# Patient Record
Sex: FEMALE | Race: WHITE | NOT HISPANIC OR LATINO | Employment: OTHER | URBAN - NONMETROPOLITAN AREA
[De-identification: names, ages, dates, MRNs, and addresses within clinical notes are randomized per-mention and may not be internally consistent; named-entity substitution may affect disease eponyms.]

---

## 2017-01-27 ENCOUNTER — PROCEDURE VISIT (OUTPATIENT)
Dept: NEUROLOGY | Facility: CLINIC | Age: 52
End: 2017-01-27

## 2017-01-27 VITALS
SYSTOLIC BLOOD PRESSURE: 140 MMHG | DIASTOLIC BLOOD PRESSURE: 96 MMHG | WEIGHT: 160 LBS | HEIGHT: 64 IN | BODY MASS INDEX: 27.31 KG/M2

## 2017-01-27 DIAGNOSIS — M54.2 NECK PAIN: ICD-10-CM

## 2017-01-27 DIAGNOSIS — G43.009 MIGRAINE WITHOUT AURA AND WITHOUT STATUS MIGRAINOSUS, NOT INTRACTABLE: Primary | ICD-10-CM

## 2017-01-27 PROCEDURE — 20553 NJX 1/MLT TRIGGER POINTS 3/>: CPT | Performed by: NURSE PRACTITIONER

## 2017-01-27 RX ORDER — METHYLPREDNISOLONE ACETATE 40 MG/ML
200 INJECTION, SUSPENSION INTRA-ARTICULAR; INTRALESIONAL; INTRAMUSCULAR; SOFT TISSUE ONCE
Status: DISCONTINUED | OUTPATIENT
Start: 2017-01-27 | End: 2018-01-21

## 2017-01-27 RX ORDER — BUPIVACAINE HYDROCHLORIDE 7.5 MG/ML
5 INJECTION, SOLUTION EPIDURAL; RETROBULBAR ONCE
Status: DISCONTINUED | OUTPATIENT
Start: 2017-01-27 | End: 2017-05-12

## 2017-01-27 NOTE — PROGRESS NOTES
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 Risks and benefits of the Trigger point injection procedure have been explained to the patient.  Patient has no contraindications such as bleed diathesis, recent acute trauma at the muscle sites, anesthetic allergy or anticoagulation.  Mechanism of trigger point injection has been explained to patient.     Procedure Note:  1.         Patient was positioned comfortably  2.         Before injections are started, 10 Trigger Points sites are identified throughout the bilateral upper trapezius muscle, levator scapulae, and erector spinae muscles.    3.         Overlying skin area was cleaned with Alcohol swab                                                                                                              4.         Before injection, trigger points sites were palpated for local twitch and referred pain to confirms placement                         5.         Local anesthetic was mixed with Depo-Medrol (5 cc of Marcaine 0.5% mixed with 5 cc of Depo-Medrol at 40 mg/ml - for a total of 10 cc)  6.         30 gauge ½ inch needle was utilized to ensure patient comfort          7.         I started with the most tender spot in above mentioned Trigger Points   1.   Localize most tender spot within taut muscle-fibers  2.   Fix tender spot between fingers (1-2 cm in size)   1.   Prevents from rolling away from needle  2.   Controls subcutaneous bleeding  3.   Before injection, patient was instructed of possible pain on injection  4.   Direct needle at 30 degree angle off skin   8.         Insert needle into skin 1-2 cm from Trigger Point   9.         Advance needle into Trigger Point   10.       Use 1cc anesthetic at each Trigger Points identified in step #2  11.       Redirect needle and reinject                                                                                              1.   Withdraw needle to subcutaneous tissue  2.   Redirect needle  into adjacent tender areas  3.   Repeat until local twitch or tautness resolves  12.   After each of the 10 injections I held direct pressure at injection site for 2 minutes to prevents hematoma formation  13.   The same procedure was repeated for other Tender Points located in the upper trapezius muscle, levator scapulae and erector spinae bilaterally  14.   Patient was instructed to gently stretch injected areas to full active range of motion in all directions and was instructed to repeat range of motion three times after injection  15.   Post-Procedure patient was instructed to avoid over-using injected area for 3-4 days   1.   Maintain active range of motion of injected muscle  2.   Patient applies ice to injected areas for a few hours  3.   Anticipate post-injection soreness for 3-4 days  There was no evidence of complications from injection was noted such as local Skin Infection  at injection site or local hematoma at injection site

## 2017-01-27 NOTE — MR AVS SNAPSHOT
Tatiana Fields   2017 4:30 PM   Procedure visit    Dept Phone:  [phone]   Encounter #:  [id number]    Provider:  SOULEYMANE Avitia   Department:  [facility] NEUROLOGY                Your Full Care Plan              Your Updated Medication List          This list is accurate as of: 17  4:58 PM.  Always use your most recent med list.                amitriptyline 10 MG tablet   Commonly known as:  ELAVIL   Take 1 tablet by mouth Every Night.       carvedilol CR 40 MG 24 hr capsule   Commonly known as:  COREG CR       lisinopril-hydrochlorothiazide 20-12.5 MG per tablet   Commonly known as:  PRINZIDE,ZESTORETIC       omeprazole 40 MG capsule   Commonly known as:  priLOSEC       TiZANidine 2 MG capsule   Commonly known as:  ZANAFLEX       Vitamin B-12 500 MCG sublingual tablet       Vitamin D 2000 UNITS tablet               Instructions     None    Patient Instructions History      Upcoming Appointments     Visit Type Date Time Department    IN OFFICE PROCEDURE 2017  4:30 PM [facility]OLOGY [place]    FOLLOW UP 3/1/2017  2:00 PM [facility]UROLOGY [place]      Printed Piece Signup     [facility] Printed Piece allows you to send messages to your doctor, view your test results, renew your prescriptions, schedule appointments, and more. To sign up, go to [employer] and click on the Sign Up Now link in the New User? box. Enter your Printed Piece Activation Code exactly as it appears below along with the last four digits of your Social Security Number and your Date of Birth () to complete the sign-up process. If you do not sign up before the expiration date, you must request a new code.    Printed Piece Activation Code: [email or web address]  Expires: 2/10/2017  4:58 PM    If you have questions, you can email [email or web address] or call [phone] to talk to our Printed Piece staff. Remember, Printed Piece is NOT to be used for urgent needs. For medical emergencies, dial  "911.               Other Info from Your Visit           Your Appointments     Mar 01, 2017  2:00 PM EST   Follow Up with SOULEYMANE Avitia   [facility] NEUROLOGY (--)    [address] [facility], [address] [address] [phone]           Arrive 15 minutes prior to appointment.              Allergies     Sulfa Antibiotics  Rash      Reason for Visit     Injections here for her first series of trigger injections for headaches. states that she has headache currently.      Vital Signs     Blood Pressure Height Weight Body Mass Index          140/96 64\" (162.6 cm) 160 lb (72.6 kg) 27.46 kg/m2          "

## 2017-03-03 ENCOUNTER — PROCEDURE VISIT (OUTPATIENT)
Dept: NEUROLOGY | Facility: CLINIC | Age: 52
End: 2017-03-03

## 2017-03-03 VITALS
HEIGHT: 64 IN | SYSTOLIC BLOOD PRESSURE: 132 MMHG | WEIGHT: 160 LBS | DIASTOLIC BLOOD PRESSURE: 90 MMHG | BODY MASS INDEX: 27.31 KG/M2

## 2017-03-03 DIAGNOSIS — H66.91 RIGHT OTITIS MEDIA, UNSPECIFIED CHRONICITY, UNSPECIFIED OTITIS MEDIA TYPE: Primary | ICD-10-CM

## 2017-03-03 DIAGNOSIS — G43.009 MIGRAINE WITHOUT AURA AND WITHOUT STATUS MIGRAINOSUS, NOT INTRACTABLE: ICD-10-CM

## 2017-03-03 RX ORDER — AMOXICILLIN AND CLAVULANATE POTASSIUM 875; 125 MG/1; MG/1
1 TABLET, FILM COATED ORAL EVERY 12 HOURS SCHEDULED
Status: DISCONTINUED | OUTPATIENT
Start: 2017-03-03 | End: 2017-05-23

## 2017-03-03 RX ORDER — SUMATRIPTAN 100 MG/1
100 TABLET, FILM COATED ORAL
Qty: 9 TABLET | Refills: 4 | Status: SHIPPED | OUTPATIENT
Start: 2017-03-03 | End: 2018-03-20 | Stop reason: SDUPTHER

## 2017-03-03 NOTE — PROGRESS NOTES
Subjective   Tatiana Fields is a 51 y.o. female     Chief Complaint   Patient presents with   • Follow-up     states that trigger injections helped but she continued to have migraines. not sure if she is going to have them again today. states that they did help some.       History of Present Illness     Pt is a very pleasant 52 yo female in clinic to follow up migraines.  Pt failed BB, elavil, steroidand trigger point injections.  Pt with 15 plus migraines monthly. Migraines mainly right temple start migrate to entire head + pounding + photo/phonophobia, + nausea with loss of work.  Discussed botox in depth with pt and she wishes to try    PAST HX: Pt 52 yo old female in clinic today to establish with [facility] [place] for headaches. Onset 2012 age 48 yrs old. States started feeling bad suddenly , dx HTN, Dx Lyme ds but was treated. Headaches onset same time. Denies head trauma. States migraines posterior and frontal simultaneous with neck pain. States dull ache leads into throbbing gets 7-8/10 States past 3 weeks + migraine continuous. States has blurry vision, difficulty thinking, Nausea with photophobia. Migraines worsened when pt returned to [art time working @ [employer]. + cervical pain + trap and c/o pain to mid back pt with known central stenosis. States was seen by pain clinic and trigger point injections helped in the past.  Currently patient states that she is having 3-4 migraines a week that are lasting anywhere from 8 hours to 48 hours this is well over 15 total days of migraines a month. Patient's pain level is 7-8 out of 10 minutes with medication. Patient also with tension headaches in her neck and posterior head.    The following portions of the patient's history were reviewed and updated as appropriate: allergies, current medications, past family history, past medical history, past social history, past surgical history and problem list.    Review of Systems  Constitutional: Positive for  "activity change and fatigue.   HENT: Positive for sinus pressure.   Chronic sinusitis Sp sinus surgery   Respiratory: Negative.   Cardiovascular: Negative.   Neurological: Positive for headaches.   Psychiatric/Behavioral: Positive for sleep disturbance.         Objective         Vitals:    03/03/17 1640   BP: 132/90   Weight: 160 lb (72.6 kg)   Height: 64\" (162.6 cm)     GENERAL: Patient is pleasant, cooperative, appears to be stated age.   SKIN AND EXTREMITIES: No skin rashes or lesions are noted. No cyanosis, clubbing or edema of the extremities.   HEAD: Head is normocephalic and atraumatic.   NECK: Neck without thyromegaly or adenopathy. Carotic upstrokes are 1+/4. No cranial or cervical bruits. The neck is supple with a decreased range of motion.  Laterally  CARDIOVASCULAR: Regular rate and rhythm with normal S1 and S2 without rub or gallop.  RESPIRATORY: Clear to auscultation without wheezes or crackle   PSYCH: Higher cortical function/mental status: The patient is alert. She is oriented x3 to time, place and person. Recent and the remote memory appear normal. The patient has a good fund of knowledge. There is no visual or auditory hallucination or suicidal or homicidal ideation.  SPEECH:There is no gross evidence of aphasia, dysarthria or agnosia.   CRANIAL NERVES: Extraocular movements are full and smooth with normal pursuits and saccades. No nystagmus noted. The face is symmetric. palate elevate symmetrically, Tongue midline, MOTOR: Strength is 5/5 throughout with normal tone and bulk with the following exceptions No involuntary movements noted.   DTR: are 2/4 and symmetrical in the upper extremities, 2/4 and symmetrical at the knees and 2/4 and symmetrical at the Achilles tendon.   COORDINATION AND GAIT: Coordination: The patient normally performs finger-nose-finger, heel-to-knee-to-shin and rapid alternating movements in symmetrical fashion.         Results for orders placed or performed in visit on " "01/10/14   T3, free   Result Value Ref Range    T3, Free 4.0 2.0 - 4.4 pg/mL   TSH   Result Value Ref Range    TSH, High Sensitivity 2.082 0.350 - 5.350 UIU/mL   Urinalysis Without Microscopic   Result Value Ref Range    Color, UA Yellow     Appearance, UA Clear     pH, UA 5.5 4.5 - 8.0    Specific Gravity, UA 1.019 1.001 - 1.030    Glucose, UA Negative NEGATIVE mg/dL    Ketones, UA Negative NEGATIVE    Bilirubin, UA Negative NEGATIVE    Blood, UA Negative NEGATIVE    Protein, UA Negative NEGATIVE mg/dL    Nitrite, UA Negative NEGATIVE    Leukocytes, UA Negative NEGATIVE    Urobilinogen, UA 0.2 0.2 - 1.0 mg/dL   T4, free   Result Value Ref Range    Free T4 1.61 0.89 - 1.76 ng/dL         I have personally reviewed the above labs     Assessment/Plan   1. Migraine: Patient with greater than 15 migraines a month positive neck pain failed trigger plan and prescription of Elavil at bedtime 10 mg. Prescription for Imitrex reviewed with patient on side effects of Imitrex and how to take Imitrex.  we will put in a request for insurance to cover BOTOX     Discussion:  Migraine headaches are a major public health problem affecting more than 28 million persons in this country. Nearly 25 percent of women and 9 percent of men experience disabling migraines.    Migraine treatment depends on the duration and severity of pain, associated symptoms, degree of disability, and initial response to therapy. A widely prescribed and effective class of medications for migraines is the 5-HT1 receptor-specific agonists (\"triptans\"). Contraindications to their use include ischemic vascular conditions, vasospastic coronary disease, uncontrolled hypertension, or other significant cardiovascular disease.  Following appropriate management of acute migraine, patients should be evaluated for initiation of preventive therapy. Factors that should prompt consideration of preventive therapy include the occurrence of two or more migraines per month with " disability lasting three or more days per month; failure of, contraindication for, or adverse events from acute treatments; use of abortive medication more than twice per week; and uncommon migraine conditions (e.g., hemiplegic migraine, migraine with prolonged aura, migrainous infarction). Patient preference and cost also should be considered. Evidence consistently supports the use of the beta blocker propranolol (Inderal) in migraine prophylaxis. Amitriptyline is a first-line agent for migraine prophylaxis and is the only antidepressant with consistent evidence supporting its effectiveness for this use. Divalproex (Depakote) and sodium valproate are well supported by evidence for use in migraine prevention. Topamax has also been studies for migraine prophlaxis in open label studies and double blind studies. Evidence does not support the use of diltiazem (Cardizem) in migraine prevention, and the evidence for several other calcium channel blockers, such as nifedipine (Procardia), is poor and suggests only modest effect  Menstrual Migraine can present a challenge to clinician. Estrogen withdrawal has been shown to precipitate migraine headaches, and a sustained elevated level of estrogen will postpone the migraine. Transdermal estrogen started just before menstruation can provide a sustained low level of estrogen, decreasing the degree of estrogen decline, and thus may prevent induction of migraines        2. Excessive sleepiness snoring insomnia a.m. headaches not feeling rested upon waking: Patient has symptoms of sleep apnea with a history of status post sinus surgery without her being relieved of symptoms. We will plan to schedule patient for a sleep study.     I have counseled patient extensively on Sleep Hygiene including regular sleep wake schedule and stimulus control therapy. I have also discussed the importance of weight reduction because 10% reduction in body weight can reduce sleep apnea by 50 %. We have  also discussed abstaining from smoking and drinking.  I have explained to patient that obstructive apnea episode is defined as the absence of airflow for at least 10 seconds.  Sleep apnea is usually accompanied by snoring, disturbed sleep, and daytime sleepiness. Patients with micrognathia, retrognathia, enlarged tonsils, tongue enlargement, and acromegaly are especially predisposed to obstructive sleep apnea. Abnormalities or weakness in the muscles can also contribute to obstructive sleep apnea. Obesity can also contribute to sleep apnea.  Sleep apnea can lead to a number of complications, ranging from daytime sleepiness to possible increased risk of cardiovascular risks.    Daytime sleepiness is the most serious. Daytime sleepiness can also increase the risk for accident-related injuries. Several studies have suggested that people with sleep apnea have two to three times as many car accidents, and five to seven times the risk for multiple accidents.    A number of cardiovascular diseases -- including high blood pressure, heart failure, stroke, and heart arrhythmias -- have an association with obstructive sleep apnea.    Up to a third of patients with heart failure also have sleep apnea. Both central and obstructive sleep apnea are linked with heart failure. Obstructive sleep apnea is also noted to be associated with type 2 diabetes according to Dr. Cadena at [facility].  The best treatment for symptomatic obstructive sleep apnea is continuous positive airflow pressure (CPAP). Bilevel positive airway pressure (BPAP) systems may be particularly helpful for patients with coexisting lung disease and those with excessive levels of carbon dioxide.  Other treatment options including UPPP surgery, LAUP surgery, radiofrequency somnoplasty and dental appliances such Vickery or Clearway.

## 2017-03-28 ENCOUNTER — HOSPITAL ENCOUNTER (OUTPATIENT)
Dept: SLEEP MEDICINE | Facility: HOSPITAL | Age: 52
Setting detail: THERAPIES SERIES
End: 2017-03-28

## 2017-03-28 DIAGNOSIS — G47.19 EXCESSIVE DAYTIME SLEEPINESS: ICD-10-CM

## 2017-03-28 DIAGNOSIS — G47.33 OBSTRUCTIVE SLEEP APNEA: ICD-10-CM

## 2017-03-28 DIAGNOSIS — T73.2XXA FATIGUE DUE TO EXPOSURE, INITIAL ENCOUNTER: ICD-10-CM

## 2017-03-28 PROCEDURE — 95810 POLYSOM 6/> YRS 4/> PARAM: CPT | Performed by: PSYCHIATRY & NEUROLOGY

## 2017-04-14 ENCOUNTER — PROCEDURE VISIT (OUTPATIENT)
Dept: NEUROLOGY | Facility: CLINIC | Age: 52
End: 2017-04-14

## 2017-04-14 VITALS
BODY MASS INDEX: 27.31 KG/M2 | DIASTOLIC BLOOD PRESSURE: 90 MMHG | SYSTOLIC BLOOD PRESSURE: 136 MMHG | HEIGHT: 64 IN | WEIGHT: 160 LBS

## 2017-04-14 DIAGNOSIS — D50.8 OTHER IRON DEFICIENCY ANEMIA: Primary | ICD-10-CM

## 2017-04-14 DIAGNOSIS — G43.119 INTRACTABLE MIGRAINE WITH AURA WITHOUT STATUS MIGRAINOSUS: ICD-10-CM

## 2017-04-14 PROCEDURE — 64615 CHEMODENERV MUSC MIGRAINE: CPT | Performed by: NURSE PRACTITIONER

## 2017-04-14 RX ORDER — ROPINIROLE 0.5 MG/1
TABLET, FILM COATED ORAL
Qty: 30 TABLET | Refills: 5 | Status: SHIPPED | OUTPATIENT
Start: 2017-04-14 | End: 2017-05-12

## 2017-04-19 PROBLEM — G43.119 INTRACTABLE MIGRAINE WITH AURA WITHOUT STATUS MIGRAINOSUS: Status: ACTIVE | Noted: 2017-04-19

## 2017-04-19 NOTE — PROGRESS NOTES
Patient has over 15 migraines a month over 4 hours duration interfering with the patient daily activities despite conservative medical treatment for acute and preventive measures.  Patient states that the trigger point injections did help in decreasing her migraines and helping with her neck and muscle pain.  However patient is still averaging 15 migraines a month lasting longer than 4 hours each.  The risk and benefit of the Botox treatment has been discussed with patient.  Safety information and contraindication of Botox has been reviewed with patient.  The most frequent Adverse reactions of Botox for migraine include but not limited to neck pain 9%, headache 5%, Ptosis 4%, muscle weakness 4%, injection site pain 3%, muscle spasms 2% have been gone over with our patient.  200 unit vial Botox was re-constituted with 4 mL 0.9 NS to 5 unit/0.1 mL using standard techniques.  10 units were given at the  muscle in 2 divided sites  5 units were given at the Procerus muscle  20 units were given at the Frontalis muscle in 4 divided sites  40 units were given at the Temporalis muscle in 8 divided sites  30 units were given at the Occipitalis muscle in 6 divided sites  20 units were given at the cervical paraspinal muscle in 4 divided sites  30 units were given at the Trapezius muscle in 6 divided sites  For a total of 155 units divided between 31 sites and 45 units of wastage  Patient tolerated procedure well without complication.

## 2017-05-12 ENCOUNTER — PROCEDURE VISIT (OUTPATIENT)
Dept: NEUROLOGY | Facility: CLINIC | Age: 52
End: 2017-05-12

## 2017-05-12 VITALS
WEIGHT: 161 LBS | SYSTOLIC BLOOD PRESSURE: 122 MMHG | DIASTOLIC BLOOD PRESSURE: 64 MMHG | HEIGHT: 64 IN | OXYGEN SATURATION: 96 % | HEART RATE: 87 BPM | BODY MASS INDEX: 27.49 KG/M2

## 2017-05-12 DIAGNOSIS — G44.209 TENSION HEADACHE: ICD-10-CM

## 2017-05-12 DIAGNOSIS — M54.2 CERVICALGIA: Primary | ICD-10-CM

## 2017-05-12 PROCEDURE — 20553 NJX 1/MLT TRIGGER POINTS 3/>: CPT | Performed by: NURSE PRACTITIONER

## 2017-05-12 RX ORDER — BUPIVACAINE HYDROCHLORIDE 7.5 MG/ML
5 INJECTION, SOLUTION EPIDURAL; RETROBULBAR ONCE
Status: COMPLETED | OUTPATIENT
Start: 2017-05-12 | End: 2017-05-12

## 2017-05-12 RX ORDER — CARISOPRODOL 350 MG/1
TABLET ORAL
Refills: 0 | COMMUNITY
Start: 2017-04-15 | End: 2017-06-09

## 2017-05-12 RX ORDER — OXYCODONE HCL/ACETAMINOPHEN 10MG-325MG
TABLET ORAL
Refills: 0 | COMMUNITY
Start: 2017-05-10 | End: 2019-08-07 | Stop reason: ALTCHOICE

## 2017-05-12 RX ORDER — BROMPHENIRAMINE MALEATE AND PSEUDOEPHEDRINE HYDROCHLORIDE 4; 60 MG/1; MG/1
CAPSULE ORAL
Refills: 0 | COMMUNITY
Start: 2017-04-17 | End: 2018-08-15 | Stop reason: SDUPTHER

## 2017-05-12 RX ORDER — ROPINIROLE 0.5 MG/1
1 TABLET, FILM COATED ORAL NIGHTLY
Qty: 60 TABLET | Refills: 5 | Status: SHIPPED | OUTPATIENT
Start: 2017-05-12 | End: 2017-06-19

## 2017-05-12 RX ORDER — METHYLPREDNISOLONE ACETATE 40 MG/ML
200 INJECTION, SUSPENSION INTRA-ARTICULAR; INTRALESIONAL; INTRAMUSCULAR; SOFT TISSUE ONCE
Status: COMPLETED | OUTPATIENT
Start: 2017-05-12 | End: 2017-05-12

## 2017-05-12 RX ORDER — IBUPROFEN 600 MG/1
TABLET ORAL
Refills: 0 | COMMUNITY
Start: 2017-05-10 | End: 2018-03-21

## 2017-05-12 RX ADMIN — METHYLPREDNISOLONE ACETATE 200 MG: 40 INJECTION, SUSPENSION INTRA-ARTICULAR; INTRALESIONAL; INTRAMUSCULAR; SOFT TISSUE at 17:07

## 2017-05-12 RX ADMIN — BUPIVACAINE HYDROCHLORIDE 37.5 MG: 7.5 INJECTION, SOLUTION EPIDURAL; RETROBULBAR at 17:07

## 2017-05-23 ENCOUNTER — OFFICE VISIT (OUTPATIENT)
Dept: FAMILY MEDICINE CLINIC | Facility: CLINIC | Age: 52
End: 2017-05-23

## 2017-05-23 VITALS
HEART RATE: 74 BPM | WEIGHT: 165 LBS | BODY MASS INDEX: 28.17 KG/M2 | SYSTOLIC BLOOD PRESSURE: 122 MMHG | DIASTOLIC BLOOD PRESSURE: 80 MMHG | HEIGHT: 64 IN | OXYGEN SATURATION: 98 %

## 2017-05-23 DIAGNOSIS — R79.89 ABNORMAL THYROID BLOOD TEST: ICD-10-CM

## 2017-05-23 DIAGNOSIS — E61.1 IRON DEFICIENCY: ICD-10-CM

## 2017-05-23 DIAGNOSIS — R73.03 PREDIABETES: ICD-10-CM

## 2017-05-23 DIAGNOSIS — N30.10 INTERSTITIAL CYSTITIS: ICD-10-CM

## 2017-05-23 DIAGNOSIS — R53.82 CHRONIC FATIGUE: ICD-10-CM

## 2017-05-23 DIAGNOSIS — R23.2 HOT FLASHES: ICD-10-CM

## 2017-05-23 DIAGNOSIS — A69.20 LYME DISEASE: ICD-10-CM

## 2017-05-23 DIAGNOSIS — M25.50 POLYARTHRALGIA: Primary | ICD-10-CM

## 2017-05-23 DIAGNOSIS — I10 ESSENTIAL HYPERTENSION: ICD-10-CM

## 2017-05-23 DIAGNOSIS — M79.7 FIBROMYALGIA: ICD-10-CM

## 2017-05-23 DIAGNOSIS — E83.42 HYPOMAGNESEMIA: ICD-10-CM

## 2017-05-23 DIAGNOSIS — E55.9 VITAMIN D DEFICIENCY: ICD-10-CM

## 2017-05-23 DIAGNOSIS — E53.8 VITAMIN B 12 DEFICIENCY: ICD-10-CM

## 2017-05-23 PROBLEM — IMO0001 BRASH: Status: ACTIVE | Noted: 2017-05-23

## 2017-05-23 PROBLEM — K59.09 CHRONIC CONSTIPATION: Status: ACTIVE | Noted: 2017-05-23

## 2017-05-23 PROBLEM — K59.00 CN (CONSTIPATION): Status: ACTIVE | Noted: 2017-05-23

## 2017-05-23 PROBLEM — IMO0001 BRASH: Status: RESOLVED | Noted: 2017-05-23 | Resolved: 2017-05-23

## 2017-05-23 PROBLEM — K58.9 IRRITABLE BOWEL SYNDROME: Status: ACTIVE | Noted: 2017-05-23

## 2017-05-23 PROBLEM — F43.10 PTSD (POST-TRAUMATIC STRESS DISORDER): Status: ACTIVE | Noted: 2017-05-23

## 2017-05-23 PROBLEM — K21.9 GERD (GASTROESOPHAGEAL REFLUX DISEASE): Status: ACTIVE | Noted: 2017-05-23

## 2017-05-23 PROCEDURE — 99204 OFFICE O/P NEW MOD 45 MIN: CPT | Performed by: FAMILY MEDICINE

## 2017-05-23 RX ORDER — SUMATRIPTAN 100 MG/1
100 TABLET, FILM COATED ORAL
COMMUNITY
End: 2018-06-15 | Stop reason: SDUPTHER

## 2017-05-23 RX ORDER — FLUOXETINE HYDROCHLORIDE 20 MG/1
CAPSULE ORAL
Refills: 0 | COMMUNITY
Start: 2017-05-17 | End: 2017-05-23 | Stop reason: ALTCHOICE

## 2017-05-23 RX ORDER — ESCITALOPRAM OXALATE 20 MG/1
20 TABLET ORAL DAILY
COMMUNITY
End: 2017-08-17

## 2017-05-24 LAB
25(OH)D3+25(OH)D2 SERPL-MCNC: 17.4 NG/ML
ALBUMIN SERPL-MCNC: 4.3 G/DL (ref 3.5–5)
ALBUMIN/GLOB SERPL: 1.9 G/DL (ref 1–2)
ALP SERPL-CCNC: 58 U/L (ref 38–126)
ALT SERPL-CCNC: 29 U/L (ref 13–69)
AST SERPL-CCNC: 21 U/L (ref 15–46)
B BURGDOR IGG+IGM SER-ACNC: <0.91 ISR (ref 0–0.9)
BASOPHILS # BLD AUTO: 0.06 10*3/MM3 (ref 0–0.2)
BASOPHILS NFR BLD AUTO: 0.8 % (ref 0–2.5)
BILIRUB SERPL-MCNC: 0.7 MG/DL (ref 0.2–1.3)
BUN SERPL-MCNC: 19 MG/DL (ref 7–20)
BUN/CREAT SERPL: 27.1 (ref 7.1–23.5)
CALCIUM SERPL-MCNC: 9.5 MG/DL (ref 8.4–10.2)
CHLORIDE SERPL-SCNC: 101 MMOL/L (ref 98–107)
CK SERPL-CCNC: 35 U/L (ref 30–170)
CO2 SERPL-SCNC: 28 MMOL/L (ref 26–30)
CREAT SERPL-MCNC: 0.7 MG/DL (ref 0.6–1.3)
EOSINOPHIL # BLD AUTO: 0.11 10*3/MM3 (ref 0–0.7)
EOSINOPHIL NFR BLD AUTO: 1.5 % (ref 0–7)
ERYTHROCYTE [DISTWIDTH] IN BLOOD BY AUTOMATED COUNT: 12.3 % (ref 11.5–14.5)
GLOBULIN SER CALC-MCNC: 2.3 GM/DL
GLUCOSE SERPL-MCNC: 93 MG/DL (ref 74–98)
HBA1C MFR BLD: 5.7 %
HCT VFR BLD AUTO: 39.6 % (ref 37–47)
HGB BLD-MCNC: 12.1 G/DL (ref 12–16)
IMM GRANULOCYTES # BLD: 0.05 10*3/MM3 (ref 0–0.06)
IMM GRANULOCYTES NFR BLD: 0.7 % (ref 0–0.6)
IRON SERPL-MCNC: 111 MCG/DL (ref 37–181)
LYMPHOCYTES # BLD AUTO: 2.44 10*3/MM3 (ref 0.6–3.4)
LYMPHOCYTES NFR BLD AUTO: 32.7 % (ref 10–50)
MAGNESIUM SERPL-MCNC: 1.9 MG/DL (ref 1.6–2.3)
MCH RBC QN AUTO: 27.7 PG (ref 27–31)
MCHC RBC AUTO-ENTMCNC: 30.6 G/DL (ref 30–37)
MCV RBC AUTO: 90.6 FL (ref 81–99)
MONOCYTES # BLD AUTO: 0.53 10*3/MM3 (ref 0–0.9)
MONOCYTES NFR BLD AUTO: 7.1 % (ref 0–12)
NEUTROPHILS # BLD AUTO: 4.28 10*3/MM3 (ref 2–6.9)
NEUTROPHILS NFR BLD AUTO: 57.2 % (ref 37–80)
NRBC BLD AUTO-RTO: 0 /100 WBC (ref 0–0)
PLATELET # BLD AUTO: 346 10*3/MM3 (ref 130–400)
POTASSIUM SERPL-SCNC: 3.8 MMOL/L (ref 3.5–5.1)
PROT SERPL-MCNC: 6.6 G/DL (ref 6.3–8.2)
RBC # BLD AUTO: 4.37 10*6/MM3 (ref 4.2–5.4)
SODIUM SERPL-SCNC: 139 MMOL/L (ref 137–145)
T3FREE SERPL-MCNC: 2.5 PG/ML (ref 2–4.4)
T4 FREE SERPL-MCNC: 1.02 NG/DL (ref 0.78–2.19)
TSH SERPL DL<=0.005 MIU/L-ACNC: 1.37 MIU/ML (ref 0.47–4.68)
VIT B12 SERPL-MCNC: 268 PG/ML (ref 239–931)
WBC # BLD AUTO: 7.47 10*3/MM3 (ref 4.8–10.8)

## 2017-05-26 ENCOUNTER — CLINICAL SUPPORT (OUTPATIENT)
Dept: FAMILY MEDICINE CLINIC | Facility: CLINIC | Age: 52
End: 2017-05-26

## 2017-05-26 DIAGNOSIS — E53.8 B12 DEFICIENCY: ICD-10-CM

## 2017-05-26 PROCEDURE — 96372 THER/PROPH/DIAG INJ SC/IM: CPT | Performed by: FAMILY MEDICINE

## 2017-05-26 RX ORDER — CYANOCOBALAMIN 1000 UG/ML
1000 INJECTION, SOLUTION INTRAMUSCULAR; SUBCUTANEOUS
Status: DISCONTINUED | OUTPATIENT
Start: 2017-05-26 | End: 2018-05-18

## 2017-05-26 RX ADMIN — CYANOCOBALAMIN 1000 MCG: 1000 INJECTION, SOLUTION INTRAMUSCULAR; SUBCUTANEOUS at 16:51

## 2017-06-02 ENCOUNTER — CLINICAL SUPPORT (OUTPATIENT)
Dept: FAMILY MEDICINE CLINIC | Facility: CLINIC | Age: 52
End: 2017-06-02

## 2017-06-02 DIAGNOSIS — E53.8 VITAMIN B12 DEFICIENCY: ICD-10-CM

## 2017-06-02 PROCEDURE — 96372 THER/PROPH/DIAG INJ SC/IM: CPT | Performed by: FAMILY MEDICINE

## 2017-06-02 RX ORDER — CYANOCOBALAMIN 1000 UG/ML
1000 INJECTION, SOLUTION INTRAMUSCULAR; SUBCUTANEOUS
Status: DISCONTINUED | OUTPATIENT
Start: 2017-06-02 | End: 2018-05-18

## 2017-06-02 RX ADMIN — CYANOCOBALAMIN 1000 MCG: 1000 INJECTION, SOLUTION INTRAMUSCULAR; SUBCUTANEOUS at 14:08

## 2017-06-06 ENCOUNTER — CLINICAL SUPPORT (OUTPATIENT)
Dept: FAMILY MEDICINE CLINIC | Facility: CLINIC | Age: 52
End: 2017-06-06

## 2017-06-06 DIAGNOSIS — E53.8 B12 DEFICIENCY: ICD-10-CM

## 2017-06-06 PROCEDURE — 96372 THER/PROPH/DIAG INJ SC/IM: CPT | Performed by: FAMILY MEDICINE

## 2017-06-06 RX ORDER — CYANOCOBALAMIN 1000 UG/ML
1000 INJECTION, SOLUTION INTRAMUSCULAR; SUBCUTANEOUS
Status: DISCONTINUED | OUTPATIENT
Start: 2017-06-06 | End: 2018-05-18

## 2017-06-06 RX ADMIN — CYANOCOBALAMIN 1000 MCG: 1000 INJECTION, SOLUTION INTRAMUSCULAR; SUBCUTANEOUS at 16:29

## 2017-06-09 ENCOUNTER — OFFICE VISIT (OUTPATIENT)
Dept: FAMILY MEDICINE CLINIC | Facility: CLINIC | Age: 52
End: 2017-06-09

## 2017-06-09 VITALS
WEIGHT: 160 LBS | DIASTOLIC BLOOD PRESSURE: 78 MMHG | OXYGEN SATURATION: 98 % | HEART RATE: 84 BPM | HEIGHT: 64 IN | BODY MASS INDEX: 27.31 KG/M2 | SYSTOLIC BLOOD PRESSURE: 115 MMHG

## 2017-06-09 DIAGNOSIS — J01.11 ACUTE RECURRENT FRONTAL SINUSITIS: ICD-10-CM

## 2017-06-09 DIAGNOSIS — J01.01 ACUTE RECURRENT MAXILLARY SINUSITIS: ICD-10-CM

## 2017-06-09 PROCEDURE — 96372 THER/PROPH/DIAG INJ SC/IM: CPT | Performed by: NURSE PRACTITIONER

## 2017-06-09 PROCEDURE — 99213 OFFICE O/P EST LOW 20 MIN: CPT | Performed by: NURSE PRACTITIONER

## 2017-06-09 RX ORDER — CEFTRIAXONE 1 G/1
1 INJECTION, POWDER, FOR SOLUTION INTRAMUSCULAR; INTRAVENOUS ONCE
Status: COMPLETED | OUTPATIENT
Start: 2017-06-09 | End: 2017-06-09

## 2017-06-09 RX ORDER — PREDNISONE 20 MG/1
20 TABLET ORAL 2 TIMES DAILY
Qty: 10 TABLET | Refills: 0 | Status: SHIPPED | OUTPATIENT
Start: 2017-06-09 | End: 2018-03-19 | Stop reason: SDUPTHER

## 2017-06-09 RX ORDER — METHYLPREDNISOLONE ACETATE 80 MG/ML
80 INJECTION, SUSPENSION INTRA-ARTICULAR; INTRALESIONAL; INTRAMUSCULAR; SOFT TISSUE ONCE
Status: COMPLETED | OUTPATIENT
Start: 2017-06-09 | End: 2017-06-09

## 2017-06-09 RX ORDER — DOXEPIN HYDROCHLORIDE 25 MG/1
CAPSULE ORAL
Refills: 0 | COMMUNITY
Start: 2017-06-01 | End: 2017-06-27

## 2017-06-09 RX ADMIN — CEFTRIAXONE 1 G: 1 INJECTION, POWDER, FOR SOLUTION INTRAMUSCULAR; INTRAVENOUS at 17:16

## 2017-06-09 RX ADMIN — METHYLPREDNISOLONE ACETATE 80 MG: 80 INJECTION, SUSPENSION INTRA-ARTICULAR; INTRALESIONAL; INTRAMUSCULAR; SOFT TISSUE at 17:14

## 2017-06-09 NOTE — PROGRESS NOTES
"Subjective   Tatiana Fields is a 51 y.o. female.     HPI Comments: Patient is a 51 year old female here today for sinus pressure and congestion. Patient states she has a history of chronic sinus infections and usually responds best to steroid and antibiotic injections due to stomach sensitivity. Patient denies any further problems or complaints. The patients pain is currently being managed by a specialist.       The following portions of the patient's history were reviewed and updated as appropriate: allergies, current medications, past family history, past medical history, past social history, past surgical history and problem list.    Review of Systems   Constitutional: Negative.    HENT: Positive for congestion, facial swelling and sinus pressure. Negative for dental problem, drooling, ear discharge, ear pain, hearing loss, mouth sores, nosebleeds, rhinorrhea, sneezing, sore throat, tinnitus, trouble swallowing and voice change.    Eyes: Negative.    Respiratory: Negative.    Cardiovascular: Negative.    Gastrointestinal: Negative.    Endocrine: Negative.    Genitourinary: Negative.    Musculoskeletal: Negative.  Back pain: chronic.   Skin: Negative.    Allergic/Immunologic: Negative.    Neurological: Negative.    Hematological: Negative.    Psychiatric/Behavioral: Negative.      Blood pressure 115/78, pulse 84, height 64\" (162.6 cm), weight 160 lb (72.6 kg), SpO2 98 %.    Objective   Physical Exam   Constitutional: She is oriented to person, place, and time. Vital signs are normal. She appears well-developed and well-nourished.  Non-toxic appearance. She does not have a sickly appearance. She does not appear ill. No distress.   HENT:   Head: Normocephalic.   Nose: Right sinus exhibits maxillary sinus tenderness and frontal sinus tenderness. Left sinus exhibits maxillary sinus tenderness and frontal sinus tenderness.   Eyes: Conjunctivae are normal.   Neck: Normal range of motion. No tracheal deviation present. " No thyromegaly present.   Cardiovascular: Normal rate and regular rhythm.    Pulmonary/Chest: Effort normal and breath sounds normal. No accessory muscle usage. No tachypnea. No respiratory distress. She has no decreased breath sounds. She has no wheezes. She has no rhonchi. She has no rales.   Abdominal: Soft. Normal appearance and bowel sounds are normal. There is no tenderness.   Musculoskeletal: Normal range of motion.   ROM in all major joints WNL   Neurological: She is alert and oriented to person, place, and time.   Skin: Skin is warm, dry and intact.   Psychiatric: She has a normal mood and affect. Her speech is normal and behavior is normal. Judgment and thought content normal. Cognition and memory are normal.       Assessment/Plan   Tatiana was seen today for facial pain.    Diagnoses and all orders for this visit:    Acute recurrent maxillary sinusitis  -     methylPREDNISolone acetate (DEPO-medrol) injection 80 mg; Inject 1 mL into the shoulder, thigh, or buttocks 1 (One) Time.  -     cefTRIAXone (ROCEPHIN) injection 1 g; Inject 1 g into the shoulder, thigh, or buttocks 1 (One) Time.    Acute recurrent frontal sinusitis    Other orders  -     predniSONE (DELTASONE) 20 MG tablet; Take 1 tablet by mouth 2 (Two) Times a Day for 5 days.         Patient given DEPO-medrol and Rocephin injections, in this office visit today. Patient instructed to take oral prednisone tablets 20mg 2x daily for 5 days, starting  tomorrow.     Patient was encouraged to keep me informed of any acute changes, lack of improvement, or any new concerning symptoms. I will contact patient regarding test results and provide instructions regarding any necessary changes in plan of care. Patient voiced understanding of all instructions and denied further questions.    Patient to RTC prn.

## 2017-06-11 PROBLEM — J01.01 ACUTE RECURRENT MAXILLARY SINUSITIS: Status: ACTIVE | Noted: 2017-06-11

## 2017-06-11 PROBLEM — J01.10 ACUTE FRONTAL SINUSITIS: Status: ACTIVE | Noted: 2017-06-11

## 2017-06-16 PROBLEM — E55.9 VITAMIN D DEFICIENCY: Status: ACTIVE | Noted: 2017-06-16

## 2017-06-16 PROBLEM — J01.10 ACUTE FRONTAL SINUSITIS: Status: RESOLVED | Noted: 2017-06-11 | Resolved: 2017-06-16

## 2017-06-16 PROBLEM — J01.01 ACUTE RECURRENT MAXILLARY SINUSITIS: Status: RESOLVED | Noted: 2017-06-11 | Resolved: 2017-06-16

## 2017-06-16 PROBLEM — R16.0 HEPATOMEGALY: Status: ACTIVE | Noted: 2017-06-16

## 2017-06-19 ENCOUNTER — OFFICE VISIT (OUTPATIENT)
Dept: FAMILY MEDICINE CLINIC | Facility: CLINIC | Age: 52
End: 2017-06-19

## 2017-06-19 VITALS
SYSTOLIC BLOOD PRESSURE: 138 MMHG | DIASTOLIC BLOOD PRESSURE: 80 MMHG | BODY MASS INDEX: 27.83 KG/M2 | OXYGEN SATURATION: 97 % | WEIGHT: 163 LBS | HEART RATE: 80 BPM | HEIGHT: 64 IN

## 2017-06-19 DIAGNOSIS — R10.84 GENERALIZED ABDOMINAL PAIN: ICD-10-CM

## 2017-06-19 DIAGNOSIS — R23.2 HOT FLASHES: ICD-10-CM

## 2017-06-19 DIAGNOSIS — E53.8 VITAMIN B12 DEFICIENCY: ICD-10-CM

## 2017-06-19 DIAGNOSIS — K59.00 CONSTIPATION, UNSPECIFIED CONSTIPATION TYPE: ICD-10-CM

## 2017-06-19 DIAGNOSIS — K58.1 IRRITABLE BOWEL SYNDROME WITH CONSTIPATION: ICD-10-CM

## 2017-06-19 LAB
BILIRUB BLD-MCNC: NEGATIVE MG/DL
CLARITY, POC: CLEAR
COLOR UR: YELLOW
GLUCOSE UR STRIP-MCNC: NEGATIVE MG/DL
KETONES UR QL: NEGATIVE
LEUKOCYTE EST, POC: NEGATIVE
NITRITE UR-MCNC: NEGATIVE MG/ML
PH UR: 7 [PH] (ref 5–8)
PROT UR STRIP-MCNC: NEGATIVE MG/DL
RBC # UR STRIP: NEGATIVE /UL
SP GR UR: 1.01 (ref 1–1.03)
UROBILINOGEN UR QL: NORMAL

## 2017-06-19 PROCEDURE — 99214 OFFICE O/P EST MOD 30 MIN: CPT | Performed by: NURSE PRACTITIONER

## 2017-06-19 PROCEDURE — 96372 THER/PROPH/DIAG INJ SC/IM: CPT | Performed by: NURSE PRACTITIONER

## 2017-06-19 PROCEDURE — 81003 URINALYSIS AUTO W/O SCOPE: CPT | Performed by: NURSE PRACTITIONER

## 2017-06-19 RX ORDER — GABAPENTIN 300 MG/1
CAPSULE ORAL
Refills: 0 | COMMUNITY
Start: 2017-06-09 | End: 2018-02-14

## 2017-06-19 RX ORDER — POLYETHYLENE GLYCOL 3350 17 G/17G
POWDER, FOR SOLUTION ORAL
Refills: 11 | COMMUNITY
Start: 2017-03-31 | End: 2018-04-24 | Stop reason: SDUPTHER

## 2017-06-19 RX ORDER — LISINOPRIL 10 MG/1
TABLET ORAL
Refills: 6 | COMMUNITY
Start: 2017-05-17 | End: 2018-09-21 | Stop reason: SDUPTHER

## 2017-06-19 RX ORDER — ROPINIROLE 1 MG/1
TABLET, FILM COATED ORAL
Refills: 5 | COMMUNITY
Start: 2017-05-12 | End: 2017-07-14

## 2017-06-19 RX ORDER — LUBIPROSTONE 24 UG/1
24 CAPSULE ORAL 2 TIMES DAILY WITH MEALS
Qty: 60 CAPSULE | Refills: 1 | Status: SHIPPED | OUTPATIENT
Start: 2017-06-19 | End: 2017-06-27

## 2017-06-19 RX ORDER — CYANOCOBALAMIN 1000 UG/ML
1000 INJECTION, SOLUTION INTRAMUSCULAR; SUBCUTANEOUS
Status: DISCONTINUED | OUTPATIENT
Start: 2017-06-19 | End: 2018-05-18

## 2017-06-19 RX ADMIN — CYANOCOBALAMIN 1000 MCG: 1000 INJECTION, SOLUTION INTRAMUSCULAR; SUBCUTANEOUS at 17:27

## 2017-06-19 NOTE — PROGRESS NOTES
Subjective   Tatiana Fields is a 51 y.o. female.     Abdominal Pain   This is a recurrent problem. The current episode started in the past 7 days (5 days). The onset quality is gradual. The problem occurs constantly. The most recent episode lasted 5 days. The problem has been gradually worsening. The pain is located in the epigastric region, periumbilical region, RUQ and LUQ. The pain is at a severity of 7/10. The pain is moderate. The quality of the pain is aching and cramping. The abdominal pain does not radiate. Associated symptoms include belching, constipation and flatus. Pertinent negatives include no anorexia, diarrhea, dysuria, fever, frequency, headaches, hematochezia, hematuria, melena, nausea, vomiting or weight loss. Associated symptoms comments: Excess hunger. The pain is aggravated by deep breathing, certain positions and palpation. The pain is relieved by eating and bowel movements. She has tried proton pump inhibitors (Miralax, Linzess) for the symptoms. The treatment provided no relief. Prior diagnostic workup includes ultrasound and GI consult. Her past medical history is significant for GERD and irritable bowel syndrome. constipation, gastroparesis        The following portions of the patient's history were reviewed and updated as appropriate: allergies, current medications, past family history, past medical history, past social history, past surgical history and problem list.    Review of Systems   Constitutional: Positive for chills, diaphoresis and fatigue. Negative for activity change, appetite change, fever, unexpected weight change and weight loss.   Respiratory: Negative.    Cardiovascular: Negative.    Gastrointestinal: Positive for abdominal pain, constipation and flatus. Negative for anorexia, diarrhea, hematochezia, melena, nausea and vomiting.   Genitourinary: Negative for difficulty urinating, dysuria, frequency, hematuria, pelvic pain, urgency, vaginal bleeding, vaginal discharge  and vaginal pain.   Allergic/Immunologic: Negative.    Neurological: Negative for dizziness, weakness and headaches.   Hematological: Negative.        Objective   Physical Exam   Constitutional: She is oriented to person, place, and time. She appears well-developed and well-nourished. No distress.   HENT:   Head: Normocephalic.   Right Ear: External ear normal.   Left Ear: External ear normal.   Nose: Nose normal.   Eyes: Conjunctivae are normal.   Neck: Normal range of motion. Neck supple. No tracheal deviation present. No thyromegaly present.   Cardiovascular: Normal rate, regular rhythm, normal heart sounds and intact distal pulses.    No murmur heard.  Pulmonary/Chest: Effort normal and breath sounds normal. No respiratory distress. She has no wheezes. She has no rales. She exhibits no tenderness.   Abdominal: Soft. Bowel sounds are normal. She exhibits no distension and no mass. There is no hepatosplenomegaly or splenomegaly. There is tenderness in the periumbilical area. There is no rebound and no guarding. No hernia.   Musculoskeletal: Normal range of motion. She exhibits no edema or tenderness.   Lymphadenopathy:     She has no cervical adenopathy.        Right cervical: No superficial cervical, no deep cervical and no posterior cervical adenopathy present.       Left cervical: No superficial cervical, no deep cervical and no posterior cervical adenopathy present.   Neurological: She is alert and oriented to person, place, and time. Coordination and gait normal.   Skin: Skin is warm and dry. No rash noted.   Psychiatric: She has a normal mood and affect. Her behavior is normal. Judgment and thought content normal.   Nursing note and vitals reviewed.      Assessment/Plan   Tatiana was seen today for abdominal pain.    Diagnoses and all orders for this visit:    Generalized abdominal pain  -     CT Abdomen Pelvis With & Without Contrast; Future  -     CBC (No Diff)  -     Comprehensive Metabolic Panel  -      Amylase  -     Lipase  -     POCT urinalysis dipstick, automated    Constipation, unspecified constipation type  -     CT Abdomen Pelvis With & Without Contrast; Future  -     lubiprostone (AMITIZA) 24 MCG capsule; Take 1 capsule by mouth 2 (Two) Times a Day With Meals for 30 days.  -     CBC (No Diff)  -     Comprehensive Metabolic Panel  -     POCT urinalysis dipstick, automated    Irritable bowel syndrome with constipation  -     lubiprostone (AMITIZA) 24 MCG capsule; Take 1 capsule by mouth 2 (Two) Times a Day With Meals for 30 days.  -     CBC (No Diff)  -     Comprehensive Metabolic Panel  -     POCT urinalysis dipstick, automated    Hot flashes  -     Estradiol  -     FSH & LH  -     Progesterone    Vitamin B12 deficiency  -     cyanocobalamin injection 1,000 mcg; Inject 1 mL into the shoulder, thigh, or buttocks Every 28 (Twenty-Eight) Days.      Screening labs obtained in clinic today, for further evaluation of her symptoms, as well as CT with and without contrast. I will contact patient regarding test results and provide instructions regarding any necessary changes in plan of care.    Linzess stopped and Amitiza started today, for better control of her constipation and IBS.     B12 injection given in clinic today for her deficiency.    Patient was encouraged to keep me informed of any acute changes, lack of improvement, or any new concerning symptoms. Patient also advised when to seek emergent treatment. Patient voiced understanding of all instructions and denied further questions.    Patient to RTC pending labs and CT.

## 2017-06-20 LAB
ALBUMIN SERPL-MCNC: 4.6 G/DL (ref 3.5–5.5)
ALBUMIN/GLOB SERPL: 1.6 {RATIO} (ref 1.2–2.2)
ALP SERPL-CCNC: 75 IU/L (ref 39–117)
ALT SERPL-CCNC: 28 IU/L (ref 0–32)
AMYLASE SERPL-CCNC: 49 U/L (ref 31–124)
AST SERPL-CCNC: 24 IU/L (ref 0–40)
BILIRUB SERPL-MCNC: 0.2 MG/DL (ref 0–1.2)
BUN SERPL-MCNC: 21 MG/DL (ref 6–24)
BUN/CREAT SERPL: 22 (ref 9–23)
CALCIUM SERPL-MCNC: 9.9 MG/DL (ref 8.7–10.2)
CHLORIDE SERPL-SCNC: 98 MMOL/L (ref 96–106)
CO2 SERPL-SCNC: 19 MMOL/L (ref 18–29)
CREAT SERPL-MCNC: 0.95 MG/DL (ref 0.57–1)
ERYTHROCYTE [DISTWIDTH] IN BLOOD BY AUTOMATED COUNT: 13.6 % (ref 12.3–15.4)
ESTRADIOL SERPL-MCNC: <5 PG/ML
FSH SERPL-ACNC: 101.8 MIU/ML
GLOBULIN SER CALC-MCNC: 2.9 G/DL (ref 1.5–4.5)
GLUCOSE SERPL-MCNC: 112 MG/DL (ref 65–99)
HCT VFR BLD AUTO: 41.6 % (ref 34–46.6)
HGB BLD-MCNC: 13.4 G/DL (ref 11.1–15.9)
LH SERPL-ACNC: 49 MIU/ML
LIPASE SERPL-CCNC: 21 U/L (ref 0–59)
MCH RBC QN AUTO: 27.7 PG (ref 26.6–33)
MCHC RBC AUTO-ENTMCNC: 32.2 G/DL (ref 31.5–35.7)
MCV RBC AUTO: 86 FL (ref 79–97)
PLATELET # BLD AUTO: 463 X10E3/UL (ref 150–379)
POTASSIUM SERPL-SCNC: 5.6 MMOL/L (ref 3.5–5.2)
PROGEST SERPL-MCNC: 0.1 NG/ML
PROT SERPL-MCNC: 7.5 G/DL (ref 6–8.5)
RBC # BLD AUTO: 4.84 X10E6/UL (ref 3.77–5.28)
SODIUM SERPL-SCNC: 140 MMOL/L (ref 134–144)
WBC # BLD AUTO: 9.3 X10E3/UL (ref 3.4–10.8)

## 2017-06-22 ENCOUNTER — TELEPHONE (OUTPATIENT)
Dept: FAMILY MEDICINE CLINIC | Facility: CLINIC | Age: 52
End: 2017-06-22

## 2017-06-23 ENCOUNTER — TELEPHONE (OUTPATIENT)
Dept: FAMILY MEDICINE CLINIC | Facility: CLINIC | Age: 52
End: 2017-06-23

## 2017-06-23 DIAGNOSIS — J06.9 VIRAL UPPER RESPIRATORY TRACT INFECTION: Primary | ICD-10-CM

## 2017-06-23 RX ORDER — AZITHROMYCIN 250 MG/1
TABLET, FILM COATED ORAL
Qty: 6 TABLET | Refills: 0 | Status: SHIPPED | OUTPATIENT
Start: 2017-06-23 | End: 2017-07-14

## 2017-06-23 NOTE — TELEPHONE ENCOUNTER
----- Message from Afshan Gillespie MA sent at 6/23/2017  5:31 PM EDT -----  Pt notified of results and scan    ----- Message -----     From: Afshan Gillespie MA     Sent: 6/22/2017   6:33 PM       To: Afshan Gillespie MA    Left vm for pt to return call to office    ----- Message -----     From: SOULEYMANE Richardson     Sent: 6/22/2017   1:14 PM       To: Afshan Gillespie MA    Ok, we will recheck her level in 2 weeks. I sent a message about her scan. I just got the results today.  ----- Message -----     From: Afshan Gillespie MA     Sent: 6/20/2017   6:12 PM       To: SOULEYMANE Richardson    Spoke with pt and notified of results. She said that she has not been taking anything with potassium in it but she has been having cramping in her legs.  She also was asking about the results of her scan. It looks like they are in there but i dont see where you have sent me a message on it    ----- Message -----     From: SOULEYMANE Richardson     Sent: 6/20/2017   9:36 AM       To: Afshan Gillespie MA    Let her know her labs show she is post menopausal. Also, her potassium is a little high. Is she taking any OTC supplements or drinking or eating stuff with potassium in it?

## 2017-06-26 ENCOUNTER — TELEPHONE (OUTPATIENT)
Dept: FAMILY MEDICINE CLINIC | Facility: CLINIC | Age: 52
End: 2017-06-26

## 2017-06-26 NOTE — TELEPHONE ENCOUNTER
----- Message from SOULEYMANE Richardson sent at 6/25/2017  6:53 PM EDT -----  Ok  ----- Message -----     From: Afshan Gillespie MA     Sent: 6/23/2017   5:30 PM       To: SOULEYMANE Richardson    Pt notified of results and she said that she would call and schedule with GI doc that she seen previously    ----- Message -----     From: Afshan Gillespie MA     Sent: 6/22/2017   6:34 PM       To: Afshan Gillespie MA     Left vm for pt to return call to office    ----- Message -----     From: SOULEYMANE Richardson     Sent: 6/22/2017   1:13 PM       To: Afshan Gillespie MA    Let her know her CT scan showed no abnormalities, just some stool in her colon, so a little constipated. It showed some very small cysts on her liver, but this would not be causing any problems for her, people often have these and we just incidentally find them when we do scans. Does she want to be referred to GI?

## 2017-06-27 ENCOUNTER — OFFICE VISIT (OUTPATIENT)
Dept: FAMILY MEDICINE CLINIC | Facility: CLINIC | Age: 52
End: 2017-06-27

## 2017-06-27 VITALS
OXYGEN SATURATION: 97 % | DIASTOLIC BLOOD PRESSURE: 80 MMHG | WEIGHT: 163 LBS | HEIGHT: 64 IN | SYSTOLIC BLOOD PRESSURE: 112 MMHG | BODY MASS INDEX: 27.83 KG/M2 | HEART RATE: 78 BPM

## 2017-06-27 DIAGNOSIS — K58.1 IRRITABLE BOWEL SYNDROME WITH CONSTIPATION: ICD-10-CM

## 2017-06-27 DIAGNOSIS — K59.09 CHRONIC CONSTIPATION: ICD-10-CM

## 2017-06-27 DIAGNOSIS — G47.61 PERIODIC LIMB MOVEMENTS OF SLEEP: ICD-10-CM

## 2017-06-27 DIAGNOSIS — N95.1 POSTMENOPAUSAL DISORDER: ICD-10-CM

## 2017-06-27 DIAGNOSIS — F51.04 CHRONIC INSOMNIA: ICD-10-CM

## 2017-06-27 DIAGNOSIS — E87.5 HYPERKALEMIA: ICD-10-CM

## 2017-06-27 DIAGNOSIS — R73.03 PREDIABETES: ICD-10-CM

## 2017-06-27 DIAGNOSIS — E55.9 VITAMIN D DEFICIENCY: ICD-10-CM

## 2017-06-27 DIAGNOSIS — I10 ESSENTIAL HYPERTENSION: Primary | ICD-10-CM

## 2017-06-27 DIAGNOSIS — Z13.1 ENCOUNTER FOR SCREENING FOR DIABETES MELLITUS: ICD-10-CM

## 2017-06-27 DIAGNOSIS — R68.82 DECREASED LIBIDO: ICD-10-CM

## 2017-06-27 PROCEDURE — 99214 OFFICE O/P EST MOD 30 MIN: CPT | Performed by: FAMILY MEDICINE

## 2017-06-27 PROCEDURE — 96372 THER/PROPH/DIAG INJ SC/IM: CPT | Performed by: FAMILY MEDICINE

## 2017-06-27 RX ORDER — TEMAZEPAM 15 MG/1
15 CAPSULE ORAL NIGHTLY PRN
Qty: 30 CAPSULE | Refills: 2 | Status: SHIPPED | OUTPATIENT
Start: 2017-06-27 | End: 2017-10-03

## 2017-06-27 RX ORDER — FAMCICLOVIR 500 MG/1
500 TABLET ORAL 3 TIMES DAILY
COMMUNITY
End: 2018-05-17 | Stop reason: SDUPTHER

## 2017-06-27 RX ADMIN — CYANOCOBALAMIN 1000 MCG: 1000 INJECTION, SOLUTION INTRAMUSCULAR; SUBCUTANEOUS at 12:05

## 2017-06-28 PROBLEM — F51.04 CHRONIC INSOMNIA: Status: ACTIVE | Noted: 2017-06-28

## 2017-06-28 NOTE — PROGRESS NOTES
"Subjective   Tatiana Fields is a 51 y.o. female.     History of Present Illness  Ms. Fields presents today to discuss recent lab work. She also has several concerns to review.  1) She has h/o Vit B12 def and would like to get repeat injection today. Assoc'd with fatigue.  2) She continues to struggle with chronic constipation. Currently on Linzess. Feels when constipation worsens, this makes her BP go up. Has questions about taking Trulance instead.   3) Has h/o HTN. Taking meds as rx'd. BP recently under better control. Takes lisinopril daily. Uses Zestoretic on \"as needed\" basis.  4) Has h/o Vit D def. Taking replacement at 2000 units per day. Unsure what dose she should take.  5) She has postmenopausal symptoms including hot flashes, insomnia, fatigue, dry skin, thinning hair, decreased libido, mood changes, vaginal dryness. Wants testosterone levels checked. Has questions about HRT. She had TH several years ago for non-cancerous reasons. Had subsequent BSO 2 years ago.  6) Was noted to have hyperkalemia with last labs. Needs recheck. Denies potassium supplementation OTC. Does c/o muscle spasms particularly in lower legs/feet.  7) Strong family h/o diabetes. Would like GTT as this was markedly abnormal previously despite borderline A1c.  8) Has questions about taking Ali for weight loss.  9) Would like to change her nosespray. She is unsure of name of current spray. Does not feel it is as effective for nasal congestion, post nasal drip.  10) She wishes to discuss medication for sleep aid. Took Ambien previously but had early awakening with it. Has questions regarding use of restoril. Has comorbid PLMDo.    The following portions of the patient's history were reviewed and updated as appropriate: allergies, current medications, past family history, past medical history, past social history, past surgical history and problem list.    Review of Systems   Constitutional: Positive for fatigue and unexpected weight " change (gain). Negative for appetite change and fever.   HENT: Positive for congestion and postnasal drip. Negative for ear pain, hearing loss, nosebleeds, rhinorrhea, sneezing, sore throat, tinnitus and trouble swallowing.    Eyes: Positive for itching. Negative for pain, discharge, redness and visual disturbance.        Dry eyes   Respiratory: Negative for cough, chest tightness, shortness of breath and wheezing.    Cardiovascular: Positive for palpitations and leg swelling. Negative for chest pain.   Gastrointestinal: Positive for abdominal pain, constipation and nausea. Negative for blood in stool, diarrhea and vomiting.   Endocrine: Positive for heat intolerance and polydipsia. Negative for cold intolerance and polyuria.   Genitourinary: Positive for frequency and pelvic pain (chronic). Negative for flank pain, hematuria and urgency.   Musculoskeletal: Positive for arthralgias and myalgias. Negative for back pain, joint swelling and neck pain.   Skin: Negative for rash and wound.   Neurological: Positive for weakness and headaches. Negative for dizziness, tremors, seizures, syncope, speech difficulty and numbness.   Hematological: Negative for adenopathy. Bruises/bleeds easily.   Psychiatric/Behavioral: Positive for sleep disturbance. Negative for confusion, dysphoric mood and suicidal ideas. The patient is nervous/anxious.        Objective    Vitals:    06/27/17 1122   BP: 112/80   Pulse: 78   SpO2: 97%     Body mass index is 27.98 kg/(m^2).  Last 2 weights    06/27/17  1122   Weight: 163 lb (73.9 kg)       Physical Exam   Constitutional: She is oriented to person, place, and time. She appears well-developed and well-nourished. She is cooperative. She does not appear ill. No distress.   HENT:   Head: Normocephalic and atraumatic.   Neurological: She is alert and oriented to person, place, and time. Gait normal.   Psychiatric: Her speech is normal and behavior is normal. Thought content normal. Her mood appears  anxious. Cognition and memory are normal.   Nursing note and vitals reviewed.    All recent labs reviewed with pt.    Assessment/Plan   Tatiana was seen today for insomnia, fatigue and spasms.    Diagnoses and all orders for this visit:    Essential hypertension  Comments:  BP controlled. Continue current meds.  Orders:  -     Basic Metabolic Panel    Chronic constipation  Comments:  Stable. Consider switch to Trulance if other options do not improve bowel function.    Irritable bowel syndrome with constipation    Vitamin D deficiency  Comments:  Increase replacement to 5000 units per day  Orders:  -     Cholecalciferol (VITAMIN D3) 5000 UNITS capsule capsule; Take 1 capsule by mouth Daily.    Prediabetes  Comments:  2-hr GTT; tx as indicated  Orders:  -     Glucose Tolerance, 2 Hours; Future    Hyperkalemia  Comments:  Recheck; tx as indicated  Orders:  -     Basic Metabolic Panel    Postmenopausal disorder  Comments:  Check Testosterone levels. Tx as indicated. Risks/benefits of HRT reviewed with pt in detail. She voiced understanding and wishes to proceed.  Orders:  -     Testosterone, Free, Total    Decreased libido  -     Testosterone, Free, Total    Periodic limb movements of sleep    Chronic insomnia  Comments:  Risks/benefits, potential side effects of tx option for insomnia reviewed. She voiced understanding and wishes to proceed with trial of Restoril. Sleep hygiene    Encounter for screening for diabetes mellitus   -     Glucose Tolerance, 2 Hours; Future    Other orders  -     temazepam (RESTORIL) 15 MG capsule; Take 1 capsule by mouth At Night As Needed for Sleep or Anxiety.    Vitamin B12 IM given in office today.    I will contact patient regarding test results and provide instructions regarding any necessary changes in plan of care.    As part of patient's treatment plan I am prescribing a controlled substance.  The patient has been made aware of the appropriate use of such medications, including  potential risk of somnolence, limited ability to drive and/or work safely, and potential for dependence and/or overdose.  It has also been made clear that these medications are for use by this patient only, without concomitant use of alcohol or other substances, unless prescribed.  KIMBERLY report reviewed and scanned into chart.  Last KIMBERLY date 5/24/17.    F/u in 1 month, sooner as needed/instructed.  Patient was encouraged to keep me informed of any acute changes, lack of improvement, or any new concerning symptoms.  Patient voiced understanding of all instructions and denied further questions.    The patient was counseled regarding diagnostic results, impressions, prognosis, instructions for management, risk factor reductions, education, and importance of treatment compliance.  Total time of encounter was 30 minutes and 20 was spent counseling.

## 2017-06-29 LAB
BUN SERPL-MCNC: 15 MG/DL (ref 7–20)
BUN/CREAT SERPL: 18.8 (ref 7.1–23.5)
CALCIUM SERPL-MCNC: 9.5 MG/DL (ref 8.4–10.2)
CHLORIDE SERPL-SCNC: 101 MMOL/L (ref 98–107)
CO2 SERPL-SCNC: 29 MMOL/L (ref 26–30)
CREAT SERPL-MCNC: 0.8 MG/DL (ref 0.6–1.3)
GLUCOSE SERPL-MCNC: 75 MG/DL (ref 74–98)
POTASSIUM SERPL-SCNC: 4.3 MMOL/L (ref 3.5–5.1)
SODIUM SERPL-SCNC: 140 MMOL/L (ref 137–145)
TESTOST FREE SERPL-MCNC: <0.2 PG/ML (ref 0–4.2)
TESTOST SERPL-MCNC: <3 NG/DL (ref 3–41)

## 2017-06-29 RX ORDER — ESTERIFIED ESTROGEN AND METHYLTESTOSTERONE .625; 1.25 MG/1; MG/1
1 TABLET ORAL DAILY
Qty: 30 TABLET | Refills: 2 | OUTPATIENT
Start: 2017-06-29 | End: 2017-07-03 | Stop reason: SDUPTHER

## 2017-07-03 RX ORDER — ESTERIFIED ESTROGEN AND METHYLTESTOSTERONE .625; 1.25 MG/1; MG/1
1 TABLET ORAL DAILY
Qty: 30 TABLET | Refills: 2 | OUTPATIENT
Start: 2017-07-03 | End: 2017-07-17 | Stop reason: SDUPTHER

## 2017-07-03 NOTE — TELEPHONE ENCOUNTER
"----- Message from Amber Mills MD sent at 6/28/2017  8:02 AM EDT -----  Regarding: med changes  Pt wanted to change her \"nosespray\" but couldn't remember name of what she was taking. Please check with [facility] to see what she has filled there previously.    Also she is going to have 2-hr glucose tolerance test. Order should have printed as she is having this at [facility]. She can come by and  prior to having it.      * I spoke with pharmacy and they haveonly filled Flonase in the past.  Faxed the lab order to [facility] registration.  "

## 2017-07-05 RX ORDER — MOMETASONE FUROATE 50 UG/1
2 SPRAY, METERED NASAL DAILY
Qty: 17 G | Refills: 5 | Status: SHIPPED | OUTPATIENT
Start: 2017-07-05 | End: 2018-03-21

## 2017-07-14 ENCOUNTER — PROCEDURE VISIT (OUTPATIENT)
Dept: NEUROLOGY | Facility: CLINIC | Age: 52
End: 2017-07-14

## 2017-07-14 VITALS
SYSTOLIC BLOOD PRESSURE: 122 MMHG | HEART RATE: 82 BPM | HEIGHT: 64 IN | BODY MASS INDEX: 27.83 KG/M2 | WEIGHT: 163 LBS | DIASTOLIC BLOOD PRESSURE: 88 MMHG | OXYGEN SATURATION: 96 %

## 2017-07-14 DIAGNOSIS — G43.019 INTRACTABLE MIGRAINE WITHOUT AURA AND WITHOUT STATUS MIGRAINOSUS: Primary | ICD-10-CM

## 2017-07-14 PROCEDURE — 64612 DESTROY NERVE FACE MUSCLE: CPT | Performed by: NURSE PRACTITIONER

## 2017-07-14 RX ORDER — TOPIRAMATE 25 MG/1
TABLET ORAL
Refills: 0 | COMMUNITY
Start: 2017-07-08 | End: 2017-08-08

## 2017-07-14 RX ORDER — PRAMIPEXOLE DIHYDROCHLORIDE 0.5 MG/1
TABLET ORAL
Qty: 30 TABLET | Refills: 3 | Status: SHIPPED | OUTPATIENT
Start: 2017-07-14 | End: 2017-11-01

## 2017-07-14 NOTE — PROGRESS NOTES
Patient has over 15 migraines a month over 4 hours duration interfering with the patient daily activities despite conservative medical treatment for acute and preventive measures.    The risk and benefit of the Botox treatment has been discussed with patient.  Safety information and contraindication of Botox has been reviewed with patient.  The most frequent Adverse reactions of Botox for migraine include but not limited to neck pain 9%, headache 5%, Ptosis 4%, muscle weakness 4%, injection site pain 3%, muscle spasms 2% have been gone over with our patient.  200 unit vial Botox was re-constituted with 4 mL 0.9 NS to 5 unit/0.1 mL using standard techniques.  10 units were given at the  muscle in 2 divided sites  5 units were given at the Procerus muscle  20 units were given at the Frontalis muscle in 4 divided sites  40 units were given at the Temporalis muscle in 8 divided sites  30 units were given at the Occipitalis muscle in 6 divided sites  20 units were given at the cervical paraspinal muscle in 4 divided sites  30 units were given at the Trapezius muscle in 6 divided sites  For a total of 155 units divided between 31 sites and 45 units of wastage  Patient tolerated procedure well without complication.    Lot number:  [id number]  Expiration date: 2/2020    Patient experience augmentation with restless leg syndrome and Requip.  Starting to have increased leg discomfort and need to move her legs earlier in the afternoon on Requip.  We will discontinue her Requip and begin Mirapex.  Patient's return to clinic in 1 month for trigger point injections.

## 2017-07-17 RX ORDER — ESTERIFIED ESTROGEN AND METHYLTESTOSTERONE .625; 1.25 MG/1; MG/1
1 TABLET ORAL DAILY
Qty: 30 TABLET | Refills: 2 | Status: SHIPPED | OUTPATIENT
Start: 2017-07-17 | End: 2017-07-20 | Stop reason: SDUPTHER

## 2017-07-19 ENCOUNTER — CLINICAL SUPPORT (OUTPATIENT)
Dept: FAMILY MEDICINE CLINIC | Facility: CLINIC | Age: 52
End: 2017-07-19

## 2017-07-19 DIAGNOSIS — E53.8 B12 DEFICIENCY: ICD-10-CM

## 2017-07-19 PROCEDURE — 96372 THER/PROPH/DIAG INJ SC/IM: CPT | Performed by: FAMILY MEDICINE

## 2017-07-19 RX ORDER — CYANOCOBALAMIN 1000 UG/ML
1000 INJECTION, SOLUTION INTRAMUSCULAR; SUBCUTANEOUS
Status: DISCONTINUED | OUTPATIENT
Start: 2017-07-19 | End: 2018-05-18

## 2017-07-19 RX ADMIN — CYANOCOBALAMIN 1000 MCG: 1000 INJECTION, SOLUTION INTRAMUSCULAR; SUBCUTANEOUS at 15:05

## 2017-07-20 ENCOUNTER — TELEPHONE (OUTPATIENT)
Dept: FAMILY MEDICINE CLINIC | Facility: CLINIC | Age: 52
End: 2017-07-20

## 2017-07-20 DIAGNOSIS — N95.1 POST MENOPAUSAL SYNDROME: Primary | ICD-10-CM

## 2017-07-20 RX ORDER — ESTERIFIED ESTROGEN AND METHYLTESTOSTERONE .625; 1.25 MG/1; MG/1
1 TABLET ORAL DAILY
Qty: 30 TABLET | Refills: 0 | OUTPATIENT
Start: 2017-07-20 | End: 2017-10-11

## 2017-07-20 RX ORDER — ESTERIFIED ESTROGEN AND METHYLTESTOSTERONE .625; 1.25 MG/1; MG/1
1 TABLET ORAL DAILY
Qty: 30 TABLET | Refills: 2 | Status: SHIPPED | OUTPATIENT
Start: 2017-07-20 | End: 2017-07-20 | Stop reason: SDUPTHER

## 2017-07-20 NOTE — TELEPHONE ENCOUNTER
----- Message from Afshan Gillespie MA sent at 7/20/2017  1:15 PM EDT -----  Pt called and requested that her estratest script be changed from [facility] to [facility].    Re sent and transferred script to [facility]

## 2017-07-20 NOTE — TELEPHONE ENCOUNTER
----- Message from Afshan Gillespie MA sent at 7/19/2017  3:06 PM EDT -----  When pt came in for b12 injection she wanted me to let you know that the current medication she is taking right now to help her sleep is not working and wanted to know if she can be switched back to the ambien

## 2017-08-02 ENCOUNTER — TELEPHONE (OUTPATIENT)
Dept: NEUROLOGY | Facility: CLINIC | Age: 52
End: 2017-08-02

## 2017-08-08 ENCOUNTER — OFFICE VISIT (OUTPATIENT)
Dept: FAMILY MEDICINE CLINIC | Facility: CLINIC | Age: 52
End: 2017-08-08

## 2017-08-08 VITALS
HEART RATE: 86 BPM | DIASTOLIC BLOOD PRESSURE: 84 MMHG | SYSTOLIC BLOOD PRESSURE: 124 MMHG | WEIGHT: 165 LBS | BODY MASS INDEX: 28.17 KG/M2 | OXYGEN SATURATION: 98 % | HEIGHT: 64 IN

## 2017-08-08 DIAGNOSIS — Z86.19 HISTORY OF LYME DISEASE: ICD-10-CM

## 2017-08-08 DIAGNOSIS — M25.50 POLYARTHRALGIA: ICD-10-CM

## 2017-08-08 DIAGNOSIS — E55.9 VITAMIN D DEFICIENCY: ICD-10-CM

## 2017-08-08 DIAGNOSIS — K59.09 CHRONIC CONSTIPATION: ICD-10-CM

## 2017-08-08 DIAGNOSIS — F51.04 CHRONIC INSOMNIA: ICD-10-CM

## 2017-08-08 DIAGNOSIS — R53.82 CHRONIC FATIGUE: ICD-10-CM

## 2017-08-08 DIAGNOSIS — I10 ESSENTIAL HYPERTENSION: Primary | ICD-10-CM

## 2017-08-08 DIAGNOSIS — E53.8 VITAMIN B12 DEFICIENCY: ICD-10-CM

## 2017-08-08 PROCEDURE — 99214 OFFICE O/P EST MOD 30 MIN: CPT | Performed by: FAMILY MEDICINE

## 2017-08-08 PROCEDURE — 96372 THER/PROPH/DIAG INJ SC/IM: CPT | Performed by: FAMILY MEDICINE

## 2017-08-08 RX ORDER — PREDNISONE 10 MG/1
TABLET ORAL
Qty: 10 TABLET | Refills: 1 | Status: SHIPPED | OUTPATIENT
Start: 2017-08-08 | End: 2017-10-11

## 2017-08-08 RX ORDER — CYANOCOBALAMIN 1000 UG/ML
1000 INJECTION, SOLUTION INTRAMUSCULAR; SUBCUTANEOUS
Status: DISCONTINUED | OUTPATIENT
Start: 2017-08-08 | End: 2018-05-18

## 2017-08-08 RX ORDER — CYCLOSPORINE 0.5 MG/ML
EMULSION OPHTHALMIC
COMMUNITY
Start: 2017-07-18 | End: 2017-12-05

## 2017-08-08 RX ORDER — ALUMINUM ZIRCONIUM OCTACHLOROHYDREX GLY 16 G/100G
GEL TOPICAL
Qty: 60 CAPSULE | Refills: 5 | Status: SHIPPED | OUTPATIENT
Start: 2017-08-08 | End: 2019-02-14

## 2017-08-08 RX ADMIN — CYANOCOBALAMIN 1000 MCG: 1000 INJECTION, SOLUTION INTRAMUSCULAR; SUBCUTANEOUS at 16:21

## 2017-08-08 NOTE — PROGRESS NOTES
"Subjective   Tatiana Fields is a 51 y.o. female.     History of Present Illness  Ms. Fields presents today to f/u on several chronic issues and review recent labs.  1) She has vit B12 deficiency; would like repeat injection today and f/u labs.  Continues to have chronic fatigue.  2) She reports h/o Lyme disease but recent f/u lab eval neg. She is not sure if she should see a Lyme Dz specialist. She continues to have diffuse joint pain as well. She reports doing remarkably well on \"as needed\" dose of prednisone. Feels it \"works better than any pain medicine\". Has assoc'd h/o FMSx. Currently on gabapentin, NSAID  3) She was placed on temazepam for chronic insomnia. She feels it helps her night-time anxiety but does not really improve sleep. Has seen neuro since last visit. Trying new med for RLSx which may improve symptoms.  4) She is on align along with her linzess for chronic constipation. Working well. Wants rx for align  5) requests rx for steroid ointment for poison ivy.  6) h/o vit D def; taking replacement as rx'd  7) Has h/o HTN. Currently 3 meds on med list. Reports she generally only takes lisinopril 10 mg but others if \"it goes up\".  8) h/o anxiety; stable on lexapro    The following portions of the patient's history were reviewed and updated as appropriate: allergies, current medications, past family history, past medical history, past social history, past surgical history and problem list.    Review of Systems   Constitutional: Positive for fatigue and unexpected weight change (gain). Negative for appetite change and fever.   HENT: Positive for congestion and postnasal drip. Negative for ear pain, hearing loss, nosebleeds, rhinorrhea, sneezing, sore throat, tinnitus and trouble swallowing.    Eyes: Positive for itching. Negative for pain, discharge, redness and visual disturbance.        Dry eyes   Respiratory: Negative for cough, chest tightness, shortness of breath and wheezing.    Cardiovascular: " Positive for palpitations and leg swelling. Negative for chest pain.   Gastrointestinal: Positive for abdominal pain, constipation and nausea. Negative for blood in stool, diarrhea and vomiting.   Endocrine: Positive for heat intolerance and polydipsia. Negative for cold intolerance and polyuria.   Genitourinary: Positive for frequency and pelvic pain (chronic). Negative for flank pain, hematuria and urgency.   Musculoskeletal: Positive for arthralgias and myalgias. Negative for back pain, joint swelling and neck pain.   Skin: Negative for rash and wound.   Neurological: Positive for weakness and headaches. Negative for dizziness, tremors, seizures, syncope, speech difficulty and numbness.   Hematological: Negative for adenopathy. Bruises/bleeds easily.   Psychiatric/Behavioral: Positive for sleep disturbance. Negative for confusion, dysphoric mood and suicidal ideas. The patient is nervous/anxious.        Objective    Vitals:    08/08/17 1128   BP: 124/84   Pulse: 86   SpO2: 98%     Body mass index is 28.32 kg/(m^2).  Last 2 weights    08/08/17  1128   Weight: 165 lb (74.8 kg)       Physical Exam   Constitutional: She is oriented to person, place, and time. She appears well-developed and well-nourished. She is cooperative. She does not appear ill. No distress.   HENT:   Mouth/Throat: Mucous membranes are normal. Mucous membranes are not dry.   Cardiovascular: Normal rate, regular rhythm and intact distal pulses.    Pulmonary/Chest: Effort normal and breath sounds normal.       Vascular Status -  Her exam exhibits no right foot edema. Her exam exhibits no left foot edema.  Neurological: She is alert and oriented to person, place, and time. She has normal strength. She displays no tremor. Gait normal.   Skin: Skin is warm and dry.   Psychiatric: Her speech is normal and behavior is normal. Thought content normal. Her mood appears anxious.   Nursing note and vitals reviewed.    Recent Results (from the past 1344  hour(s))   CBC (No Diff)    Collection Time: 06/19/17 12:00 AM   Result Value Ref Range    WBC 9.3 3.4 - 10.8 x10E3/uL    RBC 4.84 3.77 - 5.28 x10E6/uL    Hemoglobin 13.4 11.1 - 15.9 g/dL    Hematocrit 41.6 34.0 - 46.6 %    MCV 86 79 - 97 fL    MCH 27.7 26.6 - 33.0 pg    MCHC 32.2 31.5 - 35.7 g/dL    RDW 13.6 12.3 - 15.4 %    Platelets 463 (H) 150 - 379 x10E3/uL   Comprehensive Metabolic Panel    Collection Time: 06/19/17 12:00 AM   Result Value Ref Range    Glucose 112 (H) 65 - 99 mg/dL    BUN 21 6 - 24 mg/dL    Creatinine 0.95 0.57 - 1.00 mg/dL    eGFR Non African Am 70 >59 mL/min/1.73    eGFR African Am 80 >59 mL/min/1.73    BUN/Creatinine Ratio 22 9 - 23    Sodium 140 134 - 144 mmol/L    Potassium 5.6 (H) 3.5 - 5.2 mmol/L    Chloride 98 96 - 106 mmol/L    Total CO2 19 18 - 29 mmol/L    Calcium 9.9 8.7 - 10.2 mg/dL    Total Protein 7.5 6.0 - 8.5 g/dL    Albumin 4.6 3.5 - 5.5 g/dL    Globulin 2.9 1.5 - 4.5 g/dL    A/G Ratio 1.6 1.2 - 2.2    Total Bilirubin 0.2 0.0 - 1.2 mg/dL    Alkaline Phosphatase 75 39 - 117 IU/L    AST (SGOT) 24 0 - 40 IU/L    ALT (SGPT) 28 0 - 32 IU/L   Estradiol    Collection Time: 06/19/17 12:00 AM   Result Value Ref Range    Estradiol <5.0 pg/mL   FSH & LH    Collection Time: 06/19/17 12:00 AM   Result Value Ref Range    LH 49.0 mIU/mL    .8 mIU/mL   Progesterone    Collection Time: 06/19/17 12:00 AM   Result Value Ref Range    Progesterone 0.1 ng/mL   Amylase    Collection Time: 06/19/17 12:00 AM   Result Value Ref Range    Amylase 49 31 - 124 U/L   Lipase    Collection Time: 06/19/17 12:00 AM   Result Value Ref Range    Lipase 21 0 - 59 U/L   POCT urinalysis dipstick, automated    Collection Time: 06/19/17  4:47 PM   Result Value Ref Range    Color Yellow Yellow, Straw, Dark Yellow, Shanique    Clarity, UA Clear Clear    Glucose, UA Negative Negative, 1000 mg/dL (3+) mg/dL    Bilirubin Negative Negative    Ketones, UA Negative Negative    Specific Gravity  1.010 1.005 - 1.030     "Blood, UA Negative Negative    pH, Urine 7.0 5.0 - 8.0    Protein, POC Negative Negative mg/dL    Urobilinogen, UA Normal Normal    Leukocytes Negative Negative    Nitrite, UA Negative Negative   Testosterone, Free, Total    Collection Time: 06/27/17 12:03 PM   Result Value Ref Range    Testosterone, Total <3 (L) 3 - 41 ng/dL    Testosterone, Free <0.2 0.0 - 4.2 pg/mL   Basic Metabolic Panel    Collection Time: 06/27/17 12:03 PM   Result Value Ref Range    Glucose 75 74 - 98 mg/dL    BUN 15 7 - 20 mg/dL    Creatinine 0.80 0.60 - 1.30 mg/dL    eGFR Non African Am 76 >60 mL/min/1.73    eGFR African Am 92 >60 mL/min/1.73    BUN/Creatinine Ratio 18.8 7.1 - 23.5    Sodium 140 137 - 145 mmol/L    Potassium 4.3 3.5 - 5.1 mmol/L    Chloride 101 98 - 107 mmol/L    Total CO2 29.0 26.0 - 30.0 mmol/L    Calcium 9.5 8.4 - 10.2 mg/dL     Assessment/Plan   Diagnoses and all orders for this visit:    Essential hypertension    Chronic constipation  -     Probiotic Product (ALIGN) capsule; 1 po bid    Vitamin D deficiency    Chronic insomnia    History of Lyme disease  -     B. Burgdorferi Antibodies, WB Reflex; Future    Vitamin B12 deficiency  -     cyanocobalamin injection 1,000 mcg; Inject 1 mL into the shoulder, thigh, or buttocks Every 28 (Twenty-Eight) Days.  -     Vitamin B12; Future    Chronic fatigue    Polyarthralgia  -     B. Burgdorferi Antibodies, WB Reflex; Future    Other orders  -     predniSONE (DELTASONE) 10 MG tablet; 1 po qd two days per week  -     triamcinolone (KENALOG) 0.1 % ointment; Apply  topically 2 (Two) Times a Day.    BP controlled. Med list corrected. Follow low salt diet.  Stable constipation. FMSx stable.  Trial of prednisone up to bid for \"flares\". Risks/benefits and potential short/long-term side effects reviewed.  I will contact patient regarding test results and provide instructions regarding any necessary changes in plan of care.  Routine f/u in 3 months, sooner as needed.  fU with neurology as " scheduled.  Patient was encouraged to keep me informed of any acute changes, lack of improvement, or any new concerning symptoms.  Patient voiced understanding of all instructions and denied further questions.

## 2017-08-14 ENCOUNTER — RESULTS ENCOUNTER (OUTPATIENT)
Dept: FAMILY MEDICINE CLINIC | Facility: CLINIC | Age: 52
End: 2017-08-14

## 2017-08-14 ENCOUNTER — TELEPHONE (OUTPATIENT)
Dept: FAMILY MEDICINE CLINIC | Facility: CLINIC | Age: 52
End: 2017-08-14

## 2017-08-14 DIAGNOSIS — E53.8 VITAMIN B12 DEFICIENCY: ICD-10-CM

## 2017-08-14 DIAGNOSIS — M25.50 POLYARTHRALGIA: ICD-10-CM

## 2017-08-14 DIAGNOSIS — Z86.19 HISTORY OF LYME DISEASE: ICD-10-CM

## 2017-08-15 LAB
B BURGDOR IGG+IGM SER-ACNC: <0.91 ISR (ref 0–0.9)
B BURGDOR IGM SER IA-ACNC: <0.8 INDEX (ref 0–0.79)
VIT B12 SERPL-MCNC: 1267 PG/ML (ref 211–946)

## 2017-08-30 ENCOUNTER — CLINICAL SUPPORT (OUTPATIENT)
Dept: FAMILY MEDICINE CLINIC | Facility: CLINIC | Age: 52
End: 2017-08-30

## 2017-08-30 DIAGNOSIS — E53.8 VITAMIN B12 DEFICIENCY: ICD-10-CM

## 2017-08-30 PROCEDURE — 96372 THER/PROPH/DIAG INJ SC/IM: CPT | Performed by: FAMILY MEDICINE

## 2017-08-30 RX ADMIN — CYANOCOBALAMIN 1000 MCG: 1000 INJECTION, SOLUTION INTRAMUSCULAR; SUBCUTANEOUS at 17:21

## 2017-08-31 ENCOUNTER — TELEPHONE (OUTPATIENT)
Dept: FAMILY MEDICINE CLINIC | Facility: CLINIC | Age: 52
End: 2017-08-31

## 2017-09-01 ENCOUNTER — OFFICE VISIT (OUTPATIENT)
Dept: FAMILY MEDICINE CLINIC | Facility: CLINIC | Age: 52
End: 2017-09-01

## 2017-09-01 VITALS
WEIGHT: 158 LBS | SYSTOLIC BLOOD PRESSURE: 108 MMHG | TEMPERATURE: 98.2 F | BODY MASS INDEX: 26.98 KG/M2 | DIASTOLIC BLOOD PRESSURE: 74 MMHG | HEIGHT: 64 IN | OXYGEN SATURATION: 98 % | HEART RATE: 86 BPM

## 2017-09-01 DIAGNOSIS — J01.11 ACUTE RECURRENT FRONTAL SINUSITIS: ICD-10-CM

## 2017-09-01 DIAGNOSIS — J01.01 ACUTE RECURRENT MAXILLARY SINUSITIS: ICD-10-CM

## 2017-09-01 PROCEDURE — 99213 OFFICE O/P EST LOW 20 MIN: CPT | Performed by: NURSE PRACTITIONER

## 2017-09-01 PROCEDURE — 96372 THER/PROPH/DIAG INJ SC/IM: CPT | Performed by: NURSE PRACTITIONER

## 2017-09-01 RX ORDER — CEFTRIAXONE 1 G/1
1 INJECTION, POWDER, FOR SOLUTION INTRAMUSCULAR; INTRAVENOUS ONCE
Status: COMPLETED | OUTPATIENT
Start: 2017-09-01 | End: 2017-09-01

## 2017-09-01 RX ORDER — LORAZEPAM 0.5 MG/1
TABLET ORAL
Refills: 0 | COMMUNITY
Start: 2017-08-24 | End: 2018-02-14

## 2017-09-01 RX ORDER — ROPINIROLE 1 MG/1
TABLET, FILM COATED ORAL
COMMUNITY
Start: 2017-08-31 | End: 2017-10-03

## 2017-09-01 RX ORDER — ZOLPIDEM TARTRATE 10 MG/1
TABLET ORAL
Refills: 0 | COMMUNITY
Start: 2017-08-24 | End: 2017-10-11

## 2017-09-01 RX ORDER — METHYLPREDNISOLONE ACETATE 80 MG/ML
120 INJECTION, SUSPENSION INTRA-ARTICULAR; INTRALESIONAL; INTRAMUSCULAR; SOFT TISSUE ONCE
Status: COMPLETED | OUTPATIENT
Start: 2017-09-01 | End: 2017-09-01

## 2017-09-01 RX ORDER — CHOLECALCIFEROL (VITAMIN D3) 50 MCG
2000 TABLET ORAL 2 TIMES DAILY
COMMUNITY
End: 2021-05-02

## 2017-09-01 RX ORDER — AZITHROMYCIN 250 MG/1
TABLET, FILM COATED ORAL
Qty: 6 TABLET | Refills: 0 | Status: SHIPPED | OUTPATIENT
Start: 2017-09-01 | End: 2017-10-03

## 2017-09-01 RX ADMIN — METHYLPREDNISOLONE ACETATE 120 MG: 80 INJECTION, SUSPENSION INTRA-ARTICULAR; INTRALESIONAL; INTRAMUSCULAR; SOFT TISSUE at 15:49

## 2017-09-01 RX ADMIN — CEFTRIAXONE 1 G: 1 INJECTION, POWDER, FOR SOLUTION INTRAMUSCULAR; INTRAVENOUS at 15:49

## 2017-09-01 NOTE — PROGRESS NOTES
Subjective   Tatiana Fields is a 51 y.o. female.     HPI Comments: Patient is here today for complaints of sinus pressure and feeling fatigued for the past 2 days. She states she has been using her prescribed allergy medication but it is not helping.       The following portions of the patient's history were reviewed and updated as appropriate: allergies, current medications, past family history, past medical history, past social history, past surgical history and problem list.    Review of Systems   Constitutional: Negative.    HENT: Positive for postnasal drip, sinus pressure and sore throat. Negative for ear pain.    Eyes: Negative.    Respiratory: Negative.    Cardiovascular: Negative.    Gastrointestinal: Negative.    Genitourinary: Negative.    Musculoskeletal: Negative.    Skin: Negative.    Allergic/Immunologic: Positive for environmental allergies. Negative for immunocompromised state.   Neurological: Positive for headaches. Negative for dizziness, syncope, weakness and numbness.   Hematological: Negative for adenopathy.   Psychiatric/Behavioral: Negative for confusion and suicidal ideas. The patient is not nervous/anxious.        Objective   Physical Exam   Constitutional: She is oriented to person, place, and time. She appears well-developed and well-nourished. No distress.   HENT:   Head: Normocephalic.   Right Ear: External ear normal.   Left Ear: External ear normal.   Nose: Right sinus exhibits maxillary sinus tenderness and frontal sinus tenderness. Left sinus exhibits maxillary sinus tenderness and frontal sinus tenderness.   Eyes: Conjunctivae are normal.   Neck: Normal range of motion. Neck supple.   Cardiovascular: Normal rate, regular rhythm and normal heart sounds.    No murmur heard.  Pulmonary/Chest: Effort normal and breath sounds normal. No respiratory distress. She has no wheezes. She has no rales. She exhibits no tenderness.   Abdominal: Soft. Bowel sounds are normal. She exhibits no  distension and no mass. There is no hepatosplenomegaly or splenomegaly. There is no tenderness. There is no rebound and no guarding. No hernia.   Musculoskeletal: Normal range of motion. She exhibits no edema or tenderness.   NROM all major joints   Lymphadenopathy:     She has no cervical adenopathy.        Right cervical: No superficial cervical, no deep cervical and no posterior cervical adenopathy present.       Left cervical: No superficial cervical, no deep cervical and no posterior cervical adenopathy present.   Neurological: She is alert and oriented to person, place, and time. Coordination and gait normal.   Skin: Skin is warm and dry. No rash noted.   Psychiatric: She has a normal mood and affect. Her behavior is normal. Judgment and thought content normal.       Assessment/Plan   Tatiana was seen today for cough.    Diagnoses and all orders for this visit:    Acute recurrent maxillary sinusitis    Acute recurrent frontal sinusitis    Other orders  -     azithromycin (ZITHROMAX Z-OMKAR) 250 MG tablet; Take 2 tablets the first day, then 1 tablet daily for 4 days.     Rocephin and Medrol injection given in the clinic today, for her sinusitis. Zithromax started today as well.     Patient was encouraged to keep me informed of any acute changes, lack of improvement, or any new concerning symptoms. Patient voiced understanding of all instructions and denied further questions.    Patient to RTC prn.

## 2017-10-03 ENCOUNTER — PROCEDURE VISIT (OUTPATIENT)
Dept: NEUROLOGY | Facility: CLINIC | Age: 52
End: 2017-10-03

## 2017-10-03 VITALS
OXYGEN SATURATION: 96 % | SYSTOLIC BLOOD PRESSURE: 118 MMHG | WEIGHT: 159 LBS | BODY MASS INDEX: 27.14 KG/M2 | DIASTOLIC BLOOD PRESSURE: 78 MMHG | HEIGHT: 64 IN | HEART RATE: 103 BPM

## 2017-10-03 DIAGNOSIS — G54.2 CERVICAL NERVE ROOT COMPRESSION: ICD-10-CM

## 2017-10-03 DIAGNOSIS — R51.9 CHRONIC DAILY HEADACHE: Primary | ICD-10-CM

## 2017-10-03 RX ORDER — BUPIVACAINE HYDROCHLORIDE 5 MG/ML
5 INJECTION, SOLUTION PERINEURAL ONCE
Status: DISCONTINUED | OUTPATIENT
Start: 2017-10-03 | End: 2018-05-22

## 2017-10-03 RX ORDER — METHYLPREDNISOLONE ACETATE 40 MG/ML
200 INJECTION, SUSPENSION INTRA-ARTICULAR; INTRALESIONAL; INTRAMUSCULAR; SOFT TISSUE ONCE
Status: DISCONTINUED | OUTPATIENT
Start: 2017-10-03 | End: 2018-01-21

## 2017-10-03 RX ORDER — GABAPENTIN ENACARBIL 600 MG/1
600 TABLET, EXTENDED RELEASE ORAL NIGHTLY
Qty: 30 TABLET | Refills: 3 | Status: SHIPPED | OUTPATIENT
Start: 2017-10-03 | End: 2017-10-06 | Stop reason: SDUPTHER

## 2017-10-03 NOTE — PROGRESS NOTES
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 Pt behind on injections.  States + daily migraines x 2 weeks. Pt states injections helpRisks and benefits of the Trigger point injection procedure have been explained to the patient.  Patient has no contrindications such as bleed diathesis, recent acute trauma at the muscle sites, anesthetic allergy or anticoagulation.  Mechanism of trigger point injection has been explained to patient.     Procedure Note   1.   Patient was positioned comfortably  2.         Patient identified five to six Trigger Points sites  3.         Overlying skin area was cleaned with Alcohol swab  4.         Before injection, trigger points sites were palpated for local twitch and referred pain to confirms placement  5.         Local anesthetic was mixed with Depo-Medrol (5 cc of Marcaine 0.5% mixed with 5 cc of Depo-Medrol at 40 mg/ml)  6.         30 gauge ½ inch needle was utilized to ensure patient comfort  7.         I started with the most tender spot in Trigger Point    1.   Localize most tender spot within taut muscle-fibers  2.   Fix tender spot between fingers (1-2 cm in size)   1.   Prevents from rolling away from needle  2.   Controls subcutaneous bleeding  3.   Before injection, patient was instructed of possible pain on injection  4.   Direct needle at 30 degree angle off skin   5.         Insert needle into skin 1-2 cm from Trigger Point   6.         Advance needle into Trigger Point   3.         Use 0.3 to 0.5 cc anesthetic at each Trigger Point   4.         Redirect needle and reinject   1.   Withdraw needle to subcutaneous tissue  2.   Redirect needle into adjacent tender areas  3.   Repeat until local twitch or tautness resolves  8.   After injection I held direct pressure at injection site for 2 minutes to prevents hematoma formation  9.         The same procedure was repeated for other Tender Points  10.       Patient was instructed to gently stretch injected  areas to full active range of motion in all directions and was instructed to repeat range of motion three times after injection  Post-Procedure patient was instructed to avoid over-using injected area for 3-4 days   1.   Maintain active range of motion of injected muscle  2.   Patient applies ice to injected areas for a few hours  3.   Anticipate post-injection soreness for 3-4 days  There was no evidence of complications from injection was noted such as local Skin Infection  at injection site or local hematoma at injection site  NOTE:  Pt states RLS no longer controlled with mirapex.  Rx horizant written.

## 2017-10-05 ENCOUNTER — TELEPHONE (OUTPATIENT)
Dept: NEUROLOGY | Facility: CLINIC | Age: 52
End: 2017-10-05

## 2017-10-05 NOTE — TELEPHONE ENCOUNTER
LM for pt to call me back. Calling in a Rx of Horizant, need to verify which pharmacy the pt wants it sent too.

## 2017-10-06 RX ORDER — GABAPENTIN ENACARBIL 600 MG/1
600 TABLET, EXTENDED RELEASE ORAL NIGHTLY
Qty: 30 TABLET | Refills: 3 | Status: SHIPPED | OUTPATIENT
Start: 2017-10-06 | End: 2017-12-05

## 2017-10-06 NOTE — TELEPHONE ENCOUNTER
Pt would like her Horizant called in to [facility]    Called in Horizant 600mg, #30, take one tablet by mouth at 5pm with food and 3 RF to [facility]

## 2017-10-11 ENCOUNTER — TELEPHONE (OUTPATIENT)
Dept: NEUROLOGY | Facility: CLINIC | Age: 52
End: 2017-10-11

## 2017-10-11 ENCOUNTER — OFFICE VISIT (OUTPATIENT)
Dept: FAMILY MEDICINE CLINIC | Facility: CLINIC | Age: 52
End: 2017-10-11

## 2017-10-11 VITALS
DIASTOLIC BLOOD PRESSURE: 72 MMHG | HEART RATE: 92 BPM | OXYGEN SATURATION: 97 % | HEIGHT: 64 IN | BODY MASS INDEX: 27.31 KG/M2 | SYSTOLIC BLOOD PRESSURE: 112 MMHG | WEIGHT: 160 LBS

## 2017-10-11 DIAGNOSIS — K58.1 IRRITABLE BOWEL SYNDROME WITH CONSTIPATION: ICD-10-CM

## 2017-10-11 DIAGNOSIS — Z79.890 HORMONE REPLACEMENT THERAPY: ICD-10-CM

## 2017-10-11 DIAGNOSIS — K59.09 CHRONIC CONSTIPATION: ICD-10-CM

## 2017-10-11 DIAGNOSIS — Z13.220 ENCOUNTER FOR LIPID SCREENING FOR CARDIOVASCULAR DISEASE: ICD-10-CM

## 2017-10-11 DIAGNOSIS — Z11.59 NEED FOR HEPATITIS C SCREENING TEST: ICD-10-CM

## 2017-10-11 DIAGNOSIS — Z12.31 ENCOUNTER FOR SCREENING MAMMOGRAM FOR BREAST CANCER: ICD-10-CM

## 2017-10-11 DIAGNOSIS — Z00.00 MEDICARE ANNUAL WELLNESS VISIT, INITIAL: Primary | ICD-10-CM

## 2017-10-11 DIAGNOSIS — Z13.6 ENCOUNTER FOR LIPID SCREENING FOR CARDIOVASCULAR DISEASE: ICD-10-CM

## 2017-10-11 DIAGNOSIS — R73.01 IMPAIRED FASTING GLUCOSE: ICD-10-CM

## 2017-10-11 DIAGNOSIS — Z23 NEED FOR TDAP VACCINATION: ICD-10-CM

## 2017-10-11 DIAGNOSIS — N95.1 POSTMENOPAUSAL DISORDER: ICD-10-CM

## 2017-10-11 DIAGNOSIS — Z13.1 SCREENING FOR DIABETES MELLITUS: ICD-10-CM

## 2017-10-11 DIAGNOSIS — I10 ESSENTIAL HYPERTENSION: ICD-10-CM

## 2017-10-11 DIAGNOSIS — F51.04 CHRONIC INSOMNIA: ICD-10-CM

## 2017-10-11 PROCEDURE — 99214 OFFICE O/P EST MOD 30 MIN: CPT | Performed by: FAMILY MEDICINE

## 2017-10-11 PROCEDURE — G0438 PPPS, INITIAL VISIT: HCPCS | Performed by: FAMILY MEDICINE

## 2017-10-11 RX ORDER — ESZOPICLONE 3 MG/1
3 TABLET, FILM COATED ORAL NIGHTLY
Qty: 30 TABLET | Refills: 2 | Status: SHIPPED | OUTPATIENT
Start: 2017-10-11 | End: 2017-11-01

## 2017-10-11 RX ORDER — ESTRADIOL 0.05 MG/D
1 FILM, EXTENDED RELEASE TRANSDERMAL WEEKLY
Qty: 4 PATCH | Refills: 11 | Status: SHIPPED | OUTPATIENT
Start: 2017-10-11 | End: 2017-11-08 | Stop reason: SDUPTHER

## 2017-10-11 NOTE — TELEPHONE ENCOUNTER
JOSE DAVID CALLED REQUESTING A CALL BACK ABOUT [facility]'S PRIOR AUTH SHE HAD SPOKE WITH NATALY ABOUT.

## 2017-10-11 NOTE — PATIENT INSTRUCTIONS
Preventive Care 40-64 Years, Female  Preventive care refers to lifestyle choices and visits with your health care provider that can promote health and wellness.  WHAT DOES PREVENTIVE CARE INCLUDE?  · A yearly physical exam. This is also called an annual well check.  · Dental exams once or twice a year.  · Routine eye exams. Ask your health care provider how often you should have your eyes checked.  · Personal lifestyle choices, including:    Daily care of your teeth and gums.    Regular physical activity.    Eating a healthy diet.    Avoiding tobacco and drug use.    Limiting alcohol use.    Practicing safe sex.    Taking low-dose aspirin daily starting at age 50.    Taking vitamin and mineral supplements as recommended by your health care provider.  WHAT HAPPENS DURING AN ANNUAL WELL CHECK?  The services and screenings done by your health care provider during your annual well check will depend on your age, overall health, lifestyle risk factors, and family history of disease.  Counseling  Your health care provider may ask you questions about your:  · Alcohol use.  · Tobacco use.  · Drug use.  · Emotional well-being.  · Home and relationship well-being.  · Sexual activity.  · Eating habits.  · Work and work environment.  · Method of birth control.  · Menstrual cycle.  · Pregnancy history.  Screening  You may have the following tests or measurements:  · Height, weight, and BMI.  · Blood pressure.  · Lipid and cholesterol levels. These may be checked every 5 years, or more frequently if you are over 50 years old.  · Skin check.  · Lung cancer screening. You may have this screening every year starting at age 55 if you have a 30-pack-year history of smoking and currently smoke or have quit within the past 15 years.  · Fecal occult blood test (FOBT) of the stool. You may have this test every year starting at age 50.  · Flexible sigmoidoscopy or colonoscopy. You may have a sigmoidoscopy every 5 years or a colonoscopy  every 10 years starting at age 50.  · Hepatitis C blood test.  · Hepatitis B blood test.  · Sexually transmitted disease (STD) testing.  · Diabetes screening. This is done by checking your blood sugar (glucose) after you have not eaten for a while (fasting). You may have this done every 1-3 years.  · Mammogram. This may be done every 1-2 years. Talk to your health care provider about when you should start having regular mammograms. This may depend on whether you have a family history of breast cancer.  · BRCA-related cancer screening. This may be done if you have a family history of breast, ovarian, tubal, or peritoneal cancers.  · Pelvic exam and Pap test. This may be done every 3 years starting at age 21. Starting at age 30, this may be done every 5 years if you have a Pap test in combination with an HPV test.  · Bone density scan. This is done to screen for osteoporosis. You may have this scan if you are at high risk for osteoporosis.  Discuss your test results, treatment options, and if necessary, the need for more tests with your health care provider.  Vaccines   Your health care provider may recommend certain vaccines, such as:  · Influenza vaccine. This is recommended every year.  · Tetanus, diphtheria, and acellular pertussis (Tdap, Td) vaccine. You may need a Td booster every 10 years.  · Varicella vaccine. You may need this if you have not been vaccinated.  · Zoster vaccine. You may need this after age 60.  · Measles, mumps, and rubella (MMR) vaccine. You may need at least one dose of MMR if you were born in 1957 or later. You may also need a second dose.  · Pneumococcal 13-valent conjugate (PCV13) vaccine. You may need this if you have certain conditions and were not previously vaccinated.  · Pneumococcal polysaccharide (PPSV23) vaccine. You may need one or two doses if you smoke cigarettes or if you have certain conditions.  · Meningococcal vaccine. You may need this if you have certain  conditions.  · Hepatitis A vaccine. You may need this if you have certain conditions or if you travel or work in places where you may be exposed to hepatitis A.  · Hepatitis B vaccine. You may need this if you have certain conditions or if you travel or work in places where you may be exposed to hepatitis B.  · Haemophilus influenzae type b (Hib) vaccine. You may need this if you have certain conditions.  Talk to your health care provider about which screenings and vaccines you need and how often you need them.     This information is not intended to replace advice given to you by your health care provider. Make sure you discuss any questions you have with your health care provider.     Document Released: 2017 Document Reviewed: 2017  [employer] Interactive Patient Education © [employer]        Medicare Wellness  Personal Prevention Plan of Service     Date of Office Visit:  10/11/2017  Encounter Provider:  Amber Mills MD  Place of Service:  [facility]  Patient Name: Tatiana Fields  :  1965    As part of the Medicare Wellness portion of your visit today, we are providing you with this personalized preventive plan of services (PPPS). This plan is based upon recommendations of the [place] Preventive Services Task Force (USPSTF) and the Advisory Committee on Immunization Practices (ACIP).    This lists the preventive care services that should be considered, and provides dates of when you are due. Items listed as completed are up-to-date and do not require any further intervention.    Health Maintenance   Topic Date Due   • PNEUMOCOCCAL VACCINE (19-64 MEDIUM RISK) (1 of 1 - PPSV23) 10/07/1984   • TDAP/TD VACCINES (1 - Tdap) 10/07/1984   • HEPATITIS C SCREENING  2017   • MEDICARE ANNUAL WELLNESS  2017   • LIPID PANEL  2017   • INFLUENZA VACCINE  2017   • DIABETIC FOOT EXAM  10/11/2017   • URINE MICROALBUMIN  10/11/2017   •  HEMOGLOBIN A1C  11/23/2017   • MAMMOGRAM  08/16/2018   • DIABETIC EYE EXAM  09/14/2018   • COLONOSCOPY  11/25/2025   • PAP SMEAR  Excluded       Orders Placed This Encounter   Procedures   • Mammo Screening Bilateral With CAD     Standing Status:   Future     Standing Expiration Date:   10/11/2018     Order Specific Question:   Reason for Exam:     Answer:   screening     Order Specific Question:   Patient Pregnant     Answer:   No   • Lipid Panel     Standing Status:   Future     Standing Expiration Date:   10/11/2018   • Hemoglobin A1c     Standing Status:   Future     Standing Expiration Date:   10/11/2018       Return for Next scheduled follow up.    Estradiol skin patches  What is this medicine?  ESTRADIOL (es tra DYE ole) skin patches contain an estrogen. It is mostly used as hormone replacement in menopausal women. It helps to treat hot flashes and prevent osteoporosis. It is also used to treat women with low estrogen levels or those who have had their ovaries removed.  This medicine may be used for other purposes; ask your health care provider or pharmacist if you have questions.  COMMON BRAND NAME(S): Lisa, Climara, Esclim, Estraderm, FemPatch, Menostar, Minivelle, Vivelle, Vivelle-Dot  What should I tell my health care provider before I take this medicine?  They need to know if you have any of these conditions:  -abnormal vaginal bleeding  -blood vessel disease or blood clots  -breast, cervical, endometrial, ovarian, liver, or uterine cancer  -dementia  -diabetes  -gallbladder disease  -heart disease or recent heart attack  -high blood pressure  -high cholesterol  -high level of calcium in the blood  -hysterectomy  -kidney disease  -liver disease  -migraine headaches  -protein C deficiency  -protein S deficiency  -stroke  -systemic lupus erythematosus (SLE)  -tobacco smoker  -an unusual or allergic reaction to estrogens, other hormones, medicines, foods, dyes, or preservatives  -pregnant or trying to get  pregnant  -breast-feeding  How should I use this medicine?  This medicine is for external use only. Follow the directions on the prescription label. Tear open the pouch, do not use scissors. Remove the stiff protective liner covering the adhesive. Try not to touch the adhesive. Apply the patch, sticky side to the skin, to an area that is clean, dry and hairless. Avoid injured, irritated, calloused, or scarred areas. Do not apply the skin patches to your breasts or around the waistline. Use a different site each time to prevent skin irritation. Do not cut or trim the patch. Do not stop using except on the advice of your doctor or health care professional. Do not wear more than one patch at a time unless you are told to do so by your doctor or health care professional.  Contact your pediatrician regarding the use of this medicine in children. Special care may be needed.  A patient package insert for the product will be given with each prescription and refill. Read this sheet carefully each time. The sheet may change frequently.  Overdosage: If you think you have taken too much of this medicine contact a poison control center or emergency room at once.  NOTE: This medicine is only for you. Do not share this medicine with others.  What if I miss a dose?  If you miss a dose, apply it as soon as you can. If it is almost time for your next dose, apply only that dose. Do not apply double or extra doses.  What may interact with this medicine?  Do not take this medicine with any of the following medications:  -aromatase inhibitors like aminoglutethimide, anastrozole, exemestane, letrozole, testolactone  This medicine may also interact with the following medications:  -carbamazepine  -certain antibiotics used to treat infections  -certain barbiturates used for inducing sleep or treating seizures  -grapefruit juice  -medicines for fungus infections like itraconazole and ketoconazole  -raloxifene or tamoxifen  -rifabutin,  rifampin, or rifapentine  -ritonavir  -Stacey's Wort  This list may not describe all possible interactions. Give your health care provider a list of all the medicines, herbs, non-prescription drugs, or dietary supplements you use. Also tell them if you smoke, drink alcohol, or use illegal drugs. Some items may interact with your medicine.  What should I watch for while using this medicine?  Visit your doctor or health care professional for regular checks on your progress. You will need a regular breast and pelvic exam and Pap smear while on this medicine. You should also discuss the need for regular mammograms with your health care professional, and follow his or her guidelines for these tests.  This medicine can make your body retain fluid, making your fingers, hands, or ankles swell. Your blood pressure can go up. Contact your doctor or health care professional if you feel you are retaining fluid.  If you have any reason to think you are pregnant, stop taking this medicine right away and contact your doctor or health care professional.  Smoking increases the risk of getting a blood clot or having a stroke while you are taking this medicine, especially if you are more than 35 years old. You are strongly advised not to smoke.  If you wear contact lenses and notice visual changes, or if the lenses begin to feel uncomfortable, consult your eye doctor or health care professional.  This medicine can increase the risk of developing a condition (endometrial hyperplasia) that may lead to cancer of the lining of the uterus. Taking progestins, another hormone drug, with this medicine lowers the risk of developing this condition. Therefore, if your uterus has not been removed (by a hysterectomy), your doctor may prescribe a progestin for you to take together with your estrogen. You should know, however, that taking estrogens with progestins may have additional health risks. You should discuss the use of estrogens and  progestins with your health care professional to determine the benefits and risks for you.  If you are going to have surgery or an MRI, you may need to stop taking this medicine. Consult your health care professional for advice before you schedule the surgery.  You may bathe or participate in other activities while wearing your patch. If the patch pulls loose or falls off, you may reapply it if the patch is sticky enough to stay on the skin. You should reapply the patch in a different area. Use a fresh patch if it will no longer stick.  What side effects may I notice from receiving this medicine?  Side effects that you should report to your doctor or health care professional as soon as possible:  -allergic reactions like skin rash, itching or hives, swelling of the face, lips, or tongue  -breast tissue changes or discharge  -changes in vision  -chest pain  -confusion, trouble speaking or understanding  -dark urine  -general ill feeling or flu-like symptoms  -light-colored stools  -nausea, vomiting  -pain, swelling, warmth in the leg  -right upper belly pain  -severe headaches  -shortness of breath  -sudden numbness or weakness of the face, arm or leg  -trouble walking, dizziness, loss of balance or coordination  -unusual vaginal bleeding  -yellowing of the eyes or skin  Side effects that usually do not require medical attention (report to your doctor or health care professional if they continue or are bothersome):  -hair loss  -increased hunger or thirst  -increased urination  -symptoms of vaginal infection like itching, irritation or unusual discharge  -unusually weak or tired  This list may not describe all possible side effects. Call your doctor for medical advice about side effects. You may report side effects to FDA at 4-192-FDA-6200.  Where should I keep my medicine?  Keep out of the reach of children.  Store at room temperature below 30 degrees C (86 degrees F). Do not store any patches that have been removed  from their protective pouch. Throw away any unused medicine after the expiration date. Dispose of used patches properly. Since used patches may still contain active hormones, fold the patch in half so that it sticks to itself prior to disposal.  NOTE: This sheet is a summary. It may not cover all possible information. If you have questions about this medicine, talk to your doctor, pharmacist, or health care provider.     © 2017, [employer]. (2012-03-21 09:19:41)

## 2017-10-11 NOTE — PROGRESS NOTES
QUICK REFERENCE INFORMATION:  The ABCs of the Annual Wellness Visit    Initial Medicare Wellness Visit    HEALTH RISK ASSESSMENT    1965    Recent Hospitalizations:  No hospitalization(s) within the last year..        Current Medical Providers:  Patient Care Team:  Amber Mills MD as PCP - General (Family Medicine)  Jake Naranjo MD as Consulting Physician (Cardiology)  Bora Miles MD, FAAN as Consulting Physician (Sleep Medicine)  Lisa Villalobos MD as Consulting Physician (Physical Medicine and Rehabilitation)  Keisha Ruiz MD as Consulting Physician (Gastroenterology)  Malik Faira II, MD as Consulting Physician (Pain Medicine)  Mal Weeks MD as Consulting Physician (Colon and Rectal Surgery)  Shira Lawrence MD as Obstetrician (Obstetrics and Gynecology)        Smoking Status:  History   Smoking Status   • Never Smoker   Smokeless Tobacco   • Never Used       Alcohol Consumption:  History   Alcohol Use No       Depression Screen:   PHQ-2/PHQ-9 Depression Screening 10/11/2017   Little interest or pleasure in doing things 1   Feeling down, depressed, or hopeless 1   Trouble falling or staying asleep, or sleeping too much 3   Feeling tired or having little energy 2   Poor appetite or overeating 2   Feeling bad about yourself - or that you are a failure or have let yourself or your family down 3   Trouble concentrating on things, such as reading the newspaper or watching television 0   Moving or speaking so slowly that other people could have noticed. Or the opposite - being so fidgety or restless that you have been moving around a lot more than usual 0   Thoughts that you would be better off dead, or of hurting yourself in some way 0   Total Score 12   If you checked off any problems, how difficult have these problems made it for you to do your work, take care of things at home, or get along with other people? Somewhat difficult       Health Habits and Functional and Cognitive  Screening:    Functional & Cognitive Status 10/11/2017   Do you have difficulty preparing food and eating? No   Do you have difficulty bathing yourself? No   Do you have difficulty getting dressed? No   Do you have difficulty using the toilet? No   Do you have difficulty moving around from place to place? No   In the past year have you fallen or experienced a near fall? No   Do you need help using the phone?  No   Are you deaf or do you have serious difficulty hearing?  No   Do you need help with transportation? No   Do you need help shopping? No   Do you need help preparing meals?  No   Do you need help with housework?  No   Do you need help with laundry? No   Do you need help taking your medications? No   Do you need help managing money? No   Do you have difficulty concentrating, remembering or making decisions? No       Health Habits  Current Diet: Limited Junk Food  Dental Exam: Up to date  Eye Exam: Up to date  Exercise (times per week): 7 times per week  Current Exercise Activities Include: Walking          Does the patient have evidence of cognitive impairment? No    Asiprin use counseling: Does not need ASA (and currently is not on it)      Recent Lab Results:      Visual Acuity:    No exam data present; she declines today as she has had eye exam within last month and told she needed reading glassse    Age-appropriate Screening Schedule:  Refer to the list below for future screening recommendations based on patient's age, sex and/or medical conditions. Orders for these recommended tests are listed in the plan section. The patient has been provided with a written plan.    Health Maintenance   Topic Date Due   • TDAP/TD VACCINES (1 - Tdap) 10/07/1984   • LIPID PANEL  05/23/2017   • INFLUENZA VACCINE  08/01/2017   • DIABETIC FOOT EXAM  10/11/2017   • URINE MICROALBUMIN  10/11/2017   • PNEUMOCOCCAL VACCINE (19-64 MEDIUM RISK) (1 of 1 - PPSV23) 10/11/2018 (Originally 10/7/1984)   • HEMOGLOBIN A1C  11/23/2017   •  MAMMOGRAM  08/16/2018   • DIABETIC EYE EXAM  09/14/2018   • COLONOSCOPY  11/25/2025   • PAP SMEAR  Excluded        Subjective   History of Present Illness    Tatiana Fields is a 52 y.o. female who presents for an Annual Wellness Visit. Chronic conditions including HTN have been stable. Most recent labs reviewed on chart. She has several other issues she would like to discuss today.    Hormone Replacement Counseling: Patient presents to discuss hormone replacement therapy. Patient is requesting hormone replacement therapy due to hot flashes, insomnia, moodiness, no energy, vaginal dryness and hysterectomy with BSO. The patient is not currently taking hormone replacement therapy. She tried estra-test but did not like side effects as she felt it increased her anxiety.  Patient denies post-menopausal vaginal bleeding. The patient is not currently sexually active. last mammogram: normal on 10/10/14. Patient is hormone deficient due to hysterectomy with BSO, which occurred at age 47 for endometriosis.  The patient currently has symptoms of anxiety, depression, dry skin, hot flashes, insomnia, moodiness, no energy, vaginal dryness. She would like to try the Minivelle patch  Current hormone therapy:   none  Previous hormone therapy:   Estratest  Reason for starting HRT: hot flashes, insomnia, moodiness, no energy, vaginal dryness and hysterectomy with BSO  Bleeding in past on HRT: none    She also c/o chronic insomnia. Problem for several years. Previously did well on ambien but no longer effective. Would like to try lunesta. Currently on tx for RLSx as well. Denies side effects from current meds.    She has questions about possible referral to colorectal surgery. She has h/o chronic functional constipation, IBSx. She has questions about potential bowel surgery for colonic inertia.  She has had relatively recent EGD as well as colonoscopy (2015). She continues to have severe constipation, bloating, abd pain- some better on  current meds. No recent blood in stool or increased mucous.    The following portions of the patient's history were reviewed and updated as appropriate: allergies, current medications, past family history, past medical history, past social history, past surgical history and problem list.    Outpatient Medications Prior to Visit   Medication Sig Dispense Refill   • Cholecalciferol (VITAMIN D) 2000 units tablet Take 2,000 Units by mouth 2 (Two) Times a Day.     • Cyanocobalamin (VITAMIN B-12) 500 MCG sublingual tablet Place  under the tongue.     • ENDOCET  MG per tablet take 1 tablet by mouth every 6 hours if needed for pain  0   • famciclovir (FAMVIR) 500 MG tablet Take 500 mg by mouth 3 (Three) Times a Day.     • gabapentin (NEURONTIN) 300 MG capsule take 2 capsules by mouth at bedtime  0   • ibuprofen (ADVIL,MOTRIN) 600 MG tablet take 1 tablet by mouth every 8 hours  0   • linaclotide (LINZESS) 290 MCG capsule capsule Take 290 mcg by mouth Every Morning Before Breakfast.     • lisinopril (PRINIVIL,ZESTRIL) 10 MG tablet TAKE ONE TABLET TWICE DAILY  6   • LODRANE D 4-60 MG capsule Take one daily  0   • LORazepam (ATIVAN) 0.5 MG tablet take 1 tablet by mouth once daily if needed  0   • mometasone (NASONEX) 50 MCG/ACT nasal spray 2 sprays into each nostril Daily. 17 g 5   • omeprazole (priLOSEC) 40 MG capsule      • polyethylene glycol (MIRALAX) powder TAKE ONE CAPFUL (17GM) DAILY  11   • pramipexole (MIRAPEX) 0.5 MG tablet One pill 2 hours before bedtime 30 tablet 3   • Probiotic Product (ALIGN) capsule 1 po bid 60 capsule 5   • RESTASIS 0.05 % ophthalmic emulsion      • sertraline (ZOLOFT) 50 MG tablet Take 1 tablet by mouth Daily. 30 tablet 5   • SUMAtriptan (IMITREX) 100 MG tablet Take 100 mg by mouth Every 2 (Two) Hours As Needed for Migraine.     • triamcinolone (KENALOG) 0.1 % ointment Apply  topically 2 (Two) Times a Day. 60 g 0   • zolpidem (AMBIEN) 10 MG tablet take 1 tablet by mouth at bedtime  0   •  HORIZANT 600 MG tablet controlled-release Take 600 mg by mouth Every Night. at 5 PM with food 30 tablet 3   • estrogens, conjugated,-methyltestosterone (ESTRATEST HS) 0.625-1.25 MG per tablet Take 1 tablet by mouth Daily. 30 tablet 0   • predniSONE (DELTASONE) 10 MG tablet 1 po qd two days per week 10 tablet 1     Facility-Administered Medications Prior to Visit   Medication Dose Route Frequency Provider Last Rate Last Dose   • bupivacaine (MARCAINE) injection 5 mL  5 mL Injection Once SOULEYMANE Avitia       • bupivacaine (MARCAINE) injection 5 mL  5 mL Injection Once SOULEYMANE Avitia       • cyanocobalamin injection 1,000 mcg  1,000 mcg Intramuscular Q28 Days Amber Mills MD   1,000 mcg at 06/27/17 1205   • cyanocobalamin injection 1,000 mcg  1,000 mcg Intramuscular Q28 Days SOULEYMANE Richardson   1,000 mcg at 06/02/17 1408   • cyanocobalamin injection 1,000 mcg  1,000 mcg Intramuscular Q28 Days SOULEYMANE Richardson   1,000 mcg at 06/06/17 1629   • cyanocobalamin injection 1,000 mcg  1,000 mcg Intramuscular Q28 Days SOULEYMANE Richardson   1,000 mcg at 06/19/17 1727   • cyanocobalamin injection 1,000 mcg  1,000 mcg Intramuscular Q28 Days Amber Mills MD   1,000 mcg at 07/19/17 1505   • cyanocobalamin injection 1,000 mcg  1,000 mcg Intramuscular Q28 Days Amber Mills MD   1,000 mcg at 08/30/17 1721   • methylPREDNISolone acetate (DEPO-medrol) injection 200 mg  200 mg Intramuscular Once SOULEYMANE Avitia       • methylPREDNISolone acetate (DEPO-medrol) injection 200 mg  200 mg Intramuscular Once SOULEYMANE Avitia       • methylPREDNISolone acetate (DEPO-medrol) injection 200 mg  200 mg Intramuscular Once SOULEYMANE Avitia           Patient Active Problem List   Diagnosis   • Depression with somatization   • Essential tremor   • Periodic limb movements of sleep   • Chronic daily headache   • Excessive daytime sleepiness   • Obstructive sleep apnea   • Intractable  migraine with aura without status migrainosus   • Ovarian cyst   • History of Lyme disease   • Fibromyalgia   • Interstitial cystitis   • Essential hypertension   • IFG (impaired fasting glucose)   • Chronic constipation   • GERD (gastroesophageal reflux disease)   • Irritable bowel syndrome   • PTSD (post-traumatic stress disorder)   • Vitamin D deficiency   • Hepatomegaly   • Postmenopausal disorder   • Chronic insomnia   • Cervical arthritis   • Cervical nerve root compression   • Shingles   • Vitamin B12 deficiency       Advance Care Planning:  has NO advance directive - information provided to the patient today    Identification of Risk Factors:  Risk factors include: cardiovascular risk, chronic pain, depression and polypharmacy.    Review of Systems   Constitutional: Positive for fatigue. Negative for appetite change and fever.   HENT: Positive for congestion, postnasal drip and sinus pressure. Negative for ear pain, hearing loss, nosebleeds, rhinorrhea, sneezing, sore throat, tinnitus and trouble swallowing.    Eyes: Negative for pain, discharge, redness and visual disturbance.        Dry eyes   Respiratory: Negative for cough, chest tightness, shortness of breath and wheezing.    Cardiovascular: Positive for palpitations. Negative for chest pain and leg swelling.   Gastrointestinal: Positive for abdominal pain (chronic), constipation and nausea. Negative for blood in stool, diarrhea and vomiting.   Endocrine: Positive for heat intolerance. Negative for cold intolerance and polyuria.   Genitourinary: Positive for frequency and pelvic pain (chronic). Negative for dysuria, flank pain, hematuria, urgency and vaginal discharge.        Vaginal dryness   Musculoskeletal: Positive for arthralgias and myalgias. Negative for back pain, joint swelling and neck pain.   Skin: Negative for rash and wound.   Neurological: Positive for weakness and headaches. Negative for dizziness, tremors, seizures, syncope, speech  "difficulty and numbness.   Hematological: Negative for adenopathy. Bruises/bleeds easily.   Psychiatric/Behavioral: Positive for sleep disturbance. Negative for confusion, dysphoric mood and suicidal ideas. The patient is nervous/anxious.        Compared to one year ago, the patient feels her physical health is worse.  Compared to one year ago, the patient feels her mental health is worse.    Objective     Physical Exam   Constitutional: She is oriented to person, place, and time. She appears well-developed and well-nourished. She is cooperative. She does not appear ill. No distress.   HENT:   Head: Normocephalic and atraumatic.   Mouth/Throat: Mucous membranes are normal. Mucous membranes are not dry.   Eyes: Conjunctivae and lids are normal.   Neck: Phonation normal. Neck supple. Normal carotid pulses present. Carotid bruit is not present. No thyroid mass and no thyromegaly present.   Cardiovascular: Normal rate, regular rhythm, normal heart sounds and intact distal pulses.    Pulmonary/Chest: Effort normal and breath sounds normal.   Abdominal: Soft. Bowel sounds are normal. She exhibits no distension and no mass. There is no hepatosplenomegaly. There is generalized tenderness (mild). There is no rigidity, no rebound and no guarding.   Musculoskeletal: Normal range of motion.       Vascular Status -  Her exam exhibits no right foot edema. Her exam exhibits no left foot edema.  Neurological: She is alert and oriented to person, place, and time. She has normal strength. She displays no tremor. Gait normal.   Skin: Skin is warm and dry. No bruising and no rash noted.   Psychiatric: She has a normal mood and affect. Her behavior is normal. Cognition and memory are normal.   Nursing note and vitals reviewed.      Vitals:    10/11/17 1444   BP: 112/72   Pulse: 92   SpO2: 97%   Weight: 160 lb (72.6 kg)   Height: 63.5\" (161.3 cm)       Body mass index is 27.9 kg/(m^2).  Discussed the patient's BMI with her. The BMI is " above average; BMI management plan is completed.    Assessment/Plan   Patient Self-Management and Personalized Health Advice  The patient has been provided with information about: diet, exercise, weight management, prevention of cardiac or vascular disease, designing advance directives and mental health concerns and preventive services including:   · Advance directive, Exercise counseling provided, Influenza vaccine, Nutrition counseling provided, Screening mammography, referral placed, TdaP vaccine.    Visit Diagnoses:    ICD-10-CM ICD-9-CM   1. Medicare annual wellness visit, initial Z00.00 V70.0   2. Encounter for screening mammogram for breast cancer Z12.31 V76.12   3. Chronic insomnia F51.04 780.52   4. Hormone replacement therapy Z79.890 V07.4   5. Postmenopausal disorder N95.1 627.9   6. Need for Tdap vaccination Z23 V06.1   7. Screening for diabetes mellitus Z13.1 V77.1   8. Encounter for lipid screening for cardiovascular disease Z13.220 V77.91    Z13.6 V81.2   9. Impaired fasting glucose  R73.01 790.21   10. Irritable bowel syndrome with constipation K58.1 564.1   11. Chronic constipation K59.09 564.00   12. Essential hypertension I10 401.9   13. Need for hepatitis C screening test Z11.59 V73.89       Orders Placed This Encounter   Procedures   • Mammo Screening Bilateral With CAD     Standing Status:   Future     Standing Expiration Date:   10/11/2018     Order Specific Question:   Reason for Exam:     Answer:   screening     Order Specific Question:   Patient Pregnant     Answer:   No   • Lipid Panel     Standing Status:   Future     Standing Expiration Date:   10/11/2018   • Hemoglobin A1c     Standing Status:   Future     Standing Expiration Date:   10/11/2018   • Hepatitis C Antibody     Standing Status:   Future     Standing Expiration Date:   10/12/2018   • Ambulatory Referral to Colorectal Surgery     Referral Priority:   Routine     Referral Type:   Consultation     Referral Reason:   Specialty  Services Required     Requested Specialty:   Colon and Rectal Surgery     Number of Visits Requested:   1       Outpatient Encounter Prescriptions as of 10/11/2017   Medication Sig Dispense Refill   • Cholecalciferol (VITAMIN D) 2000 units tablet Take 2,000 Units by mouth 2 (Two) Times a Day.     • Cyanocobalamin (VITAMIN B-12) 500 MCG sublingual tablet Place  under the tongue.     • ENDOCET  MG per tablet take 1 tablet by mouth every 6 hours if needed for pain  0   • famciclovir (FAMVIR) 500 MG tablet Take 500 mg by mouth 3 (Three) Times a Day.     • gabapentin (NEURONTIN) 300 MG capsule take 2 capsules by mouth at bedtime  0   • ibuprofen (ADVIL,MOTRIN) 600 MG tablet take 1 tablet by mouth every 8 hours  0   • linaclotide (LINZESS) 290 MCG capsule capsule Take 290 mcg by mouth Every Morning Before Breakfast.     • lisinopril (PRINIVIL,ZESTRIL) 10 MG tablet TAKE ONE TABLET TWICE DAILY  6   • LODRANE D 4-60 MG capsule Take one daily  0   • LORazepam (ATIVAN) 0.5 MG tablet take 1 tablet by mouth once daily if needed  0   • mometasone (NASONEX) 50 MCG/ACT nasal spray 2 sprays into each nostril Daily. 17 g 5   • omeprazole (priLOSEC) 40 MG capsule      • polyethylene glycol (MIRALAX) powder TAKE ONE CAPFUL (17GM) DAILY  11   • pramipexole (MIRAPEX) 0.5 MG tablet One pill 2 hours before bedtime 30 tablet 3   • Probiotic Product (ALIGN) capsule 1 po bid 60 capsule 5   • RESTASIS 0.05 % ophthalmic emulsion      • sertraline (ZOLOFT) 50 MG tablet Take 1 tablet by mouth Daily. 30 tablet 5   • SUMAtriptan (IMITREX) 100 MG tablet Take 100 mg by mouth Every 2 (Two) Hours As Needed for Migraine.     • triamcinolone (KENALOG) 0.1 % ointment Apply  topically 2 (Two) Times a Day. 60 g 0   • [DISCONTINUED] zolpidem (AMBIEN) 10 MG tablet take 1 tablet by mouth at bedtime  0   • estradiol (MINIVELLE) 0.05 MG/24HR patch Place 1 patch on the skin 1 (One) Time Per Week. 4 patch 11   • eszopiclone (LUNESTA) 3 MG tablet Take 1  tablet by mouth Every Night. Take immediately before bedtime 30 tablet 2   • HORIZANT 600 MG tablet controlled-release Take 600 mg by mouth Every Night. at 5 PM with food 30 tablet 3   • [] Tdap (BOOSTRIX) 5-2.5-18.5 LF-MCG/0.5 injection Inject 0.5 mL into the shoulder, thigh, or buttocks 1 (One) Time for 1 dose. 0.5 mL 0   • [DISCONTINUED] estrogens, conjugated,-methyltestosterone (ESTRATEST HS) 0.625-1.25 MG per tablet Take 1 tablet by mouth Daily. 30 tablet 0   • [DISCONTINUED] predniSONE (DELTASONE) 10 MG tablet 1 po qd two days per week 10 tablet 1     Facility-Administered Encounter Medications as of 10/11/2017   Medication Dose Route Frequency Provider Last Rate Last Dose   • bupivacaine (MARCAINE) injection 5 mL  5 mL Injection Once SOULEYMANE Avitia       • bupivacaine (MARCAINE) injection 5 mL  5 mL Injection Once SOULEYMANE Avitia       • cyanocobalamin injection 1,000 mcg  1,000 mcg Intramuscular Q28 Days Amber Mills MD   1,000 mcg at 17 1205   • cyanocobalamin injection 1,000 mcg  1,000 mcg Intramuscular Q28 Days SOULEYMANE Richardson   1,000 mcg at 17 1408   • cyanocobalamin injection 1,000 mcg  1,000 mcg Intramuscular Q28 Days SOULEYMANE Richardson   1,000 mcg at 17 1629   • cyanocobalamin injection 1,000 mcg  1,000 mcg Intramuscular Q28 Days SOULEYMANE Richardson   1,000 mcg at 17 1727   • cyanocobalamin injection 1,000 mcg  1,000 mcg Intramuscular Q28 Days Amber Mills MD   1,000 mcg at 17 1505   • cyanocobalamin injection 1,000 mcg  1,000 mcg Intramuscular Q28 Days Amber Mills MD   1,000 mcg at 17 1721   • methylPREDNISolone acetate (DEPO-medrol) injection 200 mg  200 mg Intramuscular Once SOULEYMANE Avitia       • methylPREDNISolone acetate (DEPO-medrol) injection 200 mg  200 mg Intramuscular Once SOULEYMANE Avitia       • methylPREDNISolone acetate (DEPO-medrol) injection 200 mg  200 mg Intramuscular Once  SOULEYMANE Avitia           Reviewed use of high risk medication in the elderly: not applicable  Reviewed for potential of harmful drug interactions in the elderly: not applicable    I have reviewed risks/benefits and potential side effects of various treatment options for postmenopausal DO. Pt voices understanding and wishes to proceed with trial of minivelle at med dose. She is encouraged to have her routine mammogram as ordered. Handout reviewing particular r/o estrogen replacement tx provided for her as well.    I have reviewed risks/benefits and potential side effects of various treatment options for insomnia. Pt voices understanding and wishes to proceed with trial of Lunesta. As part of patient's treatment plan I am prescribing a controlled substance.  The patient has been made aware of the appropriate use of such medications, including potential risk of somnolence, limited ability to drive and/or work safely, and potential for dependence and/or overdose.  It has also been made clear that these medications are for use by this patient only, without concomitant use of alcohol or other substances, unless prescribed.  KIMBERLY report requested.    She will f/u with Dr. Orr regarding potential procedural interventions for chronic constipation.    HTN stable.    Given written rx for Tdap to take to pharmacy for administration.    Follow Up:  Return for Next scheduled follow up.   Patient was encouraged to keep me informed of any acute changes, lack of improvement, or any new concerning symptoms.  Pt is aware of reasons to seek emergent care.  Patient voiced understanding of all instructions and denied further questions.    An After Visit Summary and PPPS with all of these plans were given to the patient.

## 2017-10-12 PROBLEM — R73.01 IFG (IMPAIRED FASTING GLUCOSE): Status: ACTIVE | Noted: 2017-05-23

## 2017-10-16 ENCOUNTER — RESULTS ENCOUNTER (OUTPATIENT)
Dept: FAMILY MEDICINE CLINIC | Facility: CLINIC | Age: 52
End: 2017-10-16

## 2017-10-16 DIAGNOSIS — Z13.1 SCREENING FOR DIABETES MELLITUS: ICD-10-CM

## 2017-10-16 DIAGNOSIS — Z00.00 MEDICARE ANNUAL WELLNESS VISIT, INITIAL: ICD-10-CM

## 2017-10-16 DIAGNOSIS — R73.01 IMPAIRED FASTING GLUCOSE: ICD-10-CM

## 2017-10-16 DIAGNOSIS — Z13.220 ENCOUNTER FOR LIPID SCREENING FOR CARDIOVASCULAR DISEASE: ICD-10-CM

## 2017-10-16 DIAGNOSIS — Z13.6 ENCOUNTER FOR LIPID SCREENING FOR CARDIOVASCULAR DISEASE: ICD-10-CM

## 2017-10-17 ENCOUNTER — RESULTS ENCOUNTER (OUTPATIENT)
Dept: FAMILY MEDICINE CLINIC | Facility: CLINIC | Age: 52
End: 2017-10-17

## 2017-10-17 DIAGNOSIS — Z11.59 NEED FOR HEPATITIS C SCREENING TEST: ICD-10-CM

## 2017-10-17 DIAGNOSIS — Z00.00 MEDICARE ANNUAL WELLNESS VISIT, INITIAL: ICD-10-CM

## 2017-10-26 ENCOUNTER — TELEPHONE (OUTPATIENT)
Dept: FAMILY MEDICINE CLINIC | Facility: CLINIC | Age: 52
End: 2017-10-26

## 2017-10-26 NOTE — TELEPHONE ENCOUNTER
Patient called to see if she can get a z-tessy, she has sinus infection, colored drainage, pressure for couple of days.  She has an apt. The first week of nov. But wanted to start something now.

## 2017-10-27 LAB
CHOLEST SERPL-MCNC: 200 MG/DL (ref 0–199)
HBA1C MFR BLD: 5.7 %
HDLC SERPL-MCNC: 78 MG/DL (ref 40–60)
LDLC SERPL CALC-MCNC: 95 MG/DL (ref 0–99)
TRIGL SERPL-MCNC: 133 MG/DL
VLDLC SERPL CALC-MCNC: 26.6 MG/DL

## 2017-10-27 NOTE — TELEPHONE ENCOUNTER
The symptoms she described with no fever and only lasting 3-4 days is generally due to virus which antibiotics will not help. Most of these resolve within 7-10 days even without antibiotics. Taking unnecessary antibiotics puts her at risk for colon infection. Advise she use nasal saline, mucinex, rest, fluids, etc.

## 2017-10-27 NOTE — TELEPHONE ENCOUNTER
Patient called back and I informed her of Dr. Mills's recommendations and she voiced understanding and will call back next week if she isn't any better.

## 2017-10-27 NOTE — TELEPHONE ENCOUNTER
Patient was in for labs today and I checked her Temp. 98.8, she has been having yellow/greenish nasal drainage for past 3-4 days.  She says she usually can take the rocephin shot and do a z-tessy and that takes care of it.

## 2017-10-28 LAB — HCV AB S/CO SERPL IA: 0.1 S/CO RATIO (ref 0–0.9)

## 2017-11-01 ENCOUNTER — PROCEDURE VISIT (OUTPATIENT)
Dept: NEUROLOGY | Facility: CLINIC | Age: 52
End: 2017-11-01

## 2017-11-01 VITALS
SYSTOLIC BLOOD PRESSURE: 138 MMHG | HEIGHT: 64 IN | BODY MASS INDEX: 27.31 KG/M2 | DIASTOLIC BLOOD PRESSURE: 96 MMHG | OXYGEN SATURATION: 96 % | HEART RATE: 94 BPM | WEIGHT: 160 LBS

## 2017-11-01 DIAGNOSIS — G43.119 INTRACTABLE MIGRAINE WITH AURA WITHOUT STATUS MIGRAINOSUS: Primary | ICD-10-CM

## 2017-11-01 PROCEDURE — 64615 CHEMODENERV MUSC MIGRAINE: CPT | Performed by: NURSE PRACTITIONER

## 2017-11-01 RX ORDER — PRAZOSIN HYDROCHLORIDE 1 MG/1
CAPSULE ORAL
Refills: 0 | COMMUNITY
Start: 2017-10-26 | End: 2018-02-14

## 2017-11-01 RX ORDER — ZOLPIDEM TARTRATE 6.25 MG/1
TABLET, FILM COATED, EXTENDED RELEASE ORAL
Refills: 0 | COMMUNITY
Start: 2017-10-30 | End: 2018-02-14

## 2017-11-01 RX ORDER — FLUOXETINE HYDROCHLORIDE 20 MG/1
CAPSULE ORAL
Refills: 0 | COMMUNITY
Start: 2017-10-26 | End: 2018-02-14

## 2017-11-01 RX ORDER — ESTRADIOL 0.05 MG/D
PATCH TRANSDERMAL
COMMUNITY
Start: 2017-10-11 | End: 2017-11-08

## 2017-11-03 ENCOUNTER — OFFICE VISIT (OUTPATIENT)
Dept: FAMILY MEDICINE CLINIC | Facility: CLINIC | Age: 52
End: 2017-11-03

## 2017-11-03 VITALS
OXYGEN SATURATION: 95 % | DIASTOLIC BLOOD PRESSURE: 80 MMHG | BODY MASS INDEX: 28.34 KG/M2 | HEART RATE: 80 BPM | WEIGHT: 166 LBS | HEIGHT: 64 IN | SYSTOLIC BLOOD PRESSURE: 124 MMHG

## 2017-11-03 DIAGNOSIS — J01.01 ACUTE RECURRENT MAXILLARY SINUSITIS: ICD-10-CM

## 2017-11-03 DIAGNOSIS — Z29.9 PROPHYLACTIC MEASURE: ICD-10-CM

## 2017-11-03 DIAGNOSIS — J01.11 ACUTE RECURRENT FRONTAL SINUSITIS: ICD-10-CM

## 2017-11-03 PROCEDURE — 99213 OFFICE O/P EST LOW 20 MIN: CPT | Performed by: NURSE PRACTITIONER

## 2017-11-03 PROCEDURE — 96372 THER/PROPH/DIAG INJ SC/IM: CPT | Performed by: NURSE PRACTITIONER

## 2017-11-03 RX ORDER — METHYLPREDNISOLONE ACETATE 80 MG/ML
120 INJECTION, SUSPENSION INTRA-ARTICULAR; INTRALESIONAL; INTRAMUSCULAR; SOFT TISSUE ONCE
Status: COMPLETED | OUTPATIENT
Start: 2017-11-03 | End: 2017-11-03

## 2017-11-03 RX ORDER — CEFTRIAXONE 1 G/1
1 INJECTION, POWDER, FOR SOLUTION INTRAMUSCULAR; INTRAVENOUS ONCE
Status: COMPLETED | OUTPATIENT
Start: 2017-11-03 | End: 2017-11-03

## 2017-11-03 RX ORDER — AZITHROMYCIN 250 MG/1
TABLET, FILM COATED ORAL
Qty: 6 TABLET | Refills: 0 | Status: SHIPPED | OUTPATIENT
Start: 2017-11-03 | End: 2017-11-08

## 2017-11-03 RX ADMIN — CEFTRIAXONE 1 G: 1 INJECTION, POWDER, FOR SOLUTION INTRAMUSCULAR; INTRAVENOUS at 17:42

## 2017-11-03 RX ADMIN — METHYLPREDNISOLONE ACETATE 120 MG: 80 INJECTION, SUSPENSION INTRA-ARTICULAR; INTRALESIONAL; INTRAMUSCULAR; SOFT TISSUE at 17:43

## 2017-11-03 NOTE — PROGRESS NOTES
"Subjective   Tatiana Fields is a 52 y.o. female.     Headache    This is a new problem. The current episode started in the past 7 days. The problem occurs constantly. The problem has been gradually worsening. The pain is located in the frontal region. The pain does not radiate. The pain quality is similar to prior headaches. The quality of the pain is described as aching and throbbing (pressure). The pain is at a severity of 7/10. The pain is moderate. Associated symptoms include sinus pressure. Pertinent negatives include no dizziness, ear pain, eye pain, fever, neck pain, sore throat or weakness. Nothing aggravates the symptoms. Treatments tried: OTC allergy and cold meds. The treatment provided no relief. Her past medical history is significant for sinus disease.       The following portions of the patient's history were reviewed and updated as appropriate: allergies, current medications, past family history, past medical history, past social history, past surgical history and problem list.    Review of Systems   Constitutional: Positive for chills and fatigue. Negative for fever.   HENT: Positive for sinus pain and sinus pressure. Negative for congestion, ear pain, postnasal drip and sore throat.    Eyes: Negative for pain.   Respiratory: Negative.    Cardiovascular: Negative.    Gastrointestinal: Negative.    Genitourinary: Negative for decreased urine volume, dysuria, flank pain, pelvic pain and urgency.   Musculoskeletal: Negative for arthralgias, myalgias, neck pain and neck stiffness.   Skin: Negative for rash.   Allergic/Immunologic: Negative.    Neurological: Positive for headaches. Negative for dizziness and weakness.   Hematological: Negative.      Vitals:    11/03/17 1657   BP: 124/80   BP Location: Right arm   Patient Position: Sitting   Pulse: 80   SpO2: 95%   Weight: 166 lb (75.3 kg)   Height: 63.5\" (161.3 cm)       Objective   Physical Exam   Constitutional: She is oriented to person, place, and " time. She appears well-developed and well-nourished.   HENT:   Head: Normocephalic.   Right Ear: External ear normal.   Left Ear: External ear normal.   Nose: Right sinus exhibits maxillary sinus tenderness and frontal sinus tenderness. Left sinus exhibits maxillary sinus tenderness and frontal sinus tenderness.   Mouth/Throat: No oropharyngeal exudate.   Eyes: Conjunctivae are normal.   Neck: Normal range of motion.   Cardiovascular: Normal rate, regular rhythm and normal heart sounds.    Pulmonary/Chest: Effort normal and breath sounds normal. No respiratory distress. She has no wheezes.   Abdominal: Soft. She exhibits no distension. There is no tenderness.   Musculoskeletal: Normal range of motion. She exhibits no edema or tenderness.   NROM all major joints   Lymphadenopathy:     She has no cervical adenopathy.   Neurological: She is alert and oriented to person, place, and time.   Skin: Skin is warm and dry. No rash noted.   Psychiatric: She has a normal mood and affect. Her behavior is normal. Judgment and thought content normal.   Nursing note and vitals reviewed.      Assessment/Plan   Tatiana was seen today for headache and uri.    Diagnoses and all orders for this visit:    Acute recurrent maxillary sinusitis  -     azithromycin (ZITHROMAX Z-OMKAR) 250 MG tablet; Take 2 tablets the first day, then 1 tablet daily for 4 days.  -     cefTRIAXone (ROCEPHIN) injection 1 g; Inject 1 g into the shoulder, thigh, or buttocks 1 (One) Time.  -     methylPREDNISolone acetate (DEPO-medrol) injection 120 mg; Inject 1.5 mL into the shoulder, thigh, or buttocks 1 (One) Time.    Acute recurrent frontal sinusitis  -     azithromycin (ZITHROMAX Z-OMKAR) 250 MG tablet; Take 2 tablets the first day, then 1 tablet daily for 4 days.  -     cefTRIAXone (ROCEPHIN) injection 1 g; Inject 1 g into the shoulder, thigh, or buttocks 1 (One) Time.  -     methylPREDNISolone acetate (DEPO-medrol) injection 120 mg; Inject 1.5 mL into the  shoulder, thigh, or buttocks 1 (One) Time.    Prophylactic measure  -     cefTRIAXone (ROCEPHIN) injection 1 g; Inject 1 g into the shoulder, thigh, or buttocks 1 (One) Time.  -     methylPREDNISolone acetate (DEPO-medrol) injection 120 mg; Inject 1.5 mL into the shoulder, thigh, or buttocks 1 (One) Time.  -     fluconazole (DIFLUCAN) 150 MG tablet; Take 1 tablet by mouth 1 (One) Time for 1 dose.     Rocephin and Medrol injection given in the clinic today. A Z-Wale was prescribed today as well.     Diflucan was prescribed today as a prophylactic measure.     Patient was encouraged to keep me informed of any acute changes, lack of improvement, or any new concerning symptoms. Patient voiced understanding of all instructions and denied further questions.    Patient to RTC prn.

## 2017-11-05 RX ORDER — FLUCONAZOLE 150 MG/1
150 TABLET ORAL ONCE
Qty: 1 TABLET | Refills: 0 | Status: SHIPPED | OUTPATIENT
Start: 2017-11-05 | End: 2017-11-05

## 2017-11-08 ENCOUNTER — OFFICE VISIT (OUTPATIENT)
Dept: FAMILY MEDICINE CLINIC | Facility: CLINIC | Age: 52
End: 2017-11-08

## 2017-11-08 VITALS
DIASTOLIC BLOOD PRESSURE: 80 MMHG | WEIGHT: 161 LBS | SYSTOLIC BLOOD PRESSURE: 122 MMHG | HEART RATE: 84 BPM | HEIGHT: 64 IN | BODY MASS INDEX: 27.49 KG/M2 | OXYGEN SATURATION: 97 %

## 2017-11-08 DIAGNOSIS — E53.8 VITAMIN B12 DEFICIENCY: ICD-10-CM

## 2017-11-08 DIAGNOSIS — Z79.890 HORMONE REPLACEMENT THERAPY: ICD-10-CM

## 2017-11-08 DIAGNOSIS — E55.9 VITAMIN D DEFICIENCY: ICD-10-CM

## 2017-11-08 DIAGNOSIS — K59.09 CHRONIC CONSTIPATION: ICD-10-CM

## 2017-11-08 DIAGNOSIS — N95.1 POSTMENOPAUSAL DISORDER: ICD-10-CM

## 2017-11-08 DIAGNOSIS — I10 ESSENTIAL HYPERTENSION: Primary | ICD-10-CM

## 2017-11-08 PROCEDURE — 99214 OFFICE O/P EST MOD 30 MIN: CPT | Performed by: FAMILY MEDICINE

## 2017-11-08 PROCEDURE — 96372 THER/PROPH/DIAG INJ SC/IM: CPT | Performed by: FAMILY MEDICINE

## 2017-11-08 RX ORDER — FLUCONAZOLE 150 MG/1
150 TABLET ORAL ONCE
Qty: 1 TABLET | Refills: 0 | Status: SHIPPED | OUTPATIENT
Start: 2017-11-08 | End: 2021-12-01 | Stop reason: SDUPTHER

## 2017-11-08 RX ORDER — CYANOCOBALAMIN 1000 UG/ML
1000 INJECTION, SOLUTION INTRAMUSCULAR; SUBCUTANEOUS
Qty: 2 ML | Refills: 5 | Status: SHIPPED | OUTPATIENT
Start: 2017-11-08 | End: 2018-05-18 | Stop reason: SDUPTHER

## 2017-11-08 RX ORDER — ESTRADIOL 0.05 MG/D
FILM, EXTENDED RELEASE TRANSDERMAL
Qty: 8 PATCH | Refills: 11 | Status: SHIPPED | OUTPATIENT
Start: 2017-11-08 | End: 2018-03-21

## 2017-11-08 RX ORDER — PLECANATIDE 3 MG/1
TABLET ORAL
COMMUNITY
Start: 2017-11-06 | End: 2017-11-08

## 2017-11-08 RX ORDER — FLUCONAZOLE 150 MG/1
TABLET ORAL
Refills: 0 | COMMUNITY
Start: 2017-11-06 | End: 2017-11-08 | Stop reason: SDUPTHER

## 2017-11-08 RX ADMIN — CYANOCOBALAMIN 1000 MCG: 1000 INJECTION, SOLUTION INTRAMUSCULAR; SUBCUTANEOUS at 15:26

## 2017-11-08 NOTE — PROGRESS NOTES
"Subjective   Tatiana Fields is a 52 y.o. female.     History of Present Illness  Ms. Fields presents today with several concerns:  1) She wishes to have increased frequency of dosing of HRT patch. Rx'd for 1 patch weekly but states it generally \"falls off\" 3-4 days in. Has spoken with pharmacist; told this happens frequently. Has had good improvement in postmenopasual symptoms with tx.  2) She would like to restart B12 injections and do them at home. Level drops with oral B12 only. She has recurrence of fatigue, depressed mood, etc. Would like to try IM injections q 2-4 weeks.  3) Has h/o HTN. BP has  Been well controlled. Taking meds as rx'd. Denies side effects.  4) It taking vit D for def as well. Good compliance. Denies side effects.  5) h/o chronic constipation. Had increased symptoms over weekend. Had to take mag citrate and laxatives. Feels she has gotten \"straightened out' now.  6) tx'd for sinusitis with abx earlier this month; requested diflucan for vaginitis but apparently did not receive    The following portions of the patient's history were reviewed and updated as appropriate: allergies, current medications, past family history, past medical history, past social history, past surgical history and problem list.    Review of Systems   Constitutional: Positive for fatigue. Negative for appetite change and fever.   HENT: Positive for congestion, postnasal drip and sinus pressure. Negative for ear pain, hearing loss, nosebleeds, rhinorrhea, sneezing, sore throat, tinnitus and trouble swallowing.    Eyes: Negative for pain, discharge, redness and visual disturbance.        Dry eyes   Respiratory: Negative for cough, chest tightness, shortness of breath and wheezing.    Cardiovascular: Positive for palpitations. Negative for chest pain and leg swelling.   Gastrointestinal: Positive for abdominal pain (chronic), constipation and nausea. Negative for blood in stool, diarrhea and vomiting.   Endocrine: " Positive for heat intolerance. Negative for cold intolerance and polyuria.   Genitourinary: Positive for frequency and pelvic pain (chronic). Negative for dysuria, flank pain, hematuria, urgency and vaginal discharge.        Vaginal dryness   Musculoskeletal: Positive for arthralgias and myalgias. Negative for back pain, joint swelling and neck pain.   Skin: Negative for rash and wound.   Neurological: Positive for weakness and headaches. Negative for dizziness, tremors, seizures, syncope, speech difficulty and numbness.   Hematological: Negative for adenopathy. Bruises/bleeds easily.   Psychiatric/Behavioral: Positive for sleep disturbance. Negative for confusion, dysphoric mood and suicidal ideas. The patient is nervous/anxious.        Objective    Vitals:    11/08/17 1451   BP: 122/80   Pulse: 84   SpO2: 97%     Body mass index is 28.07 kg/(m^2).  Last 2 weights    11/08/17  1451   Weight: 161 lb (73 kg)       Physical Exam   Constitutional: She is oriented to person, place, and time. She appears well-developed and well-nourished. She is cooperative. She does not appear ill. No distress.   HENT:   Head: Normocephalic and atraumatic.   Mouth/Throat: Oropharynx is clear and moist and mucous membranes are normal. Mucous membranes are not dry. No oral lesions.   Eyes: Conjunctivae and lids are normal.   Neck: Phonation normal. Neck supple. Normal carotid pulses present. No thyroid mass and no thyromegaly present.   Cardiovascular: Normal rate, regular rhythm, normal heart sounds and intact distal pulses.    Pulmonary/Chest: Effort normal and breath sounds normal.       Vascular Status -  Her exam exhibits no right foot edema. Her exam exhibits no left foot edema.  Lymphadenopathy:     She has no cervical adenopathy.   Neurological: She is alert and oriented to person, place, and time. She has normal strength. She displays no tremor. Gait normal.   Skin: Skin is warm and dry. No bruising and no rash noted.    Psychiatric: She has a normal mood and affect. Her behavior is normal. Cognition and memory are normal.   Nursing note and vitals reviewed.      Assessment/Plan   Tatiana was seen today for follow-up, hypertension and fatigue.    Diagnoses and all orders for this visit:    Essential hypertension    Vitamin B12 deficiency    Vitamin D deficiency    Postmenopausal disorder  -     estradiol (MINIVELLE) 0.05 MG/24HR patch; 1 patch every 4 days.    Hormone replacement therapy  -     estradiol (MINIVELLE) 0.05 MG/24HR patch; 1 patch every 4 days.    Chronic constipation    Other orders  -     fluconazole (DIFLUCAN) 150 MG tablet; Take 1 tablet by mouth 1 (One) Time for 1 dose.  -     cyanocobalamin 1000 MCG/ML injection; Inject 1 mL into the shoulder, thigh, or buttocks Every 14 (Fourteen) Days.    HTN controlled.  Pt advised to eat a heart healthy diet and get regular aerobic exercise.  Continue vitamin replacement. TRial of IM B12 at home.  HRT due to postmenopausal DO. Trial of increased patch application to every 4 days. Risks, benefits, and potential side effects of HRT reviewed with patient.  Patient voiced understanding and wished to proceed with treatment.  Continue constipation mgnt, linzess, etc.  Patient was encouraged to keep me informed of any acute changes, lack of improvement, or any new concerning symptoms.  Pt is aware of reasons to seek emergent care.  F/U in 3 months, sooner as needed.  Patient voiced understanding of all instructions and denied further questions.

## 2017-11-13 NOTE — PROGRESS NOTES
Patient has over 15 migraines a month over 4 hours duration interfering with the patient daily activities despite conservative medical treatment for acute and preventive measures.    The risk and benefit of the Botox treatment has been discussed with patient.  Safety information and contraindication of Botox has been reviewed with patient.  The most frequent Adverse reactions of Botox for migraine include but not limited to neck pain 9%, headache 5%, Ptosis 4%, muscle weakness 4%, injection site pain 3%, muscle spasms 2% have been gone over with our patient.  200 unit vial Botox was re-constituted with 4 mL 0.9 NS to 5 unit/0.1 mL using standard techniques.  10 units were given at the  muscle in 2 divided sites  5 units were given at the Procerus muscle  20 units were given at the Frontalis muscle in 4 divided sites  40 units were given at the Temporalis muscle in 8 divided sites  30 units were given at the Occipitalis muscle in 6 divided sites  20 units were given at the cervical paraspinal muscle in 4 divided sites  30 units were given at the Trapezius muscle in 6 divided sites  For a total of 155 units divided between 31 sites and 45 units of wastage  Patient tolerated procedure well without complication.

## 2017-12-05 ENCOUNTER — OFFICE VISIT (OUTPATIENT)
Dept: NEUROLOGY | Facility: CLINIC | Age: 52
End: 2017-12-05

## 2017-12-05 ENCOUNTER — OFFICE VISIT (OUTPATIENT)
Dept: RETAIL CLINIC | Facility: CLINIC | Age: 52
End: 2017-12-05

## 2017-12-05 VITALS
TEMPERATURE: 99.2 F | HEART RATE: 98 BPM | RESPIRATION RATE: 18 BRPM | WEIGHT: 164 LBS | BODY MASS INDEX: 28.59 KG/M2 | OXYGEN SATURATION: 98 %

## 2017-12-05 VITALS
HEART RATE: 111 BPM | HEIGHT: 64 IN | WEIGHT: 161 LBS | BODY MASS INDEX: 27.49 KG/M2 | SYSTOLIC BLOOD PRESSURE: 118 MMHG | DIASTOLIC BLOOD PRESSURE: 80 MMHG | OXYGEN SATURATION: 99 %

## 2017-12-05 DIAGNOSIS — M54.2 CERVICALGIA: Primary | ICD-10-CM

## 2017-12-05 DIAGNOSIS — G43.119 INTRACTABLE MIGRAINE WITH AURA WITHOUT STATUS MIGRAINOSUS: ICD-10-CM

## 2017-12-05 DIAGNOSIS — J01.41 ACUTE RECURRENT PANSINUSITIS: Primary | ICD-10-CM

## 2017-12-05 PROCEDURE — 96372 THER/PROPH/DIAG INJ SC/IM: CPT | Performed by: NURSE PRACTITIONER

## 2017-12-05 PROCEDURE — 99213 OFFICE O/P EST LOW 20 MIN: CPT | Performed by: NURSE PRACTITIONER

## 2017-12-05 RX ORDER — METHYLPREDNISOLONE 4 MG/1
TABLET ORAL
Qty: 1 EACH | Refills: 0 | Status: SHIPPED | OUTPATIENT
Start: 2017-12-05 | End: 2018-02-14

## 2017-12-05 RX ORDER — KETOROLAC TROMETHAMINE 30 MG/ML
60 INJECTION, SOLUTION INTRAMUSCULAR; INTRAVENOUS ONCE
Status: COMPLETED | OUTPATIENT
Start: 2017-12-05 | End: 2017-12-05

## 2017-12-05 RX ORDER — FLUCONAZOLE 150 MG/1
150 TABLET ORAL DAILY
Qty: 2 TABLET | Refills: 0 | Status: SHIPPED | OUTPATIENT
Start: 2017-12-05 | End: 2017-12-15 | Stop reason: SDUPTHER

## 2017-12-05 RX ORDER — CEFDINIR 300 MG/1
300 CAPSULE ORAL 2 TIMES DAILY
Qty: 20 CAPSULE | Refills: 0 | Status: SHIPPED | OUTPATIENT
Start: 2017-12-05 | End: 2017-12-15

## 2017-12-05 RX ADMIN — KETOROLAC TROMETHAMINE 60 MG: 30 INJECTION, SOLUTION INTRAMUSCULAR; INTRAVENOUS at 10:29

## 2017-12-05 NOTE — PROGRESS NOTES
Subjective   Tatiana Fields is a 52 y.o. female     Chief Complaint   Patient presents with   • Migraine     Pt is here for worsening headaches. Pt states that this migraine started approx 2 weeks ago and has not let up. Pt states that she is in an incredible amount of pain and is weeking some relief.        History of Present Illness     Patient is very pleasant 52-year-old female in clinic today to follow-up for her migraine headaches.  Migraines continue to begin at the back of her head with neck pain and radiate forward with combination of sharp dull and throbbing pain.  She has had a headache for 2 weeks straight but prior to that she was still having 15+ migraines a month lasting greater than 4 hours.  Patient has been on migraine medicine rescue migraine medicine trigger points and Botox with continued migraines.  Has photophobia and phonophobia with nausea vomiting with her migraines.    The following portions of the patient's history were reviewed and updated as appropriate: allergies, current medications, past family history, past medical history, past social history, past surgical history and problem list.    Review of Systems   Constitutional: Negative for chills and fever.   HENT: Negative for congestion, ear pain, hearing loss, rhinorrhea and sore throat.    Eyes: Negative for pain, discharge and redness.   Respiratory: Negative for cough, shortness of breath, wheezing and stridor.    Cardiovascular: Negative for chest pain, palpitations and leg swelling.   Gastrointestinal: Negative for abdominal pain, constipation, nausea and vomiting.   Endocrine: Negative for cold intolerance, heat intolerance and polyphagia.   Genitourinary: Negative for dysuria, flank pain, frequency and urgency.   Musculoskeletal: Positive for arthralgias, myalgias, neck pain and neck stiffness. Negative for joint swelling.   Skin: Negative for pallor, rash and wound.   Allergic/Immunologic: Negative for environmental  "allergies.   Neurological: Positive for headaches. Negative for dizziness, tremors, seizures, syncope, facial asymmetry, speech difficulty, weakness, light-headedness and numbness.   Hematological: Negative for adenopathy.   Psychiatric/Behavioral: Positive for sleep disturbance. Negative for confusion and hallucinations. The patient is nervous/anxious.        Objective         Vitals:    12/05/17 0952   BP: 118/80   Pulse: 111   SpO2: 99%   Weight: 73 kg (161 lb)   Height: 161.3 cm (63.5\")     GENERAL: Patient is pleasant, cooperative, appears to be stated age.  Body habitus is endomorphic.  HEAD:  Head is normocephalic and atraumatic.    NECK: Neck are non-tender without thyromegaly or adenopathy.  Carotic upstrokes are 1+/4.  No cranial or cervical bruits.  The neck is supple with a f  ENT: palate elevate symmetrically, no evidence of high arch palate, tongue midline erythema in posterior pharynx, Mallampati Classification Class III   CARDIOVASCULAR:  Regular rate and rhythm with normal S1 and S2 without rub or gallop.  RESPIRATORY:  Clear to auscultation without wheezes or crackle   PSYCH:  Higher cortical function/mental status:  The patient is alert.  She  is oriented x3 to time, place and person.  Recent and the remote memory appear normal.  The patient has a good fund of knowledge.  There is no visual or auditory hallucination or suicidal or homicidal ideation.  SPEECH:There is no gross evidence of aphasia, dysarthria or agnosia.      CRANIAL NERVES: .  Extraocular movements are full and smooth with normal pursuits and saccades.  No nystagmus noted.  The face is symmetric.  MOTOR: Strength is 5/5 throughout with normal tone and bulk  No involuntary movements noted.    COORDINATION AND GAIT:  The patient walks with a narrow-based gait.      Results for orders placed or performed in visit on 10/17/17   Hepatitis C Antibody   Result Value Ref Range    Hep C Virus Ab 0.1 0.0 - 0.9 s/co ratio         I have " personally reviewed the above labs and imaging studies.    Assessment/Plan     1 migraine headaches/cervicalgia: Patient has failed multiple therapies as noted above.  Plan a Toradol injection today with the Medrol Dosepak.  Patient to have an MRI of her brain and cervical eye.    Discussion:  Migraine headaches are a major public health problem affecting more than 28 million persons in this country. Nearly 25 percent of women and 9 percent of men experience disabling migraines.    Migraine treatment depends on the duration and severity of pain, associated symptoms, degree of disability, and initial response to therapy. A widely prescribed and effective class of medications for migraines is the 5-HT1 receptor-specific agonists. Contraindications to their use include ischemic vascular conditions, vasospastic coronary disease, uncontrolled hypertension, or other significant cardiovascular disease.  Following appropriate management of acute migraine, patients should be evaluated for initiation of preventive therapy. Factors that should prompt consideration of preventive therapy include the occurrence of two or more migraines per month with disability lasting three or more days per month; failure of, contraindication for, or adverse events from acute treatments; use of abortive medication more than twice per week; and uncommon migraine conditions (e.g., hemiplegic migraine, migraine with prolonged aura, migrainous infarction). Patient preference and cost also should be considered. Evidence consistently supports the use of the beta blocker propranolol (Inderal) in migraine prophylaxis. Amitriptyline is a first-line agent for migraine prophylaxis and is the only antidepressant with consistent evidence supporting its effectiveness for this use. Divalproex (Depakote) and sodium valproate are well supported by evidence for use in migraine prevention. Topamax has also been studies for migraine prophlaxis in open label studies  and double blind studies. Evidence does not support the use of diltiazem (Cardizem) in migraine prevention, and the evidence for several other calcium channel blockers, such as nifedipine (Procardia), is poor and suggests only modest effect  Menstrual Migraine can present a challenge to clinician. Estrogen withdrawal has been shown to precipitate migraine headaches, and a sustained elevated level of estrogen will postpone the migraine. Transdermal estrogen started just before menstruation can provide a sustained low level of estrogen, decreasing the degree of estrogen decline, and thus may prevent induction of migraines

## 2017-12-06 NOTE — PROGRESS NOTES
Sinusitis      Subjective   Tatiana Fields is a 52 y.o. female.     Sinusitis   This is a new problem. Episode onset: 2 weeks ago  The problem is unchanged. There has been no fever. Associated symptoms include congestion, coughing (r/t PND), headaches and sinus pressure. Pertinent negatives include no chills, diaphoresis, ear pain, shortness of breath, sneezing or sore throat. Treatments tried: Lodrane D; Nasacort; Advil and Tylenol  The treatment provided no relief.    Has had influenza vaccine. See ROS.      Patient Active Problem List   Diagnosis   • Depression with somatization   • Essential tremor   • Periodic limb movements of sleep   • Chronic daily headache   • Excessive daytime sleepiness   • Obstructive sleep apnea   • Intractable migraine with aura without status migrainosus   • Ovarian cyst   • History of Lyme disease   • Fibromyalgia   • Interstitial cystitis   • Essential hypertension   • IFG (impaired fasting glucose)   • Chronic constipation   • GERD (gastroesophageal reflux disease)   • Irritable bowel syndrome   • PTSD (post-traumatic stress disorder)   • Vitamin D deficiency   • Hepatomegaly   • Postmenopausal disorder   • Chronic insomnia   • Cervical arthritis   • Cervical nerve root compression   • Shingles   • Vitamin B12 deficiency       Allergies   Allergen Reactions   • Trazodone And Nefazodone Mental Status Change   • Sulfa Antibiotics Rash        Current Outpatient Prescriptions on File Prior to Visit   Medication Sig Dispense Refill   • Cholecalciferol (VITAMIN D) 2000 units tablet Take 2,000 Units by mouth 2 (Two) Times a Day.     • Cyanocobalamin (VITAMIN B-12) 500 MCG sublingual tablet Place  under the tongue.     • cyanocobalamin 1000 MCG/ML injection Inject 1 mL into the shoulder, thigh, or buttocks Every 14 (Fourteen) Days. 2 mL 5   • ENDOCET  MG per tablet take 1 tablet by mouth every 6 hours if needed for pain  0   • estradiol (MINIVELLE) 0.05 MG/24HR patch 1 patch every 4  days. 8 patch 11   • famciclovir (FAMVIR) 500 MG tablet Take 500 mg by mouth 3 (Three) Times a Day.     • FLUoxetine (PROzac) 20 MG capsule take 1 capsule by mouth every morning  0   • gabapentin (NEURONTIN) 300 MG capsule take 2 capsules by mouth at bedtime  0   • ibuprofen (ADVIL,MOTRIN) 600 MG tablet take 1 tablet by mouth every 8 hours  0   • linaclotide (LINZESS) 290 MCG capsule capsule Take 290 mcg by mouth Every Morning Before Breakfast.     • lisinopril (PRINIVIL,ZESTRIL) 10 MG tablet TAKE ONE TABLET TWICE DAILY  6   • LODRANE D 4-60 MG capsule Take one daily  0   • LORazepam (ATIVAN) 0.5 MG tablet take 1 tablet by mouth once daily if needed  0   • MethylPREDNISolone (MEDROL, OMKAR,) 4 MG tablet Take as directed on package instructions. 1 each 0   • mometasone (NASONEX) 50 MCG/ACT nasal spray 2 sprays into each nostril Daily. 17 g 5   • omeprazole (priLOSEC) 40 MG capsule      • polyethylene glycol (MIRALAX) powder TAKE ONE CAPFUL (17GM) DAILY  11   • prazosin (MINIPRESS) 1 MG capsule take 1 capsule by mouth at bedtime  0   • Probiotic Product (ALIGN) capsule 1 po bid 60 capsule 5   • SUMAtriptan (IMITREX) 100 MG tablet Take 100 mg by mouth Every 2 (Two) Hours As Needed for Migraine.     • zolpidem CR (AMBIEN CR) 6.25 MG CR tablet take 1 tablet at bedtime  0   • [DISCONTINUED] HORIZANT 600 MG tablet controlled-release Take 600 mg by mouth Every Night. at 5 PM with food 30 tablet 3   • [DISCONTINUED] RESTASIS 0.05 % ophthalmic emulsion        Current Facility-Administered Medications on File Prior to Visit   Medication Dose Route Frequency Provider Last Rate Last Dose   • bupivacaine (MARCAINE) injection 5 mL  5 mL Injection Once SOULEYMANE Avitia       • bupivacaine (MARCAINE) injection 5 mL  5 mL Injection Once SOULEYMANE Avitia       • cyanocobalamin injection 1,000 mcg  1,000 mcg Intramuscular Q28 Days Amber Mills MD   1,000 mcg at 06/27/17 1205   • cyanocobalamin injection 1,000 mcg  1,000 mcg  Intramuscular Q28 Days Mickie Mohan, APRN   1,000 mcg at 06/02/17 1408   • cyanocobalamin injection 1,000 mcg  1,000 mcg Intramuscular Q28 Days Mickie Mohan, APRN   1,000 mcg at 06/06/17 1629   • cyanocobalamin injection 1,000 mcg  1,000 mcg Intramuscular Q28 Days Mickie Mohan, APRN   1,000 mcg at 06/19/17 1727   • cyanocobalamin injection 1,000 mcg  1,000 mcg Intramuscular Q28 Days Amber Mills MD   1,000 mcg at 07/19/17 1505   • cyanocobalamin injection 1,000 mcg  1,000 mcg Intramuscular Q28 Days Amber Mills MD   1,000 mcg at 11/08/17 1526   • [COMPLETED] ketorolac (TORADOL) injection 60 mg  60 mg Intramuscular Once Aarti Mallory, APRN   60 mg at 12/05/17 1029   • methylPREDNISolone acetate (DEPO-medrol) injection 200 mg  200 mg Intramuscular Once Aarti Mallory, APRN       • methylPREDNISolone acetate (DEPO-medrol) injection 200 mg  200 mg Intramuscular Once Aarti Mallory, APRN       • methylPREDNISolone acetate (DEPO-medrol) injection 200 mg  200 mg Intramuscular Once Aarti Mallory, APRN           Past Medical History:   Diagnosis Date   • Anemia    • Arthritis    • Cervical spinal stenosis    • Chronic fatigue    • DDD (degenerative disc disease), lumbar    • Depression    • Endometriosis    • Headache    • Hyperlipidemia    • Hypertension    • Irritable bowel syndrome    • Lyme disease 2012    tx'd with prolonged course abx   • Narcolepsy     listed in medical records, tx'd with provigil   • Ovarian cyst    • Sicca syndrome    • Vitamin D deficiency        Past Surgical History:   Procedure Laterality Date   • BILATERAL SALPINGO OOPHORECTOMY     • BLADDER REPAIR  2013    bladder tack   • COLECTOMY PARTIAL / TOTAL  2012    listed in medical record; done at time of tx for endometriosis   • COLONOSCOPY  09/29/2015    normal   • CYSTOCELE REPAIR     • ENDOMETRIAL ABLATION  2012   • ESOPHAGOSCOPY / EGD  09/29/2015   • HYSTERECTOMY  2013    for endometriosis   • SINUS  SURGERY  2000       Family History   Problem Relation Age of Onset   • Inflammatory bowel disease Mother    • Hypertension Mother    • Heart disease Mother    • Arthritis Mother    • Hyperlipidemia Mother    • Diabetes Mother    • Hypertension Father    • Heart attack Father    • Hyperlipidemia Father    • Cancer Sister    • Bone cancer Sister    • Colon cancer Other    • Hyperlipidemia Brother    • Colon cancer Brother    • Diabetes Maternal Grandmother    • Diabetes Paternal Grandmother        Social History     Social History   • Marital status:      Spouse name: N/A   • Number of children: N/A   • Years of education: N/A     Occupational History   • Not on file.     Social History Main Topics   • Smoking status: Never Smoker   • Smokeless tobacco: Never Used   • Alcohol use No   • Drug use: No   • Sexual activity: Defer     Other Topics Concern   • Not on file     Social History Narrative       Travel:  No recent travel within the last 21 days outside the [place] Denies recent travel to one of the following [place]  Countries:  [place], [place], [place], or [place].  Denies contact with anyone who has traveled to one of the following [place]  Countries: [place], [place], [place], or [place] within the last 21 days and is known or suspected to have Ebola.  Denies having had any contact with the human remains, blood or any bodily fluids of someone who is known or suspected to have Ebola within the last 21 days.     OB History     No data available          Review of Systems   Constitutional: Negative for chills, diaphoresis and fever.   HENT: Positive for congestion, postnasal drip, sinus pain and sinus pressure. Negative for ear discharge, ear pain, mouth sores, nosebleeds, rhinorrhea, sneezing, sore throat and voice change.    Respiratory: Positive for cough (r/t PND). Negative for shortness of breath and wheezing.    Gastrointestinal: Negative for abdominal pain, diarrhea, nausea and vomiting.    Musculoskeletal: Negative for myalgias.   Skin: Negative for rash.   Neurological: Positive for dizziness and headaches.       Pulse 98  Temp 99.2 °F (37.3 °C) (Oral)   Resp 18  Wt 74.4 kg (164 lb)  LMP  (LMP Unknown)  SpO2 98%  Breastfeeding? No  BMI 28.59 kg/m2    Objective   Physical Exam   Constitutional: She is oriented to person, place, and time. She appears well-developed and well-nourished. She is cooperative. She appears ill. No distress.   HENT:   Head: Normocephalic.   Right Ear: Tympanic membrane, external ear and ear canal normal.   Left Ear: Tympanic membrane, external ear and ear canal normal.   Nose: Mucosal edema present. Right sinus exhibits maxillary sinus tenderness and frontal sinus tenderness. Left sinus exhibits maxillary sinus tenderness and frontal sinus tenderness.   Mouth/Throat: Uvula is midline, oropharynx is clear and moist and mucous membranes are normal. No tonsillar exudate.   Nasal congestion , clear PND   Cardiovascular: Normal rate, regular rhythm and normal heart sounds.    Pulmonary/Chest: Effort normal and breath sounds normal.   Lymphadenopathy:        Head (right side): Tonsillar adenopathy present.        Head (left side): Tonsillar adenopathy present.     She has no cervical adenopathy.   Neurological: She is alert and oriented to person, place, and time.   Skin: Skin is warm, dry and intact. No rash noted.       Assessment/Plan   Tatiana was seen today for sinusitis.    Diagnoses and all orders for this visit:    Acute recurrent pansinusitis  -     cefdinir (OMNICEF) 300 MG capsule; Take 1 capsule by mouth 2 (Two) Times a Day for 10 days.  -     fluconazole (DIFLUCAN) 150 MG tablet; Take 1 tablet by mouth Daily.        Return if symptoms worsen or fail to improve.

## 2017-12-14 ENCOUNTER — CLINICAL SUPPORT (OUTPATIENT)
Dept: FAMILY MEDICINE CLINIC | Facility: CLINIC | Age: 52
End: 2017-12-14

## 2017-12-14 ENCOUNTER — TELEPHONE (OUTPATIENT)
Dept: FAMILY MEDICINE CLINIC | Facility: CLINIC | Age: 52
End: 2017-12-14

## 2017-12-14 DIAGNOSIS — E53.8 VITAMIN B12 DEFICIENCY: ICD-10-CM

## 2017-12-14 DIAGNOSIS — J01.41 ACUTE RECURRENT PANSINUSITIS: ICD-10-CM

## 2017-12-14 PROCEDURE — 96372 THER/PROPH/DIAG INJ SC/IM: CPT | Performed by: FAMILY MEDICINE

## 2017-12-14 RX ORDER — CYANOCOBALAMIN 1000 UG/ML
1000 INJECTION, SOLUTION INTRAMUSCULAR; SUBCUTANEOUS
Status: DISCONTINUED | OUTPATIENT
Start: 2017-12-14 | End: 2018-05-18

## 2017-12-14 RX ADMIN — CYANOCOBALAMIN 1000 MCG: 1000 INJECTION, SOLUTION INTRAMUSCULAR; SUBCUTANEOUS at 18:54

## 2017-12-15 RX ORDER — FLUCONAZOLE 150 MG/1
150 TABLET ORAL DAILY
Qty: 1 TABLET | Refills: 0 | Status: SHIPPED | OUTPATIENT
Start: 2017-12-15 | End: 2018-02-14

## 2017-12-15 NOTE — TELEPHONE ENCOUNTER
Please clarify what type of symptoms she is having. I have sent in 1 dose but if she does not improve, she should be seen.

## 2017-12-22 ENCOUNTER — TELEPHONE (OUTPATIENT)
Dept: NEUROLOGY | Facility: CLINIC | Age: 52
End: 2017-12-22

## 2017-12-22 DIAGNOSIS — I10 HYPERTENSION, UNSPECIFIED TYPE: Primary | ICD-10-CM

## 2017-12-22 DIAGNOSIS — M54.12 CERVICAL RADICULOPATHY: ICD-10-CM

## 2017-12-22 DIAGNOSIS — M54.2 CERVICALGIA: ICD-10-CM

## 2017-12-22 DIAGNOSIS — G43.119 INTRACTABLE MIGRAINE WITH AURA WITHOUT STATUS MIGRAINOSUS: ICD-10-CM

## 2017-12-22 NOTE — TELEPHONE ENCOUNTER
Called patient to notify her of the MRI results of her brain and spine. Pt was advised that the cervical spine results showed a severe narrowing in her C5 and C6, Pt advised that we would be sending a consult to Neurosurgery, Dr. Paula at [facility] in [place]. Pt verbalized understanding.

## 2018-01-09 ENCOUNTER — PROCEDURE VISIT (OUTPATIENT)
Dept: NEUROLOGY | Facility: CLINIC | Age: 53
End: 2018-01-09

## 2018-01-09 VITALS
DIASTOLIC BLOOD PRESSURE: 84 MMHG | WEIGHT: 164 LBS | SYSTOLIC BLOOD PRESSURE: 128 MMHG | HEART RATE: 93 BPM | OXYGEN SATURATION: 97 % | HEIGHT: 64 IN | BODY MASS INDEX: 28 KG/M2

## 2018-01-09 DIAGNOSIS — G43.019 INTRACTABLE MIGRAINE WITHOUT AURA AND WITHOUT STATUS MIGRAINOSUS: ICD-10-CM

## 2018-01-09 DIAGNOSIS — Z87.39 PERSONAL HISTORY OF SPINAL NARROWING: Primary | ICD-10-CM

## 2018-01-09 PROCEDURE — 20553 NJX 1/MLT TRIGGER POINTS 3/>: CPT | Performed by: NURSE PRACTITIONER

## 2018-01-09 RX ORDER — AZITHROMYCIN 250 MG/1
TABLET, FILM COATED ORAL
Qty: 11 TABLET | Refills: 0 | Status: SHIPPED | OUTPATIENT
Start: 2018-01-09 | End: 2018-01-14

## 2018-01-09 NOTE — PROGRESS NOTES
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 Pt states trigger points help cervicalgia.Risks and benefits of the Trigger point injection procedure have been explained to the patient.  Patient has no contraindications such as bleed diathesis, recent acute trauma at the muscle sites, anesthetic allergy or anticoagulation.  Mechanism of trigger point injection has been explained to patient.     Procedure Note:  1.         Patient was positioned comfortably  2.         Before injections are started, 10 Trigger Points sites are identified throughout the bilateral upper trapezius muscle, levator scapulae, and erector spinae muscles.    3.         Overlying skin area was cleaned with Alcohol swab                                                                                                              4.         Before injection, trigger points sites were palpated for local twitch and referred pain to confirms placement                         5.         Local anesthetic was mixed with Depo-Medrol (5 cc of Marcaine 0.5% mixed with 5 cc of Depo-Medrol at 40 mg/ml - for a total of 10 cc)  6.         30 gauge ½ inch needle was utilized to ensure patient comfort          7.         I started with the most tender spot in above mentioned Trigger Points   1.   Localize most tender spot within taut muscle-fibers  2.   Fix tender spot between fingers (1-2 cm in size)   1.   Prevents from rolling away from needle  2.   Controls subcutaneous bleeding  3.   Before injection, patient was instructed of possible pain on injection  4.   Direct needle at 30 degree angle off skin   8.         Insert needle into skin 1-2 cm from Trigger Point   9.         Advance needle into Trigger Point   10.       Use 1cc anesthetic at each Trigger Points identified in step #2  11.       Redirect needle and reinject                                                                                              1.   Withdraw needle to  subcutaneous tissue  2.   Redirect needle into adjacent tender areas  3.   Repeat until local twitch or tautness resolves  12.   After each of the 10 injections I held direct pressure at injection site for 2 minutes to prevents hematoma formation  13.   The same procedure was repeated for other Tender Points located in the upper trapezius muscle, levator scapulae and erector spinae bilaterally  14.   Patient was instructed to gently stretch injected areas to full active range of motion in all directions and was instructed to repeat range of motion three times after injection  15.   Post-Procedure patient was instructed to avoid over-using injected area for 3-4 days   1.   Maintain active range of motion of injected muscle  2.   Patient applies ice to injected areas for a few hours  3.   Anticipate post-injection soreness for 3-4 days  There was no evidence of complications from injection was noted such as local Skin Infection  at injection site or local hematoma at injection site    Pt willing to sched with NS. Consult sent

## 2018-01-17 DIAGNOSIS — J01.41 ACUTE RECURRENT PANSINUSITIS: ICD-10-CM

## 2018-01-18 RX ORDER — FLUCONAZOLE 150 MG/1
TABLET ORAL
Qty: 2 TABLET | Refills: 0 | OUTPATIENT
Start: 2018-01-18

## 2018-01-21 RX ORDER — BUPIVACAINE HYDROCHLORIDE 5 MG/ML
5 INJECTION, SOLUTION PERINEURAL ONCE
Status: COMPLETED | OUTPATIENT
Start: 2018-01-21 | End: 2018-01-21

## 2018-01-21 RX ORDER — BUPIVACAINE HYDROCHLORIDE 5 MG/ML
5 INJECTION, SOLUTION PERINEURAL ONCE
Status: DISCONTINUED | OUTPATIENT
Start: 2018-01-21 | End: 2018-05-22

## 2018-01-21 RX ORDER — METHYLPREDNISOLONE ACETATE 40 MG/ML
200 INJECTION, SUSPENSION INTRA-ARTICULAR; INTRALESIONAL; INTRAMUSCULAR; SOFT TISSUE ONCE
Status: DISCONTINUED | OUTPATIENT
Start: 2018-01-21 | End: 2018-05-22

## 2018-01-21 RX ORDER — METHYLPREDNISOLONE ACETATE 40 MG/ML
200 INJECTION, SUSPENSION INTRA-ARTICULAR; INTRALESIONAL; INTRAMUSCULAR; SOFT TISSUE ONCE
Status: COMPLETED | OUTPATIENT
Start: 2018-01-21 | End: 2018-01-21

## 2018-01-21 RX ADMIN — BUPIVACAINE HYDROCHLORIDE 5 ML: 5 INJECTION, SOLUTION PERINEURAL at 15:15

## 2018-01-21 RX ADMIN — METHYLPREDNISOLONE ACETATE 200 MG: 40 INJECTION, SUSPENSION INTRA-ARTICULAR; INTRALESIONAL; INTRAMUSCULAR; SOFT TISSUE at 15:14

## 2018-02-02 ENCOUNTER — TELEPHONE (OUTPATIENT)
Dept: FAMILY MEDICINE CLINIC | Facility: CLINIC | Age: 53
End: 2018-02-02

## 2018-02-02 DIAGNOSIS — G43.A0 CYCLICAL VOMITING WITH NAUSEA, INTRACTABILITY OF VOMITING NOT SPECIFIED: Primary | ICD-10-CM

## 2018-02-02 RX ORDER — PROMETHAZINE HYDROCHLORIDE 25 MG/1
25 TABLET ORAL EVERY 6 HOURS PRN
Qty: 20 TABLET | Refills: 0 | Status: SHIPPED | OUTPATIENT
Start: 2018-02-02 | End: 2018-02-07

## 2018-02-02 NOTE — TELEPHONE ENCOUNTER
Patient called stating she has the stomach virus and would like some phenergan called in to pharmacy,

## 2018-02-13 ENCOUNTER — PROCEDURE VISIT (OUTPATIENT)
Dept: NEUROLOGY | Facility: CLINIC | Age: 53
End: 2018-02-13

## 2018-02-13 VITALS
OXYGEN SATURATION: 98 % | HEART RATE: 81 BPM | WEIGHT: 164 LBS | HEIGHT: 64 IN | BODY MASS INDEX: 28 KG/M2 | SYSTOLIC BLOOD PRESSURE: 142 MMHG | DIASTOLIC BLOOD PRESSURE: 92 MMHG

## 2018-02-13 DIAGNOSIS — Z92.29 S/P BOTOX INJECTION: Primary | ICD-10-CM

## 2018-02-13 DIAGNOSIS — G43.119 INTRACTABLE MIGRAINE WITH AURA WITHOUT STATUS MIGRAINOSUS: ICD-10-CM

## 2018-02-13 PROCEDURE — 64615 CHEMODENERV MUSC MIGRAINE: CPT | Performed by: NURSE PRACTITIONER

## 2018-02-14 ENCOUNTER — OFFICE VISIT (OUTPATIENT)
Dept: FAMILY MEDICINE CLINIC | Facility: CLINIC | Age: 53
End: 2018-02-14

## 2018-02-14 VITALS
DIASTOLIC BLOOD PRESSURE: 84 MMHG | OXYGEN SATURATION: 98 % | BODY MASS INDEX: 28.34 KG/M2 | HEIGHT: 64 IN | WEIGHT: 166 LBS | SYSTOLIC BLOOD PRESSURE: 128 MMHG | HEART RATE: 86 BPM | TEMPERATURE: 98.5 F

## 2018-02-14 DIAGNOSIS — E53.8 VITAMIN B12 DEFICIENCY: ICD-10-CM

## 2018-02-14 DIAGNOSIS — I10 ESSENTIAL HYPERTENSION: Primary | ICD-10-CM

## 2018-02-14 DIAGNOSIS — R73.01 IFG (IMPAIRED FASTING GLUCOSE): ICD-10-CM

## 2018-02-14 DIAGNOSIS — G43.119 INTRACTABLE MIGRAINE WITH AURA WITHOUT STATUS MIGRAINOSUS: ICD-10-CM

## 2018-02-14 DIAGNOSIS — Z51.81 MEDICATION MONITORING ENCOUNTER: ICD-10-CM

## 2018-02-14 DIAGNOSIS — E55.9 VITAMIN D DEFICIENCY: ICD-10-CM

## 2018-02-14 DIAGNOSIS — K58.1 IRRITABLE BOWEL SYNDROME WITH CONSTIPATION: ICD-10-CM

## 2018-02-14 DIAGNOSIS — R35.0 URINARY FREQUENCY: ICD-10-CM

## 2018-02-14 DIAGNOSIS — M79.674 GREAT TOE PAIN, RIGHT: ICD-10-CM

## 2018-02-14 DIAGNOSIS — N30.10 INTERSTITIAL CYSTITIS: ICD-10-CM

## 2018-02-14 LAB
ALBUMIN SERPL-MCNC: 4.3 G/DL (ref 3.5–5)
ALBUMIN/GLOB SERPL: 1.8 G/DL (ref 1–2)
ALP SERPL-CCNC: 67 U/L (ref 38–126)
ALT SERPL-CCNC: 35 U/L (ref 13–69)
AST SERPL-CCNC: 30 U/L (ref 15–46)
BILIRUB BLD-MCNC: NEGATIVE MG/DL
BILIRUB SERPL-MCNC: 0.3 MG/DL (ref 0.2–1.3)
BUN SERPL-MCNC: 12 MG/DL (ref 7–20)
BUN/CREAT SERPL: 15 (ref 7.1–23.5)
CALCIUM SERPL-MCNC: 9.4 MG/DL (ref 8.4–10.2)
CHLORIDE SERPL-SCNC: 101 MMOL/L (ref 98–107)
CLARITY, POC: CLEAR
CO2 SERPL-SCNC: 28 MMOL/L (ref 26–30)
COLOR UR: YELLOW
CREAT SERPL-MCNC: 0.8 MG/DL (ref 0.6–1.3)
ERYTHROCYTE [SEDIMENTATION RATE] IN BLOOD BY WESTERGREN METHOD: 6 MM/HR (ref 0–20)
GFR SERPLBLD CREATININE-BSD FMLA CKD-EPI: 75 ML/MIN/1.73
GFR SERPLBLD CREATININE-BSD FMLA CKD-EPI: 91 ML/MIN/1.73
GLOBULIN SER CALC-MCNC: 2.4 GM/DL
GLUCOSE SERPL-MCNC: 86 MG/DL (ref 74–98)
GLUCOSE UR STRIP-MCNC: NEGATIVE MG/DL
KETONES UR QL: NEGATIVE
LEUKOCYTE EST, POC: NEGATIVE
NITRITE UR-MCNC: NEGATIVE MG/ML
PH UR: 7 [PH] (ref 5–8)
POTASSIUM SERPL-SCNC: 3.9 MMOL/L (ref 3.5–5.1)
PROT SERPL-MCNC: 6.7 G/DL (ref 6.3–8.2)
PROT UR STRIP-MCNC: NEGATIVE MG/DL
RBC # UR STRIP: NEGATIVE /UL
SODIUM SERPL-SCNC: 142 MMOL/L (ref 137–145)
SP GR UR: 1.01 (ref 1–1.03)
URATE SERPL-MCNC: 4.9 MG/DL (ref 2.5–8.5)
UROBILINOGEN UR QL: NORMAL

## 2018-02-14 PROCEDURE — 99214 OFFICE O/P EST MOD 30 MIN: CPT | Performed by: FAMILY MEDICINE

## 2018-02-14 PROCEDURE — 96372 THER/PROPH/DIAG INJ SC/IM: CPT | Performed by: FAMILY MEDICINE

## 2018-02-14 PROCEDURE — 81003 URINALYSIS AUTO W/O SCOPE: CPT | Performed by: FAMILY MEDICINE

## 2018-02-14 RX ORDER — DIAZEPAM 5 MG/1
TABLET ORAL
Qty: 30 TABLET | Refills: 0 | Status: SHIPPED | OUTPATIENT
Start: 2018-02-14 | End: 2018-03-09 | Stop reason: SDUPTHER

## 2018-02-14 RX ADMIN — CYANOCOBALAMIN 1000 MCG: 1000 INJECTION, SOLUTION INTRAMUSCULAR; SUBCUTANEOUS at 17:00

## 2018-02-14 NOTE — PROGRESS NOTES
"Subjective   Tatiana Fields is a 52 y.o. female.     History of Present Illness  Ms. Fields presents today for f/u on several chronic conditions:  1) Has HTN. BP has been well controlled. Taking meds as rx'd. Denies side effects. Following fairly heart healthy diet. Irregular exercise. Has comorbid IFG.   2) Taking vit D for def as well. Good compliance. Denies side effects.  3) h/o chronic constipation. Has had recent f/u with  GI. No major changes in treatment. Was advised to wean off \"narcotics\".   4) has had f/u for chronic migraine with neurology; receiving Botox. Continues to have feeling of \"permanent sinusitis\".   5) c/o moderate aching right great toe pain beginning several days ago, now some better; was swollen, tender to touch, hard to wear shoe. Father with h/o gout. Pt not aware of being dx'd with gout  6) would like B12 shot; has h/o def and reports increased fatigue  7) She is currently on Ambien, soma and lorazepam for sleep aid, anxiety and muscle relaxant. She would like to \"not have to take so many pills\" and is wondering if she can take something to replace all three. Has taken valium qhs in past with good response. Is currently followed by pain rebecca.  8) She reports worsening dysuria, frequency; worried about UTI.    The following portions of the patient's history were reviewed and updated as appropriate: allergies, current medications, past family history, past medical history, past social history, past surgical history and problem list.    Review of Systems   Constitutional: Positive for fatigue. Negative for appetite change and fever.   HENT: Positive for congestion, postnasal drip and sinus pressure. Negative for ear pain, hearing loss, nosebleeds, rhinorrhea, sneezing, sore throat, tinnitus and trouble swallowing.    Eyes: Negative for pain, discharge, redness and visual disturbance.        Dry eyes   Respiratory: Negative for cough, chest tightness, shortness of breath and wheezing.  "   Cardiovascular: Positive for palpitations. Negative for chest pain and leg swelling.   Gastrointestinal: Positive for abdominal pain (chronic), constipation and nausea. Negative for blood in stool, diarrhea and vomiting.   Endocrine: Positive for heat intolerance. Negative for cold intolerance and polyuria.   Genitourinary: Positive for frequency and pelvic pain (chronic). Negative for dysuria, flank pain, hematuria, urgency and vaginal discharge.        Vaginal dryness   Musculoskeletal: Positive for arthralgias and myalgias. Negative for back pain, joint swelling and neck pain.   Skin: Negative for rash and wound.   Neurological: Positive for weakness and headaches. Negative for dizziness, tremors, seizures, syncope, speech difficulty and numbness.   Hematological: Negative for adenopathy. Bruises/bleeds easily.   Psychiatric/Behavioral: Positive for sleep disturbance. Negative for confusion, dysphoric mood and suicidal ideas. The patient is nervous/anxious.        Objective    Vitals:    02/14/18 1419   BP: 128/84   Pulse: 86   Temp: 98.5 °F (36.9 °C)   SpO2: 98%     Body mass index is 28.94 kg/(m^2).  Last 2 weights    02/14/18  1419   Weight: 75.3 kg (166 lb)       Physical Exam   Constitutional: She is oriented to person, place, and time. She appears well-developed and well-nourished. She is cooperative. She does not appear ill. No distress.   HENT:   Head: Normocephalic and atraumatic.   Right Ear: Tympanic membrane, external ear and ear canal normal.   Left Ear: Tympanic membrane, external ear and ear canal normal.   Nose: Nose normal.   Mouth/Throat: Oropharynx is clear and moist and mucous membranes are normal. Mucous membranes are not dry. No oral lesions.   Eyes: Conjunctivae and lids are normal.   Neck: Phonation normal. Neck supple. Normal carotid pulses present. No thyroid mass and no thyromegaly present.   Cardiovascular: Normal rate, regular rhythm, normal heart sounds and intact distal pulses.     Pulmonary/Chest: Effort normal and breath sounds normal.   Musculoskeletal:        Right foot: There is bony tenderness. There is normal capillary refill.       Vascular Status -  Her exam exhibits no right foot edema. Her exam exhibits no left foot edema.     Lymphadenopathy:     She has no cervical adenopathy.   Neurological: She is alert and oriented to person, place, and time. She has normal strength. She displays no tremor. Gait normal.   Skin: Skin is warm and dry. No bruising and no rash noted.   Psychiatric: She has a normal mood and affect. Her behavior is normal. Cognition and memory are normal.   Nursing note and vitals reviewed.    Recent Results (from the past 72 hour(s))   POCT urinalysis dipstick, automated    Collection Time: 02/14/18  5:06 PM   Result Value Ref Range    Color Yellow Yellow, Straw, Dark Yellow, Shanique    Clarity, UA Clear Clear    Glucose, UA Negative Negative, 1000 mg/dL (3+) mg/dL    Bilirubin Negative Negative    Ketones, UA Negative Negative    Specific Gravity  1.010 1.005 - 1.030    Blood, UA Negative Negative    pH, Urine 7.0 5.0 - 8.0    Protein, POC Negative Negative mg/dL    Urobilinogen, UA Normal Normal    Leukocytes Negative Negative    Nitrite, UA Negative Negative     Assessment/Plan   Tatiana was seen today for follow-up, hypertension and urinary frequency.    Diagnoses and all orders for this visit:    Essential hypertension  -     Comprehensive Metabolic Panel    Intractable migraine with aura without status migrainosus    Vitamin B12 deficiency    Vitamin D deficiency    Irritable bowel syndrome with constipation    IFG (impaired fasting glucose)  -     Comprehensive Metabolic Panel    Interstitial cystitis  -     POCT urinalysis dipstick, automated    Great toe pain, right  -     Uric Acid  -     Sedimentation Rate    Medication monitoring encounter    Urinary frequency  -     POCT urinalysis dipstick, automated    BMI 28.0-28.9,adult    Other orders  -      diazePAM (VALIUM) 5 MG tablet; 1-2 po qhs as needed for sleep    Urine normal today.  F/U with neurology for mgnt of migraines.  Continue vit d and B12 replacement.  IBS stable; f/u with [facility].  Right great toe pain possible due to OA vs gouty arthritis. Labs as above. Overall improving course.  F/u with pain mgnt as scheduled.  D/C soma, ativan and ambien and replace with low dose valium qhs only. Will inform pain mgnt.  Routine f/u in 3 months, sooner as needed.  I will contact patient regarding test results and provide instructions regarding any necessary changes in plan of care.  As part of patient's treatment plan I am prescribing a controlled substance.  The patient has been made aware of the appropriate use of such medications, including potential risk of somnolence, limited ability to drive and/or work safely, and potential for dependence and/or overdose.  It has also been made clear that these medications are for use by this patient only, without concomitant use of alcohol or other substances, unless prescribed.  Updated KIMBER[facility] report requested.

## 2018-02-16 PROBLEM — Z51.81 MEDICATION MONITORING ENCOUNTER: Status: ACTIVE | Noted: 2018-02-16

## 2018-02-21 PROBLEM — Z92.29 S/P BOTOX INJECTION: Status: ACTIVE | Noted: 2018-02-21

## 2018-03-09 ENCOUNTER — TELEPHONE (OUTPATIENT)
Dept: FAMILY MEDICINE CLINIC | Facility: CLINIC | Age: 53
End: 2018-03-09

## 2018-03-09 DIAGNOSIS — M79.7 FIBROMYALGIA: ICD-10-CM

## 2018-03-09 DIAGNOSIS — Z86.19 HISTORY OF LYME DISEASE: ICD-10-CM

## 2018-03-09 DIAGNOSIS — M47.812 CERVICAL ARTHRITIS: Primary | ICD-10-CM

## 2018-03-09 RX ORDER — DIAZEPAM 5 MG/1
TABLET ORAL
Qty: 30 TABLET | Refills: 0 | OUTPATIENT
Start: 2018-03-09 | End: 2018-07-27

## 2018-03-09 NOTE — TELEPHONE ENCOUNTER
Patient l/m stating that the diazepam is helping, but she is having to take 2 and runs out early, she has an apt on 3/21.  I just called in a refill for her with quanity of 30.

## 2018-03-16 ENCOUNTER — TELEPHONE (OUTPATIENT)
Dept: FAMILY MEDICINE CLINIC | Facility: CLINIC | Age: 53
End: 2018-03-16

## 2018-03-16 RX ORDER — METHOCARBAMOL 750 MG/1
1 TABLET, FILM COATED ORAL EVERY 6 HOURS PRN
Refills: 0 | COMMUNITY
Start: 2018-03-06 | End: 2018-03-21

## 2018-03-16 RX ORDER — PENTOSAN POLYSULFATE SODIUM 100 MG/1
1 CAPSULE, GELATIN COATED ORAL 3 TIMES DAILY
Refills: 0 | COMMUNITY
Start: 2018-03-01 | End: 2018-03-21

## 2018-03-16 RX ORDER — FLUOXETINE HYDROCHLORIDE 40 MG/1
1 CAPSULE ORAL DAILY
Refills: 0 | COMMUNITY
Start: 2018-03-01 | End: 2018-11-21 | Stop reason: SDUPTHER

## 2018-03-16 RX ORDER — NAPROXEN 500 MG/1
1 TABLET ORAL 2 TIMES DAILY PRN
Refills: 0 | COMMUNITY
Start: 2018-03-06 | End: 2018-03-21

## 2018-03-16 NOTE — TELEPHONE ENCOUNTER
Patient called to see if she can get some steroids called in again. She stated they did help but is having knee an joint pain again.

## 2018-03-16 NOTE — TELEPHONE ENCOUNTER
Please check with pharmacy regarding providers prescribing meds to patient. I went to reconcile her meds and she has had several recent tx's not from me.

## 2018-03-19 ENCOUNTER — TELEPHONE (OUTPATIENT)
Dept: FAMILY MEDICINE CLINIC | Facility: CLINIC | Age: 53
End: 2018-03-19

## 2018-03-19 RX ORDER — PREDNISONE 20 MG/1
20 TABLET ORAL 2 TIMES DAILY
Qty: 10 TABLET | Refills: 0 | Status: SHIPPED | OUTPATIENT
Start: 2018-03-19 | End: 2018-03-24

## 2018-03-19 NOTE — TELEPHONE ENCOUNTER
Per [facility] pharmacy, it was fill by you in aug with 1 refill which she filled in sept.  leo had not filled any steroids for her before.

## 2018-03-19 NOTE — TELEPHONE ENCOUNTER
Pt called and wanted to let you know that Dr Mendiola has scheduled her for neck surgery on 3/26.

## 2018-03-19 NOTE — TELEPHONE ENCOUNTER
I have sent in refill of prednisone, but what I needed to know is if there are any other providers prescribing any other meds for her, not just prednisone.    FYI, the pharmacy listed in her chart now if [facility], not [facility] so you may have to check with both.

## 2018-03-21 ENCOUNTER — OFFICE VISIT (OUTPATIENT)
Dept: FAMILY MEDICINE CLINIC | Facility: CLINIC | Age: 53
End: 2018-03-21

## 2018-03-21 VITALS
TEMPERATURE: 98 F | OXYGEN SATURATION: 97 % | BODY MASS INDEX: 28.68 KG/M2 | HEIGHT: 64 IN | DIASTOLIC BLOOD PRESSURE: 78 MMHG | WEIGHT: 168 LBS | SYSTOLIC BLOOD PRESSURE: 104 MMHG | HEART RATE: 84 BPM

## 2018-03-21 DIAGNOSIS — M79.671 RIGHT FOOT PAIN: Primary | ICD-10-CM

## 2018-03-21 DIAGNOSIS — F51.04 CHRONIC INSOMNIA: ICD-10-CM

## 2018-03-21 DIAGNOSIS — G54.2 CERVICAL NERVE ROOT COMPRESSION: ICD-10-CM

## 2018-03-21 DIAGNOSIS — M25.561 CHRONIC PAIN OF RIGHT KNEE: ICD-10-CM

## 2018-03-21 DIAGNOSIS — G89.29 CHRONIC PAIN OF RIGHT KNEE: ICD-10-CM

## 2018-03-21 DIAGNOSIS — F45.0 DEPRESSION WITH SOMATIZATION: ICD-10-CM

## 2018-03-21 DIAGNOSIS — M79.7 FIBROMYALGIA: ICD-10-CM

## 2018-03-21 DIAGNOSIS — F32.A DEPRESSION WITH SOMATIZATION: ICD-10-CM

## 2018-03-21 DIAGNOSIS — I10 ESSENTIAL HYPERTENSION: ICD-10-CM

## 2018-03-21 PROCEDURE — 99214 OFFICE O/P EST MOD 30 MIN: CPT | Performed by: FAMILY MEDICINE

## 2018-03-21 RX ORDER — SUMATRIPTAN 100 MG/1
TABLET, FILM COATED ORAL
Qty: 9 TABLET | Refills: 1 | Status: SHIPPED | OUTPATIENT
Start: 2018-03-21 | End: 2018-03-21

## 2018-03-21 RX ORDER — GABAPENTIN ENACARBIL 600 MG/1
TABLET, EXTENDED RELEASE ORAL
COMMUNITY
Start: 2018-02-01 | End: 2018-03-21

## 2018-03-21 RX ORDER — BECLOMETHASONE DIPROPIONATE 40 UG/1
AEROSOL, METERED NASAL
Refills: 0 | COMMUNITY
Start: 2018-03-18 | End: 2019-02-14

## 2018-03-21 RX ORDER — PREDNISONE 10 MG/1
TABLET ORAL
Qty: 10 TABLET | Refills: 1 | OUTPATIENT
Start: 2018-03-21

## 2018-03-21 RX ORDER — ZOLPIDEM TARTRATE 6.25 MG/1
6.25 TABLET, FILM COATED, EXTENDED RELEASE ORAL NIGHTLY PRN
Qty: 30 TABLET | Refills: 0 | Status: SHIPPED | OUTPATIENT
Start: 2018-03-21 | End: 2018-05-16 | Stop reason: SDUPTHER

## 2018-03-21 RX ORDER — ESTRADIOL 0.1 MG/D
FILM, EXTENDED RELEASE TRANSDERMAL
Refills: 0 | COMMUNITY
Start: 2018-03-02 | End: 2018-03-21

## 2018-03-21 RX ORDER — HYDROCHLOROTHIAZIDE 25 MG/1
TABLET ORAL
Qty: 30 TABLET | Refills: 2 | Status: SHIPPED | OUTPATIENT
Start: 2018-03-21 | End: 2018-05-24 | Stop reason: SDUPTHER

## 2018-03-21 NOTE — PROGRESS NOTES
Subjective   Tatiana Fields is a 52 y.o. female.     History of Present Illness  Mrs. Fields presents today with complaint of persistent right foot pain.  She was seen for same problem approximately one month ago.  Uric acid and sedimentation rate were normal at that time.  Conservative treatment advised.  Some improvement with prednisone given at last visit. Pain is worsening, throbbing/burning.  Increased with prolonged standing or bearing weight.  Impaired sleep.  Now also having trouble with increased pain in the right knee.  Throbbing pain sometime sharp.  Worse with increased activity, some better with rest.    Since her last visit she has had surgical consultation with Dr. Mendiola regarding need for C-spine surgery for nerve root compression.  This is been scheduled for next week.  She denies any new focal neurological symptoms since that time.    She complains of intermittent swelling of feet and ankles.  Previously on HCTZ for this problem and did well.  She also has intermittently elevated blood pressure despite taking lisinopril as prescribed..  She would like to restart that as needed.    She has fibromyalgia with associated depression and anxiety.  Symptoms recently worsened with weather changes well as anxiety related to C-spine surgery.  She is currently on Prozac, B12 supplementation.  She has recently been on low-dose thallium as speech at night for sleep and muscle relaxation.  Feels it does help her anxiety, muscle spasticity but is not as helpful for sleep.  She has been on Ambien previously with minimal side effects but had trouble with sleep maintenance.  She would like to try Ambien CR.  Failed Lunesta due to intolerance of metallic taste. Has a h/o prolonged insomnia.      The following portions of the patient's history were reviewed and updated as appropriate: allergies, current medications, past family history, past medical history, past social history, past surgical history and problem  list.    Review of Systems   Constitutional: Positive for fatigue. Negative for appetite change and fever.   HENT: Positive for congestion, postnasal drip and sinus pressure. Negative for ear pain, hearing loss, nosebleeds, rhinorrhea, sneezing, sore throat, tinnitus and trouble swallowing.    Eyes: Negative for pain, discharge, redness and visual disturbance.        Dry eyes   Respiratory: Negative for cough, chest tightness, shortness of breath and wheezing.    Cardiovascular: Positive for palpitations. Negative for chest pain and leg swelling.   Gastrointestinal: Positive for abdominal pain (chronic), constipation and nausea. Negative for blood in stool, diarrhea and vomiting.   Endocrine: Positive for heat intolerance. Negative for cold intolerance and polyuria.   Genitourinary: Positive for frequency and pelvic pain (chronic). Negative for dysuria, flank pain, hematuria, urgency and vaginal discharge.        Vaginal dryness   Musculoskeletal: Positive for arthralgias and myalgias. Negative for back pain, joint swelling and neck pain.   Skin: Negative for rash and wound.   Neurological: Positive for weakness and headaches. Negative for dizziness, tremors, seizures, syncope, speech difficulty and numbness.   Hematological: Negative for adenopathy. Bruises/bleeds easily.   Psychiatric/Behavioral: Positive for sleep disturbance. Negative for confusion, dysphoric mood and suicidal ideas. The patient is nervous/anxious.        Objective    Vitals:    03/21/18 1034   BP: 104/78   Pulse: 84   Temp: 98 °F (36.7 °C)   SpO2: 97%     Body mass index is 29.29 kg/m².  1    03/21/18  1034   Weight: 76.2 kg (168 lb)       Physical Exam   Constitutional: She is oriented to person, place, and time. She appears well-developed and well-nourished. She is cooperative. She does not appear ill. No distress.   Musculoskeletal:        Right knee: She exhibits normal range of motion, no swelling, no ecchymosis, no deformity, no erythema,  no LCL laxity and no MCL laxity. No medial joint line and no lateral joint line tenderness noted.        Legs:       Right foot: There is decreased range of motion, bony tenderness and deformity (bunion). There is no swelling.     Vascular Status -  Her right foot exhibits normal right foot edema. Her left foot exhibits normal left foot edema.     Neurological: She is alert and oriented to person, place, and time. Gait normal.   Skin: Skin is warm and dry. No rash noted. No erythema.   Psychiatric: Her speech is normal and behavior is normal. Thought content normal. Her mood appears anxious. Cognition and memory are normal. She exhibits a depressed mood.   Nursing note and vitals reviewed.    Lab Results   Component Value Date    URICACID 4.9 02/14/2018     Lab Results   Component Value Date    SEDRATE 6 02/14/2018     Lab Results   Component Value Date    WBC 9.3 06/19/2017    HGB 13.4 06/19/2017    HCT 41.6 06/19/2017    MCV 86 06/19/2017     (H) 06/19/2017     Assessment/Plan   Tatiana was seen today for foot pain.    Diagnoses and all orders for this visit:    Right foot pain  -     XR Foot 3+ View Right; Future    Chronic pain of right knee  -     XR Knee 3 View Right; Future    Essential hypertension    Cervical nerve root compression    Depression with somatization    Fibromyalgia    Chronic insomnia    Other orders  -     zolpidem CR (AMBIEN CR) 6.25 MG CR tablet; Take 1 tablet by mouth At Night As Needed for Sleep.  -     hydrochlorothiazide (HYDRODIURIL) 25 MG tablet; 1 po daily as needed for sleep    HTN with intermittently BP elevations despite use of lisinopril.  Also having intermittent swelling in lower extremities.  Restart HCTZ daily as needed. Pt advised to eat a heart healthy diet and get regular aerobic exercise.    Persistent right foot pain, right knee pain.  X-rays as above.    She is strongly encouraged to follow with Dr. Mendiola as scheduled for C-spine surgery and nerve root  compression.    Depression appears stable.  Some mild increase in anxiety in anticipation of upcoming surgery.    Chronic insomnia not responded to current use of valium. I have reviewed risks/benefits and potential side effects of various treatment options. Pt voices understanding and wishes to proceed with trial of ambien MUSA. SHe is aware she should not take this medication along with valium at night. Rather valium can be used for sever anxiety, severe muscle spasm etc.  As part of patient's treatment plan I am prescribing a controlled substance.  The patient has been made aware of the appropriate use of such medications, including potential risk of somnolence, limited ability to drive and/or work safely, and potential for dependence and/or overdose.  It has also been made clear that these medications are for use by this patient only, without concomitant use of alcohol or other substances, unless prescribed.  KIMBERLY report reviewed and scanned into chart.  Last KIMBERLY date 2/19/18.    Patient was encouraged to keep me informed of any acute changes, lack of improvement, or any new concerning symptoms.  Pt is aware of reasons to seek emergent care.  Keep routine f/u in 2 months, sooner as needed/instructed.  I will contact patient regarding test results and provide instructions regarding any necessary changes in plan of care.  Patient voiced understanding of all instructions and denied further questions.

## 2018-03-22 ENCOUNTER — TELEPHONE (OUTPATIENT)
Dept: FAMILY MEDICINE CLINIC | Facility: CLINIC | Age: 53
End: 2018-03-22

## 2018-03-22 NOTE — TELEPHONE ENCOUNTER
Patient called stating that she received her lab results for her pre-op and all was normal except for BUN felicitas they said was 22 and to just let you know that.

## 2018-04-02 RX ORDER — DIAZEPAM 5 MG/1
TABLET ORAL
Qty: 30 TABLET | Refills: 0 | OUTPATIENT
Start: 2018-04-02

## 2018-04-02 NOTE — TELEPHONE ENCOUNTER
Dr Mills just filled it on 3/9, so it is a week early. Is she already out or just letting us know she needs it soon?

## 2018-04-16 ENCOUNTER — TRANSCRIBE ORDERS (OUTPATIENT)
Dept: ADMINISTRATIVE | Facility: HOSPITAL | Age: 53
End: 2018-04-16

## 2018-04-16 ENCOUNTER — HOSPITAL ENCOUNTER (OUTPATIENT)
Dept: GENERAL RADIOLOGY | Facility: HOSPITAL | Age: 53
Discharge: HOME | End: 2018-04-16
Attending: INTERNAL MEDICINE | Admitting: INTERNAL MEDICINE

## 2018-04-16 DIAGNOSIS — M47.812 SPONDYLOSIS WITHOUT MYELOPATHY OR RADICULOPATHY, CERVICAL REGION: ICD-10-CM

## 2018-04-16 DIAGNOSIS — G93.32 CHRONIC FATIGUE SYNDROME: ICD-10-CM

## 2018-04-16 DIAGNOSIS — M13.0 POLYARTHROPATHY: ICD-10-CM

## 2018-04-16 DIAGNOSIS — M79.10 MYALGIA: ICD-10-CM

## 2018-04-16 PROCEDURE — 73562 X-RAY EXAM OF KNEE 3: CPT

## 2018-04-16 PROCEDURE — 73630 X-RAY EXAM OF FOOT: CPT

## 2018-04-16 PROCEDURE — 73521 X-RAY EXAM HIPS BI 2 VIEWS: CPT

## 2018-04-17 ENCOUNTER — TELEPHONE (OUTPATIENT)
Dept: FAMILY MEDICINE CLINIC | Facility: CLINIC | Age: 53
End: 2018-04-17

## 2018-04-17 RX ORDER — FLUCONAZOLE 150 MG/1
TABLET ORAL
Qty: 2 TABLET | Refills: 0 | Status: SHIPPED | OUTPATIENT
Start: 2018-04-17 | End: 2018-05-17

## 2018-04-17 NOTE — TELEPHONE ENCOUNTER
Pt called stating she had a cervical surgery that required her to be on abx. Reports now having yeast infection sx and requesting an rx.

## 2018-04-24 RX ORDER — POLYETHYLENE GLYCOL 3350 17 G/17G
17 POWDER, FOR SOLUTION ORAL DAILY
Qty: 250 G | Refills: 5 | Status: SHIPPED | OUTPATIENT
Start: 2018-04-24 | End: 2018-08-22 | Stop reason: SDUPTHER

## 2018-04-24 NOTE — TELEPHONE ENCOUNTER
Patient called for a refill on her Famvir, you have never filled it for her yet, would you like to refill it.

## 2018-04-24 NOTE — TELEPHONE ENCOUNTER
Yes it is fine to refill as previously prescribed.  Please update med list.  In addition she has several non-reconciled medications from outside sources.  Please clarify with pharmacy what she is actually taking and is the prescriber.

## 2018-05-16 ENCOUNTER — OFFICE VISIT (OUTPATIENT)
Dept: FAMILY MEDICINE CLINIC | Facility: CLINIC | Age: 53
End: 2018-05-16

## 2018-05-16 VITALS
OXYGEN SATURATION: 99 % | HEART RATE: 82 BPM | BODY MASS INDEX: 28.51 KG/M2 | DIASTOLIC BLOOD PRESSURE: 72 MMHG | HEIGHT: 64 IN | SYSTOLIC BLOOD PRESSURE: 112 MMHG | WEIGHT: 167 LBS

## 2018-05-16 DIAGNOSIS — F32.A DEPRESSION WITH SOMATIZATION: ICD-10-CM

## 2018-05-16 DIAGNOSIS — F45.0 DEPRESSION WITH SOMATIZATION: ICD-10-CM

## 2018-05-16 DIAGNOSIS — I10 ESSENTIAL HYPERTENSION: Primary | ICD-10-CM

## 2018-05-16 DIAGNOSIS — N30.10 INTERSTITIAL CYSTITIS: ICD-10-CM

## 2018-05-16 DIAGNOSIS — E53.8 VITAMIN B12 DEFICIENCY: ICD-10-CM

## 2018-05-16 DIAGNOSIS — F51.04 CHRONIC INSOMNIA: ICD-10-CM

## 2018-05-16 DIAGNOSIS — R73.01 IFG (IMPAIRED FASTING GLUCOSE): ICD-10-CM

## 2018-05-16 DIAGNOSIS — G47.19 EXCESSIVE DAYTIME SLEEPINESS: ICD-10-CM

## 2018-05-16 DIAGNOSIS — E55.9 VITAMIN D DEFICIENCY: ICD-10-CM

## 2018-05-16 DIAGNOSIS — M79.7 FIBROMYALGIA: ICD-10-CM

## 2018-05-16 PROCEDURE — 96372 THER/PROPH/DIAG INJ SC/IM: CPT | Performed by: FAMILY MEDICINE

## 2018-05-16 PROCEDURE — 99214 OFFICE O/P EST MOD 30 MIN: CPT | Performed by: FAMILY MEDICINE

## 2018-05-16 RX ORDER — LORATADINE 10 MG/1
TABLET ORAL DAILY
Refills: 2 | COMMUNITY
Start: 2018-03-24 | End: 2018-09-25

## 2018-05-16 RX ORDER — MODAFINIL 100 MG/1
100 TABLET ORAL DAILY
Qty: 30 TABLET | Refills: 0 | Status: SHIPPED | OUTPATIENT
Start: 2018-05-16 | End: 2018-06-15 | Stop reason: SDUPTHER

## 2018-05-16 RX ORDER — ZOLPIDEM TARTRATE 6.25 MG/1
6.25 TABLET, FILM COATED, EXTENDED RELEASE ORAL NIGHTLY PRN
Qty: 30 TABLET | Refills: 1 | Status: SHIPPED | OUTPATIENT
Start: 2018-05-16 | End: 2018-08-20 | Stop reason: SDUPTHER

## 2018-05-16 RX ORDER — DOCUSATE SODIUM, SENNOSIDES 50; 8.6 MG/1; MG/1
TABLET ORAL
Refills: 0 | COMMUNITY
Start: 2018-05-08 | End: 2018-06-21 | Stop reason: SDUPTHER

## 2018-05-16 RX ORDER — LINACLOTIDE 290 UG/1
CAPSULE, GELATIN COATED ORAL
COMMUNITY
Start: 2018-05-14 | End: 2018-08-14 | Stop reason: SDUPTHER

## 2018-05-16 RX ORDER — OXYCODONE HYDROCHLORIDE AND ACETAMINOPHEN 5; 325 MG/1; MG/1
TABLET ORAL
Refills: 0 | COMMUNITY
Start: 2018-04-12 | End: 2018-05-17

## 2018-05-16 RX ORDER — PRAMIPEXOLE DIHYDROCHLORIDE 0.5 MG/1
TABLET ORAL
COMMUNITY
Start: 2018-04-25 | End: 2018-11-19 | Stop reason: SDUPTHER

## 2018-05-16 RX ORDER — ONDANSETRON 4 MG/1
TABLET, ORALLY DISINTEGRATING ORAL
COMMUNITY
Start: 2018-03-29 | End: 2018-06-29

## 2018-05-16 RX ORDER — SYRINGE WITH NEEDLE, 1 ML 25GX5/8"
SYRINGE, EMPTY DISPOSABLE MISCELLANEOUS
Refills: 5 | COMMUNITY
Start: 2018-04-09 | End: 2021-01-27

## 2018-05-16 RX ORDER — ESTRADIOL 0.1 MG/D
FILM, EXTENDED RELEASE TRANSDERMAL
Refills: 0 | COMMUNITY
Start: 2018-04-27 | End: 2018-05-30 | Stop reason: SDUPTHER

## 2018-05-16 RX ADMIN — CYANOCOBALAMIN 1000 MCG: 1000 INJECTION, SOLUTION INTRAMUSCULAR; SUBCUTANEOUS at 14:49

## 2018-05-16 NOTE — PROGRESS NOTES
"Subjective   Tatiana Fields is a 52 y.o. female.     History of Present Illness  Mrs. Fields presents today for follow-up on several issues.    Since her last visit she has undergone C3-C4 spinal fusion per Dr. Mendiola.  No surgical complications.  She is healing well.  No new focal neurological symptoms. Following surgery she was placed on Valium 2-3 times per day for muscle relaxation.    She has had consultation with rheumatology regarding chronic fatigue, diffuse arthralgias, stiffness.  She was told she has \"severe diffuse posterior arthritis\" according to patient.  No apparent diagnosis of other autoimmune disease.  She does have comorbid fibromyalgia.  She is on chronic opioid analgesics per pain management in addition to muscle relaxants.      She requests refill of Ambien CR for her chronic insomnia.  She feels her restless leg symptoms have improved.    She inquires if there are additional treatment options for her severe chronic fatigue, resistant depression.  She is currently on Prozac.  Was previously on stimulant therapy for the symptoms.  Like to consider restarting.    She has been diagnosed with interstitial cystitis.  She has not seen urologist in several years.  For the last several months she has noticed slowly increasing dysuria, no hematuria.    She has HTN, taking meds as prescribed.  Denies side effects.  She is working on increasing physical activity.  She does limit salt.    She has had impaired fasting glucose on previous lab for evaluation.  Is working on limiting sugar.  Denies polydipsia, polyuria.    Has vitamin D and vitamin B12 deficiencies.  Currently on oral placement of both.  Due to persistent low B12 levels has been encouraged to obtain IM injections at least monthly.    She is being followed by neurology for migraine headaches.  Has been receiving Botox.  Uses triptan as needed.    The following portions of the patient's history were reviewed and updated as appropriate: " allergies, current medications, past family history, past medical history, past social history, past surgical history and problem list.    Review of Systems   Constitutional: Positive for fatigue and unexpected weight change. Negative for appetite change and fever.   HENT: Positive for congestion, postnasal drip and sinus pressure. Negative for ear pain, hearing loss, nosebleeds, rhinorrhea, sneezing, sore throat, tinnitus and trouble swallowing.    Eyes: Negative for pain, discharge, redness and visual disturbance.        Dry eyes   Respiratory: Negative for cough, chest tightness, shortness of breath and wheezing.    Cardiovascular: Positive for palpitations. Negative for chest pain and leg swelling.   Gastrointestinal: Positive for abdominal pain (chronic), constipation and nausea. Negative for blood in stool, diarrhea and vomiting.   Endocrine: Positive for heat intolerance. Negative for cold intolerance and polyuria.   Genitourinary: Positive for frequency and pelvic pain (chronic). Negative for dysuria, flank pain, hematuria, urgency and vaginal discharge.        Vaginal dryness   Musculoskeletal: Positive for arthralgias and myalgias. Negative for back pain and joint swelling.   Skin: Negative for rash.   Neurological: Positive for weakness and headaches. Negative for dizziness, tremors, seizures, syncope, speech difficulty and numbness.   Hematological: Negative for adenopathy. Bruises/bleeds easily.   Psychiatric/Behavioral: Positive for sleep disturbance. Negative for confusion, dysphoric mood and suicidal ideas. The patient is nervous/anxious.        Objective    Vitals:    05/16/18 1422   BP: 112/72   Pulse: 82   SpO2: 99%     Body mass index is 29.12 kg/m².  1    05/16/18  1422   Weight: 75.8 kg (167 lb)       Physical Exam   Constitutional: She is oriented to person, place, and time. She appears well-developed and well-nourished. She is cooperative. She does not appear ill. No distress.   HENT:   Head:  Normocephalic and atraumatic.   Mouth/Throat: Mucous membranes are normal. Mucous membranes are not dry.   Eyes: Conjunctivae and lids are normal.   Neck: Phonation normal. Neck supple. Normal carotid pulses present.       Cardiovascular: Normal rate, regular rhythm, normal heart sounds and intact distal pulses.    No peripheral edema.   Pulmonary/Chest: Effort normal and breath sounds normal.   Lymphadenopathy:     She has no cervical adenopathy.   Neurological: She is alert and oriented to person, place, and time. Gait normal.   Skin: Skin is warm and dry. No bruising and no rash noted.   Psychiatric: She has a normal mood and affect. Her behavior is normal. Cognition and memory are normal.   Nursing note and vitals reviewed.      Assessment/Plan   Tatiana was seen today for depression, hypertension, hyperlipidemia, fibromyalgia and insomnia.    Diagnoses and all orders for this visit:    Essential hypertension    Fibromyalgia    IFG (impaired fasting glucose)    Vitamin D deficiency    Vitamin B12 deficiency    Interstitial cystitis  -     Ambulatory Referral to Urology    Chronic insomnia    Excessive daytime sleepiness    Depression with somatization    Other orders  -     modafinil (PROVIGIL) 100 MG tablet; Take 1 tablet by mouth Daily.  -     zolpidem CR (AMBIEN CR) 6.25 MG CR tablet; Take 1 tablet by mouth At Night As Needed for Sleep.    Hypertension controlled.  Continue current medications, low-salt diet, increase physical activity as able.    Chronic pain which is multifactorial including diffuse arthritis, cervical and lumbar disc disease, fibromyalgia.  Follow-up with pain management as scheduled.    Continue diabetic appropriate/heart healthy diet.  Limit sugar/carbohydrates.    Vitamin D and B12 replacement.    Refer to urology for further evaluation of interstitial cystitis.    Chronic insomnia associated restless leg syndrome.  Continue Ambien CR as needed.  She denies side effects or any  parasomnias.    Persistent chronic severe fatigue in association with resistant depression, excessive daytime sleepiness. I have reviewed risks/benefits and potential side effects of various treatment options which may be good add-ons to her Prozac. Pt voices understanding and wishes to proceed with trial of Provigil. Med info sheet provided. She will continue Valium as needed for muscle spasms per Dr. Mendiola or pain mgnt.    I have discussed with her risk of drug/drug interaction or significant side effect due to polypharmacy, particularly when multiple medications can have sedative effects.  She was understanding.    As part of patient's treatment plan I am prescribing a controlled substance.  The patient has been made aware of the appropriate use of such medications, including potential risk of somnolence, limited ability to drive and/or work safely, and potential for dependence and/or overdose.  It has also been made clear that these medications are for use by this patient only, without concomitant use of alcohol or other substances, unless prescribed.  KIMBERLY report reviewed and scanned into chart.  Last KIMBERLY date 5/17/18.    Routine f/u in 3 months, sooner as needed.  F/U with specialists as scheduled.  Patient was encouraged to keep me informed of any acute changes, lack of improvement, or any new concerning symptoms.  Pt is aware of reasons to seek emergent care.  Patient voiced understanding of all instructions and denied further questions.

## 2018-05-18 RX ORDER — FAMCICLOVIR 500 MG/1
TABLET ORAL
Qty: 30 TABLET | Refills: 5 | Status: SHIPPED | OUTPATIENT
Start: 2018-05-18 | End: 2019-06-29 | Stop reason: SDUPTHER

## 2018-05-21 RX ORDER — CYANOCOBALAMIN 1000 UG/ML
1000 INJECTION, SOLUTION INTRAMUSCULAR; SUBCUTANEOUS
Status: DISCONTINUED | OUTPATIENT
Start: 2018-05-16 | End: 2018-06-14

## 2018-05-22 ENCOUNTER — PROCEDURE VISIT (OUTPATIENT)
Dept: NEUROLOGY | Facility: CLINIC | Age: 53
End: 2018-05-22

## 2018-05-22 VITALS
OXYGEN SATURATION: 99 % | WEIGHT: 167 LBS | BODY MASS INDEX: 28.51 KG/M2 | SYSTOLIC BLOOD PRESSURE: 148 MMHG | DIASTOLIC BLOOD PRESSURE: 96 MMHG | HEART RATE: 90 BPM | HEIGHT: 64 IN

## 2018-05-22 DIAGNOSIS — G43.119 INTRACTABLE MIGRAINE WITH AURA WITHOUT STATUS MIGRAINOSUS: ICD-10-CM

## 2018-05-22 DIAGNOSIS — M54.2 CERVICALGIA: Primary | ICD-10-CM

## 2018-05-22 PROCEDURE — 20553 NJX 1/MLT TRIGGER POINTS 3/>: CPT | Performed by: NURSE PRACTITIONER

## 2018-05-22 RX ORDER — BUPIVACAINE HYDROCHLORIDE 5 MG/ML
5 INJECTION, SOLUTION PERINEURAL ONCE
Status: DISCONTINUED | OUTPATIENT
Start: 2018-05-22 | End: 2018-12-20

## 2018-05-22 RX ORDER — METHYLPREDNISOLONE ACETATE 40 MG/ML
200 INJECTION, SUSPENSION INTRA-ARTICULAR; INTRALESIONAL; INTRAMUSCULAR; SOFT TISSUE ONCE
Status: COMPLETED | OUTPATIENT
Start: 2018-05-22 | End: 2019-03-07

## 2018-05-22 NOTE — PROGRESS NOTES
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 Pt states trigger point help cervicalgia and migraines. Pt sp cervical surgery with Dr. Mendiola. Risks and benefits of the Trigger point injection procedure have been explained to the patient.  Patient has no contraindications such as bleed diathesis, recent acute trauma at the muscle sites, anesthetic allergy or anticoagulation.  Mechanism of trigger point injection has been explained to patient.     Procedure Note:  1.         Patient was positioned comfortably  2.         Before injections are started, 10 Trigger Points sites are identified throughout the bilateral upper trapezius muscle, levator scapulae, and erector spinae muscles.    3.         Overlying skin area was cleaned with Alcohol swab                                                                                                              4.         Before injection, trigger points sites were palpated for local twitch and referred pain to confirms placement                         5.         Local anesthetic was mixed with Depo-Medrol (5 cc of Marcaine 0.5% mixed with 5 cc of Depo-Medrol at 40 mg/ml - for a total of 10 cc)  6.         30 gauge ½ inch needle was utilized to ensure patient comfort          7.         I started with the most tender spot in above mentioned Trigger Points   1.   Localize most tender spot within taut muscle-fibers  2.   Fix tender spot between fingers (1-2 cm in size)   1.   Prevents from rolling away from needle  2.   Controls subcutaneous bleeding  3.   Before injection, patient was instructed of possible pain on injection  4.   Direct needle at 30 degree angle off skin   8.         Insert needle into skin 1-2 cm from Trigger Point   9.         Advance needle into Trigger Point   10.       Use 1cc anesthetic at each Trigger Points identified in step #2  11.       Redirect needle and reinject                                                                                               1.   Withdraw needle to subcutaneous tissue  2.   Redirect needle into adjacent tender areas  3.   Repeat until local twitch or tautness resolves  12.   After each of the 10 injections I held direct pressure at injection site for 2 minutes to prevents hematoma formation  13.   The same procedure was repeated for other Tender Points located in the upper trapezius muscle, levator scapulae and erector spinae bilaterally  14.   Patient was instructed to gently stretch injected areas to full active range of motion in all directions and was instructed to repeat range of motion three times after injection  15.   Post-Procedure patient was instructed to avoid over-using injected area for 3-4 days   1.   Maintain active range of motion of injected muscle  2.   Patient applies ice to injected areas for a few hours  3.   Anticipate post-injection soreness for 3-4 days  There was no evidence of complications from injection was noted such as local Skin Infection  at injection site or local hematoma at injection site

## 2018-05-24 RX ORDER — HYDROCHLOROTHIAZIDE 25 MG/1
TABLET ORAL
Qty: 90 TABLET | Refills: 2 | Status: SHIPPED | OUTPATIENT
Start: 2018-05-24 | End: 2019-09-30

## 2018-05-30 RX ORDER — ESTRADIOL 0.1 MG/D
FILM, EXTENDED RELEASE TRANSDERMAL
Qty: 25 PATCH | Refills: 0 | Status: SHIPPED | OUTPATIENT
Start: 2018-05-30 | End: 2018-09-21 | Stop reason: SDUPTHER

## 2018-06-14 RX ORDER — SUMATRIPTAN 100 MG/1
TABLET, FILM COATED ORAL
Qty: 9 TABLET | Refills: 1 | Status: CANCELLED | OUTPATIENT
Start: 2018-06-14

## 2018-06-14 RX ORDER — CYANOCOBALAMIN 1000 UG/ML
1000 INJECTION, SOLUTION INTRAMUSCULAR; SUBCUTANEOUS
Qty: 2 ML | Refills: 5 | Status: SHIPPED | OUTPATIENT
Start: 2018-06-14 | End: 2019-05-01 | Stop reason: SDUPTHER

## 2018-06-15 RX ORDER — MODAFINIL 100 MG/1
100 TABLET ORAL DAILY
Qty: 30 TABLET | Refills: 0 | Status: SHIPPED | OUTPATIENT
Start: 2018-06-15 | End: 2018-06-21 | Stop reason: SDUPTHER

## 2018-06-15 RX ORDER — DOCUSATE SODIUM, SENNOSIDES 50; 8.6 MG/1; MG/1
TABLET ORAL
Refills: 0 | OUTPATIENT
Start: 2018-06-15

## 2018-06-15 RX ORDER — SUMATRIPTAN 100 MG/1
100 TABLET, FILM COATED ORAL
Qty: 9 TABLET | Refills: 1 | Status: SHIPPED | OUTPATIENT
Start: 2018-06-15 | End: 2018-11-01 | Stop reason: SDUPTHER

## 2018-06-15 NOTE — TELEPHONE ENCOUNTER
Please clarify with pt how she is taking the Senna-Plus as we have not rx'd this for her previously (OTC).    Provigil E-Rxd to pharmacy.

## 2018-06-21 ENCOUNTER — OFFICE VISIT (OUTPATIENT)
Dept: FAMILY MEDICINE CLINIC | Facility: CLINIC | Age: 53
End: 2018-06-21

## 2018-06-21 VITALS
HEIGHT: 64 IN | OXYGEN SATURATION: 94 % | WEIGHT: 167 LBS | HEART RATE: 95 BPM | DIASTOLIC BLOOD PRESSURE: 86 MMHG | BODY MASS INDEX: 28.51 KG/M2 | SYSTOLIC BLOOD PRESSURE: 122 MMHG

## 2018-06-21 DIAGNOSIS — I10 ESSENTIAL HYPERTENSION: Primary | ICD-10-CM

## 2018-06-21 DIAGNOSIS — F43.10 PTSD (POST-TRAUMATIC STRESS DISORDER): ICD-10-CM

## 2018-06-21 DIAGNOSIS — F51.04 CHRONIC INSOMNIA: ICD-10-CM

## 2018-06-21 DIAGNOSIS — Z13.6 ENCOUNTER FOR LIPID SCREENING FOR CARDIOVASCULAR DISEASE: ICD-10-CM

## 2018-06-21 DIAGNOSIS — Z51.81 MEDICATION MONITORING ENCOUNTER: ICD-10-CM

## 2018-06-21 DIAGNOSIS — G89.29 CHRONIC PAIN OF RIGHT UPPER EXTREMITY: ICD-10-CM

## 2018-06-21 DIAGNOSIS — E61.1 IRON DEFICIENCY: ICD-10-CM

## 2018-06-21 DIAGNOSIS — M79.601 CHRONIC PAIN OF RIGHT UPPER EXTREMITY: ICD-10-CM

## 2018-06-21 DIAGNOSIS — E83.42 HYPOMAGNESEMIA: ICD-10-CM

## 2018-06-21 DIAGNOSIS — G43.119 INTRACTABLE MIGRAINE WITH AURA WITHOUT STATUS MIGRAINOSUS: ICD-10-CM

## 2018-06-21 DIAGNOSIS — G47.33 OBSTRUCTIVE SLEEP APNEA: ICD-10-CM

## 2018-06-21 DIAGNOSIS — R53.82 CHRONIC FATIGUE: ICD-10-CM

## 2018-06-21 DIAGNOSIS — E53.8 VITAMIN B12 DEFICIENCY: ICD-10-CM

## 2018-06-21 DIAGNOSIS — Z13.220 ENCOUNTER FOR LIPID SCREENING FOR CARDIOVASCULAR DISEASE: ICD-10-CM

## 2018-06-21 DIAGNOSIS — M79.7 FIBROMYALGIA: ICD-10-CM

## 2018-06-21 DIAGNOSIS — F32.A DEPRESSION WITH SOMATIZATION: ICD-10-CM

## 2018-06-21 DIAGNOSIS — R73.01 IFG (IMPAIRED FASTING GLUCOSE): ICD-10-CM

## 2018-06-21 DIAGNOSIS — M79.10 MUSCLE PAIN: ICD-10-CM

## 2018-06-21 DIAGNOSIS — K58.1 IRRITABLE BOWEL SYNDROME WITH CONSTIPATION: ICD-10-CM

## 2018-06-21 DIAGNOSIS — E55.9 VITAMIN D DEFICIENCY: ICD-10-CM

## 2018-06-21 DIAGNOSIS — G47.19 EXCESSIVE DAYTIME SLEEPINESS: ICD-10-CM

## 2018-06-21 DIAGNOSIS — F45.0 DEPRESSION WITH SOMATIZATION: ICD-10-CM

## 2018-06-21 PROCEDURE — 99214 OFFICE O/P EST MOD 30 MIN: CPT | Performed by: FAMILY MEDICINE

## 2018-06-21 RX ORDER — MODAFINIL 100 MG/1
100 TABLET ORAL DAILY
Qty: 30 TABLET | Refills: 2 | Status: SHIPPED | OUTPATIENT
Start: 2018-06-21 | End: 2018-07-18 | Stop reason: SDUPTHER

## 2018-06-21 RX ORDER — DOCUSATE SODIUM, SENNOSIDES 50; 8.6 MG/1; MG/1
1 TABLET ORAL 2 TIMES DAILY
Qty: 60 TABLET | Refills: 0 | Status: SHIPPED | OUTPATIENT
Start: 2018-06-21 | End: 2018-08-27 | Stop reason: SDUPTHER

## 2018-06-21 NOTE — PROGRESS NOTES
"Subjective   Tatiana Fields is a 52 y.o. female.     History of Present Illness  Mrs. Fields presents today for follow-up on several chronic issues.     She has undergone C3-C4 spinal fusion per Dr. Mendiola.  No surgical complications.  She is healing well but continues to have burning pain down right arm with \"drawing up\" of fingers in right hand. Following surgery she was placed on Valium 2-3 times per day for muscle relaxation per Dr. Mendiola. There is some concern for possible thoracic outlet dx as cause of her right shoulder and arm pain. She is currently participating in PT as ordered via Dr. Mendiola.     She has had consultation with rheumatology regarding chronic fatigue, diffuse arthralgias, stiffness.  She was told she has \"severe diffuse arthritis\" according to patient.  No apparent diagnosis of other autoimmune disease.  She does have comorbid fibromyalgia.  She is on chronic opioid analgesics per pain management in addition to muscle relaxants.       She requests refill of Ambien CR for her chronic insomnia.  She feels her restless leg symptoms have improved as well as overall sleep quality.     At her visit last month, she inquired if there were additional treatment options for her severe chronic fatigue, resistant depression.  She has DANE with excessive daytime sleepiness as well. She is currently on Prozac.  Had previously been on stimulant therapy for the symptoms. She was restarted on Provigil. She is please that her fatigue has improved although not completely resolved. She also feels more \"clear headed\". She denies side effects.      She has HTN, taking meds as prescribed.  Denies side effects.  She is working on increasing physical activity.  She does limit salt. She is concerned about her magnesium level as it has been low before and led to muscle spasms, increased headaches, anxiety. Would like it checked today.     She has had impaired fasting glucose on previous lab evaluation.  Is working on " limiting sugar.  Denies polydipsia, polyuria.     Has vitamin D and vitamin B12 deficiencies.  Currently on oral placement of both.  Due to persistent low B12 levels has been encouraged to obtain IM injections at monthly.     She is being followed by neurology for migraine headaches.  Has been receiving Botox.  Uses triptan as needed.    She has PTSD, chronic depression with somatization .Currently on prozac. Moods recently stable. 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 She requests letter stating need of emotional support animal. This was initially written by former provider, Ulises Noland 5 years ago. Needed to assist with tx/mgnt of PTSD.    The following portions of the patient's history were reviewed and updated as appropriate: allergies, current medications, past family history, past medical history, past social history, past surgical history and problem list.    Review of Systems   Constitutional: Positive for fatigue. Negative for appetite change and fever.   HENT: Positive for congestion, postnasal drip and sinus pressure. Negative for ear pain, hearing loss, nosebleeds, rhinorrhea, sneezing, sore throat, tinnitus and trouble swallowing.    Eyes: Negative for pain, discharge, redness and visual disturbance.        Dry eyes   Respiratory: Negative for cough, chest tightness, shortness of breath and wheezing.    Cardiovascular: Positive for palpitations. Negative for chest pain and leg swelling.   Gastrointestinal: Positive for abdominal pain (chronic), constipation and nausea. Negative for blood in stool, diarrhea and vomiting.   Endocrine: Positive for heat intolerance. Negative for cold intolerance, polydipsia and polyuria.   Genitourinary: Positive for frequency and pelvic pain (chronic). Negative for dysuria, flank pain, hematuria, urgency and vaginal discharge.        Vaginal dryness   Musculoskeletal: Positive for arthralgias and myalgias. Negative for back pain and joint swelling.    Skin: Negative for rash and wound.   Neurological: Positive for weakness and headaches. Negative for dizziness, tremors, seizures, syncope, speech difficulty and numbness.   Hematological: Negative for adenopathy. Bruises/bleeds easily.   Psychiatric/Behavioral: Positive for sleep disturbance. Negative for confusion, dysphoric mood and suicidal ideas. The patient is nervous/anxious.        Objective    Vitals:    06/21/18 0847   BP: 122/86   Pulse: 95   SpO2: 94%     Body mass index is 29.12 kg/m².  1    06/21/18  0847   Weight: 75.8 kg (167 lb)       Physical Exam   Constitutional: She is oriented to person, place, and time. She appears well-developed and well-nourished. She is cooperative. She does not appear ill. No distress.   HENT:   Head: Normocephalic and atraumatic.   Mouth/Throat: Oropharynx is clear and moist and mucous membranes are normal. Mucous membranes are not dry. No oral lesions.   Eyes: Conjunctivae and lids are normal.   Neck: Phonation normal. Neck supple. Normal carotid pulses present. No thyroid mass and no thyromegaly present.   Cardiovascular: Normal rate, regular rhythm, normal heart sounds and intact distal pulses.  Exam reveals no gallop.    No murmur heard.  Pulmonary/Chest: Effort normal and breath sounds normal.     Vascular Status -  Her right foot exhibits no edema. Her left foot exhibits no edema.  Lymphadenopathy:     She has no cervical adenopathy.   Neurological: She is alert and oriented to person, place, and time. Gait normal.   Skin: Skin is warm and dry. No bruising and no rash noted.   Psychiatric: Her behavior is normal. Thought content normal. Her mood appears anxious. Cognition and memory are normal.   Nursing note and vitals reviewed.      Assessment/Plan   Tatiana was seen today for depression, hyperlipidemia, hypertension, fibromyalgia, anemia, shoulder pain, hand pain and med refill.    Diagnoses and all orders for this visit:    Essential hypertension  -     CBC &  Differential  -     TSH Rfx On Abnormal To Free T4  -     Comprehensive Metabolic Panel    Intractable migraine with aura without status migrainosus  -     CBC & Differential    Irritable bowel syndrome with constipation    Vitamin B12 deficiency  -     Vitamin B12    Vitamin D deficiency  -     Vitamin D 25 Hydroxy    IFG (impaired fasting glucose)  -     Comprehensive Metabolic Panel  -     Hemoglobin A1c    Depression with somatization  -     modafinil (PROVIGIL) 100 MG tablet; Take 1 tablet by mouth Daily.    Chronic insomnia  -     TSH Rfx On Abnormal To Free T4    Fibromyalgia  -     C-reactive Protein  -     Sedimentation Rate    PTSD (post-traumatic stress disorder)    Iron deficiency  -     CBC & Differential  -     Iron    Medication monitoring encounter  -     CBC & Differential  -     Comprehensive Metabolic Panel    Encounter for lipid screening for cardiovascular disease  -     Lipid Panel    Muscle pain  -     C-reactive Protein  -     Sedimentation Rate    Hypomagnesemia  -     Magnesium  -     Phosphorus    Chronic fatigue  -     modafinil (PROVIGIL) 100 MG tablet; Take 1 tablet by mouth Daily.    Excessive daytime sleepiness  -     modafinil (PROVIGIL) 100 MG tablet; Take 1 tablet by mouth Daily.    Obstructive sleep apnea  -     modafinil (PROVIGIL) 100 MG tablet; Take 1 tablet by mouth Daily.    Chronic pain of right upper extremity  -     MRI shoulder right wo contrast; Future    Other orders  -     SENNA-PLUS 8.6-50 MG per tablet; Take 1 tablet by mouth 2 (Two) Times a Day.    Hypertension controlled.  Continue current medications, low-salt diet, increase physical activity as able.     Chronic pain which is multifactorial including diffuse arthritis, cervical and lumbar disc disease, fibromyalgia.  Follow-up with pain management as scheduled.    Chronic right shoulder and arm pain concerning for possible thoracic outlet dx. MRI as above. Conside NCS/EMG.    Continue diabetic appropriate/heart  healthy diet.  Limit sugar/carbohydrates.     Vitamin D and B12 replacement.    IBSx stable. Continue Linzess.    Migraines stable, f/u with neurology.    Chronic insomnia associated restless leg syndrome.  Continue Ambien CR as needed.  She denies side effects or any parasomnias.     Persistent chronic severe fatigue in association with resistant depression, excessive daytime sleepiness. I have once again reviewed risks/benefits and potential side effects of Provigil and she wishes to continue tx due to overall improved functioning.      I have discussed with her risk of drug/drug interaction or significant side effect due to polypharmacy, particularly when multiple medications can have sedative effects.  She voices understanding.     As part of patient's treatment plan I am prescribing a controlled substance.  The patient has been made aware of the appropriate use of such medications, including potential risk of somnolence, limited ability to drive and/or work safely, and potential for dependence and/or overdose.  It has also been made clear that these medications are for use by this patient only, without concomitant use of alcohol or other substances, unless prescribed.  KIMBERLY report reviewed and scanned into chart.  Last KIMBERLY date 5/17/18.     Routine f/u in 3 months, sooner as needed.  F/U with specialists as scheduled.  I will contact patient regarding test results and provide instructions regarding any necessary changes in plan of care.  Patient was encouraged to keep me informed of any acute changes, lack of improvement, or any new concerning symptoms.  Pt is aware of reasons to seek emergent care.  Patient voiced understanding of all instructions and denied further questions.

## 2018-06-22 ENCOUNTER — TELEPHONE (OUTPATIENT)
Dept: FAMILY MEDICINE CLINIC | Facility: CLINIC | Age: 53
End: 2018-06-22

## 2018-06-22 LAB
25(OH)D3+25(OH)D2 SERPL-MCNC: 26.8 NG/ML (ref 30–100)
ALBUMIN SERPL-MCNC: 4.4 G/DL (ref 3.5–5.5)
ALBUMIN/GLOB SERPL: 1.8 {RATIO} (ref 1.2–2.2)
ALP SERPL-CCNC: 61 IU/L (ref 39–117)
ALT SERPL-CCNC: 15 IU/L (ref 0–32)
AST SERPL-CCNC: 18 IU/L (ref 0–40)
BASOPHILS # BLD AUTO: 0 X10E3/UL (ref 0–0.2)
BASOPHILS NFR BLD AUTO: 0 %
BILIRUB SERPL-MCNC: 0.3 MG/DL (ref 0–1.2)
BUN SERPL-MCNC: 17 MG/DL (ref 6–24)
BUN/CREAT SERPL: 20 (ref 9–23)
CALCIUM SERPL-MCNC: 9.1 MG/DL (ref 8.7–10.2)
CHLORIDE SERPL-SCNC: 98 MMOL/L (ref 96–106)
CHOLEST SERPL-MCNC: 182 MG/DL (ref 100–199)
CO2 SERPL-SCNC: 26 MMOL/L (ref 20–29)
CREAT SERPL-MCNC: 0.84 MG/DL (ref 0.57–1)
CRP SERPL-MCNC: 2.1 MG/L (ref 0–4.9)
EOSINOPHIL # BLD AUTO: 0.1 X10E3/UL (ref 0–0.4)
EOSINOPHIL NFR BLD AUTO: 3 %
ERYTHROCYTE [DISTWIDTH] IN BLOOD BY AUTOMATED COUNT: 14.3 % (ref 12.3–15.4)
ERYTHROCYTE [SEDIMENTATION RATE] IN BLOOD BY WESTERGREN METHOD: 4 MM/HR (ref 0–40)
GFR SERPLBLD CREATININE-BSD FMLA CKD-EPI: 80 ML/MIN/1.73
GFR SERPLBLD CREATININE-BSD FMLA CKD-EPI: 92 ML/MIN/1.73
GLOBULIN SER CALC-MCNC: 2.4 G/DL (ref 1.5–4.5)
GLUCOSE SERPL-MCNC: 104 MG/DL (ref 65–99)
HBA1C MFR BLD: 5.5 % (ref 4.8–5.6)
HCT VFR BLD AUTO: 39.2 % (ref 34–46.6)
HDLC SERPL-MCNC: 54 MG/DL
HGB BLD-MCNC: 12.8 G/DL (ref 11.1–15.9)
IMM GRANULOCYTES # BLD: 0 X10E3/UL (ref 0–0.1)
IMM GRANULOCYTES NFR BLD: 0 %
IRON SERPL-MCNC: 90 UG/DL (ref 27–159)
LDLC SERPL CALC-MCNC: 89 MG/DL (ref 0–99)
LYMPHOCYTES # BLD AUTO: 2.2 X10E3/UL (ref 0.7–3.1)
LYMPHOCYTES NFR BLD AUTO: 45 %
MAGNESIUM SERPL-MCNC: 2.2 MG/DL (ref 1.6–2.3)
MCH RBC QN AUTO: 28.3 PG (ref 26.6–33)
MCHC RBC AUTO-ENTMCNC: 32.7 G/DL (ref 31.5–35.7)
MCV RBC AUTO: 87 FL (ref 79–97)
MONOCYTES # BLD AUTO: 0.3 X10E3/UL (ref 0.1–0.9)
MONOCYTES NFR BLD AUTO: 7 %
NEUTROPHILS # BLD AUTO: 2.2 X10E3/UL (ref 1.4–7)
NEUTROPHILS NFR BLD AUTO: 45 %
PHOSPHATE SERPL-MCNC: 3.1 MG/DL (ref 2.5–4.5)
PLATELET # BLD AUTO: 348 X10E3/UL (ref 150–379)
POTASSIUM SERPL-SCNC: 4.3 MMOL/L (ref 3.5–5.2)
PROT SERPL-MCNC: 6.8 G/DL (ref 6–8.5)
RBC # BLD AUTO: 4.52 X10E6/UL (ref 3.77–5.28)
SODIUM SERPL-SCNC: 138 MMOL/L (ref 134–144)
TRIGL SERPL-MCNC: 197 MG/DL (ref 0–149)
TSH SERPL DL<=0.005 MIU/L-ACNC: 4.02 UIU/ML (ref 0.45–4.5)
VIT B12 SERPL-MCNC: >2000 PG/ML (ref 232–1245)
VLDLC SERPL CALC-MCNC: 39 MG/DL (ref 5–40)
WBC # BLD AUTO: 4.9 X10E3/UL (ref 3.4–10.8)

## 2018-06-26 ENCOUNTER — HOSPITAL ENCOUNTER (OUTPATIENT)
Dept: MRI IMAGING | Facility: HOSPITAL | Age: 53
Discharge: HOME | End: 2018-06-26
Admitting: FAMILY MEDICINE

## 2018-06-26 DIAGNOSIS — G89.29 CHRONIC PAIN OF RIGHT UPPER EXTREMITY: ICD-10-CM

## 2018-06-26 DIAGNOSIS — M79.601 CHRONIC PAIN OF RIGHT UPPER EXTREMITY: ICD-10-CM

## 2018-06-26 PROCEDURE — 73221 MRI JOINT UPR EXTREM W/O DYE: CPT

## 2018-06-27 DIAGNOSIS — G89.29 CHRONIC RIGHT SHOULDER PAIN: ICD-10-CM

## 2018-06-27 DIAGNOSIS — M24.111 LABRAL TEAR OF SHOULDER, DEGENERATIVE, RIGHT: Primary | ICD-10-CM

## 2018-06-27 DIAGNOSIS — M25.511 CHRONIC RIGHT SHOULDER PAIN: ICD-10-CM

## 2018-06-29 ENCOUNTER — TELEPHONE (OUTPATIENT)
Dept: FAMILY MEDICINE CLINIC | Facility: CLINIC | Age: 53
End: 2018-06-29

## 2018-06-29 RX ORDER — ONDANSETRON 4 MG/1
4 TABLET, FILM COATED ORAL EVERY 8 HOURS PRN
Qty: 30 TABLET | Refills: 2 | Status: SHIPPED | OUTPATIENT
Start: 2018-06-29 | End: 2019-02-14

## 2018-06-29 RX ORDER — ONDANSETRON 4 MG/1
TABLET, ORALLY DISINTEGRATING ORAL
Status: CANCELLED | OUTPATIENT
Start: 2018-06-29

## 2018-06-29 NOTE — TELEPHONE ENCOUNTER
Pt called requesting a prescription for nausea medication.  States she would prefer phenergan because it works better for her, but Zofran would be ok as well.  Would like this sent to [facility].

## 2018-06-29 NOTE — TELEPHONE ENCOUNTER
Pt called stating she had discussed a letter with you regarding her support dog at her recent visit.  Advised that someone would contact her when letter was available and that it would also be available to her through [employer]. Please advise.

## 2018-07-18 RX ORDER — MODAFINIL 100 MG/1
100 TABLET ORAL DAILY
Qty: 30 TABLET | Refills: 0 | Status: SHIPPED | OUTPATIENT
Start: 2018-07-18 | End: 2018-07-27 | Stop reason: CLARIF

## 2018-07-24 ENCOUNTER — PROCEDURE VISIT (OUTPATIENT)
Dept: NEUROLOGY | Facility: CLINIC | Age: 53
End: 2018-07-24

## 2018-07-24 VITALS
HEART RATE: 100 BPM | OXYGEN SATURATION: 98 % | SYSTOLIC BLOOD PRESSURE: 120 MMHG | WEIGHT: 167 LBS | BODY MASS INDEX: 28.51 KG/M2 | HEIGHT: 64 IN | DIASTOLIC BLOOD PRESSURE: 90 MMHG

## 2018-07-24 DIAGNOSIS — M25.511 ACUTE PAIN OF RIGHT SHOULDER: ICD-10-CM

## 2018-07-24 DIAGNOSIS — G43.119 INTRACTABLE MIGRAINE WITH AURA WITHOUT STATUS MIGRAINOSUS: Primary | ICD-10-CM

## 2018-07-24 PROCEDURE — 64615 CHEMODENERV MUSC MIGRAINE: CPT | Performed by: NURSE PRACTITIONER

## 2018-07-24 RX ORDER — NAPROXEN 500 MG/1
TABLET ORAL
Refills: 1 | COMMUNITY
Start: 2018-07-09 | End: 2020-10-20

## 2018-07-24 RX ORDER — GABAPENTIN 300 MG/1
CAPSULE ORAL
COMMUNITY
Start: 2018-07-06 | End: 2019-03-15

## 2018-07-27 ENCOUNTER — OFFICE VISIT (OUTPATIENT)
Dept: FAMILY MEDICINE CLINIC | Facility: CLINIC | Age: 53
End: 2018-07-27

## 2018-07-27 VITALS
SYSTOLIC BLOOD PRESSURE: 122 MMHG | WEIGHT: 165 LBS | TEMPERATURE: 98.1 F | BODY MASS INDEX: 28.17 KG/M2 | HEART RATE: 88 BPM | HEIGHT: 64 IN | RESPIRATION RATE: 12 BRPM | DIASTOLIC BLOOD PRESSURE: 84 MMHG | OXYGEN SATURATION: 97 %

## 2018-07-27 DIAGNOSIS — M25.511 ARTHRALGIA OF RIGHT SHOULDER REGION: ICD-10-CM

## 2018-07-27 DIAGNOSIS — S43.431A LABRAL TEAR OF SHOULDER, RIGHT, INITIAL ENCOUNTER: ICD-10-CM

## 2018-07-27 DIAGNOSIS — H61.23 HEARING LOSS DUE TO CERUMEN IMPACTION, BILATERAL: Primary | ICD-10-CM

## 2018-07-27 PROCEDURE — 99214 OFFICE O/P EST MOD 30 MIN: CPT | Performed by: FAMILY MEDICINE

## 2018-07-27 NOTE — PROGRESS NOTES
"    Established Patient        Chief Complaint:   Chief Complaint   Patient presents with   • Ear Problem     having problems with right ear \"sounds like ocean in ear\"   • Arm Pain     pain in right arm        Tatiana Fields is a 52 y.o. female    History of Present Illness:     Patient is a 52-year-old female who presents with complaints of bilateral hearing deficits, and right ear pain.  This is been present for the last several weeks and progressively worsened over the last few days.  No significant drainage.  Denies any fever or chills.  No trauma of report.    Patient also complains of continued right upper extremity pain, predominantly at the right shoulder.  She has a significant history of cervical disc surgical intervention approximately 4/2 months ago in [place] at [facility].  Dr. Mendiola perform the surgery, and went successfully without complications.  She is expressed right shoulder pain, described as right upper arm and in.  Range of motion testing.  She is continue to be seen by physical therapy additionally.  Her primary care provider was concerned about her right shoulder, and ordered an MRI which demonstrated a probable labral tear.  Rotator cuff intact.  She was referred to [facility] sports medicine Department at patient's preference, in which she was seen most recently.    Subjective     The following portions of the patient's history were reviewed and updated as appropriate: allergies, current medications, past family history, past medical history, past social history, past surgical history and problem list.    Allergies   Allergen Reactions   • Trazodone And Nefazodone Mental Status Change   • Sulfa Antibiotics Rash       Review of Systems  1. Constitutional: Negative for fever. Negative for chills, diaphoresis, fatigue and unexpected weight change.   2. HENT: No dysphagia.  Decreased hearing bilaterally, no draining.  Otherwise as per history of present " "illness.  3. Eyes: Negative for redness and visual disturbance.   4. Respiratory: negative for shortness of breath. Negative for chest pain . Negative for cough and chest tightness.   5. Cardiovascular: Negative for chest pain and palpitations.   6. Gastrointestinal: Negative for abdominal distention, abdominal pain and blood in stool.   7. Endocrine: Negative for cold intolerance and heat intolerance.   8. Genitourinary: Negative for difficulty urinating, dysuria and frequency.   9. Musculoskeletal: Right upper extremity pain/arthralgia/myalgia at shoulder.   10. Skin: Negative for color change, rash and wound.   11. Neurological: Negative for syncope, weakness and headaches.   12. Hematological: Negative for adenopathy. Does not bruise/bleed easily.   13. Psychiatric/Behavioral: Negative for confusion. The patient is not nervous/anxious.    Objective     Physical Exam   Vital Signs: /84   Pulse 88   Temp 98.1 °F (36.7 °C)   Resp 12   Ht 161.3 cm (63.5\")   Wt 74.8 kg (165 lb)   LMP  (LMP Unknown)   SpO2 97%   BMI 28.77 kg/m²     General Appearance: alert, oriented x 3, no acute distress.  Skin: warm and dry.   HEENT: Atraumatic.  pupils round and reactive to light and accommodation, oral mucosa pink and moist.  Nares patent without epistaxis.  External auditory canals are patent tympanic membranes intact.  Neck: supple, no JVD, trachea midline.  No thyromegaly  Lungs: CTA, unlabored breathing effort.  Heart: RRR, normal S1 and S2, no S3, no rub.  Abdomen: soft, non-tender, no palpable bladder, present bowel sounds to auscultation ×4.  No guarding or rigidity.  Extremities: Neurovascular exam of right upper extremity is normal.  Hawkin's test positive, pain on palpation of the right acromioclavicular joint.  Neer's test weakly positive with pain, cross body abduction worsens pain additionally.  Skagway's test is grossly positive.  Apprehension sign positive.  Pushoff negative.  Normal  strength " bilaterally.  Preserved supination and pronation of forearm.  Normal extension and flexion of the elbow and wrist/fingers.  Symmetric muscle strength and development  Neuro: normal speech and mental status.  Cranial nerves II through XII intact.  No anosmia. DTR 2+; proprioception intact.  No focal motor/sensory deficits.    Assessment and Plan      Assessment:   Tatiana was seen today for ear problem and arm pain.    Diagnoses and all orders for this visit:    Hearing loss due to cerumen impaction, bilateral    Arthralgia of right shoulder region    Labral tear of shoulder, right, initial encounter        Plan:  [facility], with noted muscular skeletal findings.  I do disagree with the Troy's test being positive or negative.  I reviewed the findings of the MRI additionally.  Her symptomatology/clinical exam and MRI certainly do favor a labral tear.  This is likely the reason that her physical therapy progress is not improved significantly as well.  I've discussed the case with physical therapist at [facility] in [place], they do agree with their clinical suspicion that as well of a labral tear of significant etiology.  Patient is being referred to Dr. Vasques at patient's request for second opinion.  I do agree with the need for this at this time.  Focus of the physical therapy will now be placed on labral tear pathology.  I've discussed with patient assurance surgery become necessary, will likely involve arthroscopy with possible intervention if needed.    Patient tolerated ear lavage without difficulty.  No complications, resolution of hearing loss.    I've asked the patient follow up in 2 weeks' time at which time we can readdress her progress with physical therapy changes.  Discussion Summary:    Discussed plan of care in detail with pt today; pt verb understanding and agrees; counseled for approx 15 min of total 25 min exam time.  Follow up:  Return in  about 2 weeks (around 8/10/2018) for Next scheduled follow up.     Patient Instructions   SLAP Lesions Rehab  Ask your health care provider which exercises are safe for you. Do exercises exactly as told by your health care provider and adjust them as directed. It is normal to feel mild stretching, pulling, tightness, or discomfort as you do these exercises, but you should stop right away if you feel sudden pain or your pain gets worse. Do not begin these exercises until told by your health care provider.  Stretching and range of motion exercise  This exercise warms up your muscles and joints and improves the movement and flexibility of your shoulder. This exercise also helps to relieve pain and stiffness.  Exercise A: Passive shoulder horizontal adduction    1. Sit or stand and pull your left / right elbow across your chest, toward your other shoulder. Stop when you feel a gentle stretch in the back of your shoulder and upper arm.  ? Keep your arm at shoulder height.  ? Keep your arm as close to your body as you comfortably can.  2. Hold for __________ seconds.  3. Slowly return to the starting position.  Repeat __________ times. Complete this exercise __________ times a day.  Strengthening exercises  These exercises build strength and endurance in your shoulder. Endurance is the ability to use your muscles for a long time, even after they get tired.  Exercise B: Scapular protraction, supine    1. Lie on your back on a firm surface. If directed, hold a __________ weight in your left / right hand.  2. Raise your left / right arm straight into the air so your hand is directly above your shoulder joint.  3. Lift your shoulder off of the floor so you push the weight into the air. Do not move your head, neck, or back.  4. Hold for __________ seconds.  5. Slowly return to the starting position. Let your muscles relax completely before you repeat this exercise.  Repeat __________ times. Complete this exercise __________  "times a day.  Exercise C: Scapular retraction    1. Sit in a stable chair without armrests, or stand.  2. Secure an exercise band to a stable object in front of you so the band is at shoulder height.  3. Hold one end of the exercise band in each hand.  4. Squeeze your shoulder blades together and move your elbows slightly behind you. Do not shrug your shoulders.  5. Hold for __________ seconds.  6. Slowly return to the starting position.  Repeat __________ times. Complete this exercise __________ times a day.  Exercise D: Shoulder external rotation  1. Lie down on your left / right side.  2. Bend your left / right elbow to an \"L\" shape (90 degrees). Place a small pillow or a rolled-up towel under your left / right upper arm.  3. With your elbow bent to 90 degrees, place your left / right hand on your abdomen.  4. Squeeze your shoulder blade back toward your spine.  5. Keeping your upper arm against the pillow or towel, move (pivot) your forearm and hand away from your abdomen and toward the ceiling. Keep your elbow bent to 90 degrees.  6. Hold for __________ seconds.  7. Slowly return to the starting position.  Repeat __________ times. Complete this exercise __________ times a day.  Exercise E: Shoulder extension, prone    1. Lie on your abdomen on a firm surface so your left / right arm hangs over the edge.  2. Hold a __________ weight in your hand so your palm faces in toward your body. Your arm should be straight.  3. Squeeze your shoulder blade down toward the middle of your back.  4. Slowly raise your arm behind you, up to the height of the surface that you are lying on. Keep your arm straight.  5. Hold for __________ seconds.  6. Slowly return to the starting position and relax your muscles.  Repeat __________ times. Complete this exercise __________ times a day.  This information is not intended to replace advice given to you by your health care provider. Make sure you discuss any questions you have with your " health care provider.  Document Released: 12/18/2006 Document Revised: 08/24/2017 Document Reviewed: 11/19/2016  [employer]vier Interactive Patient Education © 2018 [employer]        Bert Hernández DO  07/27/18  3:55 PM    Please note that portions of this note may have been completed with a voice recognition program. Efforts were made to edit the dictations, but occasionally words are mistranscribed.

## 2018-07-27 NOTE — PATIENT INSTRUCTIONS
SLAP Lesions Rehab  Ask your health care provider which exercises are safe for you. Do exercises exactly as told by your health care provider and adjust them as directed. It is normal to feel mild stretching, pulling, tightness, or discomfort as you do these exercises, but you should stop right away if you feel sudden pain or your pain gets worse. Do not begin these exercises until told by your health care provider.  Stretching and range of motion exercise  This exercise warms up your muscles and joints and improves the movement and flexibility of your shoulder. This exercise also helps to relieve pain and stiffness.  Exercise A: Passive shoulder horizontal adduction    1. Sit or stand and pull your left / right elbow across your chest, toward your other shoulder. Stop when you feel a gentle stretch in the back of your shoulder and upper arm.  ? Keep your arm at shoulder height.  ? Keep your arm as close to your body as you comfortably can.  2. Hold for __________ seconds.  3. Slowly return to the starting position.  Repeat __________ times. Complete this exercise __________ times a day.  Strengthening exercises  These exercises build strength and endurance in your shoulder. Endurance is the ability to use your muscles for a long time, even after they get tired.  Exercise B: Scapular protraction, supine    1. Lie on your back on a firm surface. If directed, hold a __________ weight in your left / right hand.  2. Raise your left / right arm straight into the air so your hand is directly above your shoulder joint.  3. Lift your shoulder off of the floor so you push the weight into the air. Do not move your head, neck, or back.  4. Hold for __________ seconds.  5. Slowly return to the starting position. Let your muscles relax completely before you repeat this exercise.  Repeat __________ times. Complete this exercise __________ times a day.  Exercise C: Scapular retraction    1. Sit in a stable chair without armrests,  "or stand.  2. Secure an exercise band to a stable object in front of you so the band is at shoulder height.  3. Hold one end of the exercise band in each hand.  4. Squeeze your shoulder blades together and move your elbows slightly behind you. Do not shrug your shoulders.  5. Hold for __________ seconds.  6. Slowly return to the starting position.  Repeat __________ times. Complete this exercise __________ times a day.  Exercise D: Shoulder external rotation  1. Lie down on your left / right side.  2. Bend your left / right elbow to an \"L\" shape (90 degrees). Place a small pillow or a rolled-up towel under your left / right upper arm.  3. With your elbow bent to 90 degrees, place your left / right hand on your abdomen.  4. Squeeze your shoulder blade back toward your spine.  5. Keeping your upper arm against the pillow or towel, move (pivot) your forearm and hand away from your abdomen and toward the ceiling. Keep your elbow bent to 90 degrees.  6. Hold for __________ seconds.  7. Slowly return to the starting position.  Repeat __________ times. Complete this exercise __________ times a day.  Exercise E: Shoulder extension, prone    1. Lie on your abdomen on a firm surface so your left / right arm hangs over the edge.  2. Hold a __________ weight in your hand so your palm faces in toward your body. Your arm should be straight.  3. Squeeze your shoulder blade down toward the middle of your back.  4. Slowly raise your arm behind you, up to the height of the surface that you are lying on. Keep your arm straight.  5. Hold for __________ seconds.  6. Slowly return to the starting position and relax your muscles.  Repeat __________ times. Complete this exercise __________ times a day.  This information is not intended to replace advice given to you by your health care provider. Make sure you discuss any questions you have with your health care provider.  Document Released: 12/18/2006 Document Revised: 08/24/2017 Document " Reviewed: 11/19/2016  [employer] Interactive Patient Education © 2018 [employer]

## 2018-08-01 NOTE — PROGRESS NOTES
Patient has over 15 migraines a month over 4 hours duration interfering with the patient daily activities despite conservative medical treatment for acute and preventive measures.  Patient is very pleased with current Botox.  She states it does reduce her migraine frequency and severity.  Patient continues with trigger point injections and medications.  Patient is having approximately 7 migraines a month they do continue to interfere with her work patient is part time and not wanting to donate her time.  The risk and benefit of the Botox treatment has been discussed with patient.  Safety information and contraindication of Botox has been reviewed with patient.  The most frequent Adverse reactions of Botox for migraine include but not limited to neck pain 9%, headache 5%, Ptosis 4%, muscle weakness 4%, injection site pain 3%, muscle spasms 2% have been gone over with our patient.  200 unit vial Botox was re-constituted with 4 mL 0.9 NS to 5 unit/0.1 mL using standard techniques.  10 units were given at the  muscle in 2 divided sites  5 units were given at the Procerus muscle  20 units were given at the Frontalis muscle in 4 divided sites  40 units were given at the Temporalis muscle in 8 divided sites  30 units were given at the Occipitalis muscle in 6 divided sites  20 units were given at the cervical paraspinal muscle in 4 divided sites  30 units were given at the Trapezius muscle in 6 divided sites  For a total of 155 units divided between 31 sites and 45 units of wastage  Patient tolerated procedure well without complication.

## 2018-08-10 ENCOUNTER — OFFICE VISIT (OUTPATIENT)
Dept: FAMILY MEDICINE CLINIC | Facility: CLINIC | Age: 53
End: 2018-08-10

## 2018-08-10 VITALS
WEIGHT: 167 LBS | HEART RATE: 88 BPM | BODY MASS INDEX: 28.51 KG/M2 | DIASTOLIC BLOOD PRESSURE: 80 MMHG | SYSTOLIC BLOOD PRESSURE: 122 MMHG | TEMPERATURE: 97.8 F | OXYGEN SATURATION: 97 % | RESPIRATION RATE: 12 BRPM | HEIGHT: 64 IN

## 2018-08-10 DIAGNOSIS — S43.431D LABRAL TEAR OF SHOULDER, RIGHT, SUBSEQUENT ENCOUNTER: ICD-10-CM

## 2018-08-10 DIAGNOSIS — F43.10 PTSD (POST-TRAUMATIC STRESS DISORDER): ICD-10-CM

## 2018-08-10 DIAGNOSIS — M47.812 CERVICAL ARTHRITIS: Primary | ICD-10-CM

## 2018-08-10 PROCEDURE — 99213 OFFICE O/P EST LOW 20 MIN: CPT | Performed by: FAMILY MEDICINE

## 2018-08-10 RX ORDER — DIAZEPAM 2 MG/1
2 TABLET ORAL EVERY 8 HOURS PRN
Qty: 30 TABLET | Refills: 0 | Status: SHIPPED | OUTPATIENT
Start: 2018-08-10 | End: 2018-09-21 | Stop reason: SDUPTHER

## 2018-08-10 NOTE — PROGRESS NOTES
Established Patient        Chief Complaint:   Chief Complaint   Patient presents with   • Follow-up     2 week follow up on ear        Tatiana L Donnie is a 52 y.o. female    History of Present Illness:     Patient is here for scheduled follow-up visit concerning her right shoulder injury.  She has was clinically been defined as a probable glenohumeral labral tear.  Focused physical therapy since last visit is provided significant improvement to generalized symptoms.  She has a scheduled evaluation by orthopedic surgery [facility].  Periodic panic attacks.  When necessary use of low-dose diazepam as improved spasticity noted to her right cervical and trapezius area, as well as provided symptomatic improvement of her panic attacks.  She continues to use cognitive behavioral therapy in addition to prayer, meditation and breathing exercises.    Subjective     The following portions of the patient's history were reviewed and updated as appropriate: allergies, current medications, past family history, past medical history, past social history, past surgical history and problem list.    Allergies   Allergen Reactions   • Trazodone And Nefazodone Mental Status Change   • Sulfa Antibiotics Rash       Review of Systems  1. Constitutional: Negative for fever. Negative for chills, diaphoresis, fatigue and unexpected weight change.   2. HENT: No dysphagia.  Decreased hearing bilaterally, no draining.  Otherwise as per history of present illness.  3. Eyes: Negative for redness and visual disturbance.   4. Respiratory: negative for shortness of breath. Negative for chest pain . Negative for cough and chest tightness.   5. Cardiovascular: Negative for chest pain and palpitations.   6. Gastrointestinal: Negative for abdominal distention, abdominal pain and blood in stool.   7. Endocrine: Negative for cold intolerance and heat intolerance.   8. Genitourinary: Negative for difficulty urinating, dysuria and frequency.  "  9. Musculoskeletal: Right upper extremity pain/arthralgia/myalgia at shoulder.   10. Skin: Negative for color change, rash and wound.   11. Neurological: Negative for syncope, weakness and headaches.   12. Hematological: Negative for adenopathy. Does not bruise/bleed easily.   13. Psychiatric/Behavioral: Negative for confusion. The patient is not nervous/anxious at present, but as per history of present illness above has had periodic panic attacks.    Objective     Physical Exam   Vital Signs: /80   Pulse 88   Temp 97.8 °F (36.6 °C)   Resp 12   Ht 161.3 cm (63.5\")   Wt 75.8 kg (167 lb)   LMP  (LMP Unknown)   SpO2 97%   BMI 29.12 kg/m²     General Appearance: alert, oriented x 3, no acute distress.  Skin: warm and dry.   HEENT: Atraumatic.  pupils round and reactive to light and accommodation, oral mucosa pink and moist.  Nares patent without epistaxis.  External auditory canals are patent tympanic membranes intact.  Neck: supple, no JVD, trachea midline.  No thyromegaly  Lungs: CTA, unlabored breathing effort.  Heart: RRR, normal S1 and S2, no S3, no rub.  Abdomen: soft, non-tender, no palpable bladder, present bowel sounds to auscultation ×4.  No guarding or rigidity.  Extremities: Neurovascular exam of right upper extremity is normal.  Hawkin's test positive, pain on palpation of the right acromioclavicular joint.  Neer's test weakly positive with pain, cross body abduction worsens pain additionally.  Beaumont's test is grossly positive.  Apprehension sign positive.  Pushoff negative.  Normal  strength bilaterally.  Preserved supination and pronation of forearm.  Normal extension and flexion of the elbow and wrist/fingers.  Symmetric muscle strength and development  Neuro: normal speech and mental status.  Cranial nerves II through XII intact.  No anosmia. DTR 2+; proprioception intact.  No focal motor/sensory deficits.    Assessment and Plan      Assessment:   Tatiana was seen today for " follow-up.    Diagnoses and all orders for this visit:    Cervical arthritis (CMS/HCC)  -     diazePAM (VALIUM) 2 MG tablet; Take 1 tablet by mouth Every 8 (Eight) Hours As Needed for Anxiety or Muscle Spasms.    Labral tear of shoulder, right, subsequent encounter    PTSD (post-traumatic stress disorder)  -     diazePAM (VALIUM) 2 MG tablet; Take 1 tablet by mouth Every 8 (Eight) Hours As Needed for Anxiety or Muscle Spasms.        Plan:  Refilled diazepam to aid in muscle relaxant properties, while also aiding in control of periodic panic disorder symptoms.    Keep sched appt with [facility] ortho; cont PT.    F/u planned with Dr. Mills.  Discussion Summary:    Discussed plan of care in detail with pt today; pt verb understanding and agrees; counseled for approx 15 min of total 25 min exam time.  Follow up:  Return if symptoms worsen or fail to improve.     There are no Patient Instructions on file for this visit.    Bert Hernández,   08/20/18  12:53 PM    Please note that portions of this note may have been completed with a voice recognition program. Efforts were made to edit the dictations, but occasionally words are mistranscribed.

## 2018-08-14 RX ORDER — LINACLOTIDE 290 UG/1
CAPSULE, GELATIN COATED ORAL
Qty: 30 CAPSULE | Refills: 4 | Status: SHIPPED | OUTPATIENT
Start: 2018-08-14 | End: 2018-09-15 | Stop reason: SDUPTHER

## 2018-08-16 RX ORDER — BROMPHENIRAMINE MALEATE AND PSEUDOEPHEDRINE HYDROCHLORIDE 4; 60 MG/1; MG/1
1 CAPSULE ORAL 2 TIMES DAILY PRN
Qty: 56 CAPSULE | Refills: 0 | Status: SHIPPED | OUTPATIENT
Start: 2018-08-16 | End: 2018-09-11 | Stop reason: SDUPTHER

## 2018-08-20 RX ORDER — ZOLPIDEM TARTRATE 6.25 MG/1
6.25 TABLET, FILM COATED, EXTENDED RELEASE ORAL NIGHTLY PRN
Qty: 30 TABLET | Refills: 0 | Status: SHIPPED | OUTPATIENT
Start: 2018-08-20 | End: 2018-09-24 | Stop reason: SDUPTHER

## 2018-08-22 RX ORDER — POLYETHYLENE GLYCOL 3350 17 G/17G
POWDER, FOR SOLUTION ORAL
Qty: 527 G | Refills: 11 | Status: SHIPPED | OUTPATIENT
Start: 2018-08-22 | End: 2019-02-26 | Stop reason: SDUPTHER

## 2018-08-23 DIAGNOSIS — M25.511 RIGHT SHOULDER PAIN, UNSPECIFIED CHRONICITY: Primary | ICD-10-CM

## 2018-08-24 ENCOUNTER — OFFICE VISIT (OUTPATIENT)
Dept: ORTHOPEDIC SURGERY | Facility: CLINIC | Age: 53
End: 2018-08-24

## 2018-08-24 DIAGNOSIS — S43.431A LABRAL TEAR OF SHOULDER, RIGHT, INITIAL ENCOUNTER: Primary | ICD-10-CM

## 2018-08-24 DIAGNOSIS — M19.019 AC JOINT ARTHROPATHY: ICD-10-CM

## 2018-08-24 DIAGNOSIS — M25.511 ARTHRALGIA OF SHOULDER REGION, RIGHT: ICD-10-CM

## 2018-08-24 PROCEDURE — 20610 DRAIN/INJ JOINT/BURSA W/O US: CPT | Performed by: PHYSICIAN ASSISTANT

## 2018-08-24 PROCEDURE — 99203 OFFICE O/P NEW LOW 30 MIN: CPT | Performed by: PHYSICIAN ASSISTANT

## 2018-08-24 RX ORDER — LIDOCAINE HYDROCHLORIDE 10 MG/ML
2 INJECTION, SOLUTION INFILTRATION; PERINEURAL
Status: COMPLETED | OUTPATIENT
Start: 2018-08-24 | End: 2018-08-24

## 2018-08-24 RX ORDER — METHYLPREDNISOLONE ACETATE 40 MG/ML
40 INJECTION, SUSPENSION INTRA-ARTICULAR; INTRALESIONAL; INTRAMUSCULAR; SOFT TISSUE
Status: COMPLETED | OUTPATIENT
Start: 2018-08-24 | End: 2018-08-24

## 2018-08-24 RX ADMIN — METHYLPREDNISOLONE ACETATE 40 MG: 40 INJECTION, SUSPENSION INTRA-ARTICULAR; INTRALESIONAL; INTRAMUSCULAR; SOFT TISSUE at 14:46

## 2018-08-24 RX ADMIN — LIDOCAINE HYDROCHLORIDE 2 ML: 10 INJECTION, SOLUTION INFILTRATION; PERINEURAL at 14:46

## 2018-08-24 NOTE — PROGRESS NOTES
Subjective   Patient ID: Tatiana Fields is a 52 y.o.  female  Pain of the Right Shoulder (Patient states pain started 2 months ago, denies injury. She had cervical surgery 3-28-18. Patient seen Dr Mills and had MRI of left shoulder 6-26-18. Right hand dominant.)         History of Present Illness  Patient presents with complaints of right shoulder pain that has been ongoing for the last several months.  She denies any recent injury or trauma.  She states certain movements intensifies the pain.  She has tried over-the-counter pain medicine as well as formal physical therapy for 2 months with no significant improvement.    Pain Score: 8  Pain Location: Shoulder  Pain Orientation: Right     Pain Descriptors: Aching  Pain Frequency: Intermittent                       Result of Injury: No  Work-Related Injury: No    Past Medical History:   Diagnosis Date   • Anemia    • Arthritis    • Cervical spinal stenosis    • Chronic fatigue    • DDD (degenerative disc disease), lumbar    • Depression    • Endometriosis    • Headache    • Hyperlipidemia    • Hypertension    • Irritable bowel syndrome    • Lyme disease 2012    tx'd with prolonged course abx   • Narcolepsy     listed in medical records, tx'd with provigil   • Ovarian cyst    • Shingles 2/15/2013   • Sicca syndrome (CMS/HCC)    • Vitamin D deficiency         Past Surgical History:   Procedure Laterality Date   • BILATERAL SALPINGO OOPHORECTOMY     • BLADDER REPAIR  2013    bladder tack   • CERVICAL SPINE ANTERIOR  03/2018    Dr. Mendiola, C3-C4   • COLECTOMY PARTIAL / TOTAL  2012    listed in medical record; done at time of tx for endometriosis   • COLONOSCOPY  09/29/2015    normal   • CYSTOCELE REPAIR     • ENDOMETRIAL ABLATION  2012   • ESOPHAGOSCOPY / EGD  09/29/2015   • HYSTERECTOMY  2013    for endometriosis   • SINUS SURGERY  2000       Family History   Problem Relation Age of Onset   • Inflammatory bowel disease Mother    • Hypertension Mother    • Heart disease  "Mother    • Arthritis Mother    • Hyperlipidemia Mother    • Diabetes Mother    • Hypertension Father    • Heart attack Father    • Hyperlipidemia Father    • Cancer Sister    • Bone cancer Sister    • Colon cancer Other    • Hyperlipidemia Brother    • Colon cancer Brother    • Diabetes Maternal Grandmother    • Diabetes Paternal Grandmother        Social History     Social History   • Marital status:      Spouse name: N/A   • Number of children: N/A   • Years of education: N/A     Occupational History   • Not on file.     Social History Main Topics   • Smoking status: Never Smoker   • Smokeless tobacco: Never Used   • Alcohol use No   • Drug use: No   • Sexual activity: Defer     Other Topics Concern   • Not on file     Social History Narrative   • No narrative on file         Current Outpatient Prescriptions:   •  B-D 3CC LUER-DORA SYR 25GX1\" 25G X 1\" 3 ML misc, USE WITH B-12 INJECTIONS, Disp: , Rfl: 5  •  Cholecalciferol (VITAMIN D) 2000 units tablet, Take 2,000 Units by mouth 2 (Two) Times a Day., Disp: , Rfl:   •  cyanocobalamin 1000 MCG/ML injection, INJECT 1 ML INTO THE SHOULDER, THIGH, OR BUTTOCKS EVERY 14 (FOURTEEN) DAYS., Disp: 2 mL, Rfl: 5  •  diazePAM (VALIUM) 2 MG tablet, Take 1 tablet by mouth Every 8 (Eight) Hours As Needed for Anxiety or Muscle Spasms., Disp: 30 tablet, Rfl: 0  •  ENDOCET  MG per tablet, take 1 tablet by mouth every 6 hours if needed for pain, Disp: , Rfl: 0  •  estradiol (MINIVELLE, VIVELLE-DOT) 0.1 MG/24HR patch, APPLY ONE PATCH TO SKIN TWICE WEEKLY, Disp: 25 patch, Rfl: 0  •  famciclovir (FAMVIR) 500 MG tablet, TAKE 1 TABLET EVERY DAY, Disp: 30 tablet, Rfl: 5  •  FLUoxetine (PROzac) 40 MG capsule, Take 1 capsule by mouth Daily., Disp: , Rfl: 0  •  gabapentin (NEURONTIN) 300 MG capsule, , Disp: , Rfl:   •  hydrochlorothiazide (HYDRODIURIL) 25 MG tablet, TAKE 1 TABLET BY MOUTH DAILY AS NEEDED FOR SLEEP, Disp: 90 tablet, Rfl: 2  •  LINZESS 290 MCG capsule capsule, TAKE " ONE CAPSULE DAILY, Disp: 30 capsule, Rfl: 4  •  lisinopril (PRINIVIL,ZESTRIL) 10 MG tablet, TAKE ONE TABLET TWICE DAILY, Disp: , Rfl: 6  •  LODRANE D 4-60 MG capsule, Take 1 capsule by mouth 2 (Two) Times a Day As Needed (congestion, drainage)., Disp: 56 capsule, Rfl: 0  •  loratadine (CLARITIN) 10 MG tablet, Take  by mouth Daily., Disp: , Rfl: 2  •  naproxen (NAPROSYN) 500 MG tablet, TK 1 T PO Q 12 H, Disp: , Rfl: 1  •  omeprazole (priLOSEC) 40 MG capsule, , Disp: , Rfl:   •  OnabotulinumtoxinA 200 units reconstituted solution, INJECT 155 UNITS IM TO HEAD AND NECK AREA, Disp: 1 each, Rfl: 3  •  ondansetron (ZOFRAN) 4 MG tablet, Take 1 tablet by mouth Every 8 (Eight) Hours As Needed for Nausea or Vomiting., Disp: 30 tablet, Rfl: 2  •  polyethylene glycol (MIRALAX) powder, TAKE ONE CAPFUL (17GM) DAILY, Disp: 527 g, Rfl: 11  •  pramipexole (MIRAPEX) 0.5 MG tablet, , Disp: , Rfl:   •  Probiotic Product (ALIGN) capsule, 1 po bid, Disp: 60 capsule, Rfl: 5  •  QNASL CHILDRENS 40 MCG/ACT aerosol solution, INT 2 SPRAYS IEN QD, Disp: , Rfl: 0  •  SENNA-PLUS 8.6-50 MG per tablet, Take 1 tablet by mouth 2 (Two) Times a Day., Disp: 60 tablet, Rfl: 0  •  SUMAtriptan (IMITREX) 100 MG tablet, Take 1 tablet by mouth Every 2 (Two) Hours As Needed for Migraine., Disp: 9 tablet, Rfl: 1  •  zolpidem CR (AMBIEN CR) 6.25 MG CR tablet, TAKE 1 TABLET BY MOUTH AT NIGHT AS NEEDED FOR SLEEP, Disp: 30 tablet, Rfl: 0    Current Facility-Administered Medications:   •  bupivacaine (MARCAINE) 0.5 % injection 5 mL, 5 mL, Injection, Once, Aarti Mallory, APRN  •  methylPREDNISolone acetate (DEPO-medrol) injection 200 mg, 200 mg, Intramuscular, Once, Aarti Mallory, APRN    Allergies   Allergen Reactions   • Trazodone And Nefazodone Mental Status Change   • Sulfa Antibiotics Rash       Review of Systems   Constitutional: Negative for fever.   HENT: Negative for voice change.    Eyes: Negative for visual disturbance.   Respiratory: Negative for  shortness of breath.    Cardiovascular: Negative for chest pain.   Gastrointestinal: Negative for abdominal distention and abdominal pain.   Genitourinary: Negative for dysuria.   Musculoskeletal: Positive for arthralgias. Negative for gait problem and joint swelling.   Skin: Negative for rash.   Neurological: Negative for speech difficulty.   Hematological: Does not bruise/bleed easily.   Psychiatric/Behavioral: Negative for confusion.   All other systems reviewed and are negative.      Objective   LMP  (LMP Unknown)    Physical Exam   Constitutional: She is oriented to person, place, and time. She appears well-nourished.   Pulmonary/Chest: No respiratory distress.   Musculoskeletal:        Right shoulder: She exhibits tenderness (lateral aspect of shoulder, deltoid), crepitus and pain. She exhibits no effusion, no deformity, normal pulse and normal strength.        Right hand: She exhibits normal range of motion, normal capillary refill and no swelling. Normal sensation noted. Normal strength noted.   Neurological: She is alert and oriented to person, place, and time.   Skin: Capillary refill takes less than 2 seconds.   Psychiatric: She has a normal mood and affect. Her behavior is normal.   Vitals reviewed.    Right Shoulder Exam     Range of Motion   Right shoulder active abduction: 125.   Forward Flexion: 130   Internal Rotation 0 degrees: Sacrum     Muscle Strength   The patient has normal right shoulder strength.    Tests   Cross Arm: positive  Drop Arm: negative  Impingement: positive    Other   Erythema: absent  Sensation: normal  Pulse: present             Neurologic Exam     Mental Status   Oriented to person, place, and time.      Right Shoulder Exam     Muscle Strength   Normal right shoulder strength         + [place]'s test to RUE    Neg. Adson's test to RUE  Assessment/Plan   Independent Review of Radiographic Studies:    Shows no acute fracture or dislocation.  I did review an MRI of the right  shoulder with the patient in the office.  There is a superior labral tear at the biceps tendon anchor there is also mild acromioclavicular joint space arthropathy.    Large Joint Arthrocentesis  Date/Time: 8/24/2018 2:46 PM  Consent given by: patient  Site marked: site marked  Timeout: Immediately prior to procedure a time out was called to verify the correct patient, procedure, equipment, support staff and site/side marked as required   Supporting Documentation  Indications: pain   Procedure Details  Location: shoulder - R subacromial bursa  Preparation: Patient was prepped and draped in the usual sterile fashion  Needle size: 22 G  Approach: posterior  Medications administered: 40 mg methylPREDNISolone acetate 40 MG/ML; 2 mL lidocaine 1 %  Patient tolerance: patient tolerated the procedure well with no immediate complications             Tatiana was seen today for pain.    Diagnoses and all orders for this visit:    Labral tear of shoulder, right, initial encounter  -     Large Joint Arthrocentesis    AC joint arthropathy  -     Large Joint Arthrocentesis    Arthralgia of shoulder region, right  -     Large Joint Arthrocentesis       Orthopedic activities reviewed and patient expressed appreciation  Discussion of orthopedic goals  Risk, benefits, and merits of treatment alternatives reviewed with the patient and questions answered  Take prescribed medications as instructed only as tolerated  Weight bearing parameters reviewed  Avoid offending activity  Ice, heat, and/or modalities as beneficial    Recommendations/Plan:  Exercise, medications, injections, other patient advice, and return appointment as noted.  Patient is encouraged to call or return for any issues or concerns.    FU in 4 weeks - if no significant improvement from the injection may consider right shoulder diagnostic arthroscopy.  Patient agreeable to call or return sooner for any concerns.

## 2018-08-27 RX ORDER — DOCUSATE SODIUM, SENNOSIDES 50; 8.6 MG/1; MG/1
1 TABLET ORAL 2 TIMES DAILY
Qty: 60 TABLET | Refills: 0 | Status: SHIPPED | OUTPATIENT
Start: 2018-08-27 | End: 2018-11-08 | Stop reason: SDUPTHER

## 2018-09-11 ENCOUNTER — TELEPHONE (OUTPATIENT)
Dept: FAMILY MEDICINE CLINIC | Facility: CLINIC | Age: 53
End: 2018-09-11

## 2018-09-11 DIAGNOSIS — N30.10 INTERSTITIAL CYSTITIS: Primary | ICD-10-CM

## 2018-09-11 PROCEDURE — 81003 URINALYSIS AUTO W/O SCOPE: CPT | Performed by: FAMILY MEDICINE

## 2018-09-11 RX ORDER — BROMPHENIRAMINE MALEATE AND PSEUDOEPHEDRINE HYDROCHLORIDE 4; 60 MG/1; MG/1
CAPSULE ORAL
Qty: 56 CAPSULE | Refills: 0 | Status: SHIPPED | OUTPATIENT
Start: 2018-09-11 | End: 2018-09-21 | Stop reason: SDUPTHER

## 2018-09-17 LAB
BILIRUB BLD-MCNC: NEGATIVE MG/DL
CLARITY, POC: CLEAR
COLOR UR: YELLOW
GLUCOSE UR STRIP-MCNC: NEGATIVE MG/DL
KETONES UR QL: NEGATIVE
LEUKOCYTE EST, POC: NEGATIVE
NITRITE UR-MCNC: NEGATIVE MG/ML
PH UR: 7 [PH] (ref 5–8)
PROT UR STRIP-MCNC: NEGATIVE MG/DL
RBC # UR STRIP: NEGATIVE /UL
SP GR UR: 1 (ref 1–1.03)
UROBILINOGEN UR QL: NORMAL

## 2018-09-17 RX ORDER — LINACLOTIDE 290 UG/1
CAPSULE, GELATIN COATED ORAL
Qty: 30 CAPSULE | Refills: 4 | Status: SHIPPED | OUTPATIENT
Start: 2018-09-17 | End: 2019-02-26 | Stop reason: SDUPTHER

## 2018-09-20 ENCOUNTER — OFFICE VISIT (OUTPATIENT)
Dept: ORTHOPEDIC SURGERY | Facility: CLINIC | Age: 53
End: 2018-09-20

## 2018-09-20 VITALS — HEIGHT: 64 IN | WEIGHT: 163 LBS | BODY MASS INDEX: 27.83 KG/M2 | RESPIRATION RATE: 18 BRPM

## 2018-09-20 DIAGNOSIS — S43.431A LABRAL TEAR OF SHOULDER, RIGHT, INITIAL ENCOUNTER: ICD-10-CM

## 2018-09-20 DIAGNOSIS — M54.12 CERVICAL RADICULOPATHY AT C6: Primary | ICD-10-CM

## 2018-09-20 DIAGNOSIS — R25.2 HAND CRAMPS: ICD-10-CM

## 2018-09-20 DIAGNOSIS — M54.12 CERVICAL RADICULAR PAIN: ICD-10-CM

## 2018-09-20 DIAGNOSIS — M19.019 AC JOINT ARTHROPATHY: ICD-10-CM

## 2018-09-20 DIAGNOSIS — M25.511 ARTHRALGIA OF SHOULDER REGION, RIGHT: ICD-10-CM

## 2018-09-20 PROCEDURE — 99213 OFFICE O/P EST LOW 20 MIN: CPT | Performed by: PHYSICIAN ASSISTANT

## 2018-09-20 NOTE — PROGRESS NOTES
"Subjective   Patient ID: Tatiana Fields is a 52 y.o. right hand dominant female  Follow-up of the Right Shoulder         History of Present Illness  Patient is following up at his scheduled appointment in regards to right shoulder pain.  She's been having right shoulder pain for the last 3 months without injury or trauma.  She did have an MRI of the right shoulder recently that revealed probable superior labral tear with acromioclavicular joint arthropathy.  She was given a cortisone injection in our office at her last visit which she states helped for several weeks.  She also reports right hand the last 3 digits will \" draw up involuntarily\" and has done this for the last several years.  Patient did have a cervical fusion C3-C4 with Dr. Dao Mendiola in March 2018.  She states she was told she did have \"degenerative disc disease in the lower part of her cervical spine\"    Pain Score: 6  Pain Location: Shoulder  Pain Orientation: Right     Pain Descriptors: Aching  Pain Frequency: Constant/continuous           Aggravating Factors: Straightening        Pain Intervention(s): Rest          Past Medical History:   Diagnosis Date   • Anemia    • Arthritis    • Cervical spinal stenosis    • Chronic fatigue    • DDD (degenerative disc disease), lumbar    • Depression    • Endometriosis    • Headache    • Hyperlipidemia    • Hypertension    • Irritable bowel syndrome    • Lyme disease 2012    tx'd with prolonged course abx   • Narcolepsy     listed in medical records, tx'd with provigil   • Ovarian cyst    • Shingles 2/15/2013   • Sicca syndrome (CMS/HCC)    • Vitamin D deficiency         Past Surgical History:   Procedure Laterality Date   • BILATERAL SALPINGO OOPHORECTOMY     • BLADDER REPAIR  2013    bladder tack   • CERVICAL SPINE ANTERIOR  03/2018    Dr. Mendiola, C3-C4   • COLECTOMY PARTIAL / TOTAL  2012    listed in medical record; done at time of tx for endometriosis   • COLONOSCOPY  09/29/2015    normal   • CYSTOCELE " "REPAIR     • ENDOMETRIAL ABLATION  2012   • ESOPHAGOSCOPY / EGD  09/29/2015   • HYSTERECTOMY  2013    for endometriosis   • SINUS SURGERY  2000       Family History   Problem Relation Age of Onset   • Inflammatory bowel disease Mother    • Hypertension Mother    • Heart disease Mother    • Arthritis Mother    • Hyperlipidemia Mother    • Diabetes Mother    • Hypertension Father    • Heart attack Father    • Hyperlipidemia Father    • Cancer Sister    • Bone cancer Sister    • Colon cancer Other    • Hyperlipidemia Brother    • Colon cancer Brother    • Diabetes Maternal Grandmother    • Diabetes Paternal Grandmother        Social History     Social History   • Marital status:      Spouse name: N/A   • Number of children: N/A   • Years of education: N/A     Occupational History   • Not on file.     Social History Main Topics   • Smoking status: Never Smoker   • Smokeless tobacco: Never Used   • Alcohol use No   • Drug use: No   • Sexual activity: Defer     Other Topics Concern   • Not on file     Social History Narrative   • No narrative on file         Current Outpatient Prescriptions:   •  B-D 3CC LUER-DORA SYR 25GX1\" 25G X 1\" 3 ML misc, USE WITH B-12 INJECTIONS, Disp: , Rfl: 5  •  Cholecalciferol (VITAMIN D) 2000 units tablet, Take 2,000 Units by mouth 2 (Two) Times a Day., Disp: , Rfl:   •  cyanocobalamin 1000 MCG/ML injection, INJECT 1 ML INTO THE SHOULDER, THIGH, OR BUTTOCKS EVERY 14 (FOURTEEN) DAYS., Disp: 2 mL, Rfl: 5  •  diazePAM (VALIUM) 2 MG tablet, Take 1 tablet by mouth Every 8 (Eight) Hours As Needed for Anxiety or Muscle Spasms., Disp: 30 tablet, Rfl: 0  •  ENDOCET  MG per tablet, take 1 tablet by mouth every 6 hours if needed for pain, Disp: , Rfl: 0  •  estradiol (CLIMARA) 0.1 MG/24HR patch, TEST, Disp: 12 patch, Rfl: 0  •  estradiol (CLIMARA) 0.1 MG/24HR patch, TEST, Disp: 12 patch, Rfl: 0  •  estradiol (MINIVELLE, VIVELLE-DOT) 0.1 MG/24HR patch, APPLY ONE PATCH TO SKIN TWICE WEEKLY, " Disp: 25 patch, Rfl: 0  •  famciclovir (FAMVIR) 500 MG tablet, TAKE 1 TABLET EVERY DAY, Disp: 30 tablet, Rfl: 5  •  FLUoxetine (PROzac) 40 MG capsule, Take 1 capsule by mouth Daily., Disp: , Rfl: 0  •  gabapentin (NEURONTIN) 300 MG capsule, , Disp: , Rfl:   •  hydrochlorothiazide (HYDRODIURIL) 25 MG tablet, TAKE 1 TABLET BY MOUTH DAILY AS NEEDED FOR SLEEP, Disp: 90 tablet, Rfl: 2  •  LINZESS 290 MCG capsule capsule, TAKE ONE CAPSULE DAILY, Disp: 30 capsule, Rfl: 4  •  lisinopril (PRINIVIL,ZESTRIL) 10 MG tablet, TAKE ONE TABLET TWICE DAILY, Disp: , Rfl: 6  •  LODRANE D 4-60 MG capsule, TAKE 1 CAPSULE BY MOUTH TWICE DAILY AS NEEDED, Disp: 56 capsule, Rfl: 0  •  loratadine (CLARITIN) 10 MG tablet, Take  by mouth Daily., Disp: , Rfl: 2  •  naproxen (NAPROSYN) 500 MG tablet, TK 1 T PO Q 12 H, Disp: , Rfl: 1  •  omeprazole (priLOSEC) 40 MG capsule, , Disp: , Rfl:   •  OnabotulinumtoxinA 200 units reconstituted solution, INJECT 155 UNITS IM TO HEAD AND NECK AREA, Disp: 1 each, Rfl: 3  •  ondansetron (ZOFRAN) 4 MG tablet, Take 1 tablet by mouth Every 8 (Eight) Hours As Needed for Nausea or Vomiting., Disp: 30 tablet, Rfl: 2  •  polyethylene glycol (MIRALAX) powder, TAKE ONE CAPFUL (17GM) DAILY, Disp: 527 g, Rfl: 11  •  pramipexole (MIRAPEX) 0.5 MG tablet, , Disp: , Rfl:   •  Probiotic Product (ALIGN) capsule, 1 po bid, Disp: 60 capsule, Rfl: 5  •  QNASL CHILDRENS 40 MCG/ACT aerosol solution, INT 2 SPRAYS IEN QD, Disp: , Rfl: 0  •  SENNA-PLUS 8.6-50 MG per tablet, Take 1 tablet by mouth 2 (Two) Times a Day., Disp: 60 tablet, Rfl: 0  •  SUMAtriptan (IMITREX) 100 MG tablet, Take 1 tablet by mouth Every 2 (Two) Hours As Needed for Migraine., Disp: 9 tablet, Rfl: 1  •  zolpidem CR (AMBIEN CR) 6.25 MG CR tablet, TAKE 1 TABLET BY MOUTH AT NIGHT AS NEEDED FOR SLEEP, Disp: 30 tablet, Rfl: 0    Current Facility-Administered Medications:   •  bupivacaine (MARCAINE) 0.5 % injection 5 mL, 5 mL, Injection, Once, Aarti Mallory, APRN  •   "methylPREDNISolone acetate (DEPO-medrol) injection 200 mg, 200 mg, Intramuscular, Once, aArti Mallory APRN    Allergies   Allergen Reactions   • Trazodone And Nefazodone Mental Status Change   • Sulfa Antibiotics Rash       Review of Systems   Constitutional: Negative for fever.   HENT: Negative for voice change.    Eyes: Negative for visual disturbance.   Respiratory: Negative for shortness of breath.    Cardiovascular: Negative for chest pain.   Gastrointestinal: Negative for abdominal distention and abdominal pain.   Genitourinary: Negative for dysuria.   Musculoskeletal: Positive for arthralgias. Negative for gait problem and joint swelling.   Skin: Negative for rash.   Neurological: Negative for speech difficulty.   Hematological: Does not bruise/bleed easily.   Psychiatric/Behavioral: Negative for confusion.   All other systems reviewed and are negative.      Objective   Resp 18   Ht 161.3 cm (63.5\")   Wt 73.9 kg (163 lb)   LMP  (LMP Unknown)   BMI 28.42 kg/m²    Physical Exam   Constitutional: She is oriented to person, place, and time. She appears well-nourished.   Eyes: Conjunctivae are normal.   Neck: No tracheal deviation present.   Pulmonary/Chest: No respiratory distress.   Musculoskeletal:        Right shoulder: She exhibits tenderness (AC joint). She exhibits no swelling, no effusion, no crepitus and no deformity.        Right wrist: She exhibits normal range of motion, no tenderness and no bony tenderness.        Cervical back: She exhibits tenderness and bony tenderness (C6, C7). She exhibits no deformity and no laceration.        Right hand: She exhibits normal capillary refill and no swelling. Normal sensation noted. Decreased sensation is not present in the ulnar distribution, is not present in the medial distribution and is not present in the radial distribution. Normal strength noted.   Neurological: She is alert and oriented to person, place, and time.   Skin: Capillary refill takes " less than 2 seconds.   Psychiatric: She has a normal mood and affect.   Vitals reviewed.    Right Elbow Exam     Tests Tinel's Sign (cubital tunnel): negative      Right Shoulder Exam     Range of Motion   Active Abduction: 120   Forward Flexion: 130   Internal Rotation 0 degrees: Sacrum     Muscle Strength   Abduction: 4/5     Tests   Apprehension: negative  Cross Arm: positive  Drop Arm: negative  Hawkin's test: negative  Impingement: positive  Sulcus: absent    Other   Sensation: normal  Pulse: present             Neurologic Exam     Mental Status   Oriented to person, place, and time.      Ortho Exam         Assessment/Plan   Independent Review of Radiographic Studies:    Cervical spine xray in office reveals multilevel DDD    Right shoulder MRI did reveal labral tear with AC joint arthropathy and type II acromion.     There is NO trigger finger effect to digits of left hand        Procedures       Tatiana was seen today for follow-up.    Diagnoses and all orders for this visit:    Cervical radiculopathy at C6  -     MRI Cervical Spine Without Contrast  -     XR Spine Cervical 2 or 3 View    Cervical radicular pain  -     MRI Cervical Spine Without Contrast  -     EMG & Nerve Conduction Test  -     XR Spine Cervical 2 or 3 View    Hand cramps  -     EMG & Nerve Conduction Test    AC joint arthropathy    Labral tear of shoulder, right, initial encounter    Arthralgia of shoulder region, right       Orthopedic activities reviewed and patient expressed appreciation  Discussion of orthopedic goals  Risk, benefits, and merits of treatment alternatives reviewed with the patient and questions answered  Ice, heat, and/or modalities as beneficial    Recommendations/Plan:  Exercise, medications, injections, other patient advice, and return appointment as noted.  Patient is encouraged to call or return for any issues or concerns.    I did have a lengthy discussion with the patient about possible treatment options.  Given she  is having symptoms that could be related to C6 cervical radiculopathy I would like to further investigate the cervical spine as well as order an EMG of the right upper extremity before proceeding with right shoulder arthroscopy.  She has tried all the appropriate conservative measures to treat her right shoulder pain, if she should decide to proceed with right shoulder arthroscopy we will discuss this at her follow-up visit.  Patient agreeable to call or return sooner for any concerns.

## 2018-09-21 ENCOUNTER — TELEPHONE (OUTPATIENT)
Dept: FAMILY MEDICINE CLINIC | Facility: CLINIC | Age: 53
End: 2018-09-21

## 2018-09-21 ENCOUNTER — OFFICE VISIT (OUTPATIENT)
Dept: FAMILY MEDICINE CLINIC | Facility: CLINIC | Age: 53
End: 2018-09-21

## 2018-09-21 VITALS
HEIGHT: 64 IN | DIASTOLIC BLOOD PRESSURE: 110 MMHG | BODY MASS INDEX: 28.42 KG/M2 | OXYGEN SATURATION: 96 % | HEART RATE: 97 BPM | SYSTOLIC BLOOD PRESSURE: 140 MMHG

## 2018-09-21 DIAGNOSIS — I10 ESSENTIAL HYPERTENSION: Primary | ICD-10-CM

## 2018-09-21 DIAGNOSIS — F43.10 PTSD (POST-TRAUMATIC STRESS DISORDER): ICD-10-CM

## 2018-09-21 DIAGNOSIS — F45.0 DEPRESSION WITH SOMATIZATION: ICD-10-CM

## 2018-09-21 DIAGNOSIS — G47.33 OBSTRUCTIVE SLEEP APNEA: ICD-10-CM

## 2018-09-21 DIAGNOSIS — J01.01 ACUTE RECURRENT MAXILLARY SINUSITIS: ICD-10-CM

## 2018-09-21 DIAGNOSIS — M79.7 FIBROMYALGIA: ICD-10-CM

## 2018-09-21 DIAGNOSIS — K59.09 CHRONIC CONSTIPATION: ICD-10-CM

## 2018-09-21 DIAGNOSIS — R73.01 IFG (IMPAIRED FASTING GLUCOSE): ICD-10-CM

## 2018-09-21 DIAGNOSIS — Z51.81 MEDICATION MONITORING ENCOUNTER: ICD-10-CM

## 2018-09-21 DIAGNOSIS — N30.10 INTERSTITIAL CYSTITIS: ICD-10-CM

## 2018-09-21 DIAGNOSIS — E53.8 VITAMIN B12 DEFICIENCY: ICD-10-CM

## 2018-09-21 DIAGNOSIS — F51.04 CHRONIC INSOMNIA: ICD-10-CM

## 2018-09-21 DIAGNOSIS — F32.A DEPRESSION WITH SOMATIZATION: ICD-10-CM

## 2018-09-21 DIAGNOSIS — Z28.21 INFLUENZA VACCINE REFUSED: ICD-10-CM

## 2018-09-21 DIAGNOSIS — E55.9 VITAMIN D DEFICIENCY: ICD-10-CM

## 2018-09-21 DIAGNOSIS — M47.812 CERVICAL ARTHRITIS: ICD-10-CM

## 2018-09-21 PROCEDURE — 99215 OFFICE O/P EST HI 40 MIN: CPT | Performed by: FAMILY MEDICINE

## 2018-09-21 PROCEDURE — 96372 THER/PROPH/DIAG INJ SC/IM: CPT | Performed by: FAMILY MEDICINE

## 2018-09-21 RX ORDER — METHYLPREDNISOLONE ACETATE 80 MG/ML
80 INJECTION, SUSPENSION INTRA-ARTICULAR; INTRALESIONAL; INTRAMUSCULAR; SOFT TISSUE ONCE
Status: COMPLETED | OUTPATIENT
Start: 2018-09-21 | End: 2018-09-21

## 2018-09-21 RX ORDER — CEFTRIAXONE 1 G/1
1 INJECTION, POWDER, FOR SOLUTION INTRAMUSCULAR; INTRAVENOUS ONCE
Status: COMPLETED | OUTPATIENT
Start: 2018-09-21 | End: 2018-09-21

## 2018-09-21 RX ORDER — DIAZEPAM 2 MG/1
2 TABLET ORAL EVERY 8 HOURS PRN
Qty: 30 TABLET | Refills: 0 | Status: SHIPPED | OUTPATIENT
Start: 2018-09-21 | End: 2018-10-23 | Stop reason: SDUPTHER

## 2018-09-21 RX ORDER — LISINOPRIL 10 MG/1
10 TABLET ORAL 2 TIMES DAILY
Qty: 60 TABLET | Refills: 2 | Status: SHIPPED | OUTPATIENT
Start: 2018-09-21 | End: 2018-12-17 | Stop reason: SDUPTHER

## 2018-09-21 RX ORDER — BROMPHENIRAMINE MALEATE AND PSEUDOEPHEDRINE HYDROCHLORIDE 4; 60 MG/1; MG/1
CAPSULE ORAL
Qty: 60 CAPSULE | Refills: 5 | Status: SHIPPED | OUTPATIENT
Start: 2018-09-21 | End: 2018-10-15

## 2018-09-21 RX ORDER — ESTRADIOL 0.1 MG/D
1 FILM, EXTENDED RELEASE TRANSDERMAL 2 TIMES WEEKLY
Qty: 25 PATCH | Refills: 2 | Status: SHIPPED | OUTPATIENT
Start: 2018-09-24 | End: 2018-11-01 | Stop reason: ALTCHOICE

## 2018-09-21 RX ADMIN — CEFTRIAXONE 1 G: 1 INJECTION, POWDER, FOR SOLUTION INTRAMUSCULAR; INTRAVENOUS at 13:22

## 2018-09-21 RX ADMIN — METHYLPREDNISOLONE ACETATE 80 MG: 80 INJECTION, SUSPENSION INTRA-ARTICULAR; INTRALESIONAL; INTRAMUSCULAR; SOFT TISSUE at 13:24

## 2018-09-21 NOTE — TELEPHONE ENCOUNTER
I have sent in refill of Valium.  I'm still considering appropriate dosing of Ritalin and will let her know when I have sent in prescription.

## 2018-09-21 NOTE — TELEPHONE ENCOUNTER
CY pt. Advised re: Murray sent and Dr Mills still reviewing appropriate Ritalin dosage. Advised that someone would call her once that refill had been approved.  Voiced understanding.

## 2018-09-21 NOTE — TELEPHONE ENCOUNTER
Pt called sts that the ritalin and valium rx's that were to be refilled at her OV today were not at pharmacy. Needs sent.

## 2018-09-21 NOTE — PROGRESS NOTES
"Subjective   Tatiana Fields is a 52 y.o. female.     History of Present Illness  Mrs. Fields presents today for follow-up on several chronic issues.     She continues to c/o neck pain, right shoulder pain. Is s/p C3-C4 spinal fusion per Dr. Mendiola.  she continues to have burning pain down right arm with \"drawing up\" of fingers in right hand. She is currently on low dose Valium 2-3 times per day for muscle relaxation per Dr. Mendiola. She has also had PT, right shoulder injection, etc. Is awaiting upcoming arthroscopic surgery.      She has had consultation with rheumatology 4/2018 regarding chronic fatigue, diffuse arthralgias, stiffness. Dx's including FMSx and non-specific polyarthritis. No apparent diagnosis of other autoimmune disease. She is on chronic opioid analgesics and gabapentin per pain management.     She is on Ambien CR for her chronic insomnia.  She feels her restless leg symptoms have improved as well as overall sleep quality. Denies side effects/parasomnias.     At her visit 3 months ago, she inquired if there were additional treatment options for her severe chronic fatigue, resistant depression.  She has DANE with excessive daytime sleepiness as well. She was already on Prozac.  Had previously been on stimulant therapy for the symptoms. She was restarted on Provigil. Initially she felt her fatigue has improved although not completely resolved. She felt more \"clear headed\". She denied side effects. Unfortunately over the past several weeks, efficacy has waned. She would like to try alternative.      She has HTN, taking meds as prescribed.  Denies side effects.  She is working on increasing physical activity.  She does limit salt. BP elevated today.      She has had impaired fasting glucose on previous lab evaluation.  Is working on limiting sugar.  Denies polydipsia, polyuria.     Has vitamin D and vitamin B12 deficiencies.  Currently on oral placement of both.  Due to persistent low B12 levels has " "been encouraged to obtain IM injections monthly.     She is being followed by neurology for migraine headaches.  Has been receiving Botox.  Uses triptan as needed.     She has PTSD, chronic depression with somatization .Currently on prozac. Moods recently stable. 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 Does have emotional support animal which helps her manage symptoms of PTSD.    She has dx of interstitial cystitis from former urologist. Has not had eval in few years. C/o worsening dysuria, frequency, urgency with no evidence of UTI on u/a.     She continues to struggle with chronic severe constipation. Generally is able to have BM once weekly after use of Linzess, miralax, colace, enema and laxative. Denies blood in stool.     She c/o severe nasal congestion, pururlent drainage, facial pain, headache, no fever. Feels \"fall allergies have turned into infection\". Does not feel she can tolerate oral antibiotics at this time.    The following portions of the patient's history were reviewed and updated as appropriate: allergies, current medications, past family history, past medical history, past social history, past surgical history and problem list.    Review of Systems   Constitutional: Positive for fatigue. Negative for appetite change and fever.   HENT: Positive for congestion, postnasal drip and sinus pressure. Negative for ear pain, hearing loss, nosebleeds, rhinorrhea, sneezing, sore throat, tinnitus and trouble swallowing.    Eyes: Negative for pain, discharge, redness and visual disturbance.        Dry eyes   Respiratory: Negative for cough, chest tightness, shortness of breath and wheezing.    Cardiovascular: Positive for palpitations. Negative for chest pain and leg swelling.   Gastrointestinal: Positive for abdominal pain (chronic), constipation and nausea. Negative for blood in stool, diarrhea and vomiting.   Endocrine: Positive for heat intolerance. Negative for cold intolerance, polydipsia " and polyuria.   Genitourinary: Positive for dysuria, frequency, pelvic pain (chronic) and urgency. Negative for flank pain, hematuria and vaginal discharge.        Vaginal dryness   Musculoskeletal: Positive for arthralgias and myalgias. Negative for back pain and joint swelling.   Skin: Negative for rash and wound.   Neurological: Positive for weakness and headaches. Negative for dizziness, tremors, seizures, syncope, speech difficulty and numbness.   Hematological: Negative for adenopathy. Bruises/bleeds easily.   Psychiatric/Behavioral: Positive for sleep disturbance. Negative for confusion, dysphoric mood and suicidal ideas. The patient is nervous/anxious.        Objective    Vitals:    09/21/18 1123   BP: (!) 140/110   Pulse: 97   SpO2: 96%     Body mass index is 28.42 kg/m².  There were no vitals filed for this visit.    Physical Exam   Constitutional: She is oriented to person, place, and time. She appears well-developed and well-nourished. She is cooperative. She does not appear ill. No distress.   HENT:   Head: Normocephalic and atraumatic.   Nose: Right sinus exhibits maxillary sinus tenderness. Left sinus exhibits maxillary sinus tenderness.   Mouth/Throat: Oropharynx is clear and moist and mucous membranes are normal. Mucous membranes are not dry. No oral lesions.   Eyes: Conjunctivae and lids are normal.   Neck: Phonation normal. Neck supple. Normal carotid pulses present. No thyroid mass and no thyromegaly present.   Cardiovascular: Normal rate, regular rhythm, normal heart sounds and intact distal pulses.  Exam reveals no gallop.    No murmur heard.  Pulmonary/Chest: Effort normal and breath sounds normal.     Vascular Status -  Her right foot exhibits no edema. Her left foot exhibits no edema.  Lymphadenopathy:     She has no cervical adenopathy.   Neurological: She is alert and oriented to person, place, and time. She displays no tremor. Gait normal.   Skin: Skin is warm and dry. No bruising and no  rash noted.   Psychiatric: Her speech is normal and behavior is normal. Thought content normal. Her mood appears anxious. Cognition and memory are normal.   Good eye contact. Answers questions appropriately. Good personal hygiene and grooming.   Nursing note and vitals reviewed.    Lab Results   Component Value Date    WBC 9.3 06/19/2017    HGB 12.8 06/21/2018    HCT 39.2 06/21/2018    MCV 87 06/21/2018     06/21/2018     Lab Results   Component Value Date    GLUCOSE 92 01/04/2014    BUN 17 06/21/2018    CREATININE 0.84 06/21/2018    EGFRIFNONA 80 06/21/2018    EGFRIFAFRI 92 06/21/2018    BCR 20 06/21/2018    K 4.3 06/21/2018    CO2 26 06/21/2018    CALCIUM 9.1 06/21/2018    PROTENTOTREF 6.8 06/21/2018    ALBUMIN 4.4 06/21/2018    LABIL2 1.8 06/21/2018    AST 18 06/21/2018    ALT 15 06/21/2018     Lab Results   Component Value Date    HGBA1C 5.5 06/21/2018     Lab Results   Component Value Date    CHLPL 182 06/21/2018    TRIG 197 (H) 06/21/2018    HDL 54 06/21/2018    LDL 89 06/21/2018     Lab Results   Component Value Date    TSH 4.020 06/21/2018     Lab Results   Component Value Date    SEDRATE 4 06/21/2018     Assessment/Plan   Tatiana was seen today for depression, fibromyalgia, hypertension, ptsd, constipation, difficulty urinating and anxiety.    Diagnoses and all orders for this visit:    Essential hypertension    PTSD (post-traumatic stress disorder)    Medication monitoring encounter    Fibromyalgia    Depression with somatization    Chronic insomnia    Interstitial cystitis  -     Ambulatory Referral to Urology    IFG (impaired fasting glucose)    Vitamin D deficiency    Vitamin B12 deficiency    Obstructive sleep apnea    Acute recurrent maxillary sinusitis  -     cefTRIAXone (ROCEPHIN) injection 1 g; Inject 1 g into the appropriate muscle as directed by prescriber 1 (One) Time.  -     methylPREDNISolone acetate (DEPO-medrol) injection 80 mg; Inject 1 mL into the appropriate muscle as directed by  prescriber 1 (One) Time.    Chronic constipation  -     Ambulatory Referral to Gastroenterology    Influenza vaccine refused    Other orders  -     LODRANE D 4-60 MG capsule; Take one capsule BID.  -     estradiol (MINIVELLE, VIVELLE-DOT) 0.1 MG/24HR patch; Place 1 patch on the skin as directed by provider 2 (Two) Times a Week.  -     lisinopril (PRINIVIL,ZESTRIL) 10 MG tablet; Take 1 tablet by mouth 2 (Two) Times a Day.    Hypertension not at goal.  Increase lisinopril to 20 mg once daily.  She is encouraged to limit salt, follow heart healthy diet and exercise daily as able.    Chronic severe depression and PTSD.  Overall doing well on Prozac.  Continues to have severe fatigue, anhedonia.  He did well initially on Provigil as she had comorbid sleep apnea.  Efficacy has decreased.  I discussed risk/benefits of ventral side effects of various treatment options.  She was understanding and wishes to try low-dose Ritalin.    Multifactorial chronic pain including fibromyalgia, polyarthritis, cervical degenerative disc disease.  Continue follow-up with pain management.  She will follow-up with orthopedics as well for further evaluation/treatment of shoulder pain.    Chronic insomnia doing well on gabapentin (for pain and restless leg syndrome) as well as Ambien CR.    Continue vitamin D and vitamin B12 replacement.    Refer to gastroenterology for progressive, severe chronic constipation.    Refer to urology for worsening symptoms of interstitial cystitis.    Stable impaired fasting glucose.  She is encouraged to limit sugar intake and increased physical activity.    She is reminded she is overdue for mammogram.     The patient was counseled regarding diagnostic results, impressions, prognosis, instructions for management, risk factor reductions, education, and importance of treatment compliance.  Total time of encounter was >40 minutes and > 50% was spent counseling.    Routine f/u in 1 month, sooner as needed.  Patient  was encouraged to keep me informed of any acute changes, lack of improvement, or any new concerning symptoms.  Pt is aware of reasons to seek emergent care.  Patient voiced understanding of all instructions and denied further questions.  As part of patient's treatment plan I am prescribing a controlled substance.  The patient has been made aware of the appropriate use of such medications, including potential risk of somnolence, limited ability to drive and/or work safely, and potential for dependence and/or overdose.  It has also been made clear that these medications are for use by this patient only, without concomitant use of alcohol or other substances, unless prescribed.  KIMBERLY report reviewed and scanned into chart.  Last KIMBERLY date 6/25/18.            Please note that portions of this note may have been completed with a voice recognition program. Efforts were made to edit the dictations, but occasionally words are mistranscribed.

## 2018-09-24 ENCOUNTER — HOSPITAL ENCOUNTER (OUTPATIENT)
Dept: MRI IMAGING | Facility: HOSPITAL | Age: 53
Discharge: HOME | End: 2018-09-24

## 2018-09-24 ENCOUNTER — TELEPHONE (OUTPATIENT)
Dept: FAMILY MEDICINE CLINIC | Facility: CLINIC | Age: 53
End: 2018-09-24

## 2018-09-25 RX ORDER — ZOLPIDEM TARTRATE 6.25 MG/1
6.25 TABLET, FILM COATED, EXTENDED RELEASE ORAL NIGHTLY PRN
Qty: 30 TABLET | Refills: 2 | Status: SHIPPED | OUTPATIENT
Start: 2018-09-25 | End: 2018-12-17 | Stop reason: SDUPTHER

## 2018-09-25 RX ORDER — METHYLPHENIDATE HYDROCHLORIDE 10 MG/1
TABLET ORAL
Qty: 60 TABLET | Refills: 0 | Status: SHIPPED | OUTPATIENT
Start: 2018-09-25 | End: 2018-10-15 | Stop reason: SINTOL

## 2018-09-29 DIAGNOSIS — G47.33 OBSTRUCTIVE SLEEP APNEA: ICD-10-CM

## 2018-09-29 DIAGNOSIS — G47.19 EXCESSIVE DAYTIME SLEEPINESS: ICD-10-CM

## 2018-09-29 DIAGNOSIS — F45.0 DEPRESSION WITH SOMATIZATION: ICD-10-CM

## 2018-09-29 DIAGNOSIS — R53.82 CHRONIC FATIGUE: ICD-10-CM

## 2018-09-29 DIAGNOSIS — F32.A DEPRESSION WITH SOMATIZATION: ICD-10-CM

## 2018-10-01 RX ORDER — MODAFINIL 100 MG/1
100 TABLET ORAL DAILY
Qty: 30 TABLET | Refills: 0 | Status: SHIPPED | OUTPATIENT
Start: 2018-10-01 | End: 2018-12-10 | Stop reason: SDUPTHER

## 2018-10-15 ENCOUNTER — OFFICE VISIT (OUTPATIENT)
Dept: FAMILY MEDICINE CLINIC | Facility: CLINIC | Age: 53
End: 2018-10-15

## 2018-10-15 VITALS
SYSTOLIC BLOOD PRESSURE: 130 MMHG | WEIGHT: 162 LBS | HEART RATE: 84 BPM | DIASTOLIC BLOOD PRESSURE: 86 MMHG | BODY MASS INDEX: 27.66 KG/M2 | HEIGHT: 64 IN | OXYGEN SATURATION: 99 %

## 2018-10-15 DIAGNOSIS — Z51.81 MEDICATION MONITORING ENCOUNTER: ICD-10-CM

## 2018-10-15 DIAGNOSIS — K59.09 CHRONIC CONSTIPATION: ICD-10-CM

## 2018-10-15 DIAGNOSIS — F32.A DEPRESSION WITH SOMATIZATION: ICD-10-CM

## 2018-10-15 DIAGNOSIS — Z12.31 ENCOUNTER FOR SCREENING MAMMOGRAM FOR BREAST CANCER: ICD-10-CM

## 2018-10-15 DIAGNOSIS — Z00.00 MEDICARE ANNUAL WELLNESS VISIT, SUBSEQUENT: Primary | ICD-10-CM

## 2018-10-15 DIAGNOSIS — G47.19 EXCESSIVE DAYTIME SLEEPINESS: ICD-10-CM

## 2018-10-15 DIAGNOSIS — E55.9 VITAMIN D DEFICIENCY: ICD-10-CM

## 2018-10-15 DIAGNOSIS — G43.119 INTRACTABLE MIGRAINE WITH AURA WITHOUT STATUS MIGRAINOSUS: ICD-10-CM

## 2018-10-15 DIAGNOSIS — J32.9 RECURRENT SINUSITIS: ICD-10-CM

## 2018-10-15 DIAGNOSIS — R73.01 IFG (IMPAIRED FASTING GLUCOSE): ICD-10-CM

## 2018-10-15 DIAGNOSIS — F43.10 PTSD (POST-TRAUMATIC STRESS DISORDER): ICD-10-CM

## 2018-10-15 DIAGNOSIS — E53.8 VITAMIN B12 DEFICIENCY: ICD-10-CM

## 2018-10-15 DIAGNOSIS — N95.1 POSTMENOPAUSAL DISORDER: ICD-10-CM

## 2018-10-15 DIAGNOSIS — I10 ESSENTIAL HYPERTENSION: ICD-10-CM

## 2018-10-15 DIAGNOSIS — M79.7 FIBROMYALGIA: ICD-10-CM

## 2018-10-15 DIAGNOSIS — G25.0 ESSENTIAL TREMOR: ICD-10-CM

## 2018-10-15 DIAGNOSIS — F45.0 DEPRESSION WITH SOMATIZATION: ICD-10-CM

## 2018-10-15 DIAGNOSIS — M47.812 CERVICAL ARTHRITIS: ICD-10-CM

## 2018-10-15 DIAGNOSIS — K21.9 GASTROESOPHAGEAL REFLUX DISEASE, ESOPHAGITIS PRESENCE NOT SPECIFIED: ICD-10-CM

## 2018-10-15 DIAGNOSIS — F51.04 CHRONIC INSOMNIA: ICD-10-CM

## 2018-10-15 DIAGNOSIS — Z79.899 POLYPHARMACY: ICD-10-CM

## 2018-10-15 DIAGNOSIS — N30.10 INTERSTITIAL CYSTITIS: ICD-10-CM

## 2018-10-15 DIAGNOSIS — K58.1 IRRITABLE BOWEL SYNDROME WITH CONSTIPATION: ICD-10-CM

## 2018-10-15 DIAGNOSIS — G47.33 OBSTRUCTIVE SLEEP APNEA: ICD-10-CM

## 2018-10-15 DIAGNOSIS — Z79.890 HORMONE REPLACEMENT THERAPY (HRT): ICD-10-CM

## 2018-10-15 DIAGNOSIS — Z23 NEED FOR INFLUENZA VACCINATION: ICD-10-CM

## 2018-10-15 PROCEDURE — 90686 IIV4 VACC NO PRSV 0.5 ML IM: CPT | Performed by: FAMILY MEDICINE

## 2018-10-15 PROCEDURE — 96372 THER/PROPH/DIAG INJ SC/IM: CPT | Performed by: FAMILY MEDICINE

## 2018-10-15 PROCEDURE — 99396 PREV VISIT EST AGE 40-64: CPT | Performed by: FAMILY MEDICINE

## 2018-10-15 PROCEDURE — 96160 PT-FOCUSED HLTH RISK ASSMT: CPT | Performed by: FAMILY MEDICINE

## 2018-10-15 PROCEDURE — G0439 PPPS, SUBSEQ VISIT: HCPCS | Performed by: FAMILY MEDICINE

## 2018-10-15 PROCEDURE — G0008 ADMIN INFLUENZA VIRUS VAC: HCPCS | Performed by: FAMILY MEDICINE

## 2018-10-15 RX ORDER — DOXYCYCLINE 100 MG/1
100 TABLET ORAL 2 TIMES DAILY
Qty: 20 TABLET | Refills: 0 | Status: SHIPPED | OUTPATIENT
Start: 2018-10-15 | End: 2018-10-19 | Stop reason: SINTOL

## 2018-10-15 RX ORDER — CYANOCOBALAMIN 1000 UG/ML
1000 INJECTION, SOLUTION INTRAMUSCULAR; SUBCUTANEOUS ONCE
Status: COMPLETED | OUTPATIENT
Start: 2018-10-15 | End: 2018-10-15

## 2018-10-15 RX ADMIN — CYANOCOBALAMIN 1000 MCG: 1000 INJECTION, SOLUTION INTRAMUSCULAR; SUBCUTANEOUS at 13:11

## 2018-10-15 NOTE — PATIENT INSTRUCTIONS
Preventive Care 40-64 Years, Female  Preventive care refers to lifestyle choices and visits with your health care provider that can promote health and wellness.  What does preventive care include?  · A yearly physical exam. This is also called an annual well check.  · Dental exams once or twice a year.  · Routine eye exams. Ask your health care provider how often you should have your eyes checked.  · Personal lifestyle choices, including:  ? Daily care of your teeth and gums.  ? Regular physical activity.  ? Eating a healthy diet.  ? Avoiding tobacco and drug use.  ? Limiting alcohol use.  ? Practicing safe sex.  ? Taking low-dose aspirin daily starting at age 50.  ? Taking vitamin and mineral supplements as recommended by your health care provider.  What happens during an annual well check?  The services and screenings done by your health care provider during your annual well check will depend on your age, overall health, lifestyle risk factors, and family history of disease.  Counseling  Your health care provider may ask you questions about your:  · Alcohol use.  · Tobacco use.  · Drug use.  · Emotional well-being.  · Home and relationship well-being.  · Sexual activity.  · Eating habits.  · Work and work environment.  · Method of birth control.  · Menstrual cycle.  · Pregnancy history.    Screening  You may have the following tests or measurements:  · Height, weight, and BMI.  · Blood pressure.  · Lipid and cholesterol levels. These may be checked every 5 years, or more frequently if you are over 50 years old.  · Skin check.  · Lung cancer screening. You may have this screening every year starting at age 55 if you have a 30-pack-year history of smoking and currently smoke or have quit within the past 15 years.  · Fecal occult blood test (FOBT) of the stool. You may have this test every year starting at age 50.  · Flexible sigmoidoscopy or colonoscopy. You may have a sigmoidoscopy every 5 years or a colonoscopy  every 10 years starting at age 50.  · Hepatitis C blood test.  · Hepatitis B blood test.  · Sexually transmitted disease (STD) testing.  · Diabetes screening. This is done by checking your blood sugar (glucose) after you have not eaten for a while (fasting). You may have this done every 1-3 years.  · Mammogram. This may be done every 1-2 years. Talk to your health care provider about when you should start having regular mammograms. This may depend on whether you have a family history of breast cancer.  · BRCA-related cancer screening. This may be done if you have a family history of breast, ovarian, tubal, or peritoneal cancers.  · Pelvic exam and Pap test. This may be done every 3 years starting at age 21. Starting at age 30, this may be done every 5 years if you have a Pap test in combination with an HPV test.  · Bone density scan. This is done to screen for osteoporosis. You may have this scan if you are at high risk for osteoporosis.    Discuss your test results, treatment options, and if necessary, the need for more tests with your health care provider.  Vaccines  Your health care provider may recommend certain vaccines, such as:  · Influenza vaccine. This is recommended every year.  · Tetanus, diphtheria, and acellular pertussis (Tdap, Td) vaccine. You may need a Td booster every 10 years.  · Varicella vaccine. You may need this if you have not been vaccinated.  · Zoster vaccine. You may need this after age 60.  · Measles, mumps, and rubella (MMR) vaccine. You may need at least one dose of MMR if you were born in 1957 or later. You may also need a second dose.  · Pneumococcal 13-valent conjugate (PCV13) vaccine. You may need this if you have certain conditions and were not previously vaccinated.  · Pneumococcal polysaccharide (PPSV23) vaccine. You may need one or two doses if you smoke cigarettes or if you have certain conditions.  · Meningococcal vaccine. You may need this if you have certain  conditions.  · Hepatitis A vaccine. You may need this if you have certain conditions or if you travel or work in places where you may be exposed to hepatitis A.  · Hepatitis B vaccine. You may need this if you have certain conditions or if you travel or work in places where you may be exposed to hepatitis B.  · Haemophilus influenzae type b (Hib) vaccine. You may need this if you have certain conditions.    Talk to your health care provider about which screenings and vaccines you need and how often you need them.  This information is not intended to replace advice given to you by your health care provider. Make sure you discuss any questions you have with your health care provider.  Document Released: 2017 Document Revised: 2017 Document Reviewed: 10/18/2016  [employer] Interactive Patient Education © 2018 [employer]        Medicare Wellness  Personal Prevention Plan of Service     Date of Office Visit:  10/15/2018  Encounter Provider:  Amber Mills MD  Place of Service:  [facility]  Patient Name: Tatiana Fields  :  1965    As part of the Medicare Wellness portion of your visit today, we are providing you with this personalized preventive plan of services (PPPS). This plan is based upon recommendations of the [place] Preventive Services Task Force (USPSTF) and the Advisory Committee on Immunization Practices (ACIP).    This lists the preventive care services that should be considered, and provides dates of when you are due. Items listed as completed are up-to-date and do not require any further intervention.    Health Maintenance   Topic Date Due   • PNEUMOCOCCAL VACCINE (19-64 MEDIUM RISK) (1 of 1 - PPSV23) 10/07/1984   • TDAP/TD VACCINES (1 - Tdap) 10/07/1984   • ZOSTER VACCINE (1 of 2) 10/07/2015   • MAMMOGRAM  2018   • MEDICARE ANNUAL WELLNESS  10/11/2018   • INFLUENZA VACCINE  10/01/2019 (Originally 2018)   • HEMOGLOBIN A1C  2018   •  LIPID PANEL  06/21/2019   • COLONOSCOPY  11/25/2025   • HEPATITIS C SCREENING  Completed   • DIABETIC FOOT EXAM  Excluded   • PAP SMEAR  Excluded   • DIABETIC EYE EXAM  Excluded   • URINE MICROALBUMIN  Excluded       Orders Placed This Encounter   Procedures   • Mammo Screening Digital Tomosynthesis Bilateral With CAD     Standing Status:   Future     Standing Expiration Date:   10/15/2019     Order Specific Question:   Reason for Exam:     Answer:   screening for breast cancer   • Fluarix/Fluzone/Afluria Quad>6 Months       Return in about 3 months (around 1/15/2019).

## 2018-10-15 NOTE — PROGRESS NOTES
QUICK REFERENCE INFORMATION:  The ABCs of the Annual Wellness Visit    Subsequent Medicare Wellness Visit    HEALTH RISK ASSESSMENT    1965    Recent Hospitalizations:  No hospitalization(s) within the last year..        Current Medical Providers:  Patient Care Team:  Amber Mills MD as PCP - General (Family Medicine)  Jake Naranjo MD as Consulting Physician (Cardiology)  Bora Miles MD, FAAN as Consulting Physician (Sleep Medicine)  Lisa Villalobos MD as Consulting Physician (Physical Medicine and Rehabilitation)  Keisha Ruiz MD as Consulting Physician (Gastroenterology)  Malik Faria II, MD as Consulting Physician (Pain Medicine)  Mal Weeks MD as Consulting Physician (Colon and Rectal Surgery)  Shira Lawrence MD as Obstetrician (Obstetrics and Gynecology)  Aj Mendiola MD as Surgeon (Neurosurgery)        Smoking Status:  History   Smoking Status   • Never Smoker   Smokeless Tobacco   • Never Used       Alcohol Consumption:  History   Alcohol Use No       Depression Screen:   PHQ-2/PHQ-9 Depression Screening 10/15/2018   Little interest or pleasure in doing things 0   Feeling down, depressed, or hopeless 0   Trouble falling or staying asleep, or sleeping too much -   Feeling tired or having little energy -   Poor appetite or overeating -   Feeling bad about yourself - or that you are a failure or have let yourself or your family down -   Trouble concentrating on things, such as reading the newspaper or watching television -   Moving or speaking so slowly that other people could have noticed. Or the opposite - being so fidgety or restless that you have been moving around a lot more than usual -   Thoughts that you would be better off dead, or of hurting yourself in some way -   Total Score 0   If you checked off any problems, how difficult have these problems made it for you to do your work, take care of things at home, or get along with other people? -        Health Habits and Functional and Cognitive Screening:  Functional & Cognitive Status 10/15/2018   Do you have difficulty preparing food and eating? No   Do you have difficulty bathing yourself, getting dressed or grooming yourself? No   Do you have difficulty using the toilet? No   Do you have difficulty moving around from place to place? No   Do you have trouble with steps or getting out of a bed or a chair? No   In the past year have you fallen or experienced a near fall? No   Current Diet Limited Junk Food   Dental Exam Up to date   Eye Exam Not up to date   Exercise (times per week) 3 times per week   Current Exercise Activities Include Walking   Do you need help using the phone?  No   Are you deaf or do you have serious difficulty hearing?  No   Do you need help with transportation? No   Do you need help shopping? No   Do you need help preparing meals?  No   Do you need help with housework?  No   Do you need help with laundry? No   Do you need help taking your medications? No   Do you need help managing money? No   Do you ever drive or ride in a car without wearing a seat belt? No   Have you felt unusual stress, anger or loneliness in the last month? Yes   Who do you live with? Alone   If you need help, do you have trouble finding someone available to you? No   Have you been bothered in the last four weeks by sexual problems? No   Do you have difficulty concentrating, remembering or making decisions? No           Does the patient have evidence of cognitive impairment? No    Aspirin use counseling: Does not need AS but is currently taking (discussed benefits vs risks and patient elects to stay on ASA)      Recent Lab Results:  CMP:  Lab Results   Component Value Date     (H) 06/21/2018    BUN 17 06/21/2018    CREATININE 0.84 06/21/2018    EGFRIFNONA 80 06/21/2018    EGFRIFAFRI 92 06/21/2018    BCR 20 06/21/2018     06/21/2018    K 4.3 06/21/2018    CO2 26 06/21/2018    CALCIUM 9.1 06/21/2018     PROTENTOTREF 6.8 06/21/2018    ALBUMIN 4.4 06/21/2018    LABGLOBREF 2.4 06/21/2018    LABIL2 1.8 06/21/2018    BILITOT 0.3 06/21/2018    ALKPHOS 61 06/21/2018    AST 18 06/21/2018    ALT 15 06/21/2018     Lipid Panel:  Lab Results   Component Value Date    TRIG 197 (H) 06/21/2018    HDL 54 06/21/2018    VLDL 39 06/21/2018     HbA1c:  Lab Results   Component Value Date    HGBA1C 5.5 06/21/2018       Visual Acuity:   Visual Acuity Screening    Right eye Left eye Both eyes   Without correction: 20/20 20/15 20/15   With correction:          Age-appropriate Screening Schedule:  Refer to the list below for future screening recommendations based on patient's age, sex and/or medical conditions. Orders for these recommended tests are listed in the plan section. The patient has been provided with a written plan.    Health Maintenance   Topic Date Due   • TDAP/TD VACCINES (1 - Tdap) 10/07/1984   • ZOSTER VACCINE (1 of 2) 10/07/2015   • MAMMOGRAM  08/16/2018   • PNEUMOCOCCAL VACCINE (19-64 MEDIUM RISK) (1 of 1 - PPSV23) 10/01/2019 (Originally 10/7/1984)   • INFLUENZA VACCINE  10/01/2019 (Originally 8/1/2018)   • HEMOGLOBIN A1C  12/21/2018   • LIPID PANEL  06/21/2019   • COLONOSCOPY  11/25/2025   • DIABETIC FOOT EXAM  Excluded   • PAP SMEAR  Excluded   • DIABETIC EYE EXAM  Excluded   • URINE MICROALBUMIN  Excluded        Subjective   History of Present Illness    Tatiana Fields is a 53 y.o. female who presents for an Subsequent Wellness Visit.    She has HTN, taking meds as rx'd. Numbers improving. Fair job of following heart healthy diet. No regular exercise.    Intractable migraines improved. Followed by neurology. No new focal neuro symptoms.    Has DANE and is currently on CPAP. Good compliance. Sleep quality improved.    Chronic constipation and IBSx currently under eval by GI and colorectal surgery.    GERD stable. No weight loss, dysphagia or blood in stool.    Has B12 and vit D def; taking replacement as rx'd.  "Continues to have chronic fatigue and arthralgias.    Previous IFG/pre-D. Working to limit sugar.    Chronic pain due to FMSx and C-DJD/DDD. Under care of pain mgnt. No recent changes in med.    Essential tremor without recent worsening. Had eval per neurology.    Has Interstitial cystitis; referred back to urology due to worsening symptoms. No fever or hematuria.    Postmenopausal DO on HRT. Taking as rx'd. Denies side effects.    Chronic insomnia currently stable on current meds. Comorbid chronic severe depression with somatization and PTSD currently on SSRI. Moods, appetite, sleep patterns stable. Denies SI.    Currently on Provigil for mgnt chronic fatigue, EDS despite tx of DANE. Did not do well on ritalin as she felt it was \"sedating\".    tx'd for sinusitis last visit; does not feel symptoms completely resolved. Continues to have facial pain, pressure, jaw pain, etc.    The following portions of the patient's history were reviewed and updated as appropriate: allergies, current medications, past family history, past medical history, past social history, past surgical history and problem list.    Outpatient Medications Prior to Visit   Medication Sig Dispense Refill   • B-D 3CC LUER-DORA SYR 25GX1\" 25G X 1\" 3 ML misc USE WITH B-12 INJECTIONS  5   • Cholecalciferol (VITAMIN D) 2000 units tablet Take 2,000 Units by mouth 2 (Two) Times a Day.     • cyanocobalamin 1000 MCG/ML injection INJECT 1 ML INTO THE SHOULDER, THIGH, OR BUTTOCKS EVERY 14 (FOURTEEN) DAYS. 2 mL 5   • diazePAM (VALIUM) 2 MG tablet Take 1 tablet by mouth Every 8 (Eight) Hours As Needed for Anxiety or Muscle Spasms. 30 tablet 0   • ENDOCET  MG per tablet take 1 tablet by mouth every 6 hours if needed for pain  0   • estradiol (MINIVELLE, VIVELLE-DOT) 0.1 MG/24HR patch Place 1 patch on the skin as directed by provider 2 (Two) Times a Week. 25 patch 2   • famciclovir (FAMVIR) 500 MG tablet TAKE 1 TABLET EVERY DAY 30 tablet 5   • FLUoxetine " (PROzac) 40 MG capsule Take 1 capsule by mouth Daily.  0   • gabapentin (NEURONTIN) 300 MG capsule      • hydrochlorothiazide (HYDRODIURIL) 25 MG tablet TAKE 1 TABLET BY MOUTH DAILY AS NEEDED FOR SLEEP 90 tablet 2   • LINZESS 290 MCG capsule capsule TAKE ONE CAPSULE DAILY 30 capsule 4   • lisinopril (PRINIVIL,ZESTRIL) 10 MG tablet Take 1 tablet by mouth 2 (Two) Times a Day. 60 tablet 2   • modafinil (PROVIGIL) 100 MG tablet TAKE 1 TABLET BY MOUTH DAILY 30 tablet 0   • naproxen (NAPROSYN) 500 MG tablet TK 1 T PO Q 12 H  1   • omeprazole (priLOSEC) 40 MG capsule      • OnabotulinumtoxinA 200 units reconstituted solution INJECT 155 UNITS IM TO HEAD AND NECK AREA 1 each 3   • ondansetron (ZOFRAN) 4 MG tablet Take 1 tablet by mouth Every 8 (Eight) Hours As Needed for Nausea or Vomiting. 30 tablet 2   • polyethylene glycol (MIRALAX) powder TAKE ONE CAPFUL (17GM) DAILY 527 g 11   • pramipexole (MIRAPEX) 0.5 MG tablet      • Probiotic Product (ALIGN) capsule 1 po bid 60 capsule 5   • QNASL CHILDRENS 40 MCG/ACT aerosol solution INT 2 SPRAYS IEN QD  0   • SENNA-PLUS 8.6-50 MG per tablet Take 1 tablet by mouth 2 (Two) Times a Day. 60 tablet 0   • SUMAtriptan (IMITREX) 100 MG tablet Take 1 tablet by mouth Every 2 (Two) Hours As Needed for Migraine. 9 tablet 1   • zolpidem CR (AMBIEN CR) 6.25 MG CR tablet Take 1 tablet by mouth At Night As Needed for Sleep. 30 tablet 2   • LODRANE D 4-60 MG capsule Take one capsule BID. 60 capsule 5   • methylphenidate (RITALIN) 10 MG tablet 1 po q am and 1 po q afternoon 60 tablet 0     Facility-Administered Medications Prior to Visit   Medication Dose Route Frequency Provider Last Rate Last Dose   • bupivacaine (MARCAINE) 0.5 % injection 5 mL  5 mL Injection Once Aarti Mallory, APRN       • methylPREDNISolone acetate (DEPO-medrol) injection 200 mg  200 mg Intramuscular Once Aarti Mallory, APRN           Patient Active Problem List   Diagnosis   • Depression with somatization   • Essential  tremor   • Periodic limb movements of sleep   • Chronic daily headache   • Excessive daytime sleepiness   • Obstructive sleep apnea   • Intractable migraine with aura without status migrainosus   • Ovarian cyst   • History of Lyme disease   • Fibromyalgia   • Interstitial cystitis   • Essential hypertension   • IFG (impaired fasting glucose)   • Chronic constipation   • GERD (gastroesophageal reflux disease)   • Irritable bowel syndrome   • PTSD (post-traumatic stress disorder)   • Vitamin D deficiency   • Hepatomegaly   • Postmenopausal disorder   • Chronic insomnia   • Cervical arthritis   • Cervical nerve root compression   • Vitamin B12 deficiency   • BMI 28.0-28.9,adult   • Medication monitoring encounter   • S/P Botox injection   • Hormone replacement therapy (HRT)   • Polypharmacy       Advance Care Planning:  has NO advance directive - information provided to the patient today    Identification of Risk Factors:  Risk factors include: weight , unhealthy diet, inactivity, chronic pain and depression.    Review of Systems   Constitutional: Positive for fatigue. Negative for appetite change and fever.   HENT: Positive for congestion, postnasal drip and sinus pressure. Negative for ear pain, hearing loss, nosebleeds, rhinorrhea, sneezing, sore throat, tinnitus and trouble swallowing.    Eyes: Negative for pain, discharge, redness and visual disturbance.        Dry eyes   Respiratory: Negative for cough, chest tightness, shortness of breath and wheezing.    Cardiovascular: Positive for palpitations. Negative for chest pain and leg swelling.   Gastrointestinal: Positive for abdominal pain (chronic), constipation and nausea. Negative for blood in stool, diarrhea and vomiting.   Endocrine: Positive for heat intolerance. Negative for cold intolerance, polydipsia and polyuria.   Genitourinary: Positive for dysuria, frequency, pelvic pain (chronic) and urgency. Negative for flank pain, hematuria and vaginal discharge.         Vaginal dryness   Musculoskeletal: Positive for arthralgias and myalgias. Negative for back pain and joint swelling.   Skin: Negative for rash and wound.   Neurological: Positive for weakness and headaches. Negative for dizziness, tremors, seizures, syncope, speech difficulty and numbness.   Hematological: Negative for adenopathy. Bruises/bleeds easily.   Psychiatric/Behavioral: Positive for sleep disturbance. Negative for confusion, dysphoric mood and suicidal ideas. The patient is nervous/anxious.        Compared to one year ago, the patient feels her physical health is worse.  Compared to one year ago, the patient feels her mental health is worse.    Objective     Physical Exam   Constitutional: She is oriented to person, place, and time. She appears well-developed and well-nourished. She is cooperative. She does not appear ill. No distress.   HENT:   Head: Normocephalic and atraumatic.   Right Ear: Tympanic membrane, external ear and ear canal normal.   Left Ear: Tympanic membrane, external ear and ear canal normal.   Nose: Mucosal edema and rhinorrhea present. Right sinus exhibits maxillary sinus tenderness. Left sinus exhibits maxillary sinus tenderness.   Mouth/Throat: Oropharynx is clear and moist and mucous membranes are normal. Mucous membranes are not dry. No oral lesions.   Eyes: Conjunctivae and lids are normal.   Neck: Phonation normal. Neck supple. Normal carotid pulses present. No thyroid mass and no thyromegaly present.   Cardiovascular: Normal rate, regular rhythm, normal heart sounds and intact distal pulses.  Exam reveals no gallop.    No murmur heard.  Pulmonary/Chest: Effort normal and breath sounds normal.     Vascular Status -  Her right foot exhibits no edema. Her left foot exhibits no edema.  Lymphadenopathy:     She has no cervical adenopathy.   Neurological: She is alert and oriented to person, place, and time. Gait normal.   Skin: Skin is warm and dry. No bruising and no rash  "noted.   Psychiatric: She has a normal mood and affect. Her speech is normal and behavior is normal. Judgment and thought content normal. Cognition and memory are normal.   Good eye contact. Answers questions appropriately. Good personal hygiene and grooming.   Nursing note and vitals reviewed.      Vitals:    10/15/18 1155   BP: 130/86   Pulse: 84   SpO2: 99%   Weight: 73.5 kg (162 lb)   Height: 161.3 cm (63.5\")   PainSc: 0-No pain       Patient's Body mass index is 28.25 kg/m². BMI is above normal parameters. Recommendations include: educational material, exercise counseling and nutrition counseling.      Assessment/Plan   Patient Self-Management and Personalized Health Advice  The patient has been provided with information about: diet, exercise, weight management, prevention of cardiac or vascular disease, fall prevention, designing advance directives and mental health concerns and preventive services including:   · Advance directive, Exercise counseling provided, Influenza vaccine, Nutrition counseling provided, Pneumococcal vaccine , Screening mammography, referral placed, TdaP vaccine, Zostavax vaccine (Herpes Zoster).    Visit Diagnoses:    ICD-10-CM ICD-9-CM   1. Medicare annual wellness visit, subsequent Z00.00 V70.0   2. Need for influenza vaccination Z23 V04.81   3. Essential hypertension I10 401.9   4. Intractable migraine with aura without status migrainosus G43.119 346.01   5. Obstructive sleep apnea G47.33 327.23   6. Vitamin B12 deficiency E53.8 266.2   7. Medication monitoring encounter Z51.81 V58.83   8. Encounter for screening mammogram for breast cancer Z12.31 V76.12   9. PTSD (post-traumatic stress disorder) F43.10 309.81   10. Excessive daytime sleepiness G47.19 780.54   11. Depression with somatization F32.9 311    F45.0 300.81   12. Chronic insomnia F51.04 780.52   13. BMI 28.0-28.9,adult Z68.28 V85.24   14. Postmenopausal disorder N95.1 627.9   15. Interstitial cystitis N30.10 595.1   16. " "Cervical arthritis M47.812 721.0   17. Fibromyalgia M79.7 729.1   18. Essential tremor G25.0 333.1   19. IFG (impaired fasting glucose) R73.01 790.21   20. Vitamin D deficiency E55.9 268.9   21. Irritable bowel syndrome with constipation K58.1 564.1   22. Gastroesophageal reflux disease, esophagitis presence not specified K21.9 530.81   23. Chronic constipation K59.09 564.00   24. Hormone replacement therapy (HRT) Z79.890 V07.4   25. Recurrent sinusitis J32.9 473.9   26. Polypharmacy Z79.899 V58.69       Orders Placed This Encounter   Procedures   • Mammo Screening Digital Tomosynthesis Bilateral With CAD     Standing Status:   Future     Standing Expiration Date:   10/15/2019     Order Specific Question:   Reason for Exam:     Answer:   screening for breast cancer   • Fluarix/Fluzone/Afluria Quad>6 Months       Outpatient Encounter Prescriptions as of 10/15/2018   Medication Sig Dispense Refill   • B-D 3CC LUER-DORA SYR 25GX1\" 25G X 1\" 3 ML misc USE WITH B-12 INJECTIONS  5   • Cholecalciferol (VITAMIN D) 2000 units tablet Take 2,000 Units by mouth 2 (Two) Times a Day.     • cyanocobalamin 1000 MCG/ML injection INJECT 1 ML INTO THE SHOULDER, THIGH, OR BUTTOCKS EVERY 14 (FOURTEEN) DAYS. 2 mL 5   • diazePAM (VALIUM) 2 MG tablet Take 1 tablet by mouth Every 8 (Eight) Hours As Needed for Anxiety or Muscle Spasms. 30 tablet 0   • ENDOCET  MG per tablet take 1 tablet by mouth every 6 hours if needed for pain  0   • estradiol (MINIVELLE, VIVELLE-DOT) 0.1 MG/24HR patch Place 1 patch on the skin as directed by provider 2 (Two) Times a Week. 25 patch 2   • famciclovir (FAMVIR) 500 MG tablet TAKE 1 TABLET EVERY DAY 30 tablet 5   • FLUoxetine (PROzac) 40 MG capsule Take 1 capsule by mouth Daily.  0   • gabapentin (NEURONTIN) 300 MG capsule      • hydrochlorothiazide (HYDRODIURIL) 25 MG tablet TAKE 1 TABLET BY MOUTH DAILY AS NEEDED FOR SLEEP 90 tablet 2   • LINZESS 290 MCG capsule capsule TAKE ONE CAPSULE DAILY 30 capsule 4 "   • lisinopril (PRINIVIL,ZESTRIL) 10 MG tablet Take 1 tablet by mouth 2 (Two) Times a Day. 60 tablet 2   • modafinil (PROVIGIL) 100 MG tablet TAKE 1 TABLET BY MOUTH DAILY 30 tablet 0   • naproxen (NAPROSYN) 500 MG tablet TK 1 T PO Q 12 H  1   • omeprazole (priLOSEC) 40 MG capsule      • OnabotulinumtoxinA 200 units reconstituted solution INJECT 155 UNITS IM TO HEAD AND NECK AREA 1 each 3   • ondansetron (ZOFRAN) 4 MG tablet Take 1 tablet by mouth Every 8 (Eight) Hours As Needed for Nausea or Vomiting. 30 tablet 2   • polyethylene glycol (MIRALAX) powder TAKE ONE CAPFUL (17GM) DAILY 527 g 11   • pramipexole (MIRAPEX) 0.5 MG tablet      • Probiotic Product (ALIGN) capsule 1 po bid 60 capsule 5   • QNASL CHILDRENS 40 MCG/ACT aerosol solution INT 2 SPRAYS IEN QD  0   • SENNA-PLUS 8.6-50 MG per tablet Take 1 tablet by mouth 2 (Two) Times a Day. 60 tablet 0   • SUMAtriptan (IMITREX) 100 MG tablet Take 1 tablet by mouth Every 2 (Two) Hours As Needed for Migraine. 9 tablet 1   • zolpidem CR (AMBIEN CR) 6.25 MG CR tablet Take 1 tablet by mouth At Night As Needed for Sleep. 30 tablet 2   • [DISCONTINUED] LODRANE D 4-60 MG capsule Take one capsule BID. 60 capsule 5   • [DISCONTINUED] methylphenidate (RITALIN) 10 MG tablet 1 po q am and 1 po q afternoon 60 tablet 0   • doxycycline (ADOXA) 100 MG tablet Take 1 tablet by mouth 2 (Two) Times a Day for 10 days. 20 tablet 0   • loratadine-pseudoephedrine (CLARITIN-D 24 HOUR)  MG per 24 hr tablet Take 1 tablet by mouth Daily. 30 tablet 2   • Tdap (BOOSTRIX) 5-2.5-18.5 LF-MCG/0.5 injection Inject 0.5 mL into the appropriate muscle as directed by prescriber 1 (One) Time for 1 dose. 0.5 mL 0   • Zoster Vac Recomb Adjuvanted 50 MCG reconstituted suspension Inject 50 mcg into the appropriate muscle as directed by prescriber Take As Directed. Repeat 6 months. 1 each 1     Facility-Administered Encounter Medications as of 10/15/2018   Medication Dose Route Frequency Provider Last Rate  Last Dose   • bupivacaine (MARCAINE) 0.5 % injection 5 mL  5 mL Injection Once Aarti Mallory APRN       • [COMPLETED] cyanocobalamin injection 1,000 mcg  1,000 mcg Intramuscular Once Amber Mills MD   1,000 mcg at 10/15/18 1311   • methylPREDNISolone acetate (DEPO-medrol) injection 200 mg  200 mg Intramuscular Once Aarti Mallory APRN           Reviewed use of high risk medication: yes  Reviewed for potential of harmful drug interactions: yes.    Age appropriate preventive care reviewed including cancer screenings, safety measures, mental health concerns, supplements, prevention of CV disease and DM, etc. Handout provided.    Multiple chronic medical problems which appear stable at this time. Good f/u with multiple specialists.  She is aware of risks/benefits of HRT and wishes to continue tx.  Pt advised to eat a heart healthy diet and get regular aerobic exercise.    Follow Up:  Return in about 3 months (around 1/15/2019).   F/u sooner as needed.  An After Visit Summary and PPPS with all of these plans were given to the patient.

## 2018-10-19 ENCOUNTER — TELEPHONE (OUTPATIENT)
Dept: FAMILY MEDICINE CLINIC | Facility: CLINIC | Age: 53
End: 2018-10-19

## 2018-10-19 RX ORDER — PREDNISONE 20 MG/1
20 TABLET ORAL DAILY
Qty: 5 TABLET | Refills: 0 | Status: SHIPPED | OUTPATIENT
Start: 2018-10-19 | End: 2018-10-24

## 2018-10-19 RX ORDER — AMOXICILLIN AND CLAVULANATE POTASSIUM 500; 125 MG/1; MG/1
1 TABLET, FILM COATED ORAL 3 TIMES DAILY
Qty: 21 TABLET | Refills: 0 | Status: SHIPPED | OUTPATIENT
Start: 2018-10-19 | End: 2018-10-26

## 2018-10-19 NOTE — TELEPHONE ENCOUNTER
Pt called. Sts that she saw Dr Mills last week and she was rx'd antibiotics, but they are hurting her stomach. Wants to know if she can come in for steroid or abx inj - or if Dr Mills wants to rx her a different abx & steroids.  Please advise.

## 2018-10-19 NOTE — TELEPHONE ENCOUNTER
I have sent in new abx rx along with steroid rx. She needs to make sure she takes these with food.

## 2018-10-23 DIAGNOSIS — F43.10 PTSD (POST-TRAUMATIC STRESS DISORDER): ICD-10-CM

## 2018-10-23 DIAGNOSIS — M47.812 CERVICAL ARTHRITIS: ICD-10-CM

## 2018-10-23 RX ORDER — DIAZEPAM 2 MG/1
2 TABLET ORAL EVERY 8 HOURS PRN
Qty: 30 TABLET | Refills: 0 | Status: SHIPPED | OUTPATIENT
Start: 2018-10-23 | End: 2018-11-20 | Stop reason: SDUPTHER

## 2018-10-30 ENCOUNTER — PROCEDURE VISIT (OUTPATIENT)
Dept: NEUROLOGY | Facility: CLINIC | Age: 53
End: 2018-10-30

## 2018-10-30 VITALS
OXYGEN SATURATION: 98 % | HEART RATE: 105 BPM | DIASTOLIC BLOOD PRESSURE: 84 MMHG | WEIGHT: 162 LBS | SYSTOLIC BLOOD PRESSURE: 124 MMHG | BODY MASS INDEX: 27.66 KG/M2 | HEIGHT: 64 IN

## 2018-10-30 DIAGNOSIS — R51.9 CHRONIC DAILY HEADACHE: Primary | ICD-10-CM

## 2018-10-30 DIAGNOSIS — G43.719 INTRACTABLE CHRONIC MIGRAINE WITHOUT AURA AND WITHOUT STATUS MIGRAINOSUS: ICD-10-CM

## 2018-10-30 DIAGNOSIS — G43.119 INTRACTABLE MIGRAINE WITH AURA WITHOUT STATUS MIGRAINOSUS: ICD-10-CM

## 2018-10-30 PROCEDURE — 64615 CHEMODENERV MUSC MIGRAINE: CPT | Performed by: NURSE PRACTITIONER

## 2018-10-30 NOTE — PROGRESS NOTES
Patient has 15 migraines a month over 12 hours duration interfering with the patient daily activities despite conservative medical treatment for acute and preventive measures.  Patient's Botox has reduced her migraine headaches by 50% she was having 30 migraine days a month and actually had to retire from work as a result of her migraines.  She states that the Botox is helping with the intensity and is dropped him down to a 6 or 7 out of 10 pain level  The risk and benefit of the Botox treatment has been discussed with patient.  Safety information and contraindication of Botox has been reviewed with patient.  The most frequent Adverse reactions of Botox for migraine include but not limited to neck pain 9%, headache 5%, Ptosis 4%, muscle weakness 4%, injection site pain 3%, muscle spasms 2% have been gone over with our patient.  200 unit vial Botox was re-constituted with 4 mL 0.9 NS to 5 unit/0.1 mL using standard techniques.  10 units were given at the  muscle in 2 divided sites  5 units were given at the Procerus muscle  20 units were given at the Frontalis muscle in 4 divided sites  40 units were given at the Temporalis muscle in 8 divided sites  30 units were given at the Occipitalis muscle in 6 divided sites  20 units were given at the cervical paraspinal muscle in 4 divided sites  30 units were given at the Trapezius muscle in 6 divided sites  For a total of 155 units divided between 31 sites and 45 units of wastage  Patient tolerated procedure well without complication.

## 2018-11-01 ENCOUNTER — OFFICE VISIT (OUTPATIENT)
Dept: UROLOGY | Facility: CLINIC | Age: 53
End: 2018-11-01

## 2018-11-01 VITALS
HEART RATE: 84 BPM | WEIGHT: 162 LBS | DIASTOLIC BLOOD PRESSURE: 64 MMHG | SYSTOLIC BLOOD PRESSURE: 122 MMHG | HEIGHT: 64 IN | OXYGEN SATURATION: 97 % | BODY MASS INDEX: 27.66 KG/M2

## 2018-11-01 DIAGNOSIS — N30.10 CHRONIC INTERSTITIAL CYSTITIS: ICD-10-CM

## 2018-11-01 DIAGNOSIS — N94.19 DYSPAREUNIA DUE TO MEDICAL CONDITION IN FEMALE: ICD-10-CM

## 2018-11-01 DIAGNOSIS — N76.2 ATROPHIC VULVITIS: ICD-10-CM

## 2018-11-01 DIAGNOSIS — N30.10 CYSTITIS, INTERSTITIAL: Primary | ICD-10-CM

## 2018-11-01 LAB
BILIRUB BLD-MCNC: NEGATIVE MG/DL
CLARITY, POC: CLEAR
COLOR UR: YELLOW
GLUCOSE UR STRIP-MCNC: NEGATIVE MG/DL
KETONES UR QL: NEGATIVE
LEUKOCYTE EST, POC: NEGATIVE
NITRITE UR-MCNC: NEGATIVE MG/ML
PH UR: 6.5 [PH] (ref 5–8)
PROT UR STRIP-MCNC: NEGATIVE MG/DL
RBC # UR STRIP: NEGATIVE /UL
SP GR UR: 1.01 (ref 1–1.03)
UROBILINOGEN UR QL: NORMAL

## 2018-11-01 PROCEDURE — 81003 URINALYSIS AUTO W/O SCOPE: CPT | Performed by: UROLOGY

## 2018-11-01 PROCEDURE — 99203 OFFICE O/P NEW LOW 30 MIN: CPT | Performed by: UROLOGY

## 2018-11-01 PROCEDURE — 51700 IRRIGATION OF BLADDER: CPT | Performed by: UROLOGY

## 2018-11-01 PROCEDURE — 52265 CYSTOSCOPY AND TREATMENT: CPT | Performed by: UROLOGY

## 2018-11-01 RX ORDER — ESTERIFIED ESTROGEN AND METHYLTESTOSTERONE .625; 1.25 MG/1; MG/1
1 TABLET ORAL DAILY
Qty: 30 TABLET | Refills: 5 | Status: SHIPPED | OUTPATIENT
Start: 2018-11-01 | End: 2018-11-13 | Stop reason: SDUPTHER

## 2018-11-01 NOTE — PROGRESS NOTES
Chief Complaint  Cystitis (Patient referred by  for IC.)      HPI  Tatiana Fields is a 53 y.o.female who e is referred for evaluation of her bladder stating that 20 years ago she was   My patient and was treated for chronic interstitial cystitis.  She moved out of town and was treated by gynecologist with an InterStim implant that failed and had to be removed.  She states within a few months she also had have a hysterectomy for endometriosis. She has subsequently had a bladder tack through vaginal surgery. Since those 3 incidences she has not been able to have an orgasm. She is currently using a systemic estrogen patch but states she was found to have a testosterone level near 0.  Minutes to drinking 2-3 Cokes per day but no other caffeine.      Vitals:    11/01/18 1447   BP: 122/64   Pulse: 84   SpO2: 97%       Past Medical History  Past Medical History:   Diagnosis Date   • Anemia    • Arthritis    • Cervical spinal stenosis    • Chronic fatigue    • DDD (degenerative disc disease), lumbar    • Depression    • Endometriosis    • Hyperlipidemia    • Hypertension    • Irritable bowel syndrome    • Lyme disease 2012    tx'd with prolonged course abx   • Narcolepsy     listed in medical records, tx'd with provigil   • Ovarian cyst    • Shingles 2/15/2013   • Sicca syndrome (CMS/HCC)    • Vitamin D deficiency        Past Surgical History  Past Surgical History:   Procedure Laterality Date   • BILATERAL SALPINGO OOPHORECTOMY     • BLADDER REPAIR  2013    bladder tack   • CERVICAL SPINE ANTERIOR  03/2018    Dr. Mendiola, C3-C4   • COLECTOMY PARTIAL / TOTAL  2012    listed in medical record; done at time of tx for endometriosis   • COLONOSCOPY  09/29/2015    normal   • CYSTOCELE REPAIR     • ENDOMETRIAL ABLATION  2012   • ESOPHAGOSCOPY / EGD  09/29/2015   • HYSTERECTOMY  2013    for endometriosis   • SINUS SURGERY  2000       Medications  has a current medication list which includes the following prescription(s):  "b-d 3cc luer-sujata syr 25gx1\", vitamin d, cyanocobalamin, diazepam, endocet, estradiol, famciclovir, fluoxetine, fremanezumab-vfrm, gabapentin, hydrochlorothiazide, linzess, lisinopril, loratadine-pseudoephedrine, modafinil, naproxen, omeprazole, polyethylene glycol, pramipexole, align, senna-plus, sumatriptan, zolpidem cr, zoster vac recomb adjuvanted, ondansetron, and qnasl childrens, and the following Facility-Administered Medications: bupivacaine and methylprednisolone acetate.    Allergies  Allergies   Allergen Reactions   • Trazodone And Nefazodone Mental Status Change   • Sulfa Antibiotics Rash       Social History  Social History     Social History   • Marital status:      Spouse name: N/A   • Number of children: N/A   • Years of education: N/A     Occupational History   • Not on file.     Social History Main Topics   • Smoking status: Never Smoker   • Smokeless tobacco: Never Used   • Alcohol use No   • Drug use: No   • Sexual activity: Defer     Other Topics Concern   • Not on file     Social History Narrative   • No narrative on file       Family History  Family History   Problem Relation Age of Onset   • Inflammatory bowel disease Mother    • Hypertension Mother    • Heart disease Mother    • Arthritis Mother    • Hyperlipidemia Mother    • Diabetes Mother    • Hypertension Father    • Heart attack Father    • Hyperlipidemia Father    • Cancer Sister    • Bone cancer Sister    • Colon cancer Other    • Hyperlipidemia Brother    • Colon cancer Brother    • Diabetes Maternal Grandmother    • Diabetes Paternal Grandmother        Review of Systems  Review of Systems  Is positive for feeling tired weight gain abdominal pain constipation nausea frequent heartburn pelvic pain frequent urination painful joints muscle pain easy bruising chronic pain excessive thirst feeling of weakness and headaches negative and other categories  Physical Exam  Physical Exam   Constitutional: She is oriented to person, " place, and time. She appears well-developed and well-nourished.   HENT:   Head: Normocephalic.   Eyes: Pupils are equal, round, and reactive to light. EOM are normal.   Neck: Normal range of motion. Neck supple.   Cardiovascular: Normal rate and regular rhythm.    Pulmonary/Chest: Effort normal.   Abdominal: Soft. Bowel sounds are normal.   Genitourinary:         Neurological: She is alert and oriented to person, place, and time.   Skin: Skin is warm and dry.   Psychiatric: She has a normal mood and affect.       Labs recent and today in the office:  Results for orders placed or performed in visit on 11/01/18   POC Urinalysis Dipstick, Automated   Result Value Ref Range    Color Yellow Yellow, Straw, Dark Yellow, Shanique    Clarity, UA Clear Clear    Specific Gravity  1.015 1.005 - 1.030    pH, Urine 6.5 5.0 - 8.0    Leukocytes Negative Negative    Nitrite, UA Negative Negative    Protein, POC Negative Negative mg/dL    Glucose, UA Negative Negative, 1000 mg/dL (3+) mg/dL    Ketones, UA Negative Negative    Urobilinogen, UA Normal Normal    Bilirubin Negative Negative    Blood, UA Negative Negative      Female cystoscopy:     The patient is prepped and draped in the dorsal lithotomy position in a routine sterile fashion. The vulva and urethral meatus are inspected and found to be normal. The panendoscope is inserted in the bladder which is visualized with the Foroblique and 70 degree lenses and found to be free of foreign bodies and mucosal lesions. Ureteral orifices are normal location and effluxing clear urine bilaterally. The post void residual is only 1 ounce.  The bladder is distended to 500 mL which produces significant discomfort but reveals few changes of interstitial cystitis.    Assessment & Plan  #1 chronic interstitial cystitis: She is given an instillation of DMSO cocktail with rescue solution started on Myrbetriq and Estratest he'll return in 3 months for follow-up.

## 2018-11-02 RX ORDER — SUMATRIPTAN 100 MG/1
TABLET, FILM COATED ORAL
Qty: 9 TABLET | Refills: 3 | Status: SHIPPED | OUTPATIENT
Start: 2018-11-02 | End: 2019-04-20 | Stop reason: SDUPTHER

## 2018-11-07 ENCOUNTER — TELEPHONE (OUTPATIENT)
Dept: UROLOGY | Facility: CLINIC | Age: 53
End: 2018-11-07

## 2018-11-07 RX ORDER — AZITHROMYCIN 250 MG/1
TABLET, FILM COATED ORAL
Qty: 6 TABLET | Refills: 0 | Status: SHIPPED | OUTPATIENT
Start: 2018-11-07 | End: 2018-12-05

## 2018-11-08 RX ORDER — DOCUSATE SODIUM, SENNOSIDES 50; 8.6 MG/1; MG/1
TABLET ORAL
Qty: 60 TABLET | Refills: 0 | Status: SHIPPED | OUTPATIENT
Start: 2018-11-08 | End: 2018-12-10 | Stop reason: SDUPTHER

## 2018-11-12 DIAGNOSIS — N94.19 DYSPAREUNIA DUE TO MEDICAL CONDITION IN FEMALE: ICD-10-CM

## 2018-11-13 DIAGNOSIS — N94.19 DYSPAREUNIA DUE TO MEDICAL CONDITION IN FEMALE: ICD-10-CM

## 2018-11-14 RX ORDER — PRAMIPEXOLE DIHYDROCHLORIDE 0.5 MG/1
TABLET ORAL
Qty: 30 TABLET | Refills: 3 | Status: SHIPPED | OUTPATIENT
Start: 2018-11-14 | End: 2018-11-19 | Stop reason: SDUPTHER

## 2018-11-15 RX ORDER — ESTERIFIED ESTROGEN AND METHYLTESTOSTERONE .625; 1.25 MG/1; MG/1
1 TABLET ORAL DAILY
Qty: 30 TABLET | Refills: 5 | Status: SHIPPED | OUTPATIENT
Start: 2018-11-15 | End: 2019-09-12 | Stop reason: SDUPTHER

## 2018-11-19 ENCOUNTER — TELEPHONE (OUTPATIENT)
Dept: FAMILY MEDICINE CLINIC | Facility: CLINIC | Age: 53
End: 2018-11-19

## 2018-11-19 RX ORDER — PRAMIPEXOLE DIHYDROCHLORIDE 0.5 MG/1
1 TABLET ORAL
Qty: 90 TABLET | Refills: 3 | Status: SHIPPED | OUTPATIENT
Start: 2018-11-19 | End: 2018-12-17 | Stop reason: SDUPTHER

## 2018-11-20 DIAGNOSIS — F43.10 PTSD (POST-TRAUMATIC STRESS DISORDER): ICD-10-CM

## 2018-11-20 DIAGNOSIS — M47.812 CERVICAL ARTHRITIS: ICD-10-CM

## 2018-11-20 RX ORDER — FLUCONAZOLE 150 MG/1
TABLET ORAL
Qty: 2 TABLET | Refills: 0 | Status: SHIPPED | OUTPATIENT
Start: 2018-11-20 | End: 2018-12-13 | Stop reason: SDUPTHER

## 2018-11-21 ENCOUNTER — TELEPHONE (OUTPATIENT)
Dept: FAMILY MEDICINE CLINIC | Facility: CLINIC | Age: 53
End: 2018-11-21

## 2018-11-21 RX ORDER — FLUOXETINE HYDROCHLORIDE 40 MG/1
40 CAPSULE ORAL DAILY
Qty: 30 CAPSULE | Refills: 3 | Status: SHIPPED | OUTPATIENT
Start: 2018-11-21 | End: 2019-08-22

## 2018-11-21 RX ORDER — OMEPRAZOLE 40 MG/1
40 CAPSULE, DELAYED RELEASE ORAL DAILY
Qty: 30 CAPSULE | Refills: 3 | Status: SHIPPED | OUTPATIENT
Start: 2018-11-21 | End: 2018-11-21 | Stop reason: SDUPTHER

## 2018-11-21 RX ORDER — DIAZEPAM 2 MG/1
2 TABLET ORAL EVERY 8 HOURS PRN
Qty: 30 TABLET | Refills: 0 | Status: SHIPPED | OUTPATIENT
Start: 2018-11-21 | End: 2018-12-18 | Stop reason: SDUPTHER

## 2018-11-21 RX ORDER — OMEPRAZOLE 40 MG/1
CAPSULE, DELAYED RELEASE ORAL
Qty: 30 CAPSULE | Refills: 0 | Status: SHIPPED | OUTPATIENT
Start: 2018-11-21 | End: 2019-02-26 | Stop reason: SDUPTHER

## 2018-11-28 PROBLEM — G43.709 CHRONIC MIGRAINE WITHOUT AURA: Status: ACTIVE | Noted: 2018-11-28

## 2018-12-05 ENCOUNTER — OFFICE VISIT (OUTPATIENT)
Dept: FAMILY MEDICINE CLINIC | Facility: CLINIC | Age: 53
End: 2018-12-05

## 2018-12-05 VITALS
SYSTOLIC BLOOD PRESSURE: 122 MMHG | BODY MASS INDEX: 28.51 KG/M2 | WEIGHT: 167 LBS | OXYGEN SATURATION: 98 % | HEART RATE: 102 BPM | HEIGHT: 64 IN | DIASTOLIC BLOOD PRESSURE: 80 MMHG

## 2018-12-05 DIAGNOSIS — J01.01 ACUTE RECURRENT MAXILLARY SINUSITIS: Primary | ICD-10-CM

## 2018-12-05 PROCEDURE — 96372 THER/PROPH/DIAG INJ SC/IM: CPT | Performed by: FAMILY MEDICINE

## 2018-12-05 PROCEDURE — 99213 OFFICE O/P EST LOW 20 MIN: CPT | Performed by: FAMILY MEDICINE

## 2018-12-05 RX ORDER — METHYLPREDNISOLONE ACETATE 80 MG/ML
120 INJECTION, SUSPENSION INTRA-ARTICULAR; INTRALESIONAL; INTRAMUSCULAR; SOFT TISSUE ONCE
Status: COMPLETED | OUTPATIENT
Start: 2018-12-05 | End: 2018-12-05

## 2018-12-05 RX ORDER — DOXYCYCLINE HYCLATE 100 MG/1
CAPSULE ORAL
Refills: 0 | COMMUNITY
Start: 2018-12-02 | End: 2019-01-16

## 2018-12-05 RX ORDER — CEFTRIAXONE 1 G/1
1 INJECTION, POWDER, FOR SOLUTION INTRAMUSCULAR; INTRAVENOUS ONCE
Status: COMPLETED | OUTPATIENT
Start: 2018-12-05 | End: 2018-12-05

## 2018-12-05 RX ADMIN — CEFTRIAXONE 1 G: 1 INJECTION, POWDER, FOR SOLUTION INTRAMUSCULAR; INTRAVENOUS at 17:21

## 2018-12-05 RX ADMIN — METHYLPREDNISOLONE ACETATE 120 MG: 80 INJECTION, SUSPENSION INTRA-ARTICULAR; INTRALESIONAL; INTRAMUSCULAR; SOFT TISSUE at 17:23

## 2018-12-05 NOTE — PROGRESS NOTES
Subjective   Tatiana Fields is a 53 y.o. female.     She c/o congestion, headache      Sinusitis   This is a recurrent problem. The current episode started 1 to 4 weeks ago. The problem has been gradually worsening since onset. There has been no fever. The pain is moderate (around eyes, bilateral cheeks). Associated symptoms include congestion, headaches and sinus pressure. Pertinent negatives include no chills, coughing, ear pain, shortness of breath, sneezing, sore throat or swollen glands. Past treatments include oral decongestants. The treatment provided no relief.   Has significant GI distress with oral abx use.    The following portions of the patient's history were reviewed and updated as appropriate: allergies, current medications, past family history, past medical history, past social history, past surgical history and problem list.    Review of Systems   Constitutional: Positive for fatigue. Negative for chills.   HENT: Positive for congestion, postnasal drip, rhinorrhea, sinus pressure and sinus pain. Negative for ear pain, mouth sores, nosebleeds, sneezing and sore throat.    Eyes: Negative for pain, discharge and redness.   Respiratory: Negative for cough, shortness of breath and wheezing.    Gastrointestinal: Positive for nausea. Negative for diarrhea and vomiting.   Genitourinary: Negative for dysuria and hematuria.   Skin: Negative for rash.   Neurological: Positive for headaches. Negative for weakness.   Hematological: Negative for adenopathy. Does not bruise/bleed easily.   Psychiatric/Behavioral: Negative for confusion.       Objective    Vitals:    12/05/18 1417   BP: 122/80   Pulse: 102   SpO2: 98%     Body mass index is 29.12 kg/m².      12/05/18  1417   Weight: 75.8 kg (167 lb)       Physical Exam   Constitutional: She is oriented to person, place, and time. She appears well-developed and well-nourished. She is cooperative. She does not appear ill. No distress.   HENT:   Head: Normocephalic  and atraumatic.   Right Ear: Tympanic membrane, external ear and ear canal normal.   Left Ear: Tympanic membrane, external ear and ear canal normal.   Nose: Mucosal edema present. No rhinorrhea. Right sinus exhibits maxillary sinus tenderness and frontal sinus tenderness. Left sinus exhibits maxillary sinus tenderness and frontal sinus tenderness.   Mouth/Throat: Oropharynx is clear and moist and mucous membranes are normal. Mucous membranes are not dry. No oral lesions.   Eyes: Conjunctivae and lids are normal.   Neck: Phonation normal. Neck supple. Normal carotid pulses present. No thyroid mass and no thyromegaly present.   Cardiovascular: Normal rate, regular rhythm, normal heart sounds and intact distal pulses. Exam reveals no gallop.   No murmur heard.  Pulmonary/Chest: Effort normal and breath sounds normal.     Vascular Status -  Her right foot exhibits no edema. Her left foot exhibits no edema.  Lymphadenopathy:     She has no cervical adenopathy.   Neurological: She is alert and oriented to person, place, and time. Gait normal.   Skin: Skin is warm and dry. No bruising and no rash noted.   Psychiatric: She has a normal mood and affect. Her speech is normal and behavior is normal. Judgment and thought content normal. Cognition and memory are normal.   Good eye contact. Answers questions appropriately. Good personal hygiene and grooming.   Nursing note and vitals reviewed.      Assessment/Plan   Tatiana was seen today for nasal congestion and headache.    Diagnoses and all orders for this visit:    Acute recurrent maxillary sinusitis  -     cefTRIAXone (ROCEPHIN) injection 1 g; Inject 1 g into the appropriate muscle as directed by prescriber 1 (One) Time.  -     methylPREDNISolone acetate (DEPO-medrol) injection 120 mg; Inject 1.5 mL into the appropriate muscle as directed by prescriber 1 (One) Time.    Symptomatic tx reviewed as well. May f/u in 48 hours for repeat injections if needed.  otehrwise keep routine  f/u as scheduled.  Patient was encouraged to keep me informed of any acute changes, lack of improvement, or any new concerning symptoms.  Pt is aware of reasons to seek emergent care.  Patient voiced understanding of all instructions and denied further questions.

## 2018-12-06 ENCOUNTER — PROCEDURE VISIT (OUTPATIENT)
Dept: NEUROLOGY | Facility: CLINIC | Age: 53
End: 2018-12-06

## 2018-12-06 VITALS
DIASTOLIC BLOOD PRESSURE: 68 MMHG | BODY MASS INDEX: 28.51 KG/M2 | SYSTOLIC BLOOD PRESSURE: 118 MMHG | OXYGEN SATURATION: 98 % | HEART RATE: 108 BPM | HEIGHT: 64 IN | WEIGHT: 167 LBS

## 2018-12-06 DIAGNOSIS — M54.2 CERVICALGIA: ICD-10-CM

## 2018-12-06 DIAGNOSIS — G43.119 INTRACTABLE MIGRAINE WITH AURA WITHOUT STATUS MIGRAINOSUS: Primary | ICD-10-CM

## 2018-12-06 PROCEDURE — 20553 NJX 1/MLT TRIGGER POINTS 3/>: CPT | Performed by: NURSE PRACTITIONER

## 2018-12-06 NOTE — PROGRESS NOTES
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 Pt states decreased migraines from 30 headache days to 10 headache days with trigger points, meds and botox. Pt has start Ajovy but takes up to 5 months to have beneficial effects. Risks and benefits of the Trigger point injection procedure have been explained to the patient.  Patient has no contraindications such as bleed diathesis, recent acute trauma at the muscle sites, anesthetic allergy or anticoagulation.  Mechanism of trigger point injection has been explained to patient.     Procedure Note:  1.         Patient was positioned comfortably  2.         Before injections are started, 10 Trigger Points sites are identified throughout the bilateral upper trapezius muscle, levator scapulae, and erector spinae muscles.    3.         Overlying skin area was cleaned with Alcohol swab                                                                                                              4.         Before injection, trigger points sites were palpated for local twitch and referred pain to confirms placement                         5.         Local anesthetic was mixed with Depo-Medrol (5 cc of Marcaine 0.5% mixed with 5 cc of Depo-Medrol at 40 mg/ml - for a total of 10 cc)  6.         30 gauge ½ inch needle was utilized to ensure patient comfort          7.         I started with the most tender spot in above mentioned Trigger Points   1.   Localize most tender spot within taut muscle-fibers  2.   Fix tender spot between fingers (1-2 cm in size)   1.   Prevents from rolling away from needle  2.   Controls subcutaneous bleeding  3.   Before injection, patient was instructed of possible pain on injection  4.   Direct needle at 30 degree angle off skin   8.         Insert needle into skin 1-2 cm from Trigger Point   9.         Advance needle into Trigger Point   10.       Use 1cc anesthetic at each Trigger Points identified in step #2  11.       Redirect  needle and reinject                                                                                              1.   Withdraw needle to subcutaneous tissue  2.   Redirect needle into adjacent tender areas  3.   Repeat until local twitch or tautness resolves  12.   After each of the 10 injections I held direct pressure at injection site for 2 minutes to prevents hematoma formation  13.   The same procedure was repeated for other Tender Points located in the upper trapezius muscle, levator scapulae and erector spinae bilaterally  14.   Patient was instructed to gently stretch injected areas to full active range of motion in all directions and was instructed to repeat range of motion three times after injection  15.   Post-Procedure patient was instructed to avoid over-using injected area for 3-4 days   1.   Maintain active range of motion of injected muscle  2.   Patient applies ice to injected areas for a few hours  3.   Anticipate post-injection soreness for 3-4 days  There was no evidence of complications from injection was noted such as local Skin Infection  at injection site or local hematoma at injection site

## 2018-12-07 ENCOUNTER — CLINICAL SUPPORT (OUTPATIENT)
Dept: FAMILY MEDICINE CLINIC | Facility: CLINIC | Age: 53
End: 2018-12-07

## 2018-12-07 DIAGNOSIS — J01.01 ACUTE RECURRENT MAXILLARY SINUSITIS: ICD-10-CM

## 2018-12-07 PROCEDURE — 96372 THER/PROPH/DIAG INJ SC/IM: CPT | Performed by: FAMILY MEDICINE

## 2018-12-07 RX ORDER — METHYLPREDNISOLONE ACETATE 80 MG/ML
120 INJECTION, SUSPENSION INTRA-ARTICULAR; INTRALESIONAL; INTRAMUSCULAR; SOFT TISSUE ONCE
Status: COMPLETED | OUTPATIENT
Start: 2018-12-07 | End: 2018-12-07

## 2018-12-07 RX ORDER — CEFTRIAXONE 1 G/1
1 INJECTION, POWDER, FOR SOLUTION INTRAMUSCULAR; INTRAVENOUS ONCE
Status: COMPLETED | OUTPATIENT
Start: 2018-12-07 | End: 2018-12-07

## 2018-12-07 RX ADMIN — CEFTRIAXONE 1 G: 1 INJECTION, POWDER, FOR SOLUTION INTRAMUSCULAR; INTRAVENOUS at 17:51

## 2018-12-07 RX ADMIN — METHYLPREDNISOLONE ACETATE 120 MG: 80 INJECTION, SUSPENSION INTRA-ARTICULAR; INTRALESIONAL; INTRAMUSCULAR; SOFT TISSUE at 17:53

## 2018-12-10 DIAGNOSIS — G47.19 EXCESSIVE DAYTIME SLEEPINESS: ICD-10-CM

## 2018-12-10 DIAGNOSIS — F32.A DEPRESSION WITH SOMATIZATION: ICD-10-CM

## 2018-12-10 DIAGNOSIS — G47.33 OBSTRUCTIVE SLEEP APNEA: ICD-10-CM

## 2018-12-10 DIAGNOSIS — F45.0 DEPRESSION WITH SOMATIZATION: ICD-10-CM

## 2018-12-10 DIAGNOSIS — R53.82 CHRONIC FATIGUE: ICD-10-CM

## 2018-12-10 RX ORDER — DOCUSATE SODIUM, SENNOSIDES 50; 8.6 MG/1; MG/1
TABLET ORAL
Qty: 60 TABLET | Refills: 5 | Status: SHIPPED | OUTPATIENT
Start: 2018-12-10 | End: 2019-05-03 | Stop reason: SDUPTHER

## 2018-12-11 NOTE — TELEPHONE ENCOUNTER
I need some clarification regarding the information pt provided in this message. What has she actually been taking? Provigil or Ritalin. I believe she tried the ritalin and dc'd due to side effects.     Updated KIMBERLY please.

## 2018-12-12 RX ORDER — MODAFINIL 100 MG/1
100 TABLET ORAL DAILY
Qty: 30 TABLET | Refills: 0 | Status: SHIPPED | OUTPATIENT
Start: 2018-12-12 | End: 2019-01-16

## 2018-12-13 RX ORDER — FLUCONAZOLE 150 MG/1
TABLET ORAL
Qty: 2 TABLET | Refills: 0 | OUTPATIENT
Start: 2018-12-13

## 2018-12-14 DIAGNOSIS — R53.82 CHRONIC FATIGUE: ICD-10-CM

## 2018-12-14 DIAGNOSIS — F45.0 DEPRESSION WITH SOMATIZATION: ICD-10-CM

## 2018-12-14 DIAGNOSIS — F32.A DEPRESSION WITH SOMATIZATION: ICD-10-CM

## 2018-12-14 DIAGNOSIS — G47.19 EXCESSIVE DAYTIME SLEEPINESS: ICD-10-CM

## 2018-12-14 DIAGNOSIS — G47.33 OBSTRUCTIVE SLEEP APNEA: ICD-10-CM

## 2018-12-14 RX ORDER — MODAFINIL 100 MG/1
100 TABLET ORAL DAILY
Qty: 30 TABLET | Refills: 0 | OUTPATIENT
Start: 2018-12-14

## 2018-12-14 RX ORDER — FLUCONAZOLE 150 MG/1
TABLET ORAL
Qty: 2 TABLET | Refills: 0 | Status: SHIPPED | OUTPATIENT
Start: 2018-12-14 | End: 2019-02-14

## 2018-12-16 DIAGNOSIS — F32.A DEPRESSION WITH SOMATIZATION: ICD-10-CM

## 2018-12-16 DIAGNOSIS — G47.33 OBSTRUCTIVE SLEEP APNEA: ICD-10-CM

## 2018-12-16 DIAGNOSIS — G47.19 EXCESSIVE DAYTIME SLEEPINESS: ICD-10-CM

## 2018-12-16 DIAGNOSIS — R53.82 CHRONIC FATIGUE: ICD-10-CM

## 2018-12-16 DIAGNOSIS — F45.0 DEPRESSION WITH SOMATIZATION: ICD-10-CM

## 2018-12-17 ENCOUNTER — TELEPHONE (OUTPATIENT)
Dept: GASTROENTEROLOGY | Facility: CLINIC | Age: 53
End: 2018-12-17

## 2018-12-17 RX ORDER — ZOLPIDEM TARTRATE 6.25 MG/1
6.25 TABLET, FILM COATED, EXTENDED RELEASE ORAL NIGHTLY PRN
Qty: 30 TABLET | Refills: 2 | Status: SHIPPED | OUTPATIENT
Start: 2018-12-17 | End: 2019-01-16 | Stop reason: SDUPTHER

## 2018-12-17 RX ORDER — MODAFINIL 100 MG/1
100 TABLET ORAL DAILY
Qty: 30 TABLET | Refills: 0 | OUTPATIENT
Start: 2018-12-17

## 2018-12-17 RX ORDER — LISINOPRIL 10 MG/1
TABLET ORAL
Qty: 60 TABLET | Refills: 2 | Status: SHIPPED | OUTPATIENT
Start: 2018-12-17 | End: 2019-03-18 | Stop reason: SDUPTHER

## 2018-12-18 DIAGNOSIS — M47.812 CERVICAL ARTHRITIS: ICD-10-CM

## 2018-12-18 DIAGNOSIS — F43.10 PTSD (POST-TRAUMATIC STRESS DISORDER): ICD-10-CM

## 2018-12-19 RX ORDER — PRAMIPEXOLE DIHYDROCHLORIDE 0.5 MG/1
1 TABLET ORAL
Qty: 90 TABLET | Refills: 3 | Status: SHIPPED | OUTPATIENT
Start: 2018-12-19 | End: 2019-01-16 | Stop reason: SDUPTHER

## 2018-12-19 RX ORDER — DIAZEPAM 2 MG/1
2 TABLET ORAL EVERY 8 HOURS PRN
Qty: 30 TABLET | Refills: 0 | Status: SHIPPED | OUTPATIENT
Start: 2018-12-19 | End: 2019-01-22 | Stop reason: SDUPTHER

## 2018-12-20 RX ORDER — BUPIVACAINE HYDROCHLORIDE 2.5 MG/ML
5 INJECTION, SOLUTION INFILTRATION; PERINEURAL ONCE
Status: COMPLETED | OUTPATIENT
Start: 2018-12-20 | End: 2018-12-20

## 2018-12-20 RX ORDER — METHYLPREDNISOLONE ACETATE 40 MG/ML
200 INJECTION, SUSPENSION INTRA-ARTICULAR; INTRALESIONAL; INTRAMUSCULAR; SOFT TISSUE ONCE
Status: COMPLETED | OUTPATIENT
Start: 2018-12-20 | End: 2018-12-20

## 2018-12-20 RX ADMIN — BUPIVACAINE HYDROCHLORIDE 5 ML: 2.5 INJECTION, SOLUTION INFILTRATION; PERINEURAL at 21:44

## 2018-12-20 RX ADMIN — METHYLPREDNISOLONE ACETATE 200 MG: 40 INJECTION, SUSPENSION INTRA-ARTICULAR; INTRALESIONAL; INTRAMUSCULAR; SOFT TISSUE at 21:45

## 2019-01-16 ENCOUNTER — OFFICE VISIT (OUTPATIENT)
Dept: FAMILY MEDICINE CLINIC | Facility: CLINIC | Age: 54
End: 2019-01-16

## 2019-01-16 VITALS
WEIGHT: 172 LBS | HEART RATE: 96 BPM | BODY MASS INDEX: 29.37 KG/M2 | OXYGEN SATURATION: 98 % | SYSTOLIC BLOOD PRESSURE: 130 MMHG | DIASTOLIC BLOOD PRESSURE: 86 MMHG | HEIGHT: 64 IN

## 2019-01-16 DIAGNOSIS — G47.19 EXCESSIVE DAYTIME SLEEPINESS: ICD-10-CM

## 2019-01-16 DIAGNOSIS — R53.82 CHRONIC FATIGUE: ICD-10-CM

## 2019-01-16 DIAGNOSIS — F45.0 DEPRESSION WITH SOMATIZATION: ICD-10-CM

## 2019-01-16 DIAGNOSIS — G47.19 EXCESSIVE DAYTIME SLEEPINESS: Primary | ICD-10-CM

## 2019-01-16 DIAGNOSIS — F32.A DEPRESSION WITH SOMATIZATION: ICD-10-CM

## 2019-01-16 DIAGNOSIS — R51.9 CHRONIC DAILY HEADACHE: ICD-10-CM

## 2019-01-16 DIAGNOSIS — F33.9 RECURRENT MAJOR DEPRESSION RESISTANT TO TREATMENT (HCC): ICD-10-CM

## 2019-01-16 DIAGNOSIS — R73.01 IFG (IMPAIRED FASTING GLUCOSE): ICD-10-CM

## 2019-01-16 DIAGNOSIS — G47.33 OBSTRUCTIVE SLEEP APNEA: ICD-10-CM

## 2019-01-16 DIAGNOSIS — M79.7 FIBROMYALGIA: ICD-10-CM

## 2019-01-16 DIAGNOSIS — G43.719 INTRACTABLE CHRONIC MIGRAINE WITHOUT AURA AND WITHOUT STATUS MIGRAINOSUS: ICD-10-CM

## 2019-01-16 DIAGNOSIS — I10 ESSENTIAL HYPERTENSION: ICD-10-CM

## 2019-01-16 LAB
EXPIRATION DATE: NORMAL
HBA1C MFR BLD: 5.6 %
Lab: NORMAL

## 2019-01-16 PROCEDURE — 83036 HEMOGLOBIN GLYCOSYLATED A1C: CPT | Performed by: FAMILY MEDICINE

## 2019-01-16 PROCEDURE — 99214 OFFICE O/P EST MOD 30 MIN: CPT | Performed by: FAMILY MEDICINE

## 2019-01-16 RX ORDER — TRAMADOL HYDROCHLORIDE 50 MG/1
TABLET ORAL
COMMUNITY
Start: 2019-01-14 | End: 2019-05-20

## 2019-01-16 RX ORDER — ZOLPIDEM TARTRATE 6.25 MG/1
6.25 TABLET, FILM COATED, EXTENDED RELEASE ORAL NIGHTLY PRN
Qty: 30 TABLET | Refills: 2 | Status: SHIPPED | OUTPATIENT
Start: 2019-01-16 | End: 2019-05-18 | Stop reason: SDUPTHER

## 2019-01-16 RX ORDER — TIZANIDINE 4 MG/1
TABLET ORAL
COMMUNITY
Start: 2019-01-14 | End: 2020-04-01

## 2019-01-16 RX ORDER — SENNOSIDES 8.6 MG
CAPSULE ORAL
Refills: 4 | COMMUNITY
Start: 2019-01-10 | End: 2019-01-16 | Stop reason: SDUPTHER

## 2019-01-16 RX ORDER — PRAMIPEXOLE DIHYDROCHLORIDE 1 MG/1
TABLET ORAL
COMMUNITY
Start: 2018-12-19 | End: 2019-03-05 | Stop reason: SDUPTHER

## 2019-01-16 RX ORDER — METHYLPHENIDATE HYDROCHLORIDE 10 MG/1
TABLET ORAL
Qty: 60 TABLET | Refills: 0 | Status: SHIPPED | OUTPATIENT
Start: 2019-01-16 | End: 2019-02-15 | Stop reason: SDUPTHER

## 2019-01-16 RX ORDER — ESTRADIOL 0.1 MG/D
FILM, EXTENDED RELEASE TRANSDERMAL
COMMUNITY
Start: 2018-12-17 | End: 2019-03-15

## 2019-01-16 NOTE — PROGRESS NOTES
"Subjective   Tatiana Fields is a 53 y.o. female.     History of Present Illness    Mrs. Fields presents today for routine follow-up on several chronic medical problems.    Her main concern about his of persistent excessive daytime sleepiness.  This is in relation to her chronic depression, fibromyalgia.  She previously been on Provigil.  Felt it worked well at first but declining efficacy.  She had a trial of short acting Ritalin which was more effective.  She would like to retry that as she feels her symptoms have acutely worsened during the winter.  She has comorbid severe recurrent major depressive disorder somewhat resistant to treatment and associated with significant somatization.  She is currently on Prozac which are generally worked well for her.  She denies any suicidal ideation.  No manic or psychotic features.    She struggles with chronic migraine which is currently under management of neurology.  This is in association with comorbid cervical degenerative disc disease for which she has had C-spine surgery in the past year.  She continues to have a \"drawing up\" of her right hand, chronic spasm in the right shoulder and upper back.  She is undergoing treatment with Botox as well as trigger point injections.  Uses Imitrex for abortive therapy.    She is also followed by pain management.  Has been on chronic low/moderate doses of opioid analgesic but is wishing to limit use and therefore has recently transitioned to tramadol.    She was noted to have prediabetes on previous labs.  Due for recheck A1c.    She has hypertension for which she is taking her medications as prescribed.  Not currently exercising due to pain/depression.  Not dating this is a job of following heart healthy diet again she feels due to depression, trouble motivating etc.    The following portions of the patient's history were reviewed and updated as appropriate: allergies, current medications, past family history, past medical " history, past social history, past surgical history and problem list.    Review of Systems   Constitutional: Positive for fatigue and unexpected weight change. Negative for appetite change and fever.   HENT: Positive for congestion, postnasal drip and sinus pressure. Negative for ear pain, hearing loss, nosebleeds, rhinorrhea, sneezing, sore throat, tinnitus and trouble swallowing.    Eyes: Positive for visual disturbance. Negative for pain, discharge and redness.        Dry eyes   Respiratory: Negative for cough, chest tightness, shortness of breath and wheezing.    Cardiovascular: Positive for palpitations. Negative for chest pain and leg swelling.   Gastrointestinal: Positive for abdominal pain (chronic), constipation and nausea. Negative for blood in stool, diarrhea and vomiting.   Endocrine: Positive for heat intolerance. Negative for cold intolerance, polydipsia and polyuria.   Genitourinary: Positive for pelvic pain (chronic). Negative for dysuria, hematuria and vaginal discharge.        Vaginal dryness   Musculoskeletal: Positive for arthralgias, myalgias, neck pain and neck stiffness. Negative for back pain and joint swelling.   Skin: Negative for rash and wound.   Neurological: Positive for weakness and headaches. Negative for dizziness, tremors, seizures, syncope, speech difficulty and numbness.   Hematological: Negative for adenopathy. Bruises/bleeds easily.   Psychiatric/Behavioral: Positive for dysphoric mood and sleep disturbance. Negative for confusion and suicidal ideas. The patient is nervous/anxious.        Objective    Vitals:    01/16/19 0958   BP: 130/86   Pulse: 96   SpO2: 98%     Body mass index is 29.99 kg/m².      01/16/19  0958   Weight: 78 kg (172 lb)       Physical Exam   Constitutional: She is oriented to person, place, and time. She appears well-developed and well-nourished. She is cooperative. She does not appear ill. She appears distressed.   HENT:   Head: Normocephalic and  atraumatic.   Nose: Right sinus exhibits maxillary sinus tenderness. Left sinus exhibits maxillary sinus tenderness.   Mouth/Throat: Mucous membranes are normal. Mucous membranes are not dry.   Eyes: Conjunctivae are normal.   Mild dropping right upper eyelid   Neck: Phonation normal. Neck supple. Normal carotid pulses present. No thyroid mass and no thyromegaly present.   Cardiovascular: Normal rate, regular rhythm, normal heart sounds and intact distal pulses. Exam reveals no gallop.   No murmur heard.  Pulmonary/Chest: Effort normal and breath sounds normal.   Musculoskeletal:        Cervical back: She exhibits decreased range of motion, tenderness (diffuse soft tissue, worst on right side), bony tenderness (C5-C7), deformity (loss of normal lordosis) and spasm.     Vascular Status -  Her right foot exhibits no edema. Her left foot exhibits no edema.  Lymphadenopathy:     She has no cervical adenopathy.   Neurological: She is alert and oriented to person, place, and time. She displays no tremor. Gait normal.   Skin: Skin is warm and dry. No bruising and no rash noted.   Psychiatric: Her speech is normal and behavior is normal. Thought content normal. Her mood appears anxious. Cognition and memory are normal. She exhibits a depressed mood.   Good eye contact. Answers questions appropriately. Good personal hygiene and grooming.   Nursing note and vitals reviewed.    Lab Results   Component Value Date    WBC 4.9 06/21/2018    HGB 12.8 06/21/2018    HCT 39.2 06/21/2018    MCV 87 06/21/2018     06/21/2018     Lab Results   Component Value Date    GLUCOSE 92 01/04/2014    BUN 17 06/21/2018    CREATININE 0.84 06/21/2018    EGFRIFNONA 80 06/21/2018    EGFRIFAFRI 92 06/21/2018    BCR 20 06/21/2018    K 4.3 06/21/2018    CO2 26 06/21/2018    CALCIUM 9.1 06/21/2018    PROTENTOTREF 6.8 06/21/2018    ALBUMIN 4.4 06/21/2018    LABIL2 1.8 06/21/2018    AST 18 06/21/2018    ALT 15 06/21/2018     Lab Results   Component  Value Date    CHLPL 182 06/21/2018    TRIG 197 (H) 06/21/2018    HDL 54 06/21/2018    LDL 89 06/21/2018     Lab Results   Component Value Date    TSH 4.020 06/21/2018     Lab Results   Component Value Date    SEDRATE 4 06/21/2018     Lab Results   Component Value Date    HGBA1C 5.6 01/16/2019       Assessment/Plan   Tatiana was seen today for depression, fibromyalgia, hyperlipidemia, hypertension and migraine.    Diagnoses and all orders for this visit:    Excessive daytime sleepiness  -     methylphenidate (RITALIN) 10 MG tablet; 1 po q am and 1 po q afternoon    Recurrent major depression resistant to treatment (CMS/HCC)  -     methylphenidate (RITALIN) 10 MG tablet; 1 po q am and 1 po q afternoon    Intractable chronic migraine without aura and without status migrainosus    Essential hypertension    IFG (impaired fasting glucose)  -     POC Glycated Hemoglobin, Total    Chronic daily headache    Fibromyalgia    Depression with somatization       Chronic major depressive disorder, recurrent, resistant treatment with significant somatization.  She has had significant improvement in severe daytime sleepiness, depressive symptoms with use of stimulant.  She did better on Ritalin versus Provigil.  Therefore I have placed her back on low-dose Ritalin twice daily after extensive review of risk/benefits and potential side effects of this medication.  She will also continue Prozac.    Multifactorial chronic pain due to chronic migraine, cervical degenerative disc disease with radiculopathy, fibromyalgia etc.  She will follow-up with pain management, neurology as scheduled.  I have reviewed with her risk/benefits of concomitant use of SSRI and tramadol.  She was understanding wishes to proceed with treatment and is aware of symptoms which should prompt medical attention.    IFG/Pre-DM stable. Continue close monitoring. Encouraged daily exercise, heart healthy eating.    As part of patient's treatment plan I am prescribing a  controlled substance.  The patient has been made aware of the appropriate use of such medications, including potential risk of somnolence, limited ability to drive and/or work safely, and potential for dependence and/or overdose.  It has also been made clear that these medications are for use by this patient only, without concomitant use of alcohol or other substances, unless prescribed.  KIMBERLY report reviewed and scanned into chart.  Last KIMBERLY date 1/17/19.    Routine f/u in 1 month, sooner as needed.  Patient was encouraged to keep me informed of any acute changes, lack of improvement, or any new concerning symptoms.  Pt is aware of reasons to seek emergent care.  Patient voiced understanding of all instructions and denied further questions.        Please note that portions of this note may have been completed with a voice recognition program. Efforts were made to edit the dictations, but occasionally words are mistranscribed.

## 2019-01-17 RX ORDER — MODAFINIL 100 MG/1
100 TABLET ORAL DAILY
Qty: 30 TABLET | Refills: 0 | OUTPATIENT
Start: 2019-01-17

## 2019-01-18 NOTE — TELEPHONE ENCOUNTER
Patient stated the lexapro doesn't seem to help much and she wanted to try the zoloft to see if would do any better.   No

## 2019-01-19 DIAGNOSIS — F43.10 PTSD (POST-TRAUMATIC STRESS DISORDER): ICD-10-CM

## 2019-01-19 DIAGNOSIS — M47.812 CERVICAL ARTHRITIS: ICD-10-CM

## 2019-01-19 RX ORDER — DIAZEPAM 2 MG/1
TABLET ORAL
Qty: 30 TABLET | Refills: 0 | Status: CANCELLED | OUTPATIENT
Start: 2019-01-19

## 2019-01-22 ENCOUNTER — OFFICE VISIT (OUTPATIENT)
Dept: FAMILY MEDICINE CLINIC | Facility: CLINIC | Age: 54
End: 2019-01-22

## 2019-01-22 VITALS
HEIGHT: 64 IN | TEMPERATURE: 98.3 F | SYSTOLIC BLOOD PRESSURE: 115 MMHG | OXYGEN SATURATION: 96 % | WEIGHT: 169.38 LBS | HEART RATE: 93 BPM | DIASTOLIC BLOOD PRESSURE: 75 MMHG | BODY MASS INDEX: 28.92 KG/M2

## 2019-01-22 DIAGNOSIS — H65.03 BILATERAL ACUTE SEROUS OTITIS MEDIA, RECURRENCE NOT SPECIFIED: ICD-10-CM

## 2019-01-22 DIAGNOSIS — J02.9 SORE THROAT: Primary | ICD-10-CM

## 2019-01-22 DIAGNOSIS — J01.00 ACUTE NON-RECURRENT MAXILLARY SINUSITIS: ICD-10-CM

## 2019-01-22 DIAGNOSIS — F43.10 PTSD (POST-TRAUMATIC STRESS DISORDER): ICD-10-CM

## 2019-01-22 DIAGNOSIS — M47.812 CERVICAL ARTHRITIS: ICD-10-CM

## 2019-01-22 DIAGNOSIS — J01.10 ACUTE NON-RECURRENT FRONTAL SINUSITIS: ICD-10-CM

## 2019-01-22 LAB
EXPIRATION DATE: NORMAL
INTERNAL CONTROL: NORMAL
Lab: NORMAL
S PYO AG THROAT QL: NEGATIVE

## 2019-01-22 PROCEDURE — 96372 THER/PROPH/DIAG INJ SC/IM: CPT | Performed by: NURSE PRACTITIONER

## 2019-01-22 PROCEDURE — 87880 STREP A ASSAY W/OPTIC: CPT | Performed by: NURSE PRACTITIONER

## 2019-01-22 PROCEDURE — 99214 OFFICE O/P EST MOD 30 MIN: CPT | Performed by: NURSE PRACTITIONER

## 2019-01-22 RX ORDER — CEFTRIAXONE 1 G/1
1 INJECTION, POWDER, FOR SOLUTION INTRAMUSCULAR; INTRAVENOUS ONCE
Status: COMPLETED | OUTPATIENT
Start: 2019-01-22 | End: 2019-01-22

## 2019-01-22 RX ORDER — DIAZEPAM 2 MG/1
2 TABLET ORAL EVERY 8 HOURS PRN
Qty: 30 TABLET | Refills: 0 | Status: SHIPPED | OUTPATIENT
Start: 2019-01-22 | End: 2019-02-20 | Stop reason: SDUPTHER

## 2019-01-22 RX ORDER — CEFDINIR 300 MG/1
300 CAPSULE ORAL 2 TIMES DAILY
Qty: 20 CAPSULE | Refills: 0 | Status: SHIPPED | OUTPATIENT
Start: 2019-01-22 | End: 2019-02-01

## 2019-01-22 RX ORDER — METHYLPREDNISOLONE ACETATE 80 MG/ML
80 INJECTION, SUSPENSION INTRA-ARTICULAR; INTRALESIONAL; INTRAMUSCULAR; SOFT TISSUE ONCE
Status: COMPLETED | OUTPATIENT
Start: 2019-01-22 | End: 2019-01-22

## 2019-01-22 RX ORDER — FLUCONAZOLE 150 MG/1
150 TABLET ORAL ONCE
Qty: 1 TABLET | Refills: 1 | Status: SHIPPED | OUTPATIENT
Start: 2019-01-22 | End: 2019-01-22

## 2019-01-22 RX ADMIN — METHYLPREDNISOLONE ACETATE 80 MG: 80 INJECTION, SUSPENSION INTRA-ARTICULAR; INTRALESIONAL; INTRAMUSCULAR; SOFT TISSUE at 18:19

## 2019-01-22 RX ADMIN — CEFTRIAXONE 1 G: 1 INJECTION, POWDER, FOR SOLUTION INTRAMUSCULAR; INTRAVENOUS at 18:18

## 2019-01-22 NOTE — PROGRESS NOTES
Subjective   Tatiana Fields is a 53 y.o. female.     Patient is here today for complaints of sore throat, sinus pressure and cough, for the past 3 days. She does have chronic sinusitis, which is what it feels like, but she was exposed to strept throat and mono,  Babysitting, so wanted to make sure she does not have strept. She has felt like she had a fever, has been achy and chilling, but has not taken her temperature. She has had a frontal headache for 2 days too.     She needs a refill on her Valium, for her PTSD and cervical arthritis.         The following portions of the patient's history were reviewed and updated as appropriate: allergies, current medications, past family history, past medical history, past social history, past surgical history and problem list.    Review of Systems   Constitutional: Positive for chills and fatigue. Negative for activity change, appetite change, diaphoresis, fever and unexpected weight change.   HENT: Positive for ear pain, postnasal drip, rhinorrhea, sinus pressure, sinus pain and sore throat.    Eyes: Negative.    Respiratory: Negative.    Cardiovascular: Negative.    Gastrointestinal: Negative.    Genitourinary: Negative.    Musculoskeletal: Positive for arthralgias and neck pain.   Skin: Negative.    Allergic/Immunologic: Positive for environmental allergies and immunocompromised state.   Neurological: Positive for headaches. Negative for dizziness, syncope, weakness and numbness.   Hematological: Negative for adenopathy.   Psychiatric/Behavioral: Negative for confusion and suicidal ideas. The patient is not nervous/anxious.      Vitals:    01/22/19 1511   BP: 115/75   Pulse: 93   Temp: 98.3 °F (36.8 °C)   SpO2: 96%     Objective   Physical Exam   Constitutional: She is oriented to person, place, and time. She appears well-developed and well-nourished. No distress.   HENT:   Head: Normocephalic.   Right Ear: External ear normal. A middle ear effusion is present.   Left  Ear: External ear normal. Tympanic membrane is erythematous. A middle ear effusion is present.   Nose: Mucosal edema present. Right sinus exhibits maxillary sinus tenderness and frontal sinus tenderness. Left sinus exhibits maxillary sinus tenderness and frontal sinus tenderness.   Mouth/Throat: Oropharyngeal exudate (PND) and posterior oropharyngeal erythema present.   Eyes: Conjunctivae are normal.   Neck: Normal range of motion. Neck supple. No tracheal deviation present. No thyromegaly present.   Cardiovascular: Normal rate, regular rhythm, normal heart sounds and intact distal pulses.   No murmur heard.  Pulmonary/Chest: Effort normal and breath sounds normal. No respiratory distress. She has no wheezes. She has no rales. She exhibits no tenderness.   Abdominal: Soft. Bowel sounds are normal. She exhibits no distension and no mass. There is no hepatosplenomegaly or splenomegaly. There is no tenderness. There is no rebound and no guarding. No hernia.   Musculoskeletal: Normal range of motion. She exhibits no edema or tenderness.   Lymphadenopathy:     She has no cervical adenopathy.        Right cervical: No superficial cervical, no deep cervical and no posterior cervical adenopathy present.       Left cervical: No superficial cervical, no deep cervical and no posterior cervical adenopathy present.   Neurological: She is alert and oriented to person, place, and time. Coordination and gait normal.   Skin: Skin is warm and dry. No rash noted.   Psychiatric: She has a normal mood and affect. Her behavior is normal. Judgment and thought content normal.   Nursing note and vitals reviewed.      Assessment/Plan   Tatiana was seen today for sore throat, uri, cough and mononucleosis.    Diagnoses and all orders for this visit:    Sore throat  -     POCT rapid strep A    Acute non-recurrent frontal sinusitis  -     cefTRIAXone (ROCEPHIN) injection 1 g; Inject 1 g into the appropriate muscle as directed by prescriber 1 (One)  Time.  -     methylPREDNISolone acetate (DEPO-medrol) injection 80 mg; Inject 1 mL into the appropriate muscle as directed by prescriber 1 (One) Time.    Acute non-recurrent maxillary sinusitis  -     cefTRIAXone (ROCEPHIN) injection 1 g; Inject 1 g into the appropriate muscle as directed by prescriber 1 (One) Time.  -     methylPREDNISolone acetate (DEPO-medrol) injection 80 mg; Inject 1 mL into the appropriate muscle as directed by prescriber 1 (One) Time.    Bilateral acute serous otitis media, recurrence not specified  -     cefTRIAXone (ROCEPHIN) injection 1 g; Inject 1 g into the appropriate muscle as directed by prescriber 1 (One) Time.  -     methylPREDNISolone acetate (DEPO-medrol) injection 80 mg; Inject 1 mL into the appropriate muscle as directed by prescriber 1 (One) Time.    Cervical arthritis  -     diazePAM (VALIUM) 2 MG tablet; Take 1 tablet by mouth Every 8 (Eight) Hours As Needed for Anxiety or Muscle Spasms.    PTSD (post-traumatic stress disorder)  -     diazePAM (VALIUM) 2 MG tablet; Take 1 tablet by mouth Every 8 (Eight) Hours As Needed for Anxiety or Muscle Spasms.    Other orders  -     cefdinir (OMNICEF) 300 MG capsule; Take 1 capsule by mouth 2 (Two) Times a Day for 10 days.  -     fluconazole (DIFLUCAN) 150 MG tablet; Take 1 tablet by mouth 1 (One) Time for 1 dose.       Strep screen negative in clinic today. Pharyngitis r/t PND.    Rocephin and Medrol injection given in clinic today, for treatment of sinusitis and otitis.  Cefdinir prescribed as well, for patient to start in 48-72 hours if symptoms not improving.     Valium refilled today, for treatment of her PTSD and cervical arthritis. History and physical exam exhibit continued safe and appropriate use of controlled substance.     Patient was encouraged to keep me informed of any acute changes, lack of improvement, or any new concerning symptoms. Patient voiced understanding of all instructions and denied further  questions.    Patient to RTC in 1 week for her regular follow up or sooner if needed.

## 2019-02-06 ENCOUNTER — TELEPHONE (OUTPATIENT)
Dept: FAMILY MEDICINE CLINIC | Facility: CLINIC | Age: 54
End: 2019-02-06

## 2019-02-06 RX ORDER — CEFDINIR 300 MG/1
300 CAPSULE ORAL 2 TIMES DAILY
Qty: 20 CAPSULE | Refills: 0 | Status: SHIPPED | OUTPATIENT
Start: 2019-02-06 | End: 2019-02-14

## 2019-02-06 NOTE — TELEPHONE ENCOUNTER
Patient called stating that she was seen by Mickie on 1/22/19 for a severe sinus infection. She states that the cefdinir did help with the symptoms and that she has off the medication for about 2 days now. Patient states that since finishing the medication her symptoms have returned and was wondering if she could have another round of cefdinir called into the pharmacy.      Would you be ok with this?

## 2019-02-07 ENCOUNTER — PROCEDURE VISIT (OUTPATIENT)
Dept: NEUROLOGY | Facility: CLINIC | Age: 54
End: 2019-02-07

## 2019-02-07 VITALS
HEIGHT: 64 IN | WEIGHT: 164.8 LBS | SYSTOLIC BLOOD PRESSURE: 120 MMHG | OXYGEN SATURATION: 97 % | DIASTOLIC BLOOD PRESSURE: 72 MMHG | BODY MASS INDEX: 28.13 KG/M2 | HEART RATE: 103 BPM

## 2019-02-07 DIAGNOSIS — G43.119 INTRACTABLE MIGRAINE WITH AURA WITHOUT STATUS MIGRAINOSUS: Primary | ICD-10-CM

## 2019-02-07 PROCEDURE — 96372 THER/PROPH/DIAG INJ SC/IM: CPT | Performed by: NURSE PRACTITIONER

## 2019-02-07 RX ORDER — KETOROLAC TROMETHAMINE 30 MG/ML
60 INJECTION, SOLUTION INTRAMUSCULAR; INTRAVENOUS ONCE
Status: COMPLETED | OUTPATIENT
Start: 2019-02-07 | End: 2019-02-07

## 2019-02-07 RX ADMIN — KETOROLAC TROMETHAMINE 60 MG: 30 INJECTION, SOLUTION INTRAMUSCULAR; INTRAVENOUS at 14:44

## 2019-02-07 NOTE — PROGRESS NOTES
Patient came in for botox injections today. Patient has been on an antibiotic within the past 48 hours and is not able to get her injections. Pt received toradol injection to get her to her next appointment for botox injections.

## 2019-02-14 ENCOUNTER — PROCEDURE VISIT (OUTPATIENT)
Dept: NEUROLOGY | Facility: CLINIC | Age: 54
End: 2019-02-14

## 2019-02-14 VITALS
OXYGEN SATURATION: 97 % | HEART RATE: 123 BPM | BODY MASS INDEX: 28 KG/M2 | WEIGHT: 164 LBS | HEIGHT: 64 IN | DIASTOLIC BLOOD PRESSURE: 84 MMHG | SYSTOLIC BLOOD PRESSURE: 137 MMHG

## 2019-02-14 DIAGNOSIS — G43.719 INTRACTABLE CHRONIC MIGRAINE WITHOUT AURA AND WITHOUT STATUS MIGRAINOSUS: ICD-10-CM

## 2019-02-14 DIAGNOSIS — G43.119 INTRACTABLE MIGRAINE WITH AURA WITHOUT STATUS MIGRAINOSUS: Primary | ICD-10-CM

## 2019-02-14 PROCEDURE — 64615 CHEMODENERV MUSC MIGRAINE: CPT | Performed by: NURSE PRACTITIONER

## 2019-02-15 ENCOUNTER — TELEPHONE (OUTPATIENT)
Dept: FAMILY MEDICINE CLINIC | Facility: CLINIC | Age: 54
End: 2019-02-15

## 2019-02-15 DIAGNOSIS — G47.19 EXCESSIVE DAYTIME SLEEPINESS: ICD-10-CM

## 2019-02-15 DIAGNOSIS — F33.9 RECURRENT MAJOR DEPRESSION RESISTANT TO TREATMENT (HCC): ICD-10-CM

## 2019-02-15 RX ORDER — METHYLPHENIDATE HYDROCHLORIDE 10 MG/1
TABLET ORAL
Qty: 60 TABLET | Refills: 0 | Status: SHIPPED | OUTPATIENT
Start: 2019-02-15 | End: 2019-03-15 | Stop reason: SDUPTHER

## 2019-02-15 RX ORDER — METHYLPHENIDATE HYDROCHLORIDE 10 MG/1
TABLET ORAL
Qty: 60 TABLET | Refills: 0 | Status: CANCELLED | OUTPATIENT
Start: 2019-02-15

## 2019-02-20 DIAGNOSIS — M47.812 CERVICAL ARTHRITIS: ICD-10-CM

## 2019-02-20 DIAGNOSIS — F43.10 PTSD (POST-TRAUMATIC STRESS DISORDER): ICD-10-CM

## 2019-02-20 RX ORDER — DIAZEPAM 2 MG/1
2 TABLET ORAL EVERY 8 HOURS PRN
Qty: 30 TABLET | Refills: 0 | Status: SHIPPED | OUTPATIENT
Start: 2019-02-20 | End: 2019-03-15 | Stop reason: SDUPTHER

## 2019-02-25 ENCOUNTER — TELEPHONE (OUTPATIENT)
Dept: ORTHOPEDIC SURGERY | Facility: CLINIC | Age: 54
End: 2019-02-25

## 2019-02-26 DIAGNOSIS — N94.19 DYSPAREUNIA DUE TO MEDICAL CONDITION IN FEMALE: ICD-10-CM

## 2019-02-27 ENCOUNTER — TELEPHONE (OUTPATIENT)
Dept: FAMILY MEDICINE CLINIC | Facility: CLINIC | Age: 54
End: 2019-02-27

## 2019-02-27 DIAGNOSIS — M50.30 DDD (DEGENERATIVE DISC DISEASE), CERVICAL: ICD-10-CM

## 2019-02-27 DIAGNOSIS — M79.601 RIGHT ARM PAIN: ICD-10-CM

## 2019-02-27 DIAGNOSIS — Z98.890 H/O CERVICAL SPINE SURGERY: ICD-10-CM

## 2019-02-27 DIAGNOSIS — R29.898 RIGHT ARM WEAKNESS: ICD-10-CM

## 2019-02-27 DIAGNOSIS — M54.12 CERVICAL RADICULAR PAIN: ICD-10-CM

## 2019-02-27 DIAGNOSIS — M47.812 CERVICAL ARTHRITIS: Primary | ICD-10-CM

## 2019-02-27 RX ORDER — OMEPRAZOLE 40 MG/1
40 CAPSULE, DELAYED RELEASE ORAL DAILY
Qty: 30 CAPSULE | Refills: 5 | Status: SHIPPED | OUTPATIENT
Start: 2019-02-27 | End: 2019-08-20 | Stop reason: SDUPTHER

## 2019-02-27 RX ORDER — POLYETHYLENE GLYCOL 3350 17 G/17G
17 POWDER, FOR SOLUTION ORAL 2 TIMES DAILY
Qty: 527 G | Refills: 5 | Status: SHIPPED | OUTPATIENT
Start: 2019-02-27 | End: 2019-07-09 | Stop reason: SDUPTHER

## 2019-02-28 ENCOUNTER — OFFICE VISIT (OUTPATIENT)
Dept: NEUROLOGY | Facility: CLINIC | Age: 54
End: 2019-02-28

## 2019-02-28 ENCOUNTER — HOSPITAL ENCOUNTER (OUTPATIENT)
Dept: MRI IMAGING | Facility: HOSPITAL | Age: 54
Discharge: HOME | End: 2019-02-28
Admitting: NURSE PRACTITIONER

## 2019-02-28 VITALS
BODY MASS INDEX: 28 KG/M2 | OXYGEN SATURATION: 98 % | SYSTOLIC BLOOD PRESSURE: 130 MMHG | HEART RATE: 94 BPM | HEIGHT: 64 IN | WEIGHT: 164 LBS | DIASTOLIC BLOOD PRESSURE: 80 MMHG

## 2019-02-28 DIAGNOSIS — M54.2 CERVICAL PAIN: Primary | ICD-10-CM

## 2019-02-28 DIAGNOSIS — M54.2 CERVICAL PAIN: ICD-10-CM

## 2019-02-28 LAB — CREAT BLDA-MCNC: 0.8 MG/DL (ref 0.6–1.3)

## 2019-02-28 PROCEDURE — 0 GADOBENATE DIMEGLUMINE 529 MG/ML SOLUTION: Performed by: NURSE PRACTITIONER

## 2019-02-28 PROCEDURE — A9577 INJ MULTIHANCE: HCPCS | Performed by: NURSE PRACTITIONER

## 2019-02-28 PROCEDURE — 82565 ASSAY OF CREATININE: CPT

## 2019-02-28 PROCEDURE — 72156 MRI NECK SPINE W/O & W/DYE: CPT

## 2019-02-28 PROCEDURE — 99213 OFFICE O/P EST LOW 20 MIN: CPT | Performed by: NURSE PRACTITIONER

## 2019-02-28 RX ADMIN — GADOBENATE DIMEGLUMINE 15 ML: 529 INJECTION, SOLUTION INTRAVENOUS at 16:28

## 2019-02-28 NOTE — PROGRESS NOTES
Subjective   Tatiana Fields is a 53 y.o. female     Chief Complaint   Patient presents with   • Numbness     Pt arrives with numbness in right hand    • Pain     Pt arrives today with pain in neck and right arm       History of Present Illness     Patient is a very pleasant 53-year-old female who walked into neurology clinic today with complaints of right upper extremity and neck pain.  Onset was about 4 days ago she states it starts in the right side of her neck into her shoulder and down to her thumb and first finger she states that they are constantly numb and there is an ache that she feels.  She has tried massage muscle relaxers and Biofreeze and states none of it is getting better now she states that the hand is starting to tremor with activity she is having difficulty even writing.  Patient does have a history of having cervical surgery with Dr. Mendiola at  in March 2018 she has already contacted his office they have not called her back yet.    The following portions of the patient's history were reviewed and updated as appropriate: allergies, current medications, past family history, past medical history, past social history, past surgical history and problem list.    Review of Systems   Constitutional: Negative for chills and fever.   HENT: Negative for congestion, ear pain, hearing loss, rhinorrhea and sore throat.    Eyes: Negative for pain, discharge and redness.   Respiratory: Negative for cough, shortness of breath, wheezing and stridor.    Cardiovascular: Negative for chest pain, palpitations and leg swelling.   Gastrointestinal: Negative for abdominal pain, constipation, nausea and vomiting.   Endocrine: Negative for cold intolerance, heat intolerance and polyphagia.   Genitourinary: Negative for dysuria, flank pain, frequency and urgency.   Musculoskeletal: Positive for arthralgias, myalgias, neck pain and neck stiffness. Negative for joint swelling.   Skin: Negative for pallor, rash and wound.  "  Allergic/Immunologic: Negative for environmental allergies.   Neurological: Positive for weakness, light-headedness and headaches. Negative for dizziness, tremors, seizures, syncope, facial asymmetry, speech difficulty and numbness.   Hematological: Negative for adenopathy.   Psychiatric/Behavioral: Negative for confusion and hallucinations. The patient is nervous/anxious.        Objective       Vitals:    02/28/19 1442   BP: 130/80   BP Location: Left arm   Patient Position: Sitting   Cuff Size: Adult   Pulse: 94   SpO2: 98%   Weight: 74.4 kg (164 lb)   Height: 161.3 cm (63.5\")     GENERAL: Patient is pleasant, cooperative, appears to be stated age.  Body habitus is endomorphic.  HEAD:  Head is normocephalic and atraumatic.    NECK: Neck are non-tender without thyromegaly or adenopathy.  Carotic upstrokes are 1+/4.  No cranial or cervical bruits.  The neck is supple with a full range of motion.   CARDIOVASCULAR:  Regular rate and rhythm with normal S1 and S2 without rub or gallop.  RESPIRATORY:  Clear to auscultation without wheezes or crackle   PSYCH:  Higher cortical function/mental status:  The patient is alert.  She  is oriented x3 to time, place and person.  Recent and the remote memory appear normal.  The patient has a good fund of knowledge.  There is no visual or auditory hallucination or suicidal or homicidal ideation.  SPEECH:There is no gross evidence of aphasia, dysarthria or agnosia.      MOTOR: Strength is 5/5 throughout with normal tone and bulk except right upper extremity hand  is 4 out of 5.  Positive tremor with extension of her right hand and arm no tremor noted at rest.  DTR: are 3/4 and symmetrical in the upper extremities  COORDINATION AND GAIT:  The patient walks with a narrow-based gait.      Results for orders placed or performed in visit on 01/22/19   POCT rapid strep A   Result Value Ref Range    Rapid Strep A Screen Negative Negative, VALID, INVALID, Not Performed    Internal " Control Passed Passed    Lot Number [id number]     Expiration Date 3/31/2020        I have personally reviewed the above labs. Pt follows with PCP.    Assessment/Plan     1.  Cervicalgia with right upper extremity radiculopathy patient is status post surgery.  We will order an MRI of her vehicle with and without contrast were attempting to get these MRI today patient has an appointment to return to clinic for Botox injections and she will will contact Dr. Mendiola office again tomorrow at .

## 2019-03-01 ENCOUNTER — HOSPITAL ENCOUNTER (OUTPATIENT)
Dept: MRI IMAGING | Facility: HOSPITAL | Age: 54
End: 2019-03-01

## 2019-03-01 NOTE — TELEPHONE ENCOUNTER
Can you refill this prescription, [facility] will not allow me to refill because I am not authorized on this drug.

## 2019-03-05 RX ORDER — PRAMIPEXOLE DIHYDROCHLORIDE 1 MG/1
1 TABLET ORAL
Qty: 30 TABLET | Refills: 2 | Status: SHIPPED | OUTPATIENT
Start: 2019-03-05 | End: 2019-07-09 | Stop reason: SDUPTHER

## 2019-03-07 ENCOUNTER — PROCEDURE VISIT (OUTPATIENT)
Dept: NEUROLOGY | Facility: CLINIC | Age: 54
End: 2019-03-07

## 2019-03-07 VITALS
OXYGEN SATURATION: 95 % | DIASTOLIC BLOOD PRESSURE: 88 MMHG | SYSTOLIC BLOOD PRESSURE: 130 MMHG | HEART RATE: 115 BPM | WEIGHT: 164 LBS | BODY MASS INDEX: 28 KG/M2 | HEIGHT: 64 IN

## 2019-03-07 DIAGNOSIS — M50.122 CERVICAL DISC DISORDER AT C5-C6 LEVEL WITH RADICULOPATHY: Primary | ICD-10-CM

## 2019-03-07 PROCEDURE — 20553 NJX 1/MLT TRIGGER POINTS 3/>: CPT | Performed by: NURSE PRACTITIONER

## 2019-03-07 RX ORDER — BUPIVACAINE HYDROCHLORIDE 5 MG/ML
5 INJECTION, SOLUTION PERINEURAL ONCE
Status: COMPLETED | OUTPATIENT
Start: 2019-03-07 | End: 2019-03-07

## 2019-03-07 RX ORDER — METHYLPREDNISOLONE ACETATE 40 MG/ML
200 INJECTION, SUSPENSION INTRA-ARTICULAR; INTRALESIONAL; INTRAMUSCULAR; SOFT TISSUE ONCE
Status: DISCONTINUED | OUTPATIENT
Start: 2019-03-07 | End: 2019-09-26

## 2019-03-07 RX ADMIN — BUPIVACAINE HYDROCHLORIDE 5 ML: 5 INJECTION, SOLUTION PERINEURAL at 11:21

## 2019-03-07 RX ADMIN — METHYLPREDNISOLONE ACETATE 200 MG: 40 INJECTION, SUSPENSION INTRA-ARTICULAR; INTRALESIONAL; INTRAMUSCULAR; SOFT TISSUE at 11:22

## 2019-03-07 NOTE — PROGRESS NOTES
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 Risks and benefits of the Trigger point injection procedure have been explained to the patient.  Patient has no contraindications such as bleed diathesis, recent acute trauma at the muscle sites, anesthetic allergy or anticoagulation.  Mechanism of trigger point injection has been explained to patient.     Procedure Note:  1.         Patient was positioned comfortably  2.         Before injections are started, 10 Trigger Points sites are identified throughout the bilateral upper trapezius muscle, levator scapulae, and erector spinae muscles.    3.         Overlying skin area was cleaned with Alcohol swab                                                                                                              4.         Before injection, trigger points sites were palpated for local twitch and referred pain to confirms placement                         5.         Local anesthetic was mixed with Depo-Medrol (5 cc of Marcaine 0.5% mixed with 5 cc of Depo-Medrol at 40 mg/ml - for a total of 10 cc)  6.         30 gauge ½ inch needle was utilized to ensure patient comfort          7.         I started with the most tender spot in above mentioned Trigger Points   1.   Localize most tender spot within taut muscle-fibers  2.   Fix tender spot between fingers (1-2 cm in size)   1.   Prevents from rolling away from needle  2.   Controls subcutaneous bleeding  3.   Before injection, patient was instructed of possible pain on injection  4.   Direct needle at 30 degree angle off skin   8.         Insert needle into skin 1-2 cm from Trigger Point   9.         Advance needle into Trigger Point   10.       Use 1cc anesthetic at each Trigger Points identified in step #2  11.       Redirect needle and reinject                                                                                              1.   Withdraw needle to subcutaneous tissue  2.   Redirect needle  into adjacent tender areas  3.   Repeat until local twitch or tautness resolves  12.   After each of the 10 injections I held direct pressure at injection site for 2 minutes to prevents hematoma formation  13.   The same procedure was repeated for other Tender Points located in the upper trapezius muscle, levator scapulae and erector spinae bilaterally  14.   Patient was instructed to gently stretch injected areas to full active range of motion in all directions and was instructed to repeat range of motion three times after injection  15.   Post-Procedure patient was instructed to avoid over-using injected area for 3-4 days   1.   Maintain active range of motion of injected muscle  2.   Patient applies ice to injected areas for a few hours  3.   Anticipate post-injection soreness for 3-4 days  There was no evidence of complications from injection was noted such as local Skin Infection  at injection site or local hematoma at injection site    Pt had MRI reviewed + C5-C6 moderate severe narrowing with arm and hand sxs. Consult neurosurgery / Chai

## 2019-03-09 RX ORDER — ESTERIFIED ESTROGEN AND METHYLTESTOSTERONE .625; 1.25 MG/1; MG/1
1 TABLET ORAL DAILY
Qty: 30 TABLET | Refills: 5 | OUTPATIENT
Start: 2019-03-09

## 2019-03-15 ENCOUNTER — OFFICE VISIT (OUTPATIENT)
Dept: FAMILY MEDICINE CLINIC | Facility: CLINIC | Age: 54
End: 2019-03-15

## 2019-03-15 VITALS
DIASTOLIC BLOOD PRESSURE: 92 MMHG | OXYGEN SATURATION: 99 % | BODY MASS INDEX: 28 KG/M2 | WEIGHT: 164 LBS | HEIGHT: 64 IN | HEART RATE: 97 BPM | SYSTOLIC BLOOD PRESSURE: 128 MMHG

## 2019-03-15 DIAGNOSIS — I10 ESSENTIAL HYPERTENSION: Primary | ICD-10-CM

## 2019-03-15 DIAGNOSIS — Z86.73 HISTORY OF TIA (TRANSIENT ISCHEMIC ATTACK): ICD-10-CM

## 2019-03-15 DIAGNOSIS — R51.9 CHRONIC INTRACTABLE HEADACHE, UNSPECIFIED HEADACHE TYPE: ICD-10-CM

## 2019-03-15 DIAGNOSIS — R15.9 INCONTINENCE OF FECES, UNSPECIFIED FECAL INCONTINENCE TYPE: ICD-10-CM

## 2019-03-15 DIAGNOSIS — G89.29 CHRONIC INTRACTABLE HEADACHE, UNSPECIFIED HEADACHE TYPE: ICD-10-CM

## 2019-03-15 DIAGNOSIS — R25.1 TREMOR OF RIGHT HAND: ICD-10-CM

## 2019-03-15 DIAGNOSIS — F33.9 RECURRENT MAJOR DEPRESSION RESISTANT TO TREATMENT (HCC): ICD-10-CM

## 2019-03-15 DIAGNOSIS — R22.1 NECK SWELLING: ICD-10-CM

## 2019-03-15 DIAGNOSIS — G89.4 CHRONIC PAIN SYNDROME: ICD-10-CM

## 2019-03-15 DIAGNOSIS — G47.19 EXCESSIVE DAYTIME SLEEPINESS: ICD-10-CM

## 2019-03-15 DIAGNOSIS — M47.812 CERVICAL ARTHRITIS: ICD-10-CM

## 2019-03-15 DIAGNOSIS — R29.898 RIGHT ARM WEAKNESS: ICD-10-CM

## 2019-03-15 DIAGNOSIS — R73.01 IFG (IMPAIRED FASTING GLUCOSE): ICD-10-CM

## 2019-03-15 DIAGNOSIS — F43.10 PTSD (POST-TRAUMATIC STRESS DISORDER): ICD-10-CM

## 2019-03-15 DIAGNOSIS — M54.2 NECK PAIN ON RIGHT SIDE: ICD-10-CM

## 2019-03-15 DIAGNOSIS — Z51.81 MEDICATION MONITORING ENCOUNTER: ICD-10-CM

## 2019-03-15 PROCEDURE — 96372 THER/PROPH/DIAG INJ SC/IM: CPT | Performed by: FAMILY MEDICINE

## 2019-03-15 PROCEDURE — 99214 OFFICE O/P EST MOD 30 MIN: CPT | Performed by: FAMILY MEDICINE

## 2019-03-15 RX ORDER — METHYLPHENIDATE HYDROCHLORIDE 10 MG/1
TABLET ORAL
Qty: 60 TABLET | Refills: 0 | Status: SHIPPED | OUTPATIENT
Start: 2019-03-15 | End: 2019-04-20 | Stop reason: SDUPTHER

## 2019-03-15 RX ORDER — KETOROLAC TROMETHAMINE 30 MG/ML
60 INJECTION, SOLUTION INTRAMUSCULAR; INTRAVENOUS ONCE
Status: COMPLETED | OUTPATIENT
Start: 2019-03-15 | End: 2019-03-15

## 2019-03-15 RX ORDER — DIAZEPAM 2 MG/1
2 TABLET ORAL EVERY 8 HOURS PRN
Qty: 30 TABLET | Refills: 2 | Status: SHIPPED | OUTPATIENT
Start: 2019-03-15 | End: 2019-06-16 | Stop reason: SDUPTHER

## 2019-03-15 RX ADMIN — KETOROLAC TROMETHAMINE 60 MG: 30 INJECTION, SOLUTION INTRAMUSCULAR; INTRAVENOUS at 12:32

## 2019-03-15 NOTE — PROGRESS NOTES
"Subjective   Tatiana Fields is a 53 y.o. female.     History of Present Illness  Mrs. Fields presents today for routine follow-up on several chronic medical problems.     She has chronic excessive daytime sleepiness.  This is in relation to her chronic depression, fibromyalgia.  She previously been on Provigil.  Felt it worked well at first but declining efficacy.  She had a trial of short acting Ritalin which was more effective. She has cont'd ritalin. Symptoms have worse during the winter.  Some improvement with time change. She has comorbid severe recurrent major depressive disorder somewhat resistant to treatment and associated with significant somatization.  She is currently on Prozac which has generally worked well for her.  She denies any suicidal ideation.  No manic or psychotic features.     She struggles with chronic migraine which is currently under management of neurology.  This is in association with comorbid cervical degenerative disc disease for which she has had C-spine surgery.  She continues to have a \"drawing up\" of her right hand, tremor in right hand, chronic spasm in the right shoulder and upper back, numbness in 4th, 5th digits right hand. Having persistent pain in anterior right neck, supraclavicular area assoc'd with swelling.  She has also noted intermittent swelling into the arm and right hand.  She reports at least 2 episodes of rectal incontinence during the night while sleeping over the past month.  She is undergoing treatment with Botox as well as trigger point injections for tx of migraines. Uses Imitrex for abortive therapy. She has f/u apt with her neurosurgeon Dr. Mendiola next week.  Of note, mother with history of recurrent DVT.    Had MRI C spine with/without ordered per neurology. Reviewed and scanned to chart.  Showed moderate disc space narrowing and marginal osteophyte formation C4-C5, C5-C6.  Diffuse congenital canal stenosis with AP diameter of the canal at C3 measuring 10 " "mm.  She has multilevel facet arthritis.  When asked if she had ever been evaluated for possible MS she reports having had an MRI several years ago for workup of possible pituitary mass that she was having nipple discharge.  Recalls having an MRI approximate 5 years ago with reported possible chronic vascular changes with possible previous \"multiple TIAs\".     She is also followed by pain management.  Has been on chronic low/moderate doses of opioid analgesic but is wishing to limit use and therefore has recently transitioned to tramadol.     She was noted to have prediabetes/impaired fasting glucose on previous labs.    Most recent A1c stable.    She has hypertension for which she is taking her medications as prescribed.  Not currently exercising due to pain/depression.  Not following heart healthy diet again she feels due to depression, trouble motivating etc.    The following portions of the patient's history were reviewed and updated as appropriate: allergies, current medications, past family history, past medical history, past social history, past surgical history and problem list.    Review of Systems   Constitutional: Positive for fatigue. Negative for appetite change, fever and unexpected weight change.   HENT: Positive for congestion, postnasal drip and sinus pressure. Negative for ear pain, hearing loss, nosebleeds, rhinorrhea, sneezing, sore throat, tinnitus and trouble swallowing.    Eyes: Negative for pain, discharge and redness.        Dry eyes   Respiratory: Negative for cough, chest tightness, shortness of breath and wheezing.    Cardiovascular: Positive for palpitations. Negative for chest pain and leg swelling.   Gastrointestinal: Positive for abdominal pain (chronic), constipation and nausea. Negative for blood in stool, diarrhea and vomiting.   Endocrine: Positive for heat intolerance. Negative for cold intolerance, polydipsia and polyuria.   Genitourinary: Positive for pelvic pain (chronic). " Negative for dysuria, hematuria and vaginal discharge.        Vaginal dryness   Musculoskeletal: Positive for arthralgias, myalgias, neck pain and neck stiffness. Negative for back pain and joint swelling.   Skin: Negative for rash and wound.   Neurological: Positive for tremors, weakness and headaches. Negative for dizziness, seizures, syncope and speech difficulty.   Hematological: Negative for adenopathy. Bruises/bleeds easily.   Psychiatric/Behavioral: Positive for dysphoric mood and sleep disturbance. Negative for confusion and suicidal ideas. The patient is nervous/anxious.        Objective    Vitals:    03/15/19 1125   BP: 128/92   Pulse: 97   SpO2: 99%     Body mass index is 28.6 kg/m².      03/15/19  1125   Weight: 74.4 kg (164 lb)       Physical Exam   Constitutional: She is oriented to person, place, and time. She appears well-developed and well-nourished. She is cooperative. She does not appear ill. No distress.   HENT:   Head: Normocephalic and atraumatic.   Mouth/Throat: Mucous membranes are normal. Mucous membranes are not dry.   Eyes: Conjunctivae are normal. Right eye exhibits no nystagmus. Left eye exhibits no nystagmus.   Mild dropping right upper eyelid   Neck: Phonation normal. Neck supple. Normal carotid pulses present. No thyroid mass and no thyromegaly present.       Cardiovascular: Normal rate, regular rhythm, normal heart sounds and intact distal pulses. Exam reveals no gallop.   No murmur heard.  Pulmonary/Chest: Effort normal and breath sounds normal.   Musculoskeletal:        Cervical back: She exhibits decreased range of motion, tenderness (diffuse soft tissue, worst on right side), bony tenderness (C5-C7), deformity (loss of normal lordosis) and spasm.     Vascular Status -  Her right foot exhibits no edema. Her left foot exhibits no edema.  Lymphadenopathy:     She has no cervical adenopathy.   Neurological: She is alert and oriented to person, place, and time. She displays no tremor.  No sensory deficit. Gait normal.   Reflex Scores:       Tricep reflexes are 1+ on the right side and 1+ on the left side.       Bicep reflexes are 1+ on the right side and 1+ on the left side.       Brachioradialis reflexes are 1+ on the right side and 1+ on the left side.  Tremor right hand, worse with intention. Mild weakness right arm compared to left.   Skin: Skin is warm and dry. No bruising and no rash noted.   Psychiatric: Her speech is normal and behavior is normal. Thought content normal. Her mood appears anxious. Cognition and memory are normal. She exhibits a depressed mood.   Good eye contact. Answers questions appropriately. Good personal hygiene and grooming.   Nursing note and vitals reviewed.    Lab Results   Component Value Date    WBC 4.9 06/21/2018    HGB 12.8 06/21/2018    HCT 39.2 06/21/2018    MCV 87 06/21/2018     06/21/2018     Lab Results   Component Value Date    GLUCOSE 92 01/04/2014    BUN 17 06/21/2018    CREATININE 0.80 02/28/2019    EGFRIFNONA 80 06/21/2018    EGFRIFAFRI 92 06/21/2018    BCR 20 06/21/2018    K 4.3 06/21/2018    CO2 26 06/21/2018    CALCIUM 9.1 06/21/2018    PROTENTOTREF 6.8 06/21/2018    ALBUMIN 4.4 06/21/2018    LABIL2 1.8 06/21/2018    AST 18 06/21/2018    ALT 15 06/21/2018     Lab Results   Component Value Date    SEDRATE 4 06/21/2018     Lab Results   Component Value Date    HGBA1C 5.6 01/16/2019     Lab Results   Component Value Date    TSH 4.020 06/21/2018         Assessment/Plan   Tatiana was seen today for depression, fibromyalgia, hypertension, ptsd and headache.    Diagnoses and all orders for this visit:    Essential hypertension    IFG (impaired fasting glucose)    PTSD (post-traumatic stress disorder)  -     diazePAM (VALIUM) 2 MG tablet; Take 1 tablet by mouth Every 8 (Eight) Hours As Needed for Anxiety or Muscle Spasms.    Medication monitoring encounter    Excessive daytime sleepiness  -     methylphenidate (RITALIN) 10 MG tablet; TAKE 1 TABLET  (10MG) BY MOUTH EVERY MORNING AND TAKE 1 TABLET (10MG) EACH AFTERNOON    Cervical arthritis  -     diazePAM (VALIUM) 2 MG tablet; Take 1 tablet by mouth Every 8 (Eight) Hours As Needed for Anxiety or Muscle Spasms.    Recurrent major depression resistant to treatment (CMS/HCC)  -     methylphenidate (RITALIN) 10 MG tablet; TAKE 1 TABLET (10MG) BY MOUTH EVERY MORNING AND TAKE 1 TABLET (10MG) EACH AFTERNOON    Chronic pain syndrome  -     ketorolac (TORADOL) injection 60 mg; Inject 2 mL into the appropriate muscle as directed by prescriber 1 (One) Time.    Right arm weakness  -     MRI Brain Without Contrast; Future    Tremor of right hand  -     MRI Brain Without Contrast; Future    Chronic intractable headache, unspecified headache type  -     MRI Brain Without Contrast; Future    Incontinence of feces, unspecified fecal incontinence type  -     MRI Brain Without Contrast; Future    History of TIA (transient ischemic attack)  -     MRI Brain Without Contrast; Future    Neck pain on right side  -     CT soft tissue neck w contrast; Future    Neck swelling  -     CT soft tissue neck w contrast; Future      Hypertension generally well controlled.  Elevated blood pressure today possibly due to increased anxiety, pain.  Continue close monitoring.  She is encouraged to continue HCTZ, lisinopril and work on improving diet.    Impaired fasting glucose stable.  Encouraged to increase physical activity and follow diabetic appropriate diet.    Chronic anxiety with depression generally stable.  She is encouraged to continue counseling services. Chronic depression associated with severe fatigue, excessive daytime sleepiness.  Continue Ritalin as prescribed.  Denies side effects.  Good improvement in overall symptomatology.    Multifactorial chronic pain syndrome with chronic migraine, cervical DDD/DJD, fibromyalgia.  Toradol injection as above.  Otherwise follow with pain management as scheduled.  She is currently on opioid  analgesic, muscle relaxant, NSAID.    Cervical DDD/DJD with persistent neck pain and right upper arm weakness, muscle spasm etc.  Follow with Dr. Mendiola next week as scheduled.    Neck/supraclavicular swelling associated with right arm swelling.  CT soft tissue neck with possible right upper extremity ultrasound if CT negative.    MR of the brain without contrast due to reported history of TIA and persistent neurological symptoms including chronic headache, right upper extremity pain/weakness/contracture/tremor, and rectal incontinence.    Routine follow-up 3 months, sooner as needed/instructed.  I will contact patient regarding test results and provide instructions regarding any necessary changes in plan of care.  Patient was encouraged to keep me informed of any acute changes, lack of improvement, or any new concerning symptoms.  Pt is aware of reasons to seek emergent care.  Patient voiced understanding of all instructions and denied further questions.  As part of patient's treatment plan I am prescribing a controlled substance.  The patient has been made aware of the appropriate use of such medications, including potential risk of somnolence, limited ability to drive and/or work safely, and potential for dependence and/or overdose.  It has also been made clear that these medications are for use by this patient only, without concomitant use of alcohol or other substances, unless prescribed.  KIMBERLY report reviewed and scanned into chart.  Last KIMBERLY date 3/15/19.        Please note that portions of this note may have been completed with a voice recognition program. Efforts were made to edit the dictations, but occasionally words are mistranscribed.

## 2019-03-18 RX ORDER — LISINOPRIL 10 MG/1
TABLET ORAL
Qty: 60 TABLET | Refills: 2 | Status: SHIPPED | OUTPATIENT
Start: 2019-03-18 | End: 2019-07-03 | Stop reason: SDUPTHER

## 2019-03-22 ENCOUNTER — HOSPITAL ENCOUNTER (OUTPATIENT)
Dept: CT IMAGING | Facility: HOSPITAL | Age: 54
Discharge: HOME | End: 2019-03-22

## 2019-03-22 ENCOUNTER — HOSPITAL ENCOUNTER (OUTPATIENT)
Dept: MRI IMAGING | Facility: HOSPITAL | Age: 54
Discharge: HOME | End: 2019-03-22
Admitting: FAMILY MEDICINE

## 2019-03-22 DIAGNOSIS — R29.898 RIGHT ARM WEAKNESS: ICD-10-CM

## 2019-03-22 DIAGNOSIS — R15.9 INCONTINENCE OF FECES, UNSPECIFIED FECAL INCONTINENCE TYPE: ICD-10-CM

## 2019-03-22 DIAGNOSIS — Z86.73 HISTORY OF TIA (TRANSIENT ISCHEMIC ATTACK): ICD-10-CM

## 2019-03-22 DIAGNOSIS — M54.2 NECK PAIN ON RIGHT SIDE: ICD-10-CM

## 2019-03-22 DIAGNOSIS — R22.1 NECK SWELLING: ICD-10-CM

## 2019-03-22 DIAGNOSIS — R51.9 CHRONIC INTRACTABLE HEADACHE, UNSPECIFIED HEADACHE TYPE: ICD-10-CM

## 2019-03-22 DIAGNOSIS — R25.1 TREMOR OF RIGHT HAND: ICD-10-CM

## 2019-03-22 DIAGNOSIS — G89.29 CHRONIC INTRACTABLE HEADACHE, UNSPECIFIED HEADACHE TYPE: ICD-10-CM

## 2019-03-22 PROCEDURE — 70491 CT SOFT TISSUE NECK W/DYE: CPT

## 2019-03-22 PROCEDURE — 70551 MRI BRAIN STEM W/O DYE: CPT

## 2019-03-22 PROCEDURE — 25010000002 IOPAMIDOL 61 % SOLUTION: Performed by: FAMILY MEDICINE

## 2019-03-22 RX ADMIN — IOPAMIDOL 100 ML: 612 INJECTION, SOLUTION INTRAVENOUS at 16:38

## 2019-04-03 RX ORDER — LORATADINE AND PSEUDOEPHEDRINE SULFATE 10; 240 MG/1; MG/1
TABLET, FILM COATED, EXTENDED RELEASE ORAL
Qty: 30 TABLET | Refills: 2 | Status: SHIPPED | OUTPATIENT
Start: 2019-04-03 | End: 2019-10-22 | Stop reason: SDUPTHER

## 2019-04-04 RX ORDER — ONDANSETRON 4 MG/1
TABLET, FILM COATED ORAL
Qty: 30 TABLET | Refills: 0 | Status: SHIPPED | OUTPATIENT
Start: 2019-04-04 | End: 2019-05-03 | Stop reason: SDUPTHER

## 2019-04-05 RX ORDER — FLUCONAZOLE 150 MG/1
TABLET ORAL
Qty: 2 TABLET | Refills: 0 | Status: SHIPPED | OUTPATIENT
Start: 2019-04-05 | End: 2019-04-23

## 2019-04-20 DIAGNOSIS — G47.19 EXCESSIVE DAYTIME SLEEPINESS: ICD-10-CM

## 2019-04-20 DIAGNOSIS — F33.9 RECURRENT MAJOR DEPRESSION RESISTANT TO TREATMENT (HCC): ICD-10-CM

## 2019-04-22 ENCOUNTER — TELEPHONE (OUTPATIENT)
Dept: NEUROLOGY | Facility: CLINIC | Age: 54
End: 2019-04-22

## 2019-04-22 RX ORDER — METHYLPHENIDATE HYDROCHLORIDE 10 MG/1
TABLET ORAL
Qty: 60 TABLET | Refills: 0 | Status: SHIPPED | OUTPATIENT
Start: 2019-04-22 | End: 2019-05-21 | Stop reason: SDUPTHER

## 2019-04-22 RX ORDER — SUMATRIPTAN 100 MG/1
TABLET, FILM COATED ORAL
Qty: 9 TABLET | Refills: 3 | Status: SHIPPED | OUTPATIENT
Start: 2019-04-22 | End: 2019-05-03 | Stop reason: SDUPTHER

## 2019-04-22 NOTE — TELEPHONE ENCOUNTER
A NEUROSURGERY REFERRAL WAS SENT TO [facility] BUT THEY HAVE DECLINED TO SEE PATIENT. PLEASE ADVISE.

## 2019-04-22 NOTE — TELEPHONE ENCOUNTER
Pt called back to let us know that she is an existing pt with Dr. Mendiola and she has made an apt with that office. No further assistance needed for this.

## 2019-04-23 ENCOUNTER — TELEMEDICINE (OUTPATIENT)
Dept: FAMILY MEDICINE CLINIC | Facility: TELEHEALTH | Age: 54
End: 2019-04-23

## 2019-04-23 DIAGNOSIS — J01.00 ACUTE MAXILLARY SINUSITIS, RECURRENCE NOT SPECIFIED: Primary | ICD-10-CM

## 2019-04-23 PROCEDURE — VIDEOVISIT: Performed by: NURSE PRACTITIONER

## 2019-04-23 RX ORDER — FLUCONAZOLE 150 MG/1
TABLET ORAL
Qty: 2 TABLET | Refills: 0 | Status: SHIPPED | OUTPATIENT
Start: 2019-04-23 | End: 2019-06-20

## 2019-04-23 RX ORDER — FLUTICASONE PROPIONATE 50 MCG
2 SPRAY, SUSPENSION (ML) NASAL DAILY
Qty: 1 BOTTLE | Refills: 0 | Status: SHIPPED | OUTPATIENT
Start: 2019-04-23 | End: 2019-08-07 | Stop reason: SDUPTHER

## 2019-04-23 RX ORDER — AMOXICILLIN 875 MG/1
875 TABLET, COATED ORAL 2 TIMES DAILY
Qty: 20 TABLET | Refills: 0 | Status: SHIPPED | OUTPATIENT
Start: 2019-04-23 | End: 2019-05-03

## 2019-04-23 NOTE — PATIENT INSTRUCTIONS
Sinusitis, Adult  Sinusitis is soreness and inflammation of your sinuses. Sinuses are hollow spaces in the bones around your face. Your sinuses are located:  · Around your eyes.  · In the middle of your forehead.  · Behind your nose.  · In your cheekbones.    Your sinuses and nasal passages are lined with a stringy fluid (mucus). Mucus normally drains out of your sinuses. When your nasal tissues become inflamed or swollen, mucus can become trapped or blocked. Blocked or trapped mucus makes it difficult for air to flow through your sinuses. This allows bacteria, viruses, and funguses to grow, which leads to infection. Most infections of the sinuses are caused by a virus.  Sinusitis can develop quickly. It can last for 7?10 days (acute) or for more than 12 weeks (chronic). Sinusitis often develops after a cold.  What are the causes?  This condition is caused by anything that creates swelling in the sinuses or stops mucus from draining, including:  · Allergies.  · Asthma.  · Bacterial or viral infection.  · Abnormally shaped bones between the nasal passages.  · Nasal growths that contain mucus (nasal polyps).  · Narrow sinus openings.  · Pollutants, such as chemicals or irritants in the air.  · A foreign object stuck in the nose.  · A fungal infection. This is rare.    What increases the risk?  The following factors may make you more likely to develop this condition:  · Having allergies or asthma.  · Having had a recent cold or respiratory tract infection.  · Having structural deformities or blockages in your nose or sinuses.  · Having a weak immune system.  · Doing a lot of swimming or diving.  · Overusing nasal sprays.  · Smoking.    What are the signs or symptoms?  The main symptoms of this condition are pain and a feeling of pressure around the affected sinuses. Other symptoms include:  · Upper toothache.  · Earache.  · Headache.  · Bad breath.  · Decreased sense of smell and taste.  · A cough that may get worse at  night.  · Fatigue.  · Fever.  · Thick drainage from your nose. The drainage is often green and it may contain pus (purulent).  · Stuffy nose or congestion.  · Postnasal drip. This is when extra mucus collects in the throat or back of the nose.  · Swelling and warmth over the affected sinuses.  · Sore throat.  · Sensitivity to light.    How is this diagnosed?  This condition is diagnosed based on symptoms, a medical history, and a physical exam. To find out if your condition is acute or chronic, your health care provider may:  · Look in your nose for signs of nasal polyps.  · Tap over the affected sinus to check for signs of infection.  · View the inside of your sinuses using an imaging device that has a light attached (endoscope).    If your health care provider suspects that you have chronic sinusitis, you may also:  · Be tested for allergies.  · Have a sample of mucus taken from your nose (nasal culture) and checked for bacteria.  · Have a mucus sample examined to see if your sinusitis is related to an allergy.    If your sinusitis does not respond to treatment and it lasts longer than 8 weeks, you may have an MRI or CT scan to check your sinuses. These scans also help to determine how severe your infection is.  In rare cases, a bone biopsy may be done to rule out more serious types of fungal sinus disease.  How is this treated?  Treatment for sinusitis depends on the cause and whether your condition is chronic or acute. If a virus is causing your sinusitis, your symptoms will go away on their own within 10 days. You may be given medicines to relieve your symptoms, including:  · Topical nasal decongestants. They shrink swollen nasal passages and let mucus drain from your sinuses.  · Antihistamines. These drugs block inflammation that is triggered by allergies. This can help to ease swelling in your nose and sinuses.  · Topical nasal corticosteroids. These are nasal sprays that ease inflammation and swelling in  your nose and sinuses.  · Nasal saline washes. These rinses can help to get rid of thick mucus in your nose.    If your condition is caused by bacteria, your health care provider may recommend waiting to see if your symptoms improve. Most bacterial infections will get better without antibiotic medicine. If you have a severe infection or a weak immune system, you may be prescribed antibiotics.  Surgery may be needed to correct underlying conditions, such as narrow nasal passages. Surgery may also be needed to remove polyps.  Follow these instructions at home:  Medicines  · Take, use, or apply over-the-counter and prescription medicines only as told by your health care provider. These may include nasal sprays.  · If you were prescribed an antibiotic, take it as told by your health care provider. Do not stop taking the antibiotic even if you start to feel better.  Hydrate and Humidify  · Drink enough water to keep your urine clear or pale yellow. Staying hydrated will help to thin your mucus.  · Use a cool mist humidifier to keep the humidity level in your home above 50%.  · Inhale steam for 10-15 minutes, 3-4 times a day or as told by your health care provider. You can do this in the bathroom while a hot shower is running.  · Limit your exposure to cool or dry air.  Rest  · Rest as much as possible.  · Sleep with your head raised (elevated).  · Make sure to get enough sleep each night.  General instructions  · Apply a warm, moist washcloth to your face 3-4 times a day or as told by your health care provider. This will help with discomfort.  · Wash your hands often with soap and water to reduce your exposure to viruses and other germs. If soap and water are not available, use hand .  · Do not smoke. Avoid being around people who are smoking (secondhand smoke).  · Keep all follow-up visits as told by your health care provider. This is important.  Contact a health care provider if:  · You have a fever.  · Your  symptoms get worse.  · Your symptoms do not improve within 10 days.  Get help right away if:  · You have a severe headache.  · You have persistent vomiting.  · You have pain or swelling around your face or eyes.  · You have vision problems.  · You develop confusion.  · Your neck is stiff.  · You have trouble breathing.  This information is not intended to replace advice given to you by your health care provider. Make sure you discuss any questions you have with your health care provider.  Document Released: 12/18/2006 Document Revised: 06/28/2018 Document Reviewed: 10/12/2016  [employer] Interactive Patient Education © 2019 [employer]    Upper Respiratory Infection, Adult  An upper respiratory infection (URI) is a common viral infection of the nose, throat, and upper air passages that lead to the lungs. The most common type of URI is the common cold. URIs usually get better on their own, without medical treatment.  What are the causes?  A URI is caused by a virus. You may catch a virus by:  · Breathing in droplets from an infected person's cough or sneeze.  · Touching something that has been exposed to the virus (contaminated) and then touching your mouth, nose, or eyes.    What increases the risk?  You are more likely to get a URI if:  · You are very young or very old.  · It is reuben or winter.  · You have close contact with others, such as at a , school, or health care facility.  · You smoke.  · You have long-term (chronic) heart or lung disease.  · You have a weakened disease-fighting (immune) system.  · You have nasal allergies or asthma.  · You are experiencing a lot of stress.  · You work in an area that has poor air circulation.  · You have poor nutrition.    What are the signs or symptoms?  A URI usually involves some of the following symptoms:  · Runny or stuffy (congested) nose.  · Sneezing.  · Cough.  · Sore throat.  · Headache.  · Fatigue.  · Fever.  · Loss of appetite.  · Pain in your forehead,  behind your eyes, and over your cheekbones (sinus pain).  · Muscle aches.  · Redness or irritation of the eyes.  · Pressure in the ears or face.    How is this diagnosed?  This condition may be diagnosed based on your medical history and symptoms, and a physical exam. Your health care provider may use a cotton swab to take a mucus sample from your nose (nasal swab). This sample can be tested to determine what virus is causing the illness.  How is this treated?  URIs usually get better on their own within 7-10 days. You can take steps at home to relieve your symptoms. Medicines cannot cure URIs, but your health care provider may recommend certain medicines to help relieve symptoms, such as:  · Over-the-counter cold medicines.  · Cough suppressants. Coughing is a type of defense against infection that helps to clear the respiratory system, so take these medicines only as recommended by your health care provider.  · Fever-reducing medicines.    Follow these instructions at home:  Activity  · Rest as needed.  · If you have a fever, stay home from work or school until your fever is gone or until your health care provider says you are no longer contagious. Your health care provider may have you wear a face mask to prevent your infection from spreading.  Relieving symptoms  · Gargle with a salt-water mixture 3-4 times a day or as needed. To make a salt-water mixture, completely dissolve ½-1 tsp of salt in 1 cup of warm water.  · Use a cool-mist humidifier to add moisture to the air. This can help you breathe more easily.  Eating and drinking  · Drink enough fluid to keep your urine pale yellow.  · Eat soups and other clear broths.  General instructions  · Take over-the-counter and prescription medicines only as told by your health care provider. These include cold medicines, fever reducers, and cough suppressants.  · Do not use any products that contain nicotine or tobacco, such as cigarettes and e-cigarettes. If you need  help quitting, ask your health care provider.  · Stay away from secondhand smoke.  · Stay up to date on all immunizations, including the yearly (annual) flu vaccine.  · Keep all follow-up visits as told by your health care provider. This is important.  How to prevent the spread of infection to others  · URIs can be passed from person to person (are contagious). To prevent the infection from spreading:  ? Wash your hands often with soap and water. If soap and water are not available, use hand .  ? Avoid touching your mouth, face, eyes, or nose.  ? Cough or sneeze into a tissue or your sleeve or elbow instead of into your hand or into the air.  Contact a health care provider if:  · You are getting worse instead of better.  · You have a fever or chills.  · Your mucus is brown or red.  · You have yellow or brown discharge coming from your nose.  · You have pain in your face, especially when you bend forward.  · You have swollen neck glands.  · You have pain while swallowing.  · You have white areas in the back of your throat.  Get help right away if:  · You have shortness of breath that gets worse.  · You have severe or persistent:  ? Headache.  ? Ear pain.  ? Sinus pain.  ? Chest pain.  · You have chronic lung disease along with any of the following:  ? Wheezing.  ? Prolonged cough.  ? Coughing up blood.  ? A change in your usual mucus.  · You have a stiff neck.  · You have changes in your:  ? Vision.  ? Hearing.  ? Thinking.  ? Mood.  Summary  · An upper respiratory infection (URI) is a common infection of the nose, throat, and upper air passages that lead to the lungs.  · A URI is caused by a virus.  · URIs usually get better on their own within 7-10 days.  · Medicines cannot cure URIs, but your health care provider may recommend certain medicines to help relieve symptoms.  This information is not intended to replace advice given to you by your health care provider. Make sure you discuss any questions you  have with your health care provider.  Document Released: 06/13/2002 Document Revised: 08/03/2018 Document Reviewed: 08/03/2018  [employer] Interactive Patient Education © 2019 [employer]    Sore Throat  A sore throat is pain, burning, irritation, or scratchiness in the throat. When you have a sore throat, you may feel pain or tenderness in your throat when you swallow or talk.  Many things can cause a sore throat, including:  · An infection.  · Seasonal allergies.  · Dryness in the air.  · Irritants, such as smoke or pollution.  · Gastroesophageal reflux disease (GERD).  · A tumor.    A sore throat is often the first sign of another sickness. It may happen with other symptoms, such as coughing, sneezing, fever, and swollen neck glands. Most sore throats go away without medical treatment.  Follow these instructions at home:  · Take over-the-counter medicines only as told by your health care provider.  · Drink enough fluids to keep your urine clear or pale yellow.  · Rest as needed.  · To help with pain, try:  ? Sipping warm liquids, such as broth, herbal tea, or warm water.  ? Eating or drinking cold or frozen liquids, such as frozen ice pops.  ? Gargling with a salt-water mixture 3-4 times a day or as needed. To make a salt-water mixture, completely dissolve ½-1 tsp of salt in 1 cup of warm water.  ? Sucking on hard candy or throat lozenges.  ? Putting a cool-mist humidifier in your bedroom at night to moisten the air.  ? Sitting in the bathroom with the door closed for 5-10 minutes while you run hot water in the shower.  · Do not use any tobacco products, such as cigarettes, chewing tobacco, and e-cigarettes. If you need help quitting, ask your health care provider.  Contact a health care provider if:  · You have a fever for more than 2-3 days.  · You have symptoms that last (are persistent) for more than 2-3 days.  · Your throat does not get better within 7 days.  · You have a fever and your symptoms suddenly get  worse.  Get help right away if:  · You have difficulty breathing.  · You cannot swallow fluids, soft foods, or your saliva.  · You have increased swelling in your throat or neck.  · You have persistent nausea and vomiting.  This information is not intended to replace advice given to you by your health care provider. Make sure you discuss any questions you have with your health care provider.  Document Released: 01/25/2006 Document Revised: 08/13/2017 Document Reviewed: 10/07/2016  [employer] Interactive Patient Education © 2019 [employer]

## 2019-04-23 NOTE — PROGRESS NOTES
CHIEF COMPLAINT  Chief Complaint   Patient presents with   • URI   • Sore Throat       HPI  Tatiana Fields is a 53 y.o. female  presents with complaint of 7 day hx of pressure/pain in both cheeks, PND, occ cough to clear phlegm that is yellow/green; nasal congestion with yellow/green occ bloody nasal d/c, ears are popping, headache; has been taking naproxen and claritin D; fatigue; has hx of chronic sinusitis    Review of Systems   Denies fever, chills, decreased appetite, cough, chest    tightness, wheezing, SOA, body aches, n/v/d    Pertinent ROS noted in HPI    Past Medical History:   Diagnosis Date   • Anemia    • Arthritis    • Cervical spinal stenosis    • Chronic fatigue    • DDD (degenerative disc disease), lumbar    • Depression    • Endometriosis    • Hyperlipidemia    • Hypertension    • Irritable bowel syndrome    • Lyme disease 2012    tx'd with prolonged course abx   • Narcolepsy     listed in medical records, tx'd with provigil   • Ovarian cyst    • Shingles 2/15/2013   • Sicca syndrome (CMS/HCC)    • Vitamin D deficiency        Family History   Problem Relation Age of Onset   • Inflammatory bowel disease Mother    • Hypertension Mother    • Heart disease Mother    • Arthritis Mother    • Hyperlipidemia Mother    • Diabetes Mother    • Hypertension Father    • Heart attack Father    • Hyperlipidemia Father    • Cancer Sister    • Bone cancer Sister    • Colon cancer Other    • Hyperlipidemia Brother    • Colon cancer Brother    • Diabetes Maternal Grandmother    • Diabetes Paternal Grandmother        Social History     Socioeconomic History   • Marital status:      Spouse name: Not on file   • Number of children: Not on file   • Years of education: Not on file   • Highest education level: Not on file   Tobacco Use   • Smoking status: Never Smoker   • Smokeless tobacco: Never Used   Substance and Sexual Activity   • Alcohol use: No   • Drug use: No   • Sexual activity: Defer     Birth  control/protection: Surgical         LMP  (LMP Unknown)     PHYSICAL EXAM  Physical Exam   Constitutional: She is oriented to person, place, and time. She appears well-developed and well-nourished. She is cooperative.   HENT:   Head: Normocephalic and atraumatic.   Neurological: She is alert and oriented to person, place, and time.   Skin: Skin is intact.         Problem List Items Addressed This Visit     None      Visit Diagnoses     Acute maxillary sinusitis, recurrence not specified    -  Primary    Relevant Medications    amoxicillin (AMOXIL) 875 MG tablet    fluticasone (FLONASE) 50 MCG/ACT nasal spray    fluconazole (DIFLUCAN) 150 MG tablet        --pt instructions for home care of sinusitis, upper respiratory infection, and sore throat are located in My Chart    --encouraged to increase fluid intake to maintain hydration and keep mucous membranes moist; warm salt water gargles for sore throat; suggest sinus rinses to clear sinus cavities; also warm compresses across eyes and cheeks for comfort; may continue Aleve and Claritin D    --instructed to f/u with Dr. Mills if worsening cough, SOA, wheezing, or worrisome symptoms occur.    FOLLOW-UP  5-7 days with Dr. Amber Mills if no improvement or sooner if worsening of symptoms    Patient verbalizes understanding of medication dosage, comfort measures, instructions for treatment and follow-up.    SOULEYMANE Maria  04/23/2019  12:39 PM    Virtual visit via My Chart video conference:  Due to the inability to perform a vital signs or a physical exam, treatment is guided by information provided by the patient during the visit; all patients are instructed to follow-up with their primary care provider if their symptoms worsen or the treatment provided does not resolve their illness; if the illness and/or symptoms warrant the need for an in-person visit for further evaluation, the patient will be notified of the provider's concern.  A copy of the visit progress  note has been sent to the patient's primary care provider if they are within the Epic database.

## 2019-05-02 RX ORDER — CYANOCOBALAMIN 1000 UG/ML
1000 INJECTION, SOLUTION INTRAMUSCULAR; SUBCUTANEOUS
Qty: 2 ML | Refills: 5 | Status: SHIPPED | OUTPATIENT
Start: 2019-05-02 | End: 2019-05-03 | Stop reason: SDUPTHER

## 2019-05-06 RX ORDER — CYANOCOBALAMIN 1000 UG/ML
1000 INJECTION, SOLUTION INTRAMUSCULAR; SUBCUTANEOUS
Qty: 2 ML | Refills: 5 | Status: SHIPPED | OUTPATIENT
Start: 2019-05-06 | End: 2019-07-09 | Stop reason: SDUPTHER

## 2019-05-06 RX ORDER — AMOXICILLIN 250 MG
1 CAPSULE ORAL 2 TIMES DAILY
Qty: 60 TABLET | Refills: 5 | Status: SHIPPED | OUTPATIENT
Start: 2019-05-06 | End: 2020-01-03 | Stop reason: SDUPTHER

## 2019-05-06 RX ORDER — ONDANSETRON 4 MG/1
4 TABLET, FILM COATED ORAL EVERY 8 HOURS PRN
Qty: 30 TABLET | Refills: 0 | Status: SHIPPED | OUTPATIENT
Start: 2019-05-06 | End: 2019-06-29 | Stop reason: SDUPTHER

## 2019-05-06 RX ORDER — ONDANSETRON 4 MG/1
4 TABLET, FILM COATED ORAL EVERY 8 HOURS PRN
Qty: 30 TABLET | Refills: 0 | OUTPATIENT
Start: 2019-05-06

## 2019-05-06 RX ORDER — SUMATRIPTAN 100 MG/1
TABLET, FILM COATED ORAL
Qty: 9 TABLET | Refills: 3 | Status: SHIPPED | OUTPATIENT
Start: 2019-05-06 | End: 2019-10-22 | Stop reason: SDUPTHER

## 2019-05-14 ENCOUNTER — TELEPHONE (OUTPATIENT)
Dept: FAMILY MEDICINE CLINIC | Facility: CLINIC | Age: 54
End: 2019-05-14

## 2019-05-14 NOTE — TELEPHONE ENCOUNTER
Regarding: Prescription Question  Contact: [phone]  ----- Message from Sarah Morales MA sent at 5/13/2019 11:24 AM EDT -----       ----- Message from Tatiana Fields to Amber Mills MD sent at 5/13/2019 11:21 AM -----   I had a tooth filled week before last and they said it might have to have a root cancal and it started hurting bad friday and has gotten worse.  Dr Parsons is my dentist and she is out of office on Mondays.    Could you please call me in some zithromax or [facility] for tooth and I'll call Dr Parsons tomorrow.  Thank You

## 2019-05-20 RX ORDER — ZOLPIDEM TARTRATE 6.25 MG/1
6.25 TABLET, FILM COATED, EXTENDED RELEASE ORAL NIGHTLY PRN
Qty: 30 TABLET | Refills: 2 | Status: SHIPPED | OUTPATIENT
Start: 2019-05-20 | End: 2019-07-09 | Stop reason: SDUPTHER

## 2019-05-21 DIAGNOSIS — G47.19 EXCESSIVE DAYTIME SLEEPINESS: ICD-10-CM

## 2019-05-21 DIAGNOSIS — F33.9 RECURRENT MAJOR DEPRESSION RESISTANT TO TREATMENT (HCC): ICD-10-CM

## 2019-05-21 RX ORDER — TOPIRAMATE 25 MG/1
1 TABLET ORAL TAKE AS DIRECTED
COMMUNITY
Start: 2019-05-08 | End: 2019-07-10

## 2019-05-21 RX ORDER — METHYLPHENIDATE HYDROCHLORIDE 10 MG/1
TABLET ORAL
Qty: 60 TABLET | Refills: 0 | Status: SHIPPED | OUTPATIENT
Start: 2019-05-21 | End: 2019-06-04 | Stop reason: SDUPTHER

## 2019-05-21 NOTE — TELEPHONE ENCOUNTER
KIMBERLY reviewed. Refill approved and printed for . Pt to have UDS at time of . Pt to keep f/u apt as scheduled.

## 2019-05-23 ENCOUNTER — OFFICE VISIT (OUTPATIENT)
Dept: NEUROLOGY | Facility: CLINIC | Age: 54
End: 2019-05-23

## 2019-05-23 VITALS
HEIGHT: 64 IN | HEART RATE: 83 BPM | OXYGEN SATURATION: 98 % | BODY MASS INDEX: 28 KG/M2 | SYSTOLIC BLOOD PRESSURE: 130 MMHG | WEIGHT: 164 LBS | DIASTOLIC BLOOD PRESSURE: 88 MMHG

## 2019-05-23 DIAGNOSIS — G43.119 INTRACTABLE MIGRAINE WITH AURA WITHOUT STATUS MIGRAINOSUS: Primary | ICD-10-CM

## 2019-05-23 DIAGNOSIS — G43.719 INTRACTABLE CHRONIC MIGRAINE WITHOUT AURA AND WITHOUT STATUS MIGRAINOSUS: ICD-10-CM

## 2019-05-23 PROCEDURE — 99212 OFFICE O/P EST SF 10 MIN: CPT | Performed by: NURSE PRACTITIONER

## 2019-05-23 RX ORDER — ESTRADIOL 0.1 MG/D
FILM, EXTENDED RELEASE TRANSDERMAL
COMMUNITY
Start: 2019-05-07 | End: 2019-06-24 | Stop reason: ALTCHOICE

## 2019-05-23 RX ORDER — METHYLPREDNISOLONE 4 MG/1
TABLET ORAL
Qty: 1 EACH | Refills: 0 | Status: SHIPPED | OUTPATIENT
Start: 2019-05-23 | End: 2019-06-06

## 2019-05-23 RX ORDER — AZITHROMYCIN 250 MG/1
TABLET, FILM COATED ORAL
Refills: 0 | COMMUNITY
Start: 2019-05-19 | End: 2019-06-06

## 2019-05-24 ENCOUNTER — TELEPHONE (OUTPATIENT)
Dept: FAMILY MEDICINE CLINIC | Facility: CLINIC | Age: 54
End: 2019-05-24

## 2019-05-24 NOTE — TELEPHONE ENCOUNTER
Patient called back and she stated she just had a UDS with Dr. Faria office 2 days ago and she called [facility] ins to see if they would cover another one this close together. They informed her that it wouldn't be covered and she would have to pay for it.  She wanted to know what the cost would be as she didn't have any extra money right now.  She stated she still had some pills left that she didn't take it every day and she would wait till Tuesday until we could find out her cost.  She stated we made her feel like a criminal and she may just let it go.  She is trying to find a psychiatrist that takes her ins that she can start seeing.  She didn't want to see a clinical therapist as she is one her self and felt she need the psychiatrist.

## 2019-05-24 NOTE — TELEPHONE ENCOUNTER
Patient came by to  her ritalin script and when I called her back for her drug screen, she stated she had her 2 dogs in the car and was afraid they would get to hot.  Patient stated she lived out by the [facility] and she would take them home and come back, she said if she had known she had to do something she would not have brought them. I told her it would take very longthat we need her to get a urine and sign some papers, she stated that she was to worried about the dogs getting to hot and would be right back.  She left her  licence at the  when she left.

## 2019-05-24 NOTE — TELEPHONE ENCOUNTER
Patient was also informed that if she didn't come back today, that she would not be able to get her prescription next week either.

## 2019-05-28 NOTE — TELEPHONE ENCOUNTER
I confirmed with Dr. Faria office that she did have a drug screen last week there, but the results are not back yet.  They stated patient called the AM to get a copy and they told her she would need to call medical records to get the copy when they're back.  I also left patient a message and to call me with messages.

## 2019-05-28 NOTE — TELEPHONE ENCOUNTER
Please reassure her this is standard procedure with all pts taking chronic controlled medications, just like at her pain clinic. She does not have to perform one for us if she just did so at her pain clinic but we will need a copy of that result for documentation.

## 2019-06-04 DIAGNOSIS — G47.19 EXCESSIVE DAYTIME SLEEPINESS: ICD-10-CM

## 2019-06-04 DIAGNOSIS — F33.9 RECURRENT MAJOR DEPRESSION RESISTANT TO TREATMENT (HCC): ICD-10-CM

## 2019-06-04 RX ORDER — METHYLPHENIDATE HYDROCHLORIDE 10 MG/1
TABLET ORAL
Qty: 60 TABLET | Refills: 0 | Status: SHIPPED | OUTPATIENT
Start: 2019-06-04 | End: 2019-07-10

## 2019-06-06 ENCOUNTER — PROCEDURE VISIT (OUTPATIENT)
Dept: NEUROLOGY | Facility: CLINIC | Age: 54
End: 2019-06-06

## 2019-06-06 VITALS
SYSTOLIC BLOOD PRESSURE: 102 MMHG | DIASTOLIC BLOOD PRESSURE: 80 MMHG | BODY MASS INDEX: 28 KG/M2 | HEIGHT: 64 IN | HEART RATE: 90 BPM | OXYGEN SATURATION: 96 % | WEIGHT: 164 LBS

## 2019-06-06 DIAGNOSIS — R51.9 CHRONIC DAILY HEADACHE: ICD-10-CM

## 2019-06-06 DIAGNOSIS — G54.2 CERVICAL NERVE ROOT COMPRESSION: ICD-10-CM

## 2019-06-06 DIAGNOSIS — G43.719 INTRACTABLE CHRONIC MIGRAINE WITHOUT AURA AND WITHOUT STATUS MIGRAINOSUS: ICD-10-CM

## 2019-06-06 PROCEDURE — 64615 CHEMODENERV MUSC MIGRAINE: CPT | Performed by: NURSE PRACTITIONER

## 2019-06-06 NOTE — PROGRESS NOTES
.  SUBJECTIVE:  Tatiana Fields is a 53 y.o. female     Chief Complaint   Patient presents with   • Procedure     Pt sup. Botox Inj. NDC:[id number]       Tatiana Fields in clinic for Botox for migraines. Patient had 30number of migraines monthly before Botox. Since  Botox patient has had a > 50% reduction of migraines. Tatiana Fields  is pleased with current treatment.   The risk and benefit of the Botox treatment has been discussed with patient.  Safety information and contraindication of Botox has been reviewed with patient.  The most frequent Adverse reactions of Botox for migraine include but not limited to neck pain 9%, headache 5%, Ptosis 4%, muscle weakness 4%, injection site pain 3%, muscle spasms 2% have been gone over with our patient.    History of Present Illness    The following portions of the patient's history were reviewed and updated as appropriate: allergies, current medications, past family history, past medical history, past social history, past surgical history and problem list.    Review of Systems   Constitutional: Negative for chills and fever.   HENT: Negative for congestion, ear pain, hearing loss, rhinorrhea and sore throat.    Eyes: Negative for pain, discharge and redness.   Respiratory: Negative for cough, shortness of breath, wheezing and stridor.    Cardiovascular: Negative for chest pain, palpitations and leg swelling.   Gastrointestinal: Negative for abdominal pain, constipation, nausea and vomiting.   Endocrine: Negative for cold intolerance, heat intolerance and polyphagia.   Genitourinary: Negative for dysuria, flank pain, frequency and urgency.   Musculoskeletal: Positive for myalgias, neck pain and neck stiffness. Negative for joint swelling.   Skin: Negative for pallor, rash and wound.   Allergic/Immunologic: Negative for environmental allergies.   Neurological: Positive for headaches. Negative for dizziness, tremors, seizures, syncope, facial asymmetry, speech difficulty,  "weakness, light-headedness and numbness.   Hematological: Negative for adenopathy.   Psychiatric/Behavioral: Negative for confusion and hallucinations. The patient is not nervous/anxious.        OBJECTIVE:    Vitals:    06/06/19 1139   BP: 102/80   Pulse: 90   SpO2: 96%   Weight: 74.4 kg (164 lb)   Height: 161.3 cm (63.5\")     GENERAL: Patient is pleasant, cooperative, appears to be stated age.  Body habitus is endomorphic.  HEAD:  Head is normocephalic and atraumatic.    PSYCH:  Higher cortical function/mental status:  The patient is alert.  She  is oriented x3 to time, place and person.  Recent and the remote memory appear normal.  The patient has a good fund of knowledge.  There is no visual or auditory hallucination or suicidal or homicidal ideation.  SPEECH:There is no gross evidence of aphasia, dysarthria or agnosia.      CRANIAL NERVES: Extraocular movements are full and smooth with normal pursuits and saccades.  No nystagmus noted.  The face is symmetric. Tongue midline.   COORDINATION AND GAIT:  The patient walks with a narrow-based gait.        BOTOX PROCEDURE:   The risk and benefit of the Botox treatment has been discussed with patient.  Safety information and contraindication of Botox has been reviewed with patient.  The most frequent Adverse reactions of Botox for migraine include but not limited to neck pain 9%, headache 5%, Ptosis 4%, muscle weakness 4%, injection site pain 3%, muscle spasms 2% have been gone over with our patient.  200 unit vial Botox was re-constituted with 4 mL 0.9 NS to 5 unit/0.1 mL using standard techniques.  10 units were given at the  muscle in 2 divided sites  5 units were given at the Procerus muscle  20 units were given at the Frontalis muscle in 4 divided sites  40 units were given at the Temporalis muscle in 8 divided sites  30 units were given at the Occipitalis muscle in 6 divided sites  20 units were given at the cervical paraspinal muscle in 4 divided " sites  30 units were given at the Trapezius muscle in 6 divided sites  Follow the pain: Additional units  30 units (15 units bilat) were given at the Orbicularis oculi  5 units additional Frontalis  10 units additional at the Temporalis muscle  For a total of 200 units 0 units wastage.  Patient tolerated the procedure well  Patient tolerated procedure well without complication.      ASSESSMENT/PLAN:    It has been determined that Ms. Fields has chronic migraine headaches. We will proceed with a 12 week regimen of 200 units of Botox injections, to treat the patient's chronic migraines.

## 2019-06-13 ENCOUNTER — TELEPHONE (OUTPATIENT)
Dept: UROLOGY | Facility: CLINIC | Age: 54
End: 2019-06-13

## 2019-06-13 NOTE — TELEPHONE ENCOUNTER
Patient needs estrogen refill but  wants to wait for patient to call back Monday when  comes in to make an appointment or discuss prescription.

## 2019-06-16 DIAGNOSIS — M47.812 CERVICAL ARTHRITIS: ICD-10-CM

## 2019-06-16 DIAGNOSIS — F43.10 PTSD (POST-TRAUMATIC STRESS DISORDER): ICD-10-CM

## 2019-06-17 ENCOUNTER — TELEPHONE (OUTPATIENT)
Dept: NEUROLOGY | Facility: CLINIC | Age: 54
End: 2019-06-17

## 2019-06-17 RX ORDER — DIAZEPAM 2 MG/1
2 TABLET ORAL EVERY 8 HOURS PRN
Qty: 30 TABLET | Refills: 2 | Status: SHIPPED | OUTPATIENT
Start: 2019-06-17 | End: 2019-08-14 | Stop reason: SDUPTHER

## 2019-06-17 NOTE — TELEPHONE ENCOUNTER
Patient called to let us know that her right eye is drooping. She went to the eye doctor and they did not see anything wrong with the eye itself. The eye doctor told her that it could be from the Botox. I spoke to Aarti who said that it could be a side affect of the Botox. Let the pt know that Aarti wants her to try regular over the counter eyedrops. Pt verbalized understanding.

## 2019-06-18 RX ORDER — DIAZEPAM 2 MG/1
2 TABLET ORAL EVERY 8 HOURS PRN
Qty: 30 TABLET | Refills: 2 | OUTPATIENT
Start: 2019-06-18

## 2019-06-20 ENCOUNTER — LAB (OUTPATIENT)
Dept: LAB | Facility: HOSPITAL | Age: 54
End: 2019-06-20

## 2019-06-20 ENCOUNTER — PROCEDURE VISIT (OUTPATIENT)
Dept: NEUROLOGY | Facility: CLINIC | Age: 54
End: 2019-06-20

## 2019-06-20 VITALS
WEIGHT: 164 LBS | HEIGHT: 64 IN | OXYGEN SATURATION: 97 % | DIASTOLIC BLOOD PRESSURE: 76 MMHG | HEART RATE: 110 BPM | SYSTOLIC BLOOD PRESSURE: 122 MMHG | BODY MASS INDEX: 28 KG/M2

## 2019-06-20 DIAGNOSIS — R53.1 WEAKNESS GENERALIZED: ICD-10-CM

## 2019-06-20 DIAGNOSIS — G43.719 INTRACTABLE CHRONIC MIGRAINE WITHOUT AURA AND WITHOUT STATUS MIGRAINOSUS: ICD-10-CM

## 2019-06-20 DIAGNOSIS — H53.2 DIPLOPIA: ICD-10-CM

## 2019-06-20 DIAGNOSIS — R53.1 WEAKNESS GENERALIZED: Primary | ICD-10-CM

## 2019-06-20 DIAGNOSIS — M54.2 CERVICAL PAIN: ICD-10-CM

## 2019-06-20 DIAGNOSIS — G43.119 INTRACTABLE MIGRAINE WITH AURA WITHOUT STATUS MIGRAINOSUS: ICD-10-CM

## 2019-06-20 DIAGNOSIS — M54.2 CERVICALGIA: ICD-10-CM

## 2019-06-20 LAB
CREAT BLD-MCNC: 0.7 MG/DL (ref 0.6–1.3)
GFR SERPL CREATININE-BSD FRML MDRD: 88 ML/MIN/1.73

## 2019-06-20 PROCEDURE — 36415 COLL VENOUS BLD VENIPUNCTURE: CPT

## 2019-06-20 PROCEDURE — 83519 RIA NONANTIBODY: CPT

## 2019-06-20 PROCEDURE — 82565 ASSAY OF CREATININE: CPT

## 2019-06-20 PROCEDURE — 20553 NJX 1/MLT TRIGGER POINTS 3/>: CPT | Performed by: NURSE PRACTITIONER

## 2019-06-20 PROCEDURE — 86255 FLUORESCENT ANTIBODY SCREEN: CPT

## 2019-06-20 RX ORDER — METHYLPREDNISOLONE ACETATE 40 MG/ML
200 INJECTION, SUSPENSION INTRA-ARTICULAR; INTRALESIONAL; INTRAMUSCULAR; SOFT TISSUE ONCE
Status: COMPLETED | OUTPATIENT
Start: 2019-06-20 | End: 2019-06-20

## 2019-06-20 RX ORDER — ONABOTULINUMTOXINA 200 [USP'U]/1
INJECTION, POWDER, LYOPHILIZED, FOR SOLUTION INTRADERMAL; INTRAMUSCULAR
COMMUNITY
Start: 2019-06-13 | End: 2019-07-10

## 2019-06-20 RX ORDER — BUPIVACAINE HYDROCHLORIDE 7.5 MG/ML
5 INJECTION, SOLUTION EPIDURAL; RETROBULBAR ONCE
Status: COMPLETED | OUTPATIENT
Start: 2019-06-20 | End: 2019-06-20

## 2019-06-20 RX ADMIN — BUPIVACAINE HYDROCHLORIDE 37.5 MG: 7.5 INJECTION, SOLUTION EPIDURAL; RETROBULBAR at 13:00

## 2019-06-20 RX ADMIN — METHYLPREDNISOLONE ACETATE 200 MG: 40 INJECTION, SUSPENSION INTRA-ARTICULAR; INTRALESIONAL; INTRAMUSCULAR; SOFT TISSUE at 13:03

## 2019-06-24 ENCOUNTER — PROCEDURE VISIT (OUTPATIENT)
Dept: OBSTETRICS AND GYNECOLOGY | Facility: CLINIC | Age: 54
End: 2019-06-24

## 2019-06-24 VITALS
HEIGHT: 64 IN | WEIGHT: 167 LBS | SYSTOLIC BLOOD PRESSURE: 130 MMHG | BODY MASS INDEX: 28.51 KG/M2 | DIASTOLIC BLOOD PRESSURE: 78 MMHG

## 2019-06-24 DIAGNOSIS — N95.1 MENOPAUSAL SYMPTOMS: ICD-10-CM

## 2019-06-24 DIAGNOSIS — Z01.419 ENCOUNTER FOR GYNECOLOGICAL EXAMINATION WITHOUT ABNORMAL FINDING: Primary | ICD-10-CM

## 2019-06-24 DIAGNOSIS — Z12.72 SPECIAL SCREENING FOR MALIGNANT NEOPLASM OF VAGINA: ICD-10-CM

## 2019-06-24 PROCEDURE — G0101 CA SCREEN;PELVIC/BREAST EXAM: HCPCS | Performed by: PHYSICIAN ASSISTANT

## 2019-06-24 RX ORDER — HYDROCODONE BITARTRATE AND ACETAMINOPHEN 5; 325 MG/1; MG/1
1 TABLET ORAL EVERY 6 HOURS PRN
COMMUNITY
End: 2019-09-30

## 2019-06-24 RX ORDER — METHYLPREDNISOLONE 4 MG/1
TABLET ORAL
Refills: 0 | COMMUNITY
Start: 2019-06-21 | End: 2019-07-10

## 2019-06-24 RX ORDER — AZITHROMYCIN 250 MG/1
TABLET, FILM COATED ORAL
Refills: 0 | COMMUNITY
Start: 2019-06-21 | End: 2019-07-10

## 2019-06-24 NOTE — PROGRESS NOTES
Subjective   Chief Complaint   Patient presents with   • Gynecologic Exam     Last pap done in , MMG done in 2016, would like to discuss hormone issues       Tatiana Fields is a 53 y.o. year old  presenting to be seen for her annual gynecological exam.   She reports previous hysterectomy and bilateral salpingo-oophorectomy in .  The patient had previously been on an estrogen patch however she reports that about 4 to 5 months ago Dr. Kang switched her to Estratest due to complaints of decreased libido.  The patient reports that she is having issues with sleeping at this point.  Has occasional hot flashes but not significant.  She has noticed some weight gain.  Her last screening mammogram was in .  She does her mammograms at [facility] in [place] and states she will call to schedule her screening mammogram.  She also has a history of interstitial cystitis managed by Dr. Kang.    Past Medical History:   Diagnosis Date   • Anemia    • Arthritis    • Cervical spinal stenosis    • Chronic fatigue    • DDD (degenerative disc disease), lumbar    • Depression    • Endometriosis    • Hyperlipidemia    • Hypertension    • Irritable bowel syndrome    • Lyme disease     tx'd with prolonged course abx   • Narcolepsy     listed in medical records, tx'd with provigil   • Ovarian cyst    • Shingles 2/15/2013   • Sicca syndrome (CMS/[facility])    • Vitamin D deficiency         Current Outpatient Medications:   •  ALLERGY RELIEF/NASAL DECONGEST  MG per 24 hr tablet, TAKE 1 TABLET BY MOUTH DAILY., Disp: 30 tablet, Rfl: 2  •  azithromycin (ZITHROMAX) 250 MG tablet, , Disp: , Rfl: 0  •  BOTOX 200 units reconstituted solution, , Disp: , Rfl:   •  Cholecalciferol (VITAMIN D) 2000 units tablet, Take 2,000 Units by mouth 2 (Two) Times a Day., Disp: , Rfl:   •  cyanocobalamin 1000 MCG/ML injection, Inject 1 mL into the appropriate muscle as directed by prescriber Every 14 (Fourteen) Days., Disp: 2 mL, Rfl:  "5  •  diazePAM (VALIUM) 2 MG tablet, Take 1 tablet by mouth Every 8 (Eight) Hours As Needed for Anxiety or Muscle Spasms., Disp: 30 tablet, Rfl: 2  •  ENDOCET  MG per tablet, take 1 tablet by mouth every 6 hours if needed for pain, Disp: , Rfl: 0  •  estrogens, conjugated,-methyltestosterone (ESTRATEST HS) 0.625-1.25 MG per tablet, Take 1 tablet by mouth Daily., Disp: 30 tablet, Rfl: 5  •  FLUoxetine (PROzac) 40 MG capsule, Take 1 capsule by mouth Daily., Disp: 30 capsule, Rfl: 3  •  hydrochlorothiazide (HYDRODIURIL) 25 MG tablet, TAKE 1 TABLET BY MOUTH DAILY AS NEEDED FOR SLEEP, Disp: 90 tablet, Rfl: 2  •  HYDROcodone-acetaminophen (NORCO) 5-325 MG per tablet, Take 1 tablet by mouth Every 6 (Six) Hours As Needed., Disp: , Rfl:   •  lisinopril (PRINIVIL,ZESTRIL) 10 MG tablet, TAKE 1 TABLET (10MG) BY MOUTH TWICE DAILY, Disp: 60 tablet, Rfl: 2  •  methylPREDNISolone (MEDROL, OMKAR,) 4 MG tablet, FPD, Disp: , Rfl: 0  •  naproxen (NAPROSYN) 500 MG tablet, TK 1 T PO Q 12 H, Disp: , Rfl: 1  •  omeprazole (priLOSEC) 40 MG capsule, Take 1 capsule by mouth Daily., Disp: 30 capsule, Rfl: 5  •  SUMAtriptan (IMITREX) 100 MG tablet, Take one tablet at onset of headache. May repeat dose one time in 2 hours if headache not relieved., Disp: 9 tablet, Rfl: 3  •  zolpidem CR (AMBIEN CR) 6.25 MG CR tablet, TAKE 1 TABLET BY MOUTH AT NIGHT AS NEEDED FOR SLEEP., Disp: 30 tablet, Rfl: 2  •  B-D 3CC LUER-DORA SYR 25GX1\" 25G X 1\" 3 ML misc, USE WITH B-12 INJECTIONS, Disp: , Rfl: 5  •  famciclovir (FAMVIR) 500 MG tablet, TAKE 1 TABLET EVERY DAY, Disp: 30 tablet, Rfl: 5  •  fluticasone (FLONASE) 50 MCG/ACT nasal spray, 2 sprays into the nostril(s) as directed by provider Daily., Disp: 1 bottle, Rfl: 0  •  linaclotide (LINZESS) 290 MCG capsule capsule, Take 1 capsule by mouth Daily., Disp: 30 capsule, Rfl: 5  •  methylphenidate (RITALIN) 10 MG tablet, TAKE 1 TABLET (10MG) BY MOUTH EVERY MORNING AND TAKE 1 TABLET (10MG) EACH AFTERNOON, Disp: " 60 tablet, Rfl: 0  •  ondansetron (ZOFRAN) 4 MG tablet, Take 1 tablet by mouth Every 8 (Eight) Hours As Needed for Nausea or Vomiting., Disp: 30 tablet, Rfl: 0  •  polyethylene glycol (MIRALAX) powder, Take 17 g by mouth 2 (Two) Times a Day., Disp: 527 g, Rfl: 5  •  pramipexole (MIRAPEX) 1 MG tablet, Take 1 tablet by mouth every night at bedtime., Disp: 30 tablet, Rfl: 2  •  progesterone (PROMETRIUM) 100 MG capsule, Take one capsule at hs, Disp: 30 capsule, Rfl: 6  •  senna-docusate (SENNA-PLUS) 8.6-50 MG per tablet, Take 1 tablet by mouth 2 (Two) Times a Day., Disp: 60 tablet, Rfl: 5  •  tiZANidine (ZANAFLEX) 4 MG tablet, , Disp: , Rfl:   •  topiramate (TOPAMAX) 25 MG tablet, Take 1 tablet by mouth Take As Directed., Disp: , Rfl:   •  Zoster Vac Recomb Adjuvanted 50 MCG reconstituted suspension, Inject 50 mcg into the appropriate muscle as directed by prescriber Take As Directed. Repeat 6 months., Disp: 1 each, Rfl: 1    Current Facility-Administered Medications:   •  methylPREDNISolone acetate (DEPO-medrol) injection 200 mg, 200 mg, Intramuscular, Once, Aarti Mallory APRN   Allergies   Allergen Reactions   • Trazodone And Nefazodone Mental Status Change   • Sulfa Antibiotics Rash      Past Surgical History:   Procedure Laterality Date   • BILATERAL SALPINGO OOPHORECTOMY     • BLADDER REPAIR  2013    bladder tack   • CERVICAL SPINE ANTERIOR  03/2018    Dr. Mendiola, C3-C4   • COLECTOMY PARTIAL / TOTAL  2012    listed in medical record; done at time of tx for endometriosis   • COLONOSCOPY  09/29/2015    normal   • CYSTOCELE REPAIR     • ENDOMETRIAL ABLATION  2012   • ESOPHAGOSCOPY / EGD  09/29/2015   • HYSTERECTOMY  2013    for endometriosis   • SINUS SURGERY  2000      Social History     Socioeconomic History   • Marital status:      Spouse name: Not on file   • Number of children: Not on file   • Years of education: Not on file   • Highest education level: Not on file   Tobacco Use   • Smoking status:  "Never Smoker   • Smokeless tobacco: Never Used   Substance and Sexual Activity   • Alcohol use: No   • Drug use: No   • Sexual activity: Not Currently     Birth control/protection: Surgical      Family History   Problem Relation Age of Onset   • Inflammatory bowel disease Mother    • Hypertension Mother    • Heart disease Mother    • Arthritis Mother    • Hyperlipidemia Mother    • Diabetes Mother    • Hypertension Father    • Heart attack Father    • Hyperlipidemia Father    • Cancer Sister    • Bone cancer Sister    • Colon cancer Other    • Hyperlipidemia Brother    • Colon cancer Brother    • Diabetes Maternal Grandmother    • Diabetes Paternal Grandmother        Review of Systems   Constitutional:        Weight gain   HENT: Positive for sinus pressure.    Gastrointestinal: Positive for constipation.   Endocrine:        Hot flashes   Genitourinary: Positive for frequency and pelvic pain.   Psychiatric/Behavioral: Positive for dysphoric mood and sleep disturbance.           Objective   /78   Ht 162.6 cm (64\")   Wt 75.8 kg (167 lb)   LMP  (LMP Unknown)   Breastfeeding? No   BMI 28.67 kg/m²     Physical Exam   Constitutional: She appears well-developed and well-nourished. She is cooperative. No distress.   Pulmonary/Chest: Right breast exhibits no inverted nipple, no mass, no nipple discharge, no skin change and no tenderness. Left breast exhibits no inverted nipple, no mass, no nipple discharge, no skin change and no tenderness.   Abdominal: Soft. Normal appearance. There is no tenderness.   Genitourinary: Vagina normal. There is no tenderness or lesion on the right labia. There is no tenderness or lesion on the left labia. Right adnexum displays no mass and no tenderness. Left adnexum displays no mass and no tenderness.   Genitourinary Comments: Pap done   Neurological: She is alert.   Skin: Skin is warm and dry.   Psychiatric: She has a normal mood and affect. Her behavior is normal.          "   Assessment and Plan  Tatiana was seen today for gynecologic exam.    Diagnoses and all orders for this visit:    Encounter for gynecological examination without abnormal finding    Special screening for malignant neoplasm of vagina    Menopausal symptoms    Other orders  -     progesterone (PROMETRIUM) 100 MG capsule; Take one capsule at hs      Patient Instructions   Self breast exam monthly  Annual mammogram  Calcium 1200 mg daily and vit D 2000 mg daily  Regular exercise  Discussed adding progesterone to estratest hs with patients continued hot flashes and trouble sleeping and she would like to try this  Will follow up 8 weeks to review             This note was electronically signed.    Ritu Thomson PA-C   June 24, 2019

## 2019-06-24 NOTE — PATIENT INSTRUCTIONS
Self breast exam monthly  Annual mammogram  Calcium 1200 mg daily and vit D 2000 mg daily  Regular exercise  Discussed adding progesterone to estratest hs with patients continued hot flashes and trouble sleeping and she would like to try this  Will follow up 8 weeks to review

## 2019-06-26 DIAGNOSIS — M47.812 CERVICAL ARTHRITIS: ICD-10-CM

## 2019-06-26 DIAGNOSIS — F43.10 PTSD (POST-TRAUMATIC STRESS DISORDER): ICD-10-CM

## 2019-06-27 RX ORDER — DIAZEPAM 2 MG/1
2 TABLET ORAL EVERY 8 HOURS PRN
Qty: 30 TABLET | Refills: 2 | OUTPATIENT
Start: 2019-06-27

## 2019-06-28 DIAGNOSIS — Z12.72 SPECIAL SCREENING FOR MALIGNANT NEOPLASM OF VAGINA: ICD-10-CM

## 2019-07-01 RX ORDER — FAMCICLOVIR 500 MG/1
TABLET ORAL
Qty: 30 TABLET | Refills: 5 | Status: SHIPPED | OUTPATIENT
Start: 2019-07-01 | End: 2020-01-08 | Stop reason: SDUPTHER

## 2019-07-01 RX ORDER — ONDANSETRON 4 MG/1
TABLET, FILM COATED ORAL
Qty: 30 TABLET | Refills: 0 | Status: SHIPPED | OUTPATIENT
Start: 2019-07-01 | End: 2019-10-16 | Stop reason: SDUPTHER

## 2019-07-03 RX ORDER — LISINOPRIL 10 MG/1
TABLET ORAL
Qty: 60 TABLET | Refills: 2 | Status: SHIPPED | OUTPATIENT
Start: 2019-07-03 | End: 2019-10-10 | Stop reason: SDUPTHER

## 2019-07-04 LAB
ACHR AB SER-SCNC: <0.03 NMOL/L (ref 0–0.24)
ACHR BLOCK AB/ACHR TOTAL SFR SER: 12 % (ref 0–25)
ACHR MOD AB/ACHR TOTAL SFR SER: <12 % (ref 0–20)
STRIA MUS AB TITR SER IF: NEGATIVE {TITER}

## 2019-07-09 RX ORDER — CYANOCOBALAMIN 1000 UG/ML
1000 INJECTION, SOLUTION INTRAMUSCULAR; SUBCUTANEOUS
Qty: 2 ML | Refills: 5 | Status: SHIPPED | OUTPATIENT
Start: 2019-07-09 | End: 2019-08-20 | Stop reason: SDUPTHER

## 2019-07-09 RX ORDER — POLYETHYLENE GLYCOL 3350 17 G/17G
17 POWDER, FOR SOLUTION ORAL 2 TIMES DAILY
Qty: 527 G | Refills: 5 | Status: SHIPPED | OUTPATIENT
Start: 2019-07-09 | End: 2019-08-20 | Stop reason: SDUPTHER

## 2019-07-09 RX ORDER — ZOLPIDEM TARTRATE 6.25 MG/1
6.25 TABLET, FILM COATED, EXTENDED RELEASE ORAL NIGHTLY PRN
Qty: 30 TABLET | Refills: 2 | Status: SHIPPED | OUTPATIENT
Start: 2019-07-09 | End: 2019-07-10 | Stop reason: SDUPTHER

## 2019-07-09 RX ORDER — PRAMIPEXOLE DIHYDROCHLORIDE 1 MG/1
1 TABLET ORAL
Qty: 30 TABLET | Refills: 2 | Status: SHIPPED | OUTPATIENT
Start: 2019-07-09 | End: 2019-12-30 | Stop reason: SDUPTHER

## 2019-07-09 NOTE — TELEPHONE ENCOUNTER
Patient request refill for cindy.  david printed, [facility]-5/21/19, [facility]-5/15/19

## 2019-07-10 ENCOUNTER — OFFICE VISIT (OUTPATIENT)
Dept: FAMILY MEDICINE CLINIC | Facility: CLINIC | Age: 54
End: 2019-07-10

## 2019-07-10 VITALS
OXYGEN SATURATION: 98 % | DIASTOLIC BLOOD PRESSURE: 90 MMHG | BODY MASS INDEX: 28.34 KG/M2 | WEIGHT: 166 LBS | SYSTOLIC BLOOD PRESSURE: 126 MMHG | HEIGHT: 64 IN | HEART RATE: 110 BPM

## 2019-07-10 DIAGNOSIS — G47.19 EXCESSIVE DAYTIME SLEEPINESS: ICD-10-CM

## 2019-07-10 DIAGNOSIS — L56.8 PHOTODERMATITIS: ICD-10-CM

## 2019-07-10 DIAGNOSIS — I10 ESSENTIAL HYPERTENSION: ICD-10-CM

## 2019-07-10 DIAGNOSIS — H02.539 LID LAG: Primary | ICD-10-CM

## 2019-07-10 DIAGNOSIS — R53.82 CHRONIC FATIGUE: ICD-10-CM

## 2019-07-10 DIAGNOSIS — M25.50 POLYARTHRALGIA: ICD-10-CM

## 2019-07-10 DIAGNOSIS — G47.33 OBSTRUCTIVE SLEEP APNEA: ICD-10-CM

## 2019-07-10 PROCEDURE — 99214 OFFICE O/P EST MOD 30 MIN: CPT | Performed by: FAMILY MEDICINE

## 2019-07-10 RX ORDER — ZOLPIDEM TARTRATE 12.5 MG/1
12.5 TABLET, FILM COATED, EXTENDED RELEASE ORAL NIGHTLY PRN
Qty: 30 TABLET | Refills: 2 | Status: SHIPPED | OUTPATIENT
Start: 2019-07-10 | End: 2019-10-16 | Stop reason: SDUPTHER

## 2019-07-10 RX ORDER — MODAFINIL 100 MG/1
100 TABLET ORAL DAILY
Qty: 30 TABLET | Refills: 0 | Status: SHIPPED | OUTPATIENT
Start: 2019-07-10 | End: 2019-08-14 | Stop reason: SDUPTHER

## 2019-07-10 RX ORDER — TRIAMCINOLONE ACETONIDE 1 MG/G
CREAM TOPICAL
Refills: 0 | COMMUNITY
Start: 2019-07-07 | End: 2020-04-01

## 2019-07-10 RX ORDER — AMOXICILLIN AND CLAVULANATE POTASSIUM 875; 125 MG/1; MG/1
1 TABLET, FILM COATED ORAL 2 TIMES DAILY
Refills: 0 | COMMUNITY
Start: 2019-07-05 | End: 2019-07-10 | Stop reason: SINTOL

## 2019-07-10 RX ORDER — IPRATROPIUM BROMIDE 42 UG/1
SPRAY, METERED NASAL
Refills: 0 | COMMUNITY
Start: 2019-07-05 | End: 2019-08-07

## 2019-07-10 NOTE — PROGRESS NOTES
"Subjective   Tatiana Fields is a 53 y.o. female.     History of Present Illness  Mrs. Chester presents today with complaint of worsening right lid lag.  She has had a mild lid lag for several years, worsened with headaches, stress, fatigue.  Is receiving Botox injections for chronic migraine.  Last injections on 6/20/2019.  Several days ago when right lid lag worsened, she was evaluated by the neurology office.  Told it was \"not due to Botox\" per patient report.  There was some concern for possible myasthenia gravis.  Lab eval ordered.  Results normal.  Symptoms unchanged since that time.  In addition she has severe headache mid frontal.  Otherwise no new focal neurological symptoms.  She contacted \"quick care\" via her insurance, had telemedicine visit and was prescribed Augmentin for sinus infection.  She had to discontinue after few doses due to severe GI side effects.  She denies purulent nasal discharge, no fever.    She is concerned about erythematous, papular rash appearing on chest and arms after sun exposure.  Concerned about worsening fatigue, worsening diffuse joint pain.    She has hypertension for which she is currently on lisinopril, HCTZ.  Blood pressure has been fairly well controlled.  She is taking medication as prescribed.    She complains of worsening insomnia.  Currently on Ambien CR 6.25.  Like to try higher dose.  Denies side effects.    She has been on modafinil in the past and now Ritalin for excessive daytime sleepiness despite treatment for periodic limb movement disorder, chronic fatigue/fibromyalgia, DANE.  Due to \"hassle\" she is getting at pain management she would like to switch back to Provigil.  Does feel she has significantly improved function, quality of life with treatment.    The following portions of the patient's history were reviewed and updated as appropriate: allergies, current medications, past family history, past medical history, past social history, past surgical history " and problem list.    Review of Systems   Constitutional: Positive for fatigue. Negative for fever and unexpected weight change.   HENT: Positive for congestion, postnasal drip and sinus pressure. Negative for ear pain, hearing loss, nosebleeds, rhinorrhea, sneezing, sore throat, tinnitus and trouble swallowing.    Eyes: Positive for visual disturbance. Negative for pain, discharge and redness.        Dry eyes   Respiratory: Negative for cough, shortness of breath and wheezing.    Cardiovascular: Positive for palpitations. Negative for chest pain and leg swelling.   Gastrointestinal: Positive for abdominal pain (chronic), constipation and nausea. Negative for blood in stool, diarrhea and vomiting.   Endocrine: Positive for heat intolerance. Negative for polydipsia and polyuria.   Genitourinary: Positive for pelvic pain (chronic). Negative for dysuria and hematuria.        Vaginal dryness   Musculoskeletal: Positive for arthralgias, myalgias, neck pain and neck stiffness. Negative for back pain and joint swelling.   Skin: Positive for rash. Negative for wound.   Neurological: Positive for tremors, weakness and headaches. Negative for dizziness, seizures, syncope and speech difficulty.   Hematological: Negative for adenopathy. Bruises/bleeds easily.   Psychiatric/Behavioral: Positive for dysphoric mood and sleep disturbance. Negative for confusion and suicidal ideas. The patient is nervous/anxious.        Objective    Vitals:    07/10/19 1410   BP: 126/90   Pulse: 110   SpO2: 98%     Body mass index is 28.49 kg/m².      07/10/19  1410   Weight: 75.3 kg (166 lb)       Physical Exam   Constitutional: She is oriented to person, place, and time. She appears well-developed and well-nourished. She is cooperative. She does not appear ill. No distress.   HENT:   Head: Normocephalic and atraumatic.   Mouth/Throat: Mucous membranes are normal. Mucous membranes are not dry.   Eyes: Conjunctivae and EOM are normal. Right eye  exhibits no nystagmus. Left eye exhibits no nystagmus.   Right upper lid lag   Neck: Phonation normal. Neck supple. Normal carotid pulses present. No thyroid mass and no thyromegaly present.   Cardiovascular: Normal rate, regular rhythm, normal heart sounds and intact distal pulses. Exam reveals no gallop.   No murmur heard.  Pulmonary/Chest: Effort normal and breath sounds normal.   Musculoskeletal:        Cervical back: She exhibits decreased range of motion, tenderness (diffuse soft tissue, worst on right side), bony tenderness (C5-C7), deformity (loss of normal lordosis) and spasm.     Vascular Status -  Her right foot exhibits no edema. Her left foot exhibits no edema.  Lymphadenopathy:     She has no cervical adenopathy.   Neurological: She is alert and oriented to person, place, and time. She displays no tremor. No cranial nerve deficit or sensory deficit. Gait normal.   Skin: Skin is warm and dry. No bruising and no rash noted.   Psychiatric: Her speech is normal and behavior is normal. Judgment and thought content normal. Her mood appears anxious. Cognition and memory are normal. She exhibits a depressed mood.   Good eye contact. Answers questions appropriately. Good personal hygiene and grooming.   Nursing note and vitals reviewed.      Assessment/Plan   Tatiana was seen today for facial swelling.    Diagnoses and all orders for this visit:    Lid lag    Essential hypertension    Photodermatitis  -     LEONA With / DsDNA, RNP, Sjogrens A / B, Smith  -     C-reactive Protein  -     Sedimentation Rate    Chronic fatigue  -     LEONA With / DsDNA, RNP, Sjogrens A / B, Smith  -     C-reactive Protein  -     Sedimentation Rate    Polyarthralgia  -     LEONA With / DsDNA, RNP, Sjogrens A / B, Smith  -     C-reactive Protein  -     Sedimentation Rate    Excessive daytime sleepiness    Other orders  -     zolpidem CR (AMBIEN CR) 12.5 MG CR tablet; Take 1 tablet by mouth At Night As Needed for Sleep.  -     modafinil  (PROVIGIL) 100 MG tablet; Take 1 tablet by mouth Daily.       I suspect lid lag is most likely side effect of Botox.  No other focal neurological signs/symptoms. Cont'd observation for now.    Hypertension controlled, continue lisinopril and HCTZ.    Lab eval as above for worsened joint pain, fatigue and rash.    Trial of increased Ambien CR to 12.5 mg nightly as needed for chronic insomnia.    Restart Provigil in place of Ritalin for management of excessive daytime sleepiness despite treatment for arthralgia,.  Limb movement disorder, sleep apnea.    Chronic headaches with persistent facial pain which I do not feel is due to acute sinusitis.  Do not feel other antibiotics necessary at this time.    Routine follow-up in 3 months, sooner as needed/instructed.  I will contact patient regarding test results and provide instructions regarding any necessary changes in plan of care.  Patient was encouraged to keep me informed of any acute changes, lack of improvement, or any new concerning symptoms.  Pt is aware of reasons to seek emergent care.  Patient voiced understanding of all instructions and denied further questions.  As part of patient's treatment plan I am prescribing a controlled substance.  The patient has been made aware of the appropriate use of such medications, including potential risk of somnolence, limited ability to drive and/or work safely, and potential for dependence and/or overdose.  It has also been made clear that these medications are for use by this patient only, without concomitant use of alcohol or other substances, unless prescribed.  KIMBERLY report reviewed and scanned into chart.  Last KIMBERLY date 7/9/19.          Please note that portions of this note may have been completed with a voice recognition program. Efforts were made to edit the dictations, but occasionally words are mistranscribed.

## 2019-07-11 LAB
ANA SER QL: NEGATIVE
CRP SERPL-MCNC: 0.7 MG/DL (ref 0–1)
ERYTHROCYTE [SEDIMENTATION RATE] IN BLOOD BY WESTERGREN METHOD: 4 MM/HR (ref 0–20)

## 2019-08-07 ENCOUNTER — PROCEDURE VISIT (OUTPATIENT)
Dept: FAMILY MEDICINE CLINIC | Facility: CLINIC | Age: 54
End: 2019-08-07

## 2019-08-07 VITALS
SYSTOLIC BLOOD PRESSURE: 136 MMHG | BODY MASS INDEX: 29.7 KG/M2 | DIASTOLIC BLOOD PRESSURE: 88 MMHG | OXYGEN SATURATION: 96 % | HEART RATE: 79 BPM | WEIGHT: 173 LBS

## 2019-08-07 DIAGNOSIS — B37.31 VULVOVAGINAL CANDIDIASIS: ICD-10-CM

## 2019-08-07 DIAGNOSIS — J34.3 HYPERTROPHY, NASAL, TURBINATE: ICD-10-CM

## 2019-08-07 DIAGNOSIS — G89.29 CHRONIC RIGHT HIP PAIN: Primary | ICD-10-CM

## 2019-08-07 DIAGNOSIS — M70.61 GREATER TROCHANTERIC BURSITIS OF RIGHT HIP: ICD-10-CM

## 2019-08-07 DIAGNOSIS — R09.81 CHRONIC NASAL CONGESTION: ICD-10-CM

## 2019-08-07 DIAGNOSIS — M25.551 CHRONIC RIGHT HIP PAIN: Primary | ICD-10-CM

## 2019-08-07 PROCEDURE — 20610 DRAIN/INJ JOINT/BURSA W/O US: CPT | Performed by: FAMILY MEDICINE

## 2019-08-07 PROCEDURE — 99213 OFFICE O/P EST LOW 20 MIN: CPT | Performed by: FAMILY MEDICINE

## 2019-08-07 RX ORDER — FLUTICASONE PROPIONATE 50 MCG
2 SPRAY, SUSPENSION (ML) NASAL DAILY
Qty: 1 BOTTLE | Refills: 2 | Status: SHIPPED | OUTPATIENT
Start: 2019-08-07 | End: 2019-12-30 | Stop reason: SDUPTHER

## 2019-08-07 RX ORDER — FLUCONAZOLE 150 MG/1
TABLET ORAL
Qty: 2 TABLET | Refills: 0 | Status: SHIPPED | OUTPATIENT
Start: 2019-08-07 | End: 2019-09-12 | Stop reason: SDUPTHER

## 2019-08-07 RX ORDER — LIDOCAINE HYDROCHLORIDE 10 MG/ML
4 INJECTION, SOLUTION INFILTRATION; PERINEURAL ONCE
Status: COMPLETED | OUTPATIENT
Start: 2019-08-07 | End: 2019-08-07

## 2019-08-07 RX ORDER — TRIAMCINOLONE ACETONIDE 40 MG/ML
40 INJECTION, SUSPENSION INTRA-ARTICULAR; INTRAMUSCULAR ONCE
Status: COMPLETED | OUTPATIENT
Start: 2019-08-07 | End: 2019-08-07

## 2019-08-07 RX ADMIN — TRIAMCINOLONE ACETONIDE 40 MG: 40 INJECTION, SUSPENSION INTRA-ARTICULAR; INTRAMUSCULAR at 14:25

## 2019-08-07 RX ADMIN — LIDOCAINE HYDROCHLORIDE 4 ML: 10 INJECTION, SOLUTION INFILTRATION; PERINEURAL at 14:27

## 2019-08-07 NOTE — PROGRESS NOTES
"Subjective   Tatiana Fields is a 53 y.o. female.     History of Present Illness  Mrs. Fields presents today with complaint of persistent bilateral hip pain.  Similar symptoms discussed at her last visit.  She has been using her usual analgesic medications with minimal improvement.  Symptoms exacerbated by lying on side for prolonged periods of time, prolonged walking.  Denies loss of range of motion in the hips.  No fever or recent illness.  She would like to consider a \"steroid shot\" in both hips.    She continues to have chronic nasal congestion.  Has questions regarding need for Atrovent nasal spray versus Flonase.  She denies rhinorrhea but rather  complains of severe congestion.  Has chronic facial pain/sinus pressure.  No purulent drainage, no fever.  No dental pain.    She also requests refill of Diflucan for vulvovaginal candidiasis.  No new sexual partners.  No  lesions.    Of note she mentions that doses of right eye slowly improving.  She has upcoming appointment with neuro-ophthalmology.    The following portions of the patient's history were reviewed and updated as appropriate: allergies, current medications, past family history, past medical history, past social history, past surgical history and problem list.    Review of Systems   Constitutional: Positive for fatigue and unexpected weight change. Negative for fever.   HENT: Positive for congestion, postnasal drip and sinus pressure. Negative for ear pain, hearing loss, nosebleeds, rhinorrhea, sneezing, sore throat and trouble swallowing.    Eyes: Positive for visual disturbance (chronic).        Dry eyes   Respiratory: Negative for cough, shortness of breath and wheezing.    Cardiovascular: Positive for palpitations. Negative for chest pain and leg swelling.   Gastrointestinal: Positive for abdominal pain (chronic), constipation and nausea. Negative for blood in stool, diarrhea and vomiting.   Endocrine: Positive for heat intolerance. Negative " for polydipsia and polyuria.   Genitourinary: Positive for pelvic pain (chronic). Negative for dysuria and hematuria.   Musculoskeletal: Positive for arthralgias, myalgias, neck pain and neck stiffness. Negative for back pain and joint swelling.   Skin: Negative for rash and wound.   Neurological: Positive for dizziness, tremors, weakness and headaches. Negative for seizures, syncope and speech difficulty.   Hematological: Negative for adenopathy. Bruises/bleeds easily.   Psychiatric/Behavioral: Positive for dysphoric mood and sleep disturbance. Negative for confusion and suicidal ideas. The patient is nervous/anxious.    Pt's previous ROS reviewed and updated as indicated.     Objective    Vitals:    08/07/19 1332   BP: 136/88   Pulse: 79   SpO2: 96%     Body mass index is 29.7 kg/m².      08/07/19  1332   Weight: 78.5 kg (173 lb)       Physical Exam   Constitutional: She is oriented to person, place, and time. She appears well-developed and well-nourished. She is cooperative. She does not appear ill. No distress.   HENT:   Nose: Mucosal edema present. No rhinorrhea.   Mouth/Throat: Mucous membranes are normal. Mucous membranes are not dry.   Cardiovascular: Intact distal pulses.   Musculoskeletal:        Right hip: She exhibits bony tenderness (lateral over greater trochanter and bursa). She exhibits normal range of motion, normal strength and no crepitus.     Vascular Status -  Her right foot exhibits no edema. Her left foot exhibits no edema.  Neurological: She is alert and oriented to person, place, and time. Gait (mildly antalgic) abnormal.   Skin: Skin is warm and dry. No bruising and no rash noted.   Psychiatric: She has a normal mood and affect. Her behavior is normal. Cognition and memory are normal.   Nursing note and vitals reviewed.      Assessment/Plan   Tatiana was seen today for hip pain, vaginitis and allergies.    Diagnoses and all orders for this visit:    Chronic right hip pain  -     Joint  Aspiration/Injection  -     triamcinolone acetonide (KENALOG-40) injection 40 mg  -     lidocaine (XYLOCAINE) 1 % injection 4 mL    Greater trochanteric bursitis of right hip  -     Joint Aspiration/Injection  -     triamcinolone acetonide (KENALOG-40) injection 40 mg  -     lidocaine (XYLOCAINE) 1 % injection 4 mL    Chronic nasal congestion    Vulvovaginal candidiasis  -     fluconazole (DIFLUCAN) 150 MG tablet; 1 now and repeat in 1 week    Hypertrophy, nasal, turbinate  -     fluticasone (FLONASE) 50 MCG/ACT nasal spray; 2 sprays into the nostril(s) as directed by provider Daily.      I have reviewed risks/benefits and potential side effects of various treatment options for suspected trochanteric bursitis. Pt voices understanding and wishes to proceed therapeutic injection.  I have recommended we perform unilateral injection rather than bilateral to ensure she does not have adverse reaction and the procedure is efficacious.  See procedure note below.    Recommended she reinitiate Flonase, discontinue Atrovent nasal spray at this time.    Routine follow-up as scheduled in 2 months, follow-up sooner as needed.  Patient was encouraged to keep me informed of any acute changes, lack of improvement, or any new concerning symptoms.  Pt is aware of reasons to seek emergent care.  Patient voiced understanding of all instructions and denied further questions.        PROCEDURE NOTE  PROCEDURE:  Bursal Injection Right Hip  Date/Time:  8/7/19 2:00 PM  Informed consent obtained.  Time out protocol performed prior to procedure.  Performed by: SILKE HENRY  Authorized by: SILKE HENRY   Indications: pain   Body area: hip  Joint: right hip  Local anesthesia used: no   Anesthesia:  Local anesthesia used: no   Sedation:  Patient sedated: no  Preparation: Patient was prepped and draped in the usual sterile fashion.  Needle size: 22 G, 1 and 1/2 inch  Ultrasound guidance: no  Approach: lateral  Triamcinolone amount: 40  mg  Lidocaine 1% amount: 4 mL  Patient tolerance: Patient tolerated the procedure well with no immediate complications  Comments: Trochanteric bursa injected in fan-like pattern with only one skin entry.    Post procedure care reviewed and she is encouraged to report any concerns.          Please note that portions of this note may have been completed with a voice recognition program. Efforts were made to edit the dictations, but occasionally words are mistranscribed.

## 2019-08-14 DIAGNOSIS — F43.10 PTSD (POST-TRAUMATIC STRESS DISORDER): ICD-10-CM

## 2019-08-14 DIAGNOSIS — M47.812 CERVICAL ARTHRITIS: ICD-10-CM

## 2019-08-15 RX ORDER — MODAFINIL 100 MG/1
100 TABLET ORAL DAILY
Qty: 30 TABLET | Refills: 0 | Status: SHIPPED | OUTPATIENT
Start: 2019-08-15 | End: 2019-09-20 | Stop reason: SDUPTHER

## 2019-08-15 RX ORDER — DIAZEPAM 2 MG/1
2 TABLET ORAL EVERY 8 HOURS PRN
Qty: 30 TABLET | Refills: 2 | Status: SHIPPED | OUTPATIENT
Start: 2019-08-15 | End: 2019-09-12 | Stop reason: SDUPTHER

## 2019-08-20 RX ORDER — POLYETHYLENE GLYCOL 3350 17 G/17G
17 POWDER, FOR SOLUTION ORAL 2 TIMES DAILY
Qty: 527 G | Refills: 5 | Status: SHIPPED | OUTPATIENT
Start: 2019-08-20 | End: 2019-12-30 | Stop reason: SDUPTHER

## 2019-08-20 RX ORDER — CYANOCOBALAMIN 1000 UG/ML
1000 INJECTION, SOLUTION INTRAMUSCULAR; SUBCUTANEOUS
Qty: 1 ML | Refills: 5 | Status: SHIPPED | OUTPATIENT
Start: 2019-08-20 | End: 2019-10-16 | Stop reason: SDDI

## 2019-08-20 RX ORDER — OMEPRAZOLE 40 MG/1
40 CAPSULE, DELAYED RELEASE ORAL DAILY
Qty: 30 CAPSULE | Refills: 5 | Status: SHIPPED | OUTPATIENT
Start: 2019-08-20 | End: 2019-10-22 | Stop reason: SDUPTHER

## 2019-08-20 NOTE — TELEPHONE ENCOUNTER
I have sent in refill for injection decreased to once monthly. Her last 2 levels have been above normal.

## 2019-08-22 RX ORDER — ESCITALOPRAM OXALATE 20 MG/1
20 TABLET ORAL DAILY
Qty: 30 TABLET | Refills: 1 | Status: SHIPPED | OUTPATIENT
Start: 2019-08-22 | End: 2019-11-25 | Stop reason: SDUPTHER

## 2019-09-10 ENCOUNTER — TELEPHONE (OUTPATIENT)
Dept: OBSTETRICS AND GYNECOLOGY | Facility: CLINIC | Age: 54
End: 2019-09-10

## 2019-09-10 NOTE — TELEPHONE ENCOUNTER
----- Message from Hilaria Gillespie sent at 9/10/2019  9:25 AM EDT -----  Contact: PT  THIS IS NING'S PT.  SHE RESCHEDULED HER APPT FOR 9/16/19.  PLEASE SEND REFILL OF PREGESTERONE 100MG TO [facility].  THANKS

## 2019-09-12 ENCOUNTER — TELEPHONE (OUTPATIENT)
Dept: OBSTETRICS AND GYNECOLOGY | Facility: CLINIC | Age: 54
End: 2019-09-12

## 2019-09-12 ENCOUNTER — PROCEDURE VISIT (OUTPATIENT)
Dept: NEUROLOGY | Facility: CLINIC | Age: 54
End: 2019-09-12

## 2019-09-12 VITALS
TEMPERATURE: 98.7 F | OXYGEN SATURATION: 97 % | BODY MASS INDEX: 29.7 KG/M2 | SYSTOLIC BLOOD PRESSURE: 120 MMHG | WEIGHT: 173 LBS | DIASTOLIC BLOOD PRESSURE: 70 MMHG | HEART RATE: 102 BPM

## 2019-09-12 DIAGNOSIS — G43.719 INTRACTABLE CHRONIC MIGRAINE WITHOUT AURA AND WITHOUT STATUS MIGRAINOSUS: Primary | ICD-10-CM

## 2019-09-12 DIAGNOSIS — N94.19 DYSPAREUNIA DUE TO MEDICAL CONDITION IN FEMALE: ICD-10-CM

## 2019-09-12 DIAGNOSIS — F43.10 PTSD (POST-TRAUMATIC STRESS DISORDER): ICD-10-CM

## 2019-09-12 DIAGNOSIS — M47.812 CERVICAL ARTHRITIS: ICD-10-CM

## 2019-09-12 DIAGNOSIS — B37.31 VULVOVAGINAL CANDIDIASIS: ICD-10-CM

## 2019-09-12 PROCEDURE — 64615 CHEMODENERV MUSC MIGRAINE: CPT | Performed by: NURSE PRACTITIONER

## 2019-09-12 RX ORDER — ESTERIFIED ESTROGEN AND METHYLTESTOSTERONE .625; 1.25 MG/1; MG/1
1 TABLET ORAL DAILY
Qty: 30 TABLET | Refills: 5 | Status: SHIPPED | OUTPATIENT
Start: 2019-09-12 | End: 2020-01-13 | Stop reason: SDUPTHER

## 2019-09-12 RX ORDER — DIAZEPAM 2 MG/1
2 TABLET ORAL EVERY 8 HOURS PRN
Qty: 30 TABLET | Refills: 2 | Status: SHIPPED | OUTPATIENT
Start: 2019-09-12 | End: 2020-01-08 | Stop reason: SDUPTHER

## 2019-09-12 RX ORDER — FLUCONAZOLE 150 MG/1
TABLET ORAL
Qty: 2 TABLET | Refills: 0 | Status: SHIPPED | OUTPATIENT
Start: 2019-09-12 | End: 2019-09-30

## 2019-09-12 NOTE — TELEPHONE ENCOUNTER
Regarding: Prescription Question  Contact: [phone]  ----- Message from [email or web address], Generic sent at 9/12/2019  3:28 AM EDT -----    I'm out of my estertest, hormone medication.  I'm scheduled to come in on Monday for a follow up.  Can you please call me in enough to last through Monday?  Thank You   I also need a diflucan for yeast infection.   Thanks. Tatiana

## 2019-09-12 NOTE — TELEPHONE ENCOUNTER
Please inform her I sent in Diflucan and asked Dr. Flaherty to send in a refill for Estratest as that is scheduled and I cannot prescribe it. Thanks

## 2019-09-16 ENCOUNTER — PROCEDURE VISIT (OUTPATIENT)
Dept: FAMILY MEDICINE CLINIC | Facility: CLINIC | Age: 54
End: 2019-09-16

## 2019-09-16 VITALS
HEART RATE: 110 BPM | SYSTOLIC BLOOD PRESSURE: 124 MMHG | WEIGHT: 169.25 LBS | DIASTOLIC BLOOD PRESSURE: 80 MMHG | BODY MASS INDEX: 29.05 KG/M2 | OXYGEN SATURATION: 96 %

## 2019-09-16 DIAGNOSIS — M25.552 CHRONIC LEFT HIP PAIN: Primary | ICD-10-CM

## 2019-09-16 DIAGNOSIS — M70.62 TROCHANTERIC BURSITIS, LEFT HIP: ICD-10-CM

## 2019-09-16 DIAGNOSIS — G89.29 CHRONIC LEFT HIP PAIN: Primary | ICD-10-CM

## 2019-09-16 PROCEDURE — 99213 OFFICE O/P EST LOW 20 MIN: CPT | Performed by: FAMILY MEDICINE

## 2019-09-16 PROCEDURE — 20610 DRAIN/INJ JOINT/BURSA W/O US: CPT | Performed by: FAMILY MEDICINE

## 2019-09-16 RX ORDER — LIDOCAINE HYDROCHLORIDE 10 MG/ML
4 INJECTION, SOLUTION INFILTRATION; PERINEURAL ONCE
Status: COMPLETED | OUTPATIENT
Start: 2019-09-16 | End: 2019-09-16

## 2019-09-16 RX ORDER — TRIAMCINOLONE ACETONIDE 40 MG/ML
40 INJECTION, SUSPENSION INTRA-ARTICULAR; INTRAMUSCULAR ONCE
Status: COMPLETED | OUTPATIENT
Start: 2019-09-16 | End: 2019-09-16

## 2019-09-16 RX ADMIN — TRIAMCINOLONE ACETONIDE 40 MG: 40 INJECTION, SUSPENSION INTRA-ARTICULAR; INTRAMUSCULAR at 16:33

## 2019-09-16 RX ADMIN — LIDOCAINE HYDROCHLORIDE 4 ML: 10 INJECTION, SOLUTION INFILTRATION; PERINEURAL at 16:34

## 2019-09-16 NOTE — PROGRESS NOTES
Subjective   Tatiana Fields is a 53 y.o. female.     She c/o chronic hip pain, would like to try injection      Hip Pain    There was no injury mechanism (other than possible overuse). The pain is present in the left hip. The quality of the pain is described as aching and burning. The pain is moderate (severe with increased activity). The pain has been constant since onset. Pertinent negatives include no inability to bear weight, loss of sensation or muscle weakness. The symptoms are aggravated by weight bearing and palpation (lying too long on left side). She has tried ice, heat and NSAIDs for the symptoms. The treatment provided mild (temporary) relief.     She had similar symptoms in right hip, dx'd with trochanteric bursitis. tx'd with bursal injection was helpful. Would like to try similar tx in left hip.    The following portions of the patient's history were reviewed and updated as appropriate: allergies, current medications, past family history, past medical history, past social history, past surgical history and problem list.    Review of Systems   Constitutional: Positive for fatigue. Negative for fever.   HENT: Positive for congestion, postnasal drip and sinus pressure. Negative for ear pain, hearing loss, nosebleeds, rhinorrhea, sneezing, sore throat and trouble swallowing.    Eyes: Positive for visual disturbance (chronic).        Dry eyes   Respiratory: Negative for cough, shortness of breath and wheezing.    Cardiovascular: Positive for palpitations. Negative for chest pain and leg swelling.   Gastrointestinal: Positive for abdominal pain (chronic), constipation and nausea. Negative for blood in stool, diarrhea and vomiting.   Endocrine: Positive for heat intolerance. Negative for polydipsia and polyuria.   Genitourinary: Positive for pelvic pain (chronic). Negative for dysuria and hematuria.   Musculoskeletal: Positive for arthralgias, myalgias, neck pain and neck stiffness. Negative for back pain  and joint swelling.   Skin: Negative for rash and wound.   Neurological: Positive for dizziness, tremors, weakness and headaches. Negative for seizures, syncope and speech difficulty.   Hematological: Negative for adenopathy. Bruises/bleeds easily.   Psychiatric/Behavioral: Positive for dysphoric mood and sleep disturbance. Negative for confusion and suicidal ideas. The patient is nervous/anxious.    Pt's previous ROS reviewed and updated as indicated.       Objective    Vitals:    09/16/19 1530   BP: 124/80   Pulse: 110   SpO2: 96%     Body mass index is 29.05 kg/m².      09/16/19  1530   Weight: 76.8 kg (169 lb 4 oz)       Physical Exam   Constitutional: She is oriented to person, place, and time. She appears well-developed and well-nourished. She is cooperative. She does not appear ill. No distress.   Musculoskeletal:        Left hip: She exhibits tenderness (lateral) and bony tenderness (lateral). She exhibits normal range of motion, normal strength, no crepitus and no deformity.   Neurological: She is alert and oriented to person, place, and time. She has normal strength. No sensory deficit. Gait normal.   Skin: Skin is warm and dry. No bruising and no rash noted.   Psychiatric: She has a normal mood and affect. Her behavior is normal.   Nursing note and vitals reviewed.      Assessment/Plan   Tatiana was seen today for hip pain.    Diagnoses and all orders for this visit:    Chronic left hip pain  -     triamcinolone acetonide (KENALOG-40) injection 40 mg  -     lidocaine (XYLOCAINE) 1 % injection 4 mL  -     Joint Aspiration/Injection    Trochanteric bursitis, left hip  -     triamcinolone acetonide (KENALOG-40) injection 40 mg  -     lidocaine (XYLOCAINE) 1 % injection 4 mL  -     Joint Aspiration/Injection       I have reviewed risks/benefits and potential side effects of various treatment options. Pt voices understanding and wishes to proceed with trial therapeutic injection. See procedure note  below.    Symptomatic tx reviewed, ie heat/ice/relative rest/NSAID.    Routine f/u as scheduled, f/u sooner as needed.  Patient was encouraged to keep me informed of any acute changes, lack of improvement, or any new concerning symptoms.  Pt is aware of reasons to seek emergent care.  Patient voiced understanding of all instructions and denied further questions.          PROCEDURE NOTE  Procedure:  Bursal Injection Left Hip  Informed consent obtained.  Time out protocol performed prior to procedure.  Date/Time: 9/17/19 at 1550  Performed by: SILKE HENRY  Authorized by: SILKE HENRY   Indications: pain   Body area: hip  Joint: left hip  Local anesthesia used: no   Anesthesia:  Local anesthesia used: no   Sedation:  Patient sedated: no  Preparation: Patient was prepped and draped in the usual sterile fashion.  Needle size: 22 G  Ultrasound guidance: no  Approach: lateral  Triamcinolone amount: 40 mg  Lidocaine 1% amount: 4 mL  Patient tolerance: Patient tolerated the procedure well with no immediate complications  Comments: Trochanteric bursa of left hip injected in fan like pattern with single skin entry.     Post procedure care reviewed and she is encouraged to report any concerns.

## 2019-09-23 RX ORDER — MODAFINIL 100 MG/1
100 TABLET ORAL DAILY
Qty: 30 TABLET | Refills: 2 | Status: SHIPPED | OUTPATIENT
Start: 2019-09-23 | End: 2020-01-08

## 2019-09-26 ENCOUNTER — PROCEDURE VISIT (OUTPATIENT)
Dept: NEUROLOGY | Facility: CLINIC | Age: 54
End: 2019-09-26

## 2019-09-26 VITALS
BODY MASS INDEX: 29.01 KG/M2 | SYSTOLIC BLOOD PRESSURE: 122 MMHG | HEART RATE: 96 BPM | TEMPERATURE: 99 F | OXYGEN SATURATION: 96 % | DIASTOLIC BLOOD PRESSURE: 80 MMHG | WEIGHT: 169 LBS

## 2019-09-26 DIAGNOSIS — G43.119 INTRACTABLE MIGRAINE WITH AURA WITHOUT STATUS MIGRAINOSUS: Primary | ICD-10-CM

## 2019-09-26 DIAGNOSIS — G54.2 CERVICAL NERVE ROOT COMPRESSION: ICD-10-CM

## 2019-09-26 PROCEDURE — 20553 NJX 1/MLT TRIGGER POINTS 3/>: CPT | Performed by: NURSE PRACTITIONER

## 2019-09-26 RX ORDER — METHYLPREDNISOLONE ACETATE 40 MG/ML
200 INJECTION, SUSPENSION INTRA-ARTICULAR; INTRALESIONAL; INTRAMUSCULAR; SOFT TISSUE ONCE
Status: COMPLETED | OUTPATIENT
Start: 2019-09-26 | End: 2019-09-26

## 2019-09-26 RX ORDER — BUPIVACAINE HYDROCHLORIDE 7.5 MG/ML
5 INJECTION, SOLUTION EPIDURAL; RETROBULBAR ONCE
Status: COMPLETED | OUTPATIENT
Start: 2019-09-26 | End: 2019-09-26

## 2019-09-26 RX ADMIN — METHYLPREDNISOLONE ACETATE 200 MG: 40 INJECTION, SUSPENSION INTRA-ARTICULAR; INTRALESIONAL; INTRAMUSCULAR; SOFT TISSUE at 13:17

## 2019-09-26 RX ADMIN — BUPIVACAINE HYDROCHLORIDE 37.5 MG: 7.5 INJECTION, SOLUTION EPIDURAL; RETROBULBAR at 13:16

## 2019-09-30 ENCOUNTER — OFFICE VISIT (OUTPATIENT)
Dept: OBSTETRICS AND GYNECOLOGY | Facility: CLINIC | Age: 54
End: 2019-09-30

## 2019-09-30 VITALS
DIASTOLIC BLOOD PRESSURE: 84 MMHG | HEIGHT: 64 IN | BODY MASS INDEX: 28.34 KG/M2 | WEIGHT: 166 LBS | SYSTOLIC BLOOD PRESSURE: 130 MMHG

## 2019-09-30 DIAGNOSIS — Z79.890 NEED FOR POSTMENOPAUSAL HORMONE REPLACEMENT: Primary | ICD-10-CM

## 2019-09-30 PROCEDURE — 99212 OFFICE O/P EST SF 10 MIN: CPT | Performed by: PHYSICIAN ASSISTANT

## 2019-09-30 RX ORDER — OXYCODONE HCL/ACETAMINOPHEN 10MG-325MG
1 TABLET ORAL EVERY 6 HOURS PRN
Refills: 0 | COMMUNITY
Start: 2019-09-26 | End: 2020-04-01

## 2019-09-30 NOTE — PROGRESS NOTES
"Subjective   Chief Complaint   Patient presents with   • Med Management     Pt states she is doing well on her medication.        Tatiana Fields is a 53 y.o. year old  presenting to be seen for hormone replacement therapy follow up  Patient has been on Estratest hs which was initiated by her urologist 8-9 months ago due to decreased libido and patient had been on estrogen patch prior to Estratest hs.  She presented in  with complaints of mild hot flashes still and also trouble sleeping. At that time we decided to add progesterone to her Estratest hs.  She reports she has been doing well since adding progesterone and hot flashes have improved and she is also sleeping better. She desires to continue this regimen  She has no complaints today  She reports she will schedule her screening mammogram soon    Past Medical History:   Diagnosis Date   • Anemia    • Arthritis    • Cervical nerve root compression    • Cervical spinal stenosis    • Chronic fatigue    • Cluster headache    • DDD (degenerative disc disease), lumbar    • Depression    • Endometriosis    • Essential tremor    • Fibromyalgia, primary    • Headache, tension-type    • HL (hearing loss)    • Hyperlipidemia    • Hypertension    • Irritable bowel syndrome    • Lyme disease 2012    tx'd with prolonged course abx   • Memory loss    • Migraine    • Narcolepsy     listed in medical records, tx'd with provigil   • Obstructive sleep apnea    • Ovarian cyst    • Shingles 2/15/2013   • Sicca syndrome (CMS/HCC)    • Vitamin D deficiency         Current Outpatient Medications:   •  ALLERGY RELIEF/NASAL DECONGEST  MG per 24 hr tablet, TAKE 1 TABLET BY MOUTH DAILY., Disp: 30 tablet, Rfl: 2  •  B-D 3CC LUER-DORA SYR 25GX1\" 25G X 1\" 3 ML misc, USE WITH B-12 INJECTIONS, Disp: , Rfl: 5  •  Cholecalciferol (VITAMIN D) 2000 units tablet, Take 2,000 Units by mouth 2 (Two) Times a Day., Disp: , Rfl:   •  cyanocobalamin 1000 MCG/ML " injection, Inject 1 mL into the appropriate muscle as directed by prescriber Every 30 (Thirty) Days., Disp: 1 mL, Rfl: 5  •  diazePAM (VALIUM) 2 MG tablet, Take 1 tablet by mouth Every 8 (Eight) Hours As Needed for Anxiety or Muscle Spasms., Disp: 30 tablet, Rfl: 2  •  ENDOCET  MG per tablet, Take 1 tablet by mouth Every 6 (Six) Hours As Needed., Disp: , Rfl: 0  •  escitalopram (LEXAPRO) 20 MG tablet, Take 1 tablet by mouth Daily., Disp: 30 tablet, Rfl: 1  •  estrogens, conjugated,-methyltestosterone (ESTRATEST HS) 0.625-1.25 MG per tablet, Take 1 tablet by mouth Daily., Disp: 30 tablet, Rfl: 5  •  famciclovir (FAMVIR) 500 MG tablet, TAKE 1 TABLET EVERY DAY, Disp: 30 tablet, Rfl: 5  •  fluticasone (FLONASE) 50 MCG/ACT nasal spray, 2 sprays into the nostril(s) as directed by provider Daily., Disp: 1 bottle, Rfl: 2  •  linaclotide (LINZESS) 290 MCG capsule capsule, Take 1 capsule by mouth Daily., Disp: 30 capsule, Rfl: 5  •  lisinopril (PRINIVIL,ZESTRIL) 10 MG tablet, TAKE 1 TABLET (10MG) BY MOUTH TWICE DAILY, Disp: 60 tablet, Rfl: 2  •  modafinil (PROVIGIL) 100 MG tablet, TAKE 1 TABLET BY MOUTH DAILY., Disp: 30 tablet, Rfl: 2  •  naproxen (NAPROSYN) 500 MG tablet, TK 1 T PO Q 12 H, Disp: , Rfl: 1  •  omeprazole (priLOSEC) 40 MG capsule, Take 1 capsule by mouth Daily., Disp: 30 capsule, Rfl: 5  •  ondansetron (ZOFRAN) 4 MG tablet, TAKE 1 TABLET BY MOUTH EVERY 8 HOURS AS NEEDED FOR NAUSEA OR VOMITING, Disp: 30 tablet, Rfl: 0  •  polyethylene glycol (MIRALAX) powder, Take 17 g by mouth 2 (Two) Times a Day., Disp: 527 g, Rfl: 5  •  pramipexole (MIRAPEX) 1 MG tablet, Take 1 tablet by mouth every night at bedtime., Disp: 30 tablet, Rfl: 2  •  progesterone (PROMETRIUM) 100 MG capsule, Take one capsule at hs, Disp: 30 capsule, Rfl: 5  •  senna-docusate (SENNA-PLUS) 8.6-50 MG per tablet, Take 1 tablet by mouth 2 (Two) Times a Day., Disp: 60 tablet, Rfl: 5  •  SUMAtriptan (IMITREX) 100 MG tablet, Take one tablet at onset  of headache. May repeat dose one time in 2 hours if headache not relieved., Disp: 9 tablet, Rfl: 3  •  tiZANidine (ZANAFLEX) 4 MG tablet, , Disp: , Rfl:   •  triamcinolone (KENALOG) 0.1 % cream, APPLY AA BID, Disp: , Rfl: 0  •  zolpidem CR (AMBIEN CR) 12.5 MG CR tablet, Take 1 tablet by mouth At Night As Needed for Sleep., Disp: 30 tablet, Rfl: 2  •  Zoster Vac Recomb Adjuvanted 50 MCG reconstituted suspension, Inject 50 mcg into the appropriate muscle as directed by prescriber Take As Directed. Repeat 6 months., Disp: 1 each, Rfl: 1   Allergies   Allergen Reactions   • Trazodone And Nefazodone Mental Status Change   • Sulfa Antibiotics Rash      Past Surgical History:   Procedure Laterality Date   • BILATERAL SALPINGO OOPHORECTOMY     • BLADDER REPAIR  2013    bladder tack   • CERVICAL SPINE ANTERIOR  03/2018    Dr. Mendiola, C3-C4   • COLECTOMY PARTIAL / TOTAL  2012    listed in medical record; done at time of tx for endometriosis   • COLONOSCOPY  09/29/2015    normal   • CYSTOCELE REPAIR     • ENDOMETRIAL ABLATION  2012   • ESOPHAGOSCOPY / EGD  09/29/2015   • HYSTERECTOMY  2013    for endometriosis   • SINUS SURGERY  2000      Social History     Socioeconomic History   • Marital status:      Spouse name: Not on file   • Number of children: Not on file   • Years of education: Not on file   • Highest education level: Not on file   Tobacco Use   • Smoking status: Never Smoker   • Smokeless tobacco: Never Used   Substance and Sexual Activity   • Alcohol use: No   • Drug use: No   • Sexual activity: Not Currently     Birth control/protection: Surgical      Family History   Problem Relation Age of Onset   • Inflammatory bowel disease Mother    • Hypertension Mother    • Heart disease Mother    • Arthritis Mother    • Hyperlipidemia Mother    • Diabetes Mother    • Migraines Mother    • Neuropathy Mother    • Stroke Mother    • Hypertension Father    • Heart attack Father    • Hyperlipidemia Father    • Cancer  "Sister    • Bone cancer Sister    • Colon cancer Other    • Hyperlipidemia Brother    • Colon cancer Brother    • Diabetes Maternal Grandmother    • Diabetes Paternal Grandmother    • Neuropathy Paternal Grandmother    • Stroke Paternal Grandmother    • Stroke Maternal Aunt        Review of Systems   Constitutional: Negative.    Gastrointestinal: Negative.    Genitourinary: Negative for difficulty urinating, dysuria, pelvic pain and vaginal discharge.           Objective   /84   Ht 162.6 cm (64\")   Wt 75.3 kg (166 lb)   LMP  (LMP Unknown)   BMI 28.49 kg/m²     Physical Exam         Assessment and Plan  Tatiana was seen today for med management.    Diagnoses and all orders for this visit:    Need for postmenopausal hormone replacement    Other orders  -     progesterone (PROMETRIUM) 100 MG capsule; Take one capsule at hs      Patient Instructions   Will continue Estratest hs daily and progesterone 100 mg at hs  Follow up 6 months or prn             This note was electronically signed.    Ritu Thomson PA-C   September 30, 2019  "

## 2019-10-02 ENCOUNTER — TELEPHONE (OUTPATIENT)
Dept: OBSTETRICS AND GYNECOLOGY | Facility: CLINIC | Age: 54
End: 2019-10-02

## 2019-10-02 RX ORDER — FLUCONAZOLE 150 MG/1
TABLET ORAL
Qty: 2 TABLET | Refills: 0 | Status: SHIPPED | OUTPATIENT
Start: 2019-10-02 | End: 2019-10-16

## 2019-10-02 NOTE — TELEPHONE ENCOUNTER
Regarding: Prescription Question  Contact: [phone]  ----- Message from [employer], Generic sent at 10/2/2019 11:08 AM EDT -----    I was in for visit on Monday.  I ask the nurse about prescription for diflucan due to the amoxicillin I'm taking but I cant remember if I ask the doctor.  Can you please call me in couple diflican.  Thank You

## 2019-10-10 RX ORDER — LISINOPRIL 10 MG/1
TABLET ORAL
Qty: 60 TABLET | Refills: 2 | Status: SHIPPED | OUTPATIENT
Start: 2019-10-10 | End: 2020-01-15

## 2019-10-16 ENCOUNTER — OFFICE VISIT (OUTPATIENT)
Dept: FAMILY MEDICINE CLINIC | Facility: CLINIC | Age: 54
End: 2019-10-16

## 2019-10-16 DIAGNOSIS — G47.33 OBSTRUCTIVE SLEEP APNEA: ICD-10-CM

## 2019-10-16 DIAGNOSIS — K58.1 IRRITABLE BOWEL SYNDROME WITH CONSTIPATION: ICD-10-CM

## 2019-10-16 DIAGNOSIS — R73.01 IFG (IMPAIRED FASTING GLUCOSE): ICD-10-CM

## 2019-10-16 DIAGNOSIS — Z79.899 POLYPHARMACY: ICD-10-CM

## 2019-10-16 DIAGNOSIS — Z13.220 ENCOUNTER FOR LIPID SCREENING FOR CARDIOVASCULAR DISEASE: ICD-10-CM

## 2019-10-16 DIAGNOSIS — Z13.6 ENCOUNTER FOR LIPID SCREENING FOR CARDIOVASCULAR DISEASE: ICD-10-CM

## 2019-10-16 DIAGNOSIS — M79.7 FIBROMYALGIA: ICD-10-CM

## 2019-10-16 DIAGNOSIS — J01.11 ACUTE RECURRENT FRONTAL SINUSITIS: ICD-10-CM

## 2019-10-16 DIAGNOSIS — G25.0 ESSENTIAL TREMOR: ICD-10-CM

## 2019-10-16 DIAGNOSIS — Z00.00 MEDICARE ANNUAL WELLNESS VISIT, SUBSEQUENT: Primary | ICD-10-CM

## 2019-10-16 DIAGNOSIS — Z79.890 HORMONE REPLACEMENT THERAPY (HRT): ICD-10-CM

## 2019-10-16 DIAGNOSIS — Z12.31 ENCOUNTER FOR SCREENING MAMMOGRAM FOR BREAST CANCER: ICD-10-CM

## 2019-10-16 DIAGNOSIS — F51.04 CHRONIC INSOMNIA: ICD-10-CM

## 2019-10-16 DIAGNOSIS — I10 ESSENTIAL HYPERTENSION: ICD-10-CM

## 2019-10-16 DIAGNOSIS — K21.9 GASTROESOPHAGEAL REFLUX DISEASE, ESOPHAGITIS PRESENCE NOT SPECIFIED: ICD-10-CM

## 2019-10-16 DIAGNOSIS — R51.9 CHRONIC DAILY HEADACHE: ICD-10-CM

## 2019-10-16 DIAGNOSIS — Z51.81 MEDICATION MONITORING ENCOUNTER: ICD-10-CM

## 2019-10-16 DIAGNOSIS — F45.0 DEPRESSION WITH SOMATIZATION: ICD-10-CM

## 2019-10-16 DIAGNOSIS — E55.9 VITAMIN D DEFICIENCY: ICD-10-CM

## 2019-10-16 DIAGNOSIS — F32.A DEPRESSION WITH SOMATIZATION: ICD-10-CM

## 2019-10-16 DIAGNOSIS — G43.719 INTRACTABLE CHRONIC MIGRAINE WITHOUT AURA AND WITHOUT STATUS MIGRAINOSUS: ICD-10-CM

## 2019-10-16 DIAGNOSIS — N95.1 POSTMENOPAUSAL DISORDER: ICD-10-CM

## 2019-10-16 DIAGNOSIS — E53.8 VITAMIN B12 DEFICIENCY: ICD-10-CM

## 2019-10-16 DIAGNOSIS — N30.10 INTERSTITIAL CYSTITIS: ICD-10-CM

## 2019-10-16 DIAGNOSIS — F43.10 PTSD (POST-TRAUMATIC STRESS DISORDER): ICD-10-CM

## 2019-10-16 DIAGNOSIS — M62.441 CONTRACTURE OF MUSCLE OF RIGHT HAND: ICD-10-CM

## 2019-10-16 PROCEDURE — 96160 PT-FOCUSED HLTH RISK ASSMT: CPT | Performed by: FAMILY MEDICINE

## 2019-10-16 PROCEDURE — 99213 OFFICE O/P EST LOW 20 MIN: CPT | Performed by: FAMILY MEDICINE

## 2019-10-16 PROCEDURE — 96372 THER/PROPH/DIAG INJ SC/IM: CPT | Performed by: FAMILY MEDICINE

## 2019-10-16 PROCEDURE — G0439 PPPS, SUBSEQ VISIT: HCPCS | Performed by: FAMILY MEDICINE

## 2019-10-16 RX ORDER — METHYLPREDNISOLONE ACETATE 80 MG/ML
80 INJECTION, SUSPENSION INTRA-ARTICULAR; INTRALESIONAL; INTRAMUSCULAR; SOFT TISSUE ONCE
Status: COMPLETED | OUTPATIENT
Start: 2019-10-16 | End: 2019-10-16

## 2019-10-16 RX ORDER — CEFTRIAXONE 1 G/1
1 INJECTION, POWDER, FOR SOLUTION INTRAMUSCULAR; INTRAVENOUS ONCE
Status: COMPLETED | OUTPATIENT
Start: 2019-10-16 | End: 2019-10-16

## 2019-10-16 RX ADMIN — CEFTRIAXONE 1 G: 1 INJECTION, POWDER, FOR SOLUTION INTRAMUSCULAR; INTRAVENOUS at 11:24

## 2019-10-16 RX ADMIN — METHYLPREDNISOLONE ACETATE 80 MG: 80 INJECTION, SUSPENSION INTRA-ARTICULAR; INTRALESIONAL; INTRAMUSCULAR; SOFT TISSUE at 11:23

## 2019-10-16 NOTE — PATIENT INSTRUCTIONS
Preventive Care 40-64 Years, Female  Preventive care refers to lifestyle choices and visits with your health care provider that can promote health and wellness.  What does preventive care include?    · A yearly physical exam. This is also called an annual well check.  · Dental exams once or twice a year.  · Routine eye exams. Ask your health care provider how often you should have your eyes checked.  · Personal lifestyle choices, including:  ? Daily care of your teeth and gums.  ? Regular physical activity.  ? Eating a healthy diet.  ? Avoiding tobacco and drug use.  ? Limiting alcohol use.  ? Practicing safe sex.  ? Taking low-dose aspirin daily starting at age 50.  ? Taking vitamin and mineral supplements as recommended by your health care provider.  What happens during an annual well check?  The services and screenings done by your health care provider during your annual well check will depend on your age, overall health, lifestyle risk factors, and family history of disease.  Counseling  Your health care provider may ask you questions about your:  · Alcohol use.  · Tobacco use.  · Drug use.  · Emotional well-being.  · Home and relationship well-being.  · Sexual activity.  · Eating habits.  · Work and work environment.  · Method of birth control.  · Menstrual cycle.  · Pregnancy history.  Screening  You may have the following tests or measurements:  · Height, weight, and BMI.  · Blood pressure.  · Lipid and cholesterol levels. These may be checked every 5 years, or more frequently if you are over 50 years old.  · Skin check.  · Lung cancer screening. You may have this screening every year starting at age 55 if you have a 30-pack-year history of smoking and currently smoke or have quit within the past 15 years.  · Colorectal cancer screening. All adults should have this screening starting at age 50 and continuing until age 75. Your health care provider may recommend screening at age 45. You will have tests every  1-10 years, depending on your results and the type of screening test. People at increased risk should start screening at an earlier age. Screening tests may include:  ? Guaiac-based fecal occult blood testing.  ? Fecal immunochemical test (FIT).  ? Stool DNA test.  ? Virtual colonoscopy.  ? Sigmoidoscopy. During this test, a flexible tube with a tiny camera (sigmoidoscope) is used to examine your rectum and lower colon. The sigmoidoscope is inserted through your anus into your rectum and lower colon.  ? Colonoscopy. During this test, a long, thin, flexible tube with a tiny camera (colonoscope) is used to examine your entire colon and rectum.  · Hepatitis C blood test.  · Hepatitis B blood test.  · Sexually transmitted disease (STD) testing.  · Diabetes screening. This is done by checking your blood sugar (glucose) after you have not eaten for a while (fasting). You may have this done every 1-3 years.  · Mammogram. This may be done every 1-2 years. Talk to your health care provider about when you should start having regular mammograms. This may depend on whether you have a family history of breast cancer.  · BRCA-related cancer screening. This may be done if you have a family history of breast, ovarian, tubal, or peritoneal cancers.  · Pelvic exam and Pap test. This may be done every 3 years starting at age 21. Starting at age 30, this may be done every 5 years if you have a Pap test in combination with an HPV test.  · Bone density scan. This is done to screen for osteoporosis. You may have this scan if you are at high risk for osteoporosis.  Discuss your test results, treatment options, and if necessary, the need for more tests with your health care provider.  Vaccines  Your health care provider may recommend certain vaccines, such as:  · Influenza vaccine. This is recommended every year.  · Tetanus, diphtheria, and acellular pertussis (Tdap, Td) vaccine. You may need a Td booster every 10 years.  · Varicella  vaccine. You may need this if you have not been vaccinated.  · Zoster vaccine. You may need this after age 60.  · Measles, mumps, and rubella (MMR) vaccine. You may need at least one dose of MMR if you were born in 1957 or later. You may also need a second dose.  · Pneumococcal 13-valent conjugate (PCV13) vaccine. You may need this if you have certain conditions and were not previously vaccinated.  · Pneumococcal polysaccharide (PPSV23) vaccine. You may need one or two doses if you smoke cigarettes or if you have certain conditions.  · Meningococcal vaccine. You may need this if you have certain conditions.  · Hepatitis A vaccine. You may need this if you have certain conditions or if you travel or work in places where you may be exposed to hepatitis A.  · Hepatitis B vaccine. You may need this if you have certain conditions or if you travel or work in places where you may be exposed to hepatitis B.  · Haemophilus influenzae type b (Hib) vaccine. You may need this if you have certain conditions.  Talk to your health care provider about which screenings and vaccines you need and how often you need them.  This information is not intended to replace advice given to you by your health care provider. Make sure you discuss any questions you have with your health care provider.  Document Released: 2017 Document Revised: 2019 Document Reviewed: 10/18/2016  [employer] Patient Education © 2019 [employer]      Medicare Wellness  Personal Prevention Plan of Service     Date of Office Visit:  10/16/2019  Encounter Provider:  Amber Mills MD  Place of Service:  [facility]  Patient Name: Tatiana Fields  :  1965    As part of the Medicare Wellness portion of your visit today, we are providing you with this personalized preventive plan of services (PPPS). This plan is based upon recommendations of the [place] Preventive Services Task Force (USPSTF) and the  Advisory Committee on Immunization Practices (ACIP).    This lists the preventive care services that should be considered, and provides dates of when you are due. Items listed as completed are up-to-date and do not require any further intervention.    Health Maintenance   Topic Date Due   • TDAP/TD VACCINES (1 - Tdap) 10/07/1984   • ZOSTER VACCINE (1 of 2) 10/07/2015   • MAMMOGRAM  08/16/2018   • LIPID PANEL  06/21/2019   • HEMOGLOBIN A1C  07/16/2019   • INFLUENZA VACCINE  08/01/2019   • MEDICARE ANNUAL WELLNESS  10/15/2019   • COLONOSCOPY  11/25/2025   • HEPATITIS C SCREENING  Completed   • DIABETIC FOOT EXAM  Discontinued   • PAP SMEAR  Discontinued   • DIABETIC EYE EXAM  Discontinued   • URINE MICROALBUMIN  Discontinued       No orders of the defined types were placed in this encounter.      Return in about 3 months (around 1/16/2020).

## 2019-10-16 NOTE — PROGRESS NOTES
"The ABCs of the Annual Wellness Visit  Subsequent Medicare Wellness Visit    Chief Complaint   Patient presents with   • Annual Exam   • Cough   • Nasal Congestion   • Sinus Problem   • Headache       Subjective   History of Present Illness:  Tatiana Fields is a 54 y.o. female who presents for a Subsequent Medicare Wellness Visit.    She has HTN, taking lisnopril as rx'd. Fair job of following heart healthy diet. No regular exercise.     Intractable migraines improved. Followed by neurology. No new focal neuro symptoms.     Has DANE and is currently on CPAP. Good compliance. Sleep quality improved.     Chronic constipation and IBSx with extensive previous eval by by GI and colorectal surgery. Symptoms currently stable.     She has GERD. Symptoms recently stable. No weight loss, dysphagia or blood in stool.     Has B12 and vit D def; taking replacement as rx'd. Continues to have chronic fatigue and arthralgias.     Previous IFG/pre-D. Working to limit sugar. A1c has been stable.     Chronic pain due to FMSx and C-DJD/DDD. Under care of pain mgnt. No recent changes in med.     Essential tremor without recent worsening. Has had eval per neurology. C/o worsening contracture right hand/arm.     Has Interstitial cystitis; no recent exacerbation. Followed by urology. No fever or hematuria.     Postmenopausal DO on HRT. Taking as rx'd. Denies side effects.     Chronic insomnia currently stable on current meds. Comorbid chronic severe depression with somatization and PTSD currently on SSRI. Moods, appetite, sleep patterns stable. Denies SI.     Currently on Provigil for mgnt chronic fatigue, EDS despite tx of DANE. Did not do well on ritalin as she felt it was \"sedating\".     Her main concern today is that of greater than 1 week of sinus pressure/facial pain, increased cough, mild chest congestion, severe headache. Using nasal spray, netti-pot, allegra D, nyquil without much improvement. feels BP  May be up due to use of " decongestants.    Pt's previous HPI reviewed and updated as indicated.       HEALTH RISK ASSESSMENT    Recent Hospitalizations:  No hospitalization(s) within the last year.    Current Medical Providers:  Patient Care Team:  Amber Mills MD as PCP - General (Family Medicine)  Homer Kam MD as PCP - Claims Attributed  Jake Naranjo MD as Consulting Physician (Cardiology)  Bora Miles MD as Consulting Physician (Sleep Medicine)  Lisa Villalobos MD as Consulting Physician (Physical Medicine and Rehabilitation)  Keisha Ruiz MD as Consulting Physician (Gastroenterology)  Malik Faria II, MD as Consulting Physician (Pain Medicine)  Mal Weeks MD as Consulting Physician (Colon and Rectal Surgery)  Shira Lawrence MD as Obstetrician (Obstetrics and Gynecology)  Aj Mendiola MD as Surgeon (Neurosurgery)    Smoking Status:  Social History     Tobacco Use   Smoking Status Never Smoker   Smokeless Tobacco Never Used       Alcohol Consumption:  Social History     Substance and Sexual Activity   Alcohol Use No       Depression Screen:   PHQ-2/PHQ-9 Depression Screening 10/16/2019   Little interest or pleasure in doing things 0   Feeling down, depressed, or hopeless 0   Trouble falling or staying asleep, or sleeping too much -   Feeling tired or having little energy -   Poor appetite or overeating -   Feeling bad about yourself - or that you are a failure or have let yourself or your family down -   Trouble concentrating on things, such as reading the newspaper or watching television -   Moving or speaking so slowly that other people could have noticed. Or the opposite - being so fidgety or restless that you have been moving around a lot more than usual -   Thoughts that you would be better off dead, or of hurting yourself in some way -   Total Score 0   If you checked off any problems, how difficult have these problems made it for you to do your work, take care of things at  home, or get along with other people? -       Fall Risk Screen:  MADDIE Fall Risk Assessment has not been completed.    Health Habits and Functional and Cognitive Screening:  Functional & Cognitive Status 10/16/2019   Do you have difficulty preparing food and eating? No   Do you have difficulty bathing yourself, getting dressed or grooming yourself? No   Do you have difficulty using the toilet? No   Do you have difficulty moving around from place to place? No   Do you have trouble with steps or getting out of a bed or a chair? No   Current Diet Well Balanced Diet   Dental Exam Up to date   Eye Exam Up to date   Exercise (times per week) 7 times per week   Current Exercise Activities Include Walking   Do you need help using the phone?  No   Are you deaf or do you have serious difficulty hearing?  No   Do you need help with transportation? No   Do you need help shopping? No   Do you need help preparing meals?  No   Do you need help with housework?  No   Do you need help with laundry? No   Do you need help taking your medications? No   Do you need help managing money? No   Do you ever drive or ride in a car without wearing a seat belt? No   Have you felt unusual stress, anger or loneliness in the last month? No   Who do you live with? Alone   If you need help, do you have trouble finding someone available to you? Yes   Have you been bothered in the last four weeks by sexual problems? No   Do you have difficulty concentrating, remembering or making decisions? No         Does the patient have evidence of cognitive impairment? No    Asprin use counseling:Does not need ASA (and currently is not on it)    Age-appropriate Screening Schedule:  Refer to the list below for future screening recommendations based on patient's age, sex and/or medical conditions. Orders for these recommended tests are listed in the plan section. The patient has been provided with a written plan.    Health Maintenance   Topic Date Due   • TDAP/TD  "VACCINES (1 - Tdap) 10/07/1984   • ZOSTER VACCINE (1 of 2) 10/07/2015   • MAMMOGRAM  08/16/2018   • INFLUENZA VACCINE  08/01/2019   • HEMOGLOBIN A1C  04/16/2020   • LIPID PANEL  10/16/2020   • COLONOSCOPY  11/25/2025   • DIABETIC FOOT EXAM  Discontinued   • PAP SMEAR  Discontinued   • DIABETIC EYE EXAM  Discontinued   • URINE MICROALBUMIN  Discontinued          The following portions of the patient's history were reviewed and updated as appropriate: allergies, current medications, past family history, past medical history, past social history, past surgical history and problem list.    Outpatient Medications Prior to Visit   Medication Sig Dispense Refill   • ALLERGY RELIEF/NASAL DECONGEST  MG per 24 hr tablet TAKE 1 TABLET BY MOUTH DAILY. 30 tablet 2   • B-D 3CC LUER-DORA SYR 25GX1\" 25G X 1\" 3 ML misc USE WITH B-12 INJECTIONS  5   • Cholecalciferol (VITAMIN D) 2000 units tablet Take 2,000 Units by mouth 2 (Two) Times a Day.     • diazePAM (VALIUM) 2 MG tablet Take 1 tablet by mouth Every 8 (Eight) Hours As Needed for Anxiety or Muscle Spasms. 30 tablet 2   • ENDOCET  MG per tablet Take 1 tablet by mouth Every 6 (Six) Hours As Needed.  0   • escitalopram (LEXAPRO) 20 MG tablet Take 1 tablet by mouth Daily. 30 tablet 1   • estrogens, conjugated,-methyltestosterone (ESTRATEST HS) 0.625-1.25 MG per tablet Take 1 tablet by mouth Daily. 30 tablet 5   • famciclovir (FAMVIR) 500 MG tablet TAKE 1 TABLET EVERY DAY 30 tablet 5   • fluticasone (FLONASE) 50 MCG/ACT nasal spray 2 sprays into the nostril(s) as directed by provider Daily. 1 bottle 2   • linaclotide (LINZESS) 290 MCG capsule capsule Take 1 capsule by mouth Daily. 30 capsule 5   • lisinopril (PRINIVIL,ZESTRIL) 10 MG tablet TAKE 1 TABLET (10MG) BY MOUTH TWICE DAILY 60 tablet 2   • modafinil (PROVIGIL) 100 MG tablet TAKE 1 TABLET BY MOUTH DAILY. 30 tablet 2   • naproxen (NAPROSYN) 500 MG tablet TK 1 T PO Q 12 H  1   • omeprazole (priLOSEC) 40 MG capsule " Take 1 capsule by mouth Daily. 30 capsule 5   • polyethylene glycol (MIRALAX) powder Take 17 g by mouth 2 (Two) Times a Day. 527 g 5   • pramipexole (MIRAPEX) 1 MG tablet Take 1 tablet by mouth every night at bedtime. 30 tablet 2   • progesterone (PROMETRIUM) 100 MG capsule Take one capsule at hs 30 capsule 5   • senna-docusate (SENNA-PLUS) 8.6-50 MG per tablet Take 1 tablet by mouth 2 (Two) Times a Day. 60 tablet 5   • SUMAtriptan (IMITREX) 100 MG tablet Take one tablet at onset of headache. May repeat dose one time in 2 hours if headache not relieved. 9 tablet 3   • tiZANidine (ZANAFLEX) 4 MG tablet      • triamcinolone (KENALOG) 0.1 % cream APPLY AA BID  0   • ondansetron (ZOFRAN) 4 MG tablet TAKE 1 TABLET BY MOUTH EVERY 8 HOURS AS NEEDED FOR NAUSEA OR VOMITING 30 tablet 0   • zolpidem CR (AMBIEN CR) 12.5 MG CR tablet Take 1 tablet by mouth At Night As Needed for Sleep. 30 tablet 2   • cyanocobalamin 1000 MCG/ML injection Inject 1 mL into the appropriate muscle as directed by prescriber Every 30 (Thirty) Days. 1 mL 5   • fluconazole (DIFLUCAN) 150 MG tablet One po now and repeat in 3 days 2 tablet 0   • Zoster Vac Recomb Adjuvanted 50 MCG reconstituted suspension Inject 50 mcg into the appropriate muscle as directed by prescriber Take As Directed. Repeat 6 months. 1 each 1     No facility-administered medications prior to visit.        Patient Active Problem List   Diagnosis   • Depression with somatization   • Essential tremor   • Periodic limb movements of sleep   • Chronic daily headache   • Excessive daytime sleepiness   • Obstructive sleep apnea   • Intractable migraine with aura without status migrainosus   • Ovarian cyst   • History of Lyme disease   • Fibromyalgia   • Interstitial cystitis   • Essential hypertension   • IFG (impaired fasting glucose)   • Chronic constipation   • GERD (gastroesophageal reflux disease)   • Irritable bowel syndrome   • PTSD (post-traumatic stress disorder)   • Vitamin D  deficiency   • Hepatomegaly   • Postmenopausal disorder   • Chronic insomnia   • Cervical arthritis   • Cervical nerve root compression   • Vitamin B12 deficiency   • BMI 28.0-28.9,adult   • Medication monitoring encounter   • S/P Botox injection   • Hormone replacement therapy (HRT)   • Polypharmacy   • Chronic migraine without aura       Advanced Care Planning:  Patient does not have an advance directive - information provided to the patient today    Review of Systems   Constitutional: Positive for fatigue. Negative for fever.   HENT: Positive for congestion, postnasal drip, rhinorrhea, sinus pressure, sinus pain and sore throat. Negative for ear pain, hearing loss, nosebleeds, sneezing and trouble swallowing.    Eyes: Positive for pain, redness and visual disturbance (chronic).        Dry eyes   Respiratory: Positive for cough. Negative for shortness of breath and wheezing.    Cardiovascular: Positive for palpitations. Negative for chest pain and leg swelling.   Gastrointestinal: Positive for abdominal pain (chronic), constipation and nausea. Negative for blood in stool, diarrhea and vomiting.   Endocrine: Positive for heat intolerance. Negative for polydipsia and polyuria.   Genitourinary: Positive for pelvic pain (chronic). Negative for dysuria and hematuria.   Musculoskeletal: Positive for arthralgias, myalgias, neck pain and neck stiffness. Negative for back pain and joint swelling.   Skin: Negative for rash and wound.   Neurological: Positive for dizziness, tremors, weakness and headaches. Negative for seizures, syncope and speech difficulty.   Hematological: Negative for adenopathy. Bruises/bleeds easily.   Psychiatric/Behavioral: Positive for dysphoric mood and sleep disturbance. Negative for confusion and suicidal ideas. The patient is nervous/anxious.    Pt's previous ROS reviewed and updated as indicated.       Compared to one year ago, the patient feels her physical health is worse.  Compared to one  "year ago, the patient feels her mental health is the same.    Reviewed chart for potential of high risk medication in the elderly: not applicable  Reviewed chart for potential of harmful drug interactions in the elderly:not applicable    Objective         Vitals:    10/16/19 1056   BP: 144/90   Pulse: 85   Temp: 98.8 °F (37.1 °C)   TempSrc: Temporal   SpO2: 98%   Weight: 74 kg (163 lb 4 oz)   Height: 162.6 cm (64\")   PainSc:   6   PainLoc: Face       Body mass index is 28.02 kg/m².  Discussed the patient's BMI with her. The BMI is above average; BMI management plan is completed. Patient's Body mass index is 28.02 kg/m². BMI is above normal parameters. Recommendations include: educational material, exercise counseling and nutrition counseling.      Physical Exam   Constitutional: She is oriented to person, place, and time. She appears well-developed and well-nourished. She is cooperative. She appears ill. No distress.   HENT:   Head: Normocephalic and atraumatic.   Right Ear: External ear and ear canal normal. Tympanic membrane is retracted.   Left Ear: External ear and ear canal normal. Tympanic membrane is retracted.   Nose: Mucosal edema and rhinorrhea (mucoid) present. Right sinus exhibits frontal sinus tenderness.   Mouth/Throat: Oropharynx is clear and moist and mucous membranes are normal. Mucous membranes are not dry. No oral lesions. No oropharyngeal exudate (thick PND).   Eyes: EOM are normal. Right conjunctiva is injected (mildly). Left conjunctiva is injected (mildly). Right eye exhibits no nystagmus. Left eye exhibits no nystagmus.   Chronic right upper lid lag   Neck: Phonation normal. Neck supple. Normal carotid pulses present. No thyroid mass and no thyromegaly present.   Cardiovascular: Normal rate, regular rhythm, normal heart sounds and intact distal pulses. Exam reveals no gallop.   No murmur heard.  Pulmonary/Chest: Effort normal and breath sounds normal.   Musculoskeletal:        Cervical back: " She exhibits decreased range of motion, tenderness (diffuse soft tissue, worst on right side), bony tenderness (C5-C7), deformity (loss of normal lordosis) and spasm.     Vascular Status -  Her right foot exhibits no edema. Her left foot exhibits no edema.  Lymphadenopathy:        Head (right side): No submental and no submandibular adenopathy present.        Head (left side): No submental and no submandibular adenopathy present.     She has no cervical adenopathy.   Neurological: She is alert and oriented to person, place, and time. Gait normal.   Skin: Skin is warm and dry. No bruising and no rash noted. No cyanosis. Nails show no clubbing.   Psychiatric: Her speech is normal and behavior is normal. Judgment and thought content normal. Her mood appears anxious. Cognition and memory are normal. She exhibits a depressed mood.   Good eye contact. Answers questions appropriately. Good personal hygiene and grooming.   Nursing note and vitals reviewed.  Pt's previous physical exam reviewed and updated as indicated.    Lab Results   Component Value Date    GLU 85 10/16/2019    CHLPL 180 10/16/2019    TRIG 88 10/16/2019    HDL 55 10/16/2019     (H) 10/16/2019    VLDL 17.6 10/16/2019    HGBA1C 5.50 10/16/2019        Assessment/Plan   Medicare Risks and Personalized Health Plan  CMS Preventative Services Quick Reference  Advance Directive Discussion  Breast Cancer/Mammogram Screening  Cardiovascular risk  Chronic Pain   Depression/Dysphoria  Diabetic Lab Screening   Immunizations Discussed/Encouraged (specific immunizations; adacel Tdap, Influenza and Shingrix )  Obesity/Overweight     The above risks/problems have been discussed with the patient.  Pertinent information has been shared with the patient in the After Visit Summary.  Follow up plans and orders are seen below in the Assessment/Plan Section.    Diagnoses and all orders for this visit:    1. Medicare annual wellness visit, subsequent (Primary)    2. Acute  recurrent frontal sinusitis  -     cefTRIAXone (ROCEPHIN) injection 1 g  -     methylPREDNISolone acetate (DEPO-medrol) injection 80 mg  -     CBC (No Diff)    3. IFG (impaired fasting glucose)  -     Hemoglobin A1c  -     Comprehensive Metabolic Panel    4. Encounter for lipid screening for cardiovascular disease  -     Lipid Panel    5. Essential hypertension  -     CBC (No Diff)  -     Comprehensive Metabolic Panel  -     TSH Rfx On Abnormal To Free T4    6. Medication monitoring encounter  -     CBC (No Diff)  -     Comprehensive Metabolic Panel    7. Vitamin B12 deficiency  -     Vitamin B12    8. Vitamin D deficiency  -     Vitamin D 25 Hydroxy    9. PTSD (post-traumatic stress disorder)    10. Polypharmacy    11. Hormone replacement therapy (HRT)    12. Depression with somatization    13. Chronic insomnia    14. BMI 28.0-28.9,adult    15. Postmenopausal disorder    16. Interstitial cystitis    17. Fibromyalgia    18. Essential tremor    19. Chronic daily headache    20. Intractable chronic migraine without aura and without status migrainosus    21. Obstructive sleep apnea    22. Gastroesophageal reflux disease, esophagitis presence not specified    23. Irritable bowel syndrome with constipation    24. Contracture of muscle of right hand  -     Ambulatory Referral to Neurology      POC tx for acute sinusitis as above. Symptomatic mgnt reviewed. Continue nasal saline, mucinex.    Other chronic conditions appear stable. Surveillance labs as above.     BP not quite at goal today, most likely due to pain and use of decongestants. Continue lisinopril and close monitoring of BP, avoid decongestants.    Refer to new neurologist for further eval of RUE contracture.    Follow Up:  Return in about 3 months (around 1/16/2020).   F/u sooner as needed/instructed.  Return when well for seasonal flu vaccine.  I will contact patient regarding test results and provide instructions regarding any necessary changes in plan of  care.  F/u with specialists as scheduled.  Continue current tx plans.  Patient was encouraged to keep me informed of any acute changes, lack of improvement, or any new concerning symptoms.  Pt is aware of reasons to seek emergent care.  Patient voiced understanding of all instructions and denied further questions.        An After Visit Summary and PPPS were given to the patient.

## 2019-10-17 LAB
25(OH)D3+25(OH)D2 SERPL-MCNC: 24.1 NG/ML (ref 30–100)
ALBUMIN SERPL-MCNC: 4.5 G/DL (ref 3.5–5.2)
ALBUMIN/GLOB SERPL: 2.3 G/DL
ALP SERPL-CCNC: 64 U/L (ref 39–117)
ALT SERPL-CCNC: 27 U/L (ref 1–33)
AST SERPL-CCNC: 30 U/L (ref 1–32)
BILIRUB SERPL-MCNC: 0.5 MG/DL (ref 0.2–1.2)
BUN SERPL-MCNC: 12 MG/DL (ref 6–20)
BUN/CREAT SERPL: 15.2 (ref 7–25)
CALCIUM SERPL-MCNC: 9.2 MG/DL (ref 8.6–10.5)
CHLORIDE SERPL-SCNC: 100 MMOL/L (ref 98–107)
CHOLEST SERPL-MCNC: 180 MG/DL (ref 0–200)
CO2 SERPL-SCNC: 28.4 MMOL/L (ref 22–29)
CREAT SERPL-MCNC: 0.79 MG/DL (ref 0.57–1)
ERYTHROCYTE [DISTWIDTH] IN BLOOD BY AUTOMATED COUNT: 12 % (ref 12.3–15.4)
GLOBULIN SER CALC-MCNC: 2 GM/DL
GLUCOSE SERPL-MCNC: 85 MG/DL (ref 65–99)
HBA1C MFR BLD: 5.5 % (ref 4.8–5.6)
HCT VFR BLD AUTO: 38.1 % (ref 34–46.6)
HDLC SERPL-MCNC: 55 MG/DL (ref 40–60)
HGB BLD-MCNC: 12.8 G/DL (ref 12–15.9)
LDLC SERPL CALC-MCNC: 107 MG/DL (ref 0–100)
MCH RBC QN AUTO: 29.8 PG (ref 26.6–33)
MCHC RBC AUTO-ENTMCNC: 33.6 G/DL (ref 31.5–35.7)
MCV RBC AUTO: 88.8 FL (ref 79–97)
PLATELET # BLD AUTO: 281 10*3/MM3 (ref 140–450)
POTASSIUM SERPL-SCNC: 3.6 MMOL/L (ref 3.5–5.2)
PROT SERPL-MCNC: 6.5 G/DL (ref 6–8.5)
RBC # BLD AUTO: 4.29 10*6/MM3 (ref 3.77–5.28)
SODIUM SERPL-SCNC: 140 MMOL/L (ref 136–145)
TRIGL SERPL-MCNC: 88 MG/DL (ref 0–150)
TSH SERPL DL<=0.005 MIU/L-ACNC: 1.4 UIU/ML (ref 0.27–4.2)
VIT B12 SERPL-MCNC: 1021 PG/ML (ref 211–946)
VLDLC SERPL CALC-MCNC: 17.6 MG/DL
WBC # BLD AUTO: 8.1 10*3/MM3 (ref 3.4–10.8)

## 2019-10-17 RX ORDER — ZOLPIDEM TARTRATE 12.5 MG/1
12.5 TABLET, FILM COATED, EXTENDED RELEASE ORAL NIGHTLY PRN
Qty: 30 TABLET | Refills: 2 | Status: SHIPPED | OUTPATIENT
Start: 2019-10-17 | End: 2020-03-10 | Stop reason: SDUPTHER

## 2019-10-17 RX ORDER — ONDANSETRON 4 MG/1
4 TABLET, FILM COATED ORAL EVERY 8 HOURS PRN
Qty: 30 TABLET | Refills: 0 | Status: SHIPPED | OUTPATIENT
Start: 2019-10-17 | End: 2019-12-30 | Stop reason: SDUPTHER

## 2019-10-17 NOTE — TELEPHONE ENCOUNTER
KIMBERLY reviewed. Refill approved. Medication E rx'd to pharmacy as requested. Pt to keep f/u apt as scheduled.

## 2019-10-19 VITALS
SYSTOLIC BLOOD PRESSURE: 144 MMHG | DIASTOLIC BLOOD PRESSURE: 90 MMHG | TEMPERATURE: 98.8 F | HEIGHT: 64 IN | WEIGHT: 163.25 LBS | BODY MASS INDEX: 27.87 KG/M2 | HEART RATE: 85 BPM | OXYGEN SATURATION: 98 %

## 2019-10-22 DIAGNOSIS — J01.01 ACUTE RECURRENT MAXILLARY SINUSITIS: Primary | ICD-10-CM

## 2019-10-22 DIAGNOSIS — F43.10 PTSD (POST-TRAUMATIC STRESS DISORDER): ICD-10-CM

## 2019-10-22 DIAGNOSIS — M47.812 CERVICAL ARTHRITIS: ICD-10-CM

## 2019-10-22 RX ORDER — CEFTRIAXONE 1 G/1
1 INJECTION, POWDER, FOR SOLUTION INTRAMUSCULAR; INTRAVENOUS ONCE
Status: COMPLETED | OUTPATIENT
Start: 2019-10-22 | End: 2019-10-23

## 2019-10-22 RX ORDER — METHYLPREDNISOLONE ACETATE 80 MG/ML
80 INJECTION, SUSPENSION INTRA-ARTICULAR; INTRALESIONAL; INTRAMUSCULAR; SOFT TISSUE ONCE
Status: COMPLETED | OUTPATIENT
Start: 2019-10-22 | End: 2019-10-23

## 2019-10-22 RX ORDER — SUMATRIPTAN 100 MG/1
TABLET, FILM COATED ORAL
Qty: 9 TABLET | Refills: 3 | Status: SHIPPED | OUTPATIENT
Start: 2019-10-22 | End: 2020-01-03 | Stop reason: SDUPTHER

## 2019-10-22 RX ORDER — OMEPRAZOLE 40 MG/1
40 CAPSULE, DELAYED RELEASE ORAL DAILY
Qty: 30 CAPSULE | Refills: 5 | Status: SHIPPED | OUTPATIENT
Start: 2019-10-22 | End: 2020-01-03 | Stop reason: SDUPTHER

## 2019-10-23 ENCOUNTER — CLINICAL SUPPORT (OUTPATIENT)
Dept: FAMILY MEDICINE CLINIC | Facility: CLINIC | Age: 54
End: 2019-10-23

## 2019-10-23 DIAGNOSIS — J06.9 UPPER RESPIRATORY TRACT INFECTION, UNSPECIFIED TYPE: ICD-10-CM

## 2019-10-23 PROCEDURE — 96372 THER/PROPH/DIAG INJ SC/IM: CPT | Performed by: FAMILY MEDICINE

## 2019-10-23 RX ORDER — DIAZEPAM 2 MG/1
2 TABLET ORAL EVERY 8 HOURS PRN
Qty: 30 TABLET | Refills: 2 | OUTPATIENT
Start: 2019-10-23

## 2019-10-23 RX ORDER — MODAFINIL 100 MG/1
100 TABLET ORAL DAILY
Qty: 30 TABLET | Refills: 2 | OUTPATIENT
Start: 2019-10-23

## 2019-10-23 RX ADMIN — METHYLPREDNISOLONE ACETATE 80 MG: 80 INJECTION, SUSPENSION INTRA-ARTICULAR; INTRALESIONAL; INTRAMUSCULAR; SOFT TISSUE at 13:13

## 2019-10-23 RX ADMIN — CEFTRIAXONE 1 G: 1 INJECTION, POWDER, FOR SOLUTION INTRAMUSCULAR; INTRAVENOUS at 13:12

## 2019-11-25 RX ORDER — ESCITALOPRAM OXALATE 20 MG/1
20 TABLET ORAL DAILY
Qty: 30 TABLET | Refills: 1 | Status: SHIPPED | OUTPATIENT
Start: 2019-11-25 | End: 2020-04-13 | Stop reason: SDUPTHER

## 2019-12-06 DIAGNOSIS — F43.10 PTSD (POST-TRAUMATIC STRESS DISORDER): ICD-10-CM

## 2019-12-06 DIAGNOSIS — M47.812 CERVICAL ARTHRITIS: ICD-10-CM

## 2019-12-09 RX ORDER — DIAZEPAM 2 MG/1
2 TABLET ORAL EVERY 8 HOURS PRN
Qty: 30 TABLET | Refills: 2 | Status: SHIPPED | OUTPATIENT
Start: 2019-12-09 | End: 2020-01-10 | Stop reason: SDUPTHER

## 2019-12-29 ENCOUNTER — TELEPHONE (OUTPATIENT)
Dept: FAMILY MEDICINE CLINIC | Facility: CLINIC | Age: 54
End: 2019-12-29

## 2019-12-30 DIAGNOSIS — J34.3 HYPERTROPHY, NASAL, TURBINATE: ICD-10-CM

## 2019-12-30 RX ORDER — POLYETHYLENE GLYCOL 3350 17 G/17G
17 POWDER, FOR SOLUTION ORAL 2 TIMES DAILY
Qty: 527 G | Refills: 5 | Status: SHIPPED | OUTPATIENT
Start: 2019-12-30 | End: 2020-01-03 | Stop reason: SDUPTHER

## 2019-12-30 RX ORDER — ONDANSETRON 4 MG/1
4 TABLET, FILM COATED ORAL EVERY 8 HOURS PRN
Qty: 30 TABLET | Refills: 0 | Status: SHIPPED | OUTPATIENT
Start: 2019-12-30 | End: 2020-01-03 | Stop reason: SDUPTHER

## 2019-12-30 RX ORDER — PRAMIPEXOLE DIHYDROCHLORIDE 1 MG/1
1 TABLET ORAL
Qty: 30 TABLET | Refills: 2 | Status: SHIPPED | OUTPATIENT
Start: 2019-12-30 | End: 2020-01-03 | Stop reason: SDUPTHER

## 2019-12-30 RX ORDER — FLUTICASONE PROPIONATE 50 MCG
2 SPRAY, SUSPENSION (ML) NASAL DAILY
Qty: 1 BOTTLE | Refills: 2 | Status: SHIPPED | OUTPATIENT
Start: 2019-12-30 | End: 2020-01-03 | Stop reason: SDUPTHER

## 2020-01-03 DIAGNOSIS — J34.3 HYPERTROPHY, NASAL, TURBINATE: ICD-10-CM

## 2020-01-03 RX ORDER — OMEPRAZOLE 40 MG/1
40 CAPSULE, DELAYED RELEASE ORAL DAILY
Qty: 30 CAPSULE | Refills: 5 | Status: SHIPPED | OUTPATIENT
Start: 2020-01-03 | End: 2020-04-13 | Stop reason: SDUPTHER

## 2020-01-03 RX ORDER — ONDANSETRON 4 MG/1
4 TABLET, FILM COATED ORAL EVERY 8 HOURS PRN
Qty: 30 TABLET | Refills: 0 | Status: SHIPPED | OUTPATIENT
Start: 2020-01-03 | End: 2020-07-05 | Stop reason: SDUPTHER

## 2020-01-03 RX ORDER — SUMATRIPTAN 100 MG/1
TABLET, FILM COATED ORAL
Qty: 9 TABLET | Refills: 3 | Status: SHIPPED | OUTPATIENT
Start: 2020-01-03 | End: 2020-07-28 | Stop reason: SDUPTHER

## 2020-01-03 RX ORDER — PRAMIPEXOLE DIHYDROCHLORIDE 1 MG/1
1 TABLET ORAL
Qty: 30 TABLET | Refills: 2 | Status: SHIPPED | OUTPATIENT
Start: 2020-01-03 | End: 2020-06-15 | Stop reason: SDUPTHER

## 2020-01-03 RX ORDER — POLYETHYLENE GLYCOL 3350 17 G/17G
17 POWDER, FOR SOLUTION ORAL 2 TIMES DAILY
Qty: 527 G | Refills: 5 | Status: SHIPPED | OUTPATIENT
Start: 2020-01-03 | End: 2020-03-10 | Stop reason: SDUPTHER

## 2020-01-03 RX ORDER — FLUTICASONE PROPIONATE 50 MCG
2 SPRAY, SUSPENSION (ML) NASAL DAILY
Qty: 1 BOTTLE | Refills: 2 | Status: SHIPPED | OUTPATIENT
Start: 2020-01-03 | End: 2020-07-27 | Stop reason: SDUPTHER

## 2020-01-03 RX ORDER — AMOXICILLIN 250 MG
1 CAPSULE ORAL 2 TIMES DAILY
Qty: 60 TABLET | Refills: 5 | Status: SHIPPED | OUTPATIENT
Start: 2020-01-03 | End: 2020-07-27 | Stop reason: SDUPTHER

## 2020-01-08 ENCOUNTER — OFFICE VISIT (OUTPATIENT)
Dept: FAMILY MEDICINE CLINIC | Facility: CLINIC | Age: 55
End: 2020-01-08

## 2020-01-08 VITALS
DIASTOLIC BLOOD PRESSURE: 82 MMHG | OXYGEN SATURATION: 99 % | HEART RATE: 89 BPM | SYSTOLIC BLOOD PRESSURE: 126 MMHG | WEIGHT: 163.5 LBS | TEMPERATURE: 99.1 F | BODY MASS INDEX: 27.91 KG/M2 | HEIGHT: 64 IN

## 2020-01-08 DIAGNOSIS — G47.19 EXCESSIVE DAYTIME SLEEPINESS: ICD-10-CM

## 2020-01-08 DIAGNOSIS — N30.00 ACUTE CYSTITIS WITHOUT HEMATURIA: Primary | ICD-10-CM

## 2020-01-08 DIAGNOSIS — G43.719 INTRACTABLE CHRONIC MIGRAINE WITHOUT AURA AND WITHOUT STATUS MIGRAINOSUS: ICD-10-CM

## 2020-01-08 DIAGNOSIS — F33.9 RECURRENT MAJOR DEPRESSION RESISTANT TO TREATMENT (HCC): ICD-10-CM

## 2020-01-08 DIAGNOSIS — G47.33 OBSTRUCTIVE SLEEP APNEA: ICD-10-CM

## 2020-01-08 DIAGNOSIS — B02.29 PHN (POSTHERPETIC NEURALGIA): ICD-10-CM

## 2020-01-08 DIAGNOSIS — R73.01 IFG (IMPAIRED FASTING GLUCOSE): ICD-10-CM

## 2020-01-08 DIAGNOSIS — M47.812 CERVICAL ARTHRITIS: ICD-10-CM

## 2020-01-08 PROCEDURE — 96372 THER/PROPH/DIAG INJ SC/IM: CPT | Performed by: FAMILY MEDICINE

## 2020-01-08 PROCEDURE — 99214 OFFICE O/P EST MOD 30 MIN: CPT | Performed by: FAMILY MEDICINE

## 2020-01-08 RX ORDER — FAMCICLOVIR 500 MG/1
500 TABLET ORAL DAILY
Qty: 30 TABLET | Refills: 5 | Status: SHIPPED | OUTPATIENT
Start: 2020-01-08 | End: 2020-01-10 | Stop reason: SDUPTHER

## 2020-01-08 RX ORDER — NITROFURANTOIN 25; 75 MG/1; MG/1
CAPSULE ORAL
COMMUNITY
Start: 2020-01-05 | End: 2020-02-05

## 2020-01-08 RX ORDER — CEFTRIAXONE 1 G/1
1 INJECTION, POWDER, FOR SOLUTION INTRAMUSCULAR; INTRAVENOUS ONCE
Status: COMPLETED | OUTPATIENT
Start: 2020-01-08 | End: 2020-01-08

## 2020-01-08 RX ORDER — METHYLPREDNISOLONE ACETATE 40 MG/ML
80 INJECTION, SUSPENSION INTRA-ARTICULAR; INTRALESIONAL; INTRAMUSCULAR; SOFT TISSUE ONCE
Status: COMPLETED | OUTPATIENT
Start: 2020-01-08 | End: 2020-01-08

## 2020-01-08 RX ORDER — KETOROLAC TROMETHAMINE 30 MG/ML
60 INJECTION, SOLUTION INTRAMUSCULAR; INTRAVENOUS ONCE
Status: COMPLETED | OUTPATIENT
Start: 2020-01-10 | End: 2020-01-13

## 2020-01-08 RX ORDER — KETOROLAC TROMETHAMINE 30 MG/ML
60 INJECTION, SOLUTION INTRAMUSCULAR; INTRAVENOUS ONCE
Status: DISCONTINUED | OUTPATIENT
Start: 2020-01-08 | End: 2020-01-08

## 2020-01-08 RX ORDER — METHYLPHENIDATE HYDROCHLORIDE 10 MG/1
TABLET ORAL
Qty: 60 TABLET | Refills: 0 | Status: SHIPPED | OUTPATIENT
Start: 2020-01-08 | End: 2020-02-05 | Stop reason: SDUPTHER

## 2020-01-08 RX ADMIN — METHYLPREDNISOLONE ACETATE 80 MG: 40 INJECTION, SUSPENSION INTRA-ARTICULAR; INTRALESIONAL; INTRAMUSCULAR; SOFT TISSUE at 17:22

## 2020-01-08 RX ADMIN — CEFTRIAXONE 1 G: 1 INJECTION, POWDER, FOR SOLUTION INTRAMUSCULAR; INTRAVENOUS at 17:22

## 2020-01-08 NOTE — PROGRESS NOTES
"Subjective   Tatiana Fields is a 54 y.o. female.     History of Present Illness   Mrs. Fields presents today with several concerns.    She is currently being tx'd for UTI. Apparently had Telehealth apt, rx'd macrobid which she has taken x 3 days. Some better but would rather have a \"shot\" to finish the tx as abx often irritate her stomach. Of note, she has h/o interstitial cystitis.    She also c/o inrease sinus pressure/pain, congestion, thick drainage.    She once again requests Referral to neuro in [place] for cont'd trigger point injections, botox. Was seeing Dr. Miles but he has left the area.     She is schedued for upcoming mammogram.    She c/o recurrent PHN right flank. Often takes course of antivirals which seems to help it resolve more quickly. Requests refill. No rash, no fever.    She is currently on provigil for tx of severe chronic fatigue despite tx for DANE assoc'd with chronic severe resistant depression. She does not feel it is as effective as the ritalin she took previously. She is \"Sleeping way too much\". Denies side effects from medication previously.    The following portions of the patient's history were reviewed and updated as appropriate: allergies, current medications, past family history, past medical history, past social history, past surgical history and problem list.    Review of Systems   Constitutional: Positive for fatigue. Negative for fever.   HENT: Positive for congestion, postnasal drip, rhinorrhea, sinus pressure, sinus pain and sore throat. Negative for ear pain, hearing loss, nosebleeds, sneezing and trouble swallowing.    Eyes: Positive for pain, redness and visual disturbance (chronic).        Dry eyes   Respiratory: Positive for cough. Negative for shortness of breath and wheezing.    Cardiovascular: Positive for palpitations. Negative for chest pain and leg swelling.   Gastrointestinal: Positive for abdominal pain (chronic), constipation and nausea. Negative for blood in " stool, diarrhea and vomiting.   Endocrine: Positive for heat intolerance. Negative for polydipsia and polyuria.   Genitourinary: Positive for dysuria, frequency and pelvic pain (chronic). Negative for hematuria.   Musculoskeletal: Positive for arthralgias, myalgias, neck pain and neck stiffness. Negative for back pain and joint swelling.   Skin: Negative for rash and wound.   Neurological: Positive for dizziness, tremors, weakness and headaches. Negative for seizures, syncope and speech difficulty.   Hematological: Negative for adenopathy. Bruises/bleeds easily.   Psychiatric/Behavioral: Positive for dysphoric mood and sleep disturbance. Negative for confusion and suicidal ideas. The patient is nervous/anxious.    Pt's previous ROS reviewed and updated as indicated.     Objective    Vitals:    01/08/20 1619   BP: 126/82   Pulse: 89   Temp: 99.1 °F (37.3 °C)   SpO2: 99%     Body mass index is 28.06 kg/m².      01/08/20  1619   Weight: 74.2 kg (163 lb 8 oz)     Physical Exam   Constitutional: She is oriented to person, place, and time. She appears well-developed and well-nourished. She is cooperative. She appears ill. No distress.   Appears fatigued   HENT:   Head: Normocephalic and atraumatic.   Right Ear: External ear and ear canal normal. Tympanic membrane is retracted.   Left Ear: External ear and ear canal normal. Tympanic membrane is retracted.   Nose: Mucosal edema and rhinorrhea (mucoid) present.   Mouth/Throat: Oropharynx is clear and moist and mucous membranes are normal. Mucous membranes are not dry. No oral lesions. No oropharyngeal exudate.   Eyes: EOM are normal. Right eye exhibits no nystagmus. Left eye exhibits no nystagmus.   Chronic right upper lid lag   Neck: Phonation normal. Neck supple. Normal carotid pulses present. No thyroid mass and no thyromegaly present.   Cardiovascular: Normal rate, regular rhythm, normal heart sounds and intact distal pulses. Exam reveals no gallop.   No murmur  heard.  Pulmonary/Chest: Effort normal and breath sounds normal.   Musculoskeletal:        Cervical back: She exhibits decreased range of motion, tenderness (diffuse soft tissue, worst on right side), bony tenderness (C5-C7), deformity (loss of normal lordosis) and spasm.     Vascular Status -  Her right foot exhibits no edema. Her left foot exhibits no edema.  Lymphadenopathy:        Head (right side): No submental and no submandibular adenopathy present.        Head (left side): No submental and no submandibular adenopathy present.     She has no cervical adenopathy.   Neurological: She is alert and oriented to person, place, and time. Gait normal.   Skin: Skin is warm and dry. No bruising and no rash noted. No cyanosis. Nails show no clubbing.   Psychiatric: Her speech is normal and behavior is normal. Judgment and thought content normal. Cognition and memory are normal. She exhibits a depressed mood.   Good eye contact. Answers questions appropriately. Good personal hygiene and grooming.   Nursing note and vitals reviewed.  Pt's previous physical exam reviewed and updated as indicated.        Assessment/Plan   Tatiana was seen today for pelvic pain, neck pain and sinus problem.    Diagnoses and all orders for this visit:    Acute cystitis without hematuria  -     cefTRIAXone (ROCEPHIN) injection 1 g    Excessive daytime sleepiness  -     methylphenidate (RITALIN) 10 MG tablet; TAKE 1 TABLET (10MG) BY MOUTH EVERY MORNING AND TAKE 1 TABLET (10MG) EACH AFTERNOON    Recurrent major depression resistant to treatment (CMS/HCC)  -     methylphenidate (RITALIN) 10 MG tablet; TAKE 1 TABLET (10MG) BY MOUTH EVERY MORNING AND TAKE 1 TABLET (10MG) EACH AFTERNOON    Cervical arthritis  -     Discontinue: ketorolac (TORADOL) injection 60 mg  -     methylPREDNISolone acetate (DEPO-medrol) injection 80 mg  -     ketorolac (TORADOL) injection 60 mg    Intractable chronic migraine without aura and without status  migrainosus    Obstructive sleep apnea    PHN (postherpetic neuralgia)    IFG (impaired fasting glucose)  -     Insulin, Total; Future    Other orders  -     famciclovir (FAMVIR) 500 MG tablet; Take 1 tablet by mouth Daily.       POC Rocephin IM for completion of tx for UTI. If symptoms persist she is to return for u/a with cx if indicated.    EDS with assoc'd DANE, resistant chronic depression. provigil less effective than low dose ritalin twice daily. Refill ritalin as above. Continue lexapro. No SI, no psychotic features.    Referral already underway for neurology for trigger point injections, Botox , etc.    Trial of Famciclovir as above for recurrent postherpetic pain as she has responded well to this in past with flares.    She will return next week for influenza vaccine. toradol at that time as well if pain persist.    Routine f/u as scheduled next month, f/u sooner as needed.  Patient was encouraged to keep me informed of any acute changes, lack of improvement, or any new concerning symptoms.  Pt is aware of reasons to seek emergent care.  Patient voiced understanding of all instructions and denied further questions.  As part of patient's treatment plan I am prescribing a controlled substance.  The patient has been made aware of the appropriate use of such medications, including potential risk of somnolence, limited ability to drive and/or work safely, and potential for dependence and/or overdose.  It has also been made clear that these medications are for use by this patient only, without concomitant use of alcohol or other substances, unless prescribed.  KIMBERLY report reviewed and scanned into chart.  Last KIMBERLY date 10/15/19. Update requested.  History and physical exam exhibit continued safe and appropriate use of controlled substance.

## 2020-01-09 PROBLEM — B02.29 PHN (POSTHERPETIC NEURALGIA): Status: ACTIVE | Noted: 2020-01-09

## 2020-01-10 DIAGNOSIS — M47.812 CERVICAL ARTHRITIS: ICD-10-CM

## 2020-01-10 DIAGNOSIS — F43.10 PTSD (POST-TRAUMATIC STRESS DISORDER): ICD-10-CM

## 2020-01-10 RX ORDER — FAMCICLOVIR 500 MG/1
500 TABLET ORAL DAILY
Qty: 30 TABLET | Refills: 5 | Status: SHIPPED | OUTPATIENT
Start: 2020-01-10 | End: 2020-02-11

## 2020-01-13 ENCOUNTER — CLINICAL SUPPORT (OUTPATIENT)
Dept: FAMILY MEDICINE CLINIC | Facility: CLINIC | Age: 55
End: 2020-01-13

## 2020-01-13 DIAGNOSIS — F43.10 PTSD (POST-TRAUMATIC STRESS DISORDER): ICD-10-CM

## 2020-01-13 DIAGNOSIS — M47.812 CERVICAL ARTHRITIS: ICD-10-CM

## 2020-01-13 DIAGNOSIS — N94.19 DYSPAREUNIA DUE TO MEDICAL CONDITION IN FEMALE: ICD-10-CM

## 2020-01-13 DIAGNOSIS — Z23 NEED FOR INFLUENZA VACCINATION: Primary | ICD-10-CM

## 2020-01-13 PROCEDURE — G0008 ADMIN INFLUENZA VIRUS VAC: HCPCS | Performed by: FAMILY MEDICINE

## 2020-01-13 PROCEDURE — 96372 THER/PROPH/DIAG INJ SC/IM: CPT | Performed by: FAMILY MEDICINE

## 2020-01-13 PROCEDURE — 90674 CCIIV4 VAC NO PRSV 0.5 ML IM: CPT | Performed by: FAMILY MEDICINE

## 2020-01-13 RX ORDER — DIAZEPAM 2 MG/1
2 TABLET ORAL EVERY 8 HOURS PRN
Qty: 30 TABLET | Refills: 2 | Status: CANCELLED | OUTPATIENT
Start: 2020-01-13

## 2020-01-13 RX ORDER — DIAZEPAM 2 MG/1
2 TABLET ORAL EVERY 8 HOURS PRN
Qty: 30 TABLET | Refills: 2 | Status: SHIPPED | OUTPATIENT
Start: 2020-01-13 | End: 2020-03-10 | Stop reason: SDUPTHER

## 2020-01-13 RX ADMIN — KETOROLAC TROMETHAMINE 60 MG: 30 INJECTION, SOLUTION INTRAMUSCULAR; INTRAVENOUS at 16:16

## 2020-01-13 NOTE — TELEPHONE ENCOUNTER
Patient would like to know if diflucan can be called into the pharmacy. She states that she worried about getting a yeast infection since she has been taking so many antibiotics.

## 2020-01-13 NOTE — TELEPHONE ENCOUNTER
Prescription does have refills, however because it is controlled I cannot have it moved to [facility] pharmacy where patient requested refills to go.

## 2020-01-14 ENCOUNTER — RESULTS ENCOUNTER (OUTPATIENT)
Dept: FAMILY MEDICINE CLINIC | Facility: CLINIC | Age: 55
End: 2020-01-14

## 2020-01-14 DIAGNOSIS — R73.01 IFG (IMPAIRED FASTING GLUCOSE): ICD-10-CM

## 2020-01-14 LAB — INSULIN SERPL-ACNC: 16.6 UIU/ML (ref 2.6–24.9)

## 2020-01-14 RX ORDER — FLUCONAZOLE 150 MG/1
150 TABLET ORAL ONCE
Qty: 1 TABLET | Refills: 0 | Status: SHIPPED | OUTPATIENT
Start: 2020-01-14 | End: 2020-01-14

## 2020-01-14 RX ORDER — ESTERIFIED ESTROGEN AND METHYLTESTOSTERONE .625; 1.25 MG/1; MG/1
1 TABLET ORAL DAILY
Qty: 30 TABLET | Refills: 5 | Status: SHIPPED | OUTPATIENT
Start: 2020-01-14 | End: 2020-07-20 | Stop reason: SDUPTHER

## 2020-01-15 RX ORDER — LISINOPRIL 10 MG/1
TABLET ORAL
Qty: 60 TABLET | Refills: 2 | Status: SHIPPED | OUTPATIENT
Start: 2020-01-15 | End: 2020-04-13 | Stop reason: SDUPTHER

## 2020-01-20 ENCOUNTER — E-VISIT (OUTPATIENT)
Dept: FAMILY MEDICINE CLINIC | Facility: TELEHEALTH | Age: 55
End: 2020-01-20

## 2020-01-20 DIAGNOSIS — J01.40 ACUTE PANSINUSITIS, RECURRENCE NOT SPECIFIED: Primary | ICD-10-CM

## 2020-01-20 DIAGNOSIS — K08.89 ODONTALGIA: ICD-10-CM

## 2020-01-20 PROCEDURE — 99422 OL DIG E/M SVC 11-20 MIN: CPT | Performed by: NURSE PRACTITIONER

## 2020-01-20 RX ORDER — AMOXICILLIN AND CLAVULANATE POTASSIUM 875; 125 MG/1; MG/1
1 TABLET, FILM COATED ORAL 2 TIMES DAILY
Qty: 20 TABLET | Refills: 0 | Status: SHIPPED | OUTPATIENT
Start: 2020-01-20 | End: 2020-01-30

## 2020-01-20 NOTE — PATIENT INSTRUCTIONS
Acute pansinusitis, recurrence not specified  -     amoxicillin-clavulanate (AUGMENTIN) 875-125 MG per tablet; Take 1 tablet by mouth 2 (Two) Times a Day for 10 days.    --take medications as prescribed  --increase intake of clear, decaffeinated fluids  --may continue sinus rinses, Flonase nasal spray, and Allegra D    **if no improvement of sinus symptoms in 5-7 days, will need to follow-up with Dr. Amber Mills for further evaluation and treatment.      Odontalgia  -     amoxicillin-clavulanate (AUGMENTIN) 875-125 MG per tablet; Take 1 tablet by mouth 2 (Two) Times a Day for 10 days.    --take as prescribed  --NSAIDS are great for pain and inflammation, such as, Ibuprofen 600 mg every 6 hours as needed  --follow-up with dental office for further evaluation and treatment      Sinusitis, Adult  Sinusitis is inflammation of your sinuses. Sinuses are hollow spaces in the bones around your face. Your sinuses are located:  · Around your eyes.  · In the middle of your forehead.  · Behind your nose.  · In your cheekbones.  Mucus normally drains out of your sinuses. When your nasal tissues become inflamed or swollen, mucus can become trapped or blocked. This allows bacteria, viruses, and fungi to grow, which leads to infection. Most infections of the sinuses are caused by a virus.  Sinusitis can develop quickly. It can last for up to 4 weeks (acute) or for more than 12 weeks (chronic). Sinusitis often develops after a cold.  What are the causes?  This condition is caused by anything that creates swelling in the sinuses or stops mucus from draining. This includes:  · Allergies.  · Asthma.  · Infection from bacteria or viruses.  · Deformities or blockages in your nose or sinuses.  · Abnormal growths in the nose (nasal polyps).  · Pollutants, such as chemicals or irritants in the air.  · Infection from fungi (rare).  What increases the risk?  You are more likely to develop this condition if you:  · Have a weak body defense  system (immune system).  · Do a lot of swimming or diving.  · Overuse nasal sprays.  · Smoke.  What are the signs or symptoms?  The main symptoms of this condition are pain and a feeling of pressure around the affected sinuses. Other symptoms include:  · Stuffy nose or congestion.  · Thick drainage from your nose.  · Swelling and warmth over the affected sinuses.  · Headache.  · Upper toothache.  · A cough that may get worse at night.  · Extra mucus that collects in the throat or the back of the nose (postnasal drip).  · Decreased sense of smell and taste.  · Fatigue.  · A fever.  · Sore throat.  · Bad breath.  How is this diagnosed?  This condition is diagnosed based on:  · Your symptoms.  · Your medical history.  · A physical exam.  · Tests to find out if your condition is acute or chronic. This may include:  ? Checking your nose for nasal polyps.  ? Viewing your sinuses using a device that has a light (endoscope).  ? Testing for allergies or bacteria.  ? Imaging tests, such as an MRI or CT scan.  In rare cases, a bone biopsy may be done to rule out more serious types of fungal sinus disease.  How is this treated?  Treatment for sinusitis depends on the cause and whether your condition is chronic or acute.  · If caused by a virus, your symptoms should go away on their own within 10 days. You may be given medicines to relieve symptoms. They include:  ? Medicines that shrink swollen nasal passages (topical intranasal decongestants).  ? Medicines that treat allergies (antihistamines).  ? A spray that eases inflammation of the nostrils (topical intranasal corticosteroids).  ? Rinses that help get rid of thick mucus in your nose (nasal saline washes).  · If caused by bacteria, your health care provider may recommend waiting to see if your symptoms improve. Most bacterial infections will get better without antibiotic medicine. You may be given antibiotics if you have:  ? A severe infection.  ? A weak immune  system.  · If caused by narrow nasal passages or nasal polyps, you may need to have surgery.  Follow these instructions at home:  Medicines  · Take, use, or apply over-the-counter and prescription medicines only as told by your health care provider. These may include nasal sprays.  · If you were prescribed an antibiotic medicine, take it as told by your health care provider. Do not stop taking the antibiotic even if you start to feel better.  Hydrate and humidify    · Drink enough fluid to keep your urine pale yellow. Staying hydrated will help to thin your mucus.  · Use a cool mist humidifier to keep the humidity level in your home above 50%.  · Inhale steam for 10-15 minutes, 3-4 times a day, or as told by your health care provider. You can do this in the bathroom while a hot shower is running.  · Limit your exposure to cool or dry air.  Rest  · Rest as much as possible.  · Sleep with your head raised (elevated).  · Make sure you get enough sleep each night.  General instructions    · Apply a warm, moist washcloth to your face 3-4 times a day or as told by your health care provider. This will help with discomfort.  · Wash your hands often with soap and water to reduce your exposure to germs. If soap and water are not available, use hand .  · Do not smoke. Avoid being around people who are smoking (secondhand smoke).  · Keep all follow-up visits as told by your health care provider. This is important.  Contact a health care provider if:  · You have a fever.  · Your symptoms get worse.  · Your symptoms do not improve within 10 days.  Get help right away if:  · You have a severe headache.  · You have persistent vomiting.  · You have severe pain or swelling around your face or eyes.  · You have vision problems.  · You develop confusion.  · Your neck is stiff.  · You have trouble breathing.  Summary  · Sinusitis is soreness and inflammation of your sinuses. Sinuses are hollow spaces in the bones around your  face.  · This condition is caused by nasal tissues that become inflamed or swollen. The swelling traps or blocks the flow of mucus. This allows bacteria, viruses, and fungi to grow, which leads to infection.  · If you were prescribed an antibiotic medicine, take it as told by your health care provider. Do not stop taking the antibiotic even if you start to feel better.  · Keep all follow-up visits as told by your health care provider. This is important.  This information is not intended to replace advice given to you by your health care provider. Make sure you discuss any questions you have with your health care provider.  Document Released: 12/18/2006 Document Revised: 05/20/2019 Document Reviewed: 05/20/2019  [employer] Interactive Patient Education © 2019 [employer]    Dental Abscess    A dental abscess is a collection of pus in or around a tooth that results from an infection. An abscess can cause pain in the affected area as well as other symptoms. Treatment is important to help with symptoms and to prevent the infection from spreading.  What are the causes?  This condition is caused by a bacterial infection around the root of the tooth that involves the inner part of the tooth (pulp). It may result from:  · Severe tooth decay.  · Trauma to the tooth, such as a broken or chipped tooth, that allows bacteria to enter into the pulp.  · Severe gum disease around a tooth.  What increases the risk?  This condition is more likely to develop in males. It is also more likely to develop in people who:  · Have dental decay (cavities).  · Eat sugary snacks between meals.  · Use tobacco products.  · Have diabetes.  · Have a weakened disease-fighting system (immune system).  · Do not brush and care for their teeth regularly.  What are the signs or symptoms?  Symptoms of this condition include:  · Severe pain in and around the infected tooth.  · Swelling and redness around the infected tooth, in the mouth, or in the  face.  · Tenderness.  · Pus drainage.  · Bad breath.  · Bitter taste in the mouth.  · Difficulty swallowing.  · Difficulty opening the mouth.  · Nausea.  · Vomiting.  · Chills.  · Swollen neck glands.  · Fever.  How is this diagnosed?  This condition is diagnosed based on:  · Your symptoms and your medical and dental history.  · An examination of the infected tooth. During the exam, your dentist may tap on the infected tooth.  You may also have X-rays of the affected area.  How is this treated?  This condition is treated by getting rid of the infection. This may be done with:  · Incision and drainage. This procedure is done by making an incision in the abscess to drain out the pus. Removing pus is the first priority in treating an abscess.  · Antibiotic medicines. These may be used in certain situations.  · Antibacterial mouth rinse.  · A root canal. This may be performed to save the tooth. Your dentist accesses the visible part of your tooth (crown) with a drill and removes any damaged pulp. Then the space is filled and sealed off.  · Tooth extraction. The tooth is pulled out if it cannot be saved by other treatment.  You may also receive treatment for pain, such as:  · Acetaminophen or NSAIDs.  · Gels that contain a numbing medicine.  · An injection to block the pain near your nerve.  Follow these instructions at home:  Medicines  · Take over-the-counter and prescription medicines only as told by your dentist.  · If you were prescribed an antibiotic, take it as told by your dentist. Do not stop taking the antibiotic even if you start to feel better.  · If you were prescribed a gel that contains a numbing medicine, use it exactly as told in the directions. Do not use these gels for children who are younger than 2 years of age.  · Do not drive or use heavy machinery while taking prescription pain medicine.  General instructions  · Rinse out your mouth often with salt water to relieve pain or swelling. To make a  salt-water mixture, completely dissolve ½-1 tsp of salt in 1 cup of warm water.  · Eat a soft diet while your abscess is healing.  · Drink enough fluid to keep your urine pale yellow.  · Do not apply heat to the outside of your mouth.  · Do not use any products that contain nicotine or tobacco, such as cigarettes and e-cigarettes. If you need help quitting, ask your health care provider.  · Keep all follow-up visits as told by your dentist. This is important.  How is this prevented?  · Brush your teeth every morning and night with fluoride toothpaste. Floss one time each day.  · Get regularly scheduled dental cleanings.  · Consider having a dental sealant applied on teeth that have deep holes (caries).  · Drink fluoridated water regularly. This includes most tap water. Check the label on bottled water to see if it contains fluoride.  · Drink water instead of sugary drinks.  · Eat healthy meals and snacks.  · Wear a mouth guard or face shield to protect your teeth while playing sports.  Contact a health care provider if:  · Your pain is worse and is not helped by medicine.  Get help right away if:  · You have a fever or chills.  · Your symptoms suddenly get worse.  · You have a very bad headache.  · You have problems breathing or swallowing.  · You have trouble opening your mouth.  · You have swelling in your neck or around your eye.  Summary  · A dental abscess is a collection of pus in or around a tooth that results from an infection.  · A dental abscess may result from severe tooth decay, trauma to the tooth, or severe gum disease around a tooth.  · Symptoms include severe pain, swelling, redness, and drainage of pus in and around the infected tooth.  · The first priority in treating a dental abscess is to drain out the pus. Treatment may also involve removing damage inside the tooth (root canal) or pulling out (extracting) the tooth.  This information is not intended to replace advice given to you by your health  care provider. Make sure you discuss any questions you have with your health care provider.  Document Released: 12/18/2006 Document Revised: 08/20/2018 Document Reviewed: 08/20/2018  [employer] Interactive Patient Education © 2019 [employer]

## 2020-01-20 NOTE — PROGRESS NOTES
Tatiana Fields    1965  [id number]    I have reviewed the e-Visit questionnaire and patient's answers, my assessment and plan are as follows:    Several days of nasal congestion with yellow/green discharge, facial pain/pressure, headache, sore throat; also has a tooth that is swollen around gumline and painful, has a dental appointment on February 3; negative for fever, body aches, nausea/vomiting, cough    Review of Systems  History obtained from the patient  General ROS:   --positive for  - fatigue  --negative for - chills, fever or sleep disturbance  ENT ROS:   --positive for - headaches, nasal congestion, nasal discharge, sinus pain and sore throat, tooth pain, mild swelling of gumline  --negative for - epistaxis, sneezing or vertigo  Allergy and Immunology ROS:   --positive for - nasal congestion and seasonal allergies  --negative for - itchy/watery eyes or postnasal drip  Respiratory ROS:  --negative for - cough, shortness of breath, sputum changes or wheezing      Diagnoses and all orders for this visit:    Acute pansinusitis, recurrence not specified  -     amoxicillin-clavulanate (AUGMENTIN) 875-125 MG per tablet; Take 1 tablet by mouth 2 (Two) Times a Day for 10 days.    --take medications as prescribed  --increase intake of clear, decaffeinated fluids  --may continue sinus rinses, Flonase nasal spray, and Allegra D    **if no improvement of sinus symptoms in 5-7 days, will need to follow-up with Dr. Amber Mills for further evaluation and treatment.      Odontalgia  -     amoxicillin-clavulanate (AUGMENTIN) 875-125 MG per tablet; Take 1 tablet by mouth 2 (Two) Times a Day for 10 days.    --take as prescribed  --NSAIDS are great for pain and inflammation, such as, Ibuprofen 600 mg every 6 hours as needed  --follow-up with dental office for further evaluation and treatment        Any medications prescribed have been sent electronically to   [facility] - [address] - [phone]  PH - [phone] FX  [address]  [place] [address]  Phone: [phone] Fax: [phone]    [facility] - [place], [place] - [address] [facility] - [phone] PH - [phone] FX  [address]  [facility] [address]  Phone: [phone] Fax: [phone]    [id number] - [place], [place] - [address] - [phone] PH - [phone] FX  [address]  [place] [address]  Phone: [phone] Fax: [phone]    [facility] - [place], [place] - [address] [facility] - [phone] PH - [phone] FX  [address] [facility] [address]  Phone: [phone] Fax: [phone]    [facility] [address] - [place], [place] - [address] AT [facility] [address] - [phone] PH - [phone] FX  [address]  [facility] [address]  Phone: [phone] Fax: [phone]        SOULEYMANE Maria  01/20/20  10:15 AM

## 2020-02-05 ENCOUNTER — OFFICE VISIT (OUTPATIENT)
Dept: FAMILY MEDICINE CLINIC | Facility: CLINIC | Age: 55
End: 2020-02-05

## 2020-02-05 VITALS
WEIGHT: 163 LBS | BODY MASS INDEX: 27.83 KG/M2 | DIASTOLIC BLOOD PRESSURE: 80 MMHG | HEIGHT: 64 IN | TEMPERATURE: 98.4 F | HEART RATE: 75 BPM | SYSTOLIC BLOOD PRESSURE: 120 MMHG | OXYGEN SATURATION: 99 %

## 2020-02-05 DIAGNOSIS — G47.33 OBSTRUCTIVE SLEEP APNEA: ICD-10-CM

## 2020-02-05 DIAGNOSIS — Z51.81 MEDICATION MONITORING ENCOUNTER: ICD-10-CM

## 2020-02-05 DIAGNOSIS — R73.01 IFG (IMPAIRED FASTING GLUCOSE): ICD-10-CM

## 2020-02-05 DIAGNOSIS — G43.719 INTRACTABLE CHRONIC MIGRAINE WITHOUT AURA AND WITHOUT STATUS MIGRAINOSUS: ICD-10-CM

## 2020-02-05 DIAGNOSIS — E55.9 VITAMIN D DEFICIENCY: ICD-10-CM

## 2020-02-05 DIAGNOSIS — E53.8 VITAMIN B12 DEFICIENCY: ICD-10-CM

## 2020-02-05 DIAGNOSIS — F45.0 DEPRESSION WITH SOMATIZATION: Primary | ICD-10-CM

## 2020-02-05 DIAGNOSIS — F32.A DEPRESSION WITH SOMATIZATION: Primary | ICD-10-CM

## 2020-02-05 DIAGNOSIS — I10 ESSENTIAL HYPERTENSION: ICD-10-CM

## 2020-02-05 DIAGNOSIS — G47.19 EXCESSIVE DAYTIME SLEEPINESS: ICD-10-CM

## 2020-02-05 DIAGNOSIS — K21.9 GASTROESOPHAGEAL REFLUX DISEASE, ESOPHAGITIS PRESENCE NOT SPECIFIED: ICD-10-CM

## 2020-02-05 DIAGNOSIS — F33.9 RECURRENT MAJOR DEPRESSION RESISTANT TO TREATMENT (HCC): ICD-10-CM

## 2020-02-05 PROCEDURE — 99214 OFFICE O/P EST MOD 30 MIN: CPT | Performed by: FAMILY MEDICINE

## 2020-02-05 RX ORDER — BUPROPION HYDROCHLORIDE 150 MG/1
150 TABLET ORAL DAILY
Qty: 30 TABLET | Refills: 2 | Status: SHIPPED | OUTPATIENT
Start: 2020-02-05 | End: 2020-04-01

## 2020-02-05 RX ORDER — METHYLPHENIDATE HYDROCHLORIDE 10 MG/1
TABLET ORAL
Qty: 60 TABLET | Refills: 0 | Status: SHIPPED | OUTPATIENT
Start: 2020-02-05 | End: 2020-03-10 | Stop reason: SDUPTHER

## 2020-02-05 NOTE — PROGRESS NOTES
Subjective   Tatiana Fields is a 54 y.o. female.     History of Present Illness  Mrs. Fields presents today for routine f/u on sevearal chronic med problems.    She has HTN, taking lisnopril as rx'd. Fair job of following heart healthy diet. No regular exercise.     Intractable migraines improved. Has upcoming apt with new neurologist. No new focal neuro symptoms.     Has DANE and is currently on CPAP. Good compliance. Sleep quality improved.     She has GERD. Symptoms recently stable. No weight loss, dysphagia or blood in stool.     Has B12 and vit D def; taking replacement as rx'd. Continues to have chronic fatigue and arthralgias.     Previous IFG/pre-D. Working to limit sugar. A1c has been stable.     Chronic pain due to FMSx and C-DJD/DDD. Under care of pain mgnt. No recent changes in med.     Chronic insomnia stable on current meds. Comorbid chronic severe depression with somatization and PTSD currently on SSRI. Moods, appetite, sleep patterns stable. Denies SI. She would like to try wellbutrin to see if depressed may improve further. Has taken in distant past but does not recall efficacy.     Currently on ritalin for mgnt chronic fatigue, EDS despite tx of DANE. Denies side effects.     Since her last visit she has had blepharoplasty, healing well. Vision improved.    Pt's previous HPI reviewed and updated as indicated.     The following portions of the patient's history were reviewed and updated as appropriate: allergies, current medications, past family history, past medical history, past social history, past surgical history and problem list.    Review of Systems   Constitutional: Positive for fatigue. Negative for fever.   HENT: Positive for congestion, postnasal drip, rhinorrhea, sinus pressure and sinus pain. Negative for ear pain, hearing loss, nosebleeds, sneezing, sore throat and trouble swallowing.    Eyes: Positive for visual disturbance (chronic). Negative for pain.        Dry eyes   Respiratory:  Negative for cough, shortness of breath and wheezing.    Cardiovascular: Positive for palpitations. Negative for chest pain and leg swelling.   Gastrointestinal: Positive for abdominal pain (chronic), constipation and nausea. Negative for blood in stool, diarrhea and vomiting.   Endocrine: Positive for heat intolerance. Negative for polydipsia and polyuria.   Genitourinary: Positive for pelvic pain (chronic). Negative for dysuria and hematuria.   Musculoskeletal: Positive for arthralgias, myalgias, neck pain and neck stiffness. Negative for back pain and joint swelling.   Skin: Negative for rash and wound.   Neurological: Positive for dizziness, tremors, weakness and headaches. Negative for seizures, syncope and speech difficulty.   Hematological: Negative for adenopathy. Bruises/bleeds easily.   Psychiatric/Behavioral: Positive for dysphoric mood and sleep disturbance. Negative for confusion and suicidal ideas. The patient is nervous/anxious.    Pt's previous ROS reviewed and updated as indicated.       Objective    Vitals:    02/05/20 1510   BP: 120/80   Pulse: 75   Temp: 98.4 °F (36.9 °C)   SpO2: 99%     Body mass index is 27.98 kg/m².      02/05/20  1510   Weight: 73.9 kg (163 lb)       Physical Exam   Constitutional: She is oriented to person, place, and time. She appears well-developed and well-nourished. She is cooperative. She does not appear ill. No distress.   HENT:   Head: Normocephalic and atraumatic.   Mouth/Throat: Mucous membranes are normal. Mucous membranes are not dry.   Eyes: Conjunctivae and EOM are normal. Right eye exhibits no nystagmus. Left eye exhibits no nystagmus.   Bilateral blepharoplasty scars noted, healing well. Chronic right lid lag resolved.   Neck: Phonation normal. Neck supple. Normal carotid pulses present. Carotid bruit is not present. No thyroid mass and no thyromegaly present.   Cardiovascular: Normal rate, regular rhythm, normal heart sounds and intact distal pulses. Exam  reveals no gallop.   No murmur heard.  Pulmonary/Chest: Effort normal and breath sounds normal.     Vascular Status -  Her right foot exhibits no edema. Her left foot exhibits no edema.  Lymphadenopathy:     She has no cervical adenopathy.   Neurological: She is alert and oriented to person, place, and time. Gait normal.   Skin: Skin is warm and dry. No bruising and no rash noted. No cyanosis. Nails show no clubbing.   Psychiatric: Her speech is normal and behavior is normal. Judgment and thought content normal. Her mood appears anxious (mildly). Cognition and memory are normal.   Good eye contact. Answers questions appropriately. Good personal hygiene and grooming.   Nursing note and vitals reviewed.  Pt's previous physical exam reviewed and updated as indicated.    Lab Results   Component Value Date    WBC 8.10 10/16/2019    HGB 12.8 10/16/2019    HCT 38.1 10/16/2019    MCV 88.8 10/16/2019     10/16/2019       Lab Results   Component Value Date    GLUCOSE 92 01/04/2014    BUN 12 10/16/2019    CREATININE 0.79 10/16/2019    EGFRIFNONA 76 10/16/2019    EGFRIFAFRI 92 10/16/2019    BCR 15.2 10/16/2019    K 3.6 10/16/2019    CO2 28.4 10/16/2019    CALCIUM 9.2 10/16/2019    PROTENTOTREF 6.5 10/16/2019    ALBUMIN 4.50 10/16/2019    LABIL2 2.3 10/16/2019    AST 30 10/16/2019    ALT 27 10/16/2019       Lab Results   Component Value Date    CHLPL 180 10/16/2019    TRIG 88 10/16/2019    HDL 55 10/16/2019     (H) 10/16/2019       Lab Results   Component Value Date    HGBA1C 5.50 10/16/2019       Lab Results   Component Value Date    TSH 1.400 10/16/2019       Assessment/Plan   Tatiana was seen today for sleep apnea, hypertension and sinus problem.    Diagnoses and all orders for this visit:    Depression with somatization    Excessive daytime sleepiness  -     methylphenidate (RITALIN) 10 MG tablet; TAKE 1 TABLET (10MG) BY MOUTH EVERY MORNING AND TAKE 1 TABLET (10MG) EACH AFTERNOON    Recurrent major depression  resistant to treatment (CMS/[facility])  -     methylphenidate (RITALIN) 10 MG tablet; TAKE 1 TABLET (10MG) BY MOUTH EVERY MORNING AND TAKE 1 TABLET (10MG) EACH AFTERNOON    Intractable chronic migraine without aura and without status migrainosus    Essential hypertension    Obstructive sleep apnea    Gastroesophageal reflux disease, esophagitis presence not specified    Vitamin B12 deficiency    Vitamin D deficiency    IFG (impaired fasting glucose)    Medication monitoring encounter    Other orders  -     buPROPion XL (WELLBUTRIN XL) 150 MG 24 hr tablet; Take 1 tablet by mouth Daily.       MDD with assoc'd PTSD/anxiety. Continue lexapro. Add trial of wellbutrin. She denies SI. No manic/psychotic features.    HTN controlled. Continue lisinopril. Pt advised to eat a heart healthy diet and get regular aerobic exercise.    Continue ritalin for mgnt of EDS despite adqequtae tx/compliance with CPAP for DANE.    F/u with neurology as scheduled for mgnt of migraines.    Continue B12 and vit D def.    GERD controlled with PPI.    A1c improved. prevenetive measures reviewed.    Routine f/u in 3 months, sooner as needed.  Patient was encouraged to keep me informed of any acute changes, lack of improvement, or any new concerning symptoms.  Pt is aware of reasons to seek emergent care.  Patient voiced understanding of all instructions and denied further questions.  As part of patient's treatment plan I am prescribing a controlled substance.  The patient has been made aware of the appropriate use of such medications, including potential risk of somnolence, limited ability to drive and/or work safely, and potential for dependence and/or overdose.  It has also been made clear that these medications are for use by this patient only, without concomitant use of alcohol or other substances, unless prescribed.  KIMBERLY report reviewed and scanned into chart.  Last KIMBERLY date 10/15/19. Update requested.  History and physical exam exhibit  continued safe and appropriate use of controlled substance.  Patient has completed a prescribing agreement detailing terms of continued prescribing of controlled substances, including monitoring KIMBERLY reports, urine drug screening, and pill counts if necessary.  Patient is aware that inappropriate use will result in cessation of prescribing such medications.

## 2020-02-11 RX ORDER — AZITHROMYCIN 250 MG/1
TABLET, FILM COATED ORAL
Qty: 6 TABLET | Refills: 0 | Status: SHIPPED | OUTPATIENT
Start: 2020-02-11 | End: 2020-04-01

## 2020-02-11 RX ORDER — FAMCICLOVIR 500 MG/1
TABLET ORAL
Qty: 30 TABLET | Refills: 5 | Status: SHIPPED | OUTPATIENT
Start: 2020-02-11 | End: 2020-10-21 | Stop reason: SDUPTHER

## 2020-02-11 RX ORDER — HYDROCHLOROTHIAZIDE 25 MG/1
TABLET ORAL
Qty: 90 TABLET | Refills: 2 | OUTPATIENT
Start: 2020-02-11

## 2020-03-09 ENCOUNTER — HOSPITAL ENCOUNTER (OUTPATIENT)
Dept: GENERAL RADIOLOGY | Facility: HOSPITAL | Age: 55
Discharge: HOME | End: 2020-03-09
Admitting: COLON & RECTAL SURGERY

## 2020-03-09 ENCOUNTER — TRANSCRIBE ORDERS (OUTPATIENT)
Dept: EMERGENCY DEPT | Facility: HOSPITAL | Age: 55
End: 2020-03-09

## 2020-03-09 DIAGNOSIS — K59.9 COLONIC INERTIA: Primary | ICD-10-CM

## 2020-03-09 DIAGNOSIS — K59.9 COLONIC INERTIA: ICD-10-CM

## 2020-03-09 PROCEDURE — 74018 RADEX ABDOMEN 1 VIEW: CPT

## 2020-03-10 DIAGNOSIS — F43.10 PTSD (POST-TRAUMATIC STRESS DISORDER): ICD-10-CM

## 2020-03-10 DIAGNOSIS — G47.19 EXCESSIVE DAYTIME SLEEPINESS: ICD-10-CM

## 2020-03-10 DIAGNOSIS — F51.04 CHRONIC INSOMNIA: Primary | ICD-10-CM

## 2020-03-10 DIAGNOSIS — F33.9 RECURRENT MAJOR DEPRESSION RESISTANT TO TREATMENT (HCC): ICD-10-CM

## 2020-03-10 DIAGNOSIS — M47.812 CERVICAL ARTHRITIS: ICD-10-CM

## 2020-03-10 RX ORDER — ZOLPIDEM TARTRATE 12.5 MG/1
12.5 TABLET, FILM COATED, EXTENDED RELEASE ORAL NIGHTLY PRN
Qty: 30 TABLET | Refills: 2 | Status: SHIPPED | OUTPATIENT
Start: 2020-03-10 | End: 2020-06-17 | Stop reason: SDUPTHER

## 2020-03-10 RX ORDER — DIAZEPAM 2 MG/1
2 TABLET ORAL EVERY 8 HOURS PRN
Qty: 30 TABLET | Refills: 2 | Status: SHIPPED | OUTPATIENT
Start: 2020-03-10 | End: 2020-06-05 | Stop reason: SDUPTHER

## 2020-03-10 RX ORDER — POLYETHYLENE GLYCOL 3350 17 G/17G
17 POWDER, FOR SOLUTION ORAL 2 TIMES DAILY
Qty: 527 G | Refills: 5 | Status: SHIPPED | OUTPATIENT
Start: 2020-03-10 | End: 2020-07-05 | Stop reason: SDUPTHER

## 2020-03-10 RX ORDER — METHYLPHENIDATE HYDROCHLORIDE 10 MG/1
TABLET ORAL
Qty: 60 TABLET | Refills: 0 | Status: SHIPPED | OUTPATIENT
Start: 2020-03-10 | End: 2020-04-13 | Stop reason: SDUPTHER

## 2020-03-11 ENCOUNTER — HOSPITAL ENCOUNTER (OUTPATIENT)
Dept: GENERAL RADIOLOGY | Facility: HOSPITAL | Age: 55
Discharge: HOME | End: 2020-03-11
Admitting: COLON & RECTAL SURGERY

## 2020-03-11 ENCOUNTER — TRANSCRIBE ORDERS (OUTPATIENT)
Dept: EMERGENCY DEPT | Facility: HOSPITAL | Age: 55
End: 2020-03-11

## 2020-03-11 DIAGNOSIS — F33.9 RECURRENT MAJOR DEPRESSION RESISTANT TO TREATMENT (HCC): ICD-10-CM

## 2020-03-11 DIAGNOSIS — F43.10 PTSD (POST-TRAUMATIC STRESS DISORDER): ICD-10-CM

## 2020-03-11 DIAGNOSIS — K59.9 COLONIC INERTIA: Primary | ICD-10-CM

## 2020-03-11 DIAGNOSIS — K59.9 COLONIC INERTIA: ICD-10-CM

## 2020-03-11 DIAGNOSIS — M47.812 CERVICAL ARTHRITIS: ICD-10-CM

## 2020-03-11 DIAGNOSIS — F51.04 CHRONIC INSOMNIA: ICD-10-CM

## 2020-03-11 DIAGNOSIS — G47.19 EXCESSIVE DAYTIME SLEEPINESS: ICD-10-CM

## 2020-03-11 PROCEDURE — 74018 RADEX ABDOMEN 1 VIEW: CPT

## 2020-03-11 RX ORDER — ZOLPIDEM TARTRATE 12.5 MG/1
12.5 TABLET, FILM COATED, EXTENDED RELEASE ORAL NIGHTLY PRN
Qty: 30 TABLET | Refills: 2 | OUTPATIENT
Start: 2020-03-11

## 2020-03-11 RX ORDER — POLYETHYLENE GLYCOL 3350 17 G/17G
17 POWDER, FOR SOLUTION ORAL 2 TIMES DAILY
Qty: 527 G | Refills: 5 | OUTPATIENT
Start: 2020-03-11

## 2020-03-11 RX ORDER — DIAZEPAM 2 MG/1
2 TABLET ORAL EVERY 8 HOURS PRN
Qty: 30 TABLET | Refills: 2 | OUTPATIENT
Start: 2020-03-11

## 2020-03-11 RX ORDER — METHYLPHENIDATE HYDROCHLORIDE 10 MG/1
TABLET ORAL
Qty: 60 TABLET | Refills: 0 | OUTPATIENT
Start: 2020-03-11

## 2020-03-11 RX ORDER — FAMCICLOVIR 500 MG/1
500 TABLET ORAL DAILY
Qty: 30 TABLET | Refills: 5 | OUTPATIENT
Start: 2020-03-11

## 2020-03-13 ENCOUNTER — TRANSCRIBE ORDERS (OUTPATIENT)
Dept: GENERAL RADIOLOGY | Facility: HOSPITAL | Age: 55
End: 2020-03-13

## 2020-03-13 ENCOUNTER — HOSPITAL ENCOUNTER (OUTPATIENT)
Dept: GENERAL RADIOLOGY | Facility: HOSPITAL | Age: 55
Discharge: HOME | End: 2020-03-13
Admitting: COLON & RECTAL SURGERY

## 2020-03-13 DIAGNOSIS — K59.9 COLONIC INERTIA: Primary | ICD-10-CM

## 2020-03-13 PROCEDURE — 74018 RADEX ABDOMEN 1 VIEW: CPT

## 2020-03-17 DIAGNOSIS — G43.119 INTRACTABLE MIGRAINE WITH AURA WITHOUT STATUS MIGRAINOSUS: Primary | ICD-10-CM

## 2020-03-17 DIAGNOSIS — R51.9 CHRONIC DAILY HEADACHE: ICD-10-CM

## 2020-03-31 ENCOUNTER — E-VISIT (OUTPATIENT)
Dept: FAMILY MEDICINE CLINIC | Facility: CLINIC | Age: 55
End: 2020-03-31

## 2020-03-31 DIAGNOSIS — J01.91 ACUTE RECURRENT SINUSITIS, UNSPECIFIED LOCATION: Primary | ICD-10-CM

## 2020-03-31 PROCEDURE — 99422 OL DIG E/M SVC 11-20 MIN: CPT | Performed by: FAMILY MEDICINE

## 2020-04-01 DIAGNOSIS — J32.9 SINUSITIS, UNSPECIFIED CHRONICITY, UNSPECIFIED LOCATION: Primary | ICD-10-CM

## 2020-04-01 RX ORDER — FLUCONAZOLE 150 MG/1
TABLET ORAL
Qty: 2 TABLET | Refills: 0 | Status: SHIPPED | OUTPATIENT
Start: 2020-04-01 | End: 2020-05-06

## 2020-04-01 RX ORDER — NAPHAZOLINE HYDROCHLORIDE AND PHENIRAMINE MALEATE .25; 3 MG/ML; MG/ML
SOLUTION/ DROPS OPHTHALMIC
COMMUNITY
Start: 2020-02-11 | End: 2020-07-22

## 2020-04-01 RX ORDER — CEFTRIAXONE 1 G/1
1 INJECTION, POWDER, FOR SOLUTION INTRAMUSCULAR; INTRAVENOUS EVERY 24 HOURS
Status: COMPLETED | OUTPATIENT
Start: 2020-04-01 | End: 2020-04-02

## 2020-04-01 RX ORDER — PRUCALOPRIDE 2 MG/1
1 TABLET, FILM COATED ORAL DAILY
COMMUNITY
Start: 2020-03-06 | End: 2020-07-22 | Stop reason: CLARIF

## 2020-04-01 RX ORDER — METHYLPREDNISOLONE ACETATE 80 MG/ML
80 INJECTION, SUSPENSION INTRA-ARTICULAR; INTRALESIONAL; INTRAMUSCULAR; SOFT TISSUE ONCE
Status: COMPLETED | OUTPATIENT
Start: 2020-04-01 | End: 2020-04-02

## 2020-04-01 RX ORDER — HYDROCHLOROTHIAZIDE 12.5 MG/1
CAPSULE, GELATIN COATED ORAL
COMMUNITY
Start: 2019-05-01 | End: 2020-07-22

## 2020-04-01 NOTE — PROGRESS NOTES
Tatiana Fields  1965  [id number]    Date of E visit Encounter: 4/1/2020    I have reviewed the e-Visit questionnaire and and patient's answers as well as all related [employer] messages. COVID 19 screen was negative. My summary and assessment and plan are as follows:    HPI  Mrs. Fields c/o several days of worsening nasal congestion, moderate aching/throbbing facial pain over cheeks/forehead, headache, neck pain. No fever. She is non smoker. No cough. Has tx'd with decongestant nasal sprays without much improvement.     Requests diflucan be given if she receives abx as she often gets vulvovaginal yeast infection with abx.    Past medical/surgical/social/family history reviewed on chart. Medication and allergy lists reviewed and updated as necessary.    Review of Systems  Review of Systems - History obtained from the patient  General ROS: positive for  - fatigue  negative for - fever or malaise  Ophthalmic ROS: positive for - eye pain  negative for - loss of vision, red eye, no discharge  ENT ROS: positive for - headaches, nasal congestion, nasal discharge and sinus pain  negative for - epistaxis, oral lesions, sneezing or sore throat  Respiratory ROS: no cough, shortness of breath, or wheezing  Cardiovascular ROS: no chest pain or dyspnea on exertion  Gastrointestinal ROS: negative for - diarrhea or nausea/vomiting  Neurological ROS: no TIA or stroke symptoms  Dermatological ROS: negative      Diagnoses and all orders for this visit:    Acute recurrent sinusitis, unspecified location    Other orders  -     fluconazole (Diflucan) 150 MG tablet; 1 now and repeat in 1 week      As she has difficulty tolerating oral abx, she will drop in to clinic for POC treatment including:  Depomedrol 80 mg IM x 1  Rocephin 1 gm IM x 1  Continue symptomatic mgnt with routine allergy medications.  Diflucan provided for potential vulvovaginal candidiasis.    She will keep routine f/u, f/u sooner as needed.  Patient was encouraged  to keep me informed of any acute changes, lack of improvement, or any new concerning symptoms.  Pt is aware of reasons to seek emergent care.  Patient voiced understanding of all instructions and denied further questions.        History of Present Illness    Review of Systems    Total time spent in E visit messaging and coordination of care was greater than 15 minutes.

## 2020-04-01 NOTE — PROGRESS NOTES
Amber Mills MD Gabbard, Sarah LR MA             PT WILL COMING IN FOR FOLLOWING INJECTIONS THIS AFTERNOON:     DEPOMEDROL 80 MG IM     ROCEPHIN 1 GM IM

## 2020-04-02 ENCOUNTER — CLINICAL SUPPORT (OUTPATIENT)
Dept: FAMILY MEDICINE CLINIC | Facility: CLINIC | Age: 55
End: 2020-04-02

## 2020-04-02 DIAGNOSIS — J32.9 SINUSITIS, UNSPECIFIED CHRONICITY, UNSPECIFIED LOCATION: ICD-10-CM

## 2020-04-02 PROCEDURE — 96372 THER/PROPH/DIAG INJ SC/IM: CPT | Performed by: FAMILY MEDICINE

## 2020-04-02 RX ADMIN — CEFTRIAXONE 1 G: 1 INJECTION, POWDER, FOR SOLUTION INTRAMUSCULAR; INTRAVENOUS at 16:18

## 2020-04-02 RX ADMIN — METHYLPREDNISOLONE ACETATE 80 MG: 80 INJECTION, SUSPENSION INTRA-ARTICULAR; INTRALESIONAL; INTRAMUSCULAR; SOFT TISSUE at 16:19

## 2020-04-13 DIAGNOSIS — M47.812 CERVICAL ARTHRITIS: ICD-10-CM

## 2020-04-13 DIAGNOSIS — G47.19 EXCESSIVE DAYTIME SLEEPINESS: ICD-10-CM

## 2020-04-13 DIAGNOSIS — F51.04 CHRONIC INSOMNIA: ICD-10-CM

## 2020-04-13 DIAGNOSIS — F33.9 RECURRENT MAJOR DEPRESSION RESISTANT TO TREATMENT (HCC): ICD-10-CM

## 2020-04-13 DIAGNOSIS — F43.10 PTSD (POST-TRAUMATIC STRESS DISORDER): ICD-10-CM

## 2020-04-13 RX ORDER — DIAZEPAM 2 MG/1
2 TABLET ORAL EVERY 8 HOURS PRN
Qty: 30 TABLET | Refills: 2 | OUTPATIENT
Start: 2020-04-13

## 2020-04-13 RX ORDER — OMEPRAZOLE 40 MG/1
40 CAPSULE, DELAYED RELEASE ORAL DAILY
Qty: 30 CAPSULE | Refills: 5 | Status: SHIPPED | OUTPATIENT
Start: 2020-04-13 | End: 2020-08-10 | Stop reason: SDUPTHER

## 2020-04-13 RX ORDER — METHYLPHENIDATE HYDROCHLORIDE 10 MG/1
TABLET ORAL
Qty: 60 TABLET | Refills: 0 | Status: SHIPPED | OUTPATIENT
Start: 2020-04-13 | End: 2020-05-15 | Stop reason: SDUPTHER

## 2020-04-13 RX ORDER — LISINOPRIL 10 MG/1
20 TABLET ORAL DAILY
Qty: 60 TABLET | Refills: 2 | Status: SHIPPED | OUTPATIENT
Start: 2020-04-13 | End: 2020-08-10 | Stop reason: SDUPTHER

## 2020-04-13 RX ORDER — ZOLPIDEM TARTRATE 12.5 MG/1
12.5 TABLET, FILM COATED, EXTENDED RELEASE ORAL NIGHTLY PRN
Qty: 30 TABLET | Refills: 2 | OUTPATIENT
Start: 2020-04-13

## 2020-04-13 RX ORDER — ESCITALOPRAM OXALATE 20 MG/1
20 TABLET ORAL DAILY
Qty: 30 TABLET | Refills: 1 | Status: SHIPPED | OUTPATIENT
Start: 2020-04-13 | End: 2020-05-06

## 2020-04-13 NOTE — TELEPHONE ENCOUNTER
Zolpidem and diazepam should have refills. Please check with pharmacy. Last rx'd in march with 2 refills    Ritalin refill sent in.

## 2020-05-06 ENCOUNTER — OFFICE VISIT (OUTPATIENT)
Dept: FAMILY MEDICINE CLINIC | Facility: CLINIC | Age: 55
End: 2020-05-06

## 2020-05-06 VITALS
HEIGHT: 64 IN | SYSTOLIC BLOOD PRESSURE: 125 MMHG | HEART RATE: 98 BPM | DIASTOLIC BLOOD PRESSURE: 80 MMHG | WEIGHT: 165.13 LBS | BODY MASS INDEX: 28.19 KG/M2 | OXYGEN SATURATION: 97 % | TEMPERATURE: 98.2 F

## 2020-05-06 DIAGNOSIS — I10 ESSENTIAL HYPERTENSION: ICD-10-CM

## 2020-05-06 DIAGNOSIS — E55.9 VITAMIN D DEFICIENCY: ICD-10-CM

## 2020-05-06 DIAGNOSIS — G43.719 INTRACTABLE CHRONIC MIGRAINE WITHOUT AURA AND WITHOUT STATUS MIGRAINOSUS: Primary | ICD-10-CM

## 2020-05-06 DIAGNOSIS — E53.8 VITAMIN B12 DEFICIENCY: ICD-10-CM

## 2020-05-06 DIAGNOSIS — F43.10 PTSD (POST-TRAUMATIC STRESS DISORDER): ICD-10-CM

## 2020-05-06 DIAGNOSIS — G47.61 PERIODIC LIMB MOVEMENTS OF SLEEP: ICD-10-CM

## 2020-05-06 DIAGNOSIS — M79.7 FIBROMYALGIA: ICD-10-CM

## 2020-05-06 DIAGNOSIS — F45.0 DEPRESSION WITH SOMATIZATION: ICD-10-CM

## 2020-05-06 DIAGNOSIS — G47.33 OBSTRUCTIVE SLEEP APNEA: ICD-10-CM

## 2020-05-06 DIAGNOSIS — F32.A DEPRESSION WITH SOMATIZATION: ICD-10-CM

## 2020-05-06 DIAGNOSIS — F51.04 CHRONIC INSOMNIA: ICD-10-CM

## 2020-05-06 DIAGNOSIS — G47.19 EXCESSIVE DAYTIME SLEEPINESS: ICD-10-CM

## 2020-05-06 PROBLEM — K59.9 COLONIC INERTIA: Status: ACTIVE | Noted: 2020-05-06

## 2020-05-06 PROBLEM — Z92.29 S/P BOTOX INJECTION: Status: RESOLVED | Noted: 2018-02-21 | Resolved: 2020-05-06

## 2020-05-06 PROCEDURE — 99214 OFFICE O/P EST MOD 30 MIN: CPT | Performed by: FAMILY MEDICINE

## 2020-05-06 PROCEDURE — 96372 THER/PROPH/DIAG INJ SC/IM: CPT | Performed by: FAMILY MEDICINE

## 2020-05-06 RX ORDER — KETOROLAC TROMETHAMINE 30 MG/ML
60 INJECTION, SOLUTION INTRAMUSCULAR; INTRAVENOUS ONCE
Status: COMPLETED | OUTPATIENT
Start: 2020-05-06 | End: 2020-05-06

## 2020-05-06 RX ORDER — PREDNISONE 20 MG/1
TABLET ORAL
Qty: 14 TABLET | Refills: 0 | Status: SHIPPED | OUTPATIENT
Start: 2020-05-06 | End: 2020-07-22

## 2020-05-06 RX ORDER — FLUOXETINE HYDROCHLORIDE 20 MG/1
20 CAPSULE ORAL DAILY
Qty: 30 CAPSULE | Refills: 5 | Status: SHIPPED | OUTPATIENT
Start: 2020-05-06 | End: 2021-02-24 | Stop reason: SDUPTHER

## 2020-05-06 RX ADMIN — KETOROLAC TROMETHAMINE 60 MG: 30 INJECTION, SOLUTION INTRAMUSCULAR; INTRAVENOUS at 11:26

## 2020-05-06 NOTE — PATIENT INSTRUCTIONS
HEADACHE PREVENTION MEDS, INJECTABLES    Aimovig    Emgality    Ajovy    CHECK OUT [facility] DIET

## 2020-05-06 NOTE — PROGRESS NOTES
Subjective   Tatiana Fields is a 54 y.o. female.     History of Present Illness   Mrs. Fields presents today for routine f/u on sevearal chronic med problems.     She has HTN, taking lisnopril as rx'd. Not recently following heart healthy diet. Now getting regular exercise, walking most days of week. Frustrated she has not lost weight. Not tracking calories. Admits she has been eating higher calories.     Intractable/chronic migraines not well controlled as she has been waiting to restart botox and trigger point injections. Has apt later this month. worst pain between eyes and at temples. No new focal neuro symptoms. Is having increased right sided neck pain which has occurred with poorly controlled headeaches in past. No new focal neuro symptoms.     Has DANE and is currently on CPAP. Good compliance. Sleep quality improved.     She has GERD. Symptoms recently stable. No weight loss, dysphagia or blood in stool.     Has B12 and vit D def; taking replacement as rx'd. Continues to have chronic fatigue and arthralgias.      Chronic pain due to FMSx and C-DJD/DDD. Under care of pain mgnt. No recent changes in med.     Chronic insomnia with PLMD stable on current meds of mirapex and ambien CR. Comorbid chronic severe depression with somatization and PTSD currently on SSRI. Moods, appetite, sleep patterns stable. Denies SI.      Currently on ritalin for mgnt chronic fatigue, EDS despite tx of DANE. symptoms improved, denies side effects.    Pt's previous HPI reviewed and updated as indicated.     The following portions of the patient's history were reviewed and updated as appropriate: allergies, current medications, past family history, past medical history, past social history, past surgical history and problem list.    Review of Systems   Constitutional: Positive for fatigue and unexpected weight change (weight gain). Negative for fever.   HENT: Positive for congestion, postnasal drip and sinus pressure. Negative for  ear pain, hearing loss, nosebleeds, sneezing, sore throat and trouble swallowing.    Eyes: Positive for visual disturbance (chronic). Negative for pain.        Dry eyes   Respiratory: Negative for cough, shortness of breath and wheezing.    Cardiovascular: Positive for palpitations (chronic, intermittent, stable). Negative for chest pain and leg swelling.   Gastrointestinal: Positive for abdominal pain (chronic), constipation and nausea. Negative for blood in stool, diarrhea and vomiting.   Endocrine: Positive for heat intolerance. Negative for polydipsia and polyuria.   Genitourinary: Positive for pelvic pain (chronic). Negative for dysuria and hematuria.   Musculoskeletal: Positive for arthralgias, myalgias, neck pain and neck stiffness. Negative for back pain and joint swelling.   Skin: Negative for rash and wound.   Neurological: Positive for dizziness, tremors, weakness and headaches. Negative for seizures, syncope and speech difficulty.   Hematological: Negative for adenopathy. Bruises/bleeds easily.   Psychiatric/Behavioral: Positive for decreased concentration, dysphoric mood and sleep disturbance. Negative for confusion and suicidal ideas. The patient is nervous/anxious.    Pt's previous ROS reviewed and updated as indicated.     Objective    Vitals:    05/06/20 1035   BP: 125/80   Pulse: 98   Temp: 98.2 °F (36.8 °C)   SpO2: 97%     Body mass index is 28.33 kg/m².      05/06/20  1035   Weight: 74.9 kg (165 lb 2 oz)       Physical Exam   Constitutional: She is oriented to person, place, and time. She appears well-developed and well-nourished. She is cooperative. She does not appear ill. No distress.   HENT:   Head: Normocephalic and atraumatic.   Mouth/Throat: Mucous membranes are normal. Mucous membranes are not dry.   Eyes: Conjunctivae, EOM and lids are normal. No scleral icterus. Right eye exhibits no nystagmus. Left eye exhibits no nystagmus.   Neck: Phonation normal. Neck supple. Normal carotid pulses  present. No thyroid mass present.   Cardiovascular: Normal rate, regular rhythm, normal heart sounds and intact distal pulses. Exam reveals no gallop.   No murmur heard.  Pulmonary/Chest: Effort normal and breath sounds normal.   Musculoskeletal:        Cervical back: She exhibits decreased range of motion (mildly with left lateral flexion, right rotation), tenderness (diffuse soft tissue of right) and spasm. She exhibits no bony tenderness.     Vascular Status -  Her right foot exhibits no edema. Her left foot exhibits no edema.  Lymphadenopathy:     She has no cervical adenopathy.        Right: No supraclavicular adenopathy present.        Left: No supraclavicular adenopathy present.   Neurological: She is alert and oriented to person, place, and time. She displays tremor (fine in both hands, right > left). No cranial nerve deficit. Gait normal.   Skin: Skin is warm and dry. No bruising and no rash noted. No cyanosis. Nails show no clubbing.   Psychiatric: Her speech is normal and behavior is normal. Judgment and thought content normal. Her mood appears anxious (mildly). Cognition and memory are normal.   Good eye contact. Answers questions appropriately. Good personal hygiene and grooming.   Nursing note and vitals reviewed.  Pt's previous physical exam reviewed and updated as indicated.    Lab Results   Component Value Date    WBC 8.10 10/16/2019    HGB 12.8 10/16/2019    HCT 38.1 10/16/2019    MCV 88.8 10/16/2019     10/16/2019       Lab Results   Component Value Date    GLUCOSE 92 01/04/2014    BUN 12 10/16/2019    CREATININE 0.79 10/16/2019    EGFRIFNONA 76 10/16/2019    EGFRIFAFRI 92 10/16/2019    BCR 15.2 10/16/2019    K 3.6 10/16/2019    CO2 28.4 10/16/2019    CALCIUM 9.2 10/16/2019    PROTENTOTREF 6.5 10/16/2019    ALBUMIN 4.50 10/16/2019    LABIL2 2.3 10/16/2019    AST 30 10/16/2019    ALT 27 10/16/2019       Lab Results   Component Value Date    CHLPL 180 10/16/2019    TRIG 88 10/16/2019    HDL  55 10/16/2019     (H) 10/16/2019       Lab Results   Component Value Date    HGBA1C 5.50 10/16/2019       Lab Results   Component Value Date    TSH 1.400 10/16/2019       Assessment/Plan   Tatiana was seen today for depression and headache.    Diagnoses and all orders for this visit:    Intractable chronic migraine without aura and without status migrainosus  -     ketorolac (TORADOL) injection 60 mg    Essential hypertension    PTSD (post-traumatic stress disorder)    Periodic limb movements of sleep    Depression with somatization    Excessive daytime sleepiness    Chronic insomnia    Fibromyalgia    Vitamin D deficiency    Vitamin B12 deficiency    Obstructive sleep apnea    Other orders  -     FLUoxetine (PROzac) 20 MG capsule; Take 1 capsule by mouth Daily.  -     predniSONE (DELTASONE) 20 MG tablet; 3 po qd x 2 days, then 2 po qd x 2 days, then 1 po qd x 2 days, then 1/2 po qd x 4 days       Major depressive disorder associated with PTSD/anxiety.  Generally stable.  Continue Prozac.  No suicidal ideation.    Hypertension controlled continue lisinopril and HCTZ.  She is advised to follow a more heart healthy diet and continue her regular exercise.    Ritalin providing good improvement in residual excessive daytime sleepiness despite adequate treatment of DANE with CPAP.  Continue current treatment plan.  No side effects reported.    She is encouraged to follow-up as scheduled with neurology for Botox injections later this month.  She is encouraged to discuss her previously abnormal MRI and any recommendations regarding follow-up scans with contrast.  In the interim she is provided with point-of-care Toradol and prescription for prednisone taper as this has been helpful for her acute exacerbations of headache in the past.  I provided her with names of prophylactic migraine medications/injectables so that she can review coverage on her insurance formulary.    BMI 28 with recent weight gain despite regular  "exercise.  We have reviewed the concept of needing caloric deficit for weight loss.  I recommended she look into the \"[facility] diet\" which addresses behavioral modification techniques, nutritional recommendations etc. She is amenable to this.    Continue B12 and vitamin D replacement.    Continue PPI for management of heartburn/reflux.  Follow-up with gastroenterology as recommended for further eval of colonic inertia.    Multifactorial chronic pain.  She is encouraged to follow with pain management as scheduled.    Routine follow-up in 3 months, sooner as needed.  Patient was encouraged to keep me informed of any acute changes, lack of improvement, or any new concerning symptoms.  Pt is aware of reasons to seek emergent care.  Patient voiced understanding of all instructions and denied further questions.            Please note that portions of this note may have been completed with a voice recognition program. Efforts were made to edit the dictations, but occasionally words are mistranscribed.               "

## 2020-05-15 DIAGNOSIS — G47.19 EXCESSIVE DAYTIME SLEEPINESS: ICD-10-CM

## 2020-05-15 DIAGNOSIS — F33.9 RECURRENT MAJOR DEPRESSION RESISTANT TO TREATMENT (HCC): ICD-10-CM

## 2020-05-18 RX ORDER — METHYLPHENIDATE HYDROCHLORIDE 10 MG/1
TABLET ORAL
Qty: 60 TABLET | Refills: 0 | Status: SHIPPED | OUTPATIENT
Start: 2020-05-18 | End: 2020-06-17 | Stop reason: SDUPTHER

## 2020-05-26 ENCOUNTER — E-VISIT (OUTPATIENT)
Dept: FAMILY MEDICINE CLINIC | Facility: CLINIC | Age: 55
End: 2020-05-26

## 2020-05-26 DIAGNOSIS — J06.9 ACUTE URI: Primary | ICD-10-CM

## 2020-05-26 PROCEDURE — 99422 OL DIG E/M SVC 11-20 MIN: CPT | Performed by: FAMILY MEDICINE

## 2020-05-26 NOTE — PROGRESS NOTES
E VISIT  05/26/2020    Tatiana Fields  1965      I have reviewed the e-Visit questionnaire, patient's answers, and all related Re[employer] Messaging. My assessment and plan are as follows:      History of Present Illness  Upper Respiratory Infection: Patient complains of symptoms of a URI, possible sinusitis. Symptoms include congestion, coryza and headache, facial pain/pressure. Onset of symptoms was a few days ago, gradually worsening since that time. She is drinking plenty of fluids. Evaluation to date: none. Treatment to date: antihistamines, decongestants and nasal steroids. Minimal improvement. No fever, no cough      Patient's past medical hx, surgical hx, family hx, social hx reviewed on chart. Medication and allergy lists reviewed on chart. Information updated as indicated.      Review of Systems  Review of Systems - Negative except for as per HPI and chronic symptoms reviewed on previous ROS.  History obtained from the patient      Diagnoses and all orders for this visit:    Acute URI    Other orders  -     dexamethasone (DECADRON) 4 MG tablet; Take 1 tablet by mouth 2 (Two) Times a Day With Meals for 3 days.      Pt instructed to continue symptomatic mngt as well as antihistamine, nasal CS and decongestant as needed. abx not indicated unless symptoms present for greater than 10-14 days, or she develops fever. Oral corticosteroids provided to assist with swelling, facial pain and any contributing allergic components.    Patient will f/u a scheduled, sooner as needed.  Patient was encouraged to keep me informed of any acute changes, lack of improvement, or any new concerning symptoms.  Pt is aware of reasons to seek emergent care.  Patient voiced understanding of all instructions and denied further questions.      Total time spent in E visit messaging and coordination of care was 12 minutes.

## 2020-05-27 RX ORDER — DEXAMETHASONE 4 MG/1
4 TABLET ORAL 2 TIMES DAILY WITH MEALS
Qty: 6 TABLET | Refills: 0 | Status: SHIPPED | OUTPATIENT
Start: 2020-05-27 | End: 2020-05-30

## 2020-06-05 DIAGNOSIS — F43.10 PTSD (POST-TRAUMATIC STRESS DISORDER): ICD-10-CM

## 2020-06-05 DIAGNOSIS — M47.812 CERVICAL ARTHRITIS: ICD-10-CM

## 2020-06-08 RX ORDER — DIAZEPAM 2 MG/1
2 TABLET ORAL EVERY 8 HOURS PRN
Qty: 30 TABLET | Refills: 2 | Status: SHIPPED | OUTPATIENT
Start: 2020-06-08 | End: 2020-08-11 | Stop reason: SDUPTHER

## 2020-06-15 RX ORDER — PRAMIPEXOLE DIHYDROCHLORIDE 1 MG/1
1 TABLET ORAL
Qty: 30 TABLET | Refills: 0 | Status: SHIPPED | OUTPATIENT
Start: 2020-06-15 | End: 2020-07-09

## 2020-06-17 DIAGNOSIS — F51.04 CHRONIC INSOMNIA: ICD-10-CM

## 2020-06-17 DIAGNOSIS — G47.19 EXCESSIVE DAYTIME SLEEPINESS: ICD-10-CM

## 2020-06-17 DIAGNOSIS — F33.9 RECURRENT MAJOR DEPRESSION RESISTANT TO TREATMENT (HCC): ICD-10-CM

## 2020-06-17 RX ORDER — ZOLPIDEM TARTRATE 12.5 MG/1
12.5 TABLET, FILM COATED, EXTENDED RELEASE ORAL NIGHTLY PRN
Qty: 30 TABLET | Refills: 0 | Status: SHIPPED | OUTPATIENT
Start: 2020-06-17 | End: 2020-07-27 | Stop reason: SDUPTHER

## 2020-06-17 RX ORDER — METHYLPHENIDATE HYDROCHLORIDE 10 MG/1
TABLET ORAL
Qty: 60 TABLET | Refills: 0 | Status: SHIPPED | OUTPATIENT
Start: 2020-06-17 | End: 2020-07-22

## 2020-06-17 NOTE — TELEPHONE ENCOUNTER
KIMBERLY reviewed. Refill approved. Printed for . Needs updated CSA. Pt to keep f/u apt as scheduled.

## 2020-07-06 RX ORDER — POLYETHYLENE GLYCOL 3350 17 G/17G
17 POWDER, FOR SOLUTION ORAL 2 TIMES DAILY
Qty: 527 G | Refills: 5 | Status: SHIPPED | OUTPATIENT
Start: 2020-07-06 | End: 2020-07-20 | Stop reason: SDUPTHER

## 2020-07-06 RX ORDER — ONDANSETRON 4 MG/1
4 TABLET, FILM COATED ORAL EVERY 8 HOURS PRN
Qty: 30 TABLET | Refills: 0 | Status: SHIPPED | OUTPATIENT
Start: 2020-07-06 | End: 2020-09-14 | Stop reason: SDUPTHER

## 2020-07-09 RX ORDER — PRAMIPEXOLE DIHYDROCHLORIDE 1 MG/1
1 TABLET ORAL
Qty: 30 TABLET | Refills: 0 | Status: SHIPPED | OUTPATIENT
Start: 2020-07-09 | End: 2020-09-14 | Stop reason: SDUPTHER

## 2020-07-10 ENCOUNTER — E-VISIT (OUTPATIENT)
Dept: FAMILY MEDICINE CLINIC | Facility: TELEHEALTH | Age: 55
End: 2020-07-10

## 2020-07-10 ENCOUNTER — TELEPHONE (OUTPATIENT)
Dept: FAMILY MEDICINE CLINIC | Facility: CLINIC | Age: 55
End: 2020-07-10

## 2020-07-10 DIAGNOSIS — J02.9 ACUTE PHARYNGITIS, UNSPECIFIED ETIOLOGY: Primary | ICD-10-CM

## 2020-07-10 DIAGNOSIS — J06.9 UPPER RESPIRATORY TRACT INFECTION, UNSPECIFIED TYPE: ICD-10-CM

## 2020-07-10 PROCEDURE — 99422 OL DIG E/M SVC 11-20 MIN: CPT | Performed by: NURSE PRACTITIONER

## 2020-07-10 RX ORDER — AZITHROMYCIN 250 MG/1
250 TABLET, FILM COATED ORAL DAILY
Qty: 6 TABLET | Refills: 0 | Status: SHIPPED | OUTPATIENT
Start: 2020-07-10 | End: 2020-07-15

## 2020-07-10 NOTE — TELEPHONE ENCOUNTER
PT STATES SHE TRIED TO LOGIN INTO VIDEO  VISITS  A COUPLE OF TIMES WANTING TO GET A ANTIBOTIC FOR SYMPTOMS FOR STREP THROAT.  SYMPTOMS ARE TONSILS ARE SWOLLEN AND WHITE SPOTS ON THROAT AND A LOT OF DRAINAGE. SHE BABYSITT  A CHILD ON 07- THAT HAD STREPTHROAT.

## 2020-07-11 NOTE — PROGRESS NOTES
E-visit questionnaire reviewed. Z-tessy sent to patient's preferred pharmacy. Detailed instructions provided in AVS including when to follow up and med info.    I spent 11-20 minutes on this e-visit.

## 2020-07-11 NOTE — PATIENT INSTRUCTIONS
"Get a new toothbrush after you have been on the antibiotic for 24 hours. Tylenol and ibuprofen as needed for pain. Warm salt water gargles. Follow up in 2-3 days if no improvement or symptoms worsen.      Upper Respiratory Infection, Adult  An upper respiratory infection (URI) affects the nose, throat, and upper air passages. URIs are caused by germs (viruses). The most common type of URI is often called \"the common cold.\"  Medicines cannot cure URIs, but you can do things at home to relieve your symptoms. URIs usually get better within 7-10 days.  Follow these instructions at home:  Activity  · Rest as needed.  · If you have a fever, stay home from work or school until your fever is gone, or until your doctor says you may return to work or school.  ? You should stay home until you cannot spread the infection anymore (you are not contagious).  ? Your doctor may have you wear a face mask so you have less risk of spreading the infection.  Relieving symptoms  · Gargle with a salt-water mixture 3-4 times a day or as needed. To make a salt-water mixture, completely dissolve ½-1 tsp of salt in 1 cup of warm water.  · Use a cool-mist humidifier to add moisture to the air. This can help you breathe more easily.  Eating and drinking    · Drink enough fluid to keep your pee (urine) pale yellow.  · Eat soups and other clear broths.  General instructions    · Take over-the-counter and prescription medicines only as told by your doctor. These include cold medicines, fever reducers, and cough suppressants.  · Do not use any products that contain nicotine or tobacco. These include cigarettes and e-cigarettes. If you need help quitting, ask your doctor.  · Avoid being where people are smoking (avoid secondhand smoke).  · Make sure you get regular shots and get the flu shot every year.  · Keep all follow-up visits as told by your doctor. This is important.  How to avoid spreading infection to others    · Wash your hands often with " "soap and water. If you do not have soap and water, use hand .  · Avoid touching your mouth, face, eyes, or nose.  · Cough or sneeze into a tissue or your sleeve or elbow. Do not cough or sneeze into your hand or into the air.  Contact a doctor if:  · You are getting worse, not better.  · You have any of these:  ? A fever.  ? Chills.  ? Brown or red mucus in your nose.  ? Yellow or brown fluid (discharge)coming from your nose.  ? Pain in your face, especially when you bend forward.  ? Swollen neck glands.  ? Pain with swallowing.  ? White areas in the back of your throat.  Get help right away if:  · You have shortness of breath that gets worse.  · You have very bad or constant:  ? Headache.  ? Ear pain.  ? Pain in your forehead, behind your eyes, and over your cheekbones (sinus pain).  ? Chest pain.  · You have long-lasting (chronic) lung disease along with any of these:  ? Wheezing.  ? Long-lasting cough.  ? Coughing up blood.  ? A change in your usual mucus.  · You have a stiff neck.  · You have changes in your:  ? Vision.  ? Hearing.  ? Thinking.  ? Mood.  Summary  · An upper respiratory infection (URI) is caused by a germ called a virus. The most common type of URI is often called \"the common cold.\"  · URIs usually get better within 7-10 days.  · Take over-the-counter and prescription medicines only as told by your doctor.  This information is not intended to replace advice given to you by your health care provider. Make sure you discuss any questions you have with your health care provider.  Document Released: 06/05/2009 Document Revised: 12/26/2019 Document Reviewed: 08/10/2018  Elsevier Patient Education © 2020 [employer]  Strep Throat, Adult  Strep throat is an infection in the throat that is caused by bacteria. It is common during the cold months of the year. It mostly affects children who are 5-15 years old. However, people of all ages can get it at any time of the year. This infection spreads " from person to person (is contagious) through coughing, sneezing, or having close contact.  Your health care provider may use other names to describe the infection. It can be called tonsillitis (if there is swelling of the tonsils), or pharyngitis (if there is swelling at the back of the throat).  What are the causes?  This condition is caused by the Streptococcus pyogenes bacteria.  What increases the risk?  You are more likely to develop this condition if:  · You care for school-age children, or are around school-age children. Children are more likely to get strep throat and may spread it to others.  · You spend time in crowded places where the infection can spread easily.  · You have close contact with someone who has strep throat.  What are the signs or symptoms?  Symptoms of this condition include:  · Fever or chills.  · Redness, swelling, or pain in the tonsils or throat.  · Pain or difficulty when swallowing.  · White or yellow spots on the tonsils or throat.  · Tender glands in the neck and under the jaw.  · Bad smelling breath.  · Red rash all over the body. This is rare.  How is this diagnosed?  This condition is diagnosed by tests that check for the presence and the amount of bacteria that cause strep throat. They are:  · Rapid strep test. Your throat is swabbed and checked for the presence of bacteria. Results are usually ready in minutes.  · Throat culture test. Your throat is swabbed. The sample is placed in a cup that allows infections to grow. Results are usually ready in 1 or 2 days.  How is this treated?  This condition may be treated with:  · Medicines that kill germs (antibiotics).  · Medicines that relieve pain or fever. These include:  ? Ibuprofen or acetaminophen.  ? Aspirin, only for patients who are over the age of 18.  ? Throat lozenges.  ? Throat sprays.  Follow these instructions at home:  Medicines    · Take over-the-counter and prescription medicines only as told by your health care  provider.  · Take your antibiotic medicine as told by your health care provider. Do not stop taking the antibiotic even if you start to feel better.  Eating and drinking    · If you have trouble swallowing, try eating soft foods until your sore throat feels better.  · Drink enough fluid to keep your urine pale yellow.  · To help relieve pain, you may have:  ? Warm fluids, such as soup and tea.  ? Cold fluids, such as frozen desserts or popsicles.  General instructions  · Gargle with a salt-water mixture 3-4 times a day or as needed. To make a salt-water mixture, completely dissolve ½-1 tsp (3-6 g) of salt in 1 cup (237 mL) of warm water.  · Get plenty of rest.  · Stay home from work or school until you have been taking antibiotics for 24 hours.  · Avoid smoking or being around people who smoke.  · Keep all follow-up visits as told by your health care provider. This is important.  How is this prevented?    · Do not share food, drinking cups, or personal items that could cause the infection to spread to other people.  · Wash your hands well with soap and water, and make sure that all people in your house wash their hands well.  · Have family members tested if they have a sore throat or fever. They may need an antibiotic if they have strep throat.  Contact a health care provider if:  · The glands in your neck continue to get bigger.  · You develop a rash, cough, or earache.  · You cough up a thick mucus that is green, yellow-brown, or bloody.  · You have pain or discomfort that does not get better with medicine.  · Your symptoms seem to be getting worse and not better.  · You have a fever.  Get help right away if:  · You have new symptoms, such as vomiting, severe headache, stiff or painful neck, chest pain, or shortness of breath.  · You have severe throat pain, drooling, or changes in your voice.  · You have swelling of the neck, or the skin on the neck becomes red and tender.  · You have signs of dehydration, such  as tiredness (fatigue), dry mouth, and decreased urination.  · You become increasingly sleepy, or you cannot wake up completely.  · Your joints become red or painful.  Summary  · Strep throat is an infection in the throat that is caused by the Streptococcus pyogenes bacteria. This infection is spread from person to person (is contagious) through coughing, sneezing, or having close contact.  · Take your medicines, including antibiotics, as told by your health care provider. Do not stop taking the antibiotic even if you start to feel better.  · To prevent the spread of germs, wash your hands well with soap and water. Have others do the same. Do not share food, drinking cups, or personal items.  · Get help right away if you have new symptoms, such as vomiting, severe headache, stiff or painful neck, chest pain, or shortness of breath.  This information is not intended to replace advice given to you by your health care provider. Make sure you discuss any questions you have with your health care provider.  Document Released: 12/15/2001 Document Revised: 03/06/2020 Document Reviewed: 03/06/2020  [employer] Patient Education © 2020 [employer].  Azithromycin tablets  What is this medicine?  AZITHROMYCIN (az ith veronica MYE sin) is a macrolide antibiotic. It is used to treat or prevent certain kinds of bacterial infections. It will not work for colds, flu, or other viral infections.  This medicine may be used for other purposes; ask your health care provider or pharmacist if you have questions.  COMMON BRAND NAME(S): Zithromax, Zithromax Tri-Wale, Zithromax Z-Wale  What should I tell my health care provider before I take this medicine?  They need to know if you have any of these conditions:  · history of blood diseases, like leukemia  · history of irregular heartbeat  · kidney disease  · liver disease  · myasthenia gravis  · an unusual or allergic reaction to azithromycin, erythromycin, other macrolide antibiotics, foods, dyes, or  preservatives  · pregnant or trying to get pregnant  · breast-feeding  How should I use this medicine?  Take this medicine by mouth with a full glass of water. Follow the directions on the prescription label. The tablets can be taken with food or on an empty stomach. If the medicine upsets your stomach, take it with food. Take your medicine at regular intervals. Do not take your medicine more often than directed. Take all of your medicine as directed even if you think your are better. Do not skip doses or stop your medicine early.  Talk to your pediatrician regarding the use of this medicine in children. While this drug may be prescribed for children as young as 6 months for selected conditions, precautions do apply.  Overdosage: If you think you have taken too much of this medicine contact a poison control center or emergency room at once.  NOTE: This medicine is only for you. Do not share this medicine with others.  What if I miss a dose?  If you miss a dose, take it as soon as you can. If it is almost time for your next dose, take only that dose. Do not take double or extra doses.  What may interact with this medicine?  Do not take this medicine with any of the following medications:  · cisapride  · dronedarone  · pimozide  · thioridazine  This medicine may also interact with the following medications:  · antacids that contain aluminum or magnesium  · birth control pills  · colchicine  · cyclosporine  · digoxin  · ergot alkaloids like dihydroergotamine, ergotamine  · nelfinavir  · other medicines that prolong the QT interval (an abnormal heart rhythm)  · phenytoin  · warfarin  This list may not describe all possible interactions. Give your health care provider a list of all the medicines, herbs, non-prescription drugs, or dietary supplements you use. Also tell them if you smoke, drink alcohol, or use illegal drugs. Some items may interact with your medicine.  What should I watch for while using this  medicine?  Tell your doctor or healthcare provider if your symptoms do not start to get better or if they get worse.  This medicine may cause serious skin reactions. They can happen weeks to months after starting the medicine. Contact your healthcare provider right away if you notice fevers or flu-like symptoms with a rash. The rash may be red or purple and then turn into blisters or peeling of the skin. Or, you might notice a red rash with swelling of the face, lips or lymph nodes in your neck or under your arms.  Do not treat diarrhea with over the counter products. Contact your doctor if you have diarrhea that lasts more than 2 days or if it is severe and watery.  This medicine can make you more sensitive to the sun. Keep out of the sun. If you cannot avoid being in the sun, wear protective clothing and use sunscreen. Do not use sun lamps or tanning beds/booths.  What side effects may I notice from receiving this medicine?  Side effects that you should report to your doctor or health care professional as soon as possible:  · allergic reactions like skin rash, itching or hives, swelling of the face, lips, or tongue  · bloody or watery diarrhea  · breathing problems  · chest pain  · fast, irregular heartbeat  · muscle weakness  · rash, fever, and swollen lymph nodes  · redness, blistering, peeling, or loosening of the skin, including inside the mouth  · signs and symptoms of liver injury like dark yellow or brown urine; general ill feeling or flu-like symptoms; light-colored stools; loss of appetite; nausea; right upper belly pain; unusually weak or tired; yellowing of the eyes or skin  · white patches or sores in the mouth  · unusually weak or tired  Side effects that usually do not require medical attention (report to your doctor or health care professional if they continue or are bothersome):  · diarrhea  · nausea  · stomach pain  · vomiting  This list may not describe all possible side effects. Call your doctor  for medical advice about side effects. You may report side effects to FDA at 7-629-UTT-8692.  Where should I keep my medicine?  Keep out of the reach of children.  Store at room temperature between 15 and 30 degrees C (59 and 86 degrees F). Throw away any unused medicine after the expiration date.  NOTE: This sheet is a summary. It may not cover all possible information. If you have questions about this medicine, talk to your doctor, pharmacist, or health care provider.  © 2020 Elsevier/[employer] Standard (2020-03-26 17:19:20)

## 2020-07-20 DIAGNOSIS — G47.19 EXCESSIVE DAYTIME SLEEPINESS: ICD-10-CM

## 2020-07-20 DIAGNOSIS — F33.9 RECURRENT MAJOR DEPRESSION RESISTANT TO TREATMENT (HCC): ICD-10-CM

## 2020-07-20 DIAGNOSIS — N94.19 DYSPAREUNIA DUE TO MEDICAL CONDITION IN FEMALE: ICD-10-CM

## 2020-07-21 RX ORDER — POLYETHYLENE GLYCOL 3350 17 G/17G
17 POWDER, FOR SOLUTION ORAL 2 TIMES DAILY
Qty: 527 G | Refills: 5 | Status: SHIPPED | OUTPATIENT
Start: 2020-07-21 | End: 2021-02-10 | Stop reason: SDUPTHER

## 2020-07-21 RX ORDER — METHYLPHENIDATE HYDROCHLORIDE 10 MG/1
TABLET ORAL
Qty: 60 TABLET | Refills: 0 | OUTPATIENT
Start: 2020-07-21

## 2020-07-21 RX ORDER — ESTERIFIED ESTROGEN AND METHYLTESTOSTERONE .625; 1.25 MG/1; MG/1
1 TABLET ORAL DAILY
Qty: 30 TABLET | Refills: 5 | Status: SHIPPED | OUTPATIENT
Start: 2020-07-21 | End: 2020-08-05

## 2020-07-21 NOTE — TELEPHONE ENCOUNTER
She had sent me a message about potentially changing her ritalin. Do not want to refill and then change. We can discuss ritalin further at her apt tomorrow.

## 2020-07-22 ENCOUNTER — PROCEDURE VISIT (OUTPATIENT)
Dept: FAMILY MEDICINE CLINIC | Facility: CLINIC | Age: 55
End: 2020-07-22

## 2020-07-22 VITALS
BODY MASS INDEX: 28.68 KG/M2 | HEART RATE: 64 BPM | HEIGHT: 64 IN | SYSTOLIC BLOOD PRESSURE: 158 MMHG | TEMPERATURE: 97.3 F | RESPIRATION RATE: 14 BRPM | DIASTOLIC BLOOD PRESSURE: 98 MMHG | OXYGEN SATURATION: 97 % | WEIGHT: 168 LBS

## 2020-07-22 DIAGNOSIS — I10 UNCONTROLLED HYPERTENSION: Primary | ICD-10-CM

## 2020-07-22 DIAGNOSIS — Z51.81 MEDICATION MONITORING ENCOUNTER: ICD-10-CM

## 2020-07-22 DIAGNOSIS — Z79.890 HORMONE REPLACEMENT THERAPY (HRT): ICD-10-CM

## 2020-07-22 DIAGNOSIS — R73.09 ELEVATED HEMOGLOBIN A1C: ICD-10-CM

## 2020-07-22 DIAGNOSIS — G47.33 OBSTRUCTIVE SLEEP APNEA: ICD-10-CM

## 2020-07-22 DIAGNOSIS — K59.9 COLONIC INERTIA: ICD-10-CM

## 2020-07-22 DIAGNOSIS — K59.09 CHRONIC CONSTIPATION: ICD-10-CM

## 2020-07-22 DIAGNOSIS — G47.19 EXCESSIVE DAYTIME SLEEPINESS: ICD-10-CM

## 2020-07-22 DIAGNOSIS — F33.9 RECURRENT MAJOR DEPRESSION RESISTANT TO TREATMENT (HCC): ICD-10-CM

## 2020-07-22 DIAGNOSIS — N95.1 POSTMENOPAUSAL DISORDER: ICD-10-CM

## 2020-07-22 PROCEDURE — 99214 OFFICE O/P EST MOD 30 MIN: CPT | Performed by: FAMILY MEDICINE

## 2020-07-22 RX ORDER — FLUCONAZOLE 150 MG/1
TABLET ORAL
Qty: 2 TABLET | Refills: 0 | Status: SHIPPED | OUTPATIENT
Start: 2020-07-22 | End: 2020-08-05

## 2020-07-22 RX ORDER — LEVOTHYROXINE SODIUM 0.03 MG/1
25 TABLET ORAL DAILY
COMMUNITY
Start: 2020-06-12 | End: 2020-07-22

## 2020-07-22 RX ORDER — MODAFINIL 100 MG/1
100 TABLET ORAL DAILY
Qty: 30 TABLET | Refills: 2 | Status: SHIPPED | OUTPATIENT
Start: 2020-07-22 | End: 2020-08-05 | Stop reason: SDUPTHER

## 2020-07-22 RX ORDER — CLONIDINE HYDROCHLORIDE 0.1 MG/1
0.1 TABLET ORAL EVERY 12 HOURS SCHEDULED
Status: DISCONTINUED | OUTPATIENT
Start: 2020-07-22 | End: 2020-12-28

## 2020-07-22 RX ADMIN — CLONIDINE HYDROCHLORIDE 0.1 MG: 0.1 TABLET ORAL at 12:08

## 2020-07-22 NOTE — PROGRESS NOTES
"Subjective   Tatiana Fields is a 54 y.o. female.     History of Present Illness  Mrs. Fields presents today for routine f/u on several chronic med problems.     She has HTN, generally taking lisnopril as rx'd. Not recently following heart healthy diet. Getting irregular exercise. Has not yet had medication this morning.     Intractable/chronic migraines treated per neurology. No new focal neuro symptoms. Has right sided neck pain which has occurred with poorly controlled headeaches in past. No new focal neuro symptoms.     Has DANE and is currently on CPAP. Good compliance. Sleep quality had been fine until beloved pet dog dx'd with terminal cancer.      Has B12 and vit D def; taking replacement as rx'd. Continues to have chronic fatigue and arthralgias.      Chronic insomnia with PLMD stable on current meds of mirapex and ambien CR. Comorbid chronic severe depression with somatization and PTSD currently on SSRI. Moods, appetite, sleep patterns altered with recent new of dog's terminal illness. Denies SI.      Currently on ritalin for mgnt chronic fatigue, EDS despite tx of DANE. symptoms generally improved, denies side effects. She does not feel it is as effective as the provigil previously.    Had telehealth visit with psych. 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 Not sure if it has helped. Does not feel she can afford cont'd sessions. Feels her \"hormones\" may be part of her mood problems.    She is trying to get her [employer] PA's thru her GI office, in place of [employer], has not heard back. Dx is colonic inertia with chronic constipation.    Noted to have previously elevate a1c. Not following low sugar diet at this time.    Pt's previous HPI reviewed and updated as indicated.       The following portions of the patient's history were reviewed and updated as appropriate: allergies, current medications, past family history, past medical history, past social history, past surgical history and " problem list.    Review of Systems   Constitutional: Positive for fatigue. Negative for fever.   HENT: Positive for congestion and postnasal drip. Negative for ear pain, hearing loss, nosebleeds, sneezing, sore throat and trouble swallowing.    Eyes: Positive for visual disturbance (chronic). Negative for pain.        Dry eyes   Respiratory: Negative for cough, shortness of breath and wheezing.    Cardiovascular: Positive for palpitations (chronic, intermittent, stable). Negative for chest pain and leg swelling.   Gastrointestinal: Positive for abdominal pain (chronic), constipation and nausea. Negative for blood in stool, diarrhea and vomiting.   Endocrine: Positive for heat intolerance. Negative for polydipsia and polyuria.   Genitourinary: Positive for pelvic pain (chronic). Negative for dysuria and hematuria.   Musculoskeletal: Positive for arthralgias, myalgias, neck pain and neck stiffness. Negative for back pain and joint swelling.   Skin: Negative for rash and wound.   Neurological: Positive for dizziness, tremors, weakness and headaches. Negative for seizures, syncope and speech difficulty.   Hematological: Negative for adenopathy. Bruises/bleeds easily.   Psychiatric/Behavioral: Positive for decreased concentration, dysphoric mood and sleep disturbance. Negative for confusion and suicidal ideas. The patient is nervous/anxious.    Pt's previous ROS reviewed and updated as indicated.       Objective    Vitals:    07/22/20 1208   BP: 158/98   Pulse:    Resp:    Temp:    SpO2:      Body mass index is 28.84 kg/m².      07/22/20  1119   Weight: 76.2 kg (168 lb)     /104 on my recheck    Physical Exam   Constitutional: She is oriented to person, place, and time. She appears well-developed and well-nourished. She is cooperative. She does not appear ill. No distress.   HENT:   Head: Normocephalic and atraumatic.   Eyes: Conjunctivae and EOM are normal. No scleral icterus. Right eye exhibits no nystagmus. Left  eye exhibits no nystagmus.   Neck: Phonation normal. Neck supple. Normal carotid pulses present. No thyroid mass present.   Cardiovascular: Normal rate, regular rhythm, normal heart sounds and intact distal pulses. Exam reveals no gallop.   No murmur heard.  Pulmonary/Chest: Effort normal and breath sounds normal.     Vascular Status -  Her right foot exhibits no edema. Her left foot exhibits no edema.  Lymphadenopathy:     She has no cervical adenopathy.   Neurological: She is alert and oriented to person, place, and time. She displays tremor (fine in both hands, right > left). No cranial nerve deficit. Gait normal.   Skin: Skin is warm and dry. No bruising and no rash noted. No cyanosis. Nails show no clubbing.   Psychiatric: Her speech is normal and behavior is normal. Judgment and thought content normal. Her mood appears anxious (mildly). Cognition and memory are normal. She exhibits a depressed mood (tearful when discussing terminal illness of her pet).   Good eye contact. Answers questions appropriately. Good personal hygiene and grooming.   Nursing note and vitals reviewed.      Assessment/Plan   Tatiana was seen today for discuss medications.    Diagnoses and all orders for this visit:    Uncontrolled hypertension  -     cloNIDine (CATAPRES) tablet 0.1 mg  -     TSH Rfx On Abnormal To Free T4    Excessive daytime sleepiness  -     modafinil (Provigil) 100 MG tablet; Take 1 tablet by mouth Daily.  -     TSH Rfx On Abnormal To Free T4    Recurrent major depression resistant to treatment (CMS/HCC)    Obstructive sleep apnea  -     modafinil (Provigil) 100 MG tablet; Take 1 tablet by mouth Daily.    Colonic inertia  -     Prucalopride Succinate (Motegrity) 2 MG tablet; Take 1 tablet by mouth Daily.    Chronic constipation  -     Prucalopride Succinate (Motegrity) 2 MG tablet; Take 1 tablet by mouth Daily.    Hormone replacement therapy (HRT)  -     Progesterone  -     Estradiol  -     Testosterone, Free,  Total    Postmenopausal disorder  -     Progesterone  -     Estradiol  -     Testosterone, Free, Total    Medication monitoring encounter  -     Progesterone  -     Estradiol  -     Testosterone, Free, Total    Elevated hemoglobin A1c  -     Hemoglobin A1c    Other orders  -     fluconazole (DIFLUCAN) 150 MG tablet; 1 po now, repeat 5 days       HTN poorly controlled. Most likely due to her emotional state this morning as well as forgetting to take her BP med. Given clonidine x 1 in office. BP below 160/100 prior to leaving office. No new focal neuro signs/symptoms. Encouraged to take me as rx'd. Increase to 20 mg daily. Pt advised to eat a heart healthy diet and get regular aerobic exercise. Continue close monitoring of HTN.    EDS despite adequate CPAP use. D/c ritalin, restart provigil. Up-titrate as indicated.    MDD with acute stress rxn. Continue prozac    Continue ambien CR and mirapex for mgnt fo chronic insomnia, RLSx.    We will try to get her motegrity PAd.    Routine f/u in 1 month for bp recheck, sooner as needed/instructed.  I will contact patient regarding test results and provide instructions regarding any necessary changes in plan of care.  Patient was encouraged to keep me informed of any acute changes, lack of improvement, or any new concerning symptoms.  Pt is aware of reasons to seek emergent care.  Patient voiced understanding of all instructions and denied further questions.

## 2020-07-25 LAB
ESTRADIOL SERPL-MCNC: 28.5 PG/ML
HBA1C MFR BLD: 5.7 % (ref 4.8–5.6)
PROGEST SERPL-MCNC: 0.1 NG/ML
TESTOST FREE SERPL-MCNC: <0.2 PG/ML (ref 0–4.2)
TESTOST SERPL-MCNC: <3 NG/DL (ref 3–41)
TSH SERPL DL<=0.005 MIU/L-ACNC: 2.9 UIU/ML (ref 0.27–4.2)

## 2020-07-27 DIAGNOSIS — F51.04 CHRONIC INSOMNIA: ICD-10-CM

## 2020-07-27 DIAGNOSIS — J34.3 HYPERTROPHY, NASAL, TURBINATE: ICD-10-CM

## 2020-07-27 RX ORDER — AMOXICILLIN 250 MG
1 CAPSULE ORAL 2 TIMES DAILY
Qty: 60 TABLET | Refills: 5 | Status: SHIPPED | OUTPATIENT
Start: 2020-07-27 | End: 2020-11-06 | Stop reason: SDUPTHER

## 2020-07-27 RX ORDER — FLUTICASONE PROPIONATE 50 MCG
2 SPRAY, SUSPENSION (ML) NASAL DAILY
Qty: 1 BOTTLE | Refills: 2 | Status: SHIPPED | OUTPATIENT
Start: 2020-07-27 | End: 2020-10-20

## 2020-07-28 ENCOUNTER — PATIENT MESSAGE (OUTPATIENT)
Dept: FAMILY MEDICINE CLINIC | Facility: CLINIC | Age: 55
End: 2020-07-28

## 2020-07-28 RX ORDER — SUMATRIPTAN 100 MG/1
TABLET, FILM COATED ORAL
Qty: 9 TABLET | Refills: 3 | Status: SHIPPED | OUTPATIENT
Start: 2020-07-28 | End: 2020-11-16 | Stop reason: SDUPTHER

## 2020-07-28 RX ORDER — ZOLPIDEM TARTRATE 12.5 MG/1
12.5 TABLET, FILM COATED, EXTENDED RELEASE ORAL NIGHTLY PRN
Qty: 30 TABLET | Refills: 0 | Status: SHIPPED | OUTPATIENT
Start: 2020-07-28 | End: 2020-09-14 | Stop reason: SDUPTHER

## 2020-07-28 NOTE — TELEPHONE ENCOUNTER
From: Tatiana Fields  To: Amber Mills MD  Sent: 7/28/2020 6:08 AM EDT  Subject: Prescription Question    Can I please get a refill on SUMAtriptan? Thanks

## 2020-07-29 ENCOUNTER — E-VISIT (OUTPATIENT)
Dept: FAMILY MEDICINE CLINIC | Facility: TELEHEALTH | Age: 55
End: 2020-07-29

## 2020-07-29 DIAGNOSIS — J01.91 ACUTE RECURRENT SINUSITIS, UNSPECIFIED LOCATION: Primary | ICD-10-CM

## 2020-07-29 PROCEDURE — 99422 OL DIG E/M SVC 11-20 MIN: CPT | Performed by: NURSE PRACTITIONER

## 2020-07-29 RX ORDER — CEFUROXIME AXETIL 500 MG/1
500 TABLET ORAL 2 TIMES DAILY
Qty: 20 TABLET | Refills: 0 | Status: SHIPPED | OUTPATIENT
Start: 2020-07-29 | End: 2020-08-08

## 2020-07-29 RX ORDER — METHYLPREDNISOLONE 4 MG/1
TABLET ORAL
Qty: 21 TABLET | Refills: 0 | Status: SHIPPED | OUTPATIENT
Start: 2020-07-29 | End: 2020-08-05

## 2020-07-29 NOTE — PROGRESS NOTES
Tatiana Fields    1965  [id number]    I have reviewed the e-Visit questionnaire and patient's answers, my assessment and plan are as follows:    Questionnaire:   --symptoms: none  --exposure to flu or COVID-19 in past 14 days:  no  --sudden loss of taste or smell:  no      HPI: Tatiana Fields is a 53 yo female who complains of sinusitis x 1-2 weeks. She has tried over the counter medications as well as azithromycin. She feels Ceftin and steroids usually helps her       Review of Systems - ENT ROS: positive for - headaches, nasal discharge, sinus pain, sore throat and temperature < 100.4.   negative for - cough reported       Diagnoses and all orders for this visit:    Acute recurrent sinusitis, unspecified location    Other orders  -     cefuroxime (Ceftin) 500 MG tablet; Take 1 tablet by mouth 2 (Two) Times a Day for 10 days.  -     methylPREDNISolone (MEDROL, OMKAR,) 4 MG tablet; Take as directed on package instructions.        Any medications prescribed have been sent electronically to   [facility] - [place], [place] - [address] - [phone]  - [phone] FX  [address] [id number]  Phone: [phone] Fax: [phone]    [id number] - [place], [place] - [address] - [phone]  - [phone] FX  [address]  [facility] [address]  Phone: [phone] Fax: [phone]    [id number] - [place], [place] - [address] AT [facility] [address] [facility] [address]  - [phone]  - [phone] FX  [address]  [facility] [address]  Phone: [phone] Fax: [phone]    [id number] - [place], [place] - [address] [facility] - [phone]  - [phone] FX  [address] [facility] [address]  Phone: [phone] Fax: [phone]      Time Documentation  Counseled patient  Counseling topics: diagnosis, treatment options, follow up plan and return instructions  Total encounter time: counseling time more  than 50% of visit: 11 minutes        Adwoa Hartman, SOULEYMANE  07/29/20  5:33 PM

## 2020-07-29 NOTE — PATIENT INSTRUCTIONS
Drink plenty of fluids  Acetaminophen (Tylenol) or ibuprofen for fever or pain   If symptoms persist despite treatment follow up with your primary care provider or urgent care.           Sinusitis, Adult  Sinusitis is inflammation of your sinuses. Sinuses are hollow spaces in the bones around your face. Your sinuses are located:  · Around your eyes.  · In the middle of your forehead.  · Behind your nose.  · In your cheekbones.  Mucus normally drains out of your sinuses. When your nasal tissues become inflamed or swollen, mucus can become trapped or blocked. This allows bacteria, viruses, and fungi to grow, which leads to infection. Most infections of the sinuses are caused by a virus.  Sinusitis can develop quickly. It can last for up to 4 weeks (acute) or for more than 12 weeks (chronic). Sinusitis often develops after a cold.  What are the causes?  This condition is caused by anything that creates swelling in the sinuses or stops mucus from draining. This includes:  · Allergies.  · Asthma.  · Infection from bacteria or viruses.  · Deformities or blockages in your nose or sinuses.  · Abnormal growths in the nose (nasal polyps).  · Pollutants, such as chemicals or irritants in the air.  · Infection from fungi (rare).  What increases the risk?  You are more likely to develop this condition if you:  · Have a weak body defense system (immune system).  · Do a lot of swimming or diving.  · Overuse nasal sprays.  · Smoke.  What are the signs or symptoms?  The main symptoms of this condition are pain and a feeling of pressure around the affected sinuses. Other symptoms include:  · Stuffy nose or congestion.  · Thick drainage from your nose.  · Swelling and warmth over the affected sinuses.  · Headache.  · Upper toothache.  · A cough that may get worse at night.  · Extra mucus that collects in the throat or the back of the nose (postnasal drip).  · Decreased sense of smell and taste.  · Fatigue.  · A fever.  · Sore  throat.  · Bad breath.  How is this diagnosed?  This condition is diagnosed based on:  · Your symptoms.  · Your medical history.  · A physical exam.  · Tests to find out if your condition is acute or chronic. This may include:  ? Checking your nose for nasal polyps.  ? Viewing your sinuses using a device that has a light (endoscope).  ? Testing for allergies or bacteria.  ? Imaging tests, such as an MRI or CT scan.  In rare cases, a bone biopsy may be done to rule out more serious types of fungal sinus disease.  How is this treated?  Treatment for sinusitis depends on the cause and whether your condition is chronic or acute.  · If caused by a virus, your symptoms should go away on their own within 10 days. You may be given medicines to relieve symptoms. They include:  ? Medicines that shrink swollen nasal passages (topical intranasal decongestants).  ? Medicines that treat allergies (antihistamines).  ? A spray that eases inflammation of the nostrils (topical intranasal corticosteroids).  ? Rinses that help get rid of thick mucus in your nose (nasal saline washes).  · If caused by bacteria, your health care provider may recommend waiting to see if your symptoms improve. Most bacterial infections will get better without antibiotic medicine. You may be given antibiotics if you have:  ? A severe infection.  ? A weak immune system.  · If caused by narrow nasal passages or nasal polyps, you may need to have surgery.  Follow these instructions at home:  Medicines  · Take, use, or apply over-the-counter and prescription medicines only as told by your health care provider. These may include nasal sprays.  · If you were prescribed an antibiotic medicine, take it as told by your health care provider. Do not stop taking the antibiotic even if you start to feel better.  Hydrate and humidify    · Drink enough fluid to keep your urine pale yellow. Staying hydrated will help to thin your mucus.  · Use a cool mist humidifier to  keep the humidity level in your home above 50%.  · Inhale steam for 10-15 minutes, 3-4 times a day, or as told by your health care provider. You can do this in the bathroom while a hot shower is running.  · Limit your exposure to cool or dry air.  Rest  · Rest as much as possible.  · Sleep with your head raised (elevated).  · Make sure you get enough sleep each night.  General instructions    · Apply a warm, moist washcloth to your face 3-4 times a day or as told by your health care provider. This will help with discomfort.  · Wash your hands often with soap and water to reduce your exposure to germs. If soap and water are not available, use hand .  · Do not smoke. Avoid being around people who are smoking (secondhand smoke).  · Keep all follow-up visits as told by your health care provider. This is important.  Contact a health care provider if:  · You have a fever.  · Your symptoms get worse.  · Your symptoms do not improve within 10 days.  Get help right away if:  · You have a severe headache.  · You have persistent vomiting.  · You have severe pain or swelling around your face or eyes.  · You have vision problems.  · You develop confusion.  · Your neck is stiff.  · You have trouble breathing.  Summary  · Sinusitis is soreness and inflammation of your sinuses. Sinuses are hollow spaces in the bones around your face.  · This condition is caused by nasal tissues that become inflamed or swollen. The swelling traps or blocks the flow of mucus. This allows bacteria, viruses, and fungi to grow, which leads to infection.  · If you were prescribed an antibiotic medicine, take it as told by your health care provider. Do not stop taking the antibiotic even if you start to feel better.  · Keep all follow-up visits as told by your health care provider. This is important.  This information is not intended to replace advice given to you by your health care provider. Make sure you discuss any questions you have with  your health care provider.  Document Released: 12/18/2006 Document Revised: 05/20/2019 Document Reviewed: 05/20/2019  Elsevier Patient Education © 2020 [employer]

## 2020-08-05 ENCOUNTER — TELEMEDICINE (OUTPATIENT)
Dept: FAMILY MEDICINE CLINIC | Facility: CLINIC | Age: 55
End: 2020-08-05

## 2020-08-05 DIAGNOSIS — I10 ESSENTIAL HYPERTENSION: Primary | ICD-10-CM

## 2020-08-05 DIAGNOSIS — Z51.81 MEDICATION MONITORING ENCOUNTER: ICD-10-CM

## 2020-08-05 DIAGNOSIS — G47.33 OBSTRUCTIVE SLEEP APNEA: ICD-10-CM

## 2020-08-05 DIAGNOSIS — G47.19 EXCESSIVE DAYTIME SLEEPINESS: ICD-10-CM

## 2020-08-05 DIAGNOSIS — N95.1 POSTMENOPAUSAL DISORDER: ICD-10-CM

## 2020-08-05 DIAGNOSIS — Z79.890 HORMONE REPLACEMENT THERAPY (HRT): ICD-10-CM

## 2020-08-05 PROCEDURE — 99214 OFFICE O/P EST MOD 30 MIN: CPT | Performed by: FAMILY MEDICINE

## 2020-08-05 RX ORDER — MODAFINIL 200 MG/1
200 TABLET ORAL DAILY
Qty: 30 TABLET | Refills: 1 | Status: SHIPPED | OUTPATIENT
Start: 2020-08-05 | End: 2020-09-15 | Stop reason: SDUPTHER

## 2020-08-05 NOTE — PROGRESS NOTES
"TELEHEALTH/VIDEO  08/05/2020            TIERNEY FINE  1965    In order to limit face-to-face contact and enact \"social distancing\" in light of the COVID-19 outbreaks and in accordance to the recommendations per the CDC, WHO, and our individual department, she was contacted by telephone.  Telephone visit was used to screen the patient for needs and conduct her apt.          History of Present Illness   Mrs. Fine presents today for follow-up on several chronic issues.    She has hypertension, currently taking lisinopril as prescribed.  Blood pressure noted to be markedly elevated at her last office visit felt to be due to significant emotional distress.  She has been provided with clonidine for as needed use but has not required it in the interim.  Blood pressure remained in the 140s or lower.    She has postmenopausal disorder associated with mood changes, decreased sleep quality, hot flashes etc.  Previously prescribed Estratest per her gynecologist.  Discontinued due to cost.  Follow-up labs showed significantly low testosterone.  She wishes to discuss possible supplementation.  She has no longer taking progesterone as prescribed per GYN.    She is chronic severe depression, obstructive sleep apnea with persistent excessive daytime sleepiness despite adequate use of CPAP.  For that reason she has been treated with Provigil.  Having some initial response to 100 mg dose.  Would like to uptitrate if possible.  Denies side effects.  She is taking her Prozac as prescribed.  Depression recently worsened due to terminal illness of her beloved pet.      Patient's past medical hx, surgical hx, family hx, social hx reviewed on chart. Medication and allergy lists reviewed on chart. Information updated as indicated.      Review of Systems   Constitutional: Positive for fatigue. Negative for fever.   HENT: Positive for congestion and postnasal drip. Negative for ear pain, hearing loss, nosebleeds, sneezing, sore throat " and trouble swallowing.    Eyes: Positive for visual disturbance (chronic). Negative for pain.        Dry eyes   Respiratory: Negative for cough, shortness of breath and wheezing.    Cardiovascular: Positive for palpitations (chronic, intermittent, stable). Negative for chest pain and leg swelling.   Gastrointestinal: Positive for abdominal pain (chronic), constipation and nausea. Negative for blood in stool, diarrhea and vomiting.   Endocrine: Positive for heat intolerance. Negative for polydipsia and polyuria.   Genitourinary: Positive for pelvic pain (chronic). Negative for dysuria and hematuria.   Musculoskeletal: Positive for arthralgias, myalgias, neck pain and neck stiffness. Negative for back pain and joint swelling.   Skin: Negative for rash and wound.   Neurological: Positive for dizziness, tremors, weakness and headaches. Negative for seizures, syncope and speech difficulty.   Hematological: Negative for adenopathy. Bruises/bleeds easily.   Psychiatric/Behavioral: Positive for decreased concentration, dysphoric mood and sleep disturbance. Negative for confusion and suicidal ideas. The patient is nervous/anxious.    Pt's previous ROS reviewed and updated as indicated.         Physical Exam   Constitutional: She is oriented to person, place, and time.   Neurological: She is alert and oriented to person, place, and time.   Psychiatric: Her speech is normal. Thought content normal. Her mood appears anxious. Cognition and memory are normal. She exhibits a depressed mood.   Unable to perform further physical exam due to loss of video feed.    Lab Results   Component Value Date    WBC 8.10 10/16/2019    HGB 12.8 10/16/2019    HCT 38.1 10/16/2019    MCV 88.8 10/16/2019     10/16/2019       Lab Results   Component Value Date    GLUCOSE 92 01/04/2014    BUN 12 10/16/2019    CREATININE 0.79 10/16/2019    EGFRIFNONA 76 10/16/2019    EGFRIFAFRI 92 10/16/2019    BCR 15.2 10/16/2019    K 3.6 10/16/2019    CO2  28.4 10/16/2019    CALCIUM 9.2 10/16/2019    PROTENTOTREF 6.5 10/16/2019    ALBUMIN 4.50 10/16/2019    LABIL2 2.3 10/16/2019    AST 30 10/16/2019    ALT 27 10/16/2019       Lab Results   Component Value Date    CHLPL 180 10/16/2019    TRIG 88 10/16/2019    HDL 55 10/16/2019     (H) 10/16/2019       Lab Results   Component Value Date    HGBA1C 5.70 (H) 07/22/2020       Lab Results   Component Value Date    TSH 2.900 07/22/2020       Lab Results   Component Value Date    YRWHIMDJ89 1,021 (H) 10/16/2019         Diagnoses and all orders for this visit:    Essential hypertension    Hormone replacement therapy (HRT)    Excessive daytime sleepiness  -     modafinil (PROVIGIL) 200 MG tablet; Take 1 tablet by mouth Daily.    Postmenopausal disorder    Obstructive sleep apnea  -     modafinil (PROVIGIL) 200 MG tablet; Take 1 tablet by mouth Daily.    Medication monitoring encounter      Hypertension with improving control.  Continue lisinopril.  She has clonidine on hand to be used as needed.    I discussed her/benefits and potential side effects of testosterone supplementation in women and its off label FDA status.  She voices understanding wish to proceed with testosterone supplementation.  She will contact her local compounding pharmacy to discuss options/cost.    Excessive daytime sleepiness despite adequate treatment of DANE with CPAP.  Trial of modafinil 200 mg daily.    Patient will f/u in 4-6 weeks, sooner as needed.  Patient was encouraged to keep me informed of any acute changes, lack of improvement, or any new concerning symptoms.  Pt is aware of reasons to seek emergent care.  Patient voiced understanding of all instructions and denied further questions.  As part of patient's treatment plan I am prescribing a controlled substance.  The patient has been made aware of the appropriate use of such medications, including potential risk of somnolence, limited ability to drive and/or work safely, and potential for  dependence and/or overdose.  It has also been made clear that these medications are for use by this patient only, without concomitant use of alcohol or other substances, unless prescribed.  KIMBERLY report reviewed and scanned into chart.  Last KIMBERLY date 8/4/2020.  History and physical exam exhibit continued safe and appropriate use of controlled substance.            Please note that portions of this note may have been completed with a voice recognition program. Efforts were made to edit the dictations, but occasionally words are mistranscribed.          Unable to complete visit using a video connection to the patient. A phone visit was used to complete this visits. Total time of discussion was 12 minutes.

## 2020-08-10 ENCOUNTER — PRIOR AUTHORIZATION (OUTPATIENT)
Dept: FAMILY MEDICINE CLINIC | Facility: CLINIC | Age: 55
End: 2020-08-10

## 2020-08-10 DIAGNOSIS — M47.812 CERVICAL ARTHRITIS: ICD-10-CM

## 2020-08-10 DIAGNOSIS — F43.10 PTSD (POST-TRAUMATIC STRESS DISORDER): ICD-10-CM

## 2020-08-10 RX ORDER — DIAZEPAM 2 MG/1
2 TABLET ORAL EVERY 8 HOURS PRN
Qty: 30 TABLET | Refills: 2 | Status: CANCELLED | OUTPATIENT
Start: 2020-08-10

## 2020-08-10 RX ORDER — LISINOPRIL 10 MG/1
20 TABLET ORAL DAILY
Qty: 60 TABLET | Refills: 2 | Status: SHIPPED | OUTPATIENT
Start: 2020-08-10 | End: 2020-11-05

## 2020-08-10 RX ORDER — OMEPRAZOLE 40 MG/1
40 CAPSULE, DELAYED RELEASE ORAL DAILY
Qty: 30 CAPSULE | Refills: 5 | Status: SHIPPED | OUTPATIENT
Start: 2020-08-10 | End: 2020-11-16 | Stop reason: SDUPTHER

## 2020-08-11 DIAGNOSIS — F43.10 PTSD (POST-TRAUMATIC STRESS DISORDER): ICD-10-CM

## 2020-08-11 DIAGNOSIS — M47.812 CERVICAL ARTHRITIS: ICD-10-CM

## 2020-08-12 ENCOUNTER — E-VISIT (OUTPATIENT)
Dept: FAMILY MEDICINE CLINIC | Facility: TELEHEALTH | Age: 55
End: 2020-08-12

## 2020-08-12 RX ORDER — DIAZEPAM 2 MG/1
2 TABLET ORAL EVERY 12 HOURS PRN
Qty: 60 TABLET | Refills: 0 | Status: SHIPPED | OUTPATIENT
Start: 2020-08-12 | End: 2020-09-14 | Stop reason: SDUPTHER

## 2020-08-12 NOTE — TELEPHONE ENCOUNTER
Ok to fill?    Updated david has been printed.    Last fill date was 7/22/2020 for a 10 day supply

## 2020-09-14 ENCOUNTER — E-VISIT (OUTPATIENT)
Dept: FAMILY MEDICINE CLINIC | Facility: TELEHEALTH | Age: 55
End: 2020-09-14

## 2020-09-14 DIAGNOSIS — F51.04 CHRONIC INSOMNIA: ICD-10-CM

## 2020-09-14 DIAGNOSIS — K59.9 COLONIC INERTIA: ICD-10-CM

## 2020-09-14 DIAGNOSIS — M47.812 CERVICAL ARTHRITIS: ICD-10-CM

## 2020-09-14 DIAGNOSIS — K59.09 CHRONIC CONSTIPATION: ICD-10-CM

## 2020-09-14 DIAGNOSIS — J01.41 ACUTE RECURRENT PANSINUSITIS: Primary | ICD-10-CM

## 2020-09-14 DIAGNOSIS — F43.10 PTSD (POST-TRAUMATIC STRESS DISORDER): ICD-10-CM

## 2020-09-14 PROCEDURE — 99421 OL DIG E/M SVC 5-10 MIN: CPT | Performed by: NURSE PRACTITIONER

## 2020-09-14 RX ORDER — PRUCALOPRIDE 2 MG/1
1 TABLET, FILM COATED ORAL DAILY
Qty: 30 TABLET | Refills: 5 | Status: SHIPPED | OUTPATIENT
Start: 2020-09-14 | End: 2020-11-24

## 2020-09-14 RX ORDER — ZOLPIDEM TARTRATE 12.5 MG/1
12.5 TABLET, FILM COATED, EXTENDED RELEASE ORAL NIGHTLY PRN
Qty: 30 TABLET | Refills: 0 | Status: SHIPPED | OUTPATIENT
Start: 2020-09-14 | End: 2020-11-16 | Stop reason: SDUPTHER

## 2020-09-14 RX ORDER — PRAMIPEXOLE DIHYDROCHLORIDE 1 MG/1
1 TABLET ORAL
Qty: 30 TABLET | Refills: 2 | Status: SHIPPED | OUTPATIENT
Start: 2020-09-14 | End: 2020-10-20

## 2020-09-14 RX ORDER — FLUCONAZOLE 150 MG/1
TABLET ORAL
Qty: 3 TABLET | Refills: 0 | Status: SHIPPED | OUTPATIENT
Start: 2020-09-14 | End: 2020-10-20

## 2020-09-14 RX ORDER — AMOXICILLIN AND CLAVULANATE POTASSIUM 875; 125 MG/1; MG/1
1 TABLET, FILM COATED ORAL 2 TIMES DAILY
Qty: 20 TABLET | Refills: 0 | Status: SHIPPED | OUTPATIENT
Start: 2020-09-14 | End: 2020-09-24

## 2020-09-14 RX ORDER — ONDANSETRON 4 MG/1
4 TABLET, FILM COATED ORAL EVERY 8 HOURS PRN
Qty: 30 TABLET | Refills: 5 | Status: SHIPPED | OUTPATIENT
Start: 2020-09-14 | End: 2020-11-16 | Stop reason: SDUPTHER

## 2020-09-14 RX ORDER — DIAZEPAM 2 MG/1
2 TABLET ORAL EVERY 12 HOURS PRN
Qty: 60 TABLET | Refills: 0 | Status: SHIPPED | OUTPATIENT
Start: 2020-09-14 | End: 2020-10-21 | Stop reason: SDUPTHER

## 2020-09-14 RX ORDER — PREDNISONE 20 MG/1
TABLET ORAL
Qty: 20 TABLET | Refills: 0 | Status: SHIPPED | OUTPATIENT
Start: 2020-09-14 | End: 2020-10-20

## 2020-09-14 NOTE — TELEPHONE ENCOUNTER
KIMBERLY reviewed. CSA needs to be updated for diazepam, zolpidem and provigil. Refill approved and printed for . Pt to have UDS at time of . Please document timing of last dose of each controlled medication. Pt to keep f/u apt as scheduled.

## 2020-09-14 NOTE — PROGRESS NOTES
Tatiana Fields    1965  [id number]    I have reviewed the e-Visit questionnaire and patient's answers, my assessment and plan are as follows:    CC: sinus problem    SUBJECTIVE    HPI 55 yo patient presents with nasal congestion, sore throat, headache and a low grade fever. She reports that her nasal drainage is green/yellow. She is fatigued. She has a history of chronic sinus infections. She reports that usually antibiotiics ands steroids help with this.problem. She reports having thse symptoms for a couple of months but they are worsening.    Review of Systems    Constitutional  Fever  Fatigue    HENT  Nasal congestion and discharge-yellow/green, bloody at times, nasal passages red and swollen  Sinus pain   Sinus pressure  Sore throat    Respiratory  No cough  No shortness of breath  No wheezing    Cardio  No chest pain reported     GI  No nausea reported  No vomiting reported  No diarrhea reported    Musculoskeletal  No myalgia reported    Neuro  Headache    OBJECTIVE   n/a evisit    Diagnoses and all orders for this visit:    Acute recurrent pansinusitis    Other orders  -     amoxicillin-clavulanate (Augmentin) 875-125 MG per tablet; Take 1 tablet by mouth 2 (Two) Times a Day for 10 days.  -     fluconazole (Diflucan) 150 MG tablet; 150 mg tabs on days #1, 4 and 7 post antibiotics  -     predniSONE (DELTASONE) 20 MG tablet; Prednisone 20mg tabs, 3 for 3 days, 2 for 3 days, 1 for 3 days, 1/2 for 3 days take with food or milk    Take Mucinex 600mg bid with plenty of water for thin secretions  Probiotics for two weeks related to taking antibiotics. The pharmacist can help you with this if needed. Do not take within two hours of antibiotic.  Follow up with PCP or Urgent Care if no improvement in symptoms    if at any time you experiences fever, cough AND/OR shortness of breath you have been advised to go to nearest urgent care or emergency department for evaluation AND/OR testing      Any medications  prescribed have been sent electronically to   [facility] - [place], [place] - [address] [facility] - [phone] PH - [phone] FX  [address] [facility] [address]  Phone: [phone] Fax: [phone]    [id number] - [place], [place] - [address] [facility] [address] - [phone] PH - [phone] FX  [address] [facility] [address]  [facility] [address]  Phone: [phone] Fax: [phone]    [id number] - [place], [place] - [address] AT [facility] OF [facility] & [address]  - [phone] PH - [phone] FX  [address]  [facility] [address]  Phone: [phone] Fax: [phone]    [id number] - [place], [place] - [address] [facility] - [phone] [facility] [phone] FX  [address] [facility] [address]  Phone: [phone] Fax: [phone]        Keisha Magana, SOULEYMANE  09/14/20  16:19 EDT

## 2020-09-15 DIAGNOSIS — G47.33 OBSTRUCTIVE SLEEP APNEA: ICD-10-CM

## 2020-09-15 DIAGNOSIS — G47.19 EXCESSIVE DAYTIME SLEEPINESS: ICD-10-CM

## 2020-09-15 RX ORDER — MODAFINIL 200 MG/1
200 TABLET ORAL DAILY
Qty: 30 TABLET | Refills: 0 | Status: SHIPPED | OUTPATIENT
Start: 2020-09-15 | End: 2020-11-10

## 2020-09-15 NOTE — TELEPHONE ENCOUNTER
KIMBERLY reviewed. CSA needs to be updated. Refill approved and printed for . Pt to have UDS at time of . Please document timing of last dose of each controlled medication. Pt to keep f/u apt as scheduled.

## 2020-09-16 ENCOUNTER — TELEPHONE (OUTPATIENT)
Dept: FAMILY MEDICINE CLINIC | Facility: CLINIC | Age: 55
End: 2020-09-16

## 2020-09-16 NOTE — TELEPHONE ENCOUNTER
Patient called the office @ 5:59 wanting to sign papers. She said did a UDS and signed a control substance @ 1-2 months ago @ Dr Malik Faria office in [place]. She wants to know if we can use those so she doesn't have to drive back down here?

## 2020-09-17 NOTE — TELEPHONE ENCOUNTER
Patient advised she will have to come here to sign as each practice/facility has to have this. Patient verbalized understanding and will try to come this afternoon.

## 2020-10-01 ENCOUNTER — TELEMEDICINE (OUTPATIENT)
Dept: FAMILY MEDICINE CLINIC | Facility: TELEHEALTH | Age: 55
End: 2020-10-01

## 2020-10-01 VITALS
HEIGHT: 64 IN | WEIGHT: 167 LBS | DIASTOLIC BLOOD PRESSURE: 89 MMHG | BODY MASS INDEX: 28.51 KG/M2 | SYSTOLIC BLOOD PRESSURE: 140 MMHG

## 2020-10-01 DIAGNOSIS — N39.0 URINARY TRACT INFECTION WITHOUT HEMATURIA, SITE UNSPECIFIED: Primary | ICD-10-CM

## 2020-10-01 PROCEDURE — 99213 OFFICE O/P EST LOW 20 MIN: CPT | Performed by: NURSE PRACTITIONER

## 2020-10-01 RX ORDER — FLUCONAZOLE 150 MG/1
TABLET ORAL
Qty: 3 TABLET | Refills: 0 | Status: SHIPPED | OUTPATIENT
Start: 2020-10-01 | End: 2020-10-20

## 2020-10-01 RX ORDER — NITROFURANTOIN 25; 75 MG/1; MG/1
100 CAPSULE ORAL 2 TIMES DAILY
Qty: 10 CAPSULE | Refills: 0 | Status: SHIPPED | OUTPATIENT
Start: 2020-10-01 | End: 2020-10-20

## 2020-10-01 NOTE — PATIENT INSTRUCTIONS
Urinary Tract Infection, Adult    A urinary tract infection (UTI) is an infection of any part of the urinary tract. The urinary tract includes the kidneys, ureters, bladder, and urethra. These organs make, store, and get rid of urine in the body.  Your health care provider may use other names to describe the infection. An upper UTI affects the ureters and kidneys (pyelonephritis). A lower UTI affects the bladder (cystitis) and urethra (urethritis).  What are the causes?  Most urinary tract infections are caused by bacteria in your genital area, around the entrance to your urinary tract (urethra). These bacteria grow and cause inflammation of your urinary tract.  What increases the risk?  You are more likely to develop this condition if:  · You have a urinary catheter that stays in place (indwelling).  · You are not able to control when you urinate or have a bowel movement (you have incontinence).  · You are female and you:  ? Use a spermicide or diaphragm for birth control.  ? Have low estrogen levels.  ? Are pregnant.  · You have certain genes that increase your risk (genetics).  · You are sexually active.  · You take antibiotic medicines.  · You have a condition that causes your flow of urine to slow down, such as:  ? An enlarged prostate, if you are male.  ? Blockage in your urethra (stricture).  ? A kidney stone.  ? A nerve condition that affects your bladder control (neurogenic bladder).  ? Not getting enough to drink, or not urinating often.  · You have certain medical conditions, such as:  ? Diabetes.  ? A weak disease-fighting system (immunesystem).  ? Sickle cell disease.  ? Gout.  ? Spinal cord injury.  What are the signs or symptoms?  Symptoms of this condition include:  · Needing to urinate right away (urgently).  · Frequent urination or passing small amounts of urine frequently.  · Pain or burning with urination.  · Blood in the urine.  · Urine that smells bad or unusual.  · Trouble urinating.  · Cloudy  urine.  · Vaginal discharge, if you are female.  · Pain in the abdomen or the lower back.  You may also have:  · Vomiting or a decreased appetite.  · Confusion.  · Irritability or tiredness.  · A fever.  · Diarrhea.  The first symptom in older adults may be confusion. In some cases, they may not have any symptoms until the infection has worsened.  How is this diagnosed?  This condition is diagnosed based on your medical history and a physical exam. You may also have other tests, including:  · Urine tests.  · Blood tests.  · Tests for sexually transmitted infections (STIs).  If you have had more than one UTI, a cystoscopy or imaging studies may be done to determine the cause of the infections.  How is this treated?  Treatment for this condition includes:  · Antibiotic medicine.  · Over-the-counter medicines to treat discomfort.  · Drinking enough water to stay hydrated.  If you have frequent infections or have other conditions such as a kidney stone, you may need to see a health care provider who specializes in the urinary tract (urologist).  In rare cases, urinary tract infections can cause sepsis. Sepsis is a life-threatening condition that occurs when the body responds to an infection. Sepsis is treated in the hospital with IV antibiotics, fluids, and other medicines.  Follow these instructions at home:    Medicines  · Take over-the-counter and prescription medicines only as told by your health care provider.  · If you were prescribed an antibiotic medicine, take it as told by your health care provider. Do not stop using the antibiotic even if you start to feel better.  General instructions  · Make sure you:  ? Empty your bladder often and completely. Do not hold urine for long periods of time.  ? Empty your bladder after sex.  ? Wipe from front to back after a bowel movement if you are female. Use each tissue one time when you wipe.  · Drink enough fluid to keep your urine pale yellow.  · Keep all follow-up  visits as told by your health care provider. This is important.  Contact a health care provider if:  · Your symptoms do not get better after 1-2 days.  · Your symptoms go away and then return.  Get help right away if you have:  · Severe pain in your back or your lower abdomen.  · A fever.  · Nausea or vomiting.  Summary  · A urinary tract infection (UTI) is an infection of any part of the urinary tract, which includes the kidneys, ureters, bladder, and urethra.  · Most urinary tract infections are caused by bacteria in your genital area, around the entrance to your urinary tract (urethra).  · Treatment for this condition often includes antibiotic medicines.  · If you were prescribed an antibiotic medicine, take it as told by your health care provider. Do not stop using the antibiotic even if you start to feel better.  · Keep all follow-up visits as told by your health care provider. This is important.  This information is not intended to replace advice given to you by your health care provider. Make sure you discuss any questions you have with your health care provider.  Document Released: 09/27/2006 Document Revised: 12/05/2019 Document Reviewed: 06/27/2019  [employer] Patient Education © 2020 [employer]

## 2020-10-01 NOTE — PROGRESS NOTES
CHIEF COMPLAINT  Chief Complaint   Patient presents with   • Urinary Tract Infection         HPI  Tatiana Fields is a 54 y.o. female  presents with complaint of urinary frequency, urgency and discomfort at the end of urine stream. She has had urinary tract infections in the past and this is how they felt. She has been taking baths and is wondering if this is what has caused this.She also reports having a new sexual partner after not having sex for 13 years. She called her ob/gyn but the appointment is not until October 20th.    Review of Systems   Constitutional: Negative for fever.   HENT: Negative for congestion and sore throat.    Respiratory: Negative for cough, shortness of breath and wheezing.    Cardiovascular: Negative for chest pain.   Gastrointestinal: Negative for diarrhea, nausea and vomiting.   Genitourinary: Positive for flank pain, frequency and urgency. Negative for dysuria, genital sores and vaginal discharge.   Musculoskeletal: Negative for myalgias.   Skin: Negative for pallor.       Past Medical History:   Diagnosis Date   • Anemia    • Arthritis    • Cervical nerve root compression    • Cervical spinal stenosis    • Chronic fatigue    • Cluster headache 2012   • DDD (degenerative disc disease), lumbar    • Depression    • Endometriosis    • Essential tremor    • Fibromyalgia, primary 2013   • Headache, tension-type 2013   • HL (hearing loss) 2013   • Hyperlipidemia    • Hypertension    • Irritable bowel syndrome    • Lyme disease 2012    tx'd with prolonged course abx   • Memory loss    • Migraine 2013   • Narcolepsy     listed in medical records, tx'd with provigil   • Obstructive sleep apnea    • Ovarian cyst    • Shingles 2/15/2013   • Sicca syndrome (CMS/HCC)    • Vitamin D deficiency        Family History   Problem Relation Age of Onset   • Inflammatory bowel disease Mother    • Hypertension Mother    • Heart disease Mother    • Arthritis Mother    • Hyperlipidemia Mother    • Diabetes  "Mother    • Migraines Mother    • Neuropathy Mother    • Stroke Mother    • Hypertension Father    • Heart attack Father    • Hyperlipidemia Father    • Cancer Sister    • Bone cancer Sister    • Colon cancer Other    • Hyperlipidemia Brother    • Colon cancer Brother    • Diabetes Maternal Grandmother    • Diabetes Paternal Grandmother    • Neuropathy Paternal Grandmother    • Stroke Paternal Grandmother    • Stroke Maternal Aunt        Social History     Socioeconomic History   • Marital status:      Spouse name: Not on file   • Number of children: Not on file   • Years of education: Not on file   • Highest education level: Not on file   Tobacco Use   • Smoking status: Never Smoker   • Smokeless tobacco: Never Used   Substance and Sexual Activity   • Alcohol use: No   • Drug use: No   • Sexual activity: Not Currently     Birth control/protection: Surgical         /89 Comment: befor her daily bp meds  Ht 162.6 cm (64\")   Wt 75.8 kg (167 lb)   LMP  (LMP Unknown)   BMI 28.67 kg/m²     PHYSICAL EXAM  Physical Exam   Constitutional: She appears well-developed and well-nourished.   HENT:   Head: Normocephalic and atraumatic.   Right Ear: Hearing and external ear normal.   Left Ear: Hearing and external ear normal.   Nose: Congestion present.   Mouth/Throat: Mouth/Lips are normal.  Eyes: Lids are normal. Right conjunctiva is not injected. Left conjunctiva is not injected.   Pulmonary/Chest: No accessory muscle usage. No tachypnea and no bradypnea.  No respiratory distress.No use of oxygen by nasal cannulaNo use of oxygen by mask noted.  Abdominal: There is abdominal tenderness in the suprapubic area. There is no CVA tenderness.   Neurological: She is alert. No cranial nerve deficit.   Skin: Her skin appears normal.  Psychiatric: She has a normal mood and affect. Her speech is normal and behavior is normal. Judgment and thought content normal.       Results for orders placed or performed in visit on " 07/22/20   Progesterone    Specimen: Blood   Result Value Ref Range    Progesterone 0.1 ng/mL   Estradiol    Specimen: Blood   Result Value Ref Range    Estradiol 28.5 pg/mL   Testosterone, Free, Total    Specimen: Blood   Result Value Ref Range    Testosterone, Total <3 (L) 3 - 41 ng/dL    Testosterone, Free <0.2 0.0 - 4.2 pg/mL   TSH Rfx On Abnormal To Free T4    Specimen: Blood   Result Value Ref Range    TSH 2.900 0.270 - 4.200 uIU/mL   Hemoglobin A1c    Specimen: Blood   Result Value Ref Range    Hemoglobin A1C 5.70 (H) 4.80 - 5.60 %       Tatiana was seen today for urinary tract infection.    Diagnoses and all orders for this visit:    Urinary tract infection without hematuria, site unspecified    Other orders  -     nitrofurantoin, macrocrystal-monohydrate, (MACROBID) 100 MG capsule; Take 1 capsule by mouth 2 (Two) Times a Day.  -     fluconazole (Diflucan) 150 MG tablet; 150 mg tabs on days #1, 4 and 7 post antibiotics    Probiotics for two weeks related to taking antibiotics. The pharmacist can help you with this if needed.  Drink plenty of of clear decaffeinated fluids  If no relief of symptoms follow up with PCP or UC for further assessment.    if at any time you experiences fever, worsening cough AND/OR shortness of breath you have been advised to go to nearest urgent care or emergency department for evaluation AND/OR testing    Follow up   If no improvement or worsening of symptoms follow up PCP or Urgent Care for a higher level of in person evaluation.  Keep appointment with ob/gyn fpr 10/20/2020    Patient verbalizes understanding of medication dosage, comfort measures, instructions for treatment and follow-up.    SOULEYMANE Camacho  10/01/2020  18:44 EDT    This visit was performed via Telehealth.  This patient has been instructed to follow-up with their primary care provider if their symptoms worsen or the treatment provided does not resolve their illness.

## 2020-10-19 DIAGNOSIS — M47.812 CERVICAL ARTHRITIS: ICD-10-CM

## 2020-10-19 DIAGNOSIS — F43.10 PTSD (POST-TRAUMATIC STRESS DISORDER): ICD-10-CM

## 2020-10-20 ENCOUNTER — TELEMEDICINE (OUTPATIENT)
Dept: FAMILY MEDICINE CLINIC | Facility: TELEHEALTH | Age: 55
End: 2020-10-20

## 2020-10-20 DIAGNOSIS — J34.3 HYPERTROPHY, NASAL, TURBINATE: ICD-10-CM

## 2020-10-20 DIAGNOSIS — J01.40 ACUTE NON-RECURRENT PANSINUSITIS: Primary | ICD-10-CM

## 2020-10-20 PROCEDURE — 99213 OFFICE O/P EST LOW 20 MIN: CPT | Performed by: NURSE PRACTITIONER

## 2020-10-20 RX ORDER — FLUTICASONE PROPIONATE 50 MCG
SPRAY, SUSPENSION (ML) NASAL
Qty: 48 ML | Refills: 0 | Status: SHIPPED | OUTPATIENT
Start: 2020-10-20 | End: 2021-03-24 | Stop reason: SDUPTHER

## 2020-10-20 RX ORDER — LINACLOTIDE 290 UG/1
CAPSULE, GELATIN COATED ORAL
COMMUNITY
Start: 2020-08-12 | End: 2021-02-12 | Stop reason: SDUPTHER

## 2020-10-20 RX ORDER — MORPHINE SULFATE 15 MG/1
15 TABLET, FILM COATED, EXTENDED RELEASE ORAL
COMMUNITY
Start: 2020-09-03 | End: 2020-11-24

## 2020-10-20 RX ORDER — FLUCONAZOLE 150 MG/1
150 TABLET ORAL
Qty: 3 TABLET | Refills: 0 | Status: SHIPPED | OUTPATIENT
Start: 2020-10-20 | End: 2020-11-24 | Stop reason: SDUPTHER

## 2020-10-20 RX ORDER — INDOMETHACIN 25 MG/1
CAPSULE ORAL
COMMUNITY
Start: 2020-09-04 | End: 2020-11-24

## 2020-10-20 RX ORDER — DOXYCYCLINE HYCLATE 100 MG/1
100 CAPSULE ORAL 2 TIMES DAILY
Qty: 20 CAPSULE | Refills: 0 | Status: SHIPPED | OUTPATIENT
Start: 2020-10-20 | End: 2020-10-30

## 2020-10-20 RX ORDER — OXYCODONE AND ACETAMINOPHEN 10; 325 MG/1; MG/1
1 TABLET ORAL EVERY 8 HOURS
COMMUNITY
Start: 2020-09-03 | End: 2022-03-28

## 2020-10-20 RX ORDER — TIZANIDINE 4 MG/1
TABLET ORAL
COMMUNITY
Start: 2020-09-02 | End: 2022-06-21

## 2020-10-20 NOTE — PROGRESS NOTES
HPI  Tatiana Fields is a 55 y.o. female  presents with complaint of one month history of frontal and max sinus pain, sore throat from drainage, nasal congestion, occasional cough in the mornings. Denies fever, shortness of breath, chest pain, change in taste/smell, N/V/D. States she has had augmentin in the last 3 months and it did not help sinuses. Reports she usually gets rocephin shot and antibiotics. Has taken doxy in the past without issues. Takes flonase daily and an allergy pill.     Review of Systems   Constitutional: Negative for chills, diaphoresis and fever.   HENT: Positive for congestion, rhinorrhea, sinus pressure, sneezing and sore throat. Negative for ear pain.    Respiratory: Positive for cough. Negative for shortness of breath.    Cardiovascular: Negative for chest pain.   Gastrointestinal: Negative.        Past Medical History:   Diagnosis Date   • Anemia    • Arthritis    • Cervical nerve root compression    • Cervical spinal stenosis    • Chronic fatigue    • Cluster headache 2012   • DDD (degenerative disc disease), lumbar    • Depression    • Endometriosis    • Essential tremor    • Fibromyalgia, primary 2013   • Headache, tension-type 2013   • HL (hearing loss) 2013   • Hyperlipidemia    • Hypertension    • Irritable bowel syndrome    • Lyme disease 2012    tx'd with prolonged course abx   • Memory loss    • Migraine 2013   • Narcolepsy     listed in medical records, tx'd with provigil   • Obstructive sleep apnea    • Ovarian cyst    • Shingles 2/15/2013   • Sicca syndrome (CMS/HCC)    • Vitamin D deficiency        Family History   Problem Relation Age of Onset   • Inflammatory bowel disease Mother    • Hypertension Mother    • Heart disease Mother    • Arthritis Mother    • Hyperlipidemia Mother    • Diabetes Mother    • Migraines Mother    • Neuropathy Mother    • Stroke Mother    • Hypertension Father    • Heart attack Father    • Hyperlipidemia Father    • Cancer Sister    • Bone cancer  Sister    • Colon cancer Other    • Hyperlipidemia Brother    • Colon cancer Brother    • Diabetes Maternal Grandmother    • Diabetes Paternal Grandmother    • Neuropathy Paternal Grandmother    • Stroke Paternal Grandmother    • Stroke Maternal Aunt        Social History     Socioeconomic History   • Marital status:      Spouse name: Not on file   • Number of children: Not on file   • Years of education: Not on file   • Highest education level: Not on file   Tobacco Use   • Smoking status: Never Smoker   • Smokeless tobacco: Never Used   Substance and Sexual Activity   • Alcohol use: No   • Drug use: No   • Sexual activity: Not Currently     Birth control/protection: Surgical         LMP  (LMP Unknown)     PHYSICAL EXAM  Physical Exam   Constitutional: She appears well-developed and well-nourished.   HENT:   Head: Normocephalic.   Nose: Rhinorrhea present.   Frontal and max sinus tenderness reported on patient guided exam   Neck: Neck normal appearance.  Pulmonary/Chest: Effort normal.   Neurological: She is alert.   Psychiatric: She has a normal mood and affect. Her speech is normal.       Diagnoses and all orders for this visit:    1. Acute non-recurrent pansinusitis (Primary)  -     doxycycline (VIBRAMYCIN) 100 MG capsule; Take 1 capsule by mouth 2 (Two) Times a Day for 10 days.  Dispense: 20 capsule; Refill: 0  -     fluconazole (Diflucan) 150 MG tablet; Take 1 tablet by mouth Every 72 (Seventy-Two) Hours As Needed (yeast).  Dispense: 3 tablet; Refill: 0          FOLLOW-UP  As discussed during visit with [facility] [place], if symptoms worsen or fail to improve, follow-up with PCP/Urgent Care/Emergency Department.    Patient verbalizes understanding of medications, instructions for treatment and follow-up.    Sofia Forbes, APRN  10/20/2020  16:54 EDT    This visit was performed via Telehealth.  This patient has been instructed to follow-up with their primary care provider if their symptoms  worsen or the treatment provided does not resolve their illness.

## 2020-10-21 DIAGNOSIS — F43.10 PTSD (POST-TRAUMATIC STRESS DISORDER): ICD-10-CM

## 2020-10-21 DIAGNOSIS — M47.812 CERVICAL ARTHRITIS: ICD-10-CM

## 2020-10-21 RX ORDER — DIAZEPAM 2 MG/1
2 TABLET ORAL EVERY 12 HOURS PRN
Qty: 60 TABLET | Refills: 0 | Status: SHIPPED | OUTPATIENT
Start: 2020-10-21 | End: 2020-11-20 | Stop reason: SDUPTHER

## 2020-10-21 RX ORDER — DIAZEPAM 2 MG/1
2 TABLET ORAL EVERY 12 HOURS PRN
Qty: 60 TABLET | Refills: 0 | Status: SHIPPED | OUTPATIENT
Start: 2020-10-21 | End: 2020-10-21 | Stop reason: SDUPTHER

## 2020-10-21 RX ORDER — DIAZEPAM 2 MG/1
2 TABLET ORAL EVERY 12 HOURS PRN
Qty: 60 TABLET | Refills: 0 | OUTPATIENT
Start: 2020-10-21

## 2020-10-21 RX ORDER — FAMCICLOVIR 500 MG/1
500 TABLET ORAL DAILY
Qty: 30 TABLET | Refills: 5 | Status: SHIPPED | OUTPATIENT
Start: 2020-10-21 | End: 2020-12-05 | Stop reason: SDUPTHER

## 2020-10-21 NOTE — TELEPHONE ENCOUNTER
Caller: Tatiana Fields    Relationship: Self    Best call back number: [phone]     Medication needed:   Requested Prescriptions     Pending Prescriptions Disp Refills   • diazePAM (VALIUM) 2 MG tablet 60 tablet 0     Sig: Take 1 tablet by mouth Every 12 (Twelve) Hours As Needed for Anxiety or Muscle Spasms.       When do you need the refill by: 10/21/2020    What details did the patient provide when requesting the medication:     Does the patient have less than a 3 day supply:  [x] Yes  [] No    What is the patient's preferred pharmacy: [id number] - [place], [place] - [address] AT [facility] [address] [facility] [facility] [phone]  - [phone] FX

## 2020-10-25 RX ORDER — DIAZEPAM 2 MG/1
2 TABLET ORAL EVERY 12 HOURS PRN
Qty: 60 TABLET | Refills: 0 | OUTPATIENT
Start: 2020-10-25

## 2020-10-25 RX ORDER — FAMCICLOVIR 500 MG/1
500 TABLET ORAL DAILY
Qty: 30 TABLET | Refills: 5 | OUTPATIENT
Start: 2020-10-25

## 2020-10-28 ENCOUNTER — TELEMEDICINE (OUTPATIENT)
Dept: FAMILY MEDICINE CLINIC | Facility: CLINIC | Age: 55
End: 2020-10-28

## 2020-10-28 DIAGNOSIS — R53.83 FATIGUE, UNSPECIFIED TYPE: ICD-10-CM

## 2020-10-28 DIAGNOSIS — J32.9 SINUSITIS, UNSPECIFIED CHRONICITY, UNSPECIFIED LOCATION: ICD-10-CM

## 2020-10-28 DIAGNOSIS — J30.9 ALLERGIC RHINITIS, UNSPECIFIED SEASONALITY, UNSPECIFIED TRIGGER: ICD-10-CM

## 2020-10-28 DIAGNOSIS — R05.9 COUGH: Primary | ICD-10-CM

## 2020-10-28 PROCEDURE — 99442 PR PHYS/QHP TELEPHONE EVALUATION 11-20 MIN: CPT | Performed by: NURSE PRACTITIONER

## 2020-10-28 RX ORDER — DEXAMETHASONE 1.5 MG/1
3 TABLET ORAL
Qty: 10 TABLET | Refills: 0 | Status: SHIPPED | OUTPATIENT
Start: 2020-10-28 | End: 2020-11-02

## 2020-10-28 RX ORDER — MONTELUKAST SODIUM 10 MG/1
10 TABLET ORAL NIGHTLY
Qty: 30 TABLET | Refills: 5 | Status: SHIPPED | OUTPATIENT
Start: 2020-10-28 | End: 2021-03-25

## 2020-10-28 NOTE — PROGRESS NOTES
"      Subjective     Chief Complaint:    Chief Complaint   Patient presents with   • Cough   • URI   • Fatigue     You have chosen to receive care through a telephone visit. Do you consent to use a telephone visit for your medical care today? Yes    History of Present Illness:   Notes nasal congestion, sinus pressure, runny nose, sore throat. Congestion has been going on for about a month. She notes for the past 2 days she has no energy. Feels ill. Cough worse in the AM. No change in taste or smell. Ears feel full. No SOA.   She has been on  doxycycline but is not having improvement. Failed Augmentin in September, Has been taking claritin D and flonase.   Would like covid testing. No known exposures  Has chronic allergies and sinus issues. Has had sinus issues in the past.     Review of Systems  Gen- No fevers, does have chills  CV- No chest pain, palpitations  GI- No V/D, abd pain, + nausea this morning  Neuro-No dizziness, headaches      I have reviewed and/or updated the patient's past medical, surgical, family, social history and problem list as appropriate.     Medications:    Current Outpatient Medications:   •  B-D 3CC LUER-DORA SYR 25GX1\" 25G X 1\" 3 ML misc, USE WITH B-12 INJECTIONS, Disp: , Rfl: 5  •  Cholecalciferol (VITAMIN D) 2000 units tablet, Take 2,000 Units by mouth 2 (Two) Times a Day., Disp: , Rfl:   •  diazePAM (VALIUM) 2 MG tablet, Take 1 tablet by mouth Every 12 (Twelve) Hours As Needed for Anxiety or Muscle Spasms., Disp: 60 tablet, Rfl: 0  •  doxycycline (VIBRAMYCIN) 100 MG capsule, Take 1 capsule by mouth 2 (Two) Times a Day for 10 days., Disp: 20 capsule, Rfl: 0  •  famciclovir (FAMVIR) 500 MG tablet, Take 1 tablet by mouth Daily., Disp: 30 tablet, Rfl: 5  •  fluconazole (Diflucan) 150 MG tablet, Take 1 tablet by mouth Every 72 (Seventy-Two) Hours As Needed (yeast)., Disp: 3 tablet, Rfl: 0  •  FLUoxetine (PROzac) 20 MG capsule, Take 1 capsule by mouth Daily., Disp: 30 capsule, Rfl: 5  •  " fluticasone (FLONASE) 50 MCG/ACT nasal spray, INSTILL 2 SPRAYS INTO THE NOSTRIL AS DIRECTED BY PROVIDER DAILY., Disp: 48 mL, Rfl: 0  •  indomethacin (INDOCIN) 25 MG capsule, TAKE 1 2 TABLETS BY MOUTH EVERY 12 HOURS AS NEEDED, Disp: , Rfl:   •  Linzess 290 MCG capsule capsule, , Disp: , Rfl:   •  lisinopril (PRINIVIL,ZESTRIL) 10 MG tablet, Take 2 tablets by mouth Daily., Disp: 60 tablet, Rfl: 2  •  loratadine-pseudoephedrine (Allergy Relief/Nasal Decongest)  MG per 24 hr tablet, Take 1 tablet by mouth Daily., Disp: 30 tablet, Rfl: 2  •  modafinil (PROVIGIL) 200 MG tablet, Take 1 tablet by mouth Daily., Disp: 30 tablet, Rfl: 0  •  Morphine (MS CONTIN) 15 MG 12 hr tablet, Take 15 mg by mouth every night at bedtime., Disp: , Rfl:   •  omeprazole (priLOSEC) 40 MG capsule, Take 1 capsule by mouth Daily., Disp: 30 capsule, Rfl: 5  •  ondansetron (ZOFRAN) 4 MG tablet, Take 1 tablet by mouth Every 8 (Eight) Hours As Needed for Nausea or Vomiting., Disp: 30 tablet, Rfl: 5  •  oxyCODONE-acetaminophen (PERCOCET)  MG per tablet, Take 1 tablet by mouth Every 8 (Eight) Hours., Disp: , Rfl:   •  polyethylene glycol (MIRALAX) 17 GM/SCOOP powder, Take 17 g by mouth 2 (Two) Times a Day., Disp: 527 g, Rfl: 5  •  Prucalopride Succinate (Motegrity) 2 MG tablet, Take 1 tablet by mouth Daily., Disp: 30 tablet, Rfl: 5  •  sennosides-docusate (Senna-Plus) 8.6-50 MG per tablet, Take 1 tablet by mouth 2 (Two) Times a Day., Disp: 60 tablet, Rfl: 5  •  SUMAtriptan (IMITREX) 100 MG tablet, Take one tablet at onset of headache. May repeat dose one time in 2 hours if headache not relieved., Disp: 9 tablet, Rfl: 3  •  tiZANidine (ZANAFLEX) 4 MG tablet, TAKE 1 TABLET BY MOUTH AT DINNER AND THEN TAKE 2 TABLETS EVERY NIGHT AT BEDTIME, Disp: , Rfl:   •  zolpidem CR (AMBIEN CR) 12.5 MG CR tablet, Take 1 tablet by mouth At Night As Needed for Sleep., Disp: 30 tablet, Rfl: 0  •  dexamethasone (DECADRON) 1.5 MG tablet, Take 2 tablets by mouth  Daily With Breakfast for 5 days., Disp: 10 tablet, Rfl: 0  •  montelukast (Singulair) 10 MG tablet, Take 1 tablet by mouth Every Night., Disp: 30 tablet, Rfl: 5    Current Facility-Administered Medications:   •  cloNIDine (CATAPRES) tablet 0.1 mg, 0.1 mg, Oral, Q12H, Amber Mills MD, 0.1 mg at 07/22/20 1208    Allergies:  Allergies   Allergen Reactions   • Morphine Itching   • Trazodone And Nefazodone Mental Status Change   • Sulfa Antibiotics Rash       Objective     Vital Signs: There were no vitals filed for this visit.    Physical Exam:  Gen-no acute distress, telephone exam  Resp- normal WOB, able to speak in full sentences without distress, sounds very congestion, voice is raspy, frequent cough noted  Neuro-A&Ox3, speech clear  Psych-appropriate mood, cooperative       Assessment / Plan     Assessment/Plan:   Problem List Items Addressed This Visit     None      Visit Diagnoses     Cough    -  Primary    Relevant Orders    COVID-19,[facility],NP/OP Swab in Transport Media or ESwab 72 HR TAT - Swab, Nasopharynx    Allergic rhinitis, unspecified seasonality, unspecified trigger        Relevant Medications    montelukast (Singulair) 10 MG tablet    dexamethasone (DECADRON) 1.5 MG tablet    Sinusitis, unspecified chronicity, unspecified location        Relevant Medications    dexamethasone (DECADRON) 1.5 MG tablet    Fatigue, unspecified type        Relevant Orders    COVID-19,[facility],NP/OP Swab in Transport Media or ESwab 72 HR TAT - Swab, Nasopharynx        -- finish doxy, add steroids, low threshold to add levaquin if she continues to worsen or does not improve  -- start singulair  -- covid test, encouraged to self isolate    Follow up:  As needed    Total Time of Encounter 18 minutes    Electronically signed by SOULEYMANE Curiel   10/28/2020 09:51 EDT      Please note that portions of this note may have been completed with a voice recognition program. Efforts were made to edit the dictations, but  occasionally words are mistranscribed.

## 2020-10-31 PROCEDURE — U0003 INFECTIOUS AGENT DETECTION BY NUCLEIC ACID (DNA OR RNA); SEVERE ACUTE RESPIRATORY SYNDROME CORONAVIRUS 2 (SARS-COV-2) (CORONAVIRUS DISEASE [COVID-19]), AMPLIFIED PROBE TECHNIQUE, MAKING USE OF HIGH THROUGHPUT TECHNOLOGIES AS DESCRIBED BY CMS-2020-01-R: HCPCS | Performed by: NURSE PRACTITIONER

## 2020-11-02 DIAGNOSIS — J32.9 SINUSITIS, UNSPECIFIED CHRONICITY, UNSPECIFIED LOCATION: Primary | ICD-10-CM

## 2020-11-02 RX ORDER — LEVOFLOXACIN 500 MG/1
500 TABLET, FILM COATED ORAL DAILY
Qty: 7 TABLET | Refills: 0 | Status: SHIPPED | OUTPATIENT
Start: 2020-11-02 | End: 2020-11-09

## 2020-11-05 ENCOUNTER — TELEPHONE (OUTPATIENT)
Dept: FAMILY MEDICINE CLINIC | Facility: CLINIC | Age: 55
End: 2020-11-05

## 2020-11-05 DIAGNOSIS — M62.89 PELVIC FLOOR DYSFUNCTION: Primary | ICD-10-CM

## 2020-11-05 DIAGNOSIS — Z87.448 HISTORY OF CYSTOCELE: ICD-10-CM

## 2020-11-05 DIAGNOSIS — Z87.448 HISTORY OF BLADDER SUSPENSION PROCEDURE: ICD-10-CM

## 2020-11-05 DIAGNOSIS — Z98.890 HISTORY OF BLADDER SUSPENSION PROCEDURE: ICD-10-CM

## 2020-11-05 RX ORDER — LISINOPRIL 10 MG/1
TABLET ORAL
Qty: 180 TABLET | Refills: 3 | Status: SHIPPED | OUTPATIENT
Start: 2020-11-05 | End: 2021-02-24 | Stop reason: SDUPTHER

## 2020-11-05 NOTE — TELEPHONE ENCOUNTER
Regarding: Referral Request  Contact: [phone]  ----- Message from Evon Ritchie LPN sent at 11/5/2020  2:21 PM EST -----       ----- Message from Tatiana Fields to Amber Mills MD sent at 11/5/2020  2:01 PM -----   Can you please do a referral to Dr Anjel Perez at  for pelvic floor reconstructive surgery or visit.  They require a referral. I had that surgery 10 years ago and [facility].  I also have the interstitial cystitis and hopefully can get back on the 3 month treatment for that where they went in and coated my bladder every 3 months and possible botox.  Thanks

## 2020-11-09 RX ORDER — AMOXICILLIN 250 MG
1 CAPSULE ORAL 2 TIMES DAILY
Qty: 60 TABLET | Refills: 5 | Status: SHIPPED | OUTPATIENT
Start: 2020-11-09 | End: 2021-02-10 | Stop reason: SDUPTHER

## 2020-11-10 DIAGNOSIS — G47.19 EXCESSIVE DAYTIME SLEEPINESS: Primary | ICD-10-CM

## 2020-11-10 DIAGNOSIS — G47.33 OBSTRUCTIVE SLEEP APNEA: ICD-10-CM

## 2020-11-10 DIAGNOSIS — F33.9 RECURRENT MAJOR DEPRESSION RESISTANT TO TREATMENT (HCC): ICD-10-CM

## 2020-11-10 DIAGNOSIS — F45.0 DEPRESSION WITH SOMATIZATION: ICD-10-CM

## 2020-11-10 DIAGNOSIS — F32.A DEPRESSION WITH SOMATIZATION: ICD-10-CM

## 2020-11-10 RX ORDER — METHYLPHENIDATE HYDROCHLORIDE 10 MG/1
10 TABLET ORAL 2 TIMES DAILY
Qty: 60 TABLET | Refills: 0 | Status: SHIPPED | OUTPATIENT
Start: 2020-11-10 | End: 2020-12-19 | Stop reason: SDUPTHER

## 2020-11-12 ENCOUNTER — TELEPHONE (OUTPATIENT)
Dept: FAMILY MEDICINE CLINIC | Facility: CLINIC | Age: 55
End: 2020-11-12

## 2020-11-12 NOTE — TELEPHONE ENCOUNTER
Tried calling pt with referral information. No answer/voicemail full.  Unable to LM.  If pt returns call, please transfer to office to speak with me.

## 2020-11-16 DIAGNOSIS — F51.04 CHRONIC INSOMNIA: ICD-10-CM

## 2020-11-16 DIAGNOSIS — F43.10 PTSD (POST-TRAUMATIC STRESS DISORDER): ICD-10-CM

## 2020-11-16 DIAGNOSIS — M47.812 CERVICAL ARTHRITIS: ICD-10-CM

## 2020-11-16 RX ORDER — ZOLPIDEM TARTRATE 12.5 MG/1
12.5 TABLET, FILM COATED, EXTENDED RELEASE ORAL NIGHTLY PRN
Qty: 30 TABLET | Refills: 0 | Status: SHIPPED | OUTPATIENT
Start: 2020-11-16 | End: 2020-12-19 | Stop reason: SDUPTHER

## 2020-11-16 RX ORDER — SUMATRIPTAN 100 MG/1
TABLET, FILM COATED ORAL
Qty: 9 TABLET | Refills: 3 | Status: SHIPPED | OUTPATIENT
Start: 2020-11-16 | End: 2021-04-26 | Stop reason: SDUPTHER

## 2020-11-16 RX ORDER — OMEPRAZOLE 40 MG/1
40 CAPSULE, DELAYED RELEASE ORAL DAILY
Qty: 30 CAPSULE | Refills: 5 | Status: SHIPPED | OUTPATIENT
Start: 2020-11-16 | End: 2021-01-22 | Stop reason: SDUPTHER

## 2020-11-16 RX ORDER — ONDANSETRON 4 MG/1
4 TABLET, FILM COATED ORAL EVERY 8 HOURS PRN
Qty: 30 TABLET | Refills: 5 | Status: SHIPPED | OUTPATIENT
Start: 2020-11-16 | End: 2021-01-21 | Stop reason: SDUPTHER

## 2020-11-16 RX ORDER — DIAZEPAM 2 MG/1
2 TABLET ORAL EVERY 12 HOURS PRN
Qty: 60 TABLET | Refills: 0 | OUTPATIENT
Start: 2020-11-16

## 2020-11-20 DIAGNOSIS — F43.10 PTSD (POST-TRAUMATIC STRESS DISORDER): ICD-10-CM

## 2020-11-20 DIAGNOSIS — M47.812 CERVICAL ARTHRITIS: ICD-10-CM

## 2020-11-20 RX ORDER — DIAZEPAM 2 MG/1
2 TABLET ORAL EVERY 12 HOURS PRN
Qty: 60 TABLET | Refills: 0 | Status: SHIPPED | OUTPATIENT
Start: 2020-11-20 | End: 2020-12-22 | Stop reason: SDUPTHER

## 2020-11-23 RX ORDER — LORATADINE AND PSEUDOEPHEDRINE SULFATE 10; 240 MG/1; MG/1
1 TABLET, FILM COATED, EXTENDED RELEASE ORAL DAILY
Qty: 30 TABLET | Refills: 2 | Status: SHIPPED | OUTPATIENT
Start: 2020-11-23 | End: 2021-01-22 | Stop reason: SDUPTHER

## 2020-11-24 ENCOUNTER — TELEMEDICINE (OUTPATIENT)
Dept: FAMILY MEDICINE CLINIC | Facility: CLINIC | Age: 55
End: 2020-11-24

## 2020-11-24 DIAGNOSIS — J06.9 UPPER RESPIRATORY TRACT INFECTION, UNSPECIFIED TYPE: ICD-10-CM

## 2020-11-24 DIAGNOSIS — Z20.822 EXPOSURE TO COVID-19 VIRUS: Primary | ICD-10-CM

## 2020-11-24 DIAGNOSIS — N95.1 POSTMENOPAUSAL DISORDER: ICD-10-CM

## 2020-11-24 DIAGNOSIS — Z79.890 HORMONE REPLACEMENT THERAPY (HRT): ICD-10-CM

## 2020-11-24 DIAGNOSIS — B37.31 VULVOVAGINAL CANDIDIASIS: ICD-10-CM

## 2020-11-24 PROBLEM — Z51.81 MEDICATION MONITORING ENCOUNTER: Status: RESOLVED | Noted: 2018-02-16 | Resolved: 2020-11-24

## 2020-11-24 PROCEDURE — 99214 OFFICE O/P EST MOD 30 MIN: CPT | Performed by: FAMILY MEDICINE

## 2020-11-24 RX ORDER — FLUCONAZOLE 150 MG/1
150 TABLET ORAL
Qty: 3 TABLET | Refills: 0 | Status: SHIPPED | OUTPATIENT
Start: 2020-11-24 | End: 2020-12-28 | Stop reason: SDUPTHER

## 2020-11-24 RX ORDER — ESTERIFIED ESTROGEN AND METHYLTESTOSTERONE .625; 1.25 MG/1; MG/1
1 TABLET ORAL DAILY
Qty: 30 TABLET | Refills: 5 | Status: SHIPPED | OUTPATIENT
Start: 2020-11-24 | End: 2021-03-25

## 2020-11-24 NOTE — PROGRESS NOTES
VIDEO/TELEHEALTH VISIT  2020      TIERNEY FINE   1965      History of Present Illness  Mrs. Fine presents with concern regarding COVID exposure. Both her son and his girlfirend have been dx'd with COVID. She had some limited exposure to them. She was tested 2 days ago,  and was negative. Believes it was PCR.    She c/o nasal congestion, cough, some mild SOA. No fever. Some malaise and body aches.    She also has questions regarding hormone replacement. Was previously on progesterone and estratest. Also was previously on testosterone cream. Due to cost has not been taking recently but would like to restart as she is having severe hot flashes. Feels hormones also help with mood, bowel function, etc.     She reqeusts Refill diflucan for vaginal itching and dsicharge.    Patient's past medical hx, surgical hx, family hx, social hx reviewed on chart. Medication and allergy lists reviewed on chart. Information updated as indicated.      Review of Systems   Constitutional: Positive for fatigue. Negative for fever.   HENT: Positive for congestion and postnasal drip. Negative for ear pain, hearing loss, nosebleeds, sneezing, sore throat and trouble swallowing.    Eyes: Positive for visual disturbance (chronic). Negative for pain.        Dry eyes   Respiratory: Positive for cough and shortness of breath. Negative for wheezing.    Cardiovascular: Positive for palpitations (chronic, intermittent, stable). Negative for chest pain and leg swelling.   Gastrointestinal: Positive for abdominal pain (chronic), constipation and nausea. Negative for blood in stool, diarrhea and vomiting.   Endocrine: Positive for heat intolerance. Negative for polydipsia and polyuria.   Genitourinary: Positive for pelvic pain (chronic) and vaginal discharge. Negative for dysuria and hematuria.   Musculoskeletal: Positive for arthralgias, myalgias, neck pain and neck stiffness. Negative for back pain and joint swelling.   Skin:  Negative for rash and wound.   Neurological: Positive for dizziness, tremors, weakness and headaches. Negative for seizures, syncope and speech difficulty.   Hematological: Negative for adenopathy. Bruises/bleeds easily.   Psychiatric/Behavioral: Positive for decreased concentration, dysphoric mood and sleep disturbance. Negative for confusion and suicidal ideas. The patient is nervous/anxious.    Pt's previous ROS reviewed and updated as indicated.     Physical Exam  Unable to perform due to video malfunction.    Lab Results   Component Value Date    WBC 8.10 10/16/2019    HGB 12.8 10/16/2019    HCT 38.1 10/16/2019    MCV 88.8 10/16/2019     10/16/2019       Lab Results   Component Value Date    GLUCOSE 92 01/04/2014    BUN 12 10/16/2019    CREATININE 0.79 10/16/2019    EGFRIFNONA 76 10/16/2019    EGFRIFAFRI 92 10/16/2019    BCR 15.2 10/16/2019    K 3.6 10/16/2019    CO2 28.4 10/16/2019    CALCIUM 9.2 10/16/2019    PROTENTOTREF 6.5 10/16/2019    ALBUMIN 4.50 10/16/2019    LABIL2 2.3 10/16/2019    AST 30 10/16/2019    ALT 27 10/16/2019       Lab Results   Component Value Date    CHLPL 180 10/16/2019    TRIG 88 10/16/2019    HDL 55 10/16/2019     (H) 10/16/2019       Lab Results   Component Value Date    HGBA1C 5.70 (H) 07/22/2020       Lab Results   Component Value Date    TSH 2.900 07/22/2020       Diagnoses and all orders for this visit:    Exposure to COVID-19 virus    Hormone replacement therapy (HRT)  -     progesterone (PROMETRIUM) 100 MG capsule; Take one capsule at hs  -     estrogens, conjugated,-methyltestosterone (ESTRATEST HS) 0.625-1.25 MG per tablet; Take 1 tablet by mouth Daily.    Postmenopausal disorder  -     progesterone (PROMETRIUM) 100 MG capsule; Take one capsule at hs  -     estrogens, conjugated,-methyltestosterone (ESTRATEST HS) 0.625-1.25 MG per tablet; Take 1 tablet by mouth Daily.    Vulvovaginal candidiasis  -     fluconazole (Diflucan) 150 MG tablet; Take 1 tablet by mouth  Every 72 (Seventy-Two) Hours As Needed (yeast).    Upper respiratory tract infection, unspecified type    Other orders  -     SENNA CO; TK 1 T PO BID  -     pramipexole (MIRAPEX) 1 MG tablet  -     Xtampza ER 13.5 MG capsule extended-release 12 hour ; TK ONE C PO  Q 12 H  -     levothyroxine (SYNTHROID, LEVOTHROID) 25 MCG tablet  -     citalopram (CeleXA) 10 MG tablet; Take  by mouth Daily.      She wishes to be retested for COVID at [facility] where she was initially tested. Symptomatic treatment reviewed.  I have reviewed risks/benefits and potential side effects of HRT for mgnt of postmenopausal DO. Pt voices understanding and wishes to proceed with HRT as above.    Patient will f/u per routine, f/u sooner as needed.  Patient was encouraged to keep me informed of any acute changes, lack of improvement, or any new concerning symptoms.  Pt is aware of reasons to seek emergent care.  Patient voiced understanding of all instructions and denied further questions.        Unable to complete visit using a video connection to the patient. A phone visit was used to complete this visits. Total time of discussion was 11 minutes.

## 2020-11-25 RX ORDER — LEVOTHYROXINE SODIUM 0.03 MG/1
TABLET ORAL
COMMUNITY
Start: 2020-10-28 | End: 2020-12-28 | Stop reason: SDUPTHER

## 2020-11-25 RX ORDER — PRAMIPEXOLE DIHYDROCHLORIDE 1 MG/1
TABLET ORAL
COMMUNITY
Start: 2020-11-18 | End: 2020-12-28 | Stop reason: SDUPTHER

## 2020-11-25 RX ORDER — CITALOPRAM 10 MG/1
TABLET ORAL DAILY
COMMUNITY
Start: 2020-10-27 | End: 2020-12-05

## 2020-11-25 RX ORDER — OXYCODONE 13.5 MG/1
CAPSULE, EXTENDED RELEASE ORAL
COMMUNITY
Start: 2020-11-05 | End: 2021-01-27

## 2020-12-05 ENCOUNTER — E-VISIT (OUTPATIENT)
Dept: FAMILY MEDICINE CLINIC | Facility: TELEHEALTH | Age: 55
End: 2020-12-05

## 2020-12-05 DIAGNOSIS — J01.91 ACUTE RECURRENT SINUSITIS, UNSPECIFIED LOCATION: Primary | ICD-10-CM

## 2020-12-05 PROCEDURE — 99422 OL DIG E/M SVC 11-20 MIN: CPT | Performed by: NURSE PRACTITIONER

## 2020-12-05 RX ORDER — LEVOFLOXACIN 750 MG/1
750 TABLET ORAL DAILY
Qty: 7 TABLET | Refills: 0 | Status: SHIPPED | OUTPATIENT
Start: 2020-12-05 | End: 2020-12-12

## 2020-12-05 NOTE — PATIENT INSTRUCTIONS
-CALL YOUR PRIMARY DOCTOR ON Monday FOR A FOLLOW UP APPOINTMENT  -PLEASE READ THE BLACK BOX WARNING FOR LEVOFLOXACIN. STOP TAKING AND SEEK CARE IF YOU HAVE TENDON PAIN.  -TYLENOL AND WARM MOIST HEAT TO THE FACE FOR PAIN              Levofloxacin tablets  What is this medicine?  LEVOFLOXACIN (sidney dwaine DOMENICO a sin) is a quinolone antibiotic. It is used to treat certain kinds of bacterial infections. It will not work for colds, flu, or other viral infections.  This medicine may be used for other purposes; ask your health care provider or pharmacist if you have questions.  COMMON BRAND NAME(S): Levaquin, Levaquin Leva-Wale  What should I tell my health care provider before I take this medicine?  They need to know if you have any of these conditions:  · bone problems  · diabetes  · heart disease  · high blood pressure  · history of irregular heartbeat  · history of low levels of potassium in the blood  · joint problems  · kidney disease  · liver disease  · mental illness  · myasthenia gravis  · seizures  · tendon problems  · tingling of the fingers or toes, or other nerve disorder  · an unusual or allergic reaction to levofloxacin, other quinolone antibiotics, foods, dyes, or preservatives  · pregnant or trying to get pregnant  · breast-feeding  How should I use this medicine?  Take this medicine by mouth with a full glass of water. Follow the directions on the prescription label. You can take it with or without food. If it upsets your stomach, take it with food. Take your medicine at regular intervals. Do not take your medicine more often than directed. Take all of your medicine as directed even if you think you are better. Do not skip doses or stop your medicine early.  Avoid antacids, calcium, iron, and zinc products for 2 hours before and 2 hours after taking a dose of this medicine.  A special MedGuide will be given to you by the pharmacist with each prescription and refill. Be sure to read this information carefully  each time.  Talk to your pediatrician regarding the use of this medicine in children. While this drug may be prescribed for children as young as 6 months for selected conditions, precautions do apply.  Overdosage: If you think you have taken too much of this medicine contact a poison control center or emergency room at once.  NOTE: This medicine is only for you. Do not share this medicine with others.  What if I miss a dose?  If you miss a dose, take it as soon as you remember. If it is almost time for your next dose, take only that dose. Do not take double or extra doses.  What may interact with this medicine?  Do not take this medicine with any of the following medications:  · cisapride  · dronedarone  · pimozide  · thioridazine  This medicine may also interact with the following medications:  · antacids  · birth control pills  · certain medicines for diabetes, like glipizide, glyburide, or insulin  · certain medicines that treat or prevent blood clots like warfarin  · didanosine buffered tablets or powder  · multivitamins  · NSAIDS, medicines for pain and inflammation, like ibuprofen or naproxen  · other medicines that prolong the QT interval (cause an abnormal heart rhythm) like dofetilide, ziprasidone  · steroid medicines like prednisone or cortisone  · sucralfate  · theophylline  This list may not describe all possible interactions. Give your health care provider a list of all the medicines, herbs, non-prescription drugs, or dietary supplements you use. Also tell them if you smoke, drink alcohol, or use illegal drugs. Some items may interact with your medicine.  What should I watch for while using this medicine?  Tell your doctor or healthcare professional if your symptoms do not start to get better or if they get worse.  Do not treat diarrhea with over the counter products. Contact your doctor if you have diarrhea that lasts more than 2 days or if it is severe and watery.  Check with your doctor or health  care professional if you get an attack of severe diarrhea, nausea and vomiting, or if you sweat a lot. The loss of too much body fluid can make it dangerous for you to take this medicine.  This medicine may increase blood sugar. Ask your healthcare provider if changes in diet or medicines are needed if you have diabetes.  You may get drowsy or dizzy. Do not drive, use machinery, or do anything that needs mental alertness until you know how this medicine affects you. Do not sit or stand up quickly, especially if you are an older patient. This reduces the risk of dizzy or fainting spells.  This medicine can make you more sensitive to the sun. Keep out of the sun. If you cannot avoid being in the sun, wear protective clothing and use sunscreen. Do not use sun lamps or tanning beds/booths.  What side effects may I notice from receiving this medicine?  Side effects that you should report to your doctor or health care professional as soon as possible:  · allergic reactions like skin rash or hives, swelling of the face, lips, or tongue  · anxious  · bloody or watery diarrhea  · breathing problems  · confusion  · depressed mood  · fast, irregular heartbeat  · fever  · hallucination, loss of contact with reality  · joint, muscle, or tendon pain or swelling  · loss of memory  · muscle weakness  · pain, tingling, numbness in the hands or feet  · seizures  · signs and symptoms of aortic dissection such as sudden chest, stomach, or back pain  · signs and symptoms of high blood sugar such as being more thirsty or hungry or having to urinate more than normal. You may also feel very tired or have blurry vision.  · signs and symptoms of liver injury like dark yellow or brown urine; general ill feeling or flu-like symptoms; light-colored stools; loss of appetite; nausea; right upper belly pain; unusually weak or tired; yellowing of the eyes or skin  · signs and symptoms of low blood sugar such as feeling anxious; confusion;  dizziness; increased hunger; unusually weak or tired; sweating; shakiness; cold; irritable; headache; blurred vision; fast heartbeat; loss of consciousness; pale skin  · suicidal thoughts or other mood changes  · sunburn  · unusually weak  Side effects that usually do not require medical attention (report to your doctor or health care professional if they continue or are bothersome):  · constipation  · dry mouth  · headache  · nausea, vomiting  · trouble sleeping  This list may not describe all possible side effects. Call your doctor for medical advice about side effects. You may report side effects to FDA at 3-691-ASZ-8599.  Where should I keep my medicine?  Keep out of the reach of children.  Store at room temperature between 15 and 30 degrees C (59 and 86 degrees F). Keep in a tightly closed container. Throw away any unused medicine after the expiration date.  NOTE: This sheet is a summary. It may not cover all possible information. If you have questions about this medicine, talk to your doctor, pharmacist, or health care provider.  © 2020 Elsevier/Gold Standard (2019-12-09 14:03:14)  Sinusitis, Adult  Sinusitis is soreness and swelling (inflammation) of your sinuses. Sinuses are hollow spaces in the bones around your face. They are located:  · Around your eyes.  · In the middle of your forehead.  · Behind your nose.  · In your cheekbones.  Your sinuses and nasal passages are lined with a fluid called mucus. Mucus drains out of your sinuses. Swelling can trap mucus in your sinuses. This lets germs (bacteria, virus, or fungus) grow, which leads to infection. Most of the time, this condition is caused by a virus.  What are the causes?  This condition is caused by:  · Allergies.  · Asthma.  · Germs.  · Things that block your nose or sinuses.  · Growths in the nose (nasal polyps).  · Chemicals or irritants in the air.  · Fungus (rare).  What increases the risk?  You are more likely to develop this condition  if:  · You have a weak body defense system (immune system).  · You do a lot of swimming or diving.  · You use nasal sprays too much.  · You smoke.  What are the signs or symptoms?  The main symptoms of this condition are pain and a feeling of pressure around the sinuses. Other symptoms include:  · Stuffy nose (congestion).  · Runny nose (drainage).  · Swelling and warmth in the sinuses.  · Headache.  · Toothache.  · A cough that may get worse at night.  · Mucus that collects in the throat or the back of the nose (postnasal drip).  · Being unable to smell and taste.  · Being very tired (fatigue).  · A fever.  · Sore throat.  · Bad breath.  How is this diagnosed?  This condition is diagnosed based on:  · Your symptoms.  · Your medical history.  · A physical exam.  · Tests to find out if your condition is short-term (acute) or long-term (chronic). Your doctor may:  ? Check your nose for growths (polyps).  ? Check your sinuses using a tool that has a light (endoscope).  ? Check for allergies or germs.  ? Do imaging tests, such as an MRI or CT scan.  How is this treated?  Treatment for this condition depends on the cause and whether it is short-term or long-term.  · If caused by a virus, your symptoms should go away on their own within 10 days. You may be given medicines to relieve symptoms. They include:  ? Medicines that shrink swollen tissue in the nose.  ? Medicines that treat allergies (antihistamines).  ? A spray that treats swelling of the nostrils.   ? Rinses that help get rid of thick mucus in your nose (nasal saline washes).  · If caused by bacteria, your doctor may wait to see if you will get better without treatment. You may be given antibiotic medicine if you have:  ? A very bad infection.  ? A weak body defense system.  · If caused by growths in the nose, you may need to have surgery.  Follow these instructions at home:  Medicines  · Take, use, or apply over-the-counter and prescription medicines only as  told by your doctor. These may include nasal sprays.  · If you were prescribed an antibiotic medicine, take it as told by your doctor. Do not stop taking the antibiotic even if you start to feel better.  Hydrate and humidify    · Drink enough water to keep your pee (urine) pale yellow.  · Use a cool mist humidifier to keep the humidity level in your home above 50%.  · Breathe in steam for 10-15 minutes, 3-4 times a day, or as told by your doctor. You can do this in the bathroom while a hot shower is running.  · Try not to spend time in cool or dry air.  Rest  · Rest as much as you can.  · Sleep with your head raised (elevated).  · Make sure you get enough sleep each night.  General instructions    · Put a warm, moist washcloth on your face 3-4 times a day, or as often as told by your doctor. This will help with discomfort.  · Wash your hands often with soap and water. If there is no soap and water, use hand .  · Do not smoke. Avoid being around people who are smoking (secondhand smoke).  · Keep all follow-up visits as told by your doctor. This is important.  Contact a doctor if:  · You have a fever.  · Your symptoms get worse.  · Your symptoms do not get better within 10 days.  Get help right away if:  · You have a very bad headache.  · You cannot stop throwing up (vomiting).  · You have very bad pain or swelling around your face or eyes.  · You have trouble seeing.  · You feel confused.  · Your neck is stiff.  · You have trouble breathing.  Summary  · Sinusitis is swelling of your sinuses. Sinuses are hollow spaces in the bones around your face.  · This condition is caused by tissues in your nose that become inflamed or swollen. This traps germs. These can lead to infection.  · If you were prescribed an antibiotic medicine, take it as told by your doctor. Do not stop taking it even if you start to feel better.  · Keep all follow-up visits as told by your doctor. This is important.  This information is not  intended to replace advice given to you by your health care provider. Make sure you discuss any questions you have with your health care provider.  Document Revised: 05/20/2019 Document Reviewed: 05/20/2019  Elsevier Patient Education © 2020 Elsevier Inc.

## 2020-12-05 NOTE — PROGRESS NOTES
I reviewed the patient's e-visit. She has recurrent sinusitis. Failed doxycline in October and was treated with Rocephin IM. Will treat with levofloxacin with close follow up from her PCP. Patient to call PCP on Monday for FU appt.    I spent 11-20 minutes in the patient's chart.

## 2020-12-07 RX ORDER — FAMCICLOVIR 500 MG/1
500 TABLET ORAL DAILY
Qty: 30 TABLET | Refills: 5 | Status: SHIPPED | OUTPATIENT
Start: 2020-12-07 | End: 2021-01-21 | Stop reason: SDUPTHER

## 2020-12-19 DIAGNOSIS — B37.31 VULVOVAGINAL CANDIDIASIS: ICD-10-CM

## 2020-12-19 DIAGNOSIS — N95.1 POSTMENOPAUSAL DISORDER: ICD-10-CM

## 2020-12-19 DIAGNOSIS — G47.33 OBSTRUCTIVE SLEEP APNEA: ICD-10-CM

## 2020-12-19 DIAGNOSIS — F51.04 CHRONIC INSOMNIA: ICD-10-CM

## 2020-12-19 DIAGNOSIS — F33.9 RECURRENT MAJOR DEPRESSION RESISTANT TO TREATMENT (HCC): ICD-10-CM

## 2020-12-19 DIAGNOSIS — G47.19 EXCESSIVE DAYTIME SLEEPINESS: ICD-10-CM

## 2020-12-19 DIAGNOSIS — Z79.890 HORMONE REPLACEMENT THERAPY (HRT): ICD-10-CM

## 2020-12-19 RX ORDER — AMOXICILLIN 250 MG
1 CAPSULE ORAL 2 TIMES DAILY
Qty: 60 TABLET | Refills: 5 | Status: CANCELLED | OUTPATIENT
Start: 2020-12-19

## 2020-12-19 RX ORDER — ONDANSETRON 4 MG/1
4 TABLET, FILM COATED ORAL EVERY 8 HOURS PRN
Qty: 30 TABLET | Refills: 5 | Status: CANCELLED | OUTPATIENT
Start: 2020-12-19

## 2020-12-19 RX ORDER — OMEPRAZOLE 40 MG/1
40 CAPSULE, DELAYED RELEASE ORAL DAILY
Qty: 30 CAPSULE | Refills: 5 | Status: CANCELLED | OUTPATIENT
Start: 2020-12-19

## 2020-12-19 RX ORDER — METHYLPHENIDATE HYDROCHLORIDE 10 MG/1
10 TABLET ORAL 2 TIMES DAILY
Qty: 60 TABLET | Refills: 0 | Status: CANCELLED | OUTPATIENT
Start: 2020-12-19

## 2020-12-19 RX ORDER — FLUCONAZOLE 150 MG/1
150 TABLET ORAL
Qty: 3 TABLET | Refills: 0 | Status: CANCELLED | OUTPATIENT
Start: 2020-12-19

## 2020-12-19 RX ORDER — LISINOPRIL 10 MG/1
20 TABLET ORAL DAILY
Qty: 180 TABLET | Refills: 3 | Status: CANCELLED | OUTPATIENT
Start: 2020-12-19

## 2020-12-19 RX ORDER — ZOLPIDEM TARTRATE 12.5 MG/1
12.5 TABLET, FILM COATED, EXTENDED RELEASE ORAL NIGHTLY PRN
Qty: 30 TABLET | Refills: 0 | Status: CANCELLED | OUTPATIENT
Start: 2020-12-19

## 2020-12-21 DIAGNOSIS — M47.812 CERVICAL ARTHRITIS: ICD-10-CM

## 2020-12-21 DIAGNOSIS — F43.10 PTSD (POST-TRAUMATIC STRESS DISORDER): ICD-10-CM

## 2020-12-21 RX ORDER — DIAZEPAM 2 MG/1
2 TABLET ORAL EVERY 12 HOURS PRN
Qty: 60 TABLET | Refills: 0 | OUTPATIENT
Start: 2020-12-21

## 2020-12-21 RX ORDER — OMEPRAZOLE 40 MG/1
40 CAPSULE, DELAYED RELEASE ORAL DAILY
Qty: 30 CAPSULE | Refills: 5 | Status: CANCELLED | OUTPATIENT
Start: 2020-12-21

## 2020-12-22 ENCOUNTER — E-VISIT (OUTPATIENT)
Dept: FAMILY MEDICINE CLINIC | Facility: TELEHEALTH | Age: 55
End: 2020-12-22

## 2020-12-22 DIAGNOSIS — F33.9 RECURRENT MAJOR DEPRESSION RESISTANT TO TREATMENT (HCC): ICD-10-CM

## 2020-12-22 DIAGNOSIS — M47.812 CERVICAL ARTHRITIS: ICD-10-CM

## 2020-12-22 DIAGNOSIS — G47.33 OBSTRUCTIVE SLEEP APNEA: ICD-10-CM

## 2020-12-22 DIAGNOSIS — F43.10 PTSD (POST-TRAUMATIC STRESS DISORDER): ICD-10-CM

## 2020-12-22 DIAGNOSIS — G47.19 EXCESSIVE DAYTIME SLEEPINESS: ICD-10-CM

## 2020-12-22 RX ORDER — DIAZEPAM 2 MG/1
2 TABLET ORAL EVERY 12 HOURS PRN
Qty: 60 TABLET | Refills: 0 | Status: SHIPPED | OUTPATIENT
Start: 2020-12-22 | End: 2021-01-21 | Stop reason: SDUPTHER

## 2020-12-22 RX ORDER — METHYLPHENIDATE HYDROCHLORIDE 10 MG/1
10 TABLET ORAL 2 TIMES DAILY
Qty: 60 TABLET | Refills: 0 | Status: SHIPPED | OUTPATIENT
Start: 2020-12-22 | End: 2020-12-27 | Stop reason: SDUPTHER

## 2020-12-22 RX ORDER — ZOLPIDEM TARTRATE 12.5 MG/1
12.5 TABLET, FILM COATED, EXTENDED RELEASE ORAL NIGHTLY PRN
Qty: 30 TABLET | Refills: 0 | Status: SHIPPED | OUTPATIENT
Start: 2020-12-22 | End: 2021-01-21 | Stop reason: SDUPTHER

## 2020-12-22 NOTE — TELEPHONE ENCOUNTER
Caller: Tatiana Fields TIMOTHY    Relationship: Self    Best call back number:[phone]    Medication needed:   Requested Prescriptions     Pending Prescriptions Disp Refills   • diazePAM (VALIUM) 2 MG tablet 60 tablet 0     Sig: Take 1 tablet by mouth Every 12 (Twelve) Hours As Needed for Anxiety or Muscle Spasms.       When do you need the refill by: ASAP      What details did the patient provide when requesting the medication: PATIENT IS ALSO REQUESTING PHENERGAN  FOR VOMITING    Does the patient have less than a 3 day supply:  [x] Yes  [] No    What is the patient's preferred pharmacy:      [facility] [address] [phone] [facility] [phone] [facility]

## 2020-12-22 NOTE — TELEPHONE ENCOUNTER
PATIENT WANTS SCRIPTS TO GO TO [facility] NOW SINCE IT IS TOO LATE FOR [facility] TO DELIVER.    [facility]

## 2020-12-27 RX ORDER — METHYLPHENIDATE HYDROCHLORIDE 10 MG/1
10 TABLET ORAL 2 TIMES DAILY
Qty: 60 TABLET | Refills: 0 | Status: SHIPPED | OUTPATIENT
Start: 2020-12-27 | End: 2021-01-21 | Stop reason: SDUPTHER

## 2020-12-28 ENCOUNTER — OFFICE VISIT (OUTPATIENT)
Dept: FAMILY MEDICINE CLINIC | Facility: CLINIC | Age: 55
End: 2020-12-28

## 2020-12-28 DIAGNOSIS — T36.95XA ANTIBIOTIC-INDUCED YEAST INFECTION: ICD-10-CM

## 2020-12-28 DIAGNOSIS — J32.9 RECURRENT SINUSITIS: Primary | ICD-10-CM

## 2020-12-28 DIAGNOSIS — B37.9 ANTIBIOTIC-INDUCED YEAST INFECTION: ICD-10-CM

## 2020-12-28 DIAGNOSIS — R51.9 CHRONIC INTRACTABLE HEADACHE, UNSPECIFIED HEADACHE TYPE: ICD-10-CM

## 2020-12-28 DIAGNOSIS — K59.09 CHRONIC CONSTIPATION: ICD-10-CM

## 2020-12-28 DIAGNOSIS — F33.9 RECURRENT MAJOR DEPRESSION RESISTANT TO TREATMENT (HCC): ICD-10-CM

## 2020-12-28 DIAGNOSIS — G89.29 CHRONIC INTRACTABLE HEADACHE, UNSPECIFIED HEADACHE TYPE: ICD-10-CM

## 2020-12-28 DIAGNOSIS — G93.9 WHITE MATTER LESION OF CENTRAL NERVOUS SYSTEM: ICD-10-CM

## 2020-12-28 DIAGNOSIS — R90.89 ABNORMAL FINDING ON MRI OF BRAIN: ICD-10-CM

## 2020-12-28 DIAGNOSIS — K59.9 COLONIC INERTIA: ICD-10-CM

## 2020-12-28 PROCEDURE — 99442 PR PHYS/QHP TELEPHONE EVALUATION 11-20 MIN: CPT | Performed by: FAMILY MEDICINE

## 2020-12-28 RX ORDER — LEVOTHYROXINE SODIUM 0.03 MG/1
25 TABLET ORAL DAILY
Qty: 30 TABLET | Refills: 2 | Status: SHIPPED | OUTPATIENT
Start: 2020-12-28 | End: 2021-03-01

## 2020-12-28 RX ORDER — NAPROXEN 500 MG/1
TABLET ORAL
COMMUNITY
Start: 2020-12-27 | End: 2022-06-20

## 2020-12-28 RX ORDER — DOXYCYCLINE 100 MG/1
100 TABLET ORAL 2 TIMES DAILY
Qty: 20 TABLET | Refills: 0 | Status: SHIPPED | OUTPATIENT
Start: 2020-12-28 | End: 2021-01-07

## 2020-12-28 RX ORDER — PREDNISONE 10 MG/1
TABLET ORAL
Qty: 15 TABLET | Refills: 0 | Status: SHIPPED | OUTPATIENT
Start: 2020-12-28 | End: 2021-01-27

## 2020-12-28 RX ORDER — FLUCONAZOLE 150 MG/1
TABLET ORAL
Qty: 3 TABLET | Refills: 0 | Status: SHIPPED | OUTPATIENT
Start: 2020-12-28 | End: 2021-01-21 | Stop reason: SDUPTHER

## 2020-12-28 NOTE — PROGRESS NOTES
.You have chosen to receive care through a telephone visit. Do you consent to use a telephone visit for your medical care today? YES

## 2020-12-28 NOTE — PROGRESS NOTES
TELEPHONE NOTE  2020      TIERNEY FINE   1965      History of Present Illness   Mrs. Fine presents today with several concerns.    tx'd 3 weeks ago for sinusitis with levaquin from , also with diflucan. Had marked improvement in sinus pain, congestion, purulent d/c. Shortly after abx finished symptosm have recurred. She has moved into older home, been exposed to increased dust, mold as well as strong . She c/o facial pain, sinus pressure, Purulent nasal discharge, occ Bloody. Had recent covid test which was negative.    Was advised to have have imaging of sinuses in past to r/o chronic sinusitis. Has not yet had. Had abnormal MRI in 3/2019 with bilateral white matter lesions most consistent chronic small vessel ischemic changes. She did not have f/u contrasted study as advised.    She also requests continuation of low dose levothyorxine for resistant depression; she has found it improves her chronic constipation. denies side effects of med.     Patient's past medical hx, surgical hx, family hx, social hx reviewed on chart. Medication and allergy lists reviewed on chart. Information updated as indicated.      Review of Systems   Constitutional: Positive for fatigue. Negative for fever.   HENT: Positive for congestion, postnasal drip, rhinorrhea, sinus pressure, sinus pain and sore throat. Negative for ear pain, hearing loss, nosebleeds, sneezing and trouble swallowing.    Eyes: Positive for visual disturbance (chronic). Negative for pain.        Dry eyes   Respiratory: Negative for cough, shortness of breath and wheezing.    Cardiovascular: Positive for palpitations (chronic, intermittent, stable). Negative for chest pain and leg swelling.   Gastrointestinal: Positive for abdominal pain (chronic), constipation and nausea. Negative for blood in stool, diarrhea and vomiting.   Endocrine: Positive for heat intolerance. Negative for polydipsia and polyuria.   Genitourinary: Positive for  pelvic pain (chronic). Negative for dysuria and hematuria.   Musculoskeletal: Positive for arthralgias, myalgias, neck pain and neck stiffness. Negative for back pain and joint swelling.   Skin: Negative for rash and wound.   Neurological: Positive for dizziness, tremors, weakness and headaches. Negative for seizures, syncope and speech difficulty.   Hematological: Negative for adenopathy. Bruises/bleeds easily.   Psychiatric/Behavioral: Positive for decreased concentration, dysphoric mood and sleep disturbance. Negative for confusion and suicidal ideas. The patient is nervous/anxious.    Pt's previous ROS reviewed and updated as indicated.       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  -     doxycycline (ADOXA) 100 MG tablet; Take 1 tablet by mouth 2 (Two) Times a Day for 10 days.  -     predniSONE (DELTASONE) 10 MG tablet; 5 po day 1, then decrease by 1 tablet each day until gone (5,4,3,2,1)  -     fluconazole (Diflucan) 150 MG tablet; 1 po now, repeat q 5 days until gone      abx as above. Imaging as above to f/u on abnormal MRI as well as to eval sinuses.     Patient will f/u 1 month as scheduled, f/u sooner as needed.  Patient was encouraged to keep me informed of any acute changes, lack of improvement, or any new concerning symptoms.  Pt is aware of reasons to seek emergent care.  Patient voiced understanding of all  instructions and denied further questions.      This visit has been rescheduled as a phone visit to comply with patient safety concerns in accordance with CDC recommendations. Total time of discussion was 11 minutes.

## 2021-01-13 ENCOUNTER — TELEPHONE (OUTPATIENT)
Dept: FAMILY MEDICINE CLINIC | Facility: CLINIC | Age: 56
End: 2021-01-13

## 2021-01-13 NOTE — TELEPHONE ENCOUNTER
Caller: Donnie Tatiana TIMOTHY    Relationship: Self    Best call back number: [phone]    What orders are you requesting (i.e. lab or imaging): ORDERS FOR X-RAY    In what timeframe would the patient need to come in: ASAP    Where will you receive your lab/imaging services: ANY WHERE YOU SUGGEST    Additional notes: PATIENT FELL AND FEELS LIKE SHE MAY NEED AN X-RAY AS HER PAIN HAS GOTTEN WORSE.PATIENT  FELL ON Sunday, SENT A NOTE TO DR. HENRY VIA MY CHART AS WELL.

## 2021-01-14 DIAGNOSIS — R07.81 RIB PAIN ON LEFT SIDE: Primary | ICD-10-CM

## 2021-01-21 DIAGNOSIS — F51.04 CHRONIC INSOMNIA: ICD-10-CM

## 2021-01-21 DIAGNOSIS — F43.10 PTSD (POST-TRAUMATIC STRESS DISORDER): ICD-10-CM

## 2021-01-21 DIAGNOSIS — Z79.890 HORMONE REPLACEMENT THERAPY (HRT): ICD-10-CM

## 2021-01-21 DIAGNOSIS — N95.1 POSTMENOPAUSAL DISORDER: ICD-10-CM

## 2021-01-21 DIAGNOSIS — M47.812 CERVICAL ARTHRITIS: ICD-10-CM

## 2021-01-21 DIAGNOSIS — F33.9 RECURRENT MAJOR DEPRESSION RESISTANT TO TREATMENT (HCC): ICD-10-CM

## 2021-01-21 DIAGNOSIS — G47.33 OBSTRUCTIVE SLEEP APNEA: ICD-10-CM

## 2021-01-21 DIAGNOSIS — G47.19 EXCESSIVE DAYTIME SLEEPINESS: ICD-10-CM

## 2021-01-22 DIAGNOSIS — N95.1 POSTMENOPAUSAL DISORDER: ICD-10-CM

## 2021-01-22 DIAGNOSIS — Z79.890 HORMONE REPLACEMENT THERAPY (HRT): ICD-10-CM

## 2021-01-25 DIAGNOSIS — M47.812 CERVICAL ARTHRITIS: ICD-10-CM

## 2021-01-25 DIAGNOSIS — F43.10 PTSD (POST-TRAUMATIC STRESS DISORDER): ICD-10-CM

## 2021-01-25 RX ORDER — LORATADINE AND PSEUDOEPHEDRINE SULFATE 10; 240 MG/1; MG/1
1 TABLET, FILM COATED, EXTENDED RELEASE ORAL DAILY
Qty: 30 TABLET | Refills: 2 | Status: SHIPPED | OUTPATIENT
Start: 2021-01-25 | End: 2021-03-24 | Stop reason: SDUPTHER

## 2021-01-25 RX ORDER — FAMCICLOVIR 500 MG/1
500 TABLET ORAL DAILY
Qty: 30 TABLET | Refills: 5 | Status: SHIPPED | OUTPATIENT
Start: 2021-01-25 | End: 2021-03-24 | Stop reason: SDUPTHER

## 2021-01-25 RX ORDER — FLUCONAZOLE 150 MG/1
TABLET ORAL
Qty: 3 TABLET | Refills: 0 | Status: SHIPPED | OUTPATIENT
Start: 2021-01-25 | End: 2021-01-27

## 2021-01-25 RX ORDER — DIAZEPAM 2 MG/1
2 TABLET ORAL EVERY 12 HOURS PRN
Qty: 60 TABLET | Refills: 0 | OUTPATIENT
Start: 2021-01-25

## 2021-01-25 RX ORDER — OMEPRAZOLE 40 MG/1
40 CAPSULE, DELAYED RELEASE ORAL DAILY
Qty: 30 CAPSULE | Refills: 5 | Status: SHIPPED | OUTPATIENT
Start: 2021-01-25 | End: 2021-03-24 | Stop reason: SDUPTHER

## 2021-01-25 RX ORDER — DIAZEPAM 2 MG/1
2 TABLET ORAL EVERY 12 HOURS PRN
Qty: 60 TABLET | Refills: 0 | Status: SHIPPED | OUTPATIENT
Start: 2021-01-25 | End: 2021-02-24 | Stop reason: SDUPTHER

## 2021-01-25 RX ORDER — FAMCICLOVIR 500 MG/1
500 TABLET ORAL DAILY
Qty: 30 TABLET | Refills: 5 | OUTPATIENT
Start: 2021-01-25

## 2021-01-25 RX ORDER — ZOLPIDEM TARTRATE 12.5 MG/1
12.5 TABLET, FILM COATED, EXTENDED RELEASE ORAL NIGHTLY PRN
Qty: 30 TABLET | Refills: 0 | Status: SHIPPED | OUTPATIENT
Start: 2021-01-25 | End: 2021-01-27

## 2021-01-25 RX ORDER — ONDANSETRON 4 MG/1
4 TABLET, FILM COATED ORAL EVERY 8 HOURS PRN
Qty: 30 TABLET | Refills: 5 | Status: SHIPPED | OUTPATIENT
Start: 2021-01-25 | End: 2021-05-25 | Stop reason: SDUPTHER

## 2021-01-25 RX ORDER — METHYLPHENIDATE HYDROCHLORIDE 10 MG/1
10 TABLET ORAL 2 TIMES DAILY
Qty: 60 TABLET | Refills: 0 | Status: SHIPPED | OUTPATIENT
Start: 2021-01-25 | End: 2021-02-24 | Stop reason: SDUPTHER

## 2021-01-27 ENCOUNTER — TELEMEDICINE (OUTPATIENT)
Dept: FAMILY MEDICINE CLINIC | Facility: CLINIC | Age: 56
End: 2021-01-27

## 2021-01-27 DIAGNOSIS — F32.A DEPRESSION WITH SOMATIZATION: ICD-10-CM

## 2021-01-27 DIAGNOSIS — F45.0 DEPRESSION WITH SOMATIZATION: ICD-10-CM

## 2021-01-27 DIAGNOSIS — F43.10 PTSD (POST-TRAUMATIC STRESS DISORDER): ICD-10-CM

## 2021-01-27 DIAGNOSIS — R07.89 LEFT-SIDED CHEST WALL PAIN: ICD-10-CM

## 2021-01-27 DIAGNOSIS — I10 ESSENTIAL HYPERTENSION: ICD-10-CM

## 2021-01-27 DIAGNOSIS — F51.04 CHRONIC INSOMNIA: ICD-10-CM

## 2021-01-27 DIAGNOSIS — S29.9XXA INJURY OF CHEST WALL, INITIAL ENCOUNTER: Primary | ICD-10-CM

## 2021-01-27 PROCEDURE — 99214 OFFICE O/P EST MOD 30 MIN: CPT | Performed by: FAMILY MEDICINE

## 2021-01-27 RX ORDER — PREDNISONE 10 MG/1
TABLET ORAL
Qty: 15 TABLET | Refills: 0 | Status: SHIPPED | OUTPATIENT
Start: 2021-01-27 | End: 2021-02-02 | Stop reason: SDUPTHER

## 2021-01-27 RX ORDER — AZITHROMYCIN 250 MG/1
TABLET, FILM COATED ORAL
Qty: 6 TABLET | Refills: 0 | Status: SHIPPED | OUTPATIENT
Start: 2021-01-27 | End: 2021-02-02 | Stop reason: SDUPTHER

## 2021-01-27 RX ORDER — HYDROCHLOROTHIAZIDE 25 MG/1
25 TABLET ORAL DAILY
Qty: 30 TABLET | Refills: 5 | Status: SHIPPED | OUTPATIENT
Start: 2021-01-27 | End: 2021-02-24 | Stop reason: SDUPTHER

## 2021-01-27 RX ORDER — FAMCICLOVIR 500 MG/1
500 TABLET ORAL 3 TIMES DAILY
Qty: 21 TABLET | Refills: 0 | Status: SHIPPED | OUTPATIENT
Start: 2021-01-27 | End: 2021-02-03

## 2021-01-27 RX ORDER — LIDOCAINE 50 MG/G
3 PATCH TOPICAL EVERY 24 HOURS
Qty: 90 PATCH | Refills: 2 | Status: SHIPPED | OUTPATIENT
Start: 2021-01-27 | End: 2021-03-25

## 2021-01-27 RX ORDER — ZALEPLON 5 MG/1
CAPSULE ORAL
Qty: 30 CAPSULE | Refills: 0 | Status: SHIPPED | OUTPATIENT
Start: 2021-01-27 | End: 2021-03-25

## 2021-01-27 NOTE — PROGRESS NOTES
"VIDEO/TELEHALTH VISIT  DOS 2021    TIERNEY FINE   1965      History of Present Illness   Mrs. Fine presents today with c/o fall 3 weeks ago. Feel from attic stairs, hit table, struck left side, left leg. Most concerned about left chest wall and back. Pain with deep breath, \"feels something move inside\" when breathing deeply. Xray ribs was negative for fracture. She was seen at  2 days later. Did not have xray until later. No hemoptysis, no SOA or cough.     This past Saturday pain got worse again. No change in activity or re-injury. She does recall \"doing more housework\" and feels she may have re-aggravated it.     Able to take deep breath, hurts at end of breath. Pain radiating around left side to back. No rash.    She requests a replacement for ambien which is no longer working. Has taken Sonata in distant past and would like to try that again. If she falls asleep she can generally sleeping 4-5 hours.    She c/o BP running higher than usual. Having some increased swelling.     She c/o persistent depression. Feels it has been worsened by pain and poor sleep. Sought online care with provider [facility] but has had difficulty reconnecting via online portal.     She requests zpak for residual sinutisits as she has not been able to tolerate augmentin.      Patient's past medical hx, surgical hx, family hx, social hx reviewed on chart. Medication and allergy lists reviewed on chart. Information updated as indicated.      Review of Systems   Constitutional: Positive for fatigue. Negative for fever.   HENT: Positive for congestion, postnasal drip, rhinorrhea, sinus pressure, sinus pain and sore throat. Negative for ear pain, hearing loss, nosebleeds, sneezing and trouble swallowing.    Eyes: Positive for visual disturbance (chronic). Negative for pain.        Dry eyes   Respiratory: Negative for cough, shortness of breath and wheezing.    Cardiovascular: Positive for chest pain (chest wall) and " palpitations (chronic, intermittent, stable). Negative for leg swelling.   Gastrointestinal: Positive for abdominal pain (chronic), constipation and nausea. Negative for blood in stool, diarrhea and vomiting.   Endocrine: Positive for heat intolerance. Negative for polydipsia and polyuria.   Genitourinary: Positive for pelvic pain (chronic). Negative for dysuria and hematuria.   Musculoskeletal: Positive for arthralgias, myalgias, neck pain and neck stiffness. Negative for back pain and joint swelling.   Skin: Negative for rash and wound.   Neurological: Positive for dizziness, tremors, weakness and headaches. Negative for seizures, syncope and speech difficulty.   Hematological: Negative for adenopathy. Bruises/bleeds easily.   Psychiatric/Behavioral: Positive for decreased concentration, dysphoric mood and sleep disturbance. Negative for confusion and suicidal ideas. The patient is nervous/anxious.    Pt's previous ROS reviewed and updated as indicated.         Physical Exam  Unable to perform physical exam due to video malfunction during telehealth video visit.    XRAY LEFT RIBS/CHEST REVIEWED ON CHART-NEG    Lab Results   Component Value Date    WBC 8.10 10/16/2019    HGB 12.8 10/16/2019    HCT 38.1 10/16/2019    MCV 88.8 10/16/2019     10/16/2019       Lab Results   Component Value Date    GLUCOSE 92 01/04/2014    BUN 12 10/16/2019    CREATININE 0.79 10/16/2019    EGFRIFNONA 76 10/16/2019    EGFRIFAFRI 92 10/16/2019    BCR 15.2 10/16/2019    K 3.6 10/16/2019    CO2 28.4 10/16/2019    CALCIUM 9.2 10/16/2019    PROTENTOTREF 6.5 10/16/2019    ALBUMIN 4.50 10/16/2019    LABIL2 2.3 10/16/2019    AST 30 10/16/2019    ALT 27 10/16/2019       Lab Results   Component Value Date    CHLPL 180 10/16/2019    TRIG 88 10/16/2019    HDL 55 10/16/2019     (H) 10/16/2019       Lab Results   Component Value Date    HGBA1C 5.70 (H) 07/22/2020       Lab Results   Component Value Date    TSH 2.900 07/22/2020        Diagnoses and all orders for this visit:    Injury of chest wall, initial encounter    Chronic insomnia  -     zaleplon (Sonata) 5 MG capsule; 1-2 po qhs as needed for sleep    Left-sided chest wall pain    Essential hypertension    Depression with somatization    PTSD (post-traumatic stress disorder)    Other orders  -     famciclovir (Famvir) 500 MG tablet; Take 1 tablet by mouth 3 (Three) Times a Day for 7 days.  -     lidocaine (Lidoderm) 5 %; Place 3 patches on the skin as directed by provider Daily. Remove & Discard patch within 12 hours or as directed by MD  -     predniSONE (DELTASONE) 10 MG tablet; 5 po day 1, then decrease by 1 tablet each day until gone (5,4,3,2,1)  -     hydroCHLOROthiazide (HYDRODIURIL) 25 MG tablet; Take 1 tablet by mouth Daily. As needed for BP or swelling.  -     azithromycin (Zithromax Z-Wale) 250 MG tablet; Take 2 tablets the first day, then 1 tablet daily for 4 days.      HTN not controlled, with increased swelling. Start HCTZ as above. Risks/benefits and potential side effects reviewed.    Persistent depression with PTSD, continue prozac. I have provided her names of behavioral health providers here at the clinic and she will let me know if she wants apt. She denies SI. No psychotic features.    Persistent chest wall pain s/p fall, neg rib xrays. Some concern for possible trigger of herpes zoster. Anti-viral as above, oral CS as above. Lidoderm    I have reviewed risks/benefits and potential side effects of various treatment options for insomnia due to sleep initial problem. Pt voices understanding and as she has responded to sonata in past wishes to proceed with that first. We have reviewed sleep hygiene recommendations as well.    Patient will f/u in 1 month, sooner as needed.  Patient was encouraged to keep me informed of any acute changes, lack of improvement, or any new concerning symptoms.  Pt is aware of reasons to seek emergent care.  Patient voiced understanding of  all instructions and denied further questions.  As part of patient's treatment plan I am prescribing a controlled substance.  The patient has been made aware of the appropriate use of such medications, including potential risk of somnolence, limited ability to drive and/or work safely, and potential for dependence and/or overdose.  It has also been made clear that these medications are for use by this patient only, without concomitant use of alcohol or other substances, unless prescribed.  KIMBERLY reviewed.    Unable to complete visit using a video connection to the patient. A phone visit was used to complete this visits. Total time of discussion was 26 minutes.

## 2021-02-01 ENCOUNTER — PRIOR AUTHORIZATION (OUTPATIENT)
Dept: FAMILY MEDICINE CLINIC | Facility: CLINIC | Age: 56
End: 2021-02-01

## 2021-02-01 NOTE — TELEPHONE ENCOUNTER
A PRIOR AUTH HAS BEEN STARTED THROUGH [employer] FOR LIDOCAINE PATCHES.      CURRENTLY WAITING ON A RESPONSE FROM THE INSURANCE.

## 2021-02-02 RX ORDER — PREDNISONE 10 MG/1
TABLET ORAL
Qty: 15 TABLET | Refills: 0 | Status: SHIPPED | OUTPATIENT
Start: 2021-02-02 | End: 2021-03-25

## 2021-02-02 RX ORDER — AZITHROMYCIN 250 MG/1
TABLET, FILM COATED ORAL
Qty: 6 TABLET | Refills: 0 | Status: SHIPPED | OUTPATIENT
Start: 2021-02-02 | End: 2021-03-25

## 2021-02-12 RX ORDER — POLYETHYLENE GLYCOL 3350 17 G/17G
17 POWDER, FOR SOLUTION ORAL 2 TIMES DAILY
Qty: 527 G | Refills: 5 | Status: SHIPPED | OUTPATIENT
Start: 2021-02-12 | End: 2021-03-25

## 2021-02-12 RX ORDER — AMOXICILLIN 250 MG
1 CAPSULE ORAL 2 TIMES DAILY
Qty: 60 TABLET | Refills: 5 | Status: SHIPPED | OUTPATIENT
Start: 2021-02-12 | End: 2021-02-24 | Stop reason: SDUPTHER

## 2021-02-12 RX ORDER — LINACLOTIDE 290 UG/1
290 CAPSULE, GELATIN COATED ORAL
Qty: 30 CAPSULE | Refills: 5 | Status: SHIPPED | OUTPATIENT
Start: 2021-02-12 | End: 2021-08-10

## 2021-02-19 ENCOUNTER — E-VISIT (OUTPATIENT)
Dept: FAMILY MEDICINE CLINIC | Facility: TELEHEALTH | Age: 56
End: 2021-02-19

## 2021-02-24 DIAGNOSIS — G47.33 OBSTRUCTIVE SLEEP APNEA: ICD-10-CM

## 2021-02-24 DIAGNOSIS — Z79.890 HORMONE REPLACEMENT THERAPY (HRT): ICD-10-CM

## 2021-02-24 DIAGNOSIS — F33.9 RECURRENT MAJOR DEPRESSION RESISTANT TO TREATMENT (HCC): ICD-10-CM

## 2021-02-24 DIAGNOSIS — F43.10 PTSD (POST-TRAUMATIC STRESS DISORDER): ICD-10-CM

## 2021-02-24 DIAGNOSIS — M47.812 CERVICAL ARTHRITIS: ICD-10-CM

## 2021-02-24 DIAGNOSIS — N95.1 POSTMENOPAUSAL DISORDER: ICD-10-CM

## 2021-02-24 DIAGNOSIS — G47.19 EXCESSIVE DAYTIME SLEEPINESS: ICD-10-CM

## 2021-02-25 RX ORDER — LISINOPRIL 10 MG/1
20 TABLET ORAL DAILY
Qty: 180 TABLET | Refills: 3 | Status: SHIPPED | OUTPATIENT
Start: 2021-02-25 | End: 2021-06-24 | Stop reason: SDUPTHER

## 2021-02-25 RX ORDER — FLUOXETINE HYDROCHLORIDE 20 MG/1
20 CAPSULE ORAL DAILY
Qty: 30 CAPSULE | Refills: 5 | Status: SHIPPED | OUTPATIENT
Start: 2021-02-25 | End: 2021-06-25 | Stop reason: SDUPTHER

## 2021-02-25 RX ORDER — METHYLPHENIDATE HYDROCHLORIDE 10 MG/1
10 TABLET ORAL 2 TIMES DAILY
Qty: 60 TABLET | Refills: 0 | Status: SHIPPED | OUTPATIENT
Start: 2021-02-25 | End: 2021-03-24 | Stop reason: SDUPTHER

## 2021-02-25 RX ORDER — AMOXICILLIN 250 MG
1 CAPSULE ORAL 2 TIMES DAILY
Qty: 60 TABLET | Refills: 5 | Status: SHIPPED | OUTPATIENT
Start: 2021-02-25 | End: 2021-03-24 | Stop reason: SDUPTHER

## 2021-02-25 RX ORDER — HYDROCHLOROTHIAZIDE 25 MG/1
25 TABLET ORAL DAILY
Qty: 30 TABLET | Refills: 5 | Status: SHIPPED | OUTPATIENT
Start: 2021-02-25 | End: 2021-03-24 | Stop reason: SDUPTHER

## 2021-02-25 RX ORDER — DIAZEPAM 2 MG/1
2 TABLET ORAL EVERY 12 HOURS PRN
Qty: 60 TABLET | Refills: 0 | Status: SHIPPED | OUTPATIENT
Start: 2021-02-25 | End: 2021-03-24 | Stop reason: SDUPTHER

## 2021-02-25 NOTE — TELEPHONE ENCOUNTER
KIMBERLY reviewed. Refill approved. Medication E rx'd to pharmacy as requested. Pt to keep f/u apt as scheduled.    UDS appropriate and UTD

## 2021-03-01 RX ORDER — LEVOTHYROXINE SODIUM 0.03 MG/1
25 TABLET ORAL DAILY
Qty: 30 TABLET | Refills: 2 | Status: SHIPPED | OUTPATIENT
Start: 2021-03-01 | End: 2021-03-25

## 2021-03-19 ENCOUNTER — TELEPHONE (OUTPATIENT)
Dept: FAMILY MEDICINE CLINIC | Facility: CLINIC | Age: 56
End: 2021-03-19

## 2021-03-19 ENCOUNTER — E-VISIT (OUTPATIENT)
Dept: FAMILY MEDICINE CLINIC | Facility: TELEHEALTH | Age: 56
End: 2021-03-19

## 2021-03-19 NOTE — TELEPHONE ENCOUNTER
PATIENT SAID THAT SHE HAD BEEN TAKING A TESTOSTERONE CREAM PRESCRIBED BY HER PCP. SHE SAID THAT SHE PRESCRIBED IT TO HER ABOUT TWO MONTHS AGO. SHE CAN NOT REMEMBER WHAT IT IS CALLED BUT SHE WOULD LIKE IT SENT TO THE PH[facility]Y [facility] - [address] - [phone] [facility] [phone] FX     CONTACT: [phone]

## 2021-03-24 DIAGNOSIS — G47.19 EXCESSIVE DAYTIME SLEEPINESS: ICD-10-CM

## 2021-03-24 DIAGNOSIS — J34.3 HYPERTROPHY, NASAL, TURBINATE: ICD-10-CM

## 2021-03-24 DIAGNOSIS — G47.33 OBSTRUCTIVE SLEEP APNEA: ICD-10-CM

## 2021-03-24 DIAGNOSIS — F33.9 RECURRENT MAJOR DEPRESSION RESISTANT TO TREATMENT (HCC): ICD-10-CM

## 2021-03-24 DIAGNOSIS — F43.10 PTSD (POST-TRAUMATIC STRESS DISORDER): ICD-10-CM

## 2021-03-24 DIAGNOSIS — M47.812 CERVICAL ARTHRITIS: ICD-10-CM

## 2021-03-25 RX ORDER — HYDROCHLOROTHIAZIDE 25 MG/1
25 TABLET ORAL DAILY
Qty: 30 TABLET | Refills: 5 | Status: SHIPPED | OUTPATIENT
Start: 2021-03-25 | End: 2021-04-26 | Stop reason: SDUPTHER

## 2021-03-25 RX ORDER — DIAZEPAM 2 MG/1
2 TABLET ORAL EVERY 12 HOURS PRN
Qty: 60 TABLET | Refills: 0 | Status: SHIPPED | OUTPATIENT
Start: 2021-03-25 | End: 2021-04-26 | Stop reason: SDUPTHER

## 2021-03-25 RX ORDER — LORATADINE AND PSEUDOEPHEDRINE SULFATE 10; 240 MG/1; MG/1
1 TABLET, FILM COATED, EXTENDED RELEASE ORAL DAILY
Qty: 30 TABLET | Refills: 2 | Status: SHIPPED | OUTPATIENT
Start: 2021-03-25 | End: 2021-06-24 | Stop reason: SDUPTHER

## 2021-03-25 RX ORDER — OMEPRAZOLE 40 MG/1
40 CAPSULE, DELAYED RELEASE ORAL DAILY
Qty: 30 CAPSULE | Refills: 5 | Status: SHIPPED | OUTPATIENT
Start: 2021-03-25 | End: 2021-04-26 | Stop reason: SDUPTHER

## 2021-03-25 RX ORDER — METHYLPHENIDATE HYDROCHLORIDE 10 MG/1
10 TABLET ORAL 2 TIMES DAILY
Qty: 60 TABLET | Refills: 0 | Status: SHIPPED | OUTPATIENT
Start: 2021-03-25 | End: 2021-04-26 | Stop reason: SDUPTHER

## 2021-03-25 RX ORDER — FAMCICLOVIR 500 MG/1
500 TABLET ORAL DAILY
Qty: 30 TABLET | Refills: 5 | Status: SHIPPED | OUTPATIENT
Start: 2021-03-25 | End: 2021-04-26 | Stop reason: SDUPTHER

## 2021-03-25 RX ORDER — AMOXICILLIN 250 MG
1 CAPSULE ORAL 2 TIMES DAILY
Qty: 60 TABLET | Refills: 5 | Status: SHIPPED | OUTPATIENT
Start: 2021-03-25 | End: 2021-04-26 | Stop reason: SDUPTHER

## 2021-03-25 RX ORDER — FLUTICASONE PROPIONATE 50 MCG
2 SPRAY, SUSPENSION (ML) NASAL DAILY
Qty: 48 ML | Refills: 0 | Status: SHIPPED | OUTPATIENT
Start: 2021-03-25 | End: 2021-05-25 | Stop reason: SDUPTHER

## 2021-03-26 ENCOUNTER — TELEPHONE (OUTPATIENT)
Dept: FAMILY MEDICINE CLINIC | Facility: CLINIC | Age: 56
End: 2021-03-26

## 2021-03-26 DIAGNOSIS — F51.04 CHRONIC INSOMNIA: Primary | ICD-10-CM

## 2021-03-26 NOTE — TELEPHONE ENCOUNTER
Called pt and lvm due to her having a e visit with [facility] she just needs a telephone apt with wes as [facility] does not see sick patients.

## 2021-03-29 RX ORDER — ZOLPIDEM TARTRATE 12.5 MG/1
TABLET, FILM COATED, EXTENDED RELEASE ORAL
Qty: 30 TABLET | Refills: 0 | Status: SHIPPED | OUTPATIENT
Start: 2021-03-29 | End: 2021-05-05

## 2021-04-12 RX ORDER — FLUCONAZOLE 150 MG/1
TABLET ORAL
Qty: 2 TABLET | Refills: 0 | Status: SHIPPED | OUTPATIENT
Start: 2021-04-12 | End: 2021-05-02

## 2021-04-26 DIAGNOSIS — M47.812 CERVICAL ARTHRITIS: ICD-10-CM

## 2021-04-26 DIAGNOSIS — G47.33 OBSTRUCTIVE SLEEP APNEA: ICD-10-CM

## 2021-04-26 DIAGNOSIS — G47.19 EXCESSIVE DAYTIME SLEEPINESS: ICD-10-CM

## 2021-04-26 DIAGNOSIS — F43.10 PTSD (POST-TRAUMATIC STRESS DISORDER): ICD-10-CM

## 2021-04-26 DIAGNOSIS — F33.9 RECURRENT MAJOR DEPRESSION RESISTANT TO TREATMENT (HCC): ICD-10-CM

## 2021-04-27 RX ORDER — METHYLPHENIDATE HYDROCHLORIDE 10 MG/1
10 TABLET ORAL 2 TIMES DAILY
Qty: 60 TABLET | Refills: 0 | Status: SHIPPED | OUTPATIENT
Start: 2021-04-27 | End: 2021-05-25 | Stop reason: SDUPTHER

## 2021-04-27 RX ORDER — OMEPRAZOLE 40 MG/1
40 CAPSULE, DELAYED RELEASE ORAL DAILY
Qty: 30 CAPSULE | Refills: 5 | Status: SHIPPED | OUTPATIENT
Start: 2021-04-27 | End: 2021-07-23

## 2021-04-27 RX ORDER — AMOXICILLIN 250 MG
1 CAPSULE ORAL 2 TIMES DAILY
Qty: 60 TABLET | Refills: 5 | Status: SHIPPED | OUTPATIENT
Start: 2021-04-27 | End: 2021-05-25 | Stop reason: SDUPTHER

## 2021-04-27 RX ORDER — HYDROCHLOROTHIAZIDE 25 MG/1
25 TABLET ORAL DAILY
Qty: 30 TABLET | Refills: 5 | Status: SHIPPED | OUTPATIENT
Start: 2021-04-27 | End: 2021-05-25 | Stop reason: SDUPTHER

## 2021-04-27 RX ORDER — FAMCICLOVIR 500 MG/1
500 TABLET ORAL DAILY
Qty: 30 TABLET | Refills: 5 | Status: SHIPPED | OUTPATIENT
Start: 2021-04-27 | End: 2021-05-25 | Stop reason: SDUPTHER

## 2021-04-27 RX ORDER — DIAZEPAM 2 MG/1
2 TABLET ORAL EVERY 12 HOURS PRN
Qty: 60 TABLET | Refills: 0 | Status: SHIPPED | OUTPATIENT
Start: 2021-04-27 | End: 2021-05-25 | Stop reason: SDUPTHER

## 2021-04-27 RX ORDER — SUMATRIPTAN 100 MG/1
TABLET, FILM COATED ORAL
Qty: 9 TABLET | Refills: 3 | Status: SHIPPED | OUTPATIENT
Start: 2021-04-27 | End: 2021-06-08 | Stop reason: SDUPTHER

## 2021-04-27 NOTE — TELEPHONE ENCOUNTER
UDS appropriate, CSA TAMARA.    KIMBERLY reviewed. Refill approved. Medication E rx'd to pharmacy as requested. Pt to keep f/u apt as scheduled.

## 2021-05-01 ENCOUNTER — E-VISIT (OUTPATIENT)
Dept: FAMILY MEDICINE CLINIC | Facility: TELEHEALTH | Age: 56
End: 2021-05-01

## 2021-05-01 DIAGNOSIS — J32.9 SINUSITIS, UNSPECIFIED CHRONICITY, UNSPECIFIED LOCATION: ICD-10-CM

## 2021-05-01 DIAGNOSIS — J02.9 PHARYNGITIS, UNSPECIFIED ETIOLOGY: Primary | ICD-10-CM

## 2021-05-01 PROCEDURE — 99421 OL DIG E/M SVC 5-10 MIN: CPT | Performed by: NURSE PRACTITIONER

## 2021-05-02 RX ORDER — ESTRADIOL 0.1 MG/G
CREAM VAGINAL DAILY
COMMUNITY
Start: 2021-03-10

## 2021-05-02 RX ORDER — LEVOTHYROXINE SODIUM 0.03 MG/1
25 TABLET ORAL DAILY
COMMUNITY
Start: 2021-03-30 | End: 2021-05-17

## 2021-05-02 RX ORDER — ESTRADIOL 0.05 MG/D
PATCH TRANSDERMAL
COMMUNITY
Start: 2021-03-09 | End: 2021-08-03

## 2021-05-02 RX ORDER — AMOXICILLIN AND CLAVULANATE POTASSIUM 875; 125 MG/1; MG/1
1 TABLET, FILM COATED ORAL 2 TIMES DAILY
Qty: 20 TABLET | Refills: 0 | Status: SHIPPED | OUTPATIENT
Start: 2021-05-02 | End: 2021-05-05

## 2021-05-02 NOTE — PATIENT INSTRUCTIONS
Pharyngitis    Pharyngitis is a sore throat (pharynx). This is when there is redness, pain, and swelling in your throat. Most of the time, this condition gets better on its own. In some cases, you may need medicine.  Follow these instructions at home:  · Take over-the-counter and prescription medicines only as told by your doctor.  ? If you were prescribed an antibiotic medicine, take it as told by your doctor. Do not stop taking the antibiotic even if you start to feel better.  ? Do not give children aspirin. Aspirin has been linked to Reye syndrome.  · Drink enough water and fluids to keep your pee (urine) clear or pale yellow.  · Get a lot of rest.  · Rinse your mouth (gargle) with a salt-water mixture 3-4 times a day or as needed. To make a salt-water mixture, completely dissolve ½-1 tsp of salt in 1 cup of warm water.  · If your doctor approves, you may use throat lozenges or sprays to soothe your throat.  Contact a doctor if:  · You have large, tender lumps in your neck.  · You have a rash.  · You cough up green, yellow-brown, or bloody spit.  Get help right away if:  · You have a stiff neck.  · You drool or cannot swallow liquids.  · You cannot drink or take medicines without throwing up.  · You have very bad pain that does not go away with medicine.  · You have problems breathing, and it is not from a stuffy nose.  · You have new pain and swelling in your knees, ankles, wrists, or elbows.  Summary  · Pharyngitis is a sore throat (pharynx). This is when there is redness, pain, and swelling in your throat.  · If you were prescribed an antibiotic medicine, take it as told by your doctor. Do not stop taking the antibiotic even if you start to feel better.  · Most of the time, pharyngitis gets better on its own. Sometimes, you may need medicine.  This information is not intended to replace advice given to you by your health care provider. Make sure you discuss any questions you have with your health care  provider.  Document Revised: 11/30/2018 Document Reviewed: 01/23/2018  [employer] Patient Education © 2021 [employer]  Sinusitis, Adult  Sinusitis is soreness and swelling (inflammation) of your sinuses. Sinuses are hollow spaces in the bones around your face. They are located:  · Around your eyes.  · In the middle of your forehead.  · Behind your nose.  · In your cheekbones.  Your sinuses and nasal passages are lined with a fluid called mucus. Mucus drains out of your sinuses. Swelling can trap mucus in your sinuses. This lets germs (bacteria, virus, or fungus) grow, which leads to infection. Most of the time, this condition is caused by a virus.  What are the causes?  This condition is caused by:  · Allergies.  · Asthma.  · Germs.  · Things that block your nose or sinuses.  · Growths in the nose (nasal polyps).  · Chemicals or irritants in the air.  · Fungus (rare).  What increases the risk?  You are more likely to develop this condition if:  · You have a weak body defense system (immune system).  · You do a lot of swimming or diving.  · You use nasal sprays too much.  · You smoke.  What are the signs or symptoms?  The main symptoms of this condition are pain and a feeling of pressure around the sinuses. Other symptoms include:  · Stuffy nose (congestion).  · Runny nose (drainage).  · Swelling and warmth in the sinuses.  · Headache.  · Toothache.  · A cough that may get worse at night.  · Mucus that collects in the throat or the back of the nose (postnasal drip).  · Being unable to smell and taste.  · Being very tired (fatigue).  · A fever.  · Sore throat.  · Bad breath.  How is this diagnosed?  This condition is diagnosed based on:  · Your symptoms.  · Your medical history.  · A physical exam.  · Tests to find out if your condition is short-term (acute) or long-term (chronic). Your doctor may:  ? Check your nose for growths (polyps).  ? Check your sinuses using a tool that has a light (endoscope).  ? Check for  allergies or germs.  ? Do imaging tests, such as an MRI or CT scan.  How is this treated?  Treatment for this condition depends on the cause and whether it is short-term or long-term.  · If caused by a virus, your symptoms should go away on their own within 10 days. You may be given medicines to relieve symptoms. They include:  ? Medicines that shrink swollen tissue in the nose.  ? Medicines that treat allergies (antihistamines).  ? A spray that treats swelling of the nostrils.   ? Rinses that help get rid of thick mucus in your nose (nasal saline washes).  · If caused by bacteria, your doctor may wait to see if you will get better without treatment. You may be given antibiotic medicine if you have:  ? A very bad infection.  ? A weak body defense system.  · If caused by growths in the nose, you may need to have surgery.  Follow these instructions at home:  Medicines  · Take, use, or apply over-the-counter and prescription medicines only as told by your doctor. These may include nasal sprays.  · If you were prescribed an antibiotic medicine, take it as told by your doctor. Do not stop taking the antibiotic even if you start to feel better.  Hydrate and humidify    · Drink enough water to keep your pee (urine) pale yellow.  · Use a cool mist humidifier to keep the humidity level in your home above 50%.  · Breathe in steam for 10-15 minutes, 3-4 times a day, or as told by your doctor. You can do this in the bathroom while a hot shower is running.  · Try not to spend time in cool or dry air.  Rest  · Rest as much as you can.  · Sleep with your head raised (elevated).  · Make sure you get enough sleep each night.  General instructions    · Put a warm, moist washcloth on your face 3-4 times a day, or as often as told by your doctor. This will help with discomfort.  · Wash your hands often with soap and water. If there is no soap and water, use hand .  · Do not smoke. Avoid being around people who are smoking  (secondhand smoke).  · Keep all follow-up visits as told by your doctor. This is important.  Contact a doctor if:  · You have a fever.  · Your symptoms get worse.  · Your symptoms do not get better within 10 days.  Get help right away if:  · You have a very bad headache.  · You cannot stop throwing up (vomiting).  · You have very bad pain or swelling around your face or eyes.  · You have trouble seeing.  · You feel confused.  · Your neck is stiff.  · You have trouble breathing.  Summary  · Sinusitis is swelling of your sinuses. Sinuses are hollow spaces in the bones around your face.  · This condition is caused by tissues in your nose that become inflamed or swollen. This traps germs. These can lead to infection.  · If you were prescribed an antibiotic medicine, take it as told by your doctor. Do not stop taking it even if you start to feel better.  · Keep all follow-up visits as told by your doctor. This is important.  This information is not intended to replace advice given to you by your health care provider. Make sure you discuss any questions you have with your health care provider.  Document Revised: 05/20/2019 Document Reviewed: 05/20/2019  [employer] Patient Education © 2021 [employer]  Amoxicillin; Clavulanic Acid Tablets  What is this medicine?  AMOXICILLIN; CLAVULANIC ACID (a mox i CIARRA in; RONNIE reyes AS id) is a penicillin antibiotic. It treats some infections caused by bacteria. It will not work for colds, the flu, or other viruses.  This medicine may be used for other purposes; ask your health care provider or pharmacist if you have questions.  COMMON BRAND NAME(S): Augmentin  What should I tell my health care provider before I take this medicine?  They need to know if you have any of these conditions:  · bowel disease, like colitis  · kidney disease  · liver disease  · mononucleosis  · an unusual or allergic reaction to amoxicillin, penicillin, cephalosporin, other antibiotics, clavulanic acid,  other medicines, foods, dyes, or preservatives  · pregnant or trying to get pregnant  · breast-feeding  How should I use this medicine?  Take this drug by mouth. Take it as directed on the prescription label at the same time every day. Take it with food at the start of a meal or snack. Take all of this drug unless your health care provider tells you to stop it early. Keep taking it even if you think you are better.  Talk to your health care provider about the use of this drug in children. While it may be prescribed for selected conditions, precautions do apply.  Overdosage: If you think you have taken too much of this medicine contact a poison control center or emergency room at once.  NOTE: This medicine is only for you. Do not share this medicine with others.  What if I miss a dose?  If you miss a dose, take it as soon as you can. If it is almost time for your next dose, take only that dose. Do not take double or extra doses.  What may interact with this medicine?  · allopurinol  · anticoagulants  · birth control pills  · methotrexate  · probenecid  This list may not describe all possible interactions. Give your health care provider a list of all the medicines, herbs, non-prescription drugs, or dietary supplements you use. Also tell them if you smoke, drink alcohol, or use illegal drugs. Some items may interact with your medicine.  What should I watch for while using this medicine?  Tell your doctor or healthcare provider if your symptoms do not improve.  This medicine may cause serious skin reactions. They can happen weeks to months after starting the medicine. Contact your healthcare provider right away if you notice fevers or flu-like symptoms with a rash. The rash may be red or purple and then turn into blisters or peeling of the skin. Or, you might notice a red rash with swelling of the face, lips or lymph nodes in your neck or under your arms.  Do not treat diarrhea with over the counter products. Contact  your doctor if you have diarrhea that lasts more than 2 days or if it is severe and watery.  If you have diabetes, you may get a false-positive result for sugar in your urine. Check with your doctor or healthcare provider.  Birth control pills may not work properly while you are taking this medicine. Talk to your doctor about using an extra method of birth control.  What side effects may I notice from receiving this medicine?  Side effects that you should report to your doctor or health care professional as soon as possible:  · allergic reactions like skin rash, itching or hives, swelling of the face, lips, or tongue  · breathing problems  · dark urine  · fever or chills, sore throat  · redness, blistering, peeling, or loosening of the skin, including inside the mouth  · seizures  · trouble passing urine or change in the amount of urine  · unusual bleeding, bruising  · unusually weak or tired  · white patches or sores in the mouth or throat  Side effects that usually do not require medical attention (report to your doctor or health care professional if they continue or are bothersome):  · diarrhea  · dizziness  · headache  · nausea, vomiting  · stomach upset  · vaginal or anal irritation  This list may not describe all possible side effects. Call your doctor for medical advice about side effects. You may report side effects to FDA at 3-716-FDA-7007.  Where should I keep my medicine?  Keep out of the reach of children and pets.  Store at room temperature between 20 and 25 degrees C (68 and 77 degrees F). Throw away any unused drug after the expiration date.  NOTE: This sheet is a summary. It may not cover all possible information. If you have questions about this medicine, talk to your doctor, pharmacist, or health care provider.  © 2021 Elsevier/Gold [employer] (2020-07-20 11:55:53)

## 2021-05-02 NOTE — PROGRESS NOTES
Tatiana Fields    1965  [id number]    I have reviewed the e-Visit questionnaire and patient's answers, my assessment and plan are as follows:    HPI  Patient reports nasal congestion with yellow nasal drainage, pain around the nose and face that increases when pressed, headache, tenderness/swelling in front of neck, core/scratchy throat for 1-4 days. Reports she tested negative for COVID-19 on 3/1/21. Does not report fever, cough, SOA, or loss of taste or smell. Denies smoking or chronic illnesses. Denies recent hospitalization or antibiotic use for this condition in the last month (chart/med review shows antibiotic prescribed on 3/25/21 for these symptoms). She reports positive exposure to strep throat. Reports she is taking Claritin and Naproxen for her symptoms.     Review of Systems - Negative except as listed in the HPI.  History obtained from chart review and the patient.      Diagnoses and all orders for this visit:    1. Pharyngitis, unspecified etiology (Primary)  -     amoxicillin-clavulanate (Augmentin) 875-125 MG per tablet; Take 1 tablet by mouth 2 (Two) Times a Day for 10 days.  Dispense: 20 tablet; Refill: 0    2. Sinusitis, unspecified chronicity, unspecified location  -     amoxicillin-clavulanate (Augmentin) 875-125 MG per tablet; Take 1 tablet by mouth 2 (Two) Times a Day for 10 days.  Dispense: 20 tablet; Refill: 0    Plan of Care:   Rest, get plenty of clear decaffeinated fluids, alternate Tylenol and Ibuprofen per package directions as needed for pain/headache/fever, warm salt water gargles for throat, change your toothbrush in 48 hours, take antibiotic to completion and follow up with your regular provider as scheduled on 5/4/21. Go to Urgent Care or ER for new or worsening symptoms.    Any medications prescribed have been sent electronically to   [id number] - [place], [place] - [address] AT [facility] [address] . [facility] - [phone] [facility] [phone] FX  [id number] [facility] [address]  Phone: [phone] Fax: [phone]    [facility] - [place], [place] - [address] [facility] - [phone]  - [phone] FX  [address] [facility] [address]  Phone: [phone] Fax: [phone]    Time spent on this patient approx 10 min.     Maral Wilcox, SOULEYMANE  05/02/21  05:23 EDT

## 2021-05-05 ENCOUNTER — OFFICE VISIT (OUTPATIENT)
Dept: FAMILY MEDICINE CLINIC | Facility: CLINIC | Age: 56
End: 2021-05-05

## 2021-05-05 DIAGNOSIS — J01.01 ACUTE RECURRENT MAXILLARY SINUSITIS: ICD-10-CM

## 2021-05-05 DIAGNOSIS — G47.33 OBSTRUCTIVE SLEEP APNEA: ICD-10-CM

## 2021-05-05 DIAGNOSIS — K59.9 COLONIC INERTIA: ICD-10-CM

## 2021-05-05 DIAGNOSIS — F51.04 CHRONIC INSOMNIA: ICD-10-CM

## 2021-05-05 DIAGNOSIS — F45.0 DEPRESSION WITH SOMATIZATION: ICD-10-CM

## 2021-05-05 DIAGNOSIS — E55.9 VITAMIN D DEFICIENCY: ICD-10-CM

## 2021-05-05 DIAGNOSIS — N30.10 INTERSTITIAL CYSTITIS: ICD-10-CM

## 2021-05-05 DIAGNOSIS — I10 ESSENTIAL HYPERTENSION: ICD-10-CM

## 2021-05-05 DIAGNOSIS — G43.719 INTRACTABLE CHRONIC MIGRAINE WITHOUT AURA AND WITHOUT STATUS MIGRAINOSUS: ICD-10-CM

## 2021-05-05 DIAGNOSIS — E53.8 VITAMIN B12 DEFICIENCY: ICD-10-CM

## 2021-05-05 DIAGNOSIS — F43.10 PTSD (POST-TRAUMATIC STRESS DISORDER): ICD-10-CM

## 2021-05-05 DIAGNOSIS — N95.1 POSTMENOPAUSAL DISORDER: ICD-10-CM

## 2021-05-05 DIAGNOSIS — Z13.220 SCREENING FOR LIPID DISORDERS: ICD-10-CM

## 2021-05-05 DIAGNOSIS — Z79.890 HORMONE REPLACEMENT THERAPY (HRT): ICD-10-CM

## 2021-05-05 DIAGNOSIS — F32.A DEPRESSION WITH SOMATIZATION: ICD-10-CM

## 2021-05-05 DIAGNOSIS — R73.01 IFG (IMPAIRED FASTING GLUCOSE): ICD-10-CM

## 2021-05-05 DIAGNOSIS — M79.7 FIBROMYALGIA: ICD-10-CM

## 2021-05-05 DIAGNOSIS — G47.61 PERIODIC LIMB MOVEMENTS OF SLEEP: ICD-10-CM

## 2021-05-05 DIAGNOSIS — Z00.00 MEDICARE ANNUAL WELLNESS VISIT, SUBSEQUENT: Primary | ICD-10-CM

## 2021-05-05 PROCEDURE — 96372 THER/PROPH/DIAG INJ SC/IM: CPT | Performed by: FAMILY MEDICINE

## 2021-05-05 PROCEDURE — 99213 OFFICE O/P EST LOW 20 MIN: CPT | Performed by: FAMILY MEDICINE

## 2021-05-05 PROCEDURE — 96160 PT-FOCUSED HLTH RISK ASSMT: CPT | Performed by: FAMILY MEDICINE

## 2021-05-05 PROCEDURE — 1170F FXNL STATUS ASSESSED: CPT | Performed by: FAMILY MEDICINE

## 2021-05-05 PROCEDURE — 1125F AMNT PAIN NOTED PAIN PRSNT: CPT | Performed by: FAMILY MEDICINE

## 2021-05-05 PROCEDURE — G0439 PPPS, SUBSEQ VISIT: HCPCS | Performed by: FAMILY MEDICINE

## 2021-05-05 RX ORDER — ZOLPIDEM TARTRATE 5 MG/1
5 TABLET ORAL NIGHTLY PRN
Qty: 30 TABLET | Refills: 0 | Status: SHIPPED | OUTPATIENT
Start: 2021-05-05 | End: 2021-06-01 | Stop reason: SDUPTHER

## 2021-05-05 RX ORDER — DOXEPIN HYDROCHLORIDE 10 MG/1
10 CAPSULE ORAL NIGHTLY
Qty: 30 CAPSULE | Refills: 2 | Status: SHIPPED | OUTPATIENT
Start: 2021-05-05 | End: 2021-06-10

## 2021-05-05 RX ORDER — CEFTRIAXONE 1 G/1
1 INJECTION, POWDER, FOR SOLUTION INTRAMUSCULAR; INTRAVENOUS ONCE
Status: COMPLETED | OUTPATIENT
Start: 2021-05-05 | End: 2021-05-05

## 2021-05-05 RX ORDER — CLINDAMYCIN HYDROCHLORIDE 300 MG/1
300 CAPSULE ORAL 3 TIMES DAILY
Qty: 21 CAPSULE | Refills: 0 | Status: SHIPPED | OUTPATIENT
Start: 2021-05-05 | End: 2021-05-12

## 2021-05-05 RX ADMIN — CEFTRIAXONE 1 G: 1 INJECTION, POWDER, FOR SOLUTION INTRAMUSCULAR; INTRAVENOUS at 14:50

## 2021-05-17 RX ORDER — LEVOTHYROXINE SODIUM 0.03 MG/1
TABLET ORAL
Qty: 30 TABLET | Refills: 2 | Status: SHIPPED | OUTPATIENT
Start: 2021-05-17 | End: 2021-06-24 | Stop reason: SDUPTHER

## 2021-05-20 VITALS
WEIGHT: 151 LBS | HEART RATE: 82 BPM | SYSTOLIC BLOOD PRESSURE: 122 MMHG | HEIGHT: 64 IN | RESPIRATION RATE: 20 BRPM | BODY MASS INDEX: 25.78 KG/M2 | DIASTOLIC BLOOD PRESSURE: 70 MMHG | OXYGEN SATURATION: 98 %

## 2021-05-25 DIAGNOSIS — G47.19 EXCESSIVE DAYTIME SLEEPINESS: ICD-10-CM

## 2021-05-25 DIAGNOSIS — J34.3 HYPERTROPHY, NASAL, TURBINATE: ICD-10-CM

## 2021-05-25 DIAGNOSIS — F33.9 RECURRENT MAJOR DEPRESSION RESISTANT TO TREATMENT (HCC): ICD-10-CM

## 2021-05-25 DIAGNOSIS — M47.812 CERVICAL ARTHRITIS: ICD-10-CM

## 2021-05-25 DIAGNOSIS — G47.33 OBSTRUCTIVE SLEEP APNEA: ICD-10-CM

## 2021-05-25 DIAGNOSIS — F43.10 PTSD (POST-TRAUMATIC STRESS DISORDER): ICD-10-CM

## 2021-05-26 RX ORDER — FLUTICASONE PROPIONATE 50 MCG
2 SPRAY, SUSPENSION (ML) NASAL DAILY
Qty: 48 ML | Refills: 0 | Status: SHIPPED | OUTPATIENT
Start: 2021-05-26 | End: 2021-06-24 | Stop reason: SDUPTHER

## 2021-05-26 RX ORDER — AMOXICILLIN 250 MG
1 CAPSULE ORAL 2 TIMES DAILY
Qty: 60 TABLET | Refills: 5 | Status: SHIPPED | OUTPATIENT
Start: 2021-05-26 | End: 2021-08-09 | Stop reason: SDUPTHER

## 2021-05-26 RX ORDER — DIAZEPAM 2 MG/1
2 TABLET ORAL EVERY 12 HOURS PRN
Qty: 60 TABLET | Refills: 0 | Status: SHIPPED | OUTPATIENT
Start: 2021-05-26 | End: 2021-06-24 | Stop reason: SDUPTHER

## 2021-05-26 RX ORDER — HYDROCHLOROTHIAZIDE 25 MG/1
25 TABLET ORAL DAILY
Qty: 30 TABLET | Refills: 5 | Status: SHIPPED | OUTPATIENT
Start: 2021-05-26 | End: 2021-06-24 | Stop reason: SDUPTHER

## 2021-05-26 RX ORDER — FAMCICLOVIR 500 MG/1
500 TABLET ORAL DAILY
Qty: 30 TABLET | Refills: 5 | Status: SHIPPED | OUTPATIENT
Start: 2021-05-26 | End: 2021-06-24 | Stop reason: SDUPTHER

## 2021-05-26 RX ORDER — ONDANSETRON 4 MG/1
4 TABLET, FILM COATED ORAL EVERY 8 HOURS PRN
Qty: 30 TABLET | Refills: 5 | Status: SHIPPED | OUTPATIENT
Start: 2021-05-26 | End: 2021-06-24 | Stop reason: SDUPTHER

## 2021-05-26 RX ORDER — METHYLPHENIDATE HYDROCHLORIDE 10 MG/1
10 TABLET ORAL 2 TIMES DAILY
Qty: 60 TABLET | Refills: 0 | Status: SHIPPED | OUTPATIENT
Start: 2021-05-26 | End: 2021-06-24 | Stop reason: SDUPTHER

## 2021-06-01 DIAGNOSIS — F51.04 CHRONIC INSOMNIA: ICD-10-CM

## 2021-06-01 RX ORDER — ZOLPIDEM TARTRATE 10 MG/1
TABLET ORAL
Qty: 30 TABLET | Refills: 0 | Status: SHIPPED | OUTPATIENT
Start: 2021-06-01 | End: 2021-07-06

## 2021-06-08 RX ORDER — SUMATRIPTAN 100 MG/1
TABLET, FILM COATED ORAL
Qty: 9 TABLET | Refills: 3 | Status: SHIPPED | OUTPATIENT
Start: 2021-06-08 | End: 2021-10-06 | Stop reason: SDUPTHER

## 2021-06-08 RX ORDER — CLINDAMYCIN HYDROCHLORIDE 300 MG/1
300 CAPSULE ORAL 3 TIMES DAILY
Qty: 21 CAPSULE | Refills: 0 | OUTPATIENT
Start: 2021-06-08 | End: 2021-06-15

## 2021-06-09 DIAGNOSIS — Z79.890 HORMONE REPLACEMENT THERAPY (HRT): ICD-10-CM

## 2021-06-09 DIAGNOSIS — N95.1 POSTMENOPAUSAL DISORDER: ICD-10-CM

## 2021-06-14 ENCOUNTER — TELEPHONE (OUTPATIENT)
Dept: FAMILY MEDICINE CLINIC | Facility: CLINIC | Age: 56
End: 2021-06-14

## 2021-06-14 NOTE — TELEPHONE ENCOUNTER
Caller: Tatiana Fields    Relationship to patient: Self    Best call back number: [phone]    What is the call regarding:  PATIENT STATES THAT SHE SAW DR HNERY LAST WEEK. SHE HAS CHRONIC SINUS PROBLEMS AND SHE IS SPITTING UP GREEN PHLEGHM AND IS WANTING TO GET A Z-PACK AND STEROIDS SENT IN. [facility]

## 2021-06-22 ENCOUNTER — TELEPHONE (OUTPATIENT)
Dept: FAMILY MEDICINE CLINIC | Facility: CLINIC | Age: 56
End: 2021-06-22

## 2021-06-22 DIAGNOSIS — M54.2 CHRONIC NECK PAIN: ICD-10-CM

## 2021-06-22 DIAGNOSIS — R51.9 CHRONIC DAILY HEADACHE: ICD-10-CM

## 2021-06-22 DIAGNOSIS — G89.29 CHRONIC NECK PAIN: ICD-10-CM

## 2021-06-22 DIAGNOSIS — J32.9 CHRONIC RECURRENT SINUSITIS: ICD-10-CM

## 2021-06-22 DIAGNOSIS — M47.812 CERVICAL ARTHRITIS: ICD-10-CM

## 2021-06-22 DIAGNOSIS — G54.2 CERVICAL NERVE ROOT COMPRESSION: ICD-10-CM

## 2021-06-22 DIAGNOSIS — R51.9 FACIAL PAIN: Primary | ICD-10-CM

## 2021-06-22 NOTE — TELEPHONE ENCOUNTER
Patient called about getting an antibiotic. I relayed the message from PCP, and she would like to proceed with the CT, to be done at [facility]. She also asked if she can get a physical therapy order for her neck. She will try to find one close to her house. She said she has seen you for this problem previously.

## 2021-06-24 DIAGNOSIS — M47.812 CERVICAL ARTHRITIS: ICD-10-CM

## 2021-06-24 DIAGNOSIS — F33.9 RECURRENT MAJOR DEPRESSION RESISTANT TO TREATMENT (HCC): ICD-10-CM

## 2021-06-24 DIAGNOSIS — F43.10 PTSD (POST-TRAUMATIC STRESS DISORDER): ICD-10-CM

## 2021-06-24 DIAGNOSIS — G47.33 OBSTRUCTIVE SLEEP APNEA: ICD-10-CM

## 2021-06-24 DIAGNOSIS — J34.3 HYPERTROPHY, NASAL, TURBINATE: ICD-10-CM

## 2021-06-24 DIAGNOSIS — G47.19 EXCESSIVE DAYTIME SLEEPINESS: ICD-10-CM

## 2021-06-25 ENCOUNTER — PATIENT MESSAGE (OUTPATIENT)
Dept: FAMILY MEDICINE CLINIC | Facility: CLINIC | Age: 56
End: 2021-06-25

## 2021-06-25 DIAGNOSIS — G47.19 EXCESSIVE DAYTIME SLEEPINESS: ICD-10-CM

## 2021-06-25 DIAGNOSIS — F43.10 PTSD (POST-TRAUMATIC STRESS DISORDER): ICD-10-CM

## 2021-06-25 DIAGNOSIS — N95.1 POSTMENOPAUSAL DISORDER: ICD-10-CM

## 2021-06-25 DIAGNOSIS — G47.33 OBSTRUCTIVE SLEEP APNEA: ICD-10-CM

## 2021-06-25 DIAGNOSIS — Z79.890 HORMONE REPLACEMENT THERAPY (HRT): ICD-10-CM

## 2021-06-25 DIAGNOSIS — M47.812 CERVICAL ARTHRITIS: ICD-10-CM

## 2021-06-25 DIAGNOSIS — F33.9 RECURRENT MAJOR DEPRESSION RESISTANT TO TREATMENT (HCC): ICD-10-CM

## 2021-06-26 DIAGNOSIS — F43.10 PTSD (POST-TRAUMATIC STRESS DISORDER): ICD-10-CM

## 2021-06-26 DIAGNOSIS — M47.812 CERVICAL ARTHRITIS: ICD-10-CM

## 2021-06-28 RX ORDER — METHYLPHENIDATE HYDROCHLORIDE 10 MG/1
10 TABLET ORAL 2 TIMES DAILY
Qty: 60 TABLET | Refills: 0 | Status: SHIPPED | OUTPATIENT
Start: 2021-06-28 | End: 2021-07-07

## 2021-06-28 RX ORDER — LEVOTHYROXINE SODIUM 0.03 MG/1
25 TABLET ORAL DAILY
Qty: 90 TABLET | Refills: 0 | Status: SHIPPED | OUTPATIENT
Start: 2021-06-28 | End: 2021-08-09 | Stop reason: SDUPTHER

## 2021-06-28 RX ORDER — FAMCICLOVIR 500 MG/1
500 TABLET ORAL DAILY
Qty: 30 TABLET | Refills: 5 | Status: SHIPPED | OUTPATIENT
Start: 2021-06-28 | End: 2021-08-09 | Stop reason: SDUPTHER

## 2021-06-28 RX ORDER — DIAZEPAM 2 MG/1
2 TABLET ORAL EVERY 12 HOURS PRN
Qty: 60 TABLET | Refills: 0 | OUTPATIENT
Start: 2021-06-28

## 2021-06-28 RX ORDER — ONDANSETRON 4 MG/1
4 TABLET, FILM COATED ORAL EVERY 8 HOURS PRN
Qty: 30 TABLET | Refills: 5 | Status: SHIPPED | OUTPATIENT
Start: 2021-06-28 | End: 2021-10-06 | Stop reason: SDUPTHER

## 2021-06-28 RX ORDER — FLUTICASONE PROPIONATE 50 MCG
2 SPRAY, SUSPENSION (ML) NASAL DAILY
Qty: 48 ML | Refills: 0 | Status: SHIPPED | OUTPATIENT
Start: 2021-06-28 | End: 2021-11-01 | Stop reason: SDUPTHER

## 2021-06-28 RX ORDER — HYDROCHLOROTHIAZIDE 25 MG/1
25 TABLET ORAL DAILY
Qty: 90 TABLET | Refills: 11 | Status: SHIPPED | OUTPATIENT
Start: 2021-06-28 | End: 2021-08-09 | Stop reason: SDUPTHER

## 2021-06-28 RX ORDER — PROGESTERONE 100 MG/1
CAPSULE ORAL
Qty: 20 CAPSULE | Refills: 5 | OUTPATIENT
Start: 2021-06-28

## 2021-06-28 RX ORDER — LORATADINE AND PSEUDOEPHEDRINE SULFATE 10; 240 MG/1; MG/1
1 TABLET, FILM COATED, EXTENDED RELEASE ORAL DAILY
Qty: 30 TABLET | Refills: 2 | Status: SHIPPED | OUTPATIENT
Start: 2021-06-28 | End: 2021-07-30 | Stop reason: SDUPTHER

## 2021-06-28 RX ORDER — METHYLPHENIDATE HYDROCHLORIDE 10 MG/1
10 TABLET ORAL 2 TIMES DAILY
Qty: 60 TABLET | Refills: 0 | OUTPATIENT
Start: 2021-06-28

## 2021-06-28 RX ORDER — LISINOPRIL 10 MG/1
20 TABLET ORAL DAILY
Qty: 180 TABLET | Refills: 1 | Status: SHIPPED | OUTPATIENT
Start: 2021-06-28 | End: 2021-08-09 | Stop reason: SDUPTHER

## 2021-06-28 RX ORDER — FLUOXETINE HYDROCHLORIDE 20 MG/1
20 CAPSULE ORAL DAILY
Qty: 30 CAPSULE | Refills: 5 | Status: SHIPPED | OUTPATIENT
Start: 2021-06-28 | End: 2021-12-24 | Stop reason: SDUPTHER

## 2021-06-28 RX ORDER — DIAZEPAM 2 MG/1
2 TABLET ORAL EVERY 12 HOURS PRN
Qty: 60 TABLET | Refills: 0 | Status: SHIPPED | OUTPATIENT
Start: 2021-06-28 | End: 2021-07-30 | Stop reason: SDUPTHER

## 2021-06-28 NOTE — TELEPHONE ENCOUNTER
From: Tatiana Fields  To: Amber Mills MD  Sent: 6/25/2021 4:10 PM EDT  Subject: Prescription Question    I thought we had a follow up appointment from my visit in May today but was actually with neurology.  I tried to call but couldn't reach anyone, ill try and call back in a few.  Its time for my refill on Ritalin, and I wanted to either going back on the Provigil we did before or Ritalin Extended release.   If you are ok in switching back or changing to the Ritalin ER please send that in instead.of regular Ritalin.  I'm going try and call office again. Thanks   Have a good weekend

## 2021-06-28 NOTE — TELEPHONE ENCOUNTER
KIMBERLY reviewed. Refill approved. Medication E rx'd to pharmacy as requested. Pt to keep f/u apt as scheduled.    UDS and CSA UTD   Spontaneous, unlabored and symmetrical

## 2021-07-06 DIAGNOSIS — F51.04 CHRONIC INSOMNIA: ICD-10-CM

## 2021-07-06 RX ORDER — ZOLPIDEM TARTRATE 10 MG/1
TABLET ORAL
Qty: 30 TABLET | Refills: 0 | Status: SHIPPED | OUTPATIENT
Start: 2021-07-06 | End: 2021-08-10

## 2021-07-07 ENCOUNTER — TELEMEDICINE (OUTPATIENT)
Dept: FAMILY MEDICINE CLINIC | Facility: CLINIC | Age: 56
End: 2021-07-07

## 2021-07-07 DIAGNOSIS — F45.0 DEPRESSION WITH SOMATIZATION: ICD-10-CM

## 2021-07-07 DIAGNOSIS — F32.A DEPRESSION WITH SOMATIZATION: ICD-10-CM

## 2021-07-07 DIAGNOSIS — G47.19 EXCESSIVE DAYTIME SLEEPINESS: ICD-10-CM

## 2021-07-07 DIAGNOSIS — G95.9 CERVICAL MYELOPATHY (HCC): ICD-10-CM

## 2021-07-07 DIAGNOSIS — J32.9 RECURRENT SINUS INFECTIONS: ICD-10-CM

## 2021-07-07 DIAGNOSIS — G47.33 OBSTRUCTIVE SLEEP APNEA: ICD-10-CM

## 2021-07-07 DIAGNOSIS — F33.9 RECURRENT MAJOR DEPRESSION RESISTANT TO TREATMENT (HCC): Primary | ICD-10-CM

## 2021-07-07 DIAGNOSIS — M79.7 FIBROMYALGIA: ICD-10-CM

## 2021-07-07 DIAGNOSIS — Z79.899 POLYPHARMACY: ICD-10-CM

## 2021-07-07 PROCEDURE — 99214 OFFICE O/P EST MOD 30 MIN: CPT | Performed by: FAMILY MEDICINE

## 2021-07-07 RX ORDER — DEXAMETHASONE 4 MG/1
4 TABLET ORAL 2 TIMES DAILY WITH MEALS
Qty: 6 TABLET | Refills: 0 | Status: SHIPPED | OUTPATIENT
Start: 2021-07-07 | End: 2021-07-10

## 2021-07-07 RX ORDER — AZITHROMYCIN 250 MG/1
TABLET, FILM COATED ORAL
Qty: 6 TABLET | Refills: 0 | Status: SHIPPED | OUTPATIENT
Start: 2021-07-07 | End: 2021-08-23

## 2021-07-07 RX ORDER — METHYLPHENIDATE HYDROCHLORIDE 27 MG/1
27 TABLET ORAL EVERY MORNING
Qty: 30 TABLET | Refills: 0 | Status: SHIPPED | OUTPATIENT
Start: 2021-07-07 | End: 2021-08-06 | Stop reason: SDUPTHER

## 2021-07-07 NOTE — PROGRESS NOTES
"VIDEO/TELEHEALTH VISIT  DOS 2021    TIERNEY FINE   1965      History of Present Illness  Mrs. Fine presents today for routine follow-up on several concerns.    She will be getting physical therapy next week for management of persistent head and neck pain (cervical DDD/DJD).    She has CT sinuses upcoming for further eval of persistent bouts of acute sinusitis, persistent facial pain.  In the interim she continues to have purulent nasal discharge, facial pressure/pain, headache.  No fever.  Does have dental pain with bending forward.  Would like Z-Wale as she is able to tolerate well.    She is on low-dose thyroid replacement as adjunctive treatment for resistant depression and chronic fatigue.  Due for follow-up TSH.  Reports feeling more depressed.    Currently on Ritalin for persistent excessive daytime sleepiness despite adequate treatment of DANE, chronic fatigue syndrome and resistant depression.  Has been taking short acting twice daily.  Reports some clinical improvement but is short-lived and feels she has \"ups and downs\".  Has taken Provigil in past as well.    Labs have been ordered on her behalf which she has not yet completed.  Plans to complete the soon at [facility] in [place].    She has had flare of diffuse chronic pain related to fibromyalgia.  When she is in more pain, she is less active, feels this further aggravates her depression.  No suicidal ideation.    Patient's past medical hx, surgical hx, family hx, social hx reviewed on chart. Medication and allergy lists reviewed on chart. Information updated as indicated.      Review of Systems   Constitutional: Positive for fatigue. Negative for fever.   HENT: Positive for congestion, postnasal drip, rhinorrhea, sinus pressure, sinus pain and sore throat. Negative for ear pain, hearing loss, nosebleeds, sneezing and trouble swallowing.    Eyes: Positive for visual disturbance (chronic). Negative for pain.        Dry eyes "   Respiratory: Negative for cough, shortness of breath and wheezing.    Cardiovascular: Positive for palpitations (chronic, intermittent, stable). Negative for chest pain and leg swelling.   Gastrointestinal: Positive for abdominal pain (chronic), constipation and nausea. Negative for blood in stool, diarrhea and vomiting.   Endocrine: Positive for heat intolerance. Negative for polydipsia and polyuria.   Genitourinary: Positive for pelvic pain (chronic). Negative for dysuria and hematuria.   Musculoskeletal: Positive for arthralgias, myalgias, neck pain and neck stiffness. Negative for back pain and joint swelling.   Skin: Negative for rash and wound.   Neurological: Positive for dizziness, tremors, weakness and headaches. Negative for seizures, syncope and speech difficulty.   Hematological: Negative for adenopathy. Bruises/bleeds easily.   Psychiatric/Behavioral: Positive for decreased concentration, dysphoric mood and sleep disturbance. Negative for confusion and suicidal ideas. The patient is nervous/anxious.    Pt's previous ROS reviewed and updated as indicated.       Physical Exam  Unable to complete physical exam due to malfunction of video platform during this telehealth visit.    Diagnoses and all orders for this visit:    Recurrent major depression resistant to treatment (CMS/HCC)  -     methylphenidate (Concerta) 27 MG CR tablet; Take 1 tablet by mouth Every Morning    Obstructive sleep apnea  -     methylphenidate (Concerta) 27 MG CR tablet; Take 1 tablet by mouth Every Morning    Excessive daytime sleepiness  -     methylphenidate (Concerta) 27 MG CR tablet; Take 1 tablet by mouth Every Morning    Polypharmacy    Depression with somatization    Fibromyalgia    Cervical myelopathy (CMS/HCC)    Recurrent sinus infections    Other orders  -     azithromycin (Zithromax Z-Wale) 250 MG tablet; Take 2 tablets the first day, then 1 tablet daily for 4 days.  -     dexamethasone (DECADRON) 4 MG tablet; Take 1  tablet by mouth 2 (Two) Times a Day With Meals for 3 days.  -     estradiol (MINIVELLE, VIVELLE-DOT) 0.05 MG/24HR patch; Place 1 patch on the skin as directed by provider.  -     Progesterone (PROMETRIUM) 100 MG capsule; Take 100 mg by mouth every night at bedtime.  -     metroNIDAZOLE (METROGEL) 0.75 % vaginal gel; INSERT 1 APPLICATORFUL INTRAVAGINALLY EVERY NIGHT FOR 5 DAYS  -     conjugated estrogens (Premarin) 0.625 MG/GM vaginal cream; Insert  into the vagina.  -     clindamycin (CLEOCIN) 2 % vaginal cream; Insert 1 applicator into the vagina.      She is encouraged to have CT sinuses as previously ordered to help inappropriately diagnosing chronic sinusitis versus other etiology for her facial pain.  In the interim Z-Wale and Dex as above.  Continue routine allergy medications.    She is encouraged to keep appointment with PT next week as scheduled for further management of chronic neck pain, fibromyalgia.    We have discussed risk/benefits and potential side effects various treatment options for excessive daytime sleepiness despite adequate treatment of DANE, chronic fatigue syndrome, resistant depression.  She voiced understanding wished to proceed with a trial of long-acting methylphenidate in place of her previous short acting.  Also recheck TSH as previously requested.    She is once again encouraged to have routine surveillance labs as previously ordered on chart.    Follow-up in 4 to 6 weeks, sooner as needed/instructed.  I will contact patient regarding test results and provide instructions regarding any necessary changes in plan of care.  Patient was encouraged to keep me informed of any acute changes, lack of improvement, or any new concerning symptoms.  Pt is aware of reasons to seek emergent care.  Patient voiced understanding of all instructions and denied further questions.  As part of patient's treatment plan I am prescribing a controlled substance.  The patient has been made aware of the  appropriate use of such medications, including potential risk of somnolence, limited ability to drive and/or work safely, and potential for dependence and/or overdose.  It has also been made clear that these medications are for use by this patient only, without concomitant use of alcohol or other substances, unless prescribed.  History and physical exam exhibit continued safe and appropriate use of controlled substance.  KIMBERLY reviewed.  Patient has completed a prescribing agreement detailing terms of continued prescribing of controlled substances, including monitoring KIMBERLY reports, urine drug screening, and pill counts if necessary.  Patient is aware that inappropriate use will result in cessation of prescribing such medications.      Please note that portions of this note may have been completed with a voice recognition program. Efforts were made to edit the dictations, but occasionally words are mistranscribed.      Unable to complete visit using a video connection to the patient. A phone visit was used to complete this visits. Total time of discussion was 12 minutes.

## 2021-07-10 PROBLEM — M62.89 PELVIC FLOOR DYSFUNCTION IN FEMALE: Status: ACTIVE | Noted: 2021-02-10

## 2021-07-10 PROBLEM — N81.9 VAGINAL VAULT PROLAPSE: Status: ACTIVE | Noted: 2021-05-31

## 2021-07-10 PROBLEM — N81.10 PELVIC ORGAN PROLAPSE QUANTIFICATION STAGE 2 CYSTOCELE: Status: ACTIVE | Noted: 2021-02-10

## 2021-07-10 PROBLEM — M50.30 DDD (DEGENERATIVE DISC DISEASE), CERVICAL: Status: ACTIVE | Noted: 2019-04-15

## 2021-07-10 PROBLEM — G95.9 CERVICAL MYELOPATHY (HCC): Status: ACTIVE | Noted: 2018-03-13

## 2021-07-10 RX ORDER — ESTRADIOL 0.05 MG/D
1 FILM, EXTENDED RELEASE TRANSDERMAL
COMMUNITY
Start: 2021-06-10 | End: 2022-05-23 | Stop reason: SDUPTHER

## 2021-07-10 RX ORDER — CLINDAMYCIN PHOSPHATE 20 MG/G
1 CREAM VAGINAL
COMMUNITY
Start: 2021-07-06 | End: 2021-07-13

## 2021-07-10 RX ORDER — PROGESTERONE 100 MG/1
100 CAPSULE ORAL
COMMUNITY
Start: 2021-05-20 | End: 2021-12-20

## 2021-07-10 RX ORDER — CONJUGATED ESTROGENS 0.62 MG/G
CREAM VAGINAL
COMMUNITY
Start: 2021-06-11 | End: 2021-10-01

## 2021-07-10 RX ORDER — METRONIDAZOLE 7.5 MG/G
GEL VAGINAL
COMMUNITY
Start: 2021-05-28 | End: 2021-08-03

## 2021-07-14 DIAGNOSIS — M54.6 CHRONIC BILATERAL THORACIC BACK PAIN: ICD-10-CM

## 2021-07-14 DIAGNOSIS — G89.29 CHRONIC BILATERAL THORACIC BACK PAIN: ICD-10-CM

## 2021-07-14 DIAGNOSIS — M54.2 NECK PAIN: Primary | ICD-10-CM

## 2021-07-23 ENCOUNTER — TELEPHONE (OUTPATIENT)
Dept: FAMILY MEDICINE CLINIC | Facility: CLINIC | Age: 56
End: 2021-07-23

## 2021-07-23 RX ORDER — AMOXICILLIN AND CLAVULANATE POTASSIUM 875; 125 MG/1; MG/1
1 TABLET, FILM COATED ORAL 2 TIMES DAILY
Qty: 14 TABLET | Refills: 0 | Status: SHIPPED | OUTPATIENT
Start: 2021-07-23 | End: 2021-07-30

## 2021-07-23 RX ORDER — OMEPRAZOLE 40 MG/1
40 CAPSULE, DELAYED RELEASE ORAL DAILY
Qty: 30 CAPSULE | Refills: 5 | Status: SHIPPED | OUTPATIENT
Start: 2021-07-23 | End: 2021-10-06 | Stop reason: SDUPTHER

## 2021-07-23 RX ORDER — DEXAMETHASONE 0.5 MG/1
TABLET ORAL
Qty: 21 TABLET | Refills: 0 | Status: SHIPPED | OUTPATIENT
Start: 2021-07-23 | End: 2021-08-23

## 2021-07-23 NOTE — TELEPHONE ENCOUNTER
PT CALLED STATING THAT SHE HAS AN ABSCESSED TOOTH AND FACE IS SWOLLEN AND WOULD LIKE AN ANTIBIOTIC CALLED IN. PT GOES TO THE DENTIST ON Tuesday BUT WOULD LIKE ANTIBIOTICS BEFORE THEN.     WOULD YOU BE WILLING TO CALL SOME IN?

## 2021-07-30 DIAGNOSIS — M47.812 CERVICAL ARTHRITIS: ICD-10-CM

## 2021-07-30 DIAGNOSIS — F43.10 PTSD (POST-TRAUMATIC STRESS DISORDER): ICD-10-CM

## 2021-07-30 DIAGNOSIS — N95.1 POSTMENOPAUSAL DISORDER: Primary | ICD-10-CM

## 2021-07-30 DIAGNOSIS — Z79.890 HORMONE REPLACEMENT THERAPY (HRT): ICD-10-CM

## 2021-08-02 RX ORDER — LORATADINE AND PSEUDOEPHEDRINE SULFATE 10; 240 MG/1; MG/1
1 TABLET, FILM COATED, EXTENDED RELEASE ORAL DAILY
Qty: 30 TABLET | Refills: 2 | Status: SHIPPED | OUTPATIENT
Start: 2021-08-02 | End: 2021-08-31 | Stop reason: SDUPTHER

## 2021-08-02 RX ORDER — DIAZEPAM 2 MG/1
2 TABLET ORAL EVERY 12 HOURS PRN
Qty: 60 TABLET | Refills: 0 | Status: SHIPPED | OUTPATIENT
Start: 2021-08-02 | End: 2021-08-31

## 2021-08-02 RX ORDER — DIAZEPAM 2 MG/1
2 TABLET ORAL EVERY 12 HOURS PRN
Qty: 60 TABLET | Refills: 0 | OUTPATIENT
Start: 2021-08-02

## 2021-08-02 NOTE — TELEPHONE ENCOUNTER
Please contact pharmacy. I have not prescribed this for her previously? Who was original prescriber?

## 2021-08-02 NOTE — TELEPHONE ENCOUNTER
I contacted the pharmacy.  She said you previously prescribed the testosterone.  She said it had been faxed. She is going to fax me the original prescription.

## 2021-08-03 RX ORDER — DIAZEPAM 2 MG/1
2 TABLET ORAL EVERY 12 HOURS PRN
Qty: 60 TABLET | Refills: 0 | OUTPATIENT
Start: 2021-08-03

## 2021-08-03 RX ORDER — TESTOSTERONE MICRONIZED 100 %
0.5 POWDER (GRAM) MISCELLANEOUS DAILY
Qty: 0.15 G | Refills: 2 | Status: SHIPPED | OUTPATIENT
Start: 2021-08-03 | End: 2021-08-31 | Stop reason: SDUPTHER

## 2021-08-06 DIAGNOSIS — F33.9 RECURRENT MAJOR DEPRESSION RESISTANT TO TREATMENT (HCC): ICD-10-CM

## 2021-08-06 DIAGNOSIS — G47.19 EXCESSIVE DAYTIME SLEEPINESS: ICD-10-CM

## 2021-08-06 DIAGNOSIS — G47.33 OBSTRUCTIVE SLEEP APNEA: ICD-10-CM

## 2021-08-09 DIAGNOSIS — G47.19 EXCESSIVE DAYTIME SLEEPINESS: ICD-10-CM

## 2021-08-09 DIAGNOSIS — F51.04 CHRONIC INSOMNIA: ICD-10-CM

## 2021-08-09 DIAGNOSIS — G47.33 OBSTRUCTIVE SLEEP APNEA: ICD-10-CM

## 2021-08-09 DIAGNOSIS — F33.9 RECURRENT MAJOR DEPRESSION RESISTANT TO TREATMENT (HCC): ICD-10-CM

## 2021-08-09 RX ORDER — METHYLPHENIDATE HYDROCHLORIDE 27 MG/1
27 TABLET ORAL EVERY MORNING
Qty: 30 TABLET | Refills: 0 | Status: SHIPPED | OUTPATIENT
Start: 2021-08-09 | End: 2021-09-10 | Stop reason: SDUPTHER

## 2021-08-10 RX ORDER — ZOLPIDEM TARTRATE 10 MG/1
TABLET ORAL
Qty: 30 TABLET | Refills: 2 | Status: SHIPPED | OUTPATIENT
Start: 2021-08-10 | End: 2021-10-21 | Stop reason: SDUPTHER

## 2021-08-10 RX ORDER — HYDROCHLOROTHIAZIDE 25 MG/1
25 TABLET ORAL DAILY
Qty: 90 TABLET | Refills: 11 | Status: SHIPPED | OUTPATIENT
Start: 2021-08-10 | End: 2021-08-31 | Stop reason: SDUPTHER

## 2021-08-10 RX ORDER — LEVOTHYROXINE SODIUM 0.03 MG/1
25 TABLET ORAL DAILY
Qty: 90 TABLET | Refills: 0 | Status: SHIPPED | OUTPATIENT
Start: 2021-08-10 | End: 2021-08-16 | Stop reason: SDUPTHER

## 2021-08-10 RX ORDER — RIZATRIPTAN BENZOATE 10 MG/1
TABLET ORAL
COMMUNITY
Start: 2021-07-16 | End: 2022-06-21

## 2021-08-10 RX ORDER — FAMCICLOVIR 500 MG/1
500 TABLET ORAL DAILY
Qty: 30 TABLET | Refills: 5 | Status: SHIPPED | OUTPATIENT
Start: 2021-08-10 | End: 2021-08-31 | Stop reason: SDUPTHER

## 2021-08-10 RX ORDER — LINACLOTIDE 290 UG/1
290 CAPSULE, GELATIN COATED ORAL
Qty: 30 CAPSULE | Refills: 5 | Status: SHIPPED | OUTPATIENT
Start: 2021-08-10 | End: 2021-08-24 | Stop reason: SDUPTHER

## 2021-08-10 RX ORDER — METHYLPHENIDATE HYDROCHLORIDE 27 MG/1
27 TABLET ORAL EVERY MORNING
Qty: 30 TABLET | Refills: 0 | Status: CANCELLED | OUTPATIENT
Start: 2021-08-10

## 2021-08-10 RX ORDER — AMOXICILLIN 250 MG
1 CAPSULE ORAL 2 TIMES DAILY
Qty: 60 TABLET | Refills: 5 | Status: SHIPPED | OUTPATIENT
Start: 2021-08-10 | End: 2021-10-21 | Stop reason: SDUPTHER

## 2021-08-10 RX ORDER — LISINOPRIL 10 MG/1
20 TABLET ORAL DAILY
Qty: 180 TABLET | Refills: 1 | Status: SHIPPED | OUTPATIENT
Start: 2021-08-10 | End: 2021-08-31 | Stop reason: SDUPTHER

## 2021-08-10 RX ORDER — LINACLOTIDE 290 UG/1
290 CAPSULE, GELATIN COATED ORAL
Qty: 30 CAPSULE | Refills: 5 | Status: SHIPPED | OUTPATIENT
Start: 2021-08-10 | End: 2021-09-10 | Stop reason: SDUPTHER

## 2021-08-10 RX ORDER — ZOLPIDEM TARTRATE 10 MG/1
TABLET ORAL
Qty: 30 TABLET | Refills: 0 | Status: CANCELLED | OUTPATIENT
Start: 2021-08-10

## 2021-08-10 NOTE — TELEPHONE ENCOUNTER
KIMBERLY reviewed. Refill approved. Medication E rx'd to pharmacy as requested. Pt to keep f/u apt as scheduled.    CSA and UDS UTD

## 2021-08-16 ENCOUNTER — LAB (OUTPATIENT)
Dept: LAB | Facility: HOSPITAL | Age: 56
End: 2021-08-16

## 2021-08-16 DIAGNOSIS — F45.0 DEPRESSION WITH SOMATIZATION: ICD-10-CM

## 2021-08-16 DIAGNOSIS — F51.04 CHRONIC INSOMNIA: ICD-10-CM

## 2021-08-16 DIAGNOSIS — Z13.220 SCREENING FOR LIPID DISORDERS: ICD-10-CM

## 2021-08-16 DIAGNOSIS — E53.8 VITAMIN B12 DEFICIENCY: ICD-10-CM

## 2021-08-16 DIAGNOSIS — F32.A DEPRESSION WITH SOMATIZATION: ICD-10-CM

## 2021-08-16 DIAGNOSIS — J01.01 ACUTE RECURRENT MAXILLARY SINUSITIS: ICD-10-CM

## 2021-08-16 DIAGNOSIS — R73.01 IFG (IMPAIRED FASTING GLUCOSE): ICD-10-CM

## 2021-08-16 DIAGNOSIS — I10 ESSENTIAL HYPERTENSION: ICD-10-CM

## 2021-08-16 DIAGNOSIS — E55.9 VITAMIN D DEFICIENCY: ICD-10-CM

## 2021-08-16 LAB
ALBUMIN SERPL-MCNC: 4.1 G/DL (ref 3.5–5.2)
ALBUMIN/GLOB SERPL: 1.7 G/DL
ALP SERPL-CCNC: 48 U/L (ref 39–117)
ALT SERPL W P-5'-P-CCNC: 17 U/L (ref 1–33)
ANION GAP SERPL CALCULATED.3IONS-SCNC: 8.4 MMOL/L (ref 5–15)
AST SERPL-CCNC: 13 U/L (ref 1–32)
BILIRUB SERPL-MCNC: 0.3 MG/DL (ref 0–1.2)
BUN SERPL-MCNC: 17 MG/DL (ref 6–20)
BUN/CREAT SERPL: 23.3 (ref 7–25)
CALCIUM SPEC-SCNC: 9.5 MG/DL (ref 8.6–10.5)
CHLORIDE SERPL-SCNC: 100 MMOL/L (ref 98–107)
CHOLEST SERPL-MCNC: 172 MG/DL (ref 0–200)
CO2 SERPL-SCNC: 28.6 MMOL/L (ref 22–29)
CREAT SERPL-MCNC: 0.73 MG/DL (ref 0.57–1)
DEPRECATED RDW RBC AUTO: 40.1 FL (ref 37–54)
ERYTHROCYTE [DISTWIDTH] IN BLOOD BY AUTOMATED COUNT: 12.2 % (ref 12.3–15.4)
GFR SERPL CREATININE-BSD FRML MDRD: 83 ML/MIN/1.73
GLOBULIN UR ELPH-MCNC: 2.4 GM/DL
GLUCOSE SERPL-MCNC: 95 MG/DL (ref 65–99)
HBA1C MFR BLD: 5.49 % (ref 4.8–5.6)
HCT VFR BLD AUTO: 41.8 % (ref 34–46.6)
HDLC SERPL-MCNC: 58 MG/DL (ref 40–60)
HGB BLD-MCNC: 13.1 G/DL (ref 12–15.9)
LDLC SERPL CALC-MCNC: 75 MG/DL (ref 0–100)
LDLC/HDLC SERPL: 1.15 {RATIO}
MCH RBC QN AUTO: 28.2 PG (ref 26.6–33)
MCHC RBC AUTO-ENTMCNC: 31.3 G/DL (ref 31.5–35.7)
MCV RBC AUTO: 89.9 FL (ref 79–97)
PLATELET # BLD AUTO: 408 10*3/MM3 (ref 140–450)
PMV BLD AUTO: 10.1 FL (ref 6–12)
POTASSIUM SERPL-SCNC: 3.8 MMOL/L (ref 3.5–5.2)
PROT SERPL-MCNC: 6.5 G/DL (ref 6–8.5)
RBC # BLD AUTO: 4.65 10*6/MM3 (ref 3.77–5.28)
SODIUM SERPL-SCNC: 137 MMOL/L (ref 136–145)
TRIGL SERPL-MCNC: 237 MG/DL (ref 0–150)
TSH SERPL DL<=0.05 MIU/L-ACNC: 2.29 UIU/ML (ref 0.27–4.2)
VLDLC SERPL-MCNC: 39 MG/DL (ref 5–40)
WBC # BLD AUTO: 7.09 10*3/MM3 (ref 3.4–10.8)

## 2021-08-16 PROCEDURE — 82607 VITAMIN B-12: CPT

## 2021-08-16 PROCEDURE — 80061 LIPID PANEL: CPT

## 2021-08-16 PROCEDURE — 36415 COLL VENOUS BLD VENIPUNCTURE: CPT

## 2021-08-16 PROCEDURE — 84443 ASSAY THYROID STIM HORMONE: CPT

## 2021-08-16 PROCEDURE — 80053 COMPREHEN METABOLIC PANEL: CPT

## 2021-08-16 PROCEDURE — 83036 HEMOGLOBIN GLYCOSYLATED A1C: CPT

## 2021-08-16 PROCEDURE — 85027 COMPLETE CBC AUTOMATED: CPT

## 2021-08-16 PROCEDURE — 82306 VITAMIN D 25 HYDROXY: CPT

## 2021-08-16 RX ORDER — LEVOTHYROXINE SODIUM 0.03 MG/1
25 TABLET ORAL DAILY
Qty: 90 TABLET | Refills: 0 | Status: SHIPPED | OUTPATIENT
Start: 2021-08-16 | End: 2021-08-23 | Stop reason: SDUPTHER

## 2021-08-17 LAB
25(OH)D3 SERPL-MCNC: 25.3 NG/ML
VIT B12 BLD-MCNC: 1893 PG/ML (ref 211–946)

## 2021-08-23 ENCOUNTER — TELEMEDICINE (OUTPATIENT)
Dept: FAMILY MEDICINE CLINIC | Facility: CLINIC | Age: 56
End: 2021-08-23

## 2021-08-23 DIAGNOSIS — Z12.31 ENCOUNTER FOR SCREENING MAMMOGRAM FOR MALIGNANT NEOPLASM OF BREAST: ICD-10-CM

## 2021-08-23 DIAGNOSIS — I10 ESSENTIAL HYPERTENSION: Primary | ICD-10-CM

## 2021-08-23 DIAGNOSIS — F32.A DEPRESSION WITH SOMATIZATION: ICD-10-CM

## 2021-08-23 DIAGNOSIS — G47.19 EXCESSIVE DAYTIME SLEEPINESS: ICD-10-CM

## 2021-08-23 DIAGNOSIS — K59.9 COLONIC INERTIA: ICD-10-CM

## 2021-08-23 DIAGNOSIS — F45.0 DEPRESSION WITH SOMATIZATION: ICD-10-CM

## 2021-08-23 DIAGNOSIS — G47.33 OBSTRUCTIVE SLEEP APNEA: ICD-10-CM

## 2021-08-23 DIAGNOSIS — K59.09 CHRONIC CONSTIPATION: ICD-10-CM

## 2021-08-23 PROCEDURE — 99214 OFFICE O/P EST MOD 30 MIN: CPT | Performed by: FAMILY MEDICINE

## 2021-08-23 RX ORDER — LEVOTHYROXINE SODIUM 0.05 MG/1
TABLET ORAL
Qty: 90 TABLET | Refills: 0 | Status: SHIPPED | OUTPATIENT
Start: 2021-08-23 | End: 2021-10-20 | Stop reason: SDUPTHER

## 2021-08-23 RX ORDER — CONJUGATED ESTROGENS 0.62 MG/G
CREAM VAGINAL
COMMUNITY
Start: 2021-06-03 | End: 2021-12-23

## 2021-08-23 NOTE — PROGRESS NOTES
"TELEHEALTH/VIDEO VISIT  DOS 2021    TIERNEY FINE   1965      History of Present Illness  Mrs. Fine presents today for routine follow-up of several chronic medical problems.    She has colonic inertia with chronic constipation.  Still struggling with having regular bowel habits.  Has noticed that thyroid replacement for chronic fatigue/excessive daytime sleepiness/resistant depression has improved her bowel function.  Would like to try \"highest dose possible that is safe\".  She recalls having hypothyroidism in her 30s for which she took thyroid replacement.  She is also taking her Linzess as prescribed.    She has initiated vitamin D replacement as instructed since last visit.    She is working to find a psychologist/therapist for her resistant depression.  Taking Prozac as prescribed.  Denies new concerns.  No suicidal ideation.  Continues to struggle with excessive eating during times of depression, sedentary behavior.  Also on Concerta for management of excessive daytime sleepiness, resistant depression, DANE.  Good functional improvement.  Denies side effects.  No aberrant behavior.    On lisinopril for hypertension.  BP has been fairly well controlled.  She is getting a new blood pressure cuff to continue monitoring at home.    Patient's past medical hx, surgical hx, family hx, social hx reviewed on chart. Medication and allergy lists reviewed on chart. Information updated as indicated.      Review of Systems   Constitutional: Positive for fatigue. Negative for fever.   HENT: Positive for congestion, postnasal drip, rhinorrhea, sinus pressure, sinus pain and sore throat. Negative for ear pain, hearing loss, nosebleeds, sneezing and trouble swallowing.    Eyes: Positive for visual disturbance (chronic). Negative for pain.        Dry eyes   Respiratory: Negative for cough, shortness of breath and wheezing.    Cardiovascular: Positive for palpitations (chronic, intermittent, stable). Negative for " chest pain and leg swelling.   Gastrointestinal: Positive for abdominal pain (chronic), constipation and nausea. Negative for blood in stool, diarrhea and vomiting.   Endocrine: Positive for heat intolerance. Negative for polydipsia and polyuria.   Genitourinary: Positive for pelvic pain (chronic). Negative for dysuria and hematuria.   Musculoskeletal: Positive for arthralgias, myalgias, neck pain and neck stiffness. Negative for back pain and joint swelling.   Skin: Negative for rash and wound.   Neurological: Positive for dizziness, tremors, weakness and headaches. Negative for seizures, syncope and speech difficulty.   Hematological: Negative for adenopathy. Bruises/bleeds easily.   Psychiatric/Behavioral: Positive for decreased concentration, dysphoric mood and sleep disturbance. Negative for confusion and suicidal ideas. The patient is nervous/anxious.    Pt's previous ROS reviewed and updated as indicated.         Physical Exam  Constitutional:       General: She is not in acute distress.     Appearance: She is well-developed and well-groomed. She is not ill-appearing.   HENT:      Head: Normocephalic and atraumatic.   Eyes:      General: No scleral icterus.     Conjunctiva/sclera: Conjunctivae normal.   Pulmonary:      Effort: Pulmonary effort is normal.   Skin:     Coloration: Skin is not jaundiced or pale.   Neurological:      Mental Status: She is alert and oriented to person, place, and time.   Psychiatric:         Mood and Affect: Mood and affect normal.         Speech: Speech normal.         Behavior: Behavior normal. Behavior is cooperative.         Thought Content: Thought content normal.         Cognition and Memory: Cognition normal.         Judgment: Judgment normal.       Lab Results   Component Value Date    WBC 7.09 08/16/2021    HGB 13.1 08/16/2021    HCT 41.8 08/16/2021    MCV 89.9 08/16/2021     08/16/2021       Lab Results   Component Value Date    GLUCOSE 95 08/16/2021    BUN 17  08/16/2021    CREATININE 0.73 08/16/2021    EGFRIFNONA 83 08/16/2021    EGFRIFAFRI 92 10/16/2019    BCR 23.3 08/16/2021    K 3.8 08/16/2021    CO2 28.6 08/16/2021    CALCIUM 9.5 08/16/2021    PROTENTOTREF 6.5 10/16/2019    ALBUMIN 4.10 08/16/2021    LABIL2 2.3 10/16/2019    AST 13 08/16/2021    ALT 17 08/16/2021       Lab Results   Component Value Date    CHOL 172 08/16/2021    CHLPL 180 10/16/2019    TRIG 237 (H) 08/16/2021    HDL 58 08/16/2021    LDL 75 08/16/2021       Lab Results   Component Value Date    HGBA1C 5.49 08/16/2021       Lab Results   Component Value Date    TSH 2.290 08/16/2021       Diagnoses and all orders for this visit:    Essential hypertension    Chronic constipation  -     levothyroxine (SYNTHROID, LEVOTHROID) 50 MCG tablet; 1 po daily    Colonic inertia  -     levothyroxine (SYNTHROID, LEVOTHROID) 50 MCG tablet; 1 po daily    Depression with somatization  -     levothyroxine (SYNTHROID, LEVOTHROID) 50 MCG tablet; 1 po daily    Excessive daytime sleepiness  -     levothyroxine (SYNTHROID, LEVOTHROID) 50 MCG tablet; 1 po daily    Encounter for screening mammogram for malignant neoplasm of breast  -     Mammo Screening Digital Tomosynthesis Bilateral With CAD; Future    Obstructive sleep apnea    Other orders  -     Premarin 0.625 MG/GM vaginal cream      Hypertension fairly well controlled on lisinopril and HCTZ.  Continue current treatment plan.  Encouraged at least weekly BP checks, heart healthy eating and increased physical activity.    Continue Linzess for management of chronic constipation.    Trial of increased dose of levothyroxine with goal of remain within normal TSH for cystic management of resistant depression, colonic inertia/chronic constipation.  Recheck TSH in 8 to 12 weeks.  She is educated as to signs of excessive thyroid replacement.    Depression with somatization, PTSD recently stable on Prozac.  She is encouraged to continue her efforts to find  therapist/psychologist.    Patient will f/u 3 months, sooner as needed.  Patient was encouraged to keep me informed of any acute changes, lack of improvement, or any new concerning symptoms.  Pt is aware of reasons to seek emergent care.  Patient voiced understanding of all instructions and denied further questions.      Total face to face time with patient was 14 minutes.    Please note that portions of this note may have been completed with a voice recognition program. Efforts were made to edit the dictations, but occasionally words are mistranscribed.

## 2021-08-31 DIAGNOSIS — F43.10 PTSD (POST-TRAUMATIC STRESS DISORDER): ICD-10-CM

## 2021-08-31 DIAGNOSIS — M47.812 CERVICAL ARTHRITIS: ICD-10-CM

## 2021-08-31 DIAGNOSIS — N95.1 POSTMENOPAUSAL DISORDER: ICD-10-CM

## 2021-08-31 DIAGNOSIS — Z79.890 HORMONE REPLACEMENT THERAPY (HRT): ICD-10-CM

## 2021-08-31 RX ORDER — DIAZEPAM 2 MG/1
TABLET ORAL
Qty: 60 TABLET | Refills: 2 | Status: SHIPPED | OUTPATIENT
Start: 2021-08-31 | End: 2021-10-06 | Stop reason: SDUPTHER

## 2021-08-31 RX ORDER — HYDROCHLOROTHIAZIDE 25 MG/1
25 TABLET ORAL DAILY
Qty: 90 TABLET | Refills: 11 | Status: SHIPPED | OUTPATIENT
Start: 2021-08-31 | End: 2021-10-01

## 2021-08-31 RX ORDER — LORATADINE AND PSEUDOEPHEDRINE SULFATE 10; 240 MG/1; MG/1
1 TABLET, FILM COATED, EXTENDED RELEASE ORAL DAILY
Qty: 30 TABLET | Refills: 2 | Status: SHIPPED | OUTPATIENT
Start: 2021-08-31 | End: 2021-10-01 | Stop reason: ALTCHOICE

## 2021-08-31 RX ORDER — TESTOSTERONE MICRONIZED 100 %
0.5 POWDER (GRAM) MISCELLANEOUS DAILY
Qty: 0.15 G | Refills: 2 | Status: SHIPPED | OUTPATIENT
Start: 2021-08-31 | End: 2021-10-06 | Stop reason: SDUPTHER

## 2021-08-31 RX ORDER — DIAZEPAM 2 MG/1
2 TABLET ORAL EVERY 12 HOURS PRN
Qty: 60 TABLET | Refills: 0 | Status: CANCELLED | OUTPATIENT
Start: 2021-08-31

## 2021-08-31 RX ORDER — LISINOPRIL 10 MG/1
20 TABLET ORAL DAILY
Qty: 180 TABLET | Refills: 1 | Status: SHIPPED | OUTPATIENT
Start: 2021-08-31 | End: 2021-09-25 | Stop reason: SDUPTHER

## 2021-08-31 RX ORDER — FAMCICLOVIR 500 MG/1
500 TABLET ORAL DAILY
Qty: 30 TABLET | Refills: 5 | Status: SHIPPED | OUTPATIENT
Start: 2021-08-31 | End: 2021-10-06 | Stop reason: SDUPTHER

## 2021-09-07 DIAGNOSIS — J32.9 CHRONIC SINUSITIS, UNSPECIFIED LOCATION: Primary | ICD-10-CM

## 2021-09-08 DIAGNOSIS — M47.812 CERVICAL ARTHRITIS: ICD-10-CM

## 2021-09-08 DIAGNOSIS — F43.10 PTSD (POST-TRAUMATIC STRESS DISORDER): ICD-10-CM

## 2021-09-10 DIAGNOSIS — G47.19 EXCESSIVE DAYTIME SLEEPINESS: ICD-10-CM

## 2021-09-10 DIAGNOSIS — F33.9 RECURRENT MAJOR DEPRESSION RESISTANT TO TREATMENT (HCC): ICD-10-CM

## 2021-09-10 DIAGNOSIS — G47.33 OBSTRUCTIVE SLEEP APNEA: ICD-10-CM

## 2021-09-10 DIAGNOSIS — F51.04 CHRONIC INSOMNIA: ICD-10-CM

## 2021-09-10 RX ORDER — LINACLOTIDE 290 UG/1
290 CAPSULE, GELATIN COATED ORAL
Qty: 30 CAPSULE | Refills: 5 | Status: SHIPPED | OUTPATIENT
Start: 2021-09-10 | End: 2022-09-26 | Stop reason: SDUPTHER

## 2021-09-10 RX ORDER — METHYLPHENIDATE HYDROCHLORIDE 27 MG/1
27 TABLET ORAL EVERY MORNING
Qty: 30 TABLET | Refills: 0 | Status: CANCELLED | OUTPATIENT
Start: 2021-09-10

## 2021-09-10 RX ORDER — ZOLPIDEM TARTRATE 10 MG/1
TABLET ORAL
Qty: 30 TABLET | Refills: 2 | Status: CANCELLED | OUTPATIENT
Start: 2021-09-10

## 2021-09-10 RX ORDER — DIAZEPAM 2 MG/1
TABLET ORAL
Qty: 60 TABLET | Refills: 0 | OUTPATIENT
Start: 2021-09-10

## 2021-09-10 NOTE — TELEPHONE ENCOUNTER
Rx Refill Note  Requested Prescriptions     Pending Prescriptions Disp Refills   • methylphenidate (Concerta) 27 MG CR tablet 30 tablet 0     Sig: Take 1 tablet by mouth Every Morning      Last office visit with prescribing clinician: 5/5/2021      Next office visit with prescribing clinician: Visit date not found    OK TO FILL?    UDS AND CSA ARE UP TO DATE    3}  Sarah Morales MA  09/10/21, 11:41 EDT

## 2021-09-13 RX ORDER — METHYLPHENIDATE HYDROCHLORIDE 27 MG/1
27 TABLET ORAL EVERY MORNING
Qty: 30 TABLET | Refills: 0 | Status: SHIPPED | OUTPATIENT
Start: 2021-09-13 | End: 2021-10-13 | Stop reason: SDUPTHER

## 2021-09-27 RX ORDER — LISINOPRIL 10 MG/1
20 TABLET ORAL DAILY
Qty: 180 TABLET | Refills: 1 | Status: SHIPPED | OUTPATIENT
Start: 2021-09-27 | End: 2021-11-01 | Stop reason: SDUPTHER

## 2021-09-30 ENCOUNTER — TELEPHONE (OUTPATIENT)
Dept: FAMILY MEDICINE CLINIC | Facility: CLINIC | Age: 56
End: 2021-09-30

## 2021-09-30 ENCOUNTER — HOSPITAL ENCOUNTER (OUTPATIENT)
Dept: CT IMAGING | Facility: HOSPITAL | Age: 56
Discharge: HOME | End: 2021-09-30
Admitting: FAMILY MEDICINE

## 2021-09-30 DIAGNOSIS — J32.9 CHRONIC SINUSITIS, UNSPECIFIED LOCATION: ICD-10-CM

## 2021-09-30 PROCEDURE — 70486 CT MAXILLOFACIAL W/O DYE: CPT

## 2021-09-30 NOTE — TELEPHONE ENCOUNTER
Hub staff attempted to follow warm transfer process and was unsuccessful     Caller: Tierney Fields    Relationship to patient: Self    Best call back number: [phone]    Patient is needing: TIERNEY SAYS SHE HAS A SINUS INFECTION AND WOULD LIKE AN APPOINTMENT 10/01/21

## 2021-10-01 ENCOUNTER — TELEMEDICINE (OUTPATIENT)
Dept: FAMILY MEDICINE CLINIC | Facility: TELEHEALTH | Age: 56
End: 2021-10-01

## 2021-10-01 DIAGNOSIS — J01.40 ACUTE NON-RECURRENT PANSINUSITIS: Primary | ICD-10-CM

## 2021-10-01 PROCEDURE — 99213 OFFICE O/P EST LOW 20 MIN: CPT | Performed by: NURSE PRACTITIONER

## 2021-10-01 RX ORDER — HYDROCHLOROTHIAZIDE 25 MG/1
TABLET ORAL
Qty: 30 TABLET | Refills: 0 | Status: SHIPPED | OUTPATIENT
Start: 2021-10-01 | End: 2021-11-01 | Stop reason: SDUPTHER

## 2021-10-01 RX ORDER — LORATADINE, PSEUDOEPHEDRINE SULFATE 5; 120 MG/1; MG/1
1 TABLET, FILM COATED, EXTENDED RELEASE ORAL 2 TIMES DAILY PRN
Qty: 14 TABLET | Refills: 0 | Status: SHIPPED | OUTPATIENT
Start: 2021-10-01 | End: 2021-10-21

## 2021-10-01 RX ORDER — AMOXICILLIN AND CLAVULANATE POTASSIUM 875; 125 MG/1; MG/1
1 TABLET, FILM COATED ORAL 2 TIMES DAILY
Qty: 14 TABLET | Refills: 0 | Status: SHIPPED | OUTPATIENT
Start: 2021-10-01 | End: 2021-10-07 | Stop reason: SDUPTHER

## 2021-10-01 RX ORDER — METHYLPREDNISOLONE 4 MG/1
TABLET ORAL
Qty: 21 TABLET | Refills: 0 | Status: SHIPPED | OUTPATIENT
Start: 2021-10-01 | End: 2021-12-13 | Stop reason: SDUPTHER

## 2021-10-01 NOTE — PROGRESS NOTES
Subjective   Chief Complaint   Patient presents with   • Sinusitis       Tatiana Fields is a 55 y.o. female.     Pt reports congestion, very thick yellow/dark nasal drainage, sinus pain and pressure x 1 month that has worsened in the past week. She gets this several times a year related to sinusitis and is treated with antibiotic and steroid shots and oral meds. She tested negative for COVID last week. She has been fully vaccinated against COVID.     Pt also request prescriptions for Alavert-D, she states this works better than Claritin-D    Sinusitis  This is a new problem. Episode onset: 1 month, worsened over the past week. The problem has been gradually worsening since onset. There has been no fever. Associated symptoms include congestion, coughing (from drainage), headaches and sinus pressure. Pertinent negatives include no chills, diaphoresis, ear pain, hoarse voice, neck pain, shortness of breath, sneezing, sore throat or swollen glands. Treatments tried: Flonase, claritin-D, saline nasal spray.        Allergies   Allergen Reactions   • Morphine Itching   • Trazodone And Nefazodone Mental Status Change   • Sulfa Antibiotics Rash       Past Medical History:   Diagnosis Date   • Anemia    • Arthritis    • Cervical nerve root compression    • Cervical spinal stenosis    • Chronic fatigue    • Cluster headache 2012   • DDD (degenerative disc disease), lumbar    • Depression    • Endometriosis    • Essential tremor    • Fibromyalgia, primary 2013   • Headache, tension-type 2013   • HL (hearing loss) 2013   • Hyperlipidemia    • Hypertension    • Irritable bowel syndrome    • Lyme disease 2012    tx'd with prolonged course abx   • Memory loss    • Migraine 2013   • Narcolepsy     listed in medical records, tx'd with provigil   • Obstructive sleep apnea    • Ovarian cyst    • Shingles 2/15/2013   • Sicca syndrome (HCC)    • Vitamin D deficiency        Past Surgical History:   Procedure Laterality Date   • BILATERAL  SALPINGO OOPHORECTOMY     • BLADDER REPAIR  2013    bladder tack   • CERVICAL SPINE ANTERIOR  03/2018    Dr. Mendiola, C3-C4   • COLECTOMY PARTIAL / TOTAL  2012    listed in medical record; done at time of tx for endometriosis   • COLONOSCOPY  09/29/2015    normal   • CYSTOCELE REPAIR     • ENDOMETRIAL ABLATION  2012   • ESOPHAGOSCOPY / EGD  09/29/2015   • HYSTERECTOMY  2013    for endometriosis   • SINUS SURGERY  2000       Social History     Socioeconomic History   • Marital status:      Spouse name: Not on file   • Number of children: Not on file   • Years of education: Not on file   • Highest education level: Not on file   Tobacco Use   • Smoking status: Never Smoker   • Smokeless tobacco: Never Used   Substance and Sexual Activity   • Alcohol use: No   • Drug use: No   • Sexual activity: Not Currently     Birth control/protection: Surgical       Family History   Problem Relation Age of Onset   • Inflammatory bowel disease Mother    • Hypertension Mother    • Heart disease Mother    • Arthritis Mother    • Hyperlipidemia Mother    • Diabetes Mother    • Migraines Mother    • Neuropathy Mother    • Stroke Mother    • Hypertension Father    • Heart attack Father    • Hyperlipidemia Father    • Cancer Sister    • Bone cancer Sister    • Colon cancer Other    • Hyperlipidemia Brother    • Colon cancer Brother    • Diabetes Maternal Grandmother    • Diabetes Paternal Grandmother    • Neuropathy Paternal Grandmother    • Stroke Paternal Grandmother    • Stroke Maternal Aunt          Current Outpatient Medications:   •  amoxicillin-clavulanate (Augmentin) 875-125 MG per tablet, Take 1 tablet by mouth 2 (Two) Times a Day for 7 days., Disp: 14 tablet, Rfl: 0  •  diazePAM (VALIUM) 2 MG tablet, TAKE 1 TABLET BY MOUTH EVERY 12 HOURS AS NEEDED FOR ANXIETY OR MUSCLE SPASMS, Disp: 60 tablet, Rfl: 2  •  estradiol (ESTRACE) 0.1 MG/GM vaginal cream, , Disp: , Rfl:   •  estradiol (MINIVELLE, VIVELLE-DOT) 0.05 MG/24HR patch,  Place 1 patch on the skin as directed by provider., Disp: , Rfl:   •  famciclovir (FAMVIR) 500 MG tablet, Take 1 tablet by mouth Daily., Disp: 30 tablet, Rfl: 5  •  FLUoxetine (PROzac) 20 MG capsule, Take 1 capsule by mouth Daily., Disp: 30 capsule, Rfl: 5  •  fluticasone (FLONASE) 50 MCG/ACT nasal spray, 2 sprays into the nostril(s) as directed by provider Daily., Disp: 48 mL, Rfl: 0  •  hydroCHLOROthiazide (HYDRODIURIL) 25 MG tablet, Take 1 tablet by mouth Daily. As needed for BP or swelling., Disp: 90 tablet, Rfl: 11  •  levothyroxine (SYNTHROID, LEVOTHROID) 50 MCG tablet, 1 po daily, Disp: 90 tablet, Rfl: 0  •  Linzess 290 MCG capsule capsule, Take 1 capsule by mouth Every Morning Before Breakfast., Disp: 30 capsule, Rfl: 5  •  lisinopril (PRINIVIL,ZESTRIL) 10 MG tablet, Take 2 tablets by mouth Daily., Disp: 180 tablet, Rfl: 1  •  loratadine-pseudoephedrine (Alavert Allergy/Sinus) 5-120 MG per 12 hr tablet, Take 1 tablet by mouth 2 (Two) Times a Day As Needed for Allergies (congestion) for up to 7 days., Disp: 14 tablet, Rfl: 0  •  methylphenidate (Concerta) 27 MG CR tablet, Take 1 tablet by mouth Every Morning, Disp: 30 tablet, Rfl: 0  •  methylPREDNISolone (MEDROL) 4 MG dose pack, Take as directed on package instructions., Disp: 21 tablet, Rfl: 0  •  naproxen (NAPROSYN) 500 MG tablet, , Disp: , Rfl:   •  omeprazole (priLOSEC) 40 MG capsule, TAKE 1 CAPSULE BY MOUTH DAILY, Disp: 30 capsule, Rfl: 5  •  ondansetron (ZOFRAN) 4 MG tablet, Take 1 tablet by mouth Every 8 (Eight) Hours As Needed for Nausea or Vomiting., Disp: 30 tablet, Rfl: 5  •  oxyCODONE-acetaminophen (PERCOCET)  MG per tablet, Take 1 tablet by mouth Every 8 (Eight) Hours., Disp: , Rfl:   •  Premarin 0.625 MG/GM vaginal cream, , Disp: , Rfl:   •  progesterone (PROMETRIUM) 100 MG capsule, Take one capsule at hs, Disp: 30 capsule, Rfl: 5  •  Progesterone (PROMETRIUM) 100 MG capsule, Take 100 mg by mouth every night at bedtime., Disp: , Rfl:   •   rizatriptan (MAXALT) 10 MG tablet, TAKE 1 TABLET BY MOUTH AS NEEDED FOR HEADACHE MAY REPEAT ONCE IN 2 HOURS MAX 2 IN 24 HOURS, Disp: , Rfl:   •  sennosides-docusate (Senna-Plus) 8.6-50 MG per tablet, Take 1 tablet by mouth 2 (Two) Times a Day., Disp: 60 tablet, Rfl: 5  •  SUMAtriptan (IMITREX) 100 MG tablet, Take one tablet at onset of headache. May repeat dose one time in 2 hours if headache not relieved., Disp: 9 tablet, Rfl: 3  •  Testosterone powder, Place 0.5 mL on the skin as directed by provider Daily. DISPENSE 15 ML, Disp: 0.15 g, Rfl: 2  •  tiZANidine (ZANAFLEX) 4 MG tablet, TAKE 1 TABLET BY MOUTH AT DINNER AND THEN TAKE 2 TABLETS EVERY NIGHT AT BEDTIME, Disp: , Rfl:   •  zolpidem (AMBIEN) 10 MG tablet, TAKE 1 TABLET BY MOUTH EVERY NIGHT AS NEEDED FOR INSOMNIA, Disp: 30 tablet, Rfl: 2      Review of Systems   Constitutional: Positive for fatigue. Negative for chills, diaphoresis and fever.   HENT: Positive for congestion, postnasal drip and sinus pressure. Negative for ear pain, hoarse voice, rhinorrhea, sneezing, sore throat and swollen glands.    Respiratory: Positive for cough (from drainage). Negative for chest tightness, shortness of breath and wheezing.    Gastrointestinal: Negative.    Musculoskeletal: Negative for myalgias and neck pain.   Neurological: Positive for headache.        There were no vitals filed for this visit.    Objective   Physical Exam  Constitutional:       General: She is not in acute distress.     Appearance: She is ill-appearing. She is not toxic-appearing or diaphoretic.   HENT:      Head: Normocephalic.      Nose: Congestion present. No rhinorrhea.      Right Sinus: Maxillary sinus tenderness and frontal sinus tenderness present.      Left Sinus: Maxillary sinus tenderness and frontal sinus tenderness present.      Comments: Per pt       Mouth/Throat:      Lips: Pink.      Mouth: Mucous membranes are moist.      Comments: Pt sounds congested  Pulmonary:      Effort: Pulmonary  effort is normal.   Neurological:      Mental Status: She is alert and oriented to person, place, and time.   Psychiatric:         Mood and Affect: Mood normal.         Behavior: Behavior normal.          Procedures     Assessment/Plan   Diagnoses and all orders for this visit:    1. Acute non-recurrent pansinusitis (Primary)  -     amoxicillin-clavulanate (Augmentin) 875-125 MG per tablet; Take 1 tablet by mouth 2 (Two) Times a Day for 7 days.  Dispense: 14 tablet; Refill: 0  -     methylPREDNISolone (MEDROL) 4 MG dose pack; Take as directed on package instructions.  Dispense: 21 tablet; Refill: 0  -     loratadine-pseudoephedrine (Alavert Allergy/Sinus) 5-120 MG per 12 hr tablet; Take 1 tablet by mouth 2 (Two) Times a Day As Needed for Allergies (congestion) for up to 7 days.  Dispense: 14 tablet; Refill: 0      Take medicine as prescribed.   Monitor Blood pressure while taking Alavert-D, this may raise your BP.   Continue Flonase and nasal rinses as needed.   If symptoms worsen or do not improve follow up with your PCP or visit your nearest Urgent Care Center or ER.        PLAN: Discussed dosing, side effects, recommended other symptomatic care.  Patient should follow up with primary care provider if symptoms worsen, fail to resolve or other symptoms need attention. Patient/family agree to the above.     I spent 20 minutes caring for Tatiana on this date of service. This time includes time spent by me in the following activities:preparing for the visit, obtaining and/or reviewing a separately obtained history, performing a medically appropriate examination and/or evaluation , counseling and educating the patient/family/caregiver, ordering medications, tests, or procedures and documenting information in the medical record    SOULEYMANE Devine     This visit was performed via Telehealth.  This patient has been instructed to follow-up with their primary care provider if their symptoms worsen or the treatment  provided does not resolve their illness.

## 2021-10-01 NOTE — PATIENT INSTRUCTIONS
Take medicine as prescribed.   Monitor Blood pressure while taking Alavert-D, this may raise your BP.   Continue Flonase and nasal rinses as needed.   If symptoms worsen or do not improve follow up with your PCP or visit your nearest Urgent Care Center or ER.        Sinusitis, Adult  Sinusitis is soreness and swelling (inflammation) of your sinuses. Sinuses are hollow spaces in the bones around your face. They are located:  · Around your eyes.  · In the middle of your forehead.  · Behind your nose.  · In your cheekbones.  Your sinuses and nasal passages are lined with a fluid called mucus. Mucus drains out of your sinuses. Swelling can trap mucus in your sinuses. This lets germs (bacteria, virus, or fungus) grow, which leads to infection. Most of the time, this condition is caused by a virus.  What are the causes?  This condition is caused by:  · Allergies.  · Asthma.  · Germs.  · Things that block your nose or sinuses.  · Growths in the nose (nasal polyps).  · Chemicals or irritants in the air.  · Fungus (rare).  What increases the risk?  You are more likely to develop this condition if:  · You have a weak body defense system (immune system).  · You do a lot of swimming or diving.  · You use nasal sprays too much.  · You smoke.  What are the signs or symptoms?  The main symptoms of this condition are pain and a feeling of pressure around the sinuses. Other symptoms include:  · Stuffy nose (congestion).  · Runny nose (drainage).  · Swelling and warmth in the sinuses.  · Headache.  · Toothache.  · A cough that may get worse at night.  · Mucus that collects in the throat or the back of the nose (postnasal drip).  · Being unable to smell and taste.  · Being very tired (fatigue).  · A fever.  · Sore throat.  · Bad breath.  How is this diagnosed?  This condition is diagnosed based on:  · Your symptoms.  · Your medical history.  · A physical exam.  · Tests to find out if your condition is short-term (acute) or long-term  (chronic). Your doctor may:  ? Check your nose for growths (polyps).  ? Check your sinuses using a tool that has a light (endoscope).  ? Check for allergies or germs.  ? Do imaging tests, such as an MRI or CT scan.  How is this treated?  Treatment for this condition depends on the cause and whether it is short-term or long-term.  · If caused by a virus, your symptoms should go away on their own within 10 days. You may be given medicines to relieve symptoms. They include:  ? Medicines that shrink swollen tissue in the nose.  ? Medicines that treat allergies (antihistamines).  ? A spray that treats swelling of the nostrils.   ? Rinses that help get rid of thick mucus in your nose (nasal saline washes).  · If caused by bacteria, your doctor may wait to see if you will get better without treatment. You may be given antibiotic medicine if you have:  ? A very bad infection.  ? A weak body defense system.  · If caused by growths in the nose, you may need to have surgery.  Follow these instructions at home:  Medicines  · Take, use, or apply over-the-counter and prescription medicines only as told by your doctor. These may include nasal sprays.  · If you were prescribed an antibiotic medicine, take it as told by your doctor. Do not stop taking the antibiotic even if you start to feel better.  Hydrate and humidify    · Drink enough water to keep your pee (urine) pale yellow.  · Use a cool mist humidifier to keep the humidity level in your home above 50%.  · Breathe in steam for 10-15 minutes, 3-4 times a day, or as told by your doctor. You can do this in the bathroom while a hot shower is running.  · Try not to spend time in cool or dry air.    Rest  · Rest as much as you can.  · Sleep with your head raised (elevated).  · Make sure you get enough sleep each night.  General instructions    · Put a warm, moist washcloth on your face 3-4 times a day, or as often as told by your doctor. This will help with discomfort.  · Wash  your hands often with soap and water. If there is no soap and water, use hand .  · Do not smoke. Avoid being around people who are smoking (secondhand smoke).  · Keep all follow-up visits as told by your doctor. This is important.    Contact a doctor if:  · You have a fever.  · Your symptoms get worse.  · Your symptoms do not get better within 10 days.  Get help right away if:  · You have a very bad headache.  · You cannot stop throwing up (vomiting).  · You have very bad pain or swelling around your face or eyes.  · You have trouble seeing.  · You feel confused.  · Your neck is stiff.  · You have trouble breathing.  Summary  · Sinusitis is swelling of your sinuses. Sinuses are hollow spaces in the bones around your face.  · This condition is caused by tissues in your nose that become inflamed or swollen. This traps germs. These can lead to infection.  · If you were prescribed an antibiotic medicine, take it as told by your doctor. Do not stop taking it even if you start to feel better.  · Keep all follow-up visits as told by your doctor. This is important.  This information is not intended to replace advice given to you by your health care provider. Make sure you discuss any questions you have with your health care provider.  Document Revised: 05/20/2019 Document Reviewed: 05/20/2019  [employer] Patient Education © 2021 [employer]

## 2021-10-06 DIAGNOSIS — Z79.890 HORMONE REPLACEMENT THERAPY (HRT): ICD-10-CM

## 2021-10-06 DIAGNOSIS — M47.812 CERVICAL ARTHRITIS: ICD-10-CM

## 2021-10-06 DIAGNOSIS — N95.1 POSTMENOPAUSAL DISORDER: ICD-10-CM

## 2021-10-06 DIAGNOSIS — F43.10 PTSD (POST-TRAUMATIC STRESS DISORDER): ICD-10-CM

## 2021-10-06 RX ORDER — SUMATRIPTAN 100 MG/1
TABLET, FILM COATED ORAL
Qty: 9 TABLET | Refills: 3 | Status: SHIPPED | OUTPATIENT
Start: 2021-10-06 | End: 2022-02-02

## 2021-10-06 RX ORDER — FAMCICLOVIR 500 MG/1
500 TABLET ORAL DAILY
Qty: 30 TABLET | Refills: 5 | Status: SHIPPED | OUTPATIENT
Start: 2021-10-06 | End: 2021-11-01 | Stop reason: SDUPTHER

## 2021-10-06 RX ORDER — OMEPRAZOLE 40 MG/1
40 CAPSULE, DELAYED RELEASE ORAL DAILY
Qty: 30 CAPSULE | Refills: 5 | Status: SHIPPED | OUTPATIENT
Start: 2021-10-06 | End: 2022-02-01 | Stop reason: SDUPTHER

## 2021-10-06 RX ORDER — DIAZEPAM 2 MG/1
2 TABLET ORAL EVERY 12 HOURS PRN
Qty: 60 TABLET | Refills: 2 | Status: SHIPPED | OUTPATIENT
Start: 2021-10-06 | End: 2021-12-06 | Stop reason: SDUPTHER

## 2021-10-06 RX ORDER — TESTOSTERONE MICRONIZED 100 %
0.5 POWDER (GRAM) MISCELLANEOUS DAILY
Qty: 0.15 G | Refills: 2 | Status: SHIPPED | OUTPATIENT
Start: 2021-10-06 | End: 2021-12-24 | Stop reason: SDUPTHER

## 2021-10-06 RX ORDER — ONDANSETRON 4 MG/1
4 TABLET, FILM COATED ORAL EVERY 8 HOURS PRN
Qty: 30 TABLET | Refills: 5 | Status: SHIPPED | OUTPATIENT
Start: 2021-10-06 | End: 2022-05-04 | Stop reason: SDUPTHER

## 2021-10-07 DIAGNOSIS — J01.40 ACUTE NON-RECURRENT PANSINUSITIS: ICD-10-CM

## 2021-10-07 DIAGNOSIS — J32.0 CHRONIC MAXILLARY SINUSITIS: Primary | ICD-10-CM

## 2021-10-07 RX ORDER — AMOXICILLIN AND CLAVULANATE POTASSIUM 875; 125 MG/1; MG/1
1 TABLET, FILM COATED ORAL 2 TIMES DAILY
Qty: 28 TABLET | Refills: 0 | Status: SHIPPED | OUTPATIENT
Start: 2021-10-07 | End: 2021-10-21 | Stop reason: SINTOL

## 2021-10-13 DIAGNOSIS — G47.19 EXCESSIVE DAYTIME SLEEPINESS: ICD-10-CM

## 2021-10-13 DIAGNOSIS — G47.33 OBSTRUCTIVE SLEEP APNEA: ICD-10-CM

## 2021-10-13 DIAGNOSIS — F33.9 RECURRENT MAJOR DEPRESSION RESISTANT TO TREATMENT (HCC): ICD-10-CM

## 2021-10-13 RX ORDER — METHYLPHENIDATE HYDROCHLORIDE 27 MG/1
27 TABLET ORAL EVERY MORNING
Qty: 3 TABLET | Refills: 0 | Status: SHIPPED | OUTPATIENT
Start: 2021-10-13 | End: 2021-10-21 | Stop reason: SDUPTHER

## 2021-10-20 DIAGNOSIS — F45.0 DEPRESSION WITH SOMATIZATION: ICD-10-CM

## 2021-10-20 DIAGNOSIS — F32.A DEPRESSION WITH SOMATIZATION: ICD-10-CM

## 2021-10-20 DIAGNOSIS — K59.9 COLONIC INERTIA: ICD-10-CM

## 2021-10-20 DIAGNOSIS — G47.19 EXCESSIVE DAYTIME SLEEPINESS: ICD-10-CM

## 2021-10-20 DIAGNOSIS — K59.09 CHRONIC CONSTIPATION: ICD-10-CM

## 2021-10-20 RX ORDER — LEVOTHYROXINE SODIUM 0.05 MG/1
TABLET ORAL
Qty: 90 TABLET | Refills: 0 | Status: SHIPPED | OUTPATIENT
Start: 2021-10-20 | End: 2021-11-22 | Stop reason: SDUPTHER

## 2021-10-21 DIAGNOSIS — F51.04 CHRONIC INSOMNIA: ICD-10-CM

## 2021-10-21 DIAGNOSIS — J32.9 CHRONIC SINUSITIS, UNSPECIFIED LOCATION: Primary | ICD-10-CM

## 2021-10-21 RX ORDER — AMOXICILLIN 250 MG
1 CAPSULE ORAL 2 TIMES DAILY
Qty: 60 TABLET | Refills: 5 | Status: SHIPPED | OUTPATIENT
Start: 2021-10-21 | End: 2021-12-24 | Stop reason: SDUPTHER

## 2021-10-21 RX ORDER — CLINDAMYCIN HYDROCHLORIDE 300 MG/1
300 CAPSULE ORAL 3 TIMES DAILY
Qty: 42 CAPSULE | Refills: 0 | Status: SHIPPED | OUTPATIENT
Start: 2021-10-21 | End: 2021-11-04

## 2021-10-22 RX ORDER — ZOLPIDEM TARTRATE 10 MG/1
TABLET ORAL
Qty: 30 TABLET | Refills: 0 | Status: SHIPPED | OUTPATIENT
Start: 2021-10-22 | End: 2021-11-22 | Stop reason: SDUPTHER

## 2021-11-01 DIAGNOSIS — J34.3 HYPERTROPHY, NASAL, TURBINATE: ICD-10-CM

## 2021-11-01 RX ORDER — HYDROCHLOROTHIAZIDE 25 MG/1
25 TABLET ORAL DAILY
Qty: 30 TABLET | Refills: 0 | Status: SHIPPED | OUTPATIENT
Start: 2021-11-01 | End: 2021-12-24 | Stop reason: SDUPTHER

## 2021-11-01 RX ORDER — FAMCICLOVIR 500 MG/1
500 TABLET ORAL DAILY
Qty: 30 TABLET | Refills: 5 | Status: SHIPPED | OUTPATIENT
Start: 2021-11-01 | End: 2021-11-24 | Stop reason: SDUPTHER

## 2021-11-01 RX ORDER — LISINOPRIL 10 MG/1
20 TABLET ORAL DAILY
Qty: 180 TABLET | Refills: 1 | Status: SHIPPED | OUTPATIENT
Start: 2021-11-01 | End: 2022-05-04 | Stop reason: SDUPTHER

## 2021-11-01 RX ORDER — FLUTICASONE PROPIONATE 50 MCG
2 SPRAY, SUSPENSION (ML) NASAL DAILY
Qty: 48 ML | Refills: 0 | Status: SHIPPED | OUTPATIENT
Start: 2021-11-01 | End: 2022-05-04 | Stop reason: SDUPTHER

## 2021-11-02 ENCOUNTER — CLINICAL SUPPORT (OUTPATIENT)
Dept: FAMILY MEDICINE CLINIC | Facility: CLINIC | Age: 56
End: 2021-11-02

## 2021-11-02 DIAGNOSIS — Z23 NEED FOR INFLUENZA VACCINATION: Primary | ICD-10-CM

## 2021-11-02 DIAGNOSIS — Z51.81 ENCOUNTER FOR THERAPEUTIC DRUG MONITORING: Primary | ICD-10-CM

## 2021-11-02 PROCEDURE — G0008 ADMIN INFLUENZA VIRUS VAC: HCPCS | Performed by: FAMILY MEDICINE

## 2021-11-02 PROCEDURE — 90686 IIV4 VACC NO PRSV 0.5 ML IM: CPT | Performed by: FAMILY MEDICINE

## 2021-11-03 ENCOUNTER — TELEPHONE (OUTPATIENT)
Dept: FAMILY MEDICINE CLINIC | Facility: CLINIC | Age: 56
End: 2021-11-03

## 2021-11-03 NOTE — TELEPHONE ENCOUNTER
Is there any way you can find a provider in [place] that will accept her insurance? I think she is looking for a therapist/psychologist more so than a prescriber.      ----- Message -----       From:Tatiana Fields       Sent:11/3/2021 11:49 AM EDT         To:Amber Mills MD    Subject:Ritalin     I can wait and discuss Ritalin and provigil at our next appointment if you want, sorry to be a pain.  Ritalin er does help.  I am scheduled for bladder surgery Monday and ill have them send you the records.     I am struggling with grief from loss of my oldest son that I can't come to peace with and ptsd.  I've been trying to find a psychologist to talk to, preferably teletherapy.    However most of them don't take my insurance, even ones that claim they do.  I really need help work through the emotional pain. If there is someone that you trust that you can refer me to I would appreciate it. Thank you.

## 2021-11-04 ENCOUNTER — OFFICE (OUTPATIENT)
Dept: URBAN - METROPOLITAN AREA CLINIC 4 | Facility: CLINIC | Age: 56
End: 2021-11-04

## 2021-11-04 VITALS — WEIGHT: 160 LBS | DIASTOLIC BLOOD PRESSURE: 91 MMHG | SYSTOLIC BLOOD PRESSURE: 136 MMHG | HEIGHT: 64 IN

## 2021-11-04 DIAGNOSIS — R10.9 UNSPECIFIED ABDOMINAL PAIN: ICD-10-CM

## 2021-11-04 DIAGNOSIS — K21.9 GASTRO-ESOPHAGEAL REFLUX DISEASE WITHOUT ESOPHAGITIS: ICD-10-CM

## 2021-11-04 PROCEDURE — 99204 OFFICE O/P NEW MOD 45 MIN: CPT | Performed by: NURSE PRACTITIONER

## 2021-11-04 NOTE — TELEPHONE ENCOUNTER
LM for pt to call me to discuss what providers she has attempted to schedule with previously so that I do not duplicate who she may have already contacted.

## 2021-11-05 NOTE — TELEPHONE ENCOUNTER
LM #2 for pt to call me to discuss. If she doesn't call me back by Monday, I will send her a [employer] message

## 2021-11-07 LAB
DRUGS UR: NORMAL
Lab: NORMAL

## 2021-11-12 RX ORDER — PROMETHAZINE HYDROCHLORIDE 12.5 MG/1
12.5 TABLET ORAL EVERY 6 HOURS PRN
Qty: 20 TABLET | Refills: 0 | Status: SHIPPED | OUTPATIENT
Start: 2021-11-12 | End: 2021-12-17 | Stop reason: SDUPTHER

## 2021-11-22 DIAGNOSIS — F32.A DEPRESSION WITH SOMATIZATION: ICD-10-CM

## 2021-11-22 DIAGNOSIS — K59.09 CHRONIC CONSTIPATION: ICD-10-CM

## 2021-11-22 DIAGNOSIS — K59.9 COLONIC INERTIA: ICD-10-CM

## 2021-11-22 DIAGNOSIS — F51.04 CHRONIC INSOMNIA: ICD-10-CM

## 2021-11-22 DIAGNOSIS — F33.9 RECURRENT MAJOR DEPRESSION RESISTANT TO TREATMENT (HCC): ICD-10-CM

## 2021-11-22 DIAGNOSIS — F45.0 DEPRESSION WITH SOMATIZATION: ICD-10-CM

## 2021-11-22 DIAGNOSIS — G47.33 OBSTRUCTIVE SLEEP APNEA: ICD-10-CM

## 2021-11-22 DIAGNOSIS — G47.19 EXCESSIVE DAYTIME SLEEPINESS: ICD-10-CM

## 2021-11-22 RX ORDER — LEVOTHYROXINE SODIUM 0.03 MG/1
25 TABLET ORAL DAILY
Qty: 90 TABLET | Refills: 0 | Status: SHIPPED | OUTPATIENT
Start: 2021-11-22 | End: 2021-12-07 | Stop reason: SDUPTHER

## 2021-11-23 RX ORDER — ZOLPIDEM TARTRATE 10 MG/1
TABLET ORAL
Qty: 30 TABLET | Refills: 2 | Status: SHIPPED | OUTPATIENT
Start: 2021-11-23 | End: 2022-03-09 | Stop reason: SDUPTHER

## 2021-11-23 RX ORDER — LEVOTHYROXINE SODIUM 0.05 MG/1
TABLET ORAL
Qty: 90 TABLET | Refills: 0 | Status: SHIPPED | OUTPATIENT
Start: 2021-11-23 | End: 2021-12-24 | Stop reason: SDUPTHER

## 2021-11-23 RX ORDER — METHYLPHENIDATE HYDROCHLORIDE 27 MG/1
27 TABLET ORAL EVERY MORNING
Qty: 30 TABLET | Refills: 0 | Status: CANCELLED | OUTPATIENT
Start: 2021-11-23

## 2021-11-24 DIAGNOSIS — G47.19 EXCESSIVE DAYTIME SLEEPINESS: ICD-10-CM

## 2021-11-24 DIAGNOSIS — G47.33 OBSTRUCTIVE SLEEP APNEA: ICD-10-CM

## 2021-11-24 DIAGNOSIS — F33.9 RECURRENT MAJOR DEPRESSION RESISTANT TO TREATMENT (HCC): ICD-10-CM

## 2021-11-24 RX ORDER — METHYLPHENIDATE HYDROCHLORIDE 27 MG/1
27 TABLET ORAL EVERY MORNING
Qty: 30 TABLET | Refills: 0 | Status: SHIPPED | OUTPATIENT
Start: 2021-11-24 | End: 2021-12-22 | Stop reason: SDUPTHER

## 2021-11-24 RX ORDER — FAMCICLOVIR 500 MG/1
500 TABLET ORAL DAILY
Qty: 30 TABLET | Refills: 5 | Status: SHIPPED | OUTPATIENT
Start: 2021-11-24 | End: 2022-06-21

## 2021-11-29 DIAGNOSIS — N95.1 POSTMENOPAUSAL DISORDER: ICD-10-CM

## 2021-11-29 DIAGNOSIS — Z79.890 HORMONE REPLACEMENT THERAPY (HRT): ICD-10-CM

## 2021-11-30 RX ORDER — TESTOSTERONE MICRONIZED 100 %
0.5 POWDER (GRAM) MISCELLANEOUS DAILY
Qty: 0.15 G | Refills: 2 | OUTPATIENT
Start: 2021-11-30

## 2021-11-30 RX ORDER — TESTOSTERONE MICRONIZED 100 %
POWDER (GRAM) MISCELLANEOUS
Refills: 0 | OUTPATIENT
Start: 2021-11-30

## 2021-11-30 RX ORDER — LORATADINE 10 MG
TABLET ORAL
Qty: 30 TABLET | Refills: 2 | Status: SHIPPED | OUTPATIENT
Start: 2021-11-30 | End: 2022-06-01 | Stop reason: SDUPTHER

## 2021-12-01 RX ORDER — FLUCONAZOLE 150 MG/1
150 TABLET ORAL ONCE
Qty: 1 TABLET | Refills: 0 | Status: SHIPPED | OUTPATIENT
Start: 2021-12-01 | End: 2021-12-01

## 2021-12-03 ENCOUNTER — TELEPHONE (OUTPATIENT)
Dept: FAMILY MEDICINE CLINIC | Facility: CLINIC | Age: 56
End: 2021-12-03

## 2021-12-03 DIAGNOSIS — R30.0 DYSURIA: Primary | ICD-10-CM

## 2021-12-03 NOTE — TELEPHONE ENCOUNTER
Caller: Tatiana Fields    Relationship: Self    Best call back number: [phone]    What orders are you requesting (i.e. lab or imaging): LABS UTI    In what timeframe would the patient need to come in: ASAP    Where will you receive your lab/imaging services: [facility]    Additional notes: PATIENT STATES THAT SHE HAS PAIN IN HER PELVIC AREA AND DISCOMFORT WHEN URINATING

## 2021-12-03 NOTE — TELEPHONE ENCOUNTER
Patient called back asking to have a lab order put in for a urine test. She is currently in [place] and can go to . Please advise and give patient a call asap.

## 2021-12-06 ENCOUNTER — TELEPHONE (OUTPATIENT)
Dept: FAMILY MEDICINE CLINIC | Facility: CLINIC | Age: 56
End: 2021-12-06

## 2021-12-06 ENCOUNTER — CLINICAL SUPPORT (OUTPATIENT)
Dept: FAMILY MEDICINE CLINIC | Facility: CLINIC | Age: 56
End: 2021-12-06

## 2021-12-06 DIAGNOSIS — M47.812 CERVICAL ARTHRITIS: ICD-10-CM

## 2021-12-06 DIAGNOSIS — A49.9 URINARY TRACT BACTERIAL INFECTIONS: Primary | ICD-10-CM

## 2021-12-06 DIAGNOSIS — N30.00 ACUTE CYSTITIS WITHOUT HEMATURIA: Primary | ICD-10-CM

## 2021-12-06 DIAGNOSIS — N39.0 URINARY TRACT BACTERIAL INFECTIONS: Primary | ICD-10-CM

## 2021-12-06 DIAGNOSIS — F43.10 PTSD (POST-TRAUMATIC STRESS DISORDER): ICD-10-CM

## 2021-12-06 LAB
BILIRUB BLD-MCNC: NEGATIVE MG/DL
CLARITY, POC: CLEAR
COLOR UR: YELLOW
EXPIRATION DATE: ABNORMAL
GLUCOSE UR STRIP-MCNC: NEGATIVE MG/DL
KETONES UR QL: NEGATIVE
LEUKOCYTE EST, POC: ABNORMAL
Lab: ABNORMAL
NITRITE UR-MCNC: NEGATIVE MG/ML
PH UR: 6 [PH] (ref 5–8)
PROT UR STRIP-MCNC: NEGATIVE MG/DL
RBC # UR STRIP: NEGATIVE /UL
SP GR UR: 1.03 (ref 1–1.03)
UROBILINOGEN UR QL: NORMAL

## 2021-12-06 PROCEDURE — 99211 OFF/OP EST MAY X REQ PHY/QHP: CPT | Performed by: FAMILY MEDICINE

## 2021-12-06 PROCEDURE — 81003 URINALYSIS AUTO W/O SCOPE: CPT | Performed by: NURSE PRACTITIONER

## 2021-12-06 NOTE — TELEPHONE ENCOUNTER
Caller: Tatiana Fields    Relationship: Self    Best call back number: [phone]    Caller requesting test results: YES    What test was performed: URINE TEST    When was the test performed: 12/06    Where was the test performed: OFFICE    Additional notes: PATIENT CHECKING TO SEE IF RESULTS ARE BACK

## 2021-12-07 RX ORDER — NITROFURANTOIN 25; 75 MG/1; MG/1
100 CAPSULE ORAL 2 TIMES DAILY
Qty: 14 CAPSULE | Refills: 0 | Status: SHIPPED | OUTPATIENT
Start: 2021-12-07 | End: 2021-12-14

## 2021-12-08 RX ORDER — DIAZEPAM 2 MG/1
2 TABLET ORAL EVERY 12 HOURS PRN
Qty: 60 TABLET | Refills: 2 | Status: SHIPPED | OUTPATIENT
Start: 2021-12-08 | End: 2022-02-22 | Stop reason: SDUPTHER

## 2021-12-10 ENCOUNTER — TELEPHONE (OUTPATIENT)
Dept: FAMILY MEDICINE CLINIC | Facility: CLINIC | Age: 56
End: 2021-12-10

## 2021-12-10 NOTE — TELEPHONE ENCOUNTER
Caller: Tatiana Fields    Relationship: Self    Best call back number: [phone]     Caller requesting test results: SELF    What test was performed:URINALYSIS CULTURE    When was the test performed:MONDAY

## 2021-12-13 ENCOUNTER — TELEPHONE (OUTPATIENT)
Dept: FAMILY MEDICINE CLINIC | Facility: CLINIC | Age: 56
End: 2021-12-13

## 2021-12-13 DIAGNOSIS — J01.40 ACUTE NON-RECURRENT PANSINUSITIS: ICD-10-CM

## 2021-12-13 RX ORDER — METHYLPREDNISOLONE 4 MG/1
TABLET ORAL
Qty: 21 TABLET | Refills: 0 | Status: SHIPPED | OUTPATIENT
Start: 2021-12-13 | End: 2021-12-23

## 2021-12-13 NOTE — TELEPHONE ENCOUNTER
Confirmed with [facility] that Culture was not send.  It was released on 12/07, the urine was collected on 12/06 so she was unable to see the order.

## 2021-12-17 RX ORDER — PROMETHAZINE HYDROCHLORIDE 12.5 MG/1
12.5 TABLET ORAL EVERY 6 HOURS PRN
Qty: 20 TABLET | Refills: 0 | Status: SHIPPED | OUTPATIENT
Start: 2021-12-17 | End: 2021-12-31 | Stop reason: SDUPTHER

## 2021-12-17 RX ORDER — CEPHALEXIN 500 MG/1
500 CAPSULE ORAL 3 TIMES DAILY
Qty: 15 CAPSULE | Refills: 0 | Status: SHIPPED | OUTPATIENT
Start: 2021-12-17 | End: 2021-12-23

## 2021-12-17 RX ORDER — NITROFURANTOIN 25; 75 MG/1; MG/1
100 CAPSULE ORAL 2 TIMES DAILY
Qty: 14 CAPSULE | Refills: 0 | OUTPATIENT
Start: 2021-12-17 | End: 2021-12-24

## 2021-12-17 RX ORDER — LORATADINE 10 MG
1 TABLET ORAL DAILY
Qty: 30 TABLET | Refills: 2 | OUTPATIENT
Start: 2021-12-17

## 2021-12-17 NOTE — TELEPHONE ENCOUNTER
I have checked multiple times this week for a urine culture result.     It does not look like a urine culture was sent.

## 2021-12-20 DIAGNOSIS — N95.1 POSTMENOPAUSAL DISORDER: Primary | ICD-10-CM

## 2021-12-20 DIAGNOSIS — N95.1 POSTMENOPAUSAL DISORDER: ICD-10-CM

## 2021-12-20 RX ORDER — PROGESTERONE 100 MG/1
CAPSULE ORAL
Qty: 90 CAPSULE | Refills: 0 | Status: SHIPPED | OUTPATIENT
Start: 2021-12-20 | End: 2021-12-23

## 2021-12-20 RX ORDER — PROGESTERONE 100 MG/1
CAPSULE ORAL
Qty: 30 CAPSULE | Refills: 1 | Status: SHIPPED | OUTPATIENT
Start: 2021-12-20 | End: 2021-12-20

## 2021-12-22 DIAGNOSIS — G47.33 OBSTRUCTIVE SLEEP APNEA: ICD-10-CM

## 2021-12-22 DIAGNOSIS — K59.9 COLONIC INERTIA: ICD-10-CM

## 2021-12-22 DIAGNOSIS — F45.0 DEPRESSION WITH SOMATIZATION: ICD-10-CM

## 2021-12-22 DIAGNOSIS — G47.19 EXCESSIVE DAYTIME SLEEPINESS: ICD-10-CM

## 2021-12-22 DIAGNOSIS — F33.9 RECURRENT MAJOR DEPRESSION RESISTANT TO TREATMENT (HCC): ICD-10-CM

## 2021-12-22 DIAGNOSIS — F32.A DEPRESSION WITH SOMATIZATION: ICD-10-CM

## 2021-12-22 DIAGNOSIS — K59.09 CHRONIC CONSTIPATION: ICD-10-CM

## 2021-12-22 RX ORDER — FAMCICLOVIR 500 MG/1
500 TABLET ORAL DAILY
Qty: 30 TABLET | Refills: 5 | OUTPATIENT
Start: 2021-12-22

## 2021-12-22 RX ORDER — LEVOTHYROXINE SODIUM 0.05 MG/1
TABLET ORAL
Qty: 90 TABLET | Refills: 0 | OUTPATIENT
Start: 2021-12-22

## 2021-12-22 NOTE — TELEPHONE ENCOUNTER
Rx Refill Note  Requested Prescriptions     Pending Prescriptions Disp Refills   • methylphenidate (Concerta) 27 MG CR tablet 30 tablet 0     Sig: Take 1 tablet by mouth Every Morning     Refused Prescriptions Disp Refills   • levothyroxine (SYNTHROID, LEVOTHROID) 50 MCG tablet 90 tablet 0     Si po daily     Refused By: DARA BRADLEY     Reason for Refusal: Patient does not need medication refilled at this time   • famciclovir (FAMVIR) 500 MG tablet 30 tablet 5     Sig: Take 1 tablet by mouth Daily.     Refused By: DARA BRADLEY     Reason for Refusal: Patient does not need medication refilled at this time      Last office visit with prescribing clinician: 2021      Next office visit with prescribing clinician: Visit date not found            Dara Bradley MA  21, 11:28 EST

## 2021-12-23 ENCOUNTER — OFFICE VISIT (OUTPATIENT)
Dept: FAMILY MEDICINE CLINIC | Facility: CLINIC | Age: 56
End: 2021-12-23

## 2021-12-23 VITALS
TEMPERATURE: 98.4 F | DIASTOLIC BLOOD PRESSURE: 74 MMHG | WEIGHT: 162.6 LBS | SYSTOLIC BLOOD PRESSURE: 100 MMHG | BODY MASS INDEX: 27.76 KG/M2 | HEART RATE: 121 BPM | OXYGEN SATURATION: 97 % | HEIGHT: 64 IN

## 2021-12-23 DIAGNOSIS — Z20.822 CLOSE EXPOSURE TO COVID-19 VIRUS: ICD-10-CM

## 2021-12-23 DIAGNOSIS — E53.8 VITAMIN B12 DEFICIENCY: ICD-10-CM

## 2021-12-23 DIAGNOSIS — R10.2 PELVIC PAIN: ICD-10-CM

## 2021-12-23 DIAGNOSIS — R53.83 FATIGUE, UNSPECIFIED TYPE: Primary | ICD-10-CM

## 2021-12-23 DIAGNOSIS — E55.9 VITAMIN D DEFICIENCY: ICD-10-CM

## 2021-12-23 DIAGNOSIS — Z86.39 H/O IRON DEFICIENCY: ICD-10-CM

## 2021-12-23 LAB
BILIRUB BLD-MCNC: NEGATIVE MG/DL
CLARITY, POC: CLEAR
COLOR UR: YELLOW
EXPIRATION DATE: NORMAL
GLUCOSE UR STRIP-MCNC: NEGATIVE MG/DL
KETONES UR QL: NEGATIVE
LEUKOCYTE EST, POC: NEGATIVE
Lab: NORMAL
NITRITE UR-MCNC: NEGATIVE MG/ML
PH UR: 6.5 [PH] (ref 5–8)
PROT UR STRIP-MCNC: NEGATIVE MG/DL
RBC # UR STRIP: NEGATIVE /UL
SP GR UR: 1.02 (ref 1–1.03)
UROBILINOGEN UR QL: NORMAL

## 2021-12-23 PROCEDURE — 96372 THER/PROPH/DIAG INJ SC/IM: CPT

## 2021-12-23 PROCEDURE — 99213 OFFICE O/P EST LOW 20 MIN: CPT

## 2021-12-23 PROCEDURE — 81003 URINALYSIS AUTO W/O SCOPE: CPT

## 2021-12-23 PROCEDURE — U0004 COV-19 TEST NON-CDC HGH THRU: HCPCS

## 2021-12-23 RX ORDER — DEXAMETHASONE SODIUM PHOSPHATE 10 MG/ML
10 INJECTION INTRAMUSCULAR; INTRAVENOUS ONCE
Status: COMPLETED | OUTPATIENT
Start: 2021-12-23 | End: 2021-12-23

## 2021-12-23 RX ORDER — ACETAMINOPHEN 325 MG/1
650 TABLET ORAL EVERY 6 HOURS PRN
COMMUNITY
Start: 2021-11-08 | End: 2022-04-15

## 2021-12-23 RX ORDER — FREMANEZUMAB-VFRM 225 MG/1.5ML
INJECTION SUBCUTANEOUS
COMMUNITY
Start: 2021-11-23 | End: 2022-05-04

## 2021-12-23 RX ADMIN — DEXAMETHASONE SODIUM PHOSPHATE 10 MG: 10 INJECTION INTRAMUSCULAR; INTRAVENOUS at 16:10

## 2021-12-23 NOTE — PROGRESS NOTES
Follow Up Office Visit      Patient Name: Tatiana Fields  : 1965   MRN: [id number]     Chief Complaint:    Chief Complaint   Patient presents with   • Urinary Tract Infection     x3 weeks, pelvic pain, has had 3 rounds of antibiotics - levaquin, macrobid, amoxicillin    • Fatigue     had surgery on  has has fatigue since then       History of Present Illness: Tatiana Fields is a 56 y.o. female who is here today for follow up of urinary tract infection and worsening fatigue.  She reports that she had a procedure at the [facility] in early November for what was originally to repair a cystocele but turned out to be a rectocele.  She followed up with  last week and was still having UTI symptoms despite 2 rounds of antibiotics.  Her urine was tested and was shown to have white blood cells so she was given another round of antibiotics.  She has taken Levaquin, Macrobid, and amoxicillin.  She still continues to have some light pelvic pressure.  But her main concern is progressive fatigue that she has been experiencing for the past 3 weeks.  She feels as if she sleeps all the time and is not taking her Ambien because she does not need it and is afraid that it will make her sleep longer than needed.  She denies any blood in her bowel movements or urine.  She reports having 2 days of vaginal bleeding after her procedure that resolved.  And then 2 days ago she had another episode of vaginal bleeding which has also stopped.  She is also experiencing a dry cough and chronic sinus problems that are worse in the morning and at night.  She thought that one of the antibiotics she has taken would have helped as well.  She takes Prozac for depression and feels more depressed than usual and relates it to just feeling fatigued and bad.  She does not have any energy to be active and feels that she is sleeping too much as it is.  She would like to have her urine tested today and possibly a Covid swab as  she was recently exposed to a family member.    Subjective     Review of System: Review of Systems   Constitutional: Positive for fatigue. Negative for chills and fever.   HENT: Positive for postnasal drip and sinus pressure. Negative for congestion, ear pain and sore throat.    Respiratory: Positive for cough. Negative for shortness of breath.    Cardiovascular: Negative for chest pain.   Gastrointestinal: Positive for nausea. Negative for abdominal pain and vomiting.   Genitourinary: Positive for pelvic pressure and vaginal bleeding. Negative for dysuria, flank pain, frequency, hematuria and urgency.   Musculoskeletal: Negative for arthralgias and myalgias.   Neurological: Negative for weakness and headache.   Psychiatric/Behavioral: Positive for depressed mood.       I have reviewed the ROS documented by my clinical staff, updated appropriately and I agree. SOULEYMANE Terrazas    I have reviewed and the following portions of the patient's history were updated as appropriate: past family history, past medical history, past social history, past surgical history and problem list.    Medications:     Current Outpatient Medications:   •  acetaminophen (TYLENOL) 325 MG tablet, Take 650 mg by mouth Every 6 (Six) Hours As Needed., Disp: , Rfl:   •  Ajovy 225 MG/1.5ML solution auto-injector, INJECT 1.5ML UNDER THE SKIN ONCE MONTHLY, Disp: , Rfl:   •  diazePAM (VALIUM) 2 MG tablet, Take 1 tablet by mouth Every 12 (Twelve) Hours As Needed for Anxiety., Disp: 60 tablet, Rfl: 2  •  estradiol (ESTRACE) 0.1 MG/GM vaginal cream, , Disp: , Rfl:   •  estradiol (MINIVELLE, VIVELLE-DOT) 0.05 MG/24HR patch, Place 1 patch on the skin as directed by provider., Disp: , Rfl:   •  famciclovir (FAMVIR) 500 MG tablet, Take 1 tablet by mouth Daily., Disp: 30 tablet, Rfl: 5  •  FLUoxetine (PROzac) 20 MG capsule, Take 1 capsule by mouth Daily., Disp: 30 capsule, Rfl: 5  •  fluticasone (FLONASE) 50 MCG/ACT nasal spray, 2 sprays into the  nostril(s) as directed by provider Daily., Disp: 48 mL, Rfl: 0  •  hydroCHLOROthiazide (HYDRODIURIL) 25 MG tablet, Take 1 tablet by mouth Daily., Disp: 30 tablet, Rfl: 0  •  levothyroxine (SYNTHROID, LEVOTHROID) 50 MCG tablet, 1 po daily, Disp: 90 tablet, Rfl: 0  •  Linzess 290 MCG capsule capsule, Take 1 capsule by mouth Every Morning Before Breakfast., Disp: 30 capsule, Rfl: 5  •  lisinopril (PRINIVIL,ZESTRIL) 10 MG tablet, Take 2 tablets by mouth Daily., Disp: 180 tablet, Rfl: 1  •  methylphenidate (Concerta) 27 MG CR tablet, Take 1 tablet by mouth Every Morning, Disp: 30 tablet, Rfl: 0  •  naproxen (NAPROSYN) 500 MG tablet, , Disp: , Rfl:   •  omeprazole (priLOSEC) 40 MG capsule, Take 1 capsule by mouth Daily., Disp: 30 capsule, Rfl: 5  •  ondansetron (ZOFRAN) 4 MG tablet, Take 1 tablet by mouth Every 8 (Eight) Hours As Needed for Nausea or Vomiting., Disp: 30 tablet, Rfl: 5  •  oxyCODONE-acetaminophen (PERCOCET)  MG per tablet, Take 1 tablet by mouth Every 8 (Eight) Hours., Disp: , Rfl:   •  progesterone (PROMETRIUM) 100 MG capsule, Take one capsule at hs, Disp: 30 capsule, Rfl: 5  •  promethazine (PHENERGAN) 12.5 MG tablet, Take 1 tablet by mouth Every 6 (Six) Hours As Needed for Nausea or Vomiting., Disp: 20 tablet, Rfl: 0  •  rizatriptan (MAXALT) 10 MG tablet, TAKE 1 TABLET BY MOUTH AS NEEDED FOR HEADACHE MAY REPEAT ONCE IN 2 HOURS MAX 2 IN 24 HOURS, Disp: , Rfl:   •  sennosides-docusate (Senna-Plus) 8.6-50 MG per tablet, Take 1 tablet by mouth 2 (Two) Times a Day., Disp: 60 tablet, Rfl: 5  •  SUMAtriptan (IMITREX) 100 MG tablet, Take one tablet at onset of headache. May repeat dose one time in 2 hours if headache not relieved., Disp: 9 tablet, Rfl: 3  •  Testosterone powder, Place 0.5 mL on the skin as directed by provider Daily. DISPENSE 15 ML, Disp: 0.15 g, Rfl: 2  •  tiZANidine (ZANAFLEX) 4 MG tablet, TAKE 1 TABLET BY MOUTH AT DINNER AND THEN TAKE 2 TABLETS EVERY NIGHT AT BEDTIME, Disp: , Rfl:   •   "Katie-jocelyne D 24 Hour  MG per 24 hr tablet, TAKE 1 TABLET BY MOUTH DAILY, Disp: 30 tablet, Rfl: 2  •  zolpidem (AMBIEN) 10 MG tablet, 1/2 to 1 po qhs as needed for insomnia, Disp: 30 tablet, Rfl: 2  •  Premarin 0.625 MG/GM vaginal cream, , Disp: , Rfl:   •  Progesterone (PROMETRIUM) 100 MG capsule, TAKE 1 CAPSULE BY MOUTH AT BEDTIME, Disp: 90 capsule, Rfl: 0  No current facility-administered medications for this visit.    Allergies:   Allergies   Allergen Reactions   • Morphine Itching   • Trazodone And Nefazodone Mental Status Change   • Sulfa Antibiotics Rash       Objective     Physical Exam:   Vital Signs:   Vitals:    12/23/21 1502   BP: 100/74   Pulse: (!) 121   Temp: 98.4 °F (36.9 °C)   SpO2: 97%   Weight: 73.8 kg (162 lb 9.6 oz)   Height: 162.6 cm (64\")     Body mass index is 27.91 kg/m².     Physical Exam  Vitals and nursing note reviewed.   Constitutional:       Appearance: Normal appearance.   HENT:      Head: Normocephalic and atraumatic.      Right Ear: Tympanic membrane, ear canal and external ear normal.      Left Ear: Tympanic membrane, ear canal and external ear normal.      Nose: Nose normal.      Mouth/Throat:      Mouth: Mucous membranes are moist.      Pharynx: Oropharynx is clear.   Eyes:      Extraocular Movements: Extraocular movements intact.      Conjunctiva/sclera: Conjunctivae normal.      Pupils: Pupils are equal, round, and reactive to light.   Neck:      Vascular: No carotid bruit.   Cardiovascular:      Rate and Rhythm: Normal rate and regular rhythm.      Pulses: Normal pulses.      Heart sounds: Normal heart sounds.   Pulmonary:      Effort: Pulmonary effort is normal.      Breath sounds: Normal breath sounds.   Abdominal:      General: Abdomen is flat. Bowel sounds are normal. There is no distension.      Palpations: Abdomen is soft.      Tenderness: There is abdominal tenderness (Mild) in the suprapubic area.   Musculoskeletal:      Cervical back: Neck supple. No tenderness.    "   Right lower leg: No edema.      Left lower leg: No edema.   Lymphadenopathy:      Cervical: No cervical adenopathy.   Skin:     General: Skin is warm and dry.      Capillary Refill: Capillary refill takes less than 2 seconds.   Neurological:      General: No focal deficit present.      Mental Status: She is alert and oriented to person, place, and time.   Psychiatric:         Mood and Affect: Mood normal.         Behavior: Behavior normal.         Assessment / Plan      Assessment/Plan:   Diagnoses and all orders for this visit:    1. Fatigue, unspecified type (Primary)  -     CBC & Differential  -     Comprehensive Metabolic Panel  -     Vitamin B12  -     Vitamin D 25 Hydroxy  -     TSH Rfx On Abnormal To Free T4  -     Folate  -     Iron Profile  -     Ferritin    2. Close exposure to COVID-19 virus  -     COVID-19 PCR, [facility], NP SWAB IN [employer] VIRAL TRANSPORT MEDIA/ORAL SWISH 24-30 HR TAT - Swab, Nasopharynx; Future  -     dexamethasone (DECADRON) injection 10 mg    3. Pelvic pain  -     POC Urinalysis Dipstick, Automated    4. Vitamin D deficiency  -     Vitamin D 25 Hydroxy    5. Vitamin B12 deficiency  -     Vitamin B12    6. H/O iron deficiency  -     Iron Profile  -     Ferritin         1. Urinalysis completed in office and showed trace blood.  Will send for culture.  2. Labs ordered as above, will follow up with results.  3. Covid swab obtained per patient request.      Follow Up:   Return for Follow-up after testing complete.    I spent approximately 25 minutes providing clinical care for this patient; including review of patient's chart and provider documentation, face to face time spent with patient in examination room (obtaining history, performing physical exam, discussing diagnosis and management options), placing orders, and completing patient documentation.     SOULEYMANE Terrazas  [facility] [place]  Answers for HPI/ROS submitted by the patient on 12/23/2021  What is the primary reason  for your visit?: Painful Urination  Chronicity: new  Onset: 1 to 4 weeks ago  Frequency: every urination  Progression since onset: gradually improving  Pain quality: aching  Pain - numeric: 6/10  Fever: no fever  Fever duration: less than 1 day  Sexually active?: No  History of pyelonephritis?: No  discharge: No  hesitancy: No  possible pregnancy: No  sweats: No

## 2021-12-24 DIAGNOSIS — F45.0 DEPRESSION WITH SOMATIZATION: ICD-10-CM

## 2021-12-24 DIAGNOSIS — I10 ESSENTIAL HYPERTENSION: Primary | ICD-10-CM

## 2021-12-24 DIAGNOSIS — Z79.890 HORMONE REPLACEMENT THERAPY (HRT): ICD-10-CM

## 2021-12-24 DIAGNOSIS — F33.9 RECURRENT MAJOR DEPRESSION RESISTANT TO TREATMENT (HCC): ICD-10-CM

## 2021-12-24 DIAGNOSIS — F32.A DEPRESSION WITH SOMATIZATION: ICD-10-CM

## 2021-12-24 DIAGNOSIS — G47.33 OBSTRUCTIVE SLEEP APNEA: ICD-10-CM

## 2021-12-24 DIAGNOSIS — N95.1 POSTMENOPAUSAL DISORDER: ICD-10-CM

## 2021-12-24 DIAGNOSIS — K59.09 CHRONIC CONSTIPATION: ICD-10-CM

## 2021-12-24 DIAGNOSIS — K59.9 COLONIC INERTIA: ICD-10-CM

## 2021-12-24 DIAGNOSIS — G47.19 EXCESSIVE DAYTIME SLEEPINESS: ICD-10-CM

## 2021-12-24 LAB
25(OH)D3+25(OH)D2 SERPL-MCNC: 16.2 NG/ML (ref 30–100)
ALBUMIN SERPL-MCNC: 4.2 G/DL (ref 3.8–4.9)
ALBUMIN/GLOB SERPL: 2 {RATIO} (ref 1.2–2.2)
ALP SERPL-CCNC: 72 IU/L (ref 44–121)
ALT SERPL-CCNC: 17 IU/L (ref 0–32)
AST SERPL-CCNC: 21 IU/L (ref 0–40)
BASOPHILS # BLD AUTO: 0.1 X10E3/UL (ref 0–0.2)
BASOPHILS NFR BLD AUTO: 1 %
BILIRUB SERPL-MCNC: 0.4 MG/DL (ref 0–1.2)
BUN SERPL-MCNC: 18 MG/DL (ref 6–24)
BUN/CREAT SERPL: 23 (ref 9–23)
CALCIUM SERPL-MCNC: 9.4 MG/DL (ref 8.7–10.2)
CHLORIDE SERPL-SCNC: 99 MMOL/L (ref 96–106)
CO2 SERPL-SCNC: 24 MMOL/L (ref 20–29)
CREAT SERPL-MCNC: 0.79 MG/DL (ref 0.57–1)
EOSINOPHIL # BLD AUTO: 0.1 X10E3/UL (ref 0–0.4)
EOSINOPHIL NFR BLD AUTO: 2 %
ERYTHROCYTE [DISTWIDTH] IN BLOOD BY AUTOMATED COUNT: 11.5 % (ref 11.7–15.4)
FERRITIN SERPL-MCNC: 78 NG/ML (ref 15–150)
FOLATE SERPL-MCNC: 12.8 NG/ML
GLOBULIN SER CALC-MCNC: 2.1 G/DL (ref 1.5–4.5)
GLUCOSE SERPL-MCNC: 97 MG/DL (ref 65–99)
HCT VFR BLD AUTO: 40.1 % (ref 34–46.6)
HGB BLD-MCNC: 13.2 G/DL (ref 11.1–15.9)
IMM GRANULOCYTES # BLD AUTO: 0.1 X10E3/UL (ref 0–0.1)
IMM GRANULOCYTES NFR BLD AUTO: 1 %
IRON SATN MFR SERPL: 22 % (ref 15–55)
IRON SERPL-MCNC: 73 UG/DL (ref 27–159)
LYMPHOCYTES # BLD AUTO: 2.6 X10E3/UL (ref 0.7–3.1)
LYMPHOCYTES NFR BLD AUTO: 32 %
MCH RBC QN AUTO: 28.3 PG (ref 26.6–33)
MCHC RBC AUTO-ENTMCNC: 32.9 G/DL (ref 31.5–35.7)
MCV RBC AUTO: 86 FL (ref 79–97)
MONOCYTES # BLD AUTO: 0.6 X10E3/UL (ref 0.1–0.9)
MONOCYTES NFR BLD AUTO: 8 %
NEUTROPHILS # BLD AUTO: 4.6 X10E3/UL (ref 1.4–7)
NEUTROPHILS NFR BLD AUTO: 56 %
PLATELET # BLD AUTO: 433 X10E3/UL (ref 150–450)
POTASSIUM SERPL-SCNC: 3.6 MMOL/L (ref 3.5–5.2)
PROT SERPL-MCNC: 6.3 G/DL (ref 6–8.5)
RBC # BLD AUTO: 4.67 X10E6/UL (ref 3.77–5.28)
SARS-COV-2 RNA NOSE QL NAA+PROBE: NOT DETECTED
SODIUM SERPL-SCNC: 138 MMOL/L (ref 134–144)
TIBC SERPL-MCNC: 335 UG/DL (ref 250–450)
TSH SERPL DL<=0.005 MIU/L-ACNC: 0.46 UIU/ML (ref 0.45–4.5)
UIBC SERPL-MCNC: 262 UG/DL (ref 131–425)
VIT B12 SERPL-MCNC: 1087 PG/ML (ref 232–1245)
WBC # BLD AUTO: 8 X10E3/UL (ref 3.4–10.8)

## 2021-12-27 RX ORDER — LINACLOTIDE 290 UG/1
290 CAPSULE, GELATIN COATED ORAL
Qty: 30 CAPSULE | Refills: 5 | OUTPATIENT
Start: 2021-12-27

## 2021-12-27 RX ORDER — FAMCICLOVIR 500 MG/1
500 TABLET ORAL DAILY
Qty: 30 TABLET | Refills: 5 | OUTPATIENT
Start: 2021-12-27

## 2021-12-27 RX ORDER — SUMATRIPTAN 100 MG/1
TABLET, FILM COATED ORAL
Qty: 9 TABLET | Refills: 3 | OUTPATIENT
Start: 2021-12-27

## 2021-12-27 RX ORDER — OMEPRAZOLE 40 MG/1
40 CAPSULE, DELAYED RELEASE ORAL DAILY
Qty: 30 CAPSULE | Refills: 5 | OUTPATIENT
Start: 2021-12-27

## 2021-12-27 RX ORDER — LEVOTHYROXINE SODIUM 0.05 MG/1
TABLET ORAL
Qty: 90 TABLET | Refills: 0 | Status: SHIPPED | OUTPATIENT
Start: 2021-12-27 | End: 2022-02-03 | Stop reason: SDUPTHER

## 2021-12-27 RX ORDER — HYDROCHLOROTHIAZIDE 25 MG/1
25 TABLET ORAL DAILY
Qty: 30 TABLET | Refills: 0 | Status: SHIPPED | OUTPATIENT
Start: 2021-12-27 | End: 2022-02-03 | Stop reason: SDUPTHER

## 2021-12-27 RX ORDER — LISINOPRIL 10 MG/1
20 TABLET ORAL DAILY
Qty: 180 TABLET | Refills: 1 | OUTPATIENT
Start: 2021-12-27

## 2021-12-27 RX ORDER — FLUOXETINE HYDROCHLORIDE 20 MG/1
20 CAPSULE ORAL DAILY
Qty: 30 CAPSULE | Refills: 5 | Status: SHIPPED | OUTPATIENT
Start: 2021-12-27 | End: 2022-04-12

## 2021-12-27 RX ORDER — AMOXICILLIN 250 MG
1 CAPSULE ORAL 2 TIMES DAILY
Qty: 60 TABLET | Refills: 5 | Status: SHIPPED | OUTPATIENT
Start: 2021-12-27 | End: 2022-02-01 | Stop reason: SDUPTHER

## 2021-12-27 NOTE — PROGRESS NOTES
Labs mostly normal. Vitamin D level is low, would recommend using OTC Vitamind D3 2505-1226 units a day to help increase level back to normal range. [facility]  [address] Medical Record Number:  [id number]  Place of Service where encounter took place:  [facility]) [[id number]]  Chief DERREK Martins CNP

## 2021-12-27 NOTE — TELEPHONE ENCOUNTER
Rx Refill Note  Requested Prescriptions     Pending Prescriptions Disp Refills   • Testosterone powder 0.15 g 2     Sig: Place 0.5 mL on the skin as directed by provider Daily. DISPENSE 15 ML   • methylphenidate (Concerta) 27 MG CR tablet 30 tablet 0     Sig: Take 1 tablet by mouth Every Morning     Signed Prescriptions Disp Refills   • FLUoxetine (PROzac) 20 MG capsule 30 capsule 5     Sig: Take 1 capsule by mouth Daily.     Authorizing Provider: SILKE HENRY     Ordering User: DANNI HERRERA   • sennosides-docusate (Senna-Plus) 8.6-50 MG per tablet 60 tablet 5     Sig: Take 1 tablet by mouth 2 (Two) Times a Day.     Authorizing Provider: SILKE HENRY     Ordering User: DANNI HERRERA   • hydroCHLOROthiazide (HYDRODIURIL) 25 MG tablet 30 tablet 0     Sig: Take 1 tablet by mouth Daily.     Authorizing Provider: SILKE HENRY     Ordering User: DANNI HERRERA   • levothyroxine (SYNTHROID, LEVOTHROID) 50 MCG tablet 90 tablet 0     Si po daily     Authorizing Provider: SILKE HENRY     Ordering User: DANNI HERRERA     Refused Prescriptions Disp Refills   • Linzess 290 MCG capsule capsule 30 capsule 5     Sig: Take 1 capsule by mouth Every Morning Before Breakfast.     Refused By: DANNI HERRERA     Reason for Refusal: Patient has requested refill too soon   • SUMAtriptan (IMITREX) 100 MG tablet 9 tablet 3     Sig: Take one tablet at onset of headache. May repeat dose one time in 2 hours if headache not relieved.     Refused By: DANNI HERREAR     Reason for Refusal: Patient has requested refill too soon   • omeprazole (priLOSEC) 40 MG capsule 30 capsule 5     Sig: Take 1 capsule by mouth Daily.     Refused By: DANNI HERRERA     Reason for Refusal: Patient has requested refill too soon   • lisinopril (PRINIVIL,ZESTRIL) 10 MG tablet 180 tablet 1     Sig: Take 2 tablets by mouth Daily.     Refused By: DANNI HERRERA     Reason for Refusal: Patient has requested refill too soon   •  famciclovir (FAMVIR) 500 MG tablet 30 tablet 5     Sig: Take 1 tablet by mouth Daily.     Refused By: DARA BRADLEY     Reason for Refusal: Patient has requested refill too soon      Last office visit with prescribing clinician: 5/5/2021      Next office visit with prescribing clinician: Visit date not found            Dara Bradley MA  12/27/21, 08:31 EST

## 2021-12-28 RX ORDER — TESTOSTERONE MICRONIZED 100 %
0.5 POWDER (GRAM) MISCELLANEOUS DAILY
Qty: 0.15 G | Refills: 2 | Status: SHIPPED | OUTPATIENT
Start: 2021-12-28 | End: 2022-02-01 | Stop reason: SDUPTHER

## 2021-12-28 RX ORDER — METHYLPHENIDATE HYDROCHLORIDE 27 MG/1
27 TABLET ORAL EVERY MORNING
Qty: 30 TABLET | Refills: 0 | Status: SHIPPED | OUTPATIENT
Start: 2021-12-28 | End: 2022-02-01 | Stop reason: SDUPTHER

## 2021-12-28 RX ORDER — METHYLPHENIDATE HYDROCHLORIDE 27 MG/1
27 TABLET ORAL EVERY MORNING
Qty: 30 TABLET | Refills: 0 | OUTPATIENT
Start: 2021-12-28

## 2021-12-28 NOTE — TELEPHONE ENCOUNTER
KIMBERLY reviewed. Refill approved. Medication E rx'd to pharmacy as requested. Pt to keep f/u apt as scheduled.    UDS and CSA UTD

## 2022-01-02 DIAGNOSIS — F45.0 DEPRESSION WITH SOMATIZATION: ICD-10-CM

## 2022-01-02 DIAGNOSIS — F32.A DEPRESSION WITH SOMATIZATION: ICD-10-CM

## 2022-01-03 RX ORDER — PROMETHAZINE HYDROCHLORIDE 12.5 MG/1
12.5 TABLET ORAL EVERY 6 HOURS PRN
Qty: 20 TABLET | Refills: 0 | Status: SHIPPED | OUTPATIENT
Start: 2022-01-03 | End: 2022-02-03 | Stop reason: SDUPTHER

## 2022-01-03 RX ORDER — FLUOXETINE HYDROCHLORIDE 20 MG/1
20 CAPSULE ORAL DAILY
Qty: 30 CAPSULE | Refills: 5 | OUTPATIENT
Start: 2022-01-03

## 2022-01-12 DIAGNOSIS — F43.10 PTSD (POST-TRAUMATIC STRESS DISORDER): ICD-10-CM

## 2022-01-12 DIAGNOSIS — M47.812 CERVICAL ARTHRITIS: ICD-10-CM

## 2022-01-12 RX ORDER — DIAZEPAM 2 MG/1
2 TABLET ORAL EVERY 12 HOURS PRN
Qty: 60 TABLET | Refills: 2 | OUTPATIENT
Start: 2022-01-12

## 2022-02-01 DIAGNOSIS — K59.09 CHRONIC CONSTIPATION: ICD-10-CM

## 2022-02-01 DIAGNOSIS — G47.33 OBSTRUCTIVE SLEEP APNEA: ICD-10-CM

## 2022-02-01 DIAGNOSIS — N95.1 POSTMENOPAUSAL DISORDER: ICD-10-CM

## 2022-02-01 DIAGNOSIS — G47.19 EXCESSIVE DAYTIME SLEEPINESS: ICD-10-CM

## 2022-02-01 DIAGNOSIS — Z79.890 HORMONE REPLACEMENT THERAPY (HRT): ICD-10-CM

## 2022-02-01 DIAGNOSIS — F33.9 RECURRENT MAJOR DEPRESSION RESISTANT TO TREATMENT: ICD-10-CM

## 2022-02-01 RX ORDER — TESTOSTERONE MICRONIZED 100 %
0.5 POWDER (GRAM) MISCELLANEOUS DAILY
Qty: 0.15 G | Refills: 2 | Status: SHIPPED | OUTPATIENT
Start: 2022-02-01 | End: 2022-02-04 | Stop reason: SDUPTHER

## 2022-02-01 RX ORDER — OMEPRAZOLE 40 MG/1
40 CAPSULE, DELAYED RELEASE ORAL DAILY
Qty: 30 CAPSULE | Refills: 5 | Status: SHIPPED | OUTPATIENT
Start: 2022-02-01 | End: 2022-10-27

## 2022-02-01 RX ORDER — METHYLPHENIDATE HYDROCHLORIDE 27 MG/1
27 TABLET ORAL EVERY MORNING
Qty: 30 TABLET | Refills: 0 | Status: SHIPPED | OUTPATIENT
Start: 2022-02-01 | End: 2022-02-24

## 2022-02-01 RX ORDER — AMOXICILLIN 250 MG
1 CAPSULE ORAL 2 TIMES DAILY
Qty: 60 TABLET | Refills: 5 | Status: SHIPPED | OUTPATIENT
Start: 2022-02-01 | End: 2022-10-07 | Stop reason: HOSPADM

## 2022-02-01 NOTE — TELEPHONE ENCOUNTER
Rx Refill Note  Requested Prescriptions     Pending Prescriptions Disp Refills   • methylphenidate (Concerta) 27 MG CR tablet 30 tablet 0     Sig: Take 1 tablet by mouth Every Morning      Last office visit with prescribing clinician: 5/5/2021      Next office visit with prescribing clinician: 2/1/2022            Dara Bradley MA  02/01/22, 11:51 EST

## 2022-02-01 NOTE — TELEPHONE ENCOUNTER
Rx Refill Note  Requested Prescriptions     Pending Prescriptions Disp Refills   • Testosterone powder 0.15 g 2     Sig: Place 0.5 mL on the skin as directed by provider Daily. DISPENSE 15 ML      Last office visit with prescribing clinician: 5/5/2021      Next office visit with prescribing clinician: 2/1/2022            Dara Bradley MA  02/01/22, 11:52 EST

## 2022-02-02 ENCOUNTER — OFFICE VISIT (OUTPATIENT)
Dept: FAMILY MEDICINE CLINIC | Facility: CLINIC | Age: 57
End: 2022-02-02

## 2022-02-02 VITALS
HEIGHT: 64 IN | DIASTOLIC BLOOD PRESSURE: 88 MMHG | SYSTOLIC BLOOD PRESSURE: 130 MMHG | WEIGHT: 164 LBS | OXYGEN SATURATION: 97 % | HEART RATE: 106 BPM | BODY MASS INDEX: 28 KG/M2 | TEMPERATURE: 96.7 F

## 2022-02-02 DIAGNOSIS — G47.19 EXCESSIVE DAYTIME SLEEPINESS: ICD-10-CM

## 2022-02-02 DIAGNOSIS — I10 ESSENTIAL HYPERTENSION: Primary | ICD-10-CM

## 2022-02-02 DIAGNOSIS — K59.9 COLONIC INERTIA: ICD-10-CM

## 2022-02-02 DIAGNOSIS — K04.7 DENTAL INFECTION: ICD-10-CM

## 2022-02-02 DIAGNOSIS — F43.10 PTSD (POST-TRAUMATIC STRESS DISORDER): ICD-10-CM

## 2022-02-02 DIAGNOSIS — F45.0 DEPRESSION WITH SOMATIZATION: ICD-10-CM

## 2022-02-02 DIAGNOSIS — J01.90 ACUTE NON-RECURRENT SINUSITIS, UNSPECIFIED LOCATION: ICD-10-CM

## 2022-02-02 DIAGNOSIS — F32.A DEPRESSION WITH SOMATIZATION: ICD-10-CM

## 2022-02-02 DIAGNOSIS — E03.9 ACQUIRED HYPOTHYROIDISM: ICD-10-CM

## 2022-02-02 DIAGNOSIS — N95.1 POSTMENOPAUSAL DISORDER: ICD-10-CM

## 2022-02-02 DIAGNOSIS — Z79.890 HORMONE REPLACEMENT THERAPY (HRT): ICD-10-CM

## 2022-02-02 DIAGNOSIS — G47.33 OBSTRUCTIVE SLEEP APNEA: ICD-10-CM

## 2022-02-02 PROCEDURE — 99215 OFFICE O/P EST HI 40 MIN: CPT | Performed by: FAMILY MEDICINE

## 2022-02-02 PROCEDURE — 96372 THER/PROPH/DIAG INJ SC/IM: CPT | Performed by: FAMILY MEDICINE

## 2022-02-02 RX ORDER — CEFTRIAXONE 1 G/1
1 INJECTION, POWDER, FOR SOLUTION INTRAMUSCULAR; INTRAVENOUS ONCE
Status: COMPLETED | OUTPATIENT
Start: 2022-02-02 | End: 2022-02-02

## 2022-02-02 RX ORDER — DEXAMETHASONE SODIUM PHOSPHATE 10 MG/ML
5 INJECTION INTRAMUSCULAR; INTRAVENOUS ONCE
Status: COMPLETED | OUTPATIENT
Start: 2022-02-02 | End: 2022-02-02

## 2022-02-02 RX ORDER — CLINDAMYCIN HYDROCHLORIDE 300 MG/1
300 CAPSULE ORAL 3 TIMES DAILY
Qty: 30 CAPSULE | Refills: 0 | Status: SHIPPED | OUTPATIENT
Start: 2022-02-02 | End: 2022-02-12

## 2022-02-02 RX ADMIN — CEFTRIAXONE 1 G: 1 INJECTION, POWDER, FOR SOLUTION INTRAMUSCULAR; INTRAVENOUS at 12:42

## 2022-02-02 RX ADMIN — DEXAMETHASONE SODIUM PHOSPHATE 5 MG: 10 INJECTION INTRAMUSCULAR; INTRAVENOUS at 12:44

## 2022-02-02 NOTE — PROGRESS NOTES
"Subjective   Tatiana Fields is a 56 y.o. female.     History of Present Illness  Mrs. Fields presents today for routine follow-up of several chronic medical problems.     She has colonic inertia with chronic constipation.  Still struggling with having regular bowel habits. Low dose thyroid replacement for chronic fatigue/excessive daytime sleepiness/resistant depression has improved her bowel function.  She is also taking her Linzess as prescribed.     Currently on vitamin D replacement for def.     Has upcoming apt with therapist for her resistant depression.  Taking Prozac as prescribed.  Denies new concerns.  No suicidal ideation.  Continues to struggle with excessive eating during times of depression, sedentary behavior.     Also on Concerta for management of excessive daytime sleepiness, resistant depression, DANE.  Good functional improvement.  Denies side effects.  No aberrant behavior.     On lisinopril for hypertension.  BP has been fairly well controlled.  She is getting a new blood pressure cuff to continue monitoring at home.     Overdue for mammogram, she is putting off right now due to COVID pandemic.    Overdue for dental care, has apt scheduled for next month. Has \"2 bad teeth\", recurrent infection, Severe pain, took 2 days worth of \"left over augmentin\". Concerned about persistent infection until she can get it to dentist. Also with persistent nasal congestion, purulent nasal discharge, pressure/pain around eyes.    Reports a distant, previous dx of gastroparesis with GERD. Plans on trying to be seen at [facility] for it. Has intermittent vomiting when her constipation is worse, early satiety, etc.    Interested in possibly starting \"nasal spray for depression\" = Spravato, esketamine.    Requests refill of T cream to [facility]    Pt's previous HPI reviewed and updated as indicated.     The following portions of the patient's history were reviewed and updated as appropriate: allergies, " current medications, past family history, past medical history, past social history, past surgical history and problem list.    Review of Systems   Constitutional: Positive for fatigue. Negative for fever.   HENT: Positive for congestion, dental problem, postnasal drip, rhinorrhea, sinus pressure, sinus pain and sore throat. Negative for ear pain, hearing loss, nosebleeds, sneezing and trouble swallowing.    Eyes: Positive for visual disturbance (chronic). Negative for pain.        Dry eyes   Respiratory: Negative for cough, shortness of breath and wheezing.    Cardiovascular: Positive for palpitations (chronic, intermittent, stable). Negative for chest pain and leg swelling.   Gastrointestinal: Positive for abdominal pain (chronic), constipation, nausea and vomiting. Negative for blood in stool and diarrhea.   Endocrine: Positive for heat intolerance. Negative for polydipsia and polyuria.   Genitourinary: Positive for pelvic pain (chronic). Negative for dysuria and hematuria.   Musculoskeletal: Positive for arthralgias, myalgias, neck pain and neck stiffness. Negative for back pain and joint swelling.   Skin: Negative for rash and wound.   Neurological: Positive for dizziness, tremors, weakness and headaches. Negative for seizures, syncope and speech difficulty.   Hematological: Negative for adenopathy. Bruises/bleeds easily.   Psychiatric/Behavioral: Positive for decreased concentration, dysphoric mood and sleep disturbance. Negative for confusion and suicidal ideas. The patient is nervous/anxious.    Pt's previous ROS reviewed and updated as indicated.       Objective    Vitals:    02/02/22 1118   BP: 130/88   Pulse: 106   Temp: 96.7 °F (35.9 °C)   SpO2: 97%     Body mass index is 28.15 kg/m².      02/02/22  1118   Weight: 74.4 kg (164 lb)       Physical Exam  Vitals and nursing note reviewed.   Constitutional:       General: She is not in acute distress.     Appearance: She is well-developed, well-groomed and  overweight. She is not ill-appearing.   HENT:      Head: Normocephalic and atraumatic.      Mouth/Throat:      Mouth: Mucous membranes are moist.   Eyes:      General: No scleral icterus.     Conjunctiva/sclera: Conjunctivae normal.   Neck:      Thyroid: No thyroid mass.      Vascular: Normal carotid pulses. No carotid bruit.   Cardiovascular:      Rate and Rhythm: Normal rate and regular rhythm.      Pulses: Normal pulses.      Heart sounds: Normal heart sounds.   Pulmonary:      Effort: Pulmonary effort is normal.      Breath sounds: Normal breath sounds.   Abdominal:      General: Bowel sounds are decreased.      Palpations: Abdomen is soft.      Tenderness: There is generalized abdominal tenderness (mild).   Musculoskeletal:      Right lower leg: No edema.      Left lower leg: No edema.   Lymphadenopathy:      Cervical: No cervical adenopathy.   Skin:     General: Skin is warm and dry.      Coloration: Skin is not jaundiced or pale.      Findings: No bruising or rash.   Neurological:      Mental Status: She is alert and oriented to person, place, and time.      Gait: Gait is intact.   Psychiatric:         Mood and Affect: Mood and affect normal.         Behavior: Behavior normal. Behavior is cooperative.         Cognition and Memory: Cognition normal.     Pt's previous physical exam reviewed and updated as indicated.    Lab Results   Component Value Date    WBC 8.0 12/23/2021    HGB 13.2 12/23/2021    HCT 40.1 12/23/2021    MCV 86 12/23/2021     12/23/2021       Lab Results   Component Value Date    GLUCOSE 97 12/23/2021    BUN 18 12/23/2021    CREATININE 0.79 12/23/2021    EGFRIFNONA 84 12/23/2021    EGFRIFAFRI 97 12/23/2021    BCR 23 12/23/2021    K 3.6 12/23/2021    CO2 24 12/23/2021    CALCIUM 9.4 12/23/2021    PROTENTOTREF 6.3 12/23/2021    ALBUMIN 4.2 12/23/2021    LABIL2 2.0 12/23/2021    AST 21 12/23/2021    ALT 17 12/23/2021       Lab Results   Component Value Date    CHOL 172 08/16/2021    CHLPL  180 10/16/2019    TRIG 237 (H) 08/16/2021    HDL 58 08/16/2021    LDL 75 08/16/2021       Lab Results   Component Value Date    HGBA1C 5.49 08/16/2021       Lab Results   Component Value Date    TSH 0.455 12/23/2021       Assessment/Plan   Diagnoses and all orders for this visit:    1. Essential hypertension (Primary)    2. Acquired hypothyroidism    3. Dental infection  -     dexamethasone (DECADRON) injection 5 mg  -     cefTRIAXone (ROCEPHIN) injection 1 g    4. Acute non-recurrent sinusitis, unspecified location  -     dexamethasone (DECADRON) injection 5 mg  -     cefTRIAXone (ROCEPHIN) injection 1 g    5. Postmenopausal disorder  -     Testosterone powder; Place 0.5 mL on the skin as directed by provider Daily. DISPENSE 15 ML  Dispense: 0.15 g; Refill: 2    6. Hormone replacement therapy (HRT)  -     Testosterone powder; Place 0.5 mL on the skin as directed by provider Daily. DISPENSE 15 ML  Dispense: 0.15 g; Refill: 2    7. Colonic inertia    8. Depression with somatization    9. PTSD (post-traumatic stress disorder)    10. Obstructive sleep apnea    11. Excessive daytime sleepiness    Other orders  -     clindamycin (Cleocin) 300 MG capsule; Take 1 capsule by mouth 3 (Three) Times a Day for 10 days.  Dispense: 30 capsule; Refill: 0       HTN controlled, continue lisinopril, HCTZ. Pt advised to eat a heart healthy diet and get regular aerobic exercise.    Continue linzess for mgnt of chronic constipation. She will consider [facility] or [facility] at [facility] in [place].    Continue thyroid replacement. TSH at goal. Has improved resistant depression, colon inertia, etc.     Depression, PTSD, f/u with therapist as scheduled. conitnue prozac.    F/u with dentist as scheduled.    Continue concerta for EDS despite CPAP tx, resistant depression. Good clinical beenfit. No side effects reported. No aberrant behavior.    Routine f/u in 3 months, sooner as needed.  Patient was encouraged to keep me  informed of any acute changes, lack of improvement, or any new concerning symptoms.  Pt is aware of reasons to seek emergent care.  Patient voiced understanding of all instructions and denied further questions.  As part of patient's treatment plan I am prescribing a controlled substance.  The patient has been made aware of the appropriate use of such medications, including potential risk of somnolence, limited ability to drive and/or work safely, and potential for dependence and/or overdose.  It has also been made clear that these medications are for use by this patient only, without concomitant use of alcohol or other substances, unless prescribed.  History and physical exam exhibit continued safe and appropriate use of controlled substance.  KIMBERLY reviwed.  Patient has completed a prescribing agreement detailing terms of continued prescribing of controlled substances, including monitoring KIMBERLY reports, urine drug screening, and pill counts if necessary.  Patient is aware that inappropriate use will result in cessation of prescribing such medications.    I spent >40 minutes caring for Tatiana on this date of service. This time includes time spent by me in the following activities:preparing for the visit, reviewing tests, performing a medically appropriate examination and/or evaluation , counseling and educating the patient/family/caregiver, ordering medications, tests, or procedures, documenting information in the medical record and care coordination.      Please note that portions of this note may have been completed with a voice recognition program. Efforts were made to edit the dictations, but occasionally words are mistranscribed.

## 2022-02-03 DIAGNOSIS — G47.19 EXCESSIVE DAYTIME SLEEPINESS: ICD-10-CM

## 2022-02-03 DIAGNOSIS — K59.09 CHRONIC CONSTIPATION: ICD-10-CM

## 2022-02-03 DIAGNOSIS — K59.9 COLONIC INERTIA: ICD-10-CM

## 2022-02-03 DIAGNOSIS — F45.0 DEPRESSION WITH SOMATIZATION: ICD-10-CM

## 2022-02-03 DIAGNOSIS — F32.A DEPRESSION WITH SOMATIZATION: ICD-10-CM

## 2022-02-03 DIAGNOSIS — I10 ESSENTIAL HYPERTENSION: ICD-10-CM

## 2022-02-04 RX ORDER — LEVOTHYROXINE SODIUM 0.05 MG/1
TABLET ORAL
Qty: 90 TABLET | Refills: 0 | Status: SHIPPED | OUTPATIENT
Start: 2022-02-04 | End: 2022-04-20

## 2022-02-04 RX ORDER — TESTOSTERONE MICRONIZED 100 %
0.5 POWDER (GRAM) MISCELLANEOUS DAILY
Qty: 0.15 G | Refills: 2 | Status: SHIPPED | OUTPATIENT
Start: 2022-02-04 | End: 2022-02-24 | Stop reason: SDUPTHER

## 2022-02-04 RX ORDER — HYDROCHLOROTHIAZIDE 25 MG/1
25 TABLET ORAL DAILY
Qty: 30 TABLET | Refills: 0 | Status: SHIPPED | OUTPATIENT
Start: 2022-02-04 | End: 2022-04-25 | Stop reason: SDUPTHER

## 2022-02-04 RX ORDER — PROMETHAZINE HYDROCHLORIDE 12.5 MG/1
12.5 TABLET ORAL EVERY 6 HOURS PRN
Qty: 20 TABLET | Refills: 0 | Status: SHIPPED | OUTPATIENT
Start: 2022-02-04 | End: 2022-03-23 | Stop reason: SDUPTHER

## 2022-02-07 DIAGNOSIS — F33.9 RECURRENT MAJOR DEPRESSION RESISTANT TO TREATMENT: Primary | ICD-10-CM

## 2022-02-14 DIAGNOSIS — F32.A DEPRESSION WITH SOMATIZATION: Primary | ICD-10-CM

## 2022-02-14 DIAGNOSIS — F45.0 DEPRESSION WITH SOMATIZATION: Primary | ICD-10-CM

## 2022-02-14 DIAGNOSIS — F43.10 PTSD (POST-TRAUMATIC STRESS DISORDER): ICD-10-CM

## 2022-02-14 DIAGNOSIS — F33.9 RECURRENT MAJOR DEPRESSION RESISTANT TO TREATMENT: ICD-10-CM

## 2022-02-14 RX ORDER — DEXAMETHASONE 4 MG/1
4 TABLET ORAL
Qty: 5 TABLET | Refills: 0 | Status: SHIPPED | OUTPATIENT
Start: 2022-02-14 | End: 2022-02-19

## 2022-02-18 RX ORDER — LEVOTHYROXINE SODIUM 0.03 MG/1
25 TABLET ORAL DAILY
Qty: 90 TABLET | Refills: 0 | OUTPATIENT
Start: 2022-02-18

## 2022-02-18 RX ORDER — OMEPRAZOLE 40 MG/1
40 CAPSULE, DELAYED RELEASE ORAL DAILY
Qty: 30 CAPSULE | Refills: 5 | OUTPATIENT
Start: 2022-02-18

## 2022-02-22 DIAGNOSIS — F43.10 PTSD (POST-TRAUMATIC STRESS DISORDER): ICD-10-CM

## 2022-02-22 DIAGNOSIS — M47.812 CERVICAL ARTHRITIS: ICD-10-CM

## 2022-02-23 RX ORDER — DIAZEPAM 2 MG/1
2 TABLET ORAL EVERY 12 HOURS PRN
Qty: 60 TABLET | Refills: 2 | Status: SHIPPED | OUTPATIENT
Start: 2022-02-23 | End: 2022-05-04

## 2022-02-23 NOTE — TELEPHONE ENCOUNTER
Rx Refill Note  Requested Prescriptions     Pending Prescriptions Disp Refills   • diazePAM (VALIUM) 2 MG tablet 60 tablet 2     Sig: Take 1 tablet by mouth Every 12 (Twelve) Hours As Needed for Anxiety.      Last office visit with prescribing clinician: 2/2/2022      Next office visit with prescribing clinician: 5/2/2022            Dara Bradley MA  02/23/22, 07:52 EST

## 2022-02-24 ENCOUNTER — TELEPHONE (OUTPATIENT)
Dept: FAMILY MEDICINE CLINIC | Facility: CLINIC | Age: 57
End: 2022-02-24

## 2022-02-24 DIAGNOSIS — G47.19 EXCESSIVE DAYTIME SLEEPINESS: ICD-10-CM

## 2022-02-24 DIAGNOSIS — N95.1 POSTMENOPAUSAL DISORDER: ICD-10-CM

## 2022-02-24 DIAGNOSIS — Z79.890 HORMONE REPLACEMENT THERAPY (HRT): ICD-10-CM

## 2022-02-24 DIAGNOSIS — G47.33 OBSTRUCTIVE SLEEP APNEA: ICD-10-CM

## 2022-02-24 RX ORDER — TESTOSTERONE MICRONIZED 100 %
0.5 POWDER (GRAM) MISCELLANEOUS DAILY
Qty: 0.15 G | Refills: 2 | Status: SHIPPED | OUTPATIENT
Start: 2022-02-24 | End: 2022-07-14 | Stop reason: SDUPTHER

## 2022-02-24 RX ORDER — MODAFINIL 200 MG/1
200 TABLET ORAL DAILY
Qty: 30 TABLET | Refills: 1 | Status: SHIPPED | OUTPATIENT
Start: 2022-02-24 | End: 2022-04-25

## 2022-02-24 NOTE — TELEPHONE ENCOUNTER
Caller: Tatiana Fields    Relationship: Self    Best call back number: [phone]    What medications are you currently taking:   Current Outpatient Medications on File Prior to Visit   Medication Sig Dispense Refill   • acetaminophen (TYLENOL) 325 MG tablet Take 650 mg by mouth Every 6 (Six) Hours As Needed.     • Ajovy 225 MG/1.5ML solution auto-injector INJECT 1.5ML UNDER THE SKIN ONCE MONTHLY     • diazePAM (VALIUM) 2 MG tablet Take 1 tablet by mouth Every 12 (Twelve) Hours As Needed for Anxiety. Must last 30 days 60 tablet 2   • estradiol (ESTRACE) 0.1 MG/GM vaginal cream      • estradiol (MINIVELLE, VIVELLE-DOT) 0.05 MG/24HR patch Place 1 patch on the skin as directed by provider.     • famciclovir (FAMVIR) 500 MG tablet Take 1 tablet by mouth Daily. 30 tablet 5   • FLUoxetine (PROzac) 20 MG capsule Take 1 capsule by mouth Daily. 30 capsule 5   • fluticasone (FLONASE) 50 MCG/ACT nasal spray 2 sprays into the nostril(s) as directed by provider Daily. 48 mL 0   • hydroCHLOROthiazide (HYDRODIURIL) 25 MG tablet Take 1 tablet by mouth Daily. 30 tablet 0   • levothyroxine (SYNTHROID, LEVOTHROID) 50 MCG tablet 1 po daily 90 tablet 0   • Linzess 290 MCG capsule capsule Take 1 capsule by mouth Every Morning Before Breakfast. 30 capsule 5   • lisinopril (PRINIVIL,ZESTRIL) 10 MG tablet Take 2 tablets by mouth Daily. 180 tablet 1   • methylphenidate (Concerta) 27 MG CR tablet Take 1 tablet by mouth Every Morning 30 tablet 0   • naproxen (NAPROSYN) 500 MG tablet      • omeprazole (priLOSEC) 40 MG capsule Take 1 capsule by mouth Daily. 30 capsule 5   • ondansetron (ZOFRAN) 4 MG tablet Take 1 tablet by mouth Every 8 (Eight) Hours As Needed for Nausea or Vomiting. 30 tablet 5   • oxyCODONE-acetaminophen (PERCOCET)  MG per tablet Take 1 tablet by mouth Every 8 (Eight) Hours.     • progesterone (PROMETRIUM) 100 MG capsule Take one capsule at hs 30 capsule 5   • promethazine (PHENERGAN) 12.5 MG tablet Take 1 tablet by  mouth Every 6 (Six) Hours As Needed for Nausea or Vomiting. 20 tablet 0   • rizatriptan (MAXALT) 10 MG tablet TAKE 1 TABLET BY MOUTH AS NEEDED FOR HEADACHE MAY REPEAT ONCE IN 2 HOURS MAX 2 IN 24 HOURS     • sennosides-docusate (Senna-Plus) 8.6-50 MG per tablet Take 1 tablet by mouth 2 (Two) Times a Day. 60 tablet 5   • Testosterone powder Place 0.5 mL on the skin as directed by provider Daily. DISPENSE 15 ML 0.15 g 2   • tiZANidine (ZANAFLEX) 4 MG tablet TAKE 1 TABLET BY MOUTH AT DINNER AND THEN TAKE 2 TABLETS EVERY NIGHT AT BEDTIME     • Wal-itin D 24 Hour  MG per 24 hr tablet TAKE 1 TABLET BY MOUTH DAILY 30 tablet 2   • zolpidem (AMBIEN) 10 MG tablet 1/2 to 1 po qhs as needed for insomnia 30 tablet 2     No current facility-administered medications on file prior to visit.        Which medication are you concerned about: CONCERTA     Who prescribed you this medication: DR. HENRY     What are your concerns: PATIENT WOULD LIKE TO SWITCH BACK TO PROVIGIL FOR THIS CONDITION. PLEASE CALL TO ADVISE

## 2022-02-24 NOTE — TELEPHONE ENCOUNTER
Caller: Tatiana Fields    Relationship: Self    Best call back number: [phone]    Requested Prescriptions:   Requested Prescriptions     Pending Prescriptions Disp Refills   • Testosterone powder 0.15 g 2     Sig: Place 0.5 mL on the skin as directed by provider Daily. DISPENSE 15 ML        Pharmacy where request should be sent: [facility] - [place], [place] - [address] - [phone]  - [phone] FX     Additional details provided by patient: PATIENT WAS RECEIVING THIS AT A DIFFERENT PHARMACY IN [place], SHE HAS MOVED TO [place] AND IS NEEDING A CREAM COMPOUNDED AT [facility]     Does the patient have less than a 3 day supply:  [x] Yes  [] No    Christine Castillo Rep   02/24/22 10:33 EST

## 2022-03-08 ENCOUNTER — CLINICAL SUPPORT (OUTPATIENT)
Dept: FAMILY MEDICINE CLINIC | Facility: CLINIC | Age: 57
End: 2022-03-08

## 2022-03-08 DIAGNOSIS — Z79.899 ENCOUNTER FOR MEDICATION MANAGEMENT: ICD-10-CM

## 2022-03-09 DIAGNOSIS — F51.04 CHRONIC INSOMNIA: ICD-10-CM

## 2022-03-10 ENCOUNTER — OFFICE (OUTPATIENT)
Dept: URBAN - METROPOLITAN AREA CLINIC 4 | Facility: CLINIC | Age: 57
End: 2022-03-10

## 2022-03-10 VITALS — WEIGHT: 160 LBS | HEIGHT: 64 IN

## 2022-03-10 DIAGNOSIS — R13.10 DYSPHAGIA, UNSPECIFIED: ICD-10-CM

## 2022-03-10 DIAGNOSIS — R10.13 EPIGASTRIC PAIN: ICD-10-CM

## 2022-03-10 DIAGNOSIS — K59.09 OTHER CONSTIPATION: ICD-10-CM

## 2022-03-10 DIAGNOSIS — R68.81 EARLY SATIETY: ICD-10-CM

## 2022-03-10 PROCEDURE — 99214 OFFICE O/P EST MOD 30 MIN: CPT | Mod: 95 | Performed by: NURSE PRACTITIONER

## 2022-03-10 RX ORDER — TEGASEROD 6 MG/1
12 TABLET ORAL
Qty: 60 | Refills: 11 | Status: ACTIVE
Start: 2022-03-10

## 2022-03-10 RX ORDER — ZOLPIDEM TARTRATE 10 MG/1
TABLET ORAL
Qty: 30 TABLET | Refills: 2 | Status: SHIPPED | OUTPATIENT
Start: 2022-03-10 | End: 2022-05-04

## 2022-03-10 RX ORDER — DEXAMETHASONE 4 MG/1
4 TABLET ORAL
Qty: 5 TABLET | Refills: 0 | OUTPATIENT
Start: 2022-03-10 | End: 2022-03-15

## 2022-03-19 ENCOUNTER — TELEMEDICINE (OUTPATIENT)
Dept: FAMILY MEDICINE CLINIC | Facility: TELEHEALTH | Age: 57
End: 2022-03-19

## 2022-03-19 ENCOUNTER — E-VISIT (OUTPATIENT)
Dept: FAMILY MEDICINE CLINIC | Facility: TELEHEALTH | Age: 57
End: 2022-03-19

## 2022-03-19 DIAGNOSIS — Z01.812 ENCOUNTER FOR PREPROCEDURE SCREENING LABORATORY TESTING FOR COVID-19: Primary | ICD-10-CM

## 2022-03-19 DIAGNOSIS — Z20.822 ENCOUNTER FOR PREPROCEDURE SCREENING LABORATORY TESTING FOR COVID-19: Primary | ICD-10-CM

## 2022-03-19 PROCEDURE — 99213 OFFICE O/P EST LOW 20 MIN: CPT | Performed by: NURSE PRACTITIONER

## 2022-03-19 NOTE — E-VISIT ESCALATED
Patient escalated   Provider Jeannette Giang chose to escalate patient to another level of care because: Patient needs a lab test   Patient was sent the following message:   Based on the information you've provided us, you need to take another step to get care.   What to do now:   Setup a video visit   Please, schedule your video visit   Video visit     You won't be charged for your eVisit. If you paid with a credit card, the charge will be reversed.   Chief Complaint: Coronavirus (COVID-19), cold, sinus pain, allergy, or flu   Patient introduction   When asked why they're seeking care online today, patient reports they want to get tested for COVID-19.   Patient is 56-year-old female who is currently asymptomatic.   COVID-19 exposure, testing history, and vaccination status:    Close contact with a person with a suspected case of COVID-19 more than 7 days ago.    No recent travel outside of their local community.    Patient had a viral lab test > 3 months ago. Test result was negative.    Reports receiving 3 doses of the COVID-19 vaccine (Pfizer, Pfizer, Pfizer). Received their most recent dose of the vaccine > 14 days ago.   Patient-submitted comments: I need covid test today for medical procedure scheduled Monday at [facility]. Having stomach issues and have endoscope scheduled at 1pm at [facility].   Patient did not request an excuse note.   General presentation   Fever:    Denies fever.   Sinus and nasal symptoms:    Denies nasal or sinus congestion.    Denies rhinitis.    Denies itchy nose or sneezing.   Sore throat:    Denies sore throat.   Head and body aches:    Denies headache.    Denies sweats.    Denies chills.    Denies myalgia.    Denies fatigue.   Cough:    Denies cough.   Wheezing and SOB:    Denies shortness of breath.   Assessment:   Patient determined to need a level of care not appropriate to be delivered through eVisit.   Plan:   Patient informed of need to  seek in-person care      ----------   Electronically signed by SOULEYMANE Arias on 2022-03-19 at 15:47PM   ----------   Patient Interview Transcript:   Why are you getting care through eVisit today? We can't guarantee a specific treatment or test. Your provider will decide what's best for you. Select all that apply.    I want to be tested for COVID-19   Not selected:    I want to know if I have a cold or something more serious    I want to know if I need to be seen by a provider    I need a doctor's note    I want to get the COVID-19 vaccine    I think I'm having side effects from the COVID-19 vaccine    I just want to feel better!    None of the above   Do any of these statements apply to you? Select all that apply.    None of the above   Not selected:    The public health department directed me to get a COVID-19 test    My employer directed me to get a COVID-19 test    I need to get a COVID-19 test BEFORE I travel    I need to get a COVID-19 test AFTER traveling in the last week   COVID-19 symptoms are similar to symptoms of other illnesses. This makes it difficult to diagnose. Please carefully consider each question and answer as best you can. This will help your provider make the right diagnosis. Which of these symptoms are bothering you? Select all that apply.    I don't have any of these symptoms   Not selected:    Cough    Shortness of breath    Fever    Stuffed-up nose or sinuses    Runny nose    Itchy or watery eyes    Itchy nose or sneezing    Loss of smell or taste    Sore throat    Hoarse voice or loss of voice    Headache    Sweats    Chills    Muscle or body aches    Fatigue or tiredness    Nausea or vomiting    Diarrhea   Do you live in a group care setting? Examples include: - Nursing home - Residential care - Psychiatric treatment facility - Group home - Dormitory - Board and care home - Homeless shelter - Foster care setting Select one.    No   Not selected:    Yes   Are you pregnant? Select  one.    No   Not selected:    Yes   Are you a healthcare worker? Select one.    No   Not selected:    Yes   Have you ever been tested for COVID-19? Select one.    Yes   Not selected:    No   When was your most recent COVID-19 test? Select one.    More than 3 months ago   Not selected:    Within the last week (specify date in MM/DD/YYYY format)    7 to 14 days ago    15 to 30 days ago    1 to 3 months ago   What type of COVID-19 test did you most recently have? There are two types of COVID-19 tests: - Viral tests check if you're currently infected with COVID-19. For these tests, a nose swab or saliva sample is taken. Viral tests include self-tests and tests done at a doctor's office, lab, or testing site. - Antibody tests check if you've been infected in the past. For these tests, your blood is drawn. Antibody tests can only be done at a doctor's office, lab, or testing site. Select one.    Viral test at a doctor's office, lab, or testing site   Not selected:    Viral self-test    Antibody test   What was the result of your most recent COVID-19 test? Select one.    Negative   Not selected:    Positive    I'm not sure   Have you gotten the COVID-19 vaccine? Select one.    Yes   Not selected:    No   How many doses of the COVID-19 vaccine have you gotten? This includes boosters. Select one.    3 doses   Not selected:    1 dose    2 doses   Which COVID-19 vaccine did you get for your first dose? Check your Vaccination Record Card under Product Name/. Select one.    Pfizer-BioNTech (Pfizer)   Not selected:    Julian & Julian's Aliyah Vaccine (J&J/Aliyah)    Moderna   Which COVID-19 vaccine did you get for your second dose? Check your Vaccination Record Card under Product Name/. Select one.    Pfizer-BioNTech (Pfizer)   Not selected:    Julian & Julian's Aliyah Vaccine (J&J/Aliyah)    Moderna   Which COVID-19 vaccine did you get for your third dose? Check your Vaccination Record Card under  "Product Name/. Select one.    Pfizer-BioNTech (Pfizer)   Not selected:    Julian & Elastra's Aliyah Vaccine (J&J/Aliyah)    Moderna   When did you get your most recent dose of the COVID-19 vaccine?    More than 14 days ago   Not selected:    Less than 48 hours (2 days) ago    48 to 72 hours (3 days) ago    3 to 5 days ago    5 to 7 days ago    7 to 14 days ago   In the last 14 days, have you traveled outside of your local community? This includes travel by car, RV, bus, train, or plane. Travel increases your chances of getting and spreading COVID-19. Select one.    No   Not selected:    Yes   In the last 14 days, have you had close contact with someone who has coronavirus (COVID-19)? \"Close contact\" means any of these: - Living in the same household as someone with COVID-19. - Caring for someone with COVID-19. - Being within 6 feet of someone with COVID-19 for a total of at least 15 minutes over a 24-hour period. For example, three 5-minute exposures for a total of 15 minutes. - Being in direct contact with respiratory droplets from someone with COVID-19 (being coughed on, kissing, sharing utensils). Select one.    Yes, a suspected case   Not selected:    Yes, a confirmed case    No, not that I know of   When did the close contact happen? Select one.    More than 7 days ago   Not selected:    Within the last 2 days    3 to 7 days ago   Do you need a doctor's note? A doctor's note confirms that you received care today and states when you can return to school or work. It does not contain information about your diagnosis or treatment plan. Your provider will make the final decision on whether to give you a doctor's note and for how long. Doctor's notes CANNOT be backdated. We can't provide medical leave paperwork through this type of visit. If more paperwork is needed to request time off, contact your primary care provider. Select one.    No   Not selected:    Today only (1 day)    Today and tomorrow (2 " days)    3 days    7 days    10 days    14 days   Is there anything else you'd like to tell us about your symptoms?    I need covid test today for medical procedure scheduled Monday at [facility]. Having stomach issues and have endoscope scheduled at 1pm at [facility] [place]   ----------   Medical history   Medical history data does not currently exist for this patient.

## 2022-03-19 NOTE — PROGRESS NOTES
You have chosen to receive care through a telehealth visit.  Do you consent to use a video/audio connection for your medical care today? Yes     CHIEF COMPLAINT  Chief Complaint   Patient presents with   • GI Problem   needs Covid 19 test for procedure clearance      HPI  Tatiana Fields is a 56 y.o. female  presents with a request for a Covid 19 test that her physician requested to be done prior to an endoscopy she is having Monday for gastroparesis. She is having difficulty keeping food down with frequent reflux of undigested food. She has no covid 19 symptoms today.     Review of Systems   Constitutional: Negative.    HENT: Negative.    Respiratory: Negative.    Cardiovascular: Negative.    Gastrointestinal: Positive for nausea and vomiting.   Neurological: Negative.    Psychiatric/Behavioral: Negative.        Past Medical History:   Diagnosis Date   • Anemia    • Arthritis    • Cervical nerve root compression    • Cervical spinal stenosis    • Chronic fatigue    • Cluster headache 2012   • DDD (degenerative disc disease), lumbar    • Depression    • Endometriosis    • Essential tremor    • Fibromyalgia, primary 2013   • Headache, tension-type 2013   • HL (hearing loss) 2013   • Hyperlipidemia    • Hypertension    • Irritable bowel syndrome    • Lyme disease 2012    tx'd with prolonged course abx   • Memory loss    • Migraine 2013   • Narcolepsy     listed in medical records, tx'd with provigil   • Obstructive sleep apnea    • Ovarian cyst    • Shingles 2/15/2013   • Sicca syndrome (HCC)    • Vitamin D deficiency        Family History   Problem Relation Age of Onset   • Inflammatory bowel disease Mother    • Hypertension Mother    • Heart disease Mother    • Arthritis Mother    • Hyperlipidemia Mother    • Diabetes Mother    • Migraines Mother    • Neuropathy Mother    • Stroke Mother    • Hypertension Father    • Heart attack Father    • Hyperlipidemia Father    • Cancer Sister    • Bone cancer Sister    • Colon  cancer Other    • Hyperlipidemia Brother    • Colon cancer Brother    • Diabetes Maternal Grandmother    • Diabetes Paternal Grandmother    • Neuropathy Paternal Grandmother    • Stroke Paternal Grandmother    • Stroke Maternal Aunt        Social History     Socioeconomic History   • Marital status:    Tobacco Use   • Smoking status: Never Smoker   • Smokeless tobacco: Never Used   Substance and Sexual Activity   • Alcohol use: No   • Drug use: No   • Sexual activity: Not Currently     Birth control/protection: Surgical         LMP  (LMP Unknown)     PHYSICAL EXAM  Physical Exam   Constitutional: She is oriented to person, place, and time. She appears well-developed and well-nourished. She does not have a sickly appearance. She does not appear ill. No distress.   HENT:   Head: Normocephalic and atraumatic.   Pulmonary/Chest: Effort normal.  No respiratory distress.  Neurological: She is alert and oriented to person, place, and time.   Psychiatric: She has a normal mood and affect.   Vitals reviewed.      Results for orders placed or performed in visit on 12/23/21   COVID-19 PCR, [facility], NP SWAB IN [facility] VIRAL TRANSPORT MEDIA/ORAL SWISH 24-30 HR TAT - Swab, Nasopharynx    Specimen: Nasopharynx; Swab   Result Value Ref Range    SARS-CoV-2 SOPHIA Not Detected Not Detected   Comprehensive Metabolic Panel    Specimen: Blood   Result Value Ref Range    Glucose 97 65 - 99 mg/dL    BUN 18 6 - 24 mg/dL    Creatinine 0.79 0.57 - 1.00 mg/dL    eGFR Non African Am 84 >59 mL/min/1.73    eGFR African Am 97 >59 mL/min/1.73    BUN/Creatinine Ratio 23 9 - 23    Sodium 138 134 - 144 mmol/L    Potassium 3.6 3.5 - 5.2 mmol/L    Chloride 99 96 - 106 mmol/L    Total CO2 24 20 - 29 mmol/L    Calcium 9.4 8.7 - 10.2 mg/dL    Total Protein 6.3 6.0 - 8.5 g/dL    Albumin 4.2 3.8 - 4.9 g/dL    Globulin 2.1 1.5 - 4.5 g/dL    A/G Ratio 2.0 1.2 - 2.2    Total Bilirubin 0.4 0.0 - 1.2 mg/dL    Alkaline Phosphatase 72 44 - 121 IU/L    AST  (SGOT) 21 0 - 40 IU/L    ALT (SGPT) 17 0 - 32 IU/L   Vitamin B12    Specimen: Blood   Result Value Ref Range    Vitamin B-12 1,087 232 - 1,245 pg/mL   Vitamin D 25 Hydroxy    Specimen: Blood   Result Value Ref Range    25 Hydroxy, Vitamin D 16.2 (L) 30.0 - 100.0 ng/mL   TSH Rfx On Abnormal To Free T4    Specimen: Blood   Result Value Ref Range    TSH 0.455 0.450 - 4.500 uIU/mL   Folate    Specimen: Blood   Result Value Ref Range    Folate 12.8 >3.0 ng/mL   Iron Profile    Specimen: Blood   Result Value Ref Range    TIBC 335 250 - 450 ug/dL    UIBC 262 131 - 425 ug/dL    Iron 73 27 - 159 ug/dL    Iron Saturation 22 15 - 55 %   Ferritin    Specimen: Blood   Result Value Ref Range    Ferritin 78 15 - 150 ng/mL   POC Urinalysis Dipstick, Automated    Specimen: Urine   Result Value Ref Range    Color Yellow Yellow, Straw, Dark Yellow, Shanique    Clarity, UA Clear Clear    Specific Gravity  1.020 1.005 - 1.030    pH, Urine 6.5 5.0 - 8.0    Leukocytes Negative Negative    Nitrite, UA Negative Negative    Protein, POC Negative Negative mg/dL    Glucose, UA Negative Negative, 1000 mg/dL (3+) mg/dL    Ketones, UA Negative Negative    Urobilinogen, UA Normal Normal    Bilirubin Negative Negative    Blood, UA Negative Negative    Lot Number [id number]     Expiration Date 06/30/2023    CBC & Differential    Specimen: Blood   Result Value Ref Range    WBC 8.0 3.4 - 10.8 x10E3/uL    RBC 4.67 3.77 - 5.28 x10E6/uL    Hemoglobin 13.2 11.1 - 15.9 g/dL    Hematocrit 40.1 34.0 - 46.6 %    MCV 86 79 - 97 fL    MCH 28.3 26.6 - 33.0 pg    MCHC 32.9 31.5 - 35.7 g/dL    RDW 11.5 (L) 11.7 - 15.4 %    Platelets 433 150 - 450 x10E3/uL    Neutrophil Rel % 56 Not Estab. %    Lymphocyte Rel % 32 Not Estab. %    Monocyte Rel % 8 Not Estab. %    Eosinophil Rel % 2 Not Estab. %    Basophil Rel % 1 Not Estab. %    Neutrophils Absolute 4.6 1.4 - 7.0 x10E3/uL    Lymphocytes Absolute 2.6 0.7 - 3.1 x10E3/uL    Monocytes Absolute 0.6 0.1 - 0.9 x10E3/uL     Eosinophils Absolute 0.1 0.0 - 0.4 x10E3/uL    Basophils Absolute 0.1 0.0 - 0.2 x10E3/uL    Immature Granulocyte Rel % 1 Not Estab. %    Immature Grans Absolute 0.1 0.0 - 0.1 x10E3/uL       Diagnoses and all orders for this visit:    1. Encounter for preprocedure screening laboratory testing for COVID-19 (Primary)  -     COVID-19, [facility]-HOUSE NP/OP SWAB IN U[facility]/VTM/[facility] TRANSPORT MEDIA 24HR TAT - Swab, Nasopharynx; Future          FOLLOW-UP  As discussed during visit with PCP/[facility] if no improvement or Urgent Care/Emergency Department if worsening of symptoms    Patient verbalizes understanding of medication dosage, comfort measures, instructions for treatment and follow-up.    SOULEYMANE Tovar  03/19/2022  16:17 EDT    This visit was performed via Telehealth.  This patient has been instructed to follow-up with their primary care provider if their symptoms worsen or the treatment provided does not resolve their illness.

## 2022-03-20 ENCOUNTER — LAB (OUTPATIENT)
Dept: LAB | Facility: HOSPITAL | Age: 57
End: 2022-03-20

## 2022-03-20 DIAGNOSIS — Z20.822 ENCOUNTER FOR PREPROCEDURE SCREENING LABORATORY TESTING FOR COVID-19: ICD-10-CM

## 2022-03-20 DIAGNOSIS — Z01.812 ENCOUNTER FOR PREPROCEDURE SCREENING LABORATORY TESTING FOR COVID-19: ICD-10-CM

## 2022-03-20 LAB — SARS-COV-2 RNA PNL SPEC NAA+PROBE: NOT DETECTED

## 2022-03-20 PROCEDURE — C9803 HOPD COVID-19 SPEC COLLECT: HCPCS

## 2022-03-20 PROCEDURE — U0004 COV-19 TEST NON-CDC HGH THRU: HCPCS

## 2022-03-21 ENCOUNTER — AMBULATORY SURGICAL CENTER (OUTPATIENT)
Dept: URBAN - METROPOLITAN AREA SURGERY 10 | Facility: SURGERY | Age: 57
End: 2022-03-21

## 2022-03-21 ENCOUNTER — OFFICE (OUTPATIENT)
Dept: URBAN - METROPOLITAN AREA PATHOLOGY 4 | Facility: PATHOLOGY | Age: 57
End: 2022-03-21
Payer: MEDICARE

## 2022-03-21 DIAGNOSIS — K44.9 DIAPHRAGMATIC HERNIA WITHOUT OBSTRUCTION OR GANGRENE: ICD-10-CM

## 2022-03-21 DIAGNOSIS — K21.00 GASTRO-ESOPHAGEAL REFLUX DISEASE WITH ESOPHAGITIS, WITHOUT B: ICD-10-CM

## 2022-03-21 DIAGNOSIS — K22.4 DYSKINESIA OF ESOPHAGUS: ICD-10-CM

## 2022-03-21 DIAGNOSIS — K29.50 UNSPECIFIED CHRONIC GASTRITIS WITHOUT BLEEDING: ICD-10-CM

## 2022-03-21 DIAGNOSIS — K29.70 GASTRITIS, UNSPECIFIED, WITHOUT BLEEDING: ICD-10-CM

## 2022-03-21 DIAGNOSIS — K22.2 ESOPHAGEAL OBSTRUCTION: ICD-10-CM

## 2022-03-21 PROCEDURE — 43249 ESOPH EGD DILATION <30 MM: CPT | Performed by: INTERNAL MEDICINE

## 2022-03-21 PROCEDURE — 88305 TISSUE EXAM BY PATHOLOGIST: CPT | Performed by: INTERNAL MEDICINE

## 2022-03-21 PROCEDURE — 43239 EGD BIOPSY SINGLE/MULTIPLE: CPT | Mod: 59 | Performed by: INTERNAL MEDICINE

## 2022-03-23 RX ORDER — PROMETHAZINE HYDROCHLORIDE 12.5 MG/1
12.5 TABLET ORAL EVERY 6 HOURS PRN
Qty: 20 TABLET | Refills: 1 | Status: SHIPPED | OUTPATIENT
Start: 2022-03-23 | End: 2022-06-01

## 2022-03-28 ENCOUNTER — TELEPHONE (OUTPATIENT)
Dept: FAMILY MEDICINE CLINIC | Facility: CLINIC | Age: 57
End: 2022-03-28

## 2022-04-12 ENCOUNTER — TELEMEDICINE (OUTPATIENT)
Dept: FAMILY MEDICINE CLINIC | Facility: CLINIC | Age: 57
End: 2022-04-12

## 2022-04-12 DIAGNOSIS — F43.10 PTSD (POST-TRAUMATIC STRESS DISORDER): ICD-10-CM

## 2022-04-12 DIAGNOSIS — E55.9 VITAMIN D DEFICIENCY: ICD-10-CM

## 2022-04-12 DIAGNOSIS — F45.0 DEPRESSION WITH SOMATIZATION: ICD-10-CM

## 2022-04-12 DIAGNOSIS — F32.A DEPRESSION WITH SOMATIZATION: ICD-10-CM

## 2022-04-12 DIAGNOSIS — E53.8 VITAMIN B12 DEFICIENCY: ICD-10-CM

## 2022-04-12 DIAGNOSIS — F33.1 MODERATE EPISODE OF RECURRENT MAJOR DEPRESSIVE DISORDER: Primary | ICD-10-CM

## 2022-04-12 PROCEDURE — 99213 OFFICE O/P EST LOW 20 MIN: CPT | Performed by: FAMILY MEDICINE

## 2022-04-12 RX ORDER — LEVOMILNACIPRAN HYDROCHLORIDE 20 MG/1
20 CAPSULE, EXTENDED RELEASE ORAL DAILY
Qty: 30 CAPSULE | Refills: 2 | Status: SHIPPED | OUTPATIENT
Start: 2022-04-12 | End: 2022-09-06

## 2022-04-12 NOTE — PROGRESS NOTES
"TELEHEALTH/VIDEO VISIT  DOS 22    TIERNEY FINE   1965    You have chosen to receive care through a telehealth visit.  Do you consent to use a video/audio connection for your medical care today? Yes    In order to limit face-to-face contact and enact \"social distancing\" in light of the COVID-19 outbreaks and in accordance to the recommendations per the CDC, WHO, and our individual department, she was contacted by telephone.  Telehealth/video visit was used to screen the patient for needs and conduct her regularly scheduled follow-up.     History of Present Illness  Mrs. Fine presents today for follow-up on severe depression with anxiety.  Recently had Adomik test which showed her current Prozac may not be ideal therapy.  She is wishing to try alternative.  Interested in Fetzima particularly.    She has an upcoming evaluation with [facility] for daily nausea, vomiting.  Did have recent EGD which showed suspected gastroparesis, GERD chronic gastritis etc per her report.  Records requested. Prescribed a no spray which was quite expensive, changed to erythromycin.  Has been taking for the last few days.  She is increased her fluids and is eating a generally soft diet.    Getting her vitamin D3 regularly  B12 off and on every 3-4 days    Patient's past medical hx, surgical hx, family hx, social hx reviewed on chart. Medication and allergy lists reviewed on chart. Information updated as indicated.      Review of Systems   Constitutional: Positive for fatigue. Negative for fever.   HENT: Positive for congestion, dental problem, postnasal drip, rhinorrhea, sinus pressure, sinus pain and sore throat. Negative for ear pain, hearing loss, nosebleeds, sneezing and trouble swallowing.    Eyes: Positive for visual disturbance (chronic). Negative for pain.        Dry eyes   Respiratory: Negative for cough, shortness of breath and wheezing.    Cardiovascular: Positive for palpitations (chronic, " intermittent, stable). Negative for chest pain and leg swelling.   Gastrointestinal: Positive for abdominal pain (chronic), constipation, nausea and vomiting. Negative for blood in stool and diarrhea.   Endocrine: Positive for heat intolerance. Negative for polydipsia and polyuria.   Genitourinary: Positive for pelvic pain (chronic). Negative for dysuria and hematuria.   Musculoskeletal: Positive for arthralgias, myalgias, neck pain and neck stiffness. Negative for back pain and joint swelling.   Skin: Negative for rash and wound.   Neurological: Positive for dizziness, tremors, weakness and headaches. Negative for seizures, syncope and speech difficulty.   Hematological: Negative for adenopathy. Bruises/bleeds easily.   Psychiatric/Behavioral: Positive for decreased concentration, dysphoric mood and sleep disturbance. Negative for confusion and suicidal ideas. The patient is nervous/anxious.    Pt's previous ROS reviewed and updated as indicated.         Physical Exam  Constitutional:       General: She is not in acute distress.     Appearance: She is well-developed and well-groomed. She is not ill-appearing.   HENT:      Head: Normocephalic and atraumatic.   Eyes:      General: No scleral icterus.     Conjunctiva/sclera: Conjunctivae normal.   Pulmonary:      Effort: Pulmonary effort is normal.   Skin:     Coloration: Skin is not jaundiced or pale.   Neurological:      Mental Status: She is alert and oriented to person, place, and time.   Psychiatric:         Mood and Affect: Mood and affect normal.         Behavior: Behavior normal. Behavior is cooperative.         Cognition and Memory: Cognition normal.           Diagnoses and all orders for this visit:    Moderate episode of recurrent major depressive disorder (HCC)  -     Levomilnacipran HCl ER (Fetzima) 20 MG capsule sustained-release 24 hr; Take 20 mg by mouth Daily.    Vitamin D deficiency    Vitamin B12 deficiency    Depression with somatization    PTSD  (post-traumatic stress disorder)    Other orders  -     Discontinue: rizatriptan (MAXALT) 10 MG tablet  -     Discontinue: promethazine (PHENERGAN) 12.5 MG tablet  -     Discontinue: modafinil (PROVIGIL) 200 MG tablet  -     erythromycin base (E-MYCIN) 250 MG tablet; TAKE 1/2 TABLET BY MOUTH 30 MINUTES BEFORE A MEAL AND AT BEDTIME  -     Discontinue: Premarin 0.625 MG/GM vaginal cream  -     mirtazapine (REMERON) 7.5 MG tablet; Take 7.5 mg by mouth every night at bedtime.  -     oxyCODONE (ROXICODONE) 10 MG tablet; Take 10 mg by mouth Every 6 (Six) Hours.  -     Progesterone (PROMETRIUM) 100 MG capsule; Take 100 mg by mouth every night at bedtime.      I have reviewed risks/benefits and potential side effects of various treatment options for MDD with anxiety. Pt voices understanding and wishes to proceed with trial of Fetzima as above.    Continue vitamin D and B12 replacement.    Patient will f/u as scheduled next month, follow-up sooner as needed.  Patient was encouraged to keep me informed of any acute changes, lack of improvement, or any new concerning symptoms.  Pt is aware of reasons to seek emergent care.  Patient voiced understanding of all instructions and denied further questions.      Total face to face time with patient was 17 minutes.

## 2022-04-13 ENCOUNTER — PRIOR AUTHORIZATION (OUTPATIENT)
Dept: FAMILY MEDICINE CLINIC | Facility: CLINIC | Age: 57
End: 2022-04-13

## 2022-04-13 NOTE — TELEPHONE ENCOUNTER
A PRIOR AUTH HAS BEEN STARTED THROUGH [employer] FOR FETZIMA.    CURRENTLY WAITING ON A RESPONSE FROM THE INSURANCE.    Key: [id number]

## 2022-04-14 ENCOUNTER — TELEHEALTH PROVIDED OTHER THAN IN PATIENT'S HOME (OUTPATIENT)
Dept: URBAN - METROPOLITAN AREA CLINIC 4 | Facility: CLINIC | Age: 57
End: 2022-04-14

## 2022-04-14 VITALS — HEIGHT: 64 IN | WEIGHT: 160 LBS

## 2022-04-14 DIAGNOSIS — R11.2 NAUSEA WITH VOMITING, UNSPECIFIED: ICD-10-CM

## 2022-04-14 DIAGNOSIS — R13.10 DYSPHAGIA, UNSPECIFIED: ICD-10-CM

## 2022-04-14 DIAGNOSIS — K30 FUNCTIONAL DYSPEPSIA: ICD-10-CM

## 2022-04-14 DIAGNOSIS — K59.09 OTHER CONSTIPATION: ICD-10-CM

## 2022-04-14 PROCEDURE — 99214 OFFICE O/P EST MOD 30 MIN: CPT | Mod: 95 | Performed by: NURSE PRACTITIONER

## 2022-04-15 RX ORDER — RIZATRIPTAN BENZOATE 10 MG/1
TABLET ORAL
COMMUNITY
Start: 2022-02-18 | End: 2022-04-15 | Stop reason: SDUPTHER

## 2022-04-15 RX ORDER — MODAFINIL 200 MG/1
TABLET ORAL
COMMUNITY
Start: 2022-02-27 | End: 2022-04-15 | Stop reason: SDUPTHER

## 2022-04-15 RX ORDER — MIRTAZAPINE 7.5 MG/1
7.5 TABLET, FILM COATED ORAL
COMMUNITY
Start: 2022-02-23 | End: 2022-05-04

## 2022-04-15 RX ORDER — PROMETHAZINE HYDROCHLORIDE 12.5 MG/1
TABLET ORAL
COMMUNITY
Start: 2022-03-31 | End: 2022-04-15 | Stop reason: SDUPTHER

## 2022-04-15 RX ORDER — ERYTHROMYCIN 250 MG/1
TABLET, COATED ORAL
COMMUNITY
Start: 2022-03-15 | End: 2022-08-22

## 2022-04-15 RX ORDER — PROGESTERONE 100 MG/1
100 CAPSULE ORAL
COMMUNITY
Start: 2022-03-17 | End: 2022-05-04 | Stop reason: SDUPTHER

## 2022-04-15 RX ORDER — CONJUGATED ESTROGENS 0.62 MG/G
CREAM VAGINAL
COMMUNITY
Start: 2022-02-21 | End: 2022-04-15 | Stop reason: SDUPTHER

## 2022-04-15 RX ORDER — OXYCODONE HYDROCHLORIDE 10 MG/1
10 TABLET ORAL EVERY 6 HOURS
COMMUNITY
Start: 2022-03-19 | End: 2022-05-23

## 2022-04-18 NOTE — TELEPHONE ENCOUNTER
PATIENTS MEDICATION  HAS BEEN APPROVED THROUGH THE INSURANCE.    APPROVAL START DATE: 01/01/2022    APPROVAL END DATE: 12/31/2022    PHARMACY HAS BEEN NOTIFIED.    PATIENT HAS BEEN NOTIFIED VIA [employer].

## 2022-04-20 DIAGNOSIS — F32.A DEPRESSION WITH SOMATIZATION: ICD-10-CM

## 2022-04-20 DIAGNOSIS — K59.09 CHRONIC CONSTIPATION: ICD-10-CM

## 2022-04-20 DIAGNOSIS — F45.0 DEPRESSION WITH SOMATIZATION: ICD-10-CM

## 2022-04-20 DIAGNOSIS — G47.19 EXCESSIVE DAYTIME SLEEPINESS: ICD-10-CM

## 2022-04-20 DIAGNOSIS — K59.9 COLONIC INERTIA: ICD-10-CM

## 2022-04-20 RX ORDER — LEVOTHYROXINE SODIUM 0.05 MG/1
TABLET ORAL
Qty: 90 TABLET | Refills: 0 | Status: SHIPPED | OUTPATIENT
Start: 2022-04-20 | End: 2022-04-25 | Stop reason: SDUPTHER

## 2022-04-25 DIAGNOSIS — F32.A DEPRESSION WITH SOMATIZATION: ICD-10-CM

## 2022-04-25 DIAGNOSIS — G47.19 EXCESSIVE DAYTIME SLEEPINESS: ICD-10-CM

## 2022-04-25 DIAGNOSIS — G47.33 OBSTRUCTIVE SLEEP APNEA: ICD-10-CM

## 2022-04-25 DIAGNOSIS — K59.09 CHRONIC CONSTIPATION: ICD-10-CM

## 2022-04-25 DIAGNOSIS — F45.0 DEPRESSION WITH SOMATIZATION: ICD-10-CM

## 2022-04-25 DIAGNOSIS — I10 ESSENTIAL HYPERTENSION: ICD-10-CM

## 2022-04-25 DIAGNOSIS — K59.9 COLONIC INERTIA: ICD-10-CM

## 2022-04-25 RX ORDER — MODAFINIL 200 MG/1
200 TABLET ORAL DAILY
Qty: 30 TABLET | Refills: 2 | Status: SHIPPED | OUTPATIENT
Start: 2022-04-25 | End: 2022-07-25 | Stop reason: SDUPTHER

## 2022-04-25 NOTE — TELEPHONE ENCOUNTER
Rx Refill Note  Requested Prescriptions     Pending Prescriptions Disp Refills   • modafinil (PROVIGIL) 200 MG tablet [Pharmacy Med Name: MODAFINIL 200MG TABLETS] 30 tablet      Sig: TAKE 1 TABLET BY MOUTH DAILY      Last office visit with prescribing clinician: 2/2/2022      Next office visit with prescribing clinician: 5/2/2022    Ok to fill?  Sarah Morales MA  04/25/22, 11:14 EDT

## 2022-04-26 RX ORDER — MODAFINIL 200 MG/1
200 TABLET ORAL DAILY
Qty: 30 TABLET | Refills: 1 | OUTPATIENT
Start: 2022-04-26

## 2022-04-26 RX ORDER — LEVOTHYROXINE SODIUM 0.05 MG/1
TABLET ORAL
Qty: 90 TABLET | Refills: 0 | Status: SHIPPED | OUTPATIENT
Start: 2022-04-26 | End: 2022-05-04 | Stop reason: SDUPTHER

## 2022-04-26 RX ORDER — HYDROCHLOROTHIAZIDE 25 MG/1
25 TABLET ORAL DAILY
Qty: 30 TABLET | Refills: 5 | Status: SHIPPED | OUTPATIENT
Start: 2022-04-26 | End: 2022-05-04 | Stop reason: SDUPTHER

## 2022-05-04 ENCOUNTER — OFFICE VISIT (OUTPATIENT)
Dept: FAMILY MEDICINE CLINIC | Facility: CLINIC | Age: 57
End: 2022-05-04

## 2022-05-04 VITALS
HEART RATE: 102 BPM | OXYGEN SATURATION: 97 % | TEMPERATURE: 97.7 F | SYSTOLIC BLOOD PRESSURE: 140 MMHG | BODY MASS INDEX: 29.19 KG/M2 | HEIGHT: 64 IN | WEIGHT: 171 LBS | DIASTOLIC BLOOD PRESSURE: 90 MMHG

## 2022-05-04 DIAGNOSIS — E66.3 OVERWEIGHT (BMI 25.0-29.9): ICD-10-CM

## 2022-05-04 DIAGNOSIS — G47.19 EXCESSIVE DAYTIME SLEEPINESS: ICD-10-CM

## 2022-05-04 DIAGNOSIS — J34.3 HYPERTROPHY, NASAL, TURBINATE: ICD-10-CM

## 2022-05-04 DIAGNOSIS — E55.9 VITAMIN D DEFICIENCY: ICD-10-CM

## 2022-05-04 DIAGNOSIS — M79.7 FIBROMYALGIA: ICD-10-CM

## 2022-05-04 DIAGNOSIS — K59.09 CHRONIC CONSTIPATION: ICD-10-CM

## 2022-05-04 DIAGNOSIS — F32.A DEPRESSION WITH SOMATIZATION: ICD-10-CM

## 2022-05-04 DIAGNOSIS — F45.0 DEPRESSION WITH SOMATIZATION: ICD-10-CM

## 2022-05-04 DIAGNOSIS — E03.9 ACQUIRED HYPOTHYROIDISM: ICD-10-CM

## 2022-05-04 DIAGNOSIS — F43.10 PTSD (POST-TRAUMATIC STRESS DISORDER): ICD-10-CM

## 2022-05-04 DIAGNOSIS — K29.50 CHRONIC GASTRITIS WITHOUT BLEEDING, UNSPECIFIED GASTRITIS TYPE: ICD-10-CM

## 2022-05-04 DIAGNOSIS — K59.9 COLONIC INERTIA: ICD-10-CM

## 2022-05-04 DIAGNOSIS — I10 ESSENTIAL HYPERTENSION: ICD-10-CM

## 2022-05-04 DIAGNOSIS — G47.33 OBSTRUCTIVE SLEEP APNEA: ICD-10-CM

## 2022-05-04 DIAGNOSIS — F51.04 CHRONIC INSOMNIA: ICD-10-CM

## 2022-05-04 DIAGNOSIS — I10 ESSENTIAL HYPERTENSION: Primary | ICD-10-CM

## 2022-05-04 DIAGNOSIS — F41.1 GAD (GENERALIZED ANXIETY DISORDER): ICD-10-CM

## 2022-05-04 PROCEDURE — 99214 OFFICE O/P EST MOD 30 MIN: CPT | Performed by: FAMILY MEDICINE

## 2022-05-04 RX ORDER — HYDROCHLOROTHIAZIDE 25 MG/1
25 TABLET ORAL DAILY
Qty: 30 TABLET | Refills: 5 | Status: SHIPPED | OUTPATIENT
Start: 2022-05-04 | End: 2022-05-12 | Stop reason: SDUPTHER

## 2022-05-04 RX ORDER — SUCRALFATE ORAL 1 G/10ML
1 SUSPENSION ORAL
Qty: 414 ML | Refills: 5 | Status: SHIPPED | OUTPATIENT
Start: 2022-05-04 | End: 2022-06-02 | Stop reason: SDUPTHER

## 2022-05-04 RX ORDER — ONDANSETRON 4 MG/1
4 TABLET, FILM COATED ORAL EVERY 8 HOURS PRN
Qty: 30 TABLET | Refills: 5 | Status: SHIPPED | OUTPATIENT
Start: 2022-05-04 | End: 2022-08-22

## 2022-05-04 RX ORDER — CLONAZEPAM 0.5 MG/1
0.5 TABLET ORAL 2 TIMES DAILY PRN
Qty: 60 TABLET | Refills: 0 | Status: SHIPPED | OUTPATIENT
Start: 2022-05-04 | End: 2022-06-08

## 2022-05-04 RX ORDER — FLUTICASONE PROPIONATE 50 MCG
2 SPRAY, SUSPENSION (ML) NASAL DAILY
Qty: 48 ML | Refills: 0 | Status: SHIPPED | OUTPATIENT
Start: 2022-05-04 | End: 2022-05-05

## 2022-05-04 RX ORDER — LISINOPRIL 30 MG/1
30 TABLET ORAL DAILY
Qty: 90 TABLET | Refills: 3 | Status: SHIPPED | OUTPATIENT
Start: 2022-05-04 | End: 2022-10-07 | Stop reason: HOSPADM

## 2022-05-04 RX ORDER — CLONIDINE HYDROCHLORIDE 0.1 MG/1
TABLET ORAL
Qty: 30 TABLET | Refills: 1 | Status: SHIPPED | OUTPATIENT
Start: 2022-05-04 | End: 2022-10-07 | Stop reason: HOSPADM

## 2022-05-04 RX ORDER — LEVOTHYROXINE SODIUM 0.05 MG/1
TABLET ORAL
Qty: 90 TABLET | Refills: 0 | Status: SHIPPED | OUTPATIENT
Start: 2022-05-04 | End: 2022-11-16 | Stop reason: SDUPTHER

## 2022-05-04 NOTE — PROGRESS NOTES
Subjective   Tatiana Fields is a 56 y.o. female.     History of Present Illness   Mrs. Fields presents today for routine follow-up of several chronic medical problems.     She has colonic inertia with chronic constipation.  Still struggling with having regular bowel habits. Low dose thyroid replacement for chronic fatigue/excessive daytime sleepiness/resistant depression has improved her bowel function.  She is also taking her Linzess as prescribed.  Has had recent eval by GI. Referred on to [facility], apt next month. dx'd with possible gastroparesis, GERD, gastritis.     Currently on vitamin D replacement for def.     Has resistant depression, PTSD, insomnia related to anxiety.  now on FEtzima. Feels it is working better than the prozac she was taking previously. Denies new concerns.  No suicidal ideation.  Continues to struggle with excessive eating during times of depression, sedentary behavior. Having increased anxiety. Would like to try klonopin in place of valium and ambien as she has taken clonzepam previously and done well.     Also on Provigil for management of excessive daytime sleepiness, resistant depression, DANE.  Good functional improvement.  Denies side effects.  No aberrant behavior.     On lisinopril for hypertension.  BP has been running some higher. Feels it may be due to weight gain. BP at home usually 140/90 or higher. occ as high as 180. Having daily headaches (chronic). No new focal neuro sympotms.     Overdue for mammogram, she is reminded. Order on chart.     Followed by Dr. Faria for pain mgnt (C-DDD/DJD, FMSx, etc). Currently on belbuca patch. has f/u with Dr. Faria next week.    Pt's previous HPI reviewed and updated as indicated.     The following portions of the patient's history were reviewed and updated as appropriate: allergies, current medications, past family history, past medical history, past social history, past surgical history and problem list.    Review of Systems    Constitutional: Positive for fatigue. Negative for fever.   HENT: Positive for congestion, postnasal drip and sinus pressure. Negative for ear pain, hearing loss, nosebleeds, rhinorrhea, sneezing, sore throat and trouble swallowing.    Eyes: Positive for visual disturbance (chronic). Negative for pain.        Dry eyes   Respiratory: Negative for cough, shortness of breath and wheezing.    Cardiovascular: Positive for palpitations (chronic, intermittent, stable). Negative for chest pain and leg swelling.   Gastrointestinal: Positive for abdominal pain (chronic), constipation, nausea and vomiting. Negative for blood in stool and diarrhea.   Endocrine: Positive for heat intolerance. Negative for polydipsia and polyuria.   Genitourinary: Positive for pelvic pain (chronic). Negative for dysuria and hematuria.   Musculoskeletal: Positive for arthralgias, myalgias, neck pain and neck stiffness. Negative for back pain and joint swelling.   Skin: Negative for rash and wound.   Neurological: Positive for dizziness, tremors, weakness and headaches. Negative for seizures, syncope and speech difficulty.   Hematological: Negative for adenopathy. Bruises/bleeds easily.   Psychiatric/Behavioral: Positive for decreased concentration, dysphoric mood and sleep disturbance. Negative for confusion and suicidal ideas. The patient is nervous/anxious.    Pt's previous ROS reviewed and updated as indicated.       Objective    Vitals:    05/04/22 1114   BP: 140/90   Pulse: 102   Temp: 97.7 °F (36.5 °C)   SpO2: 97%     Body mass index is 29.35 kg/m².      05/04/22  1114   Weight: 77.6 kg (171 lb)       Physical Exam  Vitals and nursing note reviewed.   Constitutional:       General: She is not in acute distress.     Appearance: She is well-developed, well-groomed and overweight. She is not ill-appearing.   HENT:      Head: Normocephalic and atraumatic.      Mouth/Throat:      Mouth: Mucous membranes are moist.   Eyes:      General: No  scleral icterus.     Extraocular Movements: Extraocular movements intact.      Conjunctiva/sclera: Conjunctivae normal.   Neck:      Thyroid: No thyroid mass.      Vascular: Normal carotid pulses. No carotid bruit.   Cardiovascular:      Rate and Rhythm: Normal rate and regular rhythm.      Pulses: Normal pulses.      Heart sounds: Normal heart sounds.   Pulmonary:      Effort: Pulmonary effort is normal.      Breath sounds: Normal breath sounds.   Abdominal:      General: Bowel sounds are decreased. There is no distension.      Palpations: Abdomen is soft. There is no hepatomegaly, splenomegaly or mass.      Tenderness: There is generalized abdominal tenderness (mild). There is no guarding or rebound.   Musculoskeletal:      Right lower leg: No edema.      Left lower leg: No edema.   Lymphadenopathy:      Cervical: No cervical adenopathy.   Skin:     General: Skin is warm and dry.      Coloration: Skin is not jaundiced or pale.      Findings: No bruising or rash.   Neurological:      Mental Status: She is alert and oriented to person, place, and time.      Gait: Gait is intact.   Psychiatric:         Mood and Affect: Affect normal. Mood is anxious.         Behavior: Behavior normal. Behavior is cooperative.         Cognition and Memory: Cognition normal.     Pt's previous physical exam reviewed and updated as indicated.    Lab Results   Component Value Date    WBC 8.0 12/23/2021    HGB 13.2 12/23/2021    HCT 40.1 12/23/2021    MCV 86 12/23/2021     12/23/2021       Lab Results   Component Value Date    GLUCOSE 97 12/23/2021    BUN 18 12/23/2021    CREATININE 0.79 12/23/2021    EGFRIFNONA 84 12/23/2021    EGFRIFAFRI 97 12/23/2021    BCR 23 12/23/2021    K 3.6 12/23/2021    CO2 24 12/23/2021    CALCIUM 9.4 12/23/2021    PROTENTOTREF 6.3 12/23/2021    ALBUMIN 4.2 12/23/2021    LABIL2 2.0 12/23/2021    AST 21 12/23/2021    ALT 17 12/23/2021       Lab Results   Component Value Date    CHOL 172 08/16/2021    CHLPL  180 10/16/2019    TRIG 237 (H) 08/16/2021    HDL 58 08/16/2021    LDL 75 08/16/2021       Lab Results   Component Value Date    HGBA1C 5.49 08/16/2021       Lab Results   Component Value Date    TSH 0.455 12/23/2021           Assessment/Plan   Diagnoses and all orders for this visit:    1. Essential hypertension (Primary)  -     lisinopril (PRINIVIL,ZESTRIL) 30 MG tablet; Take 1 tablet by mouth Daily.  Dispense: 90 tablet; Refill: 3  -     cloNIDine (Catapres) 0.1 MG tablet; 1 po q 8 hr prn SBP over 160, DBP over 100  Dispense: 30 tablet; Refill: 1    2. Vitamin D deficiency  -     Vitamin D 25 Hydroxy    3. MELVIN (generalized anxiety disorder)  -     clonazePAM (KlonoPIN) 0.5 MG tablet; Take 1 tablet by mouth 2 (Two) Times a Day As Needed for Anxiety (or insomnia).  Dispense: 60 tablet; Refill: 0    4. Acquired hypothyroidism    5. Depression with somatization    6. PTSD (post-traumatic stress disorder)    7. Fibromyalgia    8. Obstructive sleep apnea    9. Chronic insomnia    10. Overweight (BMI 25.0-29.9)    11. Chronic gastritis without bleeding, unspecified gastritis type    Other orders  -     sucralfate (Carafate) 1 GM/10ML suspension; Take 10 mL by mouth 4 (Four) Times a Day With Meals & at Bedtime.  Dispense: 414 mL; Refill: 5       Resistant depression, PTSD, generalized anxiety, assoc'd insomnia not ideally controlled. Continue Fetzima as this seems to be more effective for her than prozac. D/c valium and ambien. Trial of low dose clonazepam up to bid.   As part of patient's treatment plan I am prescribing a controlled substance.  The patient has been made aware of the appropriate use of such medications, including potential risk of somnolence, limited ability to drive and/or work safely, and potential for dependence and/or overdose.  It has also been made clear that these medications are for use by this patient only, without concomitant use of alcohol or other substances, unless prescribed.  History and  physical exam exhibit continued safe and appropriate use of controlled substance.  KIMBERLY reviewed.  She is to inform pain mgnt of change in medications.     Assess status of vit/min deficiencies and replace as indicated. Continue vit D replacement.    HTN no ideally controlled. Increase lisinopril to 30 mg daily. Clonidine as needed.     F/u with GI for mgnt of gastritis, colonic inertia etc. Trial of carafate. Has apt with [facility] next month.    BMI is >= 25 and < 30. (Overweight) The following options were offered after discussion: exercise counseling/recommendations and nutrition counseling/recommendations.    F/u with pain mgnt as scheduled.    Routine f/u in 2-3 months, sooner as needed/instructed.  I will contact patient regarding test results and provide instructions regarding any necessary changes in plan of care.  Patient was encouraged to keep me informed of any acute changes, lack of improvement, or any new concerning symptoms.  Pt is aware of reasons to seek emergent care.  Patient voiced understanding of all instructions and denied further questions.    Please note that portions of this note may have been completed with a voice recognition program. Efforts were made to edit the dictations, but occasionally words are mistranscribed.

## 2022-05-05 ENCOUNTER — PRIOR AUTHORIZATION (OUTPATIENT)
Dept: FAMILY MEDICINE CLINIC | Facility: CLINIC | Age: 57
End: 2022-05-05

## 2022-05-05 LAB — 25(OH)D3+25(OH)D2 SERPL-MCNC: 30.3 NG/ML (ref 30–100)

## 2022-05-05 RX ORDER — FLUTICASONE PROPIONATE 50 MCG
SPRAY, SUSPENSION (ML) NASAL
Qty: 48 G | Refills: 0 | Status: SHIPPED | OUTPATIENT
Start: 2022-05-05 | End: 2022-12-20

## 2022-05-05 NOTE — TELEPHONE ENCOUNTER
A PRIOR AUTH HAS BEEN STARTED THROUGH [facility] [facility] FOR CLONAZEPAM.    WAITING ON A RESPONSE FROM THE INSURANCE.    Key: [id number]

## 2022-05-12 ENCOUNTER — TELEPHONE (OUTPATIENT)
Dept: FAMILY MEDICINE CLINIC | Facility: CLINIC | Age: 57
End: 2022-05-12

## 2022-05-12 DIAGNOSIS — I10 ESSENTIAL HYPERTENSION: ICD-10-CM

## 2022-05-12 RX ORDER — HYDROCHLOROTHIAZIDE 25 MG/1
TABLET ORAL
Qty: 45 TABLET | Refills: 5 | Status: SHIPPED | OUTPATIENT
Start: 2022-05-12 | End: 2022-10-07 | Stop reason: HOSPADM

## 2022-05-18 ENCOUNTER — TELEPHONE (OUTPATIENT)
Dept: FAMILY MEDICINE CLINIC | Facility: CLINIC | Age: 57
End: 2022-05-18

## 2022-05-18 NOTE — TELEPHONE ENCOUNTER
Caller: Tatiana Fields    Relationship: Self    Best call back number:[phone]    What medication are you requesting:   STEROID MEDICATION     What are your current symptoms:   CONGESTION AND COUGHING UP PHLEGM     How long have you been experiencing symptoms:   3 DAYS     Have you had these symptoms before:    [x] Yes  [] No    Have you been treated for these symptoms before:   [x] Yes  [] No    If a prescription is needed, what is your preferred pharmacy and phone number:    [id number] [place] [address] [address] AT [facility] - [phone]  - [phone] FX  [phone]    Additional notes:  PATIENT WOULD LIKE FOR A STEROID MEDICATION TO BE CALLED INTO THE PHARMACY FOR HER CURRENT SYMPTOMS

## 2022-05-18 NOTE — TELEPHONE ENCOUNTER
Patient was last seen on 05/04/2022, she is scheduled for a follow up 05/23/2022.  She is requesting a steroid be sent in for productive cough and congestion

## 2022-05-19 RX ORDER — DEXAMETHASONE 4 MG/1
4 TABLET ORAL
Qty: 3 TABLET | Refills: 0 | Status: SHIPPED | OUTPATIENT
Start: 2022-05-19 | End: 2022-05-23 | Stop reason: SDUPTHER

## 2022-05-23 ENCOUNTER — TELEMEDICINE (OUTPATIENT)
Dept: FAMILY MEDICINE CLINIC | Facility: CLINIC | Age: 57
End: 2022-05-23

## 2022-05-23 DIAGNOSIS — F45.0 DEPRESSION WITH SOMATIZATION: ICD-10-CM

## 2022-05-23 DIAGNOSIS — K31.84 GASTROPARESIS: ICD-10-CM

## 2022-05-23 DIAGNOSIS — K21.9 DUODENAL GASTROESOPHAGEAL REFLUX: Primary | ICD-10-CM

## 2022-05-23 DIAGNOSIS — F32.A DEPRESSION WITH SOMATIZATION: ICD-10-CM

## 2022-05-23 DIAGNOSIS — R11.2 NAUSEA AND VOMITING, UNSPECIFIED VOMITING TYPE: ICD-10-CM

## 2022-05-23 PROBLEM — K22.4 ESOPHAGEAL SPASM: Status: ACTIVE | Noted: 2022-05-23

## 2022-05-23 PROCEDURE — 99214 OFFICE O/P EST MOD 30 MIN: CPT | Performed by: FAMILY MEDICINE

## 2022-05-23 RX ORDER — DIAZEPAM 2 MG/1
TABLET ORAL
COMMUNITY
Start: 2022-05-08 | End: 2022-06-20

## 2022-05-23 RX ORDER — DEXAMETHASONE 4 MG/1
4 TABLET ORAL
Qty: 4 TABLET | Refills: 0 | Status: SHIPPED | OUTPATIENT
Start: 2022-05-23 | End: 2022-05-27

## 2022-05-23 RX ORDER — BACLOFEN 10 MG/1
TABLET ORAL
COMMUNITY
Start: 2022-05-19 | End: 2022-09-26

## 2022-05-23 RX ORDER — ESTRADIOL 0.05 MG/D
1 FILM, EXTENDED RELEASE TRANSDERMAL
COMMUNITY
Start: 2022-05-09 | End: 2022-09-26 | Stop reason: SDUPTHER

## 2022-05-23 RX ORDER — BUPRENORPHINE HYDROCHLORIDE 300 UG/1
FILM, SOLUBLE BUCCAL
COMMUNITY
Start: 2022-05-20 | End: 2022-09-26

## 2022-05-23 RX ORDER — FAMCICLOVIR 500 MG/1
TABLET ORAL
COMMUNITY
Start: 2022-05-04 | End: 2022-05-23 | Stop reason: SDUPTHER

## 2022-05-23 NOTE — PROGRESS NOTES
TELEHEALTH/VIDEO ENCOUNTER  DOS 22    TIERNEY FINE   [birth year]    You have chosen to receive care through a telehealth visit.  Do you consent to use a video/audio connection for your medical care today? Yes    History of Present Illness   She presents today requesting gastric emptying study. Was referred to [facility] by her local gastroenterologist. Found out today that this is not to the gastroparesis specialist she requested and that gastric emptying study is required prior to being accepted as consult.    Symptoms unchanged.    Also wishes to consult with Dr. Vinod Santos at [facility] for possible intervention for gastroparesis, pyloric stenosis, etc.    Depression improved on Fetmiza.    Of note, current GI related medications include:  carafate  Senna-plus  Phenergan  zofran  prilosec  linzess  E-mycin tablets    Last consultation with Dr. Doc David 3/2022. Advised to decrease to prilosec once daily, d/c linzess, take miralax and fiber. rx'd E mycin as well as motility nasal spray which she was not able to afford.    Patient's past medical hx, surgical hx, family hx, social hx reviewed on chart. Medication and allergy lists reviewed on chart. Information updated as indicated.      Review of Systems   Constitutional: Positive for fatigue. Negative for fever.   HENT: Positive for congestion, postnasal drip and sinus pressure. Negative for ear pain, hearing loss, nosebleeds, rhinorrhea, sneezing, sore throat and trouble swallowing.    Eyes: Positive for visual disturbance (chronic). Negative for pain.        Dry eyes   Respiratory: Negative for cough, shortness of breath and wheezing.    Cardiovascular: Positive for palpitations (chronic, intermittent, stable). Negative for chest pain and leg swelling.   Gastrointestinal: Positive for abdominal pain (chronic), constipation, nausea and vomiting. Negative for blood in stool and diarrhea.   Endocrine: Positive  for heat intolerance. Negative for polydipsia and polyuria.   Genitourinary: Positive for pelvic pain (chronic). Negative for dysuria and hematuria.   Musculoskeletal: Positive for arthralgias, myalgias, neck pain and neck stiffness. Negative for back pain and joint swelling.   Skin: Negative for rash and wound.   Neurological: Positive for dizziness, tremors, weakness and headaches. Negative for seizures, syncope and speech difficulty.   Hematological: Negative for adenopathy. Bruises/bleeds easily.   Psychiatric/Behavioral: Positive for decreased concentration, dysphoric mood and sleep disturbance. Negative for confusion and suicidal ideas. The patient is nervous/anxious.    Pt's previous ROS reviewed and updated as indicated.         Physical Exam  Constitutional:       Appearance: She is well-developed and well-groomed. She is not ill-appearing.   HENT:      Head: Normocephalic and atraumatic.   Eyes:      General: No scleral icterus.     Conjunctiva/sclera: Conjunctivae normal.   Pulmonary:      Effort: Pulmonary effort is normal.   Skin:     Coloration: Skin is not jaundiced or pale.   Neurological:      Mental Status: She is alert and oriented to person, place, and time.   Psychiatric:         Mood and Affect: Mood and affect normal.         Speech: Speech normal.         Behavior: Behavior is cooperative.         Thought Content: Thought content normal.         Cognition and Memory: Cognition normal.       Lab Results   Component Value Date    WBC 8.0 12/23/2021    HGB 13.2 12/23/2021    HCT 40.1 12/23/2021    MCV 86 12/23/2021     12/23/2021       Lab Results   Component Value Date    GLUCOSE 97 12/23/2021    BUN 18 12/23/2021    CREATININE 0.79 12/23/2021    EGFRIFNONA 84 12/23/2021    EGFRIFAFRI 97 12/23/2021    BCR 23 12/23/2021    K 3.6 12/23/2021    CO2 24 12/23/2021    CALCIUM 9.4 12/23/2021    PROTENTOTREF 6.3 12/23/2021    ALBUMIN 4.2 12/23/2021    LABIL2 2.0 12/23/2021    AST 21 12/23/2021     ALT 17 12/23/2021       Lab Results   Component Value Date    CHOL 172 08/16/2021    CHLPL 180 10/16/2019    TRIG 237 (H) 08/16/2021    HDL 58 08/16/2021    LDL 75 08/16/2021       Lab Results   Component Value Date    HGBA1C 5.49 08/16/2021       Lab Results   Component Value Date    TSH 0.455 12/23/2021       Lab Results   Component Value Date    LIPASE 21 06/19/2017           Diagnoses and all orders for this visit:    Duodenal gastroesophageal reflux  -     NM gastric emptying; Future  -     Ambulatory Referral to General Surgery  -     Ambulatory Referral to Gastroenterology    Gastroparesis  -     NM gastric emptying; Future  -     Ambulatory Referral to General Surgery  -     Ambulatory Referral to Gastroenterology    Nausea and vomiting, unspecified vomiting type  -     NM gastric emptying; Future  -     Ambulatory Referral to General Surgery  -     Ambulatory Referral to Gastroenterology    Depression with somatization    Other orders  -     dexamethasone (DECADRON) 4 MG tablet; Take 1 tablet by mouth Daily With Breakfast for 4 days.  -     estradiol (MINIVELLE, VIVELLE-DOT) 0.05 MG/24HR patch; Place 1 patch on the skin as directed by provider.  -     diazePAM (VALIUM) 2 MG tablet; TAKE 1 TABLET BY MOUTH EVERY 12 HOURS AS NEEDED FOR ANXIETY. MUST LAST 30 DAYS  -     Belbuca film  -     baclofen (LIORESAL) 10 MG tablet; TAKE 1/2 TABLET BY MOUTH THREE TIMES DAILY FOR 5 DAYS THEN TAKE 1 TABLET BY MOUTH EVERY 8 HOURS  -     Discontinue: famciclovir (FAMVIR) 500 MG tablet      Med list updated.   I will need to clarify with her if she stayed on low dose diazepam or if she switched to clonazepam.   She requests consultations as above.  Gastric emptying study as requested by specialist.    Depression improved on Fetzima. Continue current tx plan.    Patient will f/u per routine, sooner as needed/instructed.  I will contact patient regarding test results and provide instructions regarding any necessary changes  in plan of care.  Patient was encouraged to keep me informed of any acute changes, lack of improvement, or any new concerning symptoms.  Pt is aware of reasons to seek emergent care.  Patient voiced understanding of all instructions and denied further questions.      Total face to face time with patient was 15 minutes.    Please note that portions of this note may have been completed with a voice recognition program. Efforts were made to edit the dictations, but occasionally words are mistranscribed.

## 2022-06-01 RX ORDER — PROMETHAZINE HYDROCHLORIDE 12.5 MG/1
TABLET ORAL
Qty: 20 TABLET | Refills: 1 | Status: SHIPPED | OUTPATIENT
Start: 2022-06-01 | End: 2022-09-16 | Stop reason: DRUGHIGH

## 2022-06-01 RX ORDER — LORATADINE 10 MG
1 TABLET ORAL DAILY
Qty: 30 TABLET | Refills: 2 | Status: SHIPPED | OUTPATIENT
Start: 2022-06-01 | End: 2022-12-13

## 2022-06-02 RX ORDER — SUCRALFATE ORAL 1 G/10ML
1 SUSPENSION ORAL
Qty: 414 ML | Refills: 5 | Status: SHIPPED | OUTPATIENT
Start: 2022-06-02 | End: 2022-06-21

## 2022-06-02 NOTE — TELEPHONE ENCOUNTER
Caller: Tatiana Fields    Relationship: Self    Best call back number: [phone]    Requested Prescriptions:   Requested Prescriptions     Pending Prescriptions Disp Refills   • sucralfate (Carafate) 1 GM/10ML suspension 414 mL 5     Sig: Take 10 mL by mouth 4 (Four) Times a Day With Meals & at Bedtime.        Pharmacy where request should be sent: [id number] - [place], [place] - [facility] [address] AT [facility] [address] - [phone]  - [phone] FX     Additional details provided by patient:   PATIENT STATED THAT SHE IS UNABLE TO TAKE THIS MEDICATION IN A SYRUP FORM AND WOULD LIKE FOR THIS MEDICATION TO BE CALLED INTO THE PHARMACY AS A TABLET INSTEAD     PATIENT STATED THAT SHE IS HAVING SOME ABDOMINAL PAIN AND WOULD LIKE FOR MEDICATION TO BE CALLED INTO THE PHARMACY TO HELP     Does the patient have less than a 3 day supply:  [x] Yes  [] No    Christine Burrell   06/02/22 11:50 EDT

## 2022-06-06 DIAGNOSIS — F41.1 GAD (GENERALIZED ANXIETY DISORDER): ICD-10-CM

## 2022-06-06 NOTE — TELEPHONE ENCOUNTER
Rx Refill Note  Requested Prescriptions     Pending Prescriptions Disp Refills   • clonazePAM (KlonoPIN) 0.5 MG tablet [Pharmacy Med Name: CLONAZEPAM 0.5MG TABLETS] 60 tablet      Sig: TAKE 1 TABLET BY MOUTH TWICE DAILY AS NEEDED FOR ANXIETY OR INSOMNIA      Last office visit with prescribing clinician: 5/4/2022      Next office visit with prescribing clinician: Visit date not found            Dara Bradley MA  06/06/22, 12:19 EDT

## 2022-06-07 ENCOUNTER — PATIENT MESSAGE (OUTPATIENT)
Dept: FAMILY MEDICINE CLINIC | Facility: CLINIC | Age: 57
End: 2022-06-07

## 2022-06-07 ENCOUNTER — TELEPHONE (OUTPATIENT)
Dept: FAMILY MEDICINE CLINIC | Facility: CLINIC | Age: 57
End: 2022-06-07

## 2022-06-07 NOTE — TELEPHONE ENCOUNTER
----- Message from Dara Bradley MA sent at 6/7/2022  7:24 AM EDT -----  Regarding: FW: Rash    ----- Message -----  From: Tatiana Fields  Sent: 6/7/2022   6:50 AM EDT  To: [facility]  Subject: Rash                                             Can we did video,  phone , or evisit today regarding rash on nose and beside of nose on face? May be nothing but wanted make sure.  I can upload photos if you can do phone or video visit.  Thanks.

## 2022-06-07 NOTE — TELEPHONE ENCOUNTER
Spoke with patient.  She is taking Clonopin.  She said RX for Diazepam was refilled per Pharmacy auto request.  She declined the RX.

## 2022-06-07 NOTE — TELEPHONE ENCOUNTER
Patients phone number is not working.  Contacted emergency contact.  He will contact Ms KangDonnie and have return my call

## 2022-06-07 NOTE — TELEPHONE ENCOUNTER
Please clarify if she is taking clonazepam or diazepam, she cannot take both. Both were refilled during the month of May

## 2022-06-08 RX ORDER — CLONAZEPAM 0.5 MG/1
TABLET ORAL
Qty: 60 TABLET | Refills: 0 | Status: SHIPPED | OUTPATIENT
Start: 2022-06-08 | End: 2022-07-14

## 2022-06-11 ENCOUNTER — E-VISIT (OUTPATIENT)
Dept: FAMILY MEDICINE CLINIC | Facility: TELEHEALTH | Age: 57
End: 2022-06-11

## 2022-06-11 PROCEDURE — BRIGHTMDVISIT: Performed by: NURSE PRACTITIONER

## 2022-06-11 NOTE — EXTERNAL PATIENT INSTRUCTIONS
Diagnosis   Bacterial vaginosis   My name is Sammie Olvera, and I'm a healthcare provider at [facility]. Based on your interview, I see you have bacterial vaginosis (BV). BV is a very common vaginal infection. BV isn't considered a sexually transmitted infection (STI).   Medications   Your pharmacy   [id number] [address] [place] [id number] [phone]     Prescription   Fluconazole (150mg): Take 1 tablet by mouth once for 1 day as a single dose. Use this medication only if you develop a yeast infection. If symptoms are still present after 3 days, you may take a second tablet.   Metronidazole (500mg): Take 1 tablet by mouth twice a day for 7 days for infection. Do not drink alcohol while taking this medication and for 24 hours after you finish the course of this medication. This medication is an antibiotic. Take it exactly as directed. You must finish the entire course of medication, even if you feel better after the first several days.    Metronidazole is the treatment recommended by the Centers for Disease Control and Prevention (CDC) for BV.    Because you've developed yeast infections after taking antibiotics in the past, I've prescribed Diflucan (fluconazole). Diflucan is an oral antifungal that treats yeast infections. If you develop yeast infection symptoms (such as itching or a white discharge) while taking the antibiotic or after finishing the antibiotic, then take the Diflucan as directed.   About your diagnosis   Bacterial vaginosis (BV) is a vaginal infection that happens when there are changes in the number and type of bacteria that normally live in the vagina. Overgrowth of certain bacteria then leads to symptoms commonly associated with BV. We're not sure what causes this overgrowth of bacteria, but we do have effective treatments to cure the infection. Although BV isn't considered a sexually transmitted infection (STI), sexual activity is a risk factor for getting  BV.   What to expect   If you follow this treatment plan, you should feel better in about 1 week. It's important that you finish all your medications, even if you feel better after the first several days.   When to seek care   Call us at [phone]   with any sudden or unexpected symptoms.    Symptoms that change or get worse.    Symptoms that don't go away, even after completing your treatment plan.    A fever of 101F or higher.    Frothy or foamy vaginal discharge.    Green or yellow vaginal discharge.    Spotting or bleeding unrelated to your period.    Severe abdominal cramping or pain.    Sores on your genital area.    Nausea or vomiting.   Other treatment   Avoid sex or use condoms consistently and correctly while you're being treated for BV.   BV can increase your risk of getting sexually transmitted infections (STIs), such as HIV, gonorrhea, chlamydia, and herpes. Talk to your healthcare provider about screening for these and other STIs.   Prevention    Use unscented soaps.    Make sure you rinse off all soap or shower gel when bathing or showering.    If you take baths, don't add soap or any other bath products to the bath water.    After bathing, dry off completely before you get dressed.    Don't use scented sanitary pads or tampons.    Avoid using scented condoms or dental dams.    Urinate after sex.    Drink plenty of water.    Avoid douching products. Douching can increase the risk of vaginal infections.    Avoid products that contain nonoxynol-9.   Your provider   Your diagnosis was provided by Sammie Olvera, a member of your trusted care team at [facility].   If you have any questions, call us at [phone]  .

## 2022-06-11 NOTE — E-VISIT TREATED
Chief Complaint: Yeast infection   Patient introduction   Patient is 56-year-old female presenting with 1-3 days of vaginal burning and fishy-smelling vaginal discharge.   Patient-submitted comments   Patient writes: I started on penicillin 875 thus past week due to accessed tooth and I'm sure I've got yeast infection. In past Diflucan during antibiotics and at the end takes care of yeast. I also believe I have BV for week or so, I've had it in the past .   General presentation   Denies pain in or around vagina. Reports vaginal dryness. Denies vaginal bleeding or spotting unrelated to menstruation.   Denies being sexually active in the past 90 days. Denies STI treatment within the past 3 months.   Reports taking hormone replacement therapy. Denies recent use of douching products. Denies recent use of a product containing nonoxynol-9. Reports recent use of new medication. Reports taking antibiotics in the last 2 weeks.   Positive history of vulvovaginal candidiasis with 1-3 episodes in the previous 12 months. Current symptoms are similar to previous episodes of vulvovaginal candidiasis.   Has tried vaginal wipes for current symptoms.   Reports positive history of bacterial vaginosis with no episodes in the previous 12 months. Reports current symptoms are similar to previous episodes of bacterial vaginosis.   Reports trying oral clindamycin for past episode(s) of bacterial vaginosis. They found it helpful.   Patient denies the following red flags:    Genital trauma    Genital sores    Abdominal or pelvic surgery within the last month    Fever    Vomiting    Abdominal pain    Retained foreign object in vagina    Treatment for an STI in the last 3 months   Pregnancy/menstrual status/breastfeeding:   Patient is postmenopausal.   Preferred medication route:   Prefers oral treatment option.   Reports previous antibiotic-induced yeast infection, so Diflucan (fluconazole) is recommended.   Current medications   Reports  taking Penicillamine.   Medication allergies   None.   Medication contraindication review   None.   Past medical history   No diabetes mellitus.   Immune conditions: Denies immunocompromising conditions. Denies history of cancer.   Social history   Non-smoker.   Assessment   Bacterial vaginosis.   This is the likely diagnosis based on patient's interview responses, including:    Fishy-smelling vaginal discharge.    Prior diagnosis of bacterial vaginosis with similar symptoms.   Plan   Medications:    fluconazole 150 mg tablet RX 150mg 1 tab PO once 1d as a single dose for vaginal yeast infection. Repeat in 72 hours if symptoms persist. Amount is 2 tab.    metronidazole 500 mg tablet RX 500mg 1 tab PO bid 7d for infection. Do not drink alcohol while taking this medication and for 24 hours after you finish the course of this medication. This medication is an antibiotic. Take it exactly as directed. You must finish the entire course of medication, even if you feel better after the first several days. Amount is 14 tab.   The patient's prescriptions will be sent to:   [id number]   [address] [place] [id number]   Phone: [phone]     Fax: [phone]   Education:    Condition and causes    Prevention    Treatment and self-care    When to call provider   Follow-up:   Patient to follow up as needed for progression or lack of improvement in symptoms within 7-10 days.   ----------   Electronically signed by SOULEYMANE Herrera on 2022-06-11 at 01:19AM   ----------   Patient Interview Transcript:   Which of these symptoms are bothering you? Select all that apply.    Burning in my vagina    Fishy-smelling vaginal discharge   Not selected:    Itching around my vagina    Itching in my vagina    Burning around my vagina    Vaginal discharge that looks different than usual    Pain during sex    Burning with urination    None of the above   How long have you had these symptoms? Select one.    1 to 3  days   Not selected:    Less than 24 hours    4 to 7 days    More than 7 days   Do you have any vaginal dryness? Select one.    Yes   Not selected:    No   Have you had any unexpected vaginal bleeding or spotting? By unexpected, we mean either between your normal periods or after you've gone through menopause. Select one.    No   Not selected:    Yes   Is there any redness, swelling, or darkening of the skin in or around your vagina? Select all that apply.    I'm not sure   Not selected:    Redness    Swelling    Darkening of the skin    None of the above   Do you have any pain in or around your vagina? Select one.    No   Not selected:    Yes   Since your symptoms started, have you used a vaginal health screening kit to check the acidity (pH) of your vaginal discharge? These kits are available without a prescription at many drug stores and pharmacies. Common brands include Monistat Complete Care Vaginal Health Test and Vagisil Screening Kit. Select one.    No   Not selected:    Yes   Have you noticed any sores on your genital area? This includes blisters or ulcers. Select one.    No   Not selected:    Yes   Along with your vaginal symptoms, have you had any of these symptoms? Select all that apply.    None of the above   Not selected:    Fever    Vomiting    Abdominal (stomach) pain   Before your symptoms began, did you have an injury to your genital area or vagina? Select one.    No   Not selected:    Yes   Could an object like a tampon or condom be stuck inside your vagina? Select one.    No   Not selected:    Yes   In the 2 weeks before your symptoms began, did you use any douching products? Select one.    No   Not selected:    Yes   In the 2 weeks before your symptoms began, did you use any products containing nonoxynol-9? Nonoxynol-9 is a spermicide commonly found in birth control products, including condoms, sponges, vaginal films, and jellies. Select one.    No   Not selected:    Yes   In the week before  your symptoms started, did any of these come into contact with your genital area? Select all that apply.    New medication   Not selected:    New soap    Underwear washed with a new laundry detergent    New sex toy    New cream or lotion    New perfume    New feminine or flushable wipe    Other new product (specify)    None of the above   Have you had a yeast infection before? Select one.    Yes, and my current symptoms feel the same as before   Not selected:    Yes, but my current symptoms feel different than before    No   How many yeast infections have you had in the last 12 months? Select one.    1 to 3   Not selected:    0    4 or more   Have you ever been treated for bacterial vaginosis (BV)? Select one.    Yes   Not selected:    No   Compared to the last time you were treated for BV, how do your current symptoms feel? Select one.    The same   Not selected:    Different   In the last 12 months, how many times have you been treated for BV? Select one.    0   Not selected:    1 to 3    4 or more   In the past, have you developed yeast infections as a result of taking oral antibiotics? Select one.    Yes   Not selected:    No   Were you sexually active at any time in the last 3 months? Select one.    No   Not selected:    Yes   In the last 3 months, were you treated for a sexually transmitted infection (STI)? Examples of STIs include chlamydia, gonorrhea, trichomoniasis (trich), herpes, or syphilis. Select one.    No   Not selected:    Yes   Have you gone through menopause? Select one.    Yes   Not selected:    No    I'm going through it now   Have you recently had abdominal or pelvic surgery? Select one.    No   Not selected:    Yes, within the last month    Yes, within the last 3 months   Are you currently being treated for Type 1 or Type 2 diabetes? Select one.    No   Not selected:    Yes   Do you have any of these conditions that can affect the immune system? Scroll to see all options. Select all that  "apply.    None of these   Not selected:    History of bone marrow transplant    Chronic kidney disease    Chronic liver disease (including cirrhosis)    HIV/AIDS    Inflammatory bowel disease (Crohn's disease or ulcerative colitis)    Lupus    Moderate to severe plaque psoriasis    Multiple sclerosis    Rheumatoid arthritis    Sickle cell anemia    Alpha or beta thalassemia    History of solid organ transplant (kidney, liver, or heart)    History of spleen removal    An autoimmune disorder not listed here    A condition requiring treatment with long-term use of oral steroids (such as prednisone, prednisolone, or dexamethasone)   Have you ever been diagnosed with cancer? Select one.    No   Not selected:    Yes, I have cancer now    Yes, but I'm in remission   Have you been treated for antibiotic-associated colitis in the last month? This is also called \"C. diff colitis.\" It's commonly caused by the bacteria Clostridium difficile. Select one.    No   Not selected:    Yes   Have you ever been diagnosed with the heart condition known as prolonged QT interval or QT prolongation? Select one.    No   Not selected:    Yes   Do you smoke tobacco? Select one.    No, never   Not selected:    Yes, every day    Yes, some days    No, I quit   Have you tried any of these over-the-counter treatments for your current symptoms? Select all that apply.    Vaginal wipes, such as Summer's Kiki   Not selected:    Vaginal cream, ointment, or suppository for yeast infection    Anti-itch cream or ointment containing hydrocortisone    Vaginal wash    Homeopathic treatment    Other (specify)    I haven't tried anything for my symptoms   Do you take or use any of these medications containing estrogen or progesterone? Select one.    Hormone replacement therapy (HRT), such as oral and topical medication, vaginal ring, and transdermal patch   Not selected:    Birth control pills    Hormonal intrauterine device (IUD)    Contraceptive patch   "  Vaginal ring, such as NuvaRing    Birth control shot, such as Depo-Provera    None of the above   Which medication(s) have you used for BV in the past? Select all that apply.    Clindamycin oral capsules (Cleocin)   Not selected:    Clindamycin vaginal cream (Cleocin Vaginal, ClindaMax Vaginal, Clindesse)    Clindamycin vaginal ovule (Cleocin Vaginal)    Metronidazole oral tablets (Flagyl)    Metronidazole vaginal gel (MetroGel Vaginal, Nuvessa, Vandazole)    Tinidazole oral tablets (Tindamax)    Other (specify)    I'm not sure   Did the clindamycin oral capsules clear up all of your BV symptoms? Select one.    Yes   Not selected:    No   In the last 2 weeks, have you taken oral antibiotics? Oral antibiotics are medications that you take by mouth to treat a bacterial infection. Select one.    Yes   Not selected:    No   Are you taking any other medications or supplements? On the next screen, you need to list all vitamins, supplements, non-prescription medications (such as aspirin or Aleve), and prescription medications that you're taking. Select one.    Yes   Not selected:    Yes, but I'm not sure what they are    No   Have you ever had an allergic or bad reaction to any medication? Select one.    No   Not selected:    Yes   Have you used the medication disulfiram (Antabuse) in the last 2 weeks? Select one.    No   Not selected:    Yes   If medication is needed, would you prefer oral or vaginal medication? - Oral medications are pills that you swallow. - Vaginal medications are gels, creams, ointments, or suppositories that are placed in the vagina. We'll try to provide medication in the form you prefer, but that's not always possible. Select one.    Oral   Not selected:    Vaginal    No preference   Is there anything else you'd like to tell us about your symptoms?    I started on penicillin 875 thus past week due to accessed tooth and I'm sure I've got yeast infection. In past Diflucan during antibiotics and at  the end takes care of yeast. I also believe I have BV for week or so, I've had it in the past   ----------   Medical history   Medical history data does not currently exist for this patient.

## 2022-06-14 ENCOUNTER — HOSPITAL ENCOUNTER (OUTPATIENT)
Dept: NUCLEAR MEDICINE | Facility: HOSPITAL | Age: 57
Discharge: HOME | End: 2022-06-14

## 2022-06-14 DIAGNOSIS — R11.2 NAUSEA AND VOMITING, UNSPECIFIED VOMITING TYPE: ICD-10-CM

## 2022-06-14 DIAGNOSIS — K21.9 DUODENAL GASTROESOPHAGEAL REFLUX: ICD-10-CM

## 2022-06-14 DIAGNOSIS — K31.84 GASTROPARESIS: ICD-10-CM

## 2022-06-14 PROCEDURE — 78264 GASTRIC EMPTYING IMG STUDY: CPT

## 2022-06-14 PROCEDURE — 0 TECHNETIUM SULFUR COLLOID: Performed by: FAMILY MEDICINE

## 2022-06-14 PROCEDURE — A9541 TC99M SULFUR COLLOID: HCPCS | Performed by: FAMILY MEDICINE

## 2022-06-14 RX ADMIN — TECHNETIUM TC 99M SULFUR COLLOID 1 DOSE: KIT at 08:20

## 2022-06-15 ENCOUNTER — TELEPHONE (OUTPATIENT)
Dept: FAMILY MEDICINE CLINIC | Facility: CLINIC | Age: 57
End: 2022-06-15

## 2022-06-15 NOTE — TELEPHONE ENCOUNTER
"  Caller: Tatiana Fields    Relationship to patient: Self    Best call back number: [phone]    Patient is needing:     PATIENT ASKING IF DR HENRY CAN DO THE \"NASAL THING THAT GOES DOWN INTO STOMACH TO EMPTY IT\" PATIENT COULD NOT REMEMBER THE NAME. PATIENT STATES DR HENRY KNOWS ABOUT THIS AND WOULD LIKE A CALL WITH A RESPONSE.   "

## 2022-06-16 ENCOUNTER — TELEPHONE (OUTPATIENT)
Dept: FAMILY MEDICINE CLINIC | Facility: CLINIC | Age: 57
End: 2022-06-16

## 2022-06-16 NOTE — TELEPHONE ENCOUNTER
Caller: Tatiana Fields    Relationship: Self    Best call back number:  [phone]    Who are you requesting to speak with (clinical staff, provider,  specific staff member):   CLINICAL, REFERRAL COORDINATOR      What was the call regarding:  REFERRAL FOR [facility],   STOMACH REFERRAL       Do you require a callback:  PLEASE CALL

## 2022-06-20 ENCOUNTER — TELEMEDICINE (OUTPATIENT)
Dept: FAMILY MEDICINE CLINIC | Facility: CLINIC | Age: 57
End: 2022-06-20

## 2022-06-20 DIAGNOSIS — K21.9 DUODENAL GASTROESOPHAGEAL REFLUX: ICD-10-CM

## 2022-06-20 DIAGNOSIS — K31.84 GASTROPARESIS: ICD-10-CM

## 2022-06-20 DIAGNOSIS — K59.9 COLONIC INERTIA: ICD-10-CM

## 2022-06-20 DIAGNOSIS — R09.89 SUSPECTED PULMONARY ASPIRATION OF FOOD: ICD-10-CM

## 2022-06-20 DIAGNOSIS — J18.9 COMMUNITY ACQUIRED PNEUMONIA, UNSPECIFIED LATERALITY: Primary | ICD-10-CM

## 2022-06-20 PROCEDURE — 99214 OFFICE O/P EST MOD 30 MIN: CPT | Performed by: FAMILY MEDICINE

## 2022-06-20 NOTE — PROGRESS NOTES
TELEHEALTH/VIDEO VISIT  DOS 22    TIERNEY FINE   [birth year]    You have chosen to receive care through a telehealth visit.  Do you consent to use a video/audio connection for your medical care today? Yes      History of Present Illness  Mrs. Fine presents today for f/u on recent telehealth visit for possible pneumonia. Was already on augmentin for abscessed tooth. Has taken approx 10 days. Overall better but still having some cough. Also given steroid. Chest sore from coughing. Possible aspritiation as she has gastric outlet obstruction vs gastroparesis. Having frequency n/v, reflux at night.    Has questions about possible referral to colorectal on [facility] for colonic inertia. Saw Dr. Ca 2 weeks ago for eval. Recommended she would most likely need surgery. Had EGD in March per Dr. Tijerina. Also had recent gastric emptying study. Quite distressed from symptoms. Now having fecal incontinence.    Patient's past medical hx, surgical hx, family hx, social hx reviewed on chart. Medication and allergy lists reviewed on chart. Information updated as indicated.      Review of Systems   Constitutional: Positive for fatigue. Negative for fever.   HENT: Positive for congestion, postnasal drip, sinus pressure, sore throat (mild) and trouble swallowing (intermittent). Negative for ear pain, hearing loss, nosebleeds and rhinorrhea.    Eyes: Positive for visual disturbance (chronic). Negative for pain.        Dry eyes   Respiratory: Positive for cough. Negative for shortness of breath and wheezing.    Cardiovascular: Positive for palpitations (chronic, intermittent, stable). Negative for chest pain and leg swelling.   Gastrointestinal: Positive for abdominal distention, abdominal pain (chronic), constipation, nausea and vomiting. Negative for blood in stool and diarrhea.   Endocrine: Positive for heat intolerance. Negative for polydipsia and polyuria.   Genitourinary: Positive for pelvic pain (chronic). Negative  for dysuria and hematuria.   Musculoskeletal: Positive for arthralgias, myalgias, neck pain and neck stiffness. Negative for back pain and joint swelling.   Skin: Negative for rash and wound.   Neurological: Positive for dizziness, tremors, weakness and headaches. Negative for seizures, syncope and speech difficulty.   Hematological: Negative for adenopathy. Bruises/bleeds easily.   Psychiatric/Behavioral: Positive for decreased concentration, dysphoric mood and sleep disturbance. Negative for confusion and suicidal ideas. The patient is nervous/anxious.    Pt's previous ROS reviewed and updated as indicated.       Physical Exam  Constitutional:       Appearance: She is well-developed and well-groomed. She is ill-appearing (mildly).   Eyes:      General: No scleral icterus.     Conjunctiva/sclera: Conjunctivae normal.   Pulmonary:      Effort: Pulmonary effort is normal.      Comments: Frequent coughing during exam  Skin:     Coloration: Skin is not cyanotic, jaundiced or pale.   Neurological:      Mental Status: She is alert and oriented to person, place, and time.   Psychiatric:         Mood and Affect: Affect normal. Mood is anxious and depressed.         Behavior: Behavior normal. Behavior is cooperative.         Thought Content: Thought content normal. Thought content is not paranoid or delusional. Thought content does not include suicidal ideation.         Cognition and Memory: Cognition normal.     Pt's previous physical exam reviewed and updated as indicated.    GI consultation note reviewed.  Gastric emptying study reviewed.    Lab Results   Component Value Date    WBC 8.0 12/23/2021    HGB 13.2 12/23/2021    HCT 40.1 12/23/2021    MCV 86 12/23/2021     12/23/2021       Lab Results   Component Value Date    GLUCOSE 97 12/23/2021    BUN 18 12/23/2021    CREATININE 0.79 12/23/2021    EGFRIFNONA 84 12/23/2021    EGFRIFAFRI 97 12/23/2021    BCR 23 12/23/2021    K 3.6 12/23/2021    CO2 24 12/23/2021     CALCIUM 9.4 12/23/2021    PROTENTOTREF 6.3 12/23/2021    ALBUMIN 4.2 12/23/2021    LABIL2 2.0 12/23/2021    AST 21 12/23/2021    ALT 17 12/23/2021       Lab Results   Component Value Date    HGBA1C 5.49 08/16/2021       Lab Results   Component Value Date    TSH 0.455 12/23/2021         Diagnoses and all orders for this visit:    Community acquired pneumonia, unspecified laterality    Suspected pulmonary aspiration of food    Colonic inertia  -     Ambulatory Referral to Colorectal Surgery    Gastroparesis  -     Ambulatory Referral to Colorectal Surgery    Duodenal gastroesophageal reflux  -     Ambulatory Referral to Colorectal Surgery    Other orders  -     benzonatate (TESSALON) 200 MG capsule; Take 200 mg by mouth 3 (Three) Times a Day As Needed.  -     amoxicillin (AMOXIL) 875 MG tablet; TAKE 1 TABLET BY MOUTH TWICE DAILY UNTIL ALL TAKEN  -     albuterol sulfate  (90 Base) MCG/ACT inhaler; INHALE 2 PUFFS BY MOUTH EVERY 4 TO 6 HOURS AS NEEDED FOR SHORTNESS OF BREATH OR WHEEZING  -     rizatriptan (MAXALT) 10 MG tablet  -     fluconazole (DIFLUCAN) 150 MG tablet; TAKE 1 TABLET BY MOUTH 1 TIME FOR FOR 1 DOSE. REPEAT IN 72 HOURS IF SYMPTOMS PERSIST  -     metroNIDAZOLE (FLAGYL) 500 MG tablet; Take 500 mg by mouth 2 (Two) Times a Day. for 7 days  -     predniSONE (DELTASONE) 20 MG tablet; Take 40 mg by mouth Daily.      Complete abx, corticosteroids, albuterol as prescribed during outlying telehealth visit. If no further improvement by end of week, recommend CXR (she declines at this time). Consider tx with clindamycin.    Referral as requested above. May need to see GI as well    Patient will f/u per routine, sooner as needed.  Patient was encouraged to keep me informed of any acute changes, lack of improvement, or any new concerning symptoms.  Pt is aware of reasons to seek emergent care.  Patient voiced understanding of all instructions and denied further questions.      Total face to face time with  patient was 21 minutes.  I spent 32 minutes caring for Tatiana on this date of service. This time includes time spent by me in the following activities:preparing for the visit, reviewing tests, obtaining and/or reviewing a separately obtained history, performing a medically appropriate examination and/or evaluation , counseling and educating the patient/family/caregiver, referring and communicating with other health care professionals , documenting information in the medical record and care coordination    .  Please note that portions of this note may have been completed with a voice recognition program. Efforts were made to edit the dictations, but occasionally words are mistranscribed.

## 2022-06-20 NOTE — TELEPHONE ENCOUNTER
Ms Fields has a [employer] video visit this afternoon.  Can you please ask her a current phone number.  The number we have for her has a rapid busy signal.

## 2022-06-21 RX ORDER — FAMCICLOVIR 500 MG/1
TABLET ORAL
Qty: 30 TABLET | Refills: 5 | Status: SHIPPED | OUTPATIENT
Start: 2022-06-21 | End: 2022-10-07 | Stop reason: HOSPADM

## 2022-06-21 RX ORDER — ALBUTEROL SULFATE 90 UG/1
AEROSOL, METERED RESPIRATORY (INHALATION)
COMMUNITY
Start: 2022-06-13 | End: 2022-08-22

## 2022-06-21 RX ORDER — FLUCONAZOLE 150 MG/1
TABLET ORAL
COMMUNITY
Start: 2022-06-11 | End: 2022-08-17 | Stop reason: SDUPTHER

## 2022-06-21 RX ORDER — RIZATRIPTAN BENZOATE 10 MG/1
10 TABLET ORAL ONCE AS NEEDED
COMMUNITY
Start: 2022-05-31 | End: 2023-01-17 | Stop reason: SDUPTHER

## 2022-06-21 RX ORDER — AMOXICILLIN 875 MG/1
TABLET, COATED ORAL
COMMUNITY
Start: 2022-06-08 | End: 2022-09-26

## 2022-06-21 RX ORDER — PREDNISONE 20 MG/1
40 TABLET ORAL DAILY
COMMUNITY
Start: 2022-06-13 | End: 2022-08-22

## 2022-06-21 RX ORDER — METRONIDAZOLE 500 MG/1
500 TABLET ORAL 2 TIMES DAILY
COMMUNITY
Start: 2022-06-11 | End: 2022-08-22

## 2022-06-21 RX ORDER — BENZONATATE 200 MG/1
200 CAPSULE ORAL 3 TIMES DAILY PRN
COMMUNITY
Start: 2022-06-13 | End: 2022-08-22

## 2022-07-06 ENCOUNTER — TELEPHONE (OUTPATIENT)
Dept: FAMILY MEDICINE CLINIC | Facility: CLINIC | Age: 57
End: 2022-07-06

## 2022-07-13 DIAGNOSIS — F41.1 GAD (GENERALIZED ANXIETY DISORDER): ICD-10-CM

## 2022-07-13 NOTE — TELEPHONE ENCOUNTER
Rx Refill Note  Requested Prescriptions     Pending Prescriptions Disp Refills   • clonazePAM (KlonoPIN) 0.5 MG tablet [Pharmacy Med Name: CLONAZEPAM 0.5MG TABLETS] 60 tablet      Sig: TAKE 1 TABLET BY MOUTH TWICE DAILY AS NEEDED FOR ANXIETY OR INSOMNIA      Last office visit with prescribing clinician: 5/4/2022      Next office visit with prescribing clinician: Visit date not found            Lizzy Graves MA  07/13/22, 14:07 EDT

## 2022-07-14 ENCOUNTER — TELEPHONE (OUTPATIENT)
Dept: FAMILY MEDICINE CLINIC | Facility: CLINIC | Age: 57
End: 2022-07-14

## 2022-07-14 DIAGNOSIS — N95.1 POSTMENOPAUSAL DISORDER: ICD-10-CM

## 2022-07-14 DIAGNOSIS — Z79.890 HORMONE REPLACEMENT THERAPY (HRT): ICD-10-CM

## 2022-07-14 RX ORDER — CLONAZEPAM 0.5 MG/1
TABLET ORAL
Qty: 60 TABLET | Refills: 2 | Status: SHIPPED | OUTPATIENT
Start: 2022-07-14 | End: 2022-10-13

## 2022-07-15 ENCOUNTER — TRANSCRIBE ORDERS (OUTPATIENT)
Dept: ADMINISTRATIVE | Facility: HOSPITAL | Age: 57
End: 2022-07-15

## 2022-07-15 DIAGNOSIS — Z12.31 VISIT FOR SCREENING MAMMOGRAM: Primary | ICD-10-CM

## 2022-07-15 NOTE — TELEPHONE ENCOUNTER
Rx Refill Note  Requested Prescriptions     Pending Prescriptions Disp Refills   • Testosterone powder 0.15 g 2     Sig: Place 0.5 mL on the skin as directed by provider Daily. DISPENSE 15 ML      Last office visit with prescribing clinician: 5/4/2022      Next office visit with prescribing clinician: Visit date not found            Lizzy Graves MA  07/15/22, 07:46 EDT

## 2022-07-18 RX ORDER — TESTOSTERONE MICRONIZED 100 %
0.5 POWDER (GRAM) MISCELLANEOUS DAILY
Qty: 0.15 G | Refills: 2 | Status: SHIPPED | OUTPATIENT
Start: 2022-07-18 | End: 2022-10-17 | Stop reason: SDUPTHER

## 2022-07-25 ENCOUNTER — TELEPHONE (OUTPATIENT)
Dept: FAMILY MEDICINE CLINIC | Facility: CLINIC | Age: 57
End: 2022-07-25

## 2022-07-25 DIAGNOSIS — G47.33 OBSTRUCTIVE SLEEP APNEA: ICD-10-CM

## 2022-07-25 DIAGNOSIS — G47.19 EXCESSIVE DAYTIME SLEEPINESS: ICD-10-CM

## 2022-07-25 RX ORDER — MODAFINIL 200 MG/1
200 TABLET ORAL DAILY
Qty: 30 TABLET | Refills: 1 | Status: SHIPPED | OUTPATIENT
Start: 2022-07-25 | End: 2022-09-26

## 2022-07-25 NOTE — TELEPHONE ENCOUNTER
Caller: Tatiana Fields    Relationship: Self    Best call back number: [phone]    Requested Prescriptions:   modafinil (PROVIGIL) 200 MG tablet    Pharmacy where request should be sent: [id number]  [place], [place] - [facility] [address] AT [facility] [address] - [phone]  - [phone] FX     Additional details provided by patient: PATIENT WANTING TO SWITCH BACK TO RITALIN FROM THE MODAFINIL BUT STATES IF THIS ISNT POSSIBLE TO PLEASE SEND IN REFILL FOR MODAFINIL      Does the patient have less than a 3 day supply:  [x] Yes  [] No    Christine Goldberg Rep   07/25/22 10:11 EDT

## 2022-08-17 RX ORDER — FLUCONAZOLE 150 MG/1
TABLET ORAL
Qty: 3 TABLET | Refills: 0 | Status: SHIPPED | OUTPATIENT
Start: 2022-08-17 | End: 2022-08-22

## 2022-08-18 NOTE — TELEPHONE ENCOUNTER
Scheduled as requested.    Finasteride Pregnancy And Lactation Text: This medication is absolutely contraindicated during pregnancy. It is unknown if it is excreted in breast milk.

## 2022-08-22 ENCOUNTER — OFFICE VISIT (OUTPATIENT)
Dept: SURGERY | Facility: CLINIC | Age: 57
End: 2022-08-22

## 2022-08-22 VITALS
HEIGHT: 64 IN | WEIGHT: 181.1 LBS | SYSTOLIC BLOOD PRESSURE: 146 MMHG | HEART RATE: 119 BPM | DIASTOLIC BLOOD PRESSURE: 96 MMHG | OXYGEN SATURATION: 98 % | BODY MASS INDEX: 30.92 KG/M2

## 2022-08-22 DIAGNOSIS — K59.9 COLONIC INERTIA: Primary | ICD-10-CM

## 2022-08-22 PROBLEM — K21.9 GASTROESOPHAGEAL REFLUX DISEASE: Status: ACTIVE | Noted: 2017-05-23

## 2022-08-22 PROCEDURE — 99204 OFFICE O/P NEW MOD 45 MIN: CPT | Performed by: COLON & RECTAL SURGERY

## 2022-08-22 RX ORDER — LEVOTHYROXINE SODIUM 0.05 MG/1
50 TABLET ORAL DAILY
COMMUNITY
Start: 2022-07-14 | End: 2022-08-22 | Stop reason: SDUPTHER

## 2022-08-22 RX ORDER — CEFAZOLIN SODIUM 2 G/100ML
2 INJECTION, SOLUTION INTRAVENOUS ONCE
Status: CANCELLED | OUTPATIENT
Start: 2022-10-04 | End: 2022-08-22

## 2022-08-22 RX ORDER — OXYCODONE HYDROCHLORIDE 10 MG/1
10 TABLET ORAL EVERY 8 HOURS
COMMUNITY
Start: 2022-08-19 | End: 2022-10-07 | Stop reason: HOSPADM

## 2022-08-22 RX ORDER — NAPROXEN 500 MG/1
TABLET ORAL
COMMUNITY
Start: 2022-08-11 | End: 2022-09-15

## 2022-08-22 RX ORDER — CHLORHEXIDINE GLUCONATE 4 G/100ML
SOLUTION TOPICAL 2 TIMES DAILY
Qty: 236 ML | Refills: 1 | Status: ON HOLD | OUTPATIENT
Start: 2022-08-22 | End: 2022-10-04

## 2022-08-22 RX ORDER — LEVOMILNACIPRAN HYDROCHLORIDE 20 MG/1
CAPSULE, EXTENDED RELEASE ORAL
COMMUNITY
Start: 2022-07-11 | End: 2022-08-22 | Stop reason: SDUPTHER

## 2022-08-22 RX ORDER — SCOLOPAMINE TRANSDERMAL SYSTEM 1 MG/1
1 PATCH, EXTENDED RELEASE TRANSDERMAL CONTINUOUS
Status: CANCELLED | OUTPATIENT
Start: 2022-10-04 | End: 2022-10-07

## 2022-08-22 RX ORDER — METRONIDAZOLE 500 MG/100ML
500 INJECTION, SOLUTION INTRAVENOUS ONCE
Status: CANCELLED | OUTPATIENT
Start: 2022-10-04 | End: 2022-08-22

## 2022-08-22 RX ORDER — CELECOXIB 200 MG/1
200 CAPSULE ORAL ONCE
Status: CANCELLED | OUTPATIENT
Start: 2022-10-04 | End: 2022-08-22

## 2022-08-22 RX ORDER — CHLORHEXIDINE GLUCONATE 0.12 MG/ML
RINSE ORAL
COMMUNITY
Start: 2022-08-19 | End: 2022-09-27

## 2022-08-22 RX ORDER — NEOMYCIN SULFATE 500 MG/1
TABLET ORAL
Qty: 6 TABLET | Refills: 0 | Status: ON HOLD | OUTPATIENT
Start: 2022-08-22 | End: 2022-10-04

## 2022-08-22 RX ORDER — METHYLNALTREXONE BROMIDE 150 MG/1
TABLET ORAL
COMMUNITY
Start: 2022-07-13 | End: 2022-09-26

## 2022-08-22 RX ORDER — ACETAMINOPHEN 500 MG
1000 TABLET ORAL ONCE
Status: CANCELLED | OUTPATIENT
Start: 2022-10-04 | End: 2022-08-22

## 2022-08-22 RX ORDER — METRONIDAZOLE 500 MG/1
TABLET ORAL
Qty: 3 TABLET | Refills: 0 | Status: ON HOLD | OUTPATIENT
Start: 2022-08-22 | End: 2022-10-04

## 2022-08-22 RX ORDER — GABAPENTIN 100 MG/1
600 CAPSULE ORAL ONCE
Status: CANCELLED | OUTPATIENT
Start: 2022-10-04 | End: 2022-08-22

## 2022-08-29 ENCOUNTER — CLINICAL SUPPORT (OUTPATIENT)
Dept: FAMILY MEDICINE CLINIC | Facility: CLINIC | Age: 57
End: 2022-08-29

## 2022-08-29 DIAGNOSIS — Z23 NEED FOR COVID-19 VACCINE: Primary | ICD-10-CM

## 2022-08-29 PROCEDURE — 0051A COVID-19 (PFIZER) 12+ YRS: CPT | Performed by: FAMILY MEDICINE

## 2022-08-29 PROCEDURE — 91305 COVID-19 (PFIZER) 12+ YRS: CPT | Performed by: FAMILY MEDICINE

## 2022-09-04 DIAGNOSIS — F33.1 MODERATE EPISODE OF RECURRENT MAJOR DEPRESSIVE DISORDER: ICD-10-CM

## 2022-09-06 RX ORDER — LEVOMILNACIPRAN HYDROCHLORIDE 20 MG/1
CAPSULE, EXTENDED RELEASE ORAL
Qty: 30 CAPSULE | Refills: 2 | Status: SHIPPED | OUTPATIENT
Start: 2022-09-06 | End: 2022-11-28

## 2022-09-15 ENCOUNTER — TELEMEDICINE (OUTPATIENT)
Dept: FAMILY MEDICINE CLINIC | Facility: CLINIC | Age: 57
End: 2022-09-15

## 2022-09-15 ENCOUNTER — TELEPHONE (OUTPATIENT)
Dept: SURGERY | Facility: CLINIC | Age: 57
End: 2022-09-15

## 2022-09-15 DIAGNOSIS — I10 ESSENTIAL HYPERTENSION: ICD-10-CM

## 2022-09-15 DIAGNOSIS — E55.9 VITAMIN D DEFICIENCY: ICD-10-CM

## 2022-09-15 DIAGNOSIS — E03.9 ACQUIRED HYPOTHYROIDISM: ICD-10-CM

## 2022-09-15 DIAGNOSIS — Z51.81 MEDICATION MONITORING ENCOUNTER: ICD-10-CM

## 2022-09-15 DIAGNOSIS — M79.10 MUSCLE PAIN: ICD-10-CM

## 2022-09-15 DIAGNOSIS — R79.9 ABNORMAL BLOOD CHEMISTRY: ICD-10-CM

## 2022-09-15 DIAGNOSIS — E53.8 VITAMIN B12 DEFICIENCY: ICD-10-CM

## 2022-09-15 DIAGNOSIS — E78.5 DYSLIPIDEMIA: ICD-10-CM

## 2022-09-15 DIAGNOSIS — R73.01 IFG (IMPAIRED FASTING GLUCOSE): Primary | ICD-10-CM

## 2022-09-15 PROCEDURE — 99214 OFFICE O/P EST MOD 30 MIN: CPT | Performed by: FAMILY MEDICINE

## 2022-09-15 NOTE — PROGRESS NOTES
TELEHEALTH/VIDEO VISIT (via [employer]/ZOOM)  DOS 9/15/22  Location of provider: [facility]    TIERNEY TIMOTHY FINE   [birth year]  Location of Patient: personal residence    You have chosen to receive care through a telehealth visit.  Do you consent to use a video/audio connection for your medical care today? Yes         History of Present Illness   Has had consultation with Dr. Weeks as well as provider as [facility]. Has colonic inertia. Colectomy recommended but only after completing pill endoscopy/study of small bowel.     Subsequent consultation with surgeon at [facility]. Dr. Abigail Kang. Currently scheduled 10/4/22, colectomy. Pt now wondering if she should proceed without having 'pill test' first. Had apt at [facility] but was not able to keep apt. Now not able to get in until after surgery is scheduled.    She c/o increasing pain in arms, legs. Constant aching. Has multifactorial chronic pain as reviewed on her chart.    Due for routine surveillance labs. Chronic conditions include HTN, hypoTHism, dyslipidemia, vit def's, IFG. Reports taking med as rx'd.    Currently on HRT. Questions about alternatives that may be cheaper.      Patient's past medical hx, surgical hx, family hx, social hx reviewed on chart. Medication and allergy lists reviewed on chart. Information updated as indicated.      Review of Systems   Constitutional: Positive for fatigue. Negative for fever.   HENT: Positive for congestion, postnasal drip, sinus pressure, sore throat (mild) and trouble swallowing (intermittent). Negative for ear pain, hearing loss, nosebleeds and rhinorrhea.    Eyes: Positive for visual disturbance (chronic). Negative for pain.        Dry eyes   Respiratory: Positive for cough. Negative for shortness of breath and wheezing.    Cardiovascular: Positive for palpitations (chronic, intermittent, stable). Negative for chest pain and leg swelling.   Gastrointestinal: Positive for abdominal distention, abdominal  pain (chronic), constipation, nausea and vomiting. Negative for blood in stool and diarrhea.   Endocrine: Positive for heat intolerance. Negative for polydipsia and polyuria.   Genitourinary: Positive for pelvic pain (chronic). Negative for dysuria and hematuria.   Musculoskeletal: Positive for arthralgias, myalgias, neck pain and neck stiffness. Negative for back pain and joint swelling.   Skin: Negative for rash and wound.   Neurological: Positive for dizziness, tremors, weakness and headaches. Negative for seizures, syncope and speech difficulty.   Hematological: Negative for adenopathy. Bruises/bleeds easily.   Psychiatric/Behavioral: Positive for decreased concentration, dysphoric mood and sleep disturbance. Negative for confusion and suicidal ideas. The patient is nervous/anxious.    Pt's previous ROS reviewed and updated as indicated.       Physical Exam  Constitutional:       General: She is not in acute distress.     Appearance: She is well-developed and well-groomed. She is not ill-appearing.   HENT:      Head: Atraumatic.   Eyes:      General: No scleral icterus.     Conjunctiva/sclera: Conjunctivae normal.   Pulmonary:      Effort: Pulmonary effort is normal.   Skin:     Coloration: Skin is not jaundiced or pale.   Neurological:      Mental Status: She is alert and oriented to person, place, and time.   Psychiatric:         Behavior: Behavior normal. Behavior is cooperative.         Cognition and Memory: Cognition normal.       Consultation notes from Dr. Weeks and Dr. EVELIN Kang reviewed on chart.    NM Gastric Emptying  Result Date: 6/14/2022  Abnormally prolonged T-1/2. Consider gastroparesis or gastric outlet obstruction.      Images were reviewed, interpreted, and dictated by SHAYLEE Moreau Transcribed by Beverly Sneed PA-C.  This report was signed and finalized on 6/14/2022 3:31 PM by SHAYLEE Sierra.      Diagnoses and all orders for this visit:    IFG (impaired fasting  glucose)  -     Comprehensive Metabolic Panel; Future  -     Hemoglobin A1c; Future    Essential hypertension  -     CBC & Differential; Future  -     Comprehensive Metabolic Panel; Future    Vitamin D deficiency  -     Vitamin D 25 Hydroxy; Future    Vitamin B12 deficiency  -     CBC & Differential; Future    Acquired hypothyroidism  -     TSH Rfx On Abnormal To Free T4; Future    Medication monitoring encounter  -     CBC & Differential; Future  -     Comprehensive Metabolic Panel; Future    Muscle pain  -     Comprehensive Metabolic Panel; Future  -     CK; Future  -     Magnesium; Future    Abnormal blood chemistry  -     Hemoglobin A1c; Future    Dyslipidemia  -     Lipid Panel; Future    Other orders  -     promethazine (PHENERGAN) 25 MG tablet  -     zolpidem (AMBIEN) 10 MG tablet; TAKE 1/2 TO 1 TABLET BY MOUTH EVERY NIGHT AT BEDTIME AS NEEDED FOR INSOMNIA      Assess status of vit/min deficiencies and replace as indicated.  Routine surveillance labs as above.  She will contact insurance regarding affordable HRT options.  Continue lisinopril for HTN.    Encouraged her to contact Dr. KIRTI Kang's office regarding access to further studies prior to colectomy.  Patient will f/u per routine, sooner as needed/instructed.  I will contact patient regarding test results and provide instructions regarding any necessary changes in plan of care.  Patient was encouraged to keep me informed of any acute changes, lack of improvement, or any new concerning symptoms.  Pt is aware of reasons to seek emergent care.  Patient voiced understanding of all instructions and denied further questions.  KIMBERLY reviewed.    Total face to face time with patient was 21 minutes.    Please note that portions of this note may have been completed with a voice recognition program. Efforts were made to edit the dictations, but occasionally words are mistranscribed.

## 2022-09-15 NOTE — TELEPHONE ENCOUNTER
Patient sent via Cell>Point, see below;    Tatiana Fields  to Abigail Kang MD          9/15/22 10:37 AM  Hi Dr. Kang,  I was ordered the smart pill test a couple months ago but for some reason it was never scheduled.  I wanted to see if you thought it be a good ideal tp have the test done this month before the surgery.   I understand that it provides alot of information.  If you can get that scheduled for me in [place] or [place] I'll go get it done so you have that before surgery.  Thank You. Tatiana Singh  to Abigail Kang MD          9/15/22 10:56 AM  If you don’t think the smart pill test is helpful or needed then disregard previous message. Thank you

## 2022-09-16 RX ORDER — ZOLPIDEM TARTRATE 10 MG/1
10 TABLET ORAL NIGHTLY PRN
COMMUNITY
Start: 2022-08-26 | End: 2022-10-17 | Stop reason: SDUPTHER

## 2022-09-16 RX ORDER — PROMETHAZINE HYDROCHLORIDE 25 MG/1
25 TABLET ORAL EVERY 6 HOURS PRN
COMMUNITY
Start: 2022-08-26 | End: 2022-10-17 | Stop reason: SDUPTHER

## 2022-09-23 ENCOUNTER — APPOINTMENT (OUTPATIENT)
Dept: MAMMOGRAPHY | Facility: HOSPITAL | Age: 57
End: 2022-09-23

## 2022-09-23 ENCOUNTER — OFFICE VISIT (OUTPATIENT)
Dept: SURGERY | Facility: CLINIC | Age: 57
End: 2022-09-23

## 2022-09-23 DIAGNOSIS — K59.9 COLONIC INERTIA: Primary | ICD-10-CM

## 2022-09-23 PROCEDURE — 99212 OFFICE O/P EST SF 10 MIN: CPT | Performed by: COLON & RECTAL SURGERY

## 2022-09-26 ENCOUNTER — OFFICE VISIT (OUTPATIENT)
Dept: FAMILY MEDICINE CLINIC | Facility: CLINIC | Age: 57
End: 2022-09-26

## 2022-09-26 VITALS
BODY MASS INDEX: 29.37 KG/M2 | OXYGEN SATURATION: 98 % | HEIGHT: 64 IN | TEMPERATURE: 98.4 F | DIASTOLIC BLOOD PRESSURE: 70 MMHG | WEIGHT: 172 LBS | SYSTOLIC BLOOD PRESSURE: 120 MMHG | HEART RATE: 110 BPM

## 2022-09-26 DIAGNOSIS — G47.33 OBSTRUCTIVE SLEEP APNEA: ICD-10-CM

## 2022-09-26 DIAGNOSIS — I10 ESSENTIAL HYPERTENSION: ICD-10-CM

## 2022-09-26 DIAGNOSIS — G47.19 EXCESSIVE DAYTIME SLEEPINESS: ICD-10-CM

## 2022-09-26 DIAGNOSIS — Z51.81 MEDICATION MONITORING ENCOUNTER: ICD-10-CM

## 2022-09-26 DIAGNOSIS — M79.10 MUSCLE PAIN: ICD-10-CM

## 2022-09-26 DIAGNOSIS — K59.9 COLONIC INERTIA: Primary | ICD-10-CM

## 2022-09-26 DIAGNOSIS — E78.5 DYSLIPIDEMIA: ICD-10-CM

## 2022-09-26 DIAGNOSIS — J01.01 ACUTE RECURRENT MAXILLARY SINUSITIS: Primary | ICD-10-CM

## 2022-09-26 DIAGNOSIS — E53.8 VITAMIN B12 DEFICIENCY: ICD-10-CM

## 2022-09-26 DIAGNOSIS — R79.9 ABNORMAL BLOOD CHEMISTRY: ICD-10-CM

## 2022-09-26 DIAGNOSIS — Z79.890 HORMONE REPLACEMENT THERAPY (HRT): ICD-10-CM

## 2022-09-26 DIAGNOSIS — F33.9 RECURRENT MAJOR DEPRESSION RESISTANT TO TREATMENT: ICD-10-CM

## 2022-09-26 DIAGNOSIS — R73.01 IFG (IMPAIRED FASTING GLUCOSE): ICD-10-CM

## 2022-09-26 DIAGNOSIS — K59.9 COLONIC INERTIA: ICD-10-CM

## 2022-09-26 DIAGNOSIS — E03.9 ACQUIRED HYPOTHYROIDISM: ICD-10-CM

## 2022-09-26 DIAGNOSIS — E55.9 VITAMIN D DEFICIENCY: ICD-10-CM

## 2022-09-26 LAB
EXPIRATION DATE: NORMAL
FLUAV AG UPPER RESP QL IA.RAPID: NOT DETECTED
FLUBV AG UPPER RESP QL IA.RAPID: NOT DETECTED
INTERNAL CONTROL: NORMAL
Lab: NORMAL
SARS-COV-2 AG UPPER RESP QL IA.RAPID: NOT DETECTED

## 2022-09-26 PROCEDURE — 96372 THER/PROPH/DIAG INJ SC/IM: CPT | Performed by: FAMILY MEDICINE

## 2022-09-26 PROCEDURE — 87428 SARSCOV & INF VIR A&B AG IA: CPT | Performed by: FAMILY MEDICINE

## 2022-09-26 PROCEDURE — 99214 OFFICE O/P EST MOD 30 MIN: CPT | Performed by: FAMILY MEDICINE

## 2022-09-26 RX ORDER — MODAFINIL 200 MG/1
200 TABLET ORAL DAILY
Qty: 30 TABLET | Refills: 1 | Status: CANCELLED | OUTPATIENT
Start: 2022-09-26

## 2022-09-26 RX ORDER — ESTRADIOL 0.05 MG/D
1 FILM, EXTENDED RELEASE TRANSDERMAL 2 TIMES WEEKLY
Qty: 8 EACH | Refills: 11 | Status: SHIPPED | OUTPATIENT
Start: 2022-09-26 | End: 2023-09-26

## 2022-09-26 RX ORDER — LINACLOTIDE 290 UG/1
290 CAPSULE, GELATIN COATED ORAL
Qty: 30 CAPSULE | Refills: 5 | Status: SHIPPED | OUTPATIENT
Start: 2022-09-26 | End: 2022-09-26 | Stop reason: SDUPTHER

## 2022-09-26 RX ORDER — LINACLOTIDE 290 UG/1
290 CAPSULE, GELATIN COATED ORAL
Qty: 16 CAPSULE | Refills: 0 | Status: ON HOLD | COMMUNITY
Start: 2022-09-26 | End: 2022-10-04

## 2022-09-26 RX ORDER — DEXAMETHASONE SODIUM PHOSPHATE 4 MG/ML
4 INJECTION, SOLUTION INTRA-ARTICULAR; INTRALESIONAL; INTRAMUSCULAR; INTRAVENOUS; SOFT TISSUE ONCE
Status: DISCONTINUED | OUTPATIENT
Start: 2022-09-26 | End: 2022-09-26

## 2022-09-26 RX ORDER — METHYLPHENIDATE HYDROCHLORIDE 27 MG/1
27 TABLET ORAL EVERY MORNING
Qty: 30 TABLET | Refills: 0 | Status: SHIPPED | OUTPATIENT
Start: 2022-09-26 | End: 2022-10-25 | Stop reason: SDUPTHER

## 2022-09-26 RX ORDER — CEFTRIAXONE 1 G/1
1 INJECTION, POWDER, FOR SOLUTION INTRAMUSCULAR; INTRAVENOUS ONCE
Status: DISCONTINUED | OUTPATIENT
Start: 2022-09-26 | End: 2022-09-26

## 2022-09-26 RX ADMIN — DEXAMETHASONE SODIUM PHOSPHATE 4 MG: 4 INJECTION, SOLUTION INTRA-ARTICULAR; INTRALESIONAL; INTRAMUSCULAR; INTRAVENOUS; SOFT TISSUE at 16:02

## 2022-09-26 RX ADMIN — CEFTRIAXONE 1 G: 1 INJECTION, POWDER, FOR SOLUTION INTRAMUSCULAR; INTRAVENOUS at 16:01

## 2022-09-26 NOTE — PROGRESS NOTES
Subjective   Tatiana Fields is a 56 y.o. female.     History of Present Illness   Mrs. Fields presents today with several concerns.    Has upcoming colonic surgery. Feels she has recurrent sinus infection with severe congestion, thick postnsal drainage, facial pain, etc.    She wishes to D/c baclofen and restart Replace tizanidine mg, up to tid    Wishes to continue Estrogen patch for now, requests refill but wants to dsicuss other options at f/u visit.    currenctly on provigil for EDS with DANE. Wishes to go back on concerta instead as she felt this also helped her depression.      The following portions of the patient's history were reviewed and updated as appropriate: allergies, current medications, past family history, past medical history, past social history, past surgical history and problem list.    Review of Systems   Constitutional: Positive for fatigue. Negative for fever.   HENT: Positive for congestion, postnasal drip, sinus pressure, sore throat (mild) and trouble swallowing (intermittent). Negative for ear pain, hearing loss, nosebleeds and rhinorrhea.    Eyes: Positive for visual disturbance (chronic). Negative for pain.        Dry eyes   Respiratory: Positive for cough. Negative for shortness of breath and wheezing.    Cardiovascular: Positive for palpitations (chronic, intermittent, stable). Negative for chest pain and leg swelling.   Gastrointestinal: Positive for abdominal distention, abdominal pain (chronic), constipation, nausea and vomiting. Negative for blood in stool and diarrhea.   Endocrine: Positive for heat intolerance. Negative for polydipsia and polyuria.   Genitourinary: Positive for pelvic pain (chronic). Negative for dysuria and hematuria.   Musculoskeletal: Positive for arthralgias, myalgias, neck pain and neck stiffness. Negative for back pain and joint swelling.   Skin: Negative for rash and wound.   Neurological: Positive for dizziness, tremors, weakness and headaches.  Negative for seizures, syncope and speech difficulty.   Hematological: Negative for adenopathy. Bruises/bleeds easily.   Psychiatric/Behavioral: Positive for decreased concentration, dysphoric mood and sleep disturbance. Negative for confusion and suicidal ideas. The patient is nervous/anxious.    Pt's previous ROS reviewed and updated as indicated.       Objective    Vitals:    09/26/22 1504   BP: 120/70   Pulse: 110   Temp: 98.4 °F (36.9 °C)   SpO2: 98%     Body mass index is 29.52 kg/m².      09/26/22  1504   Weight: 78 kg (172 lb)       Physical Exam  Vitals and nursing note reviewed.   Constitutional:       General: She is not in acute distress.     Appearance: She is well-developed, well-groomed and overweight. She is ill-appearing (mildly\).   HENT:      Head: Atraumatic.      Right Ear: Ear canal and external ear normal. Tympanic membrane is retracted.      Left Ear: Ear canal and external ear normal. Tympanic membrane is retracted.      Nose: Mucosal edema and congestion present.      Right Sinus: Maxillary sinus tenderness and frontal sinus tenderness present.      Left Sinus: Maxillary sinus tenderness and frontal sinus tenderness present.      Mouth/Throat:      Mouth: Mucous membranes are moist.      Pharynx: Posterior oropharyngeal erythema (mild with PND) present.   Eyes:      General: No scleral icterus.     Conjunctiva/sclera: Conjunctivae normal.   Neck:      Thyroid: No thyroid mass.   Cardiovascular:      Rate and Rhythm: Normal rate and regular rhythm.      Pulses: Normal pulses.      Heart sounds: Normal heart sounds.   Pulmonary:      Effort: Pulmonary effort is normal.      Breath sounds: Normal breath sounds.   Musculoskeletal:      Right lower leg: No edema.      Left lower leg: No edema.   Lymphadenopathy:      Cervical: No cervical adenopathy.   Skin:     General: Skin is warm and dry.      Coloration: Skin is not jaundiced or pale.      Findings: No bruising or rash.   Neurological:       Mental Status: She is alert and oriented to person, place, and time.      Gait: Gait is intact.   Psychiatric:         Mood and Affect: Affect normal. Mood is anxious and depressed.         Behavior: Behavior normal. Behavior is cooperative.         Thought Content: Thought content normal. Thought content is not paranoid or delusional. Thought content does not include suicidal ideation.         Cognition and Memory: Cognition normal.     Pt's previous physical exam reviewed and updated as indicated.    Rapid flu, covid negative.    Assessment & Plan   Diagnoses and all orders for this visit:    1. Acute recurrent maxillary sinusitis (Primary)  -     Discontinue: dexamethasone (DECADRON) injection 4 mg  -     Discontinue: cefTRIAXone (ROCEPHIN) injection 1 g  -     POCT SARS-CoV-2 Antigen EVIE + Flu    2. Excessive daytime sleepiness  -     methylphenidate (Concerta) 27 MG CR tablet; Take 1 tablet by mouth Every Morning (Patient taking differently: Take 27 mg by mouth Every Morning PT TO HOLD 48 HOURS PRIOR TO SURGERY)  Dispense: 30 tablet; Refill: 0    3. Obstructive sleep apnea  -     methylphenidate (Concerta) 27 MG CR tablet; Take 1 tablet by mouth Every Morning (Patient taking differently: Take 27 mg by mouth Every Morning PT TO HOLD 48 HOURS PRIOR TO SURGERY)  Dispense: 30 tablet; Refill: 0    4. Colonic inertia  -     Linzess 290 MCG capsule capsule; Take 1 capsule by mouth Every Morning Before Breakfast.  Dispense: 16 capsule; Refill: 0    5. Recurrent major depression resistant to treatment (HCC)  -     methylphenidate (Concerta) 27 MG CR tablet; Take 1 tablet by mouth Every Morning (Patient taking differently: Take 27 mg by mouth Every Morning PT TO HOLD 48 HOURS PRIOR TO SURGERY)  Dispense: 30 tablet; Refill: 0    6. Hormone replacement therapy (HRT)    Other orders  -     estradiol (MINIVELLE, VIVELLE-DOT) 0.05 MG/24HR patch; Place 1 patch on the skin as directed by provider 2 (Two) Times a Week.   Dispense: 8 each; Refill: 11  -     tiZANidine (ZANAFLEX) 4 MG tablet; Take 1 tablet by mouth Every 8 (Eight) Hours As Needed for Muscle Spasms.  Dispense: 90 tablet; Refill: 0       POC abx and CS. Continue regular allergy meds.  F/u with colorectal surgery as scheduled.  Depression with EDS, DANE - switch back to concerta, d/c provigil.  She wishes to continue hormone replacement. Risks/beneftis again reviewed.  Fu per routine, sooner as needed.  Patient was encouraged to keep me informed of any acute changes, lack of improvement, or any new concerning symptoms.  Pt is aware of reasons to seek emergent care.  Patient voiced understanding of all instructions and denied further questions.  As part of patient's treatment plan I am prescribing a controlled substance.  The patient has been made aware of the appropriate use of such medications, including potential risk of somnolence, limited ability to drive and/or work safely, and potential for dependence and/or overdose.  It has also been made clear that these medications are for use by this patient only, without concomitant use of alcohol or other substances, unless prescribed.  History and physical exam exhibit continued safe and appropriate use of controlled substance.  KIMBERLY reviewed.  Patient has completed a prescribing agreement detailing terms of continued prescribing of controlled substances, including monitoring KIMBERLY reports, urine drug screening, and pill counts if necessary.  Patient is aware that inappropriate use will result in cessation of prescribing such medications.      Please note that portions of this note may have been completed with a voice recognition program. Efforts were made to edit the dictations, but occasionally words are mistranscribed.

## 2022-09-26 NOTE — TELEPHONE ENCOUNTER
Rx Refill Note  Requested Prescriptions     Pending Prescriptions Disp Refills   • modafinil (PROVIGIL) 200 MG tablet 30 tablet 1     Sig: Take 1 tablet by mouth Daily.     Signed Prescriptions Disp Refills   • Linzess 290 MCG capsule capsule 30 capsule 5     Sig: Take 1 capsule by mouth Every Morning Before Breakfast.     Authorizing Provider: SILKE HENRY     Ordering User: DARA BRADLEY      Last office visit with prescribing clinician: 5/4/2022      Next office visit with prescribing clinician: 9/26/2022            Dara Bradley MA  09/26/22, 14:47 EDT

## 2022-09-26 NOTE — TELEPHONE ENCOUNTER
----- Message from Tatiana Fields sent at 9/26/2022  5:28 PM EDT -----  Regarding: Today appointment     The pharmacy said they didn't get the prescription for the tizandine we discussed.  If you could sent that when you get time. Thanks

## 2022-09-27 ENCOUNTER — TELEPHONE (OUTPATIENT)
Dept: FAMILY MEDICINE CLINIC | Facility: CLINIC | Age: 57
End: 2022-09-27

## 2022-09-27 ENCOUNTER — PRE-ADMISSION TESTING (OUTPATIENT)
Dept: PREADMISSION TESTING | Facility: HOSPITAL | Age: 57
End: 2022-09-27

## 2022-09-27 VITALS
OXYGEN SATURATION: 100 % | RESPIRATION RATE: 18 BRPM | DIASTOLIC BLOOD PRESSURE: 83 MMHG | TEMPERATURE: 97.5 F | HEART RATE: 96 BPM | WEIGHT: 172 LBS | HEIGHT: 64 IN | SYSTOLIC BLOOD PRESSURE: 129 MMHG | BODY MASS INDEX: 29.37 KG/M2

## 2022-09-27 DIAGNOSIS — K59.9 COLONIC INERTIA: ICD-10-CM

## 2022-09-27 LAB
25(OH)D3+25(OH)D2 SERPL-MCNC: 26.1 NG/ML (ref 30–100)
ABO GROUP BLD: NORMAL
ALBUMIN SERPL-MCNC: 4.4 G/DL (ref 3.8–4.9)
ALBUMIN/GLOB SERPL: 2.3 {RATIO} (ref 1.2–2.2)
ALP SERPL-CCNC: 63 IU/L (ref 44–121)
ALT SERPL-CCNC: 11 IU/L (ref 0–32)
AST SERPL-CCNC: 17 IU/L (ref 0–40)
BASOPHILS # BLD AUTO: 0 X10E3/UL (ref 0–0.2)
BASOPHILS NFR BLD AUTO: 1 %
BILIRUB SERPL-MCNC: 0.2 MG/DL (ref 0–1.2)
BLD GP AB SCN SERPL QL: NEGATIVE
BUN SERPL-MCNC: 10 MG/DL (ref 6–24)
BUN/CREAT SERPL: 13 (ref 9–23)
CALCIUM SERPL-MCNC: 9.7 MG/DL (ref 8.7–10.2)
CHLORIDE SERPL-SCNC: 101 MMOL/L (ref 96–106)
CHOLEST SERPL-MCNC: 203 MG/DL (ref 100–199)
CK MB SERPL-MCNC: 1.9 NG/ML (ref 0–5.3)
CO2 SERPL-SCNC: 24 MMOL/L (ref 20–29)
CREAT SERPL-MCNC: 0.78 MG/DL (ref 0.57–1)
EGFRCR SERPLBLD CKD-EPI 2021: 89 ML/MIN/1.73
EOSINOPHIL # BLD AUTO: 0.1 X10E3/UL (ref 0–0.4)
EOSINOPHIL NFR BLD AUTO: 2 %
ERYTHROCYTE [DISTWIDTH] IN BLOOD BY AUTOMATED COUNT: 11.9 % (ref 11.7–15.4)
GLOBULIN SER CALC-MCNC: 1.9 G/DL (ref 1.5–4.5)
GLUCOSE SERPL-MCNC: 87 MG/DL (ref 70–99)
HBA1C MFR BLD: 6.1 % (ref 4.8–5.6)
HCT VFR BLD AUTO: 36.6 % (ref 34–46.6)
HDLC SERPL-MCNC: 50 MG/DL
HGB BLD-MCNC: 12.4 G/DL (ref 11.1–15.9)
IMM GRANULOCYTES # BLD AUTO: 0 X10E3/UL (ref 0–0.1)
IMM GRANULOCYTES NFR BLD AUTO: 0 %
LDLC SERPL CALC-MCNC: 110 MG/DL (ref 0–99)
LYMPHOCYTES # BLD AUTO: 1.9 X10E3/UL (ref 0.7–3.1)
LYMPHOCYTES NFR BLD AUTO: 29 %
MAGNESIUM SERPL-MCNC: 2 MG/DL (ref 1.6–2.3)
MCH RBC QN AUTO: 28.4 PG (ref 26.6–33)
MCHC RBC AUTO-ENTMCNC: 33.9 G/DL (ref 31.5–35.7)
MCV RBC AUTO: 84 FL (ref 79–97)
MONOCYTES # BLD AUTO: 0.5 X10E3/UL (ref 0.1–0.9)
MONOCYTES NFR BLD AUTO: 7 %
NEUTROPHILS # BLD AUTO: 4 X10E3/UL (ref 1.4–7)
NEUTROPHILS NFR BLD AUTO: 61 %
PLATELET # BLD AUTO: 404 X10E3/UL (ref 150–450)
POTASSIUM SERPL-SCNC: 4 MMOL/L (ref 3.5–5.2)
PROT SERPL-MCNC: 6.3 G/DL (ref 6–8.5)
RBC # BLD AUTO: 4.36 X10E6/UL (ref 3.77–5.28)
RH BLD: POSITIVE
SODIUM SERPL-SCNC: 140 MMOL/L (ref 134–144)
T&S EXPIRATION DATE: NORMAL
TRIGL SERPL-MCNC: 249 MG/DL (ref 0–149)
TSH SERPL DL<=0.005 MIU/L-ACNC: 0.73 UIU/ML (ref 0.45–4.5)
VLDLC SERPL CALC-MCNC: 43 MG/DL (ref 5–40)
WBC # BLD AUTO: 6.5 X10E3/UL (ref 3.4–10.8)

## 2022-09-27 PROCEDURE — 36415 COLL VENOUS BLD VENIPUNCTURE: CPT

## 2022-09-27 PROCEDURE — 86901 BLOOD TYPING SEROLOGIC RH(D): CPT

## 2022-09-27 PROCEDURE — 86900 BLOOD TYPING SEROLOGIC ABO: CPT

## 2022-09-27 PROCEDURE — 86850 RBC ANTIBODY SCREEN: CPT

## 2022-09-27 RX ORDER — TIZANIDINE 4 MG/1
4 TABLET ORAL EVERY 8 HOURS PRN
Qty: 90 TABLET | Refills: 0 | Status: SHIPPED | OUTPATIENT
Start: 2022-09-27 | End: 2023-03-09 | Stop reason: SDUPTHER

## 2022-09-27 RX ORDER — CHLORHEXIDINE GLUCONATE 500 MG/1
CLOTH TOPICAL TAKE AS DIRECTED
Status: ON HOLD | COMMUNITY
End: 2022-10-04

## 2022-09-27 NOTE — DISCHARGE INSTRUCTIONS
Take the following medications the morning of surgery: SYNTHROID    ARRIVE  AM ON 10/4/22        If you are on prescription narcotic pain medication to control your pain you may also take that medication the morning of surgery.    General Instructions:    Follow your surgeons instructions regarding when to stop solid foods and when to stop liquids.   Verify with your surgeon if you are to complete a bowel prep and when to do so.  Patients who avoid smoking, chewing tobacco and alcohol for 4 weeks prior to surgery have a reduced risk of post-operative complications.  Quit smoking as many days before surgery as you can.  Do not smoke, use chewing tobacco or drink alcohol the day of surgery.   If applicable bring your C-PAP/ BI-PAP machine.  Bring any papers given to you in the doctor’s office.  Wear clean comfortable clothes.  Do not wear contact lenses, false eyelashes or make-up.  Bring a case for your glasses.   Bring crutches or walker if applicable.  Remove all piercings.  Leave jewelry and any other valuables at home.  Hair extensions with metal clips must be removed prior to surgery.  The Pre-Admission Testing nurse will instruct you to bring medications if unable to obtain an accurate list in Pre-Admission Testing.        If you were given a blood bank ID arm band remember to bring it with you the day of surgery.    Preventing a Surgical Site Infection:  For 2 to 3 days before surgery, avoid shaving with a razor because the razor can irritate skin and make it easier to develop an infection.    Any areas of open skin can increase the risk of a post-operative wound infection by allowing bacteria to enter and travel throughout the body.  Notify your surgeon if you have any skin wounds / rashes even if it is not near the expected surgical site.  The area will need assessed to determine if surgery should be delayed until it is healed.  The night prior to surgery shower using a fresh bar of anti-bacterial soap  (such as Dial) and clean washcloth.  Sleep in a clean bed with clean clothing.  Do not allow pets to sleep with you.  Shower on the morning of surgery using a fresh bar of anti-bacterial soap (such as Dial) and clean washcloth.  Dry with a clean towel and dress in clean clothing.  Ask your surgeon if you will be receiving antibiotics prior to surgery.  Make sure you, your family, and all healthcare providers clean their hands with soap and water or an alcohol based hand  before caring for you or your wound.    CHLORHEXIDINE CLOTH INSTRUCTIONS  The morning of surgery follow these instructions using the Chlorhexidine cloths you've been given.  These steps reduce bacteria on the body.  Do not use the cloths near your eyes, ears mouth, genitalia or on open wounds.  Throw the cloths away after use but do not try to flush them down a toilet.      Open and remove one cloth at a time from the package.    Leave the cloth unfolded and begin the bathing.  Massage the skin with the cloths using gentle pressure to remove bacteria.  Do not scrub harshly.   Follow the steps below with one 2% CHG cloth per area (6 total cloths).  One cloth for neck, shoulders and chest.  One cloth for both arms, hands, fingers and underarms (do underarms last).  One cloth for the abdomen followed by groin.  One cloth for right leg and foot including between the toes.  One cloth for left leg and foot including between the toes.  The last cloth is to be used for the back of the neck, back and buttocks.    Allow the CHG to air dry 3 minutes on the skin which will give it time to work and decrease the chance of irritation.  The skin may feel sticky until it is dry.  Do not rinse with water or any other liquid or you will lose the beneficial effects of the CHG.  If mild skin irritation occurs, do rinse the skin to remove the CHG.  Report this to the nurse at time of admission.  Do not apply lotions, creams, ointments, deodorants or perfumes  after using the clothes. Dress in clean clothes before coming to the hospital.   Day of surgery:  Your arrival time is approximately two hours before your scheduled surgery time.  Upon arrival, a Pre-op nurse and Anesthesiologist will review your health history, obtain vital signs, and answer questions you may have.  The only belongings needed at this time will be a list of your home medications and if applicable your C-PAP/BI-PAP machine.  A Pre-op nurse will start an IV and you may receive medication in preparation for surgery, including something to help you relax.     Please be aware that surgery does come with discomfort.  We want to make every effort to control your discomfort so please discuss any uncontrolled symptoms with your nurse.   Your doctor will most likely have prescribed pain medications.      If you are going home after surgery you will receive individualized written care instructions before being discharged.  A responsible adult must drive you to and from the hospital on the day of your surgery and stay with you for 24 hours.  Discharge prescriptions can be filled by the hospital pharmacy during regular pharmacy hours.  If you are having surgery late in the day/evening your prescription may be e-prescribed to your pharmacy.  Please verify your pharmacy hours or chose a 24 hour pharmacy to avoid not having access to your prescription because your pharmacy has closed for the day.    If you are staying overnight following surgery, you will be transported to your hospital room following the recovery period.  [facility] has all private rooms.    If you have any questions please call Pre-Admission Testing at [phone].  Deductibles and co-payments are collected on the day of service. Please be prepared to pay the required co-pay, deductible or deposit on the day of service as defined by your plan.    Call your surgeon immediately if you experience any of the following symptoms:  Sore  Throat  Shortness of Breath or difficulty breathing  Cough  Chills  Body soreness or muscle pain  Headache  Fever  New loss of taste or smell  Do not arrive for your surgery ill.  Your procedure will need to be rescheduled to another time.  You will need to call your physician before the day of surgery to avoid any unnecessary exposure to hospital staff as well as other patients.

## 2022-09-27 NOTE — NURSING NOTE
09/27/22 1529   Stoma Site Marking   Procedure Marking For ileostomy   Site Marked RUQ: right upper quadrant   Patient Assessment Screen rectus muscle identified;marked within patient's visual field;bony prominences avoided;waistband avoided;creases/scars avoided   How Was Patient Marked? surgical marker;transparent dressing   Patient Position During Marking sitting;standing   WOCN: Stoma marking possible ostomy. Patient has no questions.

## 2022-09-27 NOTE — TELEPHONE ENCOUNTER
Caller: Donnie Tatiana L    Relationship: Self    Best call back number: [phone]     Caller requesting test results: SELF    What test was performed: LABS    When was the test performed: 8-26-22    Where was the test performed: [facility]    Additional notes: PATIENT ASKED IF THERE WERE ANY TEST RESULTS BACK.  PATIENT WANTED TO GIVE THIS INFORMATION FOR HER CONSULT FOR SURGERY SO THAT THEY WILL NOT REPEAT LABS.

## 2022-10-04 ENCOUNTER — ANESTHESIA (OUTPATIENT)
Dept: PERIOP | Facility: HOSPITAL | Age: 57
End: 2022-10-04

## 2022-10-04 ENCOUNTER — HOSPITAL ENCOUNTER (INPATIENT)
Facility: HOSPITAL | Age: 57
LOS: 3 days | Discharge: HOME | End: 2022-10-07
Attending: COLON & RECTAL SURGERY | Admitting: COLON & RECTAL SURGERY

## 2022-10-04 ENCOUNTER — ANESTHESIA EVENT (OUTPATIENT)
Dept: PERIOP | Facility: HOSPITAL | Age: 57
End: 2022-10-04

## 2022-10-04 DIAGNOSIS — K59.9 COLONIC INERTIA: ICD-10-CM

## 2022-10-04 DIAGNOSIS — K59.9: Primary | ICD-10-CM

## 2022-10-04 LAB — GLUCOSE BLDC GLUCOMTR-MCNC: 107 MG/DL (ref 70–130)

## 2022-10-04 PROCEDURE — 82962 GLUCOSE BLOOD TEST: CPT

## 2022-10-04 PROCEDURE — 0DTE4ZZ RESECTION OF LARGE INTESTINE, PERCUTANEOUS ENDOSCOPIC APPROACH: ICD-10-PCS | Performed by: COLON & RECTAL SURGERY

## 2022-10-04 PROCEDURE — 25010000002 ONDANSETRON PER 1 MG: Performed by: NURSE ANESTHETIST, CERTIFIED REGISTERED

## 2022-10-04 PROCEDURE — 25010000002 MAGNESIUM SULFATE PER 500 MG OF MAGNESIUM: Performed by: NURSE ANESTHETIST, CERTIFIED REGISTERED

## 2022-10-04 PROCEDURE — 25010000002 MIDAZOLAM PER 1 MG: Performed by: ANESTHESIOLOGY

## 2022-10-04 PROCEDURE — 25010000002 NEOSTIGMINE 5 MG/10ML SOLUTION: Performed by: NURSE ANESTHETIST, CERTIFIED REGISTERED

## 2022-10-04 PROCEDURE — 25010000002 DEXAMETHASONE SODIUM PHOSPHATE 20 MG/5ML SOLUTION: Performed by: NURSE ANESTHETIST, CERTIFIED REGISTERED

## 2022-10-04 PROCEDURE — S2900 ROBOTIC SURGICAL SYSTEM: HCPCS | Performed by: PHYSICIAN ASSISTANT

## 2022-10-04 PROCEDURE — 25010000002 CEFAZOLIN IN DEXTROSE 2-4 GM/100ML-% SOLUTION: Performed by: COLON & RECTAL SURGERY

## 2022-10-04 PROCEDURE — 25010000002 PROPOFOL 10 MG/ML EMULSION: Performed by: NURSE ANESTHETIST, CERTIFIED REGISTERED

## 2022-10-04 PROCEDURE — 25010000002 METOCLOPRAMIDE PER 10 MG: Performed by: COLON & RECTAL SURGERY

## 2022-10-04 PROCEDURE — 44210 LAPARO TOTAL PROCTOCOLECTOMY: CPT | Performed by: COLON & RECTAL SURGERY

## 2022-10-04 PROCEDURE — 88307 TISSUE EXAM BY PATHOLOGIST: CPT | Performed by: COLON & RECTAL SURGERY

## 2022-10-04 PROCEDURE — 25010000002 CEFAZOLIN IN DEXTROSE 2-4 GM/100ML-% SOLUTION: Performed by: NURSE ANESTHETIST, CERTIFIED REGISTERED

## 2022-10-04 PROCEDURE — 44210 LAPARO TOTAL PROCTOCOLECTOMY: CPT | Performed by: PHYSICIAN ASSISTANT

## 2022-10-04 PROCEDURE — 0 BUPIVACAINE LIPOSOME 1.3 % SUSPENSION: Performed by: ANESTHESIOLOGY

## 2022-10-04 PROCEDURE — 25010000002 PHENYLEPHRINE 10 MG/ML SOLUTION 5 ML VIAL: Performed by: NURSE ANESTHETIST, CERTIFIED REGISTERED

## 2022-10-04 PROCEDURE — C9290 INJ, BUPIVACAINE LIPOSOME: HCPCS | Performed by: ANESTHESIOLOGY

## 2022-10-04 PROCEDURE — 25010000002 HYDROMORPHONE PER 4 MG: Performed by: NURSE ANESTHETIST, CERTIFIED REGISTERED

## 2022-10-04 PROCEDURE — 88305 TISSUE EXAM BY PATHOLOGIST: CPT | Performed by: COLON & RECTAL SURGERY

## 2022-10-04 PROCEDURE — 25010000002 LIDOCAINE PER 10 MG: Performed by: NURSE ANESTHETIST, CERTIFIED REGISTERED

## 2022-10-04 PROCEDURE — 8E0W4CZ ROBOTIC ASSISTED PROCEDURE OF TRUNK REGION, PERCUTANEOUS ENDOSCOPIC APPROACH: ICD-10-PCS | Performed by: COLON & RECTAL SURGERY

## 2022-10-04 DEVICE — STAPLER 60 RELOAD GREEN
Type: IMPLANTABLE DEVICE | Site: ABDOMEN | Status: FUNCTIONAL
Brand: SUREFORM

## 2022-10-04 DEVICE — SEALANT WND FIBRIN TISSEEL VAPOR/HEAT/PREFIL/SYR 10ML: Type: IMPLANTABLE DEVICE | Site: ABDOMEN | Status: FUNCTIONAL

## 2022-10-04 DEVICE — PROXIMATE RELOADABLE LINEAR CUTTER WITH SAFETY LOCK-OUT, 75MM
Type: IMPLANTABLE DEVICE | Site: ABDOMEN | Status: FUNCTIONAL
Brand: PROXIMATE

## 2022-10-04 DEVICE — CELLULAR STAPLER WITH TRI-STAPLE TECHNOLOGY
Type: IMPLANTABLE DEVICE | Site: ABDOMEN | Status: FUNCTIONAL
Brand: EEA

## 2022-10-04 RX ORDER — BUPIVACAINE HYDROCHLORIDE 2.5 MG/ML
INJECTION, SOLUTION EPIDURAL; INFILTRATION; INTRACAUDAL
Status: COMPLETED | OUTPATIENT
Start: 2022-10-04 | End: 2022-10-04

## 2022-10-04 RX ORDER — INDOCYANINE GREEN AND WATER 25 MG
KIT INJECTION AS NEEDED
Status: DISCONTINUED | OUTPATIENT
Start: 2022-10-04 | End: 2022-10-04 | Stop reason: SURG

## 2022-10-04 RX ORDER — ENOXAPARIN SODIUM 100 MG/ML
40 INJECTION SUBCUTANEOUS DAILY
Status: DISCONTINUED | OUTPATIENT
Start: 2022-10-05 | End: 2022-10-07 | Stop reason: HOSPADM

## 2022-10-04 RX ORDER — NEOSTIGMINE METHYLSULFATE 0.5 MG/ML
INJECTION, SOLUTION INTRAVENOUS AS NEEDED
Status: DISCONTINUED | OUTPATIENT
Start: 2022-10-04 | End: 2022-10-04 | Stop reason: SURG

## 2022-10-04 RX ORDER — BUPIVACAINE HYDROCHLORIDE AND EPINEPHRINE 2.5; 5 MG/ML; UG/ML
INJECTION, SOLUTION EPIDURAL; INFILTRATION; INTRACAUDAL; PERINEURAL AS NEEDED
Status: DISCONTINUED | OUTPATIENT
Start: 2022-10-04 | End: 2022-10-04 | Stop reason: HOSPADM

## 2022-10-04 RX ORDER — ONDANSETRON 2 MG/ML
INJECTION INTRAMUSCULAR; INTRAVENOUS AS NEEDED
Status: DISCONTINUED | OUTPATIENT
Start: 2022-10-04 | End: 2022-10-04 | Stop reason: SURG

## 2022-10-04 RX ORDER — GABAPENTIN 400 MG/1
400 CAPSULE ORAL EVERY 8 HOURS SCHEDULED
Status: DISCONTINUED | OUTPATIENT
Start: 2022-10-04 | End: 2022-10-05

## 2022-10-04 RX ORDER — CELECOXIB 200 MG/1
200 CAPSULE ORAL EVERY 12 HOURS SCHEDULED
Status: DISCONTINUED | OUTPATIENT
Start: 2022-10-04 | End: 2022-10-07 | Stop reason: HOSPADM

## 2022-10-04 RX ORDER — ONDANSETRON 2 MG/ML
4 INJECTION INTRAMUSCULAR; INTRAVENOUS EVERY 6 HOURS PRN
Status: DISCONTINUED | OUTPATIENT
Start: 2022-10-04 | End: 2022-10-07 | Stop reason: HOSPADM

## 2022-10-04 RX ORDER — PROPOFOL 10 MG/ML
VIAL (ML) INTRAVENOUS AS NEEDED
Status: DISCONTINUED | OUTPATIENT
Start: 2022-10-04 | End: 2022-10-04 | Stop reason: SURG

## 2022-10-04 RX ORDER — MAGNESIUM SULFATE HEPTAHYDRATE 500 MG/ML
INJECTION, SOLUTION INTRAMUSCULAR; INTRAVENOUS AS NEEDED
Status: DISCONTINUED | OUTPATIENT
Start: 2022-10-04 | End: 2022-10-04 | Stop reason: SURG

## 2022-10-04 RX ORDER — LORAZEPAM 0.5 MG/1
0.5 TABLET ORAL EVERY 8 HOURS PRN
Status: DISCONTINUED | OUTPATIENT
Start: 2022-10-04 | End: 2022-10-07 | Stop reason: HOSPADM

## 2022-10-04 RX ORDER — SODIUM CHLORIDE 0.9 % (FLUSH) 0.9 %
3-10 SYRINGE (ML) INJECTION AS NEEDED
Status: DISCONTINUED | OUTPATIENT
Start: 2022-10-04 | End: 2022-10-04 | Stop reason: HOSPADM

## 2022-10-04 RX ORDER — LIDOCAINE HYDROCHLORIDE ANHYDROUS AND DEXTROSE MONOHYDRATE 5; 400 G/100ML; MG/100ML
INJECTION, SOLUTION INTRAVENOUS CONTINUOUS PRN
Status: DISCONTINUED | OUTPATIENT
Start: 2022-10-04 | End: 2022-10-04 | Stop reason: SURG

## 2022-10-04 RX ORDER — CEFAZOLIN SODIUM 2 G/100ML
INJECTION, SOLUTION INTRAVENOUS AS NEEDED
Status: DISCONTINUED | OUTPATIENT
Start: 2022-10-04 | End: 2022-10-04 | Stop reason: SURG

## 2022-10-04 RX ORDER — SUCCINYLCHOLINE/SOD CL,ISO/PF 200MG/10ML
SYRINGE (ML) INTRAVENOUS AS NEEDED
Status: DISCONTINUED | OUTPATIENT
Start: 2022-10-04 | End: 2022-10-04 | Stop reason: SURG

## 2022-10-04 RX ORDER — METOCLOPRAMIDE HYDROCHLORIDE 5 MG/ML
10 INJECTION INTRAMUSCULAR; INTRAVENOUS
Status: DISCONTINUED | OUTPATIENT
Start: 2022-10-04 | End: 2022-10-06

## 2022-10-04 RX ORDER — CEFAZOLIN SODIUM 2 G/100ML
2 INJECTION, SOLUTION INTRAVENOUS ONCE
Status: COMPLETED | OUTPATIENT
Start: 2022-10-04 | End: 2022-10-04

## 2022-10-04 RX ORDER — DIPHENHYDRAMINE HYDROCHLORIDE 50 MG/ML
25 INJECTION INTRAMUSCULAR; INTRAVENOUS EVERY 4 HOURS PRN
Status: DISCONTINUED | OUTPATIENT
Start: 2022-10-04 | End: 2022-10-04 | Stop reason: HOSPADM

## 2022-10-04 RX ORDER — ACETAMINOPHEN 500 MG
1000 TABLET ORAL ONCE
Status: COMPLETED | OUTPATIENT
Start: 2022-10-04 | End: 2022-10-04

## 2022-10-04 RX ORDER — NITROGLYCERIN 0.4 MG/1
0.4 TABLET SUBLINGUAL
Status: DISCONTINUED | OUTPATIENT
Start: 2022-10-04 | End: 2022-10-07 | Stop reason: HOSPADM

## 2022-10-04 RX ORDER — LIDOCAINE HYDROCHLORIDE 20 MG/ML
INJECTION, SOLUTION EPIDURAL; INFILTRATION; INTRACAUDAL; PERINEURAL AS NEEDED
Status: DISCONTINUED | OUTPATIENT
Start: 2022-10-04 | End: 2022-10-04 | Stop reason: SURG

## 2022-10-04 RX ORDER — BUPIVACAINE HCL/0.9 % NACL/PF 0.125 %
PLASTIC BAG, INJECTION (ML) EPIDURAL AS NEEDED
Status: DISCONTINUED | OUTPATIENT
Start: 2022-10-04 | End: 2022-10-04 | Stop reason: SURG

## 2022-10-04 RX ORDER — KETAMINE HCL IN NACL, ISO-OSM 100MG/10ML
SYRINGE (ML) INJECTION AS NEEDED
Status: DISCONTINUED | OUTPATIENT
Start: 2022-10-04 | End: 2022-10-04 | Stop reason: SURG

## 2022-10-04 RX ORDER — HYDROMORPHONE HYDROCHLORIDE 1 MG/ML
0.25 INJECTION, SOLUTION INTRAMUSCULAR; INTRAVENOUS; SUBCUTANEOUS
Status: DISCONTINUED | OUTPATIENT
Start: 2022-10-04 | End: 2022-10-07 | Stop reason: HOSPADM

## 2022-10-04 RX ORDER — SODIUM CHLORIDE 9 MG/ML
INJECTION, SOLUTION INTRAVENOUS AS NEEDED
Status: DISCONTINUED | OUTPATIENT
Start: 2022-10-04 | End: 2022-10-04 | Stop reason: HOSPADM

## 2022-10-04 RX ORDER — OXYCODONE HYDROCHLORIDE 5 MG/1
5 TABLET ORAL ONCE AS NEEDED
Status: DISCONTINUED | OUTPATIENT
Start: 2022-10-04 | End: 2022-10-04 | Stop reason: HOSPADM

## 2022-10-04 RX ORDER — HYDROMORPHONE HYDROCHLORIDE 1 MG/ML
0.25 INJECTION, SOLUTION INTRAMUSCULAR; INTRAVENOUS; SUBCUTANEOUS
Status: DISCONTINUED | OUTPATIENT
Start: 2022-10-04 | End: 2022-10-04 | Stop reason: HOSPADM

## 2022-10-04 RX ORDER — ALVIMOPAN 12 MG/1
12 CAPSULE ORAL 2 TIMES DAILY
Status: DISCONTINUED | OUTPATIENT
Start: 2022-10-05 | End: 2022-10-05

## 2022-10-04 RX ORDER — FAMOTIDINE 10 MG/ML
20 INJECTION, SOLUTION INTRAVENOUS ONCE
Status: COMPLETED | OUTPATIENT
Start: 2022-10-04 | End: 2022-10-04

## 2022-10-04 RX ORDER — ACETAMINOPHEN 10 MG/ML
1000 INJECTION, SOLUTION INTRAVENOUS ONCE
Status: COMPLETED | OUTPATIENT
Start: 2022-10-04 | End: 2022-10-04

## 2022-10-04 RX ORDER — SODIUM CHLORIDE, SODIUM LACTATE, POTASSIUM CHLORIDE, CALCIUM CHLORIDE 600; 310; 30; 20 MG/100ML; MG/100ML; MG/100ML; MG/100ML
9 INJECTION, SOLUTION INTRAVENOUS CONTINUOUS
Status: DISCONTINUED | OUTPATIENT
Start: 2022-10-04 | End: 2022-10-04

## 2022-10-04 RX ORDER — PANTOPRAZOLE SODIUM 40 MG/1
40 TABLET, DELAYED RELEASE ORAL
Status: DISCONTINUED | OUTPATIENT
Start: 2022-10-05 | End: 2022-10-07 | Stop reason: HOSPADM

## 2022-10-04 RX ORDER — METRONIDAZOLE 500 MG/100ML
INJECTION, SOLUTION INTRAVENOUS AS NEEDED
Status: DISCONTINUED | OUTPATIENT
Start: 2022-10-04 | End: 2022-10-04 | Stop reason: SURG

## 2022-10-04 RX ORDER — EPHEDRINE SULFATE 50 MG/ML
INJECTION INTRAVENOUS AS NEEDED
Status: DISCONTINUED | OUTPATIENT
Start: 2022-10-04 | End: 2022-10-04 | Stop reason: SURG

## 2022-10-04 RX ORDER — MIDAZOLAM HYDROCHLORIDE 1 MG/ML
1 INJECTION INTRAMUSCULAR; INTRAVENOUS
Status: DISCONTINUED | OUTPATIENT
Start: 2022-10-04 | End: 2022-10-04 | Stop reason: HOSPADM

## 2022-10-04 RX ORDER — SODIUM CHLORIDE 0.9 % (FLUSH) 0.9 %
3 SYRINGE (ML) INJECTION EVERY 12 HOURS SCHEDULED
Status: DISCONTINUED | OUTPATIENT
Start: 2022-10-04 | End: 2022-10-04 | Stop reason: HOSPADM

## 2022-10-04 RX ORDER — NALOXONE HCL 0.4 MG/ML
0.4 VIAL (ML) INJECTION
Status: DISCONTINUED | OUTPATIENT
Start: 2022-10-04 | End: 2022-10-07 | Stop reason: HOSPADM

## 2022-10-04 RX ORDER — ACETAMINOPHEN 500 MG
1000 TABLET ORAL EVERY 6 HOURS
Status: DISCONTINUED | OUTPATIENT
Start: 2022-10-04 | End: 2022-10-07 | Stop reason: HOSPADM

## 2022-10-04 RX ORDER — DIPHENHYDRAMINE HCL 25 MG
25 CAPSULE ORAL EVERY 4 HOURS PRN
Status: DISCONTINUED | OUTPATIENT
Start: 2022-10-04 | End: 2022-10-04 | Stop reason: HOSPADM

## 2022-10-04 RX ORDER — GLYCOPYRROLATE 0.2 MG/ML
INJECTION INTRAMUSCULAR; INTRAVENOUS AS NEEDED
Status: DISCONTINUED | OUTPATIENT
Start: 2022-10-04 | End: 2022-10-04 | Stop reason: SURG

## 2022-10-04 RX ORDER — SODIUM CHLORIDE, SODIUM LACTATE, POTASSIUM CHLORIDE, CALCIUM CHLORIDE 600; 310; 30; 20 MG/100ML; MG/100ML; MG/100ML; MG/100ML
50 INJECTION, SOLUTION INTRAVENOUS CONTINUOUS
Status: DISCONTINUED | OUTPATIENT
Start: 2022-10-04 | End: 2022-10-05

## 2022-10-04 RX ORDER — SODIUM CHLORIDE, SODIUM LACTATE, POTASSIUM CHLORIDE, CALCIUM CHLORIDE 600; 310; 30; 20 MG/100ML; MG/100ML; MG/100ML; MG/100ML
INJECTION, SOLUTION INTRAVENOUS CONTINUOUS PRN
Status: DISCONTINUED | OUTPATIENT
Start: 2022-10-04 | End: 2022-10-04 | Stop reason: SURG

## 2022-10-04 RX ORDER — ONDANSETRON 2 MG/ML
4 INJECTION INTRAMUSCULAR; INTRAVENOUS ONCE AS NEEDED
Status: DISCONTINUED | OUTPATIENT
Start: 2022-10-04 | End: 2022-10-04 | Stop reason: HOSPADM

## 2022-10-04 RX ORDER — DEXAMETHASONE SODIUM PHOSPHATE 4 MG/ML
INJECTION, SOLUTION INTRA-ARTICULAR; INTRALESIONAL; INTRAMUSCULAR; INTRAVENOUS; SOFT TISSUE AS NEEDED
Status: DISCONTINUED | OUTPATIENT
Start: 2022-10-04 | End: 2022-10-04 | Stop reason: SURG

## 2022-10-04 RX ORDER — FENTANYL CITRATE 50 UG/ML
50 INJECTION, SOLUTION INTRAMUSCULAR; INTRAVENOUS
Status: DISCONTINUED | OUTPATIENT
Start: 2022-10-04 | End: 2022-10-04 | Stop reason: HOSPADM

## 2022-10-04 RX ORDER — METOCLOPRAMIDE HYDROCHLORIDE 5 MG/ML
10 INJECTION INTRAMUSCULAR; INTRAVENOUS ONCE AS NEEDED
Status: DISCONTINUED | OUTPATIENT
Start: 2022-10-04 | End: 2022-10-04 | Stop reason: HOSPADM

## 2022-10-04 RX ORDER — MAGNESIUM HYDROXIDE 1200 MG/15ML
LIQUID ORAL AS NEEDED
Status: DISCONTINUED | OUTPATIENT
Start: 2022-10-04 | End: 2022-10-04 | Stop reason: HOSPADM

## 2022-10-04 RX ORDER — SCOLOPAMINE TRANSDERMAL SYSTEM 1 MG/1
1 PATCH, EXTENDED RELEASE TRANSDERMAL CONTINUOUS
Status: DISPENSED | OUTPATIENT
Start: 2022-10-04 | End: 2022-10-07

## 2022-10-04 RX ORDER — GABAPENTIN 100 MG/1
600 CAPSULE ORAL ONCE
Status: COMPLETED | OUTPATIENT
Start: 2022-10-04 | End: 2022-10-04

## 2022-10-04 RX ORDER — LIDOCAINE HYDROCHLORIDE 10 MG/ML
0.5 INJECTION, SOLUTION EPIDURAL; INFILTRATION; INTRACAUDAL; PERINEURAL ONCE AS NEEDED
Status: DISCONTINUED | OUTPATIENT
Start: 2022-10-04 | End: 2022-10-04 | Stop reason: HOSPADM

## 2022-10-04 RX ORDER — METRONIDAZOLE 500 MG/100ML
500 INJECTION, SOLUTION INTRAVENOUS ONCE
Status: COMPLETED | OUTPATIENT
Start: 2022-10-04 | End: 2022-10-04

## 2022-10-04 RX ORDER — ROCURONIUM BROMIDE 10 MG/ML
INJECTION, SOLUTION INTRAVENOUS AS NEEDED
Status: DISCONTINUED | OUTPATIENT
Start: 2022-10-04 | End: 2022-10-04 | Stop reason: SURG

## 2022-10-04 RX ORDER — NALOXONE HCL 0.4 MG/ML
0.4 VIAL (ML) INJECTION
Status: DISCONTINUED | OUTPATIENT
Start: 2022-10-04 | End: 2022-10-04 | Stop reason: HOSPADM

## 2022-10-04 RX ORDER — LEVOTHYROXINE SODIUM 0.05 MG/1
50 TABLET ORAL
Status: DISCONTINUED | OUTPATIENT
Start: 2022-10-05 | End: 2022-10-07 | Stop reason: HOSPADM

## 2022-10-04 RX ORDER — CELECOXIB 200 MG/1
200 CAPSULE ORAL ONCE
Status: COMPLETED | OUTPATIENT
Start: 2022-10-04 | End: 2022-10-04

## 2022-10-04 RX ADMIN — BUPIVACAINE 20 ML: 13.3 INJECTION, SUSPENSION, LIPOSOMAL INFILTRATION at 08:05

## 2022-10-04 RX ADMIN — Medication 200 MCG: at 08:05

## 2022-10-04 RX ADMIN — ONDANSETRON 4 MG: 2 INJECTION INTRAMUSCULAR; INTRAVENOUS at 07:44

## 2022-10-04 RX ADMIN — METRONIDAZOLE 500 MG: 500 INJECTION, SOLUTION INTRAVENOUS at 08:04

## 2022-10-04 RX ADMIN — EPHEDRINE SULFATE 10 MG: 50 INJECTION INTRAVENOUS at 08:25

## 2022-10-04 RX ADMIN — Medication 200 MCG: at 12:09

## 2022-10-04 RX ADMIN — CEFAZOLIN SODIUM 2 G: 2 INJECTION, SOLUTION INTRAVENOUS at 08:02

## 2022-10-04 RX ADMIN — LIDOCAINE HYDROCHLORIDE 2 MG/MIN: 4 INJECTION, SOLUTION INTRAVENOUS at 07:54

## 2022-10-04 RX ADMIN — Medication 10 MG: at 08:51

## 2022-10-04 RX ADMIN — SODIUM CHLORIDE, POTASSIUM CHLORIDE, SODIUM LACTATE AND CALCIUM CHLORIDE: 600; 310; 30; 20 INJECTION, SOLUTION INTRAVENOUS at 10:55

## 2022-10-04 RX ADMIN — LIDOCAINE HYDROCHLORIDE 100 MG: 20 INJECTION, SOLUTION EPIDURAL; INFILTRATION; INTRACAUDAL; PERINEURAL at 07:42

## 2022-10-04 RX ADMIN — CELECOXIB 200 MG: 200 CAPSULE ORAL at 20:35

## 2022-10-04 RX ADMIN — DEXAMETHASONE SODIUM PHOSPHATE 10 MG: 4 INJECTION, SOLUTION INTRAMUSCULAR; INTRAVENOUS at 07:46

## 2022-10-04 RX ADMIN — GABAPENTIN 400 MG: 400 CAPSULE ORAL at 23:20

## 2022-10-04 RX ADMIN — ACETAMINOPHEN 1000 MG: 500 TABLET, FILM COATED ORAL at 20:34

## 2022-10-04 RX ADMIN — SODIUM CHLORIDE, POTASSIUM CHLORIDE, SODIUM LACTATE AND CALCIUM CHLORIDE 50 ML/HR: 600; 310; 30; 20 INJECTION, SOLUTION INTRAVENOUS at 21:37

## 2022-10-04 RX ADMIN — BUPIVACAINE HYDROCHLORIDE 20 ML: 2.5 INJECTION, SOLUTION EPIDURAL; INFILTRATION; INTRACAUDAL; PERINEURAL at 08:05

## 2022-10-04 RX ADMIN — SODIUM CHLORIDE, POTASSIUM CHLORIDE, SODIUM LACTATE AND CALCIUM CHLORIDE: 600; 310; 30; 20 INJECTION, SOLUTION INTRAVENOUS at 07:34

## 2022-10-04 RX ADMIN — ROCURONIUM BROMIDE 50 MG: 10 INJECTION INTRAVENOUS at 07:45

## 2022-10-04 RX ADMIN — PROPOFOL 200 MG: 10 INJECTION, EMULSION INTRAVENOUS at 07:42

## 2022-10-04 RX ADMIN — Medication 10 MG: at 10:50

## 2022-10-04 RX ADMIN — Medication 10 MG: at 09:50

## 2022-10-04 RX ADMIN — Medication 140 MG: at 07:42

## 2022-10-04 RX ADMIN — Medication 200 MCG: at 08:25

## 2022-10-04 RX ADMIN — ROCURONIUM BROMIDE 10 MG: 10 INJECTION INTRAVENOUS at 11:33

## 2022-10-04 RX ADMIN — GLYCOPYRROLATE 0.6 MCG: 1 INJECTION INTRAMUSCULAR; INTRAVENOUS at 12:24

## 2022-10-04 RX ADMIN — Medication 200 MCG: at 08:02

## 2022-10-04 RX ADMIN — Medication 200 MCG: at 08:24

## 2022-10-04 RX ADMIN — CEFAZOLIN SODIUM 2 G: 2 INJECTION, SOLUTION INTRAVENOUS at 07:24

## 2022-10-04 RX ADMIN — METOCLOPRAMIDE 10 MG: 5 INJECTION, SOLUTION INTRAMUSCULAR; INTRAVENOUS at 20:34

## 2022-10-04 RX ADMIN — SUGAMMADEX 200 MG: 100 INJECTION, SOLUTION INTRAVENOUS at 12:57

## 2022-10-04 RX ADMIN — Medication 200 MCG: at 08:20

## 2022-10-04 RX ADMIN — MIDAZOLAM 1 MG: 1 INJECTION INTRAMUSCULAR; INTRAVENOUS at 07:07

## 2022-10-04 RX ADMIN — MIDAZOLAM 1 MG: 1 INJECTION INTRAMUSCULAR; INTRAVENOUS at 07:18

## 2022-10-04 RX ADMIN — CEFAZOLIN SODIUM 2 G: 2 INJECTION, SOLUTION INTRAVENOUS at 12:02

## 2022-10-04 RX ADMIN — SODIUM CHLORIDE, POTASSIUM CHLORIDE, SODIUM LACTATE AND CALCIUM CHLORIDE: 600; 310; 30; 20 INJECTION, SOLUTION INTRAVENOUS at 11:49

## 2022-10-04 RX ADMIN — EPHEDRINE SULFATE 10 MG: 50 INJECTION INTRAVENOUS at 08:22

## 2022-10-04 RX ADMIN — ONDANSETRON 4 MG: 2 INJECTION INTRAMUSCULAR; INTRAVENOUS at 12:24

## 2022-10-04 RX ADMIN — Medication 200 MCG: at 08:22

## 2022-10-04 RX ADMIN — SCOPALAMINE 1 PATCH: 1 PATCH, EXTENDED RELEASE TRANSDERMAL at 06:31

## 2022-10-04 RX ADMIN — PHENYLEPHRINE HYDROCHLORIDE 1 MCG/KG/MIN: 10 INJECTION INTRAVENOUS at 08:24

## 2022-10-04 RX ADMIN — EPHEDRINE SULFATE 10 MG: 50 INJECTION INTRAVENOUS at 08:28

## 2022-10-04 RX ADMIN — Medication 10 MG: at 11:48

## 2022-10-04 RX ADMIN — HYDROMORPHONE HYDROCHLORIDE 0.25 MG: 1 INJECTION, SOLUTION INTRAMUSCULAR; INTRAVENOUS; SUBCUTANEOUS at 15:17

## 2022-10-04 RX ADMIN — SODIUM CHLORIDE, POTASSIUM CHLORIDE, SODIUM LACTATE AND CALCIUM CHLORIDE 9 ML/HR: 600; 310; 30; 20 INJECTION, SOLUTION INTRAVENOUS at 07:24

## 2022-10-04 RX ADMIN — ACETAMINOPHEN 1000 MG: 10 INJECTION, SOLUTION INTRAVENOUS at 13:54

## 2022-10-04 RX ADMIN — FAMOTIDINE 20 MG: 10 INJECTION INTRAVENOUS at 06:49

## 2022-10-04 RX ADMIN — NEOSTIGMINE METHYLSULFATE 3 MG: 0.5 INJECTION INTRAVENOUS at 12:24

## 2022-10-04 RX ADMIN — CELECOXIB 200 MG: 200 CAPSULE ORAL at 06:32

## 2022-10-04 RX ADMIN — Medication 10 MG: at 12:27

## 2022-10-04 RX ADMIN — INDOCYANINE GREEN AND WATER 7.5 MG: KIT at 10:48

## 2022-10-04 RX ADMIN — ACETAMINOPHEN 1000 MG: 500 TABLET, FILM COATED ORAL at 06:32

## 2022-10-04 RX ADMIN — GABAPENTIN 600 MG: 100 CAPSULE ORAL at 06:46

## 2022-10-04 RX ADMIN — Medication 20 MG: at 07:50

## 2022-10-04 RX ADMIN — HYDROMORPHONE HYDROCHLORIDE 0.25 MG: 1 INJECTION, SOLUTION INTRAMUSCULAR; INTRAVENOUS; SUBCUTANEOUS at 15:51

## 2022-10-04 RX ADMIN — MAGNESIUM SULFATE HEPTAHYDRATE 2 G: 500 INJECTION, SOLUTION INTRAMUSCULAR; INTRAVENOUS at 07:46

## 2022-10-04 RX ADMIN — METRONIDAZOLE 500 MG: 500 INJECTION, SOLUTION INTRAVENOUS at 07:24

## 2022-10-04 NOTE — ANESTHESIA PROCEDURE NOTES
Peripheral Block      Patient reassessed immediately prior to procedure    Patient location during procedure: OR  Reason for block: at surgeon's request and post-op pain management  Performed by  Anesthesiologist: Mike Moreno MD  Preanesthetic Checklist  Completed: patient identified, IV checked, site marked, risks and benefits discussed, surgical consent, monitors and equipment checked, pre-op evaluation and timeout performed  Prep:  Pt Position: supine  Sterile barriers:cap, gloves, mask and sterile barriers  Prep: ChloraPrep  Patient monitoring: blood pressure monitoring, continuous pulse oximetry and EKG  Procedure    Sedation: yes  Performed under: local infiltration  Guidance:ultrasound guided    ULTRASOUND INTERPRETATION. Using ultrasound guidance a 21 G gauge needle was placed in close proximity to the nerve, at which point, under ultrasound guidance anesthetic was injected in the area of the nerve and spread of the anesthesia was seen on ultrasound in close proximity thereto.  There were no abnormalities seen on ultrasound; a digital image was taken; and the patient tolerated the procedure with no complications. Images:still images obtained    Laterality:Bilateral  Block Type:TAP  Injection Technique:single-shot  Needle Type:echogenic  Needle Gauge:21 G  Resistance on Injection: none    Medications Used: bupivacaine liposome (EXPAREL) 1.3 % injection, 20 mL  bupivacaine PF (MARCAINE) 0.25 % injection, 20 mL  Med administered at 10/4/2022 8:05 AM      Post Assessment  Injection Assessment: negative aspiration for heme, no paresthesia on injection and incremental injection  Patient Tolerance:comfortable throughout block  Complications:no  Additional Notes  Ultrasound interpretation note:  Under ultrasound guidance, needle seen near nerves, local seen spreading around.  No abnormalities noted.  Block for postop pain per surgeon request.

## 2022-10-04 NOTE — ANESTHESIA POSTPROCEDURE EVALUATION
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        Post Anesthesia Care and Evaluation    Level of consciousness: awake  Pain management: adequate    Airway patency: patent  Anesthetic complications: No anesthetic complications  PONV Status: controlled  Cardiovascular status: acceptable  Respiratory status: acceptable  Hydration status: acceptable    Comments: /88 (BP Location: Right arm, Patient Position: Lying)   Pulse 93   Temp 36.6 °C (97.8 °F) (Oral)   Resp 16   Ht 162.6 cm (64\")   Wt 78 kg (172 lb)   LMP  (LMP Unknown)   SpO2 93%   BMI 29.52 kg/m²         "

## 2022-10-04 NOTE — OP NOTE
Surgeon: Abigail Kang MD    Surgical  Assistant: Amber Gavin PA-C     Preoperative diagnosis: Colonic inertia [K59.9]    Post-Op Diagnosis Codes:     * Colonic inertia [K59.9]    Procedure: LAPAROSCOPIC TOTAL ABDOMINAL COLECTOMY WITH DAVINCI ROBOT, * Panel 2 does not exist *    Estimated Blood Loss: 200 mL    Specimens:   Specimens     ID Source Type Tests Collected By Collected At Frozen?    A Large Intestine, Right / Ascending Colon Tissue · TISSUE PATHOLOGY EXAM   Abigail Kang MD 10/4/22 1226 No    Description: TOTAL ABDOMINAL COLON    This specimen was not marked as sent.    B Large Intestine, Right / Ascending Colon Tissue · TISSUE PATHOLOGY EXAM   Abigail Kang MD 10/4/22 1232 No    Description: PROXIMAL DONUT    This specimen was not marked as sent.    C Large Intestine, Right / Ascending Colon Tissue · TISSUE PATHOLOGY EXAM   Abigail Kang MD 10/4/22 1232 No    Description: DISTAL DONUT    This specimen was not marked as sent.         Order Name Source Comment Collection Info Order Time   TISSUE PATHOLOGY EXAM Large Intestine, Right / Ascending Colon  Collected By: Abigail Kang MD 10/4/2022 12:26 PM     Release to patient   Routine Release            Indication:  Tatiana Fields is a 56 y.o. female who comes in with Colonic inertia  Patient understands risks, benefits,and alternatives wishes to proceed.      Procedure Details:  The patient was brought to the operating room, had SCDs in place, antibiotics infused. After general anesthesia was achieved, TAP block was done, and then she was prepped and draped in sterile fashion. 0.25% Marcaine with epinephrine was used for local infiltration at the trocar sites. First, I made a incision just right under the ribs then used a Veress needle to gain access into the abdomen. Insufflated up to 15 mmHg. I then placed an 8 mm trocar into the abdomen. I was able to place 3 other trocars all in a row in a linear fashion, all on the left side  approximately 9 cm apart. I was able to place an assistant port, 8 mm in the left upper quadrant. The patient was tilted slight at 6 degrees right side up.  I docked the robot and placed all the instruments in under direct visualization.   I was able to lift the transverse colon up, identified the ileocolic vessel, and dissected the space between the IC vessels and the duodenum.  I took the ileocolic with the vessel sealer, sweeping the duodenum out of the way. I was able to develop the plane to mobilize the colon medial to lateral. Once I got the colon completely mobilized along the white line of Toldt, I was able to take the omentum off the transverse colon all the way to the splenic flexure.  I took  the middle colic artery with the vessel sealer,   Once I had mobilized completely the hepatic and splenic flexures, we undocked and turned the robot 180 degrees and redocked.  I lifted up the sigmoid colon and found the BRE and started dissecting distal to this and found the left ureter and got into the retrorectal space.  I then incised the anterior peritoneum along with the lateral sides also, always confirming the position of the left ureter.  Once I got as much of the dissection done with the scissors, I then exchanged them out for the vessel sealer.  I took the BRE with multiple burns and then started taking the lateral stalk with the vessel sealer.  I then used the vessel sealer to take the mesentery off to the level of the upper rectum.  I then had anesthesia give 3 mL of ICG.  This delineated my well perfused rectum from colon.  I then used a green load of the 60 mm stapler to go across the upper rectum.  I connected the dissection from the splenic flexure down to the sigmoid using the vessel sealer.    I rescrubbed in and the umbilicus incision larger.  I placed a small wound retractor in and brought the specimen out.  I made an enterotomy right at the ileocecal valve and then brought the 28 EEA anvil in  through the enterotomy.   I brought the anvil out 3 cm from the ileocecal valve at the antimesenteric border of the ileum.   I used a blue load of the 75 ASAD stapler to staple just proximal to where I brought the the anvil out. I then placed the top of the GelPort on and reinflated.  We redocked the robot.  And I then went back to the console.  I brought the anvil  down to the rectum.  The assistant went below and brought up the EEA sizers and then the 28 stapler, brought the pin out just lateral to the staple line.  I brought the anvil down to the pin.  The assistant started to close the stapler and made sure the orientation of the conduit was correct and then once the stapler was in the green we held for several seconds and then fired the stapler.  We tested the anastomosis with a flexible sigmoidoscope insufflation and the anastomosis under water and there was no evidence of leak.  I then placed Tisseel around the anastomosis.  I then closed the larger incision fascia in 2 layers with #1 PDS.  The skin was closed with 4-0 Monocryl. All instrument, lap counts and needle counts were correct. The patient was stable throughout the entire case and was taken to recovery.    Assistant: Amber Gavin PA-C was responsible for performing the following activities: Retraction, Suction, Irrigation, Suturing, Closing and Placing Dressing and their skilled assistance was necessary for the success of this case

## 2022-10-04 NOTE — ANESTHESIA PREPROCEDURE EVALUATION
" Anesthesia Evaluation     NPO Solid Status: > 8 hours  NPO Liquid Status: > 2 hours           Airway   Mallampati: I  TM distance: >3 FB  Neck ROM: full  no difficulty expected  Dental - normal exam     Pulmonary - normal exam   (-) sleep apnea (was told that her DANE is very mild an doesnt require mask ), decreased breath sounds, wheezes  Cardiovascular - normal exam    (+) hypertension, hyperlipidemia,       Neuro/Psych  (+) TIA (not sure, was told she \"might have had one\", no residual), headaches, numbness (R arm from spine compression, not TIA),    GI/Hepatic/Renal/Endo    (+)  hiatal hernia, GERD,  thyroid problem hypothyroidism    Musculoskeletal     Abdominal  - normal exam   Substance History      OB/GYN          Other   arthritis,      ROS/Med Hx Other: gastroparesis   Ulcerative coliltis                Anesthesia Plan    ASA 3     general   Rapid sequence  (TAP)  intravenous induction     Anesthetic plan, risks, benefits, and alternatives have been provided, discussed and informed consent has been obtained with: patient.    Plan discussed with CRNA.        CODE STATUS:       "

## 2022-10-04 NOTE — ANESTHESIA PROCEDURE NOTES
Airway  Urgency: elective    Date/Time: 10/4/2022 7:48 AM  Difficult airway    General Information and Staff    Patient location during procedure: OR  Anesthesiologist: Mike Moreno MD  CRNA/CAA: Marty Keith CRNA    Indications and Patient Condition  Indications for airway management: airway protection    Preoxygenated: yes  MILS maintained throughout  Mask difficulty assessment: 1 - vent by mask    Final Airway Details  Final airway type: endotracheal airway      Successful airway: ETT  Cuffed: yes   Successful intubation technique: direct laryngoscopy and RSI  Facilitating devices/methods: intubating stylet, anterior pressure/BURP, cricoid pressure and Bougie  Endotracheal tube insertion site: oral  Blade: Alva  Blade size: 3  ETT size (mm): 7.0  Cormack-Lehane Classification: grade III - view of epiglottis only  Placement verified by: chest auscultation and capnometry   Measured from: gums  ETT/EBT to gums (cm): 21  Number of attempts at approach: 2  Assessment: lips, teeth, and gum same as pre-op and atraumatic intubation

## 2022-10-04 NOTE — PLAN OF CARE
Problem: Adult Inpatient Plan of Care  Goal: Plan of Care Review  Outcome: Ongoing, Progressing  Flowsheets (Taken 10/4/2022 7806)  Progress: improving  Plan of Care Reviewed With: patient  Outcome Evaluation: pt admitted to [facility]. Vitals stable. Pain controlled with ERAS. Holguin in place - will be removed tomorrow morning. Due to ambulate once less drowsy. Will continue to monitor.

## 2022-10-05 LAB
ANION GAP SERPL CALCULATED.3IONS-SCNC: 7 MMOL/L (ref 5–15)
APTT PPP: 28.7 SECONDS (ref 22.7–35.4)
BASOPHILS # BLD AUTO: 0.01 10*3/MM3 (ref 0–0.2)
BASOPHILS NFR BLD AUTO: 0.1 % (ref 0–1.5)
BUN SERPL-MCNC: 8 MG/DL (ref 6–20)
BUN/CREAT SERPL: 11.9 (ref 7–25)
CALCIUM SPEC-SCNC: 8.2 MG/DL (ref 8.6–10.5)
CHLORIDE SERPL-SCNC: 104 MMOL/L (ref 98–107)
CO2 SERPL-SCNC: 27 MMOL/L (ref 22–29)
CREAT SERPL-MCNC: 0.67 MG/DL (ref 0.57–1)
DEPRECATED RDW RBC AUTO: 36.7 FL (ref 37–54)
EGFRCR SERPLBLD CKD-EPI 2021: 102.7 ML/MIN/1.73
EOSINOPHIL # BLD AUTO: 0 10*3/MM3 (ref 0–0.4)
EOSINOPHIL NFR BLD AUTO: 0 % (ref 0.3–6.2)
ERYTHROCYTE [DISTWIDTH] IN BLOOD BY AUTOMATED COUNT: 11.8 % (ref 12.3–15.4)
GLUCOSE SERPL-MCNC: 107 MG/DL (ref 65–99)
HCT VFR BLD AUTO: 30.6 % (ref 34–46.6)
HGB BLD-MCNC: 9.9 G/DL (ref 12–15.9)
IMM GRANULOCYTES # BLD AUTO: 0.04 10*3/MM3 (ref 0–0.05)
IMM GRANULOCYTES NFR BLD AUTO: 0.4 % (ref 0–0.5)
INR PPP: 1.13 (ref 0.9–1.1)
LAB AP CASE REPORT: NORMAL
LAB AP DIAGNOSIS COMMENT: NORMAL
LYMPHOCYTES # BLD AUTO: 1.38 10*3/MM3 (ref 0.7–3.1)
LYMPHOCYTES NFR BLD AUTO: 14.6 % (ref 19.6–45.3)
MAGNESIUM SERPL-MCNC: 2.2 MG/DL (ref 1.6–2.6)
MCH RBC QN AUTO: 27.6 PG (ref 26.6–33)
MCHC RBC AUTO-ENTMCNC: 32.4 G/DL (ref 31.5–35.7)
MCV RBC AUTO: 85.2 FL (ref 79–97)
MONOCYTES # BLD AUTO: 0.8 10*3/MM3 (ref 0.1–0.9)
MONOCYTES NFR BLD AUTO: 8.5 % (ref 5–12)
NEUTROPHILS NFR BLD AUTO: 7.21 10*3/MM3 (ref 1.7–7)
NEUTROPHILS NFR BLD AUTO: 76.4 % (ref 42.7–76)
NRBC BLD AUTO-RTO: 0 /100 WBC (ref 0–0.2)
PATH REPORT.FINAL DX SPEC: NORMAL
PATH REPORT.GROSS SPEC: NORMAL
PLATELET # BLD AUTO: 309 10*3/MM3 (ref 140–450)
PMV BLD AUTO: 9.8 FL (ref 6–12)
POTASSIUM SERPL-SCNC: 3.5 MMOL/L (ref 3.5–5.2)
PROTHROMBIN TIME: 14.4 SECONDS (ref 11.7–14.2)
RBC # BLD AUTO: 3.59 10*6/MM3 (ref 3.77–5.28)
SODIUM SERPL-SCNC: 138 MMOL/L (ref 136–145)
WBC NRBC COR # BLD: 9.44 10*3/MM3 (ref 3.4–10.8)

## 2022-10-05 PROCEDURE — 83735 ASSAY OF MAGNESIUM: CPT | Performed by: COLON & RECTAL SURGERY

## 2022-10-05 PROCEDURE — 25010000002 METOCLOPRAMIDE PER 10 MG: Performed by: COLON & RECTAL SURGERY

## 2022-10-05 PROCEDURE — 85025 COMPLETE CBC W/AUTO DIFF WBC: CPT | Performed by: COLON & RECTAL SURGERY

## 2022-10-05 PROCEDURE — 85730 THROMBOPLASTIN TIME PARTIAL: CPT | Performed by: COLON & RECTAL SURGERY

## 2022-10-05 PROCEDURE — 99024 POSTOP FOLLOW-UP VISIT: CPT | Performed by: PHYSICIAN ASSISTANT

## 2022-10-05 PROCEDURE — 85610 PROTHROMBIN TIME: CPT | Performed by: COLON & RECTAL SURGERY

## 2022-10-05 PROCEDURE — 25010000002 ENOXAPARIN PER 10 MG: Performed by: COLON & RECTAL SURGERY

## 2022-10-05 PROCEDURE — 80048 BASIC METABOLIC PNL TOTAL CA: CPT | Performed by: COLON & RECTAL SURGERY

## 2022-10-05 RX ORDER — PSEUDOEPHEDRINE HCL 120 MG/1
120 TABLET, FILM COATED, EXTENDED RELEASE ORAL 2 TIMES DAILY PRN
Status: DISCONTINUED | OUTPATIENT
Start: 2022-10-05 | End: 2022-10-07 | Stop reason: HOSPADM

## 2022-10-05 RX ORDER — GABAPENTIN 100 MG/1
100 CAPSULE ORAL EVERY 8 HOURS SCHEDULED
Status: DISCONTINUED | OUTPATIENT
Start: 2022-10-05 | End: 2022-10-07 | Stop reason: HOSPADM

## 2022-10-05 RX ORDER — CETIRIZINE HYDROCHLORIDE, PSEUDOEPHEDRINE HYDROCHLORIDE 5; 120 MG/1; MG/1
1 TABLET, FILM COATED, EXTENDED RELEASE ORAL 2 TIMES DAILY PRN
Refills: 2 | Status: DISCONTINUED | OUTPATIENT
Start: 2022-10-05 | End: 2022-10-05

## 2022-10-05 RX ORDER — CLONIDINE HYDROCHLORIDE 0.1 MG/1
0.1 TABLET ORAL EVERY 12 HOURS PRN
Status: DISCONTINUED | OUTPATIENT
Start: 2022-10-05 | End: 2022-10-06

## 2022-10-05 RX ORDER — CETIRIZINE HYDROCHLORIDE 10 MG/1
5 TABLET ORAL 2 TIMES DAILY PRN
Status: DISCONTINUED | OUTPATIENT
Start: 2022-10-05 | End: 2022-10-07 | Stop reason: HOSPADM

## 2022-10-05 RX ORDER — GABAPENTIN 100 MG/1
200 CAPSULE ORAL EVERY 8 HOURS SCHEDULED
Status: DISCONTINUED | OUTPATIENT
Start: 2022-10-05 | End: 2022-10-05

## 2022-10-05 RX ADMIN — ACETAMINOPHEN 1000 MG: 500 TABLET, FILM COATED ORAL at 06:31

## 2022-10-05 RX ADMIN — LEVOTHYROXINE SODIUM 50 MCG: 0.05 TABLET ORAL at 06:26

## 2022-10-05 RX ADMIN — ACETAMINOPHEN 1000 MG: 500 TABLET, FILM COATED ORAL at 13:47

## 2022-10-05 RX ADMIN — GABAPENTIN 200 MG: 100 CAPSULE ORAL at 13:47

## 2022-10-05 RX ADMIN — METOCLOPRAMIDE 10 MG: 5 INJECTION, SOLUTION INTRAMUSCULAR; INTRAVENOUS at 11:35

## 2022-10-05 RX ADMIN — ACETAMINOPHEN 1000 MG: 500 TABLET, FILM COATED ORAL at 01:13

## 2022-10-05 RX ADMIN — PANTOPRAZOLE SODIUM 40 MG: 40 TABLET, DELAYED RELEASE ORAL at 06:29

## 2022-10-05 RX ADMIN — ACETAMINOPHEN 1000 MG: 500 TABLET, FILM COATED ORAL at 19:58

## 2022-10-05 RX ADMIN — CELECOXIB 200 MG: 200 CAPSULE ORAL at 08:36

## 2022-10-05 RX ADMIN — ENOXAPARIN SODIUM 40 MG: 100 INJECTION SUBCUTANEOUS at 08:36

## 2022-10-05 RX ADMIN — CELECOXIB 200 MG: 200 CAPSULE ORAL at 21:21

## 2022-10-05 RX ADMIN — METOCLOPRAMIDE 10 MG: 5 INJECTION, SOLUTION INTRAMUSCULAR; INTRAVENOUS at 21:39

## 2022-10-05 RX ADMIN — METOCLOPRAMIDE 10 MG: 5 INJECTION, SOLUTION INTRAMUSCULAR; INTRAVENOUS at 08:36

## 2022-10-05 RX ADMIN — ALVIMOPAN 12 MG: 12 CAPSULE ORAL at 08:36

## 2022-10-05 RX ADMIN — METOCLOPRAMIDE 10 MG: 5 INJECTION, SOLUTION INTRAMUSCULAR; INTRAVENOUS at 17:30

## 2022-10-05 RX ADMIN — GABAPENTIN 400 MG: 400 CAPSULE ORAL at 06:26

## 2022-10-05 RX ADMIN — GABAPENTIN 100 MG: 100 CAPSULE ORAL at 21:22

## 2022-10-05 NOTE — PROGRESS NOTES
CRS attending  Patient doing well  Multiple liquidy bowel movements  Abdomen soft    Status post TAC [place]  Advance diet to regular  Hep-Lock

## 2022-10-05 NOTE — CASE MANAGEMENT/SOCIAL WORK
Discharge Planning Assessment  [facility]     Patient Name: Tatiana Fields  MRN: [id number]  Today's Date: 10/5/2022    Admit Date: 10/4/2022    Plan: Home with family assist. Pt requesting HH at D/C. Referral for [facility]  to see at D/C. Family to transport at D/C.   Discharge Needs Assessment     Row Name 10/05/22 1556       Living Environment    People in Home alone    Current Living Arrangements apartment    Primary Care Provided by self    Provides Primary Care For no one, unable/limited ability to care for self    Family Caregiver if Needed child(rosalio), adult;friend(s);sibling(s)    Quality of Family Relationships unable to assess    Able to Return to Prior Arrangements yes       Resource/Environmental Concerns    Resource/Environmental Concerns none    Transportation Concerns none       Transition Planning    Patient/Family Anticipates Transition to home    Patient/Family Anticipated Services at Transition none    Transportation Anticipated family or friend will provide       Discharge Needs Assessment    Readmission Within the Last 30 Days no previous admission in last 30 days    Equipment Currently Used at Home bp cuff    Concerns to be Addressed discharge planning    Anticipated Changes Related to Illness none    Equipment Needed After Discharge none    Provided Post Acute Provider List? N/A    N/A Provider List Comment Denies any D/C needs at this time.    Provided Post Acute Provider Quality & Resource List? N/A    Current Discharge Risk chronically ill;lives alone;lack of support system/caregiver               Discharge Plan     Row Name 10/05/22 1555       Plan    Plan Home with family assist. Pt requesting HH at D/C. Referral for [facility]  to see at D/C. Family to transport at D/C.    Roadmap to Recovery Yes    Patient/Family in Agreement with Plan yes    Plan Comments Met with pt. at bedside. Explained roll of . Face sheet and pharmacy verified. Pt lives with alone. There are no  steps to enter home.  DME equipment includes a B/P cuff.  Pt is independent with ADLs. Pt has never been to Rehab or used HH. Pt’s PCP is Amber Mills MD. Pt enrolled with Meds to Bed. Pt normally drives. At discharge, family will transport. Pt states she may need HH at D/C. Will place referral for [facility] to see at D/C. Explained that CCP would follow to assess for discharge needs.  Blanco Marley RN-BC              Continued Care and Services - Admitted Since 10/4/2022     Home Medical Care     Service Provider Request Status Selected Services Address Phone Fax Patient Preferred    [facility]  Pending - Request Sent N/A [address] [address] [phone] --              Expected Discharge Date and Time     Expected Discharge Date Expected Discharge Time    Oct 7, 2022          Demographic Summary     Row Name 10/05/22 1554       General Information    Admission Type inpatient    Arrived From PACU/recovery room    Required Notices Provided Important Message from Medicare    Reason for Consult discharge planning;care coordination/care conference    Preferred Language English               Functional Status     Row Name 10/05/22 1555       Functional Status    Usual Activity Tolerance good    Current Activity Tolerance moderate       Functional Status, IADL    Medications independent    Meal Preparation independent    Housekeeping independent    Laundry independent    Shopping independent       Mental Status    General Appearance WDL WDL       Mental Status Summary    Recent Changes in Mental Status/Cognitive Functioning no changes       Employment/    Employment Status disabled                  Blanco Marley, RN

## 2022-10-05 NOTE — PROGRESS NOTES
Progress Note    Pod 1      S: negative flatus, negative BM. negative ambulating. Pain controlled with ERAS. Pt appears to be excessively drowsy per nursing. Tolerating clear liquids. Holguin D/C this morning.     O:  Temp:  [97.6 °F (36.4 °C)-98.4 °F (36.9 °C)] 98.1 °F (36.7 °C)  Heart Rate:  [] 110  Resp:  [8-20] 20  BP: ()/(47-88) 111/57      Intake/Output Summary (Last 24 hours) at 10/5/2022 0837  Last data filed at 10/5/2022 0630  Gross per 24 hour   Intake 1998.33 ml   Output 1405 ml   Net 593.33 ml     Lips with mild edema likely secondary to intubation.   Abd: Soft, non-distended  Incision: clean, dry    Results from last 7 days   Lab Units 10/05/22  0533   WBC 10*3/mm3 9.44   HEMOGLOBIN g/dL 9.9*   HEMATOCRIT % 30.6*   PLATELETS 10*3/mm3 309     Results from last 7 days   Lab Units 10/05/22  0533   SODIUM mmol/L 138   POTASSIUM mmol/L 3.5   CHLORIDE mmol/L 104   CO2 mmol/L 27.0   BUN mg/dL 8   CREATININE mg/dL 0.67   EGFR mL/min/1.73 102.7   GLUCOSE mg/dL 107*   CALCIUM mg/dL 8.2*       Results from last 7 days   Lab Units 10/05/22  0533   MAGNESIUM mg/dL 2.2         A/P: 56 y.o. female with s/p LAPAROSCOPIC TOTAL ABDOMINAL COLECTOMY WITH DAVINCI ROBOT    - Will decrease Gabapentin due to increased drowsiness  - Advance to full liquid diet. Can advance as tolerated.  - Encouraged ambulation and sitting in chair most of the day.   - Holguin D/C this morning. Awaiting bladder function.

## 2022-10-05 NOTE — PLAN OF CARE
Goal Outcome Evaluation:  Plan of Care Reviewed With: patient           Outcome Evaluation: Pt drowsy but opens eyes to voice. Follows simple commands. lap sites and midline incision are well approximated. C/o some soreness. On ERAS. Will continue to monitor.

## 2022-10-05 NOTE — CASE MANAGEMENT/SOCIAL WORK
Continued Stay Note  [facility]     Patient Name: Tatiana Fields  MRN: [id number]  Today's Date: 10/5/2022    Admit Date: 10/4/2022    Plan: Home with family assist. Pt requesting HH at D/C. Referral for [facility] to see at D/C. Family to transport at D/C.   Discharge Plan     Row Name 10/05/22 1558       Plan    Plan Home with family assist. Pt requesting HH at D/C. Referral for [facility] to see at D/C. Family to transport at D/C.    Roadmap to Recovery Yes    Patient/Family in Agreement with Plan yes    Plan Comments Met with pt. at bedside. Explained roll of . Face sheet and pharmacy verified. Pt lives with alone. There are no steps to enter home.  DME equipment includes a B/P cuff.  Pt is independent with ADLs. Pt has never been to Rehab or used HH. Pt’s PCP is Amber Mills MD. Pt enrolled with Meds to Bed. Pt normally drives. At discharge, family will transport. Pt states she may need HH at D/C. Will place referral for [facility] to see at D/C. Explained that CCP would follow to assess for discharge needs.  Blanco Marley RN-BC               Discharge Codes    No documentation.               Expected Discharge Date and Time     Expected Discharge Date Expected Discharge Time    Oct 7, 2022             Blanco Marley RN

## 2022-10-05 NOTE — PLAN OF CARE
Problem: Adult Inpatient Plan of Care  Goal: Plan of Care Review  Outcome: Ongoing, Progressing  Flowsheets (Taken 10/5/2022 1703)  Progress: improving  Plan of Care Reviewed With: patient  Outcome Evaluation: Vitals stable. +Ambulation in hallway and up to chair with staff. Pain controlled with ERAS. Tolerating diet - Starting GI Soft for dinner. Several liquid bowel movements. IVF discontinued. Will continue to monitor.

## 2022-10-06 LAB
BASOPHILS # BLD AUTO: 0.04 10*3/MM3 (ref 0–0.2)
BASOPHILS NFR BLD AUTO: 0.5 % (ref 0–1.5)
DEPRECATED RDW RBC AUTO: 38.2 FL (ref 37–54)
EOSINOPHIL # BLD AUTO: 0.08 10*3/MM3 (ref 0–0.4)
EOSINOPHIL NFR BLD AUTO: 0.9 % (ref 0.3–6.2)
ERYTHROCYTE [DISTWIDTH] IN BLOOD BY AUTOMATED COUNT: 11.8 % (ref 12.3–15.4)
HCT VFR BLD AUTO: 28.5 % (ref 34–46.6)
HGB BLD-MCNC: 9.2 G/DL (ref 12–15.9)
IMM GRANULOCYTES # BLD AUTO: 0.03 10*3/MM3 (ref 0–0.05)
IMM GRANULOCYTES NFR BLD AUTO: 0.3 % (ref 0–0.5)
LYMPHOCYTES # BLD AUTO: 1.69 10*3/MM3 (ref 0.7–3.1)
LYMPHOCYTES NFR BLD AUTO: 19.4 % (ref 19.6–45.3)
MCH RBC QN AUTO: 28.2 PG (ref 26.6–33)
MCHC RBC AUTO-ENTMCNC: 32.3 G/DL (ref 31.5–35.7)
MCV RBC AUTO: 87.4 FL (ref 79–97)
MONOCYTES # BLD AUTO: 0.75 10*3/MM3 (ref 0.1–0.9)
MONOCYTES NFR BLD AUTO: 8.6 % (ref 5–12)
NEUTROPHILS NFR BLD AUTO: 6.14 10*3/MM3 (ref 1.7–7)
NEUTROPHILS NFR BLD AUTO: 70.3 % (ref 42.7–76)
NRBC BLD AUTO-RTO: 0 /100 WBC (ref 0–0.2)
PLATELET # BLD AUTO: 266 10*3/MM3 (ref 140–450)
PMV BLD AUTO: 9.6 FL (ref 6–12)
RBC # BLD AUTO: 3.26 10*6/MM3 (ref 3.77–5.28)
WBC NRBC COR # BLD: 8.73 10*3/MM3 (ref 3.4–10.8)

## 2022-10-06 PROCEDURE — 25010000002 ENOXAPARIN PER 10 MG: Performed by: COLON & RECTAL SURGERY

## 2022-10-06 PROCEDURE — 85025 COMPLETE CBC W/AUTO DIFF WBC: CPT | Performed by: PHYSICIAN ASSISTANT

## 2022-10-06 PROCEDURE — P9041 ALBUMIN (HUMAN),5%, 50ML: HCPCS | Performed by: COLON & RECTAL SURGERY

## 2022-10-06 PROCEDURE — 99024 POSTOP FOLLOW-UP VISIT: CPT | Performed by: PHYSICIAN ASSISTANT

## 2022-10-06 PROCEDURE — 25010000002 ALBUMIN HUMAN 5% PER 50 ML: Performed by: COLON & RECTAL SURGERY

## 2022-10-06 PROCEDURE — 25010000002 ONDANSETRON PER 1 MG: Performed by: COLON & RECTAL SURGERY

## 2022-10-06 RX ORDER — ALBUMIN, HUMAN INJ 5% 5 %
250 SOLUTION INTRAVENOUS ONCE
Status: COMPLETED | OUTPATIENT
Start: 2022-10-06 | End: 2022-10-06

## 2022-10-06 RX ADMIN — ACETAMINOPHEN 1000 MG: 500 TABLET, FILM COATED ORAL at 12:11

## 2022-10-06 RX ADMIN — ACETAMINOPHEN 1000 MG: 500 TABLET, FILM COATED ORAL at 06:31

## 2022-10-06 RX ADMIN — GABAPENTIN 100 MG: 100 CAPSULE ORAL at 06:30

## 2022-10-06 RX ADMIN — CELECOXIB 200 MG: 200 CAPSULE ORAL at 22:13

## 2022-10-06 RX ADMIN — CELECOXIB 200 MG: 200 CAPSULE ORAL at 09:14

## 2022-10-06 RX ADMIN — ALBUMIN HUMAN 250 ML: 0.05 INJECTION, SOLUTION INTRAVENOUS at 10:23

## 2022-10-06 RX ADMIN — ACETAMINOPHEN 1000 MG: 500 TABLET, FILM COATED ORAL at 01:58

## 2022-10-06 RX ADMIN — GABAPENTIN 100 MG: 100 CAPSULE ORAL at 22:13

## 2022-10-06 RX ADMIN — CETIRIZINE HYDROCHLORIDE 5 MG: 10 TABLET ORAL at 18:36

## 2022-10-06 RX ADMIN — PSEUDOEPHEDRINE HCL 120 MG: 120 TABLET, FILM COATED, EXTENDED RELEASE ORAL at 17:40

## 2022-10-06 RX ADMIN — ONDANSETRON 4 MG: 2 INJECTION INTRAMUSCULAR; INTRAVENOUS at 21:30

## 2022-10-06 RX ADMIN — ENOXAPARIN SODIUM 40 MG: 100 INJECTION SUBCUTANEOUS at 09:14

## 2022-10-06 RX ADMIN — LEVOTHYROXINE SODIUM 50 MCG: 0.05 TABLET ORAL at 06:31

## 2022-10-06 RX ADMIN — PANTOPRAZOLE SODIUM 40 MG: 40 TABLET, DELAYED RELEASE ORAL at 06:31

## 2022-10-06 RX ADMIN — ACETAMINOPHEN 1000 MG: 500 TABLET, FILM COATED ORAL at 18:36

## 2022-10-06 NOTE — PROGRESS NOTES
Progress Note    Pod 2      S: positive flatus,  positive BM. positive ambulating. C/O abdominal tenderness and lightheadedness with ambulation. Pain controlled with ERAS. Tolerating regular diet. Denies any N/V. Spontaneously voiding.     O:  Temp:  [97.8 °F (36.6 °C)-98.6 °F (37 °C)] 97.8 °F (36.6 °C)  Heart Rate:  [] 91  Resp:  [16-18] 16  BP: ()/(49-70) 99/65      Intake/Output Summary (Last 24 hours) at 10/6/2022 0837  Last data filed at 10/6/2022 0200  Gross per 24 hour   Intake 910 ml   Output 1120 ml   Net -210 ml       Abd:   soft, non-distended  Incision: clean, dry      A/P: 56 y.o. female with s/p LAPAROSCOPIC TOTAL ABDOMINAL COLECTOMY WITH DAVINCI ROBOT    - Will transfuse 250 mL of albumin 5% due to hypotension and tachycardia.   - Will recheck CBC.   - Anticipate D/C home this afternoon or tomorrow

## 2022-10-06 NOTE — PLAN OF CARE
Goal Outcome Evaluation:  Plan of Care Reviewed With: patient        Progress: improving  Outcome Evaluation: No major issues over shift, VS stable, pain under control, no nausea, pt tolerating meals fine, urinating fine, lap sites dry and intact, BP borderline low this am, but improved with administration of albumin, pt likely home tomorrow

## 2022-10-06 NOTE — PLAN OF CARE
Goal Outcome Evaluation:  Plan of Care Reviewed With: patient           Outcome Evaluation: Lap / incision sites well approximated CDI. C/o soreness on abdominal area. On ERAS.

## 2022-10-07 ENCOUNTER — READMISSION MANAGEMENT (OUTPATIENT)
Dept: CALL CENTER | Facility: HOSPITAL | Age: 57
End: 2022-10-07

## 2022-10-07 ENCOUNTER — HOME HEALTH ADMISSION (OUTPATIENT)
Dept: HOME HEALTH SERVICES | Facility: HOME HEALTHCARE | Age: 57
End: 2022-10-07

## 2022-10-07 VITALS
HEIGHT: 64 IN | RESPIRATION RATE: 18 BRPM | DIASTOLIC BLOOD PRESSURE: 85 MMHG | HEART RATE: 92 BPM | OXYGEN SATURATION: 98 % | SYSTOLIC BLOOD PRESSURE: 132 MMHG | WEIGHT: 172 LBS | TEMPERATURE: 97.9 F | BODY MASS INDEX: 29.37 KG/M2

## 2022-10-07 PROCEDURE — 99024 POSTOP FOLLOW-UP VISIT: CPT | Performed by: PHYSICIAN ASSISTANT

## 2022-10-07 PROCEDURE — 25010000002 ENOXAPARIN PER 10 MG: Performed by: COLON & RECTAL SURGERY

## 2022-10-07 RX ORDER — GABAPENTIN 100 MG/1
100 CAPSULE ORAL EVERY 8 HOURS SCHEDULED
Qty: 42 CAPSULE | Refills: 0 | Status: SHIPPED | OUTPATIENT
Start: 2022-10-07 | End: 2022-10-24 | Stop reason: SDUPTHER

## 2022-10-07 RX ORDER — ACETAMINOPHEN 500 MG
1000 TABLET ORAL EVERY 6 HOURS
Start: 2022-10-07

## 2022-10-07 RX ADMIN — GABAPENTIN 100 MG: 100 CAPSULE ORAL at 06:49

## 2022-10-07 RX ADMIN — ACETAMINOPHEN 1000 MG: 500 TABLET, FILM COATED ORAL at 06:49

## 2022-10-07 RX ADMIN — ACETAMINOPHEN 1000 MG: 500 TABLET, FILM COATED ORAL at 01:37

## 2022-10-07 RX ADMIN — CELECOXIB 200 MG: 200 CAPSULE ORAL at 08:18

## 2022-10-07 RX ADMIN — PSEUDOEPHEDRINE HCL 120 MG: 120 TABLET, FILM COATED, EXTENDED RELEASE ORAL at 09:31

## 2022-10-07 RX ADMIN — PANTOPRAZOLE SODIUM 40 MG: 40 TABLET, DELAYED RELEASE ORAL at 06:49

## 2022-10-07 RX ADMIN — PSEUDOEPHEDRINE HCL 120 MG: 120 TABLET, FILM COATED, EXTENDED RELEASE ORAL at 01:40

## 2022-10-07 RX ADMIN — LEVOTHYROXINE SODIUM 50 MCG: 0.05 TABLET ORAL at 06:48

## 2022-10-07 RX ADMIN — ENOXAPARIN SODIUM 40 MG: 100 INJECTION SUBCUTANEOUS at 08:18

## 2022-10-07 NOTE — OUTREACH NOTE
Prep Survey    Flowsheet Row Responses   [facility] patient discharged from? [place]   Is LACE score < 7 ? Yes   Emergency Room discharge w/ pulse ox? No   Eligibility [facility]   Date of Admission 10/04/22   Date of Discharge 10/07/22   Discharge Disposition Home or Self Care   Discharge diagnosis Colonic inertia- LAPAROSCOPIC TOTAL ABDOMINAL COLECTOMY WITH DAVINCI ROBOT   Does the patient have one of the following disease processes/diagnoses(primary or secondary)? General Surgery   Does the patient have Home health ordered? No  [Declines home health]   Is there a DME ordered? No   Prep survey completed? Yes          MALORIE BRAGA - Registered Nurse

## 2022-10-07 NOTE — DISCHARGE SUMMARY
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 Of interest, the patient states she has had severe constipation since having bladder tack and endometriosis sugery in 2013 in which her colon was \"cut\". We do not have these records.;    • Chronic fatigue    • Chronic gastritis    • Chronic insomnia 06/28/2017   • Chronic pain syndrome    • Chronic sinusitis    • Clonic hemifacial spasm, right 10/2019   • Cluster headache 2012    DAILY   • Colonic inertia 05/06/2020   • Contracture of muscle of right hand 10/2019   • Corneal dystrophy    • DDD (degenerative disc disease), cervical 04/15/2019   • DDD (degenerative disc disease), lumbar    • DDD (degenerative disc disease), thoracic    • Depression with somatization 11/18/2016   • Dermatochalasis of both upper eyelids    • Diplopia    • Duodenal gastroesophageal reflux 05/23/2017   • Dyskinesia of esophagus 03/2022   • Dysphagia    • Dyssynergic defecation 01/2018    SEEN BY DR. CORIE RHODES AT [facility] [place]   • Early satiety    • Endometriosis     S/P HYSTERECTOMY   • Esophageal spasm 05/23/2022   • Essential tremor    • Excessive daytime sleepiness    • Fibromyalgia, primary 2013   • Full incontinence of feces 06/2022   • Functional dyspepsia    • MELVIN (generalized anxiety disorder)    • Gastritis    • Gastroparesis " 05/23/2022   • GERD (gastroesophageal reflux disease)    • Gout 09/2018   • Headache, tension-type 2013   • Hepatomegaly 11/2016   • Hiatal hernia    • History of gestational diabetes    • Hormone replacement therapy (HRT) 10/15/2018   • Hyperlipidemia    • Hypertension    • Hypertrophy, nasal, turbinate    • Interstitial cystitis 05/23/2017   • Iridocyclitis 06/2019   • Irritable bowel syndrome    • Keratoconjunctivitis sicca (HCC)     BILATERAL   • Labral tear of shoulder 09/2018    RIGHT   • Lumbar spondylosis    • Lyme disease 2012    tx'd with prolonged course abx   • Memory loss    • Migraine 2013   • Mixed conductive and sensorineural hearing loss of both ears 2013   • Myositis 06/2022   • Narcolepsy     listed in medical records, tx'd with provigil   • Obstructive sleep apnea    • Ovarian cyst 06/2016    CORPUS LUTEUM CYST   • Pelvic organ prolapse quantification stage 2 cystocele 02/10/2021   • Periapical abscess without sinus 06/08/2017   • Periodic limb movements of sleep 11/18/2016    probable   • PHN (postherpetic neuralgia) 01/09/2020   • Polypharmacy 10/15/2018   • Post laminectomy syndrome    • Prediabetes    • Ptosis, mechanical, bilateral 01/2020    AND MYOGENIC   • PTSD (post-traumatic stress disorder) 05/23/2017   • Punctate keratitis, left 03/2020   • Pylorospasm 03/2022   • Regular astigmatism of left eye 08/2021   • Rheumatoid arthritis ([facility])    • RLS (restless legs syndrome)    • Segmental and somatic dysfunction of cervical region    • Segmental and somatic dysfunction of cervical region    • Segmental and somatic dysfunction of lumbar region    • Segmental and somatic dysfunction of pelvic region    • Shingles 02/15/2013   • SLE (systemic lupus erythematosus) ([facility])    • Soft tissue injury of left chest wall 02/01/2021   • Tachycardia 05/2016   • Thyroiditis 01/2015   • TIA (transient ischemic attack) 03/2019    RIGHT SIDE AFFECTED   • TIA (transient ischemic attack) 05/04/2016   •  Trochanteric bursitis, left hip 06/2019   • Ulcerative colitis (HCC)    • Vitamin B12 deficiency 08/08/2017   • Vitamin D deficiency    • White matter abnormality on MRI of brain 12/2020       Procedures Performed  Procedure(s):  LAPAROSCOPIC TOTAL ABDOMINAL COLECTOMY WITH DAVINCI ROBOT      Hospital Course  Patient is a 57 y.o. female presented for a robotic assisted laparoscopic TAC with ileorectal anastomosis. Pt is s/p transfusion of albumin yesterday with improvement in hypotension. Pt was started on a clear liquid diet and advanced to a regular diet. Tolerating PO intake. No N/V. Having bowel function and voiding spontaneously. Positive ambulation. Pain controlled with ERAS. Pt comfortable being D/C home today.      Consults:   Consults     No orders found from 9/5/2022 to 10/5/2022.            Discharge Disposition  Home or Self Care    Discharge Medications     Discharge Medications      New Medications      Instructions Start Date   acetaminophen 500 MG tablet  Commonly known as: TYLENOL   1,000 mg, Oral, Every 6 Hours      gabapentin 100 MG capsule  Commonly known as: NEURONTIN   100 mg, Oral, Every 8 Hours Scheduled         Changes to Medications      Instructions Start Date   clonazePAM 0.5 MG tablet  Commonly known as: KlonoPIN  What changed: reasons to take this   TAKE 1 TABLET BY MOUTH TWICE DAILY AS NEEDED FOR ANXIETY OR INSOMNIA      Fetzima 20 MG capsule sustained-release 24 hr  Generic drug: Levomilnacipran HCl ER  What changed:   · how much to take  · when to take this   TAKE 1 CAPSULE BY MOUTH DAILY      fluticasone 50 MCG/ACT nasal spray  Commonly known as: FLONASE  What changed: See the new instructions.   USE 2 SPRAYS TO EACH NOSTRIL EVERY DAY      levothyroxine 50 MCG tablet  Commonly known as: SYNTHROID, LEVOTHROID  What changed:   · how much to take  · how to take this  · when to take this   TAKE 1 TABLET BY MOUTH DAILY      methylphenidate 27 MG CR tablet  Commonly known as:  Concerta  What changed: additional instructions   27 mg, Oral, Every Morning      omeprazole 40 MG capsule  Commonly known as: priLOSEC  What changed:   · when to take this  · reasons to take this   40 mg, Oral, Daily      progesterone 100 MG capsule  Commonly known as: PROMETRIUM  What changed:   · how much to take  · how to take this  · when to take this   Take one capsule at hs         Continue These Medications      Instructions Start Date   estradiol 0.05 MG/24HR patch  Commonly known as: MINIPATRICK CARTERELLE-DOT   1 patch, Transdermal, 2 Times Weekly      estradiol 0.1 MG/GM vaginal cream  Commonly known as: ESTRACE   Vaginal, Daily      promethazine 25 MG tablet  Commonly known as: PHENERGAN   25 mg, Oral, Every 6 Hours PRN      rizatriptan 10 MG tablet  Commonly known as: MAXALT   10 mg, Oral, Once As Needed      Testosterone powder   0.5 mL, Transdermal, Daily, DISPENSE 15 ML      tiZANidine 4 MG tablet  Commonly known as: ZANAFLEX   4 mg, Oral, Every 8 Hours PRN      Wal-itin D 24 Hour  MG per 24 hr tablet  Generic drug: loratadine-pseudoephedrine   1 tablet, Oral, Daily      zolpidem 10 MG tablet  Commonly known as: AMBIEN   10 mg, Oral, Nightly PRN         Stop These Medications    cloNIDine 0.1 MG tablet  Commonly known as: Catapres     famciclovir 500 MG tablet  Commonly known as: FAMVIR     hydroCHLOROthiazide 25 MG tablet  Commonly known as: HYDRODIURIL     lisinopril 30 MG tablet  Commonly known as: PRINIVIL,ZESTRIL     oxyCODONE 10 MG tablet  Commonly known as: ROXICODONE     sennosides-docusate 8.6-50 MG per tablet  Commonly known as: Senna-Plus            Discharge Diet:   Diet Instructions     Diet: Regular      Discharge Diet: Regular          Activity at Discharge:   Activity Instructions     Driving Restrictions      Type of Restriction: Driving    Driving Restrictions: No Driving Until Next Appointment    Gradually Increase Activity Until at Pre-Hospitalization Level      Lifting  Restrictions      Type of Restriction: Lifting    Lifting Restrictions: Lifting Restriction (Indicate Limit)    Weight Limit (Pounds): 15    Length of Lifting Restriction: until office appt          Follow-up Appointments  Future Appointments   Date Time Provider Department Center   10/24/2022  2:45 PM Abigail Kang MD [facility]   12/10/2022 11:00 AM [facility]     Additional Instructions for the Follow-ups that You Need to Schedule     Call MD With Problems / Concerns   As directed      Instructions: temp >101. Drainage from wound. vomitting.    Order Comments: Instructions: temp >101. Drainage from wound. vomitting.          Discharge Follow-up with Specified Provider: Jorge Luis; 2 Weeks   As directed      To: Jorge Luis    Follow Up: 2 Weeks               Discharge Diet:   As tolerated  Activity at Discharge:   As tolerated  Follow-up Appointments  2 wks

## 2022-10-07 NOTE — CASE MANAGEMENT/SOCIAL WORK
Case Management Discharge Note      Final Note: Home with family assist. Declined HH at D/C stating she did not need. Transport by family.    Provided Post Acute Provider List?: N/A  N/A Provider List Comment: Denies any D/C needs at this time.  Provided Post Acute Provider Quality & Resource List?: N/A    Selected Continued Care - Discharged on 10/7/2022 Admission date: 10/4/2022 - Discharge disposition: Home or Self Care    Destination    No services have been selected for the patient.              Durable Medical Equipment    No services have been selected for the patient.              Dialysis/Infusion    No services have been selected for the patient.              Home Medical Care    No services have been selected for the patient.              Therapy    No services have been selected for the patient.              Community Resources    No services have been selected for the patient.              Community & DME    No services have been selected for the patient.                  Transportation Services  Private: Car    Final Discharge Disposition Code: 01 - home or self-care

## 2022-10-10 ENCOUNTER — TRANSITIONAL CARE MANAGEMENT TELEPHONE ENCOUNTER (OUTPATIENT)
Dept: CALL CENTER | Facility: HOSPITAL | Age: 57
End: 2022-10-10

## 2022-10-10 NOTE — OUTREACH NOTE
Call Center TCM Note    Flowsheet Row Responses   [facility] patient discharged from? [facility]   Does the patient have one of the following disease processes/diagnoses(primary or secondary)? General Surgery   TCM attempt successful? Yes   Call start time 1553   Call end time 1600   Discharge diagnosis Colonic inertia- LAPAROSCOPIC TOTAL ABDOMINAL COLECTOMY WITH DAVINCI ROBOT   Person spoke with today (if not patient) and relationship patient   Meds reviewed with patient/caregiver? Yes   Does the patient have all medications related to this admission filled (includes all antibiotics, pain medications, etc.) Yes   Is the patient taking all medications as directed (includes completed medication regime)? Yes   Medication comments patient reports that her blood pressure today is 146/86 and she is going to take a low dose of lisinopril.    Comments PCP Dr Mills. Hospital follow up scheduled for 10/20   815am. Patient wanted to see Dr Mills and not a NP in office.   [Patient has post op with Dr Kang 10/24]   Has home health visited the patient within 72 hours of discharge? N/A   Psychosocial issues? No   Comments Patient is going to monitor home blood pressure twice a day and keep record.    Did the patient receive a copy of their discharge instructions? Yes   Nursing interventions Reviewed instructions with patient   What is the patient's perception of their health status since discharge? Improving   Nursing interventions Nurse provided patient education   Is the patient /caregiver able to teach back basic post-op care? Practice 'cough and deep breath', Drive as instructed by MD in discharge instructions, Take showers only when approved by MD-sponge bathe until then, Keep incision areas clean,dry and protected, Lifting as instructed by MD in discharge instructions   Is the patient/caregiver able to teach back signs and symptoms of incisional infection? Increased redness, swelling or pain at the incisonal site,  Increased drainage or bleeding, Pus or odor from incision, Fever   Is the patient/caregiver able to teach back steps to recovery at home? Set small, achievable goals for return to baseline health, Rest and rebuild strength, gradually increase activity   If the patient is a current smoker, are they able to teach back resources for cessation? Not a smoker   Is the patient/caregiver able to teach back the hierarchy of who to call/visit for symptoms/problems? PCP, Specialist, Home health nurse, Urgent Care, ED, 911 Yes   TCM call completed? Yes          Holly Clark RN    10/10/2022, 16:03 EDT

## 2022-10-11 DIAGNOSIS — F41.1 GAD (GENERALIZED ANXIETY DISORDER): ICD-10-CM

## 2022-10-11 NOTE — TELEPHONE ENCOUNTER
Caller: Tatiana Fields    Relationship: Self    Best call back number:[phone]     Requested Prescriptions:   Requested Prescriptions     Pending Prescriptions Disp Refills   • clonazePAM (KlonoPIN) 0.5 MG tablet [Pharmacy Med Name: CLONAZEPAM 0.5MG TABLETS] 60 tablet      Sig: TAKE 1 TABLET BY MOUTH TWICE DAILY AS NEEDED FOR ANXIETY OR INSOMNIA        Pharmacy where request should be sent: [id number] [place], [place] - [facility] [address] AT [facility] [address] - [phone]  - [phone] FX     Additional details provided by patient: PATIENT IS OUT OF MEDICATION    Does the patient have less than a 3 day supply:  [x] Yes  [] No    Christine Cummins Rep   10/11/22 16:25 EDT     PATIENT WAS ADVISE THAT IT COULD TAKE 24 TO 48 HRS TO RESPOND

## 2022-10-13 RX ORDER — CLONAZEPAM 0.5 MG/1
TABLET ORAL
Qty: 60 TABLET | Refills: 0 | Status: SHIPPED | OUTPATIENT
Start: 2022-10-13 | End: 2022-11-14

## 2022-10-17 DIAGNOSIS — F51.04 CHRONIC INSOMNIA: Primary | ICD-10-CM

## 2022-10-17 DIAGNOSIS — N95.1 POSTMENOPAUSAL DISORDER: ICD-10-CM

## 2022-10-17 DIAGNOSIS — Z79.890 HORMONE REPLACEMENT THERAPY (HRT): ICD-10-CM

## 2022-10-18 ENCOUNTER — TELEPHONE (OUTPATIENT)
Dept: FAMILY MEDICINE CLINIC | Facility: CLINIC | Age: 57
End: 2022-10-18

## 2022-10-18 RX ORDER — PROMETHAZINE HYDROCHLORIDE 25 MG/1
25 TABLET ORAL EVERY 8 HOURS PRN
Qty: 90 TABLET | Refills: 1 | Status: SHIPPED | OUTPATIENT
Start: 2022-10-18 | End: 2022-12-28

## 2022-10-18 RX ORDER — ZOLPIDEM TARTRATE 10 MG/1
10 TABLET ORAL NIGHTLY PRN
Qty: 30 TABLET | Refills: 2 | Status: SHIPPED | OUTPATIENT
Start: 2022-10-18 | End: 2023-01-17

## 2022-10-18 RX ORDER — TESTOSTERONE MICRONIZED 100 %
0.5 POWDER (GRAM) MISCELLANEOUS DAILY
Qty: 0.15 G | Refills: 2 | Status: SHIPPED | OUTPATIENT
Start: 2022-10-18 | End: 2023-03-17

## 2022-10-18 NOTE — TELEPHONE ENCOUNTER
Caller: [id number] - [place], [place] - [address] AT [facility] [address] - [phone]  - [phone] FX    Relationship: Pharmacy    Best call back number: [phone]    What was the call regarding:  [facility] STATED THAT THEY DO NOT CARRY PATIENT'S MEDICATION AND IS UNABLE TO FILL  Testosterone powder    Do you require a callback: YES

## 2022-10-19 NOTE — TELEPHONE ENCOUNTER
Yes, please confirm where she has received this previously and see if they will take a verbal refill approval, or have form to sign, etc

## 2022-10-20 ENCOUNTER — OFFICE VISIT (OUTPATIENT)
Dept: FAMILY MEDICINE CLINIC | Facility: CLINIC | Age: 57
End: 2022-10-20

## 2022-10-20 VITALS
HEART RATE: 110 BPM | SYSTOLIC BLOOD PRESSURE: 110 MMHG | WEIGHT: 166 LBS | TEMPERATURE: 98 F | HEIGHT: 64 IN | OXYGEN SATURATION: 99 % | DIASTOLIC BLOOD PRESSURE: 70 MMHG | BODY MASS INDEX: 28.34 KG/M2

## 2022-10-20 DIAGNOSIS — R16.0 HEPATOMEGALY: ICD-10-CM

## 2022-10-20 DIAGNOSIS — Z23 NEED FOR COVID-19 VACCINE: ICD-10-CM

## 2022-10-20 DIAGNOSIS — E03.9 ACQUIRED HYPOTHYROIDISM: ICD-10-CM

## 2022-10-20 DIAGNOSIS — Z23 NEED FOR INFLUENZA VACCINATION: ICD-10-CM

## 2022-10-20 DIAGNOSIS — D64.9 POSTOPERATIVE ANEMIA: ICD-10-CM

## 2022-10-20 DIAGNOSIS — E83.51 HYPOCALCEMIA: ICD-10-CM

## 2022-10-20 DIAGNOSIS — R10.2 SUPRAPUBIC PAIN: ICD-10-CM

## 2022-10-20 DIAGNOSIS — Z09 HOSPITAL DISCHARGE FOLLOW-UP: Primary | ICD-10-CM

## 2022-10-20 DIAGNOSIS — Z90.49 STATUS POST COLECTOMY: ICD-10-CM

## 2022-10-20 PROCEDURE — G0008 ADMIN INFLUENZA VIRUS VAC: HCPCS | Performed by: FAMILY MEDICINE

## 2022-10-20 PROCEDURE — 91312 COVID-19 (PFIZER) BIVALENT BOOSTER 12+YRS: CPT | Performed by: FAMILY MEDICINE

## 2022-10-20 PROCEDURE — 0124A PR ADM SARSCOV2 30MCG/0.3ML BST: CPT | Performed by: FAMILY MEDICINE

## 2022-10-20 PROCEDURE — 99495 TRANSJ CARE MGMT MOD F2F 14D: CPT | Performed by: FAMILY MEDICINE

## 2022-10-20 PROCEDURE — 1111F DSCHRG MED/CURRENT MED MERGE: CPT | Performed by: FAMILY MEDICINE

## 2022-10-20 PROCEDURE — 90686 IIV4 VACC NO PRSV 0.5 ML IM: CPT | Performed by: FAMILY MEDICINE

## 2022-10-20 RX ORDER — METHYLPHENIDATE HYDROCHLORIDE 27 MG/1
1 TABLET, EXTENDED RELEASE ORAL EVERY MORNING
COMMUNITY
Start: 2022-09-26 | End: 2022-10-24 | Stop reason: SDUPTHER

## 2022-10-20 RX ORDER — LINACLOTIDE 290 UG/1
CAPSULE, GELATIN COATED ORAL
COMMUNITY
Start: 2022-09-28 | End: 2022-10-24

## 2022-10-20 NOTE — PROGRESS NOTES
Transitional Care Follow Up Visit  Subjective     Tatiana Fields is a 57 y.o. female who presents for a transitional care management visit.    Within 48 business hours after discharge our office contacted her via telephone to coordinate her care and needs.      I reviewed and discussed the details of that call along with the discharge summary, hospital problems, inpatient lab results, inpatient diagnostic studies, and consultation reports with Tatiana.     Current outpatient and discharge medications have been reconciled for the patient.  Reviewed by: Amber Mills MD      Date of TCM Phone Call 10/7/2022   [facility]   Date of Admission 10/4/2022   Date of Discharge 10/7/2022   Discharge Disposition Home or Self Care     Risk for Readmission (LACE) No data recorded      History of Present Illness   Course During Hospital Stay:  Admitted 10/4/22 for elective total colectomy for chronic colonic inertia. dc'd 10/7/22. No apparent intra/post operative complications.    Since d/c home, having regular BMs.     Did have postop anemia, due for recheck H/H.    Having mild lower abd pain, wants to make sure she does not have UTI. Has h/o interstitial cystitis.    Calcium noted to be low perviously.    Hepatomegaly noted on recent imaging as well.    Has upcoming surgical f/u apt. Feels wounds are healing well.    The following portions of the patient's history were reviewed and updated as appropriate: allergies, current medications, past family history, past medical history, past social history, past surgical history and problem list.    Review of Systems   Constitutional: Positive for fatigue. Negative for fever and unexpected weight change.   HENT: Positive for congestion, postnasal drip, sinus pressure, sore throat (mild) and trouble swallowing (intermittent). Negative for ear pain, hearing loss, nosebleeds and rhinorrhea.    Eyes: Positive for visual disturbance (chronic). Negative for pain.         Dry eyes   Respiratory: Negative for cough, shortness of breath and wheezing.    Cardiovascular: Positive for palpitations (chronic, intermittent, stable). Negative for chest pain and leg swelling.   Gastrointestinal: Positive for abdominal pain (chronic), blood in stool (intermittent mild bright red blood on toilet tissue) and nausea. Negative for abdominal distention, constipation, diarrhea and vomiting.   Endocrine: Positive for heat intolerance. Negative for polydipsia and polyuria.   Genitourinary: Positive for dysuria (mild, intermittent) and pelvic pain (chronic). Negative for hematuria.   Musculoskeletal: Positive for arthralgias, myalgias, neck pain and neck stiffness. Negative for back pain and joint swelling.   Skin: Positive for wound (surgical). Negative for rash.   Neurological: Positive for weakness and headaches. Negative for dizziness, seizures, syncope and speech difficulty.   Hematological: Negative for adenopathy. Bruises/bleeds easily.   Psychiatric/Behavioral: Positive for dysphoric mood and sleep disturbance. Negative for confusion and suicidal ideas. The patient is nervous/anxious.    Pt's previous ROS reviewed and updated as indicated.       Objective    Vitals:    10/20/22 0834   BP: 110/70   Pulse: 110   Temp: 98 °F (36.7 °C)   SpO2: 99%     Body mass index is 28.49 kg/m².      10/20/22  0834   Weight: 75.3 kg (166 lb)         Physical Exam  Vitals and nursing note reviewed.   Constitutional:       General: She is not in acute distress.     Appearance: She is well-developed, well-groomed and overweight. She is not ill-appearing.   HENT:      Head: Atraumatic.      Mouth/Throat:      Mouth: Mucous membranes are moist.   Eyes:      General: No scleral icterus.     Conjunctiva/sclera: Conjunctivae normal.   Neck:      Thyroid: No thyroid mass.   Cardiovascular:      Rate and Rhythm: Normal rate and regular rhythm.      Pulses: Normal pulses.      Heart sounds: Normal heart sounds.    Pulmonary:      Effort: Pulmonary effort is normal.      Breath sounds: Normal breath sounds.   Abdominal:      General: Bowel sounds are normal. There is no distension.      Palpations: Abdomen is soft. There is no hepatomegaly, splenomegaly or mass.      Tenderness: There is generalized abdominal tenderness (mild). There is no right CVA tenderness, left CVA tenderness, guarding or rebound.   Musculoskeletal:      Right lower leg: No edema.      Left lower leg: No edema.   Lymphadenopathy:      Cervical: No cervical adenopathy.   Skin:     General: Skin is warm and dry.      Coloration: Skin is not jaundiced or pale.      Findings: No bruising or rash.      Comments: Laparoscopic trochar wounds clean, dry, intact   Neurological:      Mental Status: She is alert and oriented to person, place, and time.      Gait: Gait is intact.   Psychiatric:         Mood and Affect: Mood and affect normal.         Behavior: Behavior normal. Behavior is cooperative.         Cognition and Memory: Cognition normal.     Pt's previous physical exam reviewed and updated as indicated.    Assessment & Plan   Diagnoses and all orders for this visit:    1. Hospital discharge follow-up (Primary)    2. Status post colectomy    3. Postoperative anemia  -     CBC & Differential    4. Acquired hypothyroidism  -     TSH Rfx On Abnormal To Free T4    5. Suprapubic pain  -     Urinalysis With Culture If Indicated - Urine, Clean Catch    6. Hypocalcemia  -     Comprehensive Metabolic Panel    7. Hepatomegaly  -     Comprehensive Metabolic Panel    8. Need for COVID-19 vaccine  -     COVID-19 Bivalent Booster (Pfizer) 12+yrs    9. Need for influenza vaccination  -     FluLaval/Fluzone >6 mos ([id number])    Other orders  -     Microscopic Examination -      Doing quite well 2 weeks s/p colectomy.   F/u with Dr. KIRTI Kang as scheduled.  F/u in 2 months for AWV. F/u sooner as needed/instructed.  I will contact patient regarding test results and provide  instructions regarding any necessary changes in plan of care.  Patient was encouraged to keep me informed of any acute changes, lack of improvement, or any new concerning symptoms.  Pt is aware of reasons to seek emergent care.  Patient voiced understanding of all instructions and denied further questions.    This transitional care mgnt visit required moderate complexity medical decision making and took place within 2 weeks of hospital d/c.    Please note that portions of this note may have been completed with a voice recognition program. Efforts were made to edit the dictations, but occasionally words are mistranscribed.

## 2022-10-21 LAB
ALBUMIN SERPL-MCNC: 4.6 G/DL (ref 3.5–5.2)
ALBUMIN/GLOB SERPL: 2.3 G/DL
ALP SERPL-CCNC: 79 U/L (ref 39–117)
ALT SERPL-CCNC: 16 U/L (ref 1–33)
APPEARANCE UR: CLEAR
AST SERPL-CCNC: 17 U/L (ref 1–32)
BACTERIA #/AREA URNS HPF: NORMAL /HPF
BASOPHILS # BLD AUTO: 0.06 10*3/MM3 (ref 0–0.2)
BASOPHILS NFR BLD AUTO: 0.8 % (ref 0–1.5)
BILIRUB SERPL-MCNC: 0.4 MG/DL (ref 0–1.2)
BILIRUB UR QL STRIP: NEGATIVE
BUN SERPL-MCNC: 10 MG/DL (ref 6–20)
BUN/CREAT SERPL: 12.7 (ref 7–25)
CALCIUM SERPL-MCNC: 9.8 MG/DL (ref 8.6–10.5)
CASTS URNS QL MICRO: NORMAL /LPF
CHLORIDE SERPL-SCNC: 96 MMOL/L (ref 98–107)
CO2 SERPL-SCNC: 28.1 MMOL/L (ref 22–29)
COLOR UR: YELLOW
CREAT SERPL-MCNC: 0.79 MG/DL (ref 0.57–1)
EGFRCR SERPLBLD CKD-EPI 2021: 87.4 ML/MIN/1.73
EOSINOPHIL # BLD AUTO: 0.12 10*3/MM3 (ref 0–0.4)
EOSINOPHIL NFR BLD AUTO: 1.5 % (ref 0.3–6.2)
EPI CELLS #/AREA URNS HPF: NORMAL /HPF (ref 0–10)
ERYTHROCYTE [DISTWIDTH] IN BLOOD BY AUTOMATED COUNT: 12.1 % (ref 12.3–15.4)
GLOBULIN SER CALC-MCNC: 2 GM/DL
GLUCOSE SERPL-MCNC: 107 MG/DL (ref 65–99)
GLUCOSE UR QL STRIP: NEGATIVE
HCT VFR BLD AUTO: 37.2 % (ref 34–46.6)
HGB BLD-MCNC: 11.8 G/DL (ref 12–15.9)
HGB UR QL STRIP: NEGATIVE
IMM GRANULOCYTES # BLD AUTO: 0.01 10*3/MM3 (ref 0–0.05)
IMM GRANULOCYTES NFR BLD AUTO: 0.1 % (ref 0–0.5)
KETONES UR QL STRIP: NEGATIVE
LEUKOCYTE ESTERASE UR QL STRIP: NEGATIVE
LYMPHOCYTES # BLD AUTO: 1.94 10*3/MM3 (ref 0.7–3.1)
LYMPHOCYTES NFR BLD AUTO: 24.4 % (ref 19.6–45.3)
MCH RBC QN AUTO: 27.7 PG (ref 26.6–33)
MCHC RBC AUTO-ENTMCNC: 31.7 G/DL (ref 31.5–35.7)
MCV RBC AUTO: 87.3 FL (ref 79–97)
MICRO URNS: NORMAL
MICRO URNS: NORMAL
MONOCYTES # BLD AUTO: 0.63 10*3/MM3 (ref 0.1–0.9)
MONOCYTES NFR BLD AUTO: 7.9 % (ref 5–12)
NEUTROPHILS # BLD AUTO: 5.2 10*3/MM3 (ref 1.7–7)
NEUTROPHILS NFR BLD AUTO: 65.3 % (ref 42.7–76)
NITRITE UR QL STRIP: NEGATIVE
NRBC BLD AUTO-RTO: 0 /100 WBC (ref 0–0.2)
PH UR STRIP: 6.5 [PH] (ref 5–7.5)
PLATELET # BLD AUTO: 510 10*3/MM3 (ref 140–450)
POTASSIUM SERPL-SCNC: 4 MMOL/L (ref 3.5–5.2)
PROT SERPL-MCNC: 6.6 G/DL (ref 6–8.5)
PROT UR QL STRIP: NEGATIVE
RBC # BLD AUTO: 4.26 10*6/MM3 (ref 3.77–5.28)
RBC #/AREA URNS HPF: NORMAL /HPF (ref 0–2)
SODIUM SERPL-SCNC: 137 MMOL/L (ref 136–145)
SP GR UR STRIP: 1.01 (ref 1–1.03)
TSH SERPL DL<=0.005 MIU/L-ACNC: 2.29 UIU/ML (ref 0.27–4.2)
URINALYSIS REFLEX: NORMAL
UROBILINOGEN UR STRIP-MCNC: 0.2 MG/DL (ref 0.2–1)
WBC # BLD AUTO: 7.96 10*3/MM3 (ref 3.4–10.8)
WBC #/AREA URNS HPF: NORMAL /HPF (ref 0–5)

## 2022-10-23 RX ORDER — GABAPENTIN 100 MG/1
CAPSULE ORAL
COMMUNITY
Start: 2022-10-07 | End: 2022-11-16

## 2022-10-24 ENCOUNTER — OFFICE VISIT (OUTPATIENT)
Dept: SURGERY | Facility: CLINIC | Age: 57
End: 2022-10-24

## 2022-10-24 VITALS
OXYGEN SATURATION: 98 % | DIASTOLIC BLOOD PRESSURE: 70 MMHG | SYSTOLIC BLOOD PRESSURE: 110 MMHG | BODY MASS INDEX: 29.02 KG/M2 | WEIGHT: 170 LBS | HEIGHT: 64 IN | HEART RATE: 110 BPM

## 2022-10-24 DIAGNOSIS — K59.9 COLONIC INERTIA: Primary | ICD-10-CM

## 2022-10-24 PROCEDURE — 99024 POSTOP FOLLOW-UP VISIT: CPT | Performed by: COLON & RECTAL SURGERY

## 2022-10-24 RX ORDER — HYDROCORTISONE 25 MG/G
CREAM TOPICAL
Qty: 30 G | Refills: 1 | Status: SHIPPED | OUTPATIENT
Start: 2022-10-24 | End: 2022-11-28

## 2022-10-24 RX ORDER — OXYCODONE AND ACETAMINOPHEN 10; 325 MG/1; MG/1
1 TABLET ORAL EVERY 8 HOURS
COMMUNITY
Start: 2022-10-20 | End: 2022-12-28

## 2022-10-24 RX ORDER — NAPROXEN 500 MG/1
TABLET ORAL
COMMUNITY
Start: 2022-10-19

## 2022-10-24 NOTE — PROGRESS NOTES
"Tatiana Fields is a 57 y.o. female in for follow up of Colonic inertia    HPI:  Tatiana Fields is a 57-year-old female who presents today for a post-operative follow-up.    The patient notes that she felt more energy through Friday 10/21/2022. She reports that she stayed in bed for most of Saturday 10/22/2022 and José Luis 10/23/2022 secondary to nausea and pain in abdomen.  The patient notes that she experiences reflux. She reports that she previously did not need to take her reflux medication, however she recently began taking it as needed.     The patient reports feeling better than she has felt in years when using the bathroom. She notes concern regarding a potential hemorrhoid, and she has a history of an internal hemorrhoid. The patient states that she has been able to use the bathroom many times, and she reports that she goes several times per day. The patient reports experiencing pain when utilizing hemorrhoid cream but then it calms down.     The patient reports that she had an appointment with her family doctor last Friday 10/21/2022 and her appearance was different secondary to a history of swelling in her face, neck, and feet for a couple of years. She notes that the previous swelling has completely resolved. Her family doctor completed blood work and overall values were better than before. The patient reports that her platelet count was elevated. She confirms that she is taking her thyroid medication. The patient was told that she had chronic gastritis when she completed an EGD. She states that she has taken probiotics in the past, but she has been afraid to take any recently. She reports that her memory was a bit \" foggy \" from the operation.      /70 (BP Location: Left arm, Patient Position: Sitting, Cuff Size: Adult)   Pulse 110   Ht 162.6 cm (64\")   Wt 77.1 kg (170 lb)   LMP  (LMP Unknown)   SpO2 98%   Breastfeeding No   BMI 29.18 kg/m²   Body mass index is 29.18 kg/m².      PE:  Physical " Exam  Constitutional:       General: She is not in acute distress.     Appearance: She is well-developed.   HENT:      Head: Normocephalic and atraumatic.   Abdominal:      General: There is no distension.      Palpations: Abdomen is soft.   Musculoskeletal:         General: Normal range of motion.   Neurological:      Mental Status: She is alert.   Psychiatric:         Thought Content: Thought content normal.           Assessment:   1. Colonic inertia         Plan: The patient has been prescribed to utilize hydrocortisone cream 3 times daily. She was advised to massage the area of larger suture with a deep tissue massage using cocoa butter, vitamin E, or any lotion she has at home. She was instructed to continue taking the reflux medication as needed. She may take probiotics if available, but they are not recommended. The patient was encouraged to resume regular physical activities. She will follow up in 1 month through video visit.          Transcribed from ambient dictation for Abigail Kang MD by Tiffany Pettit.  10/24/22   20:43 EDT    Patient or patient representative verbalized consent to the visit recording.  I have personally performed the services described in this document as transcribed by the above individual, and it is both accurate and complete.

## 2022-10-25 DIAGNOSIS — G47.33 OBSTRUCTIVE SLEEP APNEA: ICD-10-CM

## 2022-10-25 DIAGNOSIS — G47.19 EXCESSIVE DAYTIME SLEEPINESS: ICD-10-CM

## 2022-10-25 DIAGNOSIS — F33.9 RECURRENT MAJOR DEPRESSION RESISTANT TO TREATMENT: ICD-10-CM

## 2022-10-26 RX ORDER — METHYLPHENIDATE HYDROCHLORIDE 27 MG/1
27 TABLET ORAL EVERY MORNING
Qty: 30 TABLET | Refills: 0 | Status: SHIPPED | OUTPATIENT
Start: 2022-10-26 | End: 2022-10-31 | Stop reason: SDUPTHER

## 2022-10-27 RX ORDER — OMEPRAZOLE 40 MG/1
40 CAPSULE, DELAYED RELEASE ORAL DAILY
Qty: 30 CAPSULE | Refills: 5 | Status: SHIPPED | OUTPATIENT
Start: 2022-10-27 | End: 2022-12-28

## 2022-10-28 ENCOUNTER — TELEPHONE (OUTPATIENT)
Dept: FAMILY MEDICINE CLINIC | Facility: CLINIC | Age: 57
End: 2022-10-28

## 2022-10-31 ENCOUNTER — TELEPHONE (OUTPATIENT)
Dept: FAMILY MEDICINE CLINIC | Facility: CLINIC | Age: 57
End: 2022-10-31

## 2022-10-31 DIAGNOSIS — G47.33 OBSTRUCTIVE SLEEP APNEA: ICD-10-CM

## 2022-10-31 DIAGNOSIS — F33.9 RECURRENT MAJOR DEPRESSION RESISTANT TO TREATMENT: ICD-10-CM

## 2022-10-31 DIAGNOSIS — G47.19 EXCESSIVE DAYTIME SLEEPINESS: ICD-10-CM

## 2022-10-31 RX ORDER — METHYLPHENIDATE HYDROCHLORIDE 27 MG/1
27 TABLET ORAL EVERY MORNING
Qty: 30 TABLET | Refills: 0 | Status: SHIPPED | OUTPATIENT
Start: 2022-10-31 | End: 2022-11-30 | Stop reason: SDUPTHER

## 2022-10-31 NOTE — TELEPHONE ENCOUNTER
Patient is requesting medication be send to [facility] pharmacy because [facility] does not have this medication

## 2022-10-31 NOTE — TELEPHONE ENCOUNTER
Caller: Tatiana Fields TIMOTHY    Relationship: Self    Best call back number: [phone]    Requested Prescriptions:   Requested Prescriptions     Pending Prescriptions Disp Refills   • methylphenidate (Concerta) 27 MG CR tablet 30 tablet 0     Sig: Take 1 tablet by mouth Every Morning        Pharmacy where request should be sent:      [facility] [id number] [address] AT [facility] [phone] [facility] [phone] FX        Additional details provided by patient: PREVIOUSLY CALLED INTO [facility] IN [place], [place] BUT THIS PHARMACY DOES NOT HAVE MEDICATION        Does the patient have less than a 3 day supply:  [x] Yes  [] No    Christine Alberts Rep   10/31/22 08:44 EDT

## 2022-11-01 ENCOUNTER — TELEPHONE (OUTPATIENT)
Dept: FAMILY MEDICINE CLINIC | Facility: CLINIC | Age: 57
End: 2022-11-01

## 2022-11-01 NOTE — TELEPHONE ENCOUNTER
Tried calling patient a couple times but got a busy signal. I sent her a eCert message asking her to make an appointment to discuss medication request.

## 2022-11-01 NOTE — TELEPHONE ENCOUNTER
Caller: Tatiana Feilds    Relationship to patient: Self    Best call back number: [phone]    Patient is needing: PATIENT WAS RETURNING CALL TO [facility].

## 2022-11-11 DIAGNOSIS — F41.1 GAD (GENERALIZED ANXIETY DISORDER): ICD-10-CM

## 2022-11-14 RX ORDER — CLONAZEPAM 0.5 MG/1
TABLET ORAL
Qty: 60 TABLET | Refills: 0 | Status: SHIPPED | OUTPATIENT
Start: 2022-11-14 | End: 2022-12-21

## 2022-11-14 NOTE — TELEPHONE ENCOUNTER
Rx Refill Note  Requested Prescriptions     Pending Prescriptions Disp Refills   • clonazePAM (KlonoPIN) 0.5 MG tablet [Pharmacy Med Name: CLONAZEPAM 0.5MG TABLETS] 60 tablet      Sig: TAKE 1 TABLET BY MOUTH TWICE DAILY AS NEEDED FOR ANXIETY OR INSOMNIA      Last office visit with prescribing clinician: 10/20/2022      Next office visit with prescribing clinician: 1/5/2023            Dara Bradley MA  11/14/22, 08:31 EST

## 2022-11-16 ENCOUNTER — OFFICE VISIT (OUTPATIENT)
Dept: FAMILY MEDICINE CLINIC | Facility: CLINIC | Age: 57
End: 2022-11-16

## 2022-11-16 VITALS
BODY MASS INDEX: 28 KG/M2 | TEMPERATURE: 98 F | HEIGHT: 64 IN | OXYGEN SATURATION: 97 % | WEIGHT: 164 LBS | HEART RATE: 108 BPM | SYSTOLIC BLOOD PRESSURE: 122 MMHG | DIASTOLIC BLOOD PRESSURE: 80 MMHG

## 2022-11-16 DIAGNOSIS — E03.9 ACQUIRED HYPOTHYROIDISM: ICD-10-CM

## 2022-11-16 DIAGNOSIS — D64.9 CHRONIC ANEMIA: ICD-10-CM

## 2022-11-16 DIAGNOSIS — F32.A DEPRESSION WITH SOMATIZATION: ICD-10-CM

## 2022-11-16 DIAGNOSIS — G25.81 RLS (RESTLESS LEGS SYNDROME): ICD-10-CM

## 2022-11-16 DIAGNOSIS — R19.7 DIARRHEA, UNSPECIFIED TYPE: ICD-10-CM

## 2022-11-16 DIAGNOSIS — Z51.81 ENCOUNTER FOR THERAPEUTIC DRUG MONITORING: ICD-10-CM

## 2022-11-16 DIAGNOSIS — L29.9 ITCHING: Primary | ICD-10-CM

## 2022-11-16 DIAGNOSIS — F45.0 DEPRESSION WITH SOMATIZATION: ICD-10-CM

## 2022-11-16 DIAGNOSIS — G47.19 EXCESSIVE DAYTIME SLEEPINESS: ICD-10-CM

## 2022-11-16 PROCEDURE — 99214 OFFICE O/P EST MOD 30 MIN: CPT | Performed by: FAMILY MEDICINE

## 2022-11-16 RX ORDER — FAMCICLOVIR 500 MG/1
500 TABLET ORAL DAILY
COMMUNITY
Start: 2022-10-24 | End: 2023-03-09 | Stop reason: SDUPTHER

## 2022-11-16 RX ORDER — PANTOPRAZOLE SODIUM 40 MG/1
40 TABLET, DELAYED RELEASE ORAL DAILY
COMMUNITY
Start: 2022-11-15 | End: 2023-11-15

## 2022-11-16 RX ORDER — DULOXETIN HYDROCHLORIDE 60 MG/1
60 CAPSULE, DELAYED RELEASE ORAL DAILY
COMMUNITY
Start: 2022-10-31 | End: 2022-12-28

## 2022-11-16 RX ORDER — LEVOTHYROXINE SODIUM 0.05 MG/1
TABLET ORAL
Qty: 90 TABLET | Refills: 1 | Status: SHIPPED | OUTPATIENT
Start: 2022-11-16 | End: 2022-11-18

## 2022-11-16 RX ORDER — LINACLOTIDE 290 UG/1
290 CAPSULE, GELATIN COATED ORAL
COMMUNITY
Start: 2022-11-07 | End: 2022-11-28 | Stop reason: ALTCHOICE

## 2022-11-16 NOTE — PROGRESS NOTES
"Subjective   Tatiana Fields is a 57 y.o. female.     History of Present Illness   Mrs. Fields presents today with several concerns.    Since last visit she had video visit consult with GI. Still having n/v although having regular bowel movements s/p colectomy. Did have previous dx of gastroparesis as well. Currently on linzess but she felt too high of dose was aggravating symptoms and leading to watery diarrhea. Dose decreased.    She c/o \"itching all over\". Believes it began around the time of her colon surgery. No nasir rash. Also with worsened restltess leg symptoms. Just started some mag and K and she feels the symptoms have improved some today.    No other recent med changes.    Was taking vit D.   Not as bad today    Is on methyphenidate and was recently given different generic due to change in pharmacy.    Feels depression/anxiety generally stable.     Taking thyroid replacement as rx'd.      The following portions of the patient's history were reviewed and updated as appropriate: allergies, current medications, past family history, past medical history, past social history, past surgical history and problem list.    Review of Systems   Constitutional: Positive for fatigue. Negative for fever and unexpected weight change.   HENT: Positive for congestion, postnasal drip, sinus pressure and trouble swallowing (intermittent). Negative for ear pain, hearing loss, nosebleeds, rhinorrhea and sore throat.    Eyes: Positive for visual disturbance (chronic). Negative for pain.        Dry eyes   Respiratory: Negative for cough, shortness of breath and wheezing.    Cardiovascular: Positive for palpitations (chronic, intermittent, stable). Negative for chest pain and leg swelling.   Gastrointestinal: Positive for diarrhea, nausea and vomiting. Negative for abdominal pain, blood in stool and constipation.   Endocrine: Positive for heat intolerance. Negative for polydipsia and polyuria.   Genitourinary: Positive for " dysuria (mild, intermittent) and pelvic pain (chronic). Negative for hematuria.   Musculoskeletal: Positive for arthralgias, myalgias, neck pain and neck stiffness. Negative for back pain and joint swelling.   Skin: Negative for rash.   Neurological: Positive for weakness and headaches. Negative for dizziness, seizures, syncope and speech difficulty.   Hematological: Negative for adenopathy. Bruises/bleeds easily.   Psychiatric/Behavioral: Positive for dysphoric mood and sleep disturbance. Negative for confusion and suicidal ideas. The patient is nervous/anxious.    Pt's previous ROS reviewed and updated as indicated.       Objective    Vitals:    11/16/22 1119   BP: 122/80   Pulse: 108   Temp: 98 °F (36.7 °C)   SpO2: 97%     Body mass index is 28.15 kg/m².      11/16/22  1119   Weight: 74.4 kg (164 lb)       Physical Exam  Vitals and nursing note reviewed.   Constitutional:       General: She is not in acute distress.     Appearance: She is well-developed, well-groomed and overweight. She is not ill-appearing.   HENT:      Head: Atraumatic.      Mouth/Throat:      Mouth: Mucous membranes are moist.   Eyes:      General: No scleral icterus.     Conjunctiva/sclera: Conjunctivae normal.   Cardiovascular:      Rate and Rhythm: Normal rate and regular rhythm.      Pulses: Normal pulses.      Heart sounds: Normal heart sounds.   Pulmonary:      Effort: Pulmonary effort is normal.      Breath sounds: Normal breath sounds.   Skin:     General: Skin is warm and dry.      Coloration: Skin is not jaundiced or pale.   Neurological:      Mental Status: She is alert and oriented to person, place, and time.      Gait: Gait is intact.   Psychiatric:         Mood and Affect: Mood and affect normal.         Behavior: Behavior normal. Behavior is cooperative.         Cognition and Memory: Cognition normal.       Lab Results   Component Value Date    WBC 5.09 11/16/2022    HGB 11.8 (L) 11/16/2022    HCT 36.5 11/16/2022    MCV 86.7  11/16/2022     11/16/2022       Lab Results   Component Value Date    HGBA1C 6.1 (H) 09/26/2022       Lab Results   Component Value Date    TSH 2.290 10/20/2022       Assessment & Plan   Diagnoses and all orders for this visit:    1. Itching (Primary)    2. Depression with somatization  -     Compliance Drug Analysis, Ur - Urine, Clean Catch  -     Discontinue: levothyroxine (SYNTHROID, LEVOTHROID) 50 MCG tablet; TAKE 1 TABLET BY MOUTH DAILY  Dispense: 90 tablet; Refill: 1    3. Chronic anemia  -     Iron  -     CBC (No Diff)  -     Vitamin B12    4. Encounter for therapeutic drug monitoring  -     Compliance Drug Analysis, Ur - Urine, Clean Catch    5. Diarrhea, unspecified type  -     Magnesium  -     Phosphorus  -     Basic Metabolic Panel    6. RLS (restless legs syndrome)    7. Acquired hypothyroidism  -     levothyroxine (SYNTHROID, LEVOTHROID) 50 MCG tablet; TAKE 1 TABLET BY MOUTH DAILY  Dispense: 90 tablet; Refill: 1    8. Excessive daytime sleepiness       Itching of unclear etiology. Possibly related to anemia, anxiety DO. Lab eval as above. Keep skin well hydrated.    Suspect her RLSx may have been aggravated by electrolyte shifts or worsened B12 def. Assess status of vit/min deficiencies and replace as indicated.    Agree with decreased dose of Linzess. F/u with GI as scheduled.     Continue thyroid replacement.    On methylphenidate for EDSx despite tx of DANE. Symptoms stable.   UDS per routine as above.  As part of patient's treatment plan I am prescribing a controlled substance.  The patient has been made aware of the appropriate use of such medications, including potential risk of somnolence, limited ability to drive and/or work safely, and potential for dependence and/or overdose.  It has also been made clear that these medications are for use by this patient only, without concomitant use of alcohol or other substances, unless prescribed.  Patient has completed a prescribing agreement  detailing terms of continued prescribing of controlled substances, including monitoring KIMBERLY reports, urine drug screening, and pill counts if necessary.  Patient is aware that inappropriate use will result in cessation of prescribing such medications.  KIMBERLY reviewed.  Patient has completed a prescribing agreement detailing terms of continued prescribing of controlled substances, including monitoring KIMBERLY reports, urine drug screening, and pill counts if necessary.  Patient is aware that inappropriate use will result in cessation of prescribing such medications.    Routine f/u as scheduled, f/u sooner as needed/instructed.  Encouraged to keep apt for mammogram as scheduled.  I will contact patient regarding test results and provide instructions regarding any necessary changes in plan of care.  Patient was encouraged to keep me informed of any acute changes, lack of improvement, or any new concerning symptoms.  Pt is aware of reasons to seek emergent care.  Patient voiced understanding of all instructions and denied further questions.    Please note that portions of this note may have been completed with a voice recognition program.               NOTE TO PATIENT: The 21st Century Cures Act makes medical notes like these available to patients in the interest of transparency. However, be advised this is a medical document. It is intended as peer to peer communication. It is written in medical language and may contain abbreviations or verbiage that are unfamiliar. It may appear blunt or direct. Medical documents are intended to carry relevant information, facts as evident, and the clinical opinion of the practitioner.

## 2022-11-17 LAB
BUN SERPL-MCNC: 9 MG/DL (ref 6–20)
BUN/CREAT SERPL: 12.5 (ref 7–25)
CALCIUM SERPL-MCNC: 9.6 MG/DL (ref 8.6–10.5)
CHLORIDE SERPL-SCNC: 105 MMOL/L (ref 98–107)
CO2 SERPL-SCNC: 24.3 MMOL/L (ref 22–29)
CREAT SERPL-MCNC: 0.72 MG/DL (ref 0.57–1)
EGFRCR SERPLBLD CKD-EPI 2021: 97.7 ML/MIN/1.73
ERYTHROCYTE [DISTWIDTH] IN BLOOD BY AUTOMATED COUNT: 12.3 % (ref 12.3–15.4)
GLUCOSE SERPL-MCNC: 93 MG/DL (ref 65–99)
HCT VFR BLD AUTO: 36.5 % (ref 34–46.6)
HGB BLD-MCNC: 11.8 G/DL (ref 12–15.9)
IRON SERPL-MCNC: 70 MCG/DL (ref 37–145)
MAGNESIUM SERPL-MCNC: 1.9 MG/DL (ref 1.6–2.6)
MCH RBC QN AUTO: 28 PG (ref 26.6–33)
MCHC RBC AUTO-ENTMCNC: 32.3 G/DL (ref 31.5–35.7)
MCV RBC AUTO: 86.7 FL (ref 79–97)
PHOSPHATE SERPL-MCNC: 4.2 MG/DL (ref 2.5–4.5)
PLATELET # BLD AUTO: 366 10*3/MM3 (ref 140–450)
POTASSIUM SERPL-SCNC: 4.6 MMOL/L (ref 3.5–5.2)
RBC # BLD AUTO: 4.21 10*6/MM3 (ref 3.77–5.28)
SODIUM SERPL-SCNC: 141 MMOL/L (ref 136–145)
VIT B12 SERPL-MCNC: 1160 PG/ML (ref 211–946)
WBC # BLD AUTO: 5.09 10*3/MM3 (ref 3.4–10.8)

## 2022-11-18 PROBLEM — K59.9: Status: RESOLVED | Noted: 2020-05-06 | Resolved: 2022-11-18

## 2022-11-18 RX ORDER — LEVOTHYROXINE SODIUM 0.05 MG/1
TABLET ORAL
Qty: 90 TABLET | Refills: 1
Start: 2022-11-18 | End: 2023-02-17

## 2022-11-21 RX ORDER — FLUOXETINE HYDROCHLORIDE 20 MG/1
20 CAPSULE ORAL DAILY
Qty: 30 CAPSULE | Refills: 5 | OUTPATIENT
Start: 2022-11-21

## 2022-11-24 LAB — DRUGS UR: NORMAL

## 2022-11-28 ENCOUNTER — OFFICE VISIT (OUTPATIENT)
Dept: SURGERY | Facility: CLINIC | Age: 57
End: 2022-11-28

## 2022-11-28 DIAGNOSIS — K64.8 INTERNAL HEMORRHOIDS WITH COMPLICATION: ICD-10-CM

## 2022-11-28 DIAGNOSIS — K59.9 COLONIC INERTIA: Primary | ICD-10-CM

## 2022-11-28 PROCEDURE — 99024 POSTOP FOLLOW-UP VISIT: CPT | Performed by: COLON & RECTAL SURGERY

## 2022-11-28 RX ORDER — LINACLOTIDE 145 UG/1
CAPSULE, GELATIN COATED ORAL
COMMUNITY
Start: 2022-11-15 | End: 2023-03-27

## 2022-11-28 RX ORDER — FLUOXETINE HYDROCHLORIDE 20 MG/1
20 CAPSULE ORAL EVERY EVENING
COMMUNITY
Start: 2022-11-23 | End: 2022-12-27 | Stop reason: SDUPTHER

## 2022-11-28 RX ORDER — HYDROCORTISONE ACETATE 25 MG/1
25 SUPPOSITORY RECTAL EVERY 12 HOURS
Qty: 14 SUPPOSITORY | Refills: 0 | Status: SHIPPED | OUTPATIENT
Start: 2022-11-28 | End: 2022-12-05

## 2022-11-29 DIAGNOSIS — F33.9 RECURRENT MAJOR DEPRESSION RESISTANT TO TREATMENT: ICD-10-CM

## 2022-11-29 DIAGNOSIS — G47.33 OBSTRUCTIVE SLEEP APNEA: ICD-10-CM

## 2022-11-29 DIAGNOSIS — G47.19 EXCESSIVE DAYTIME SLEEPINESS: ICD-10-CM

## 2022-11-29 RX ORDER — METHYLPHENIDATE HYDROCHLORIDE 27 MG/1
27 TABLET ORAL EVERY MORNING
Qty: 30 TABLET | Refills: 0 | Status: CANCELLED | OUTPATIENT
Start: 2022-11-29

## 2022-11-30 DIAGNOSIS — G47.19 EXCESSIVE DAYTIME SLEEPINESS: ICD-10-CM

## 2022-11-30 DIAGNOSIS — G47.33 OBSTRUCTIVE SLEEP APNEA: ICD-10-CM

## 2022-11-30 DIAGNOSIS — F33.9 RECURRENT MAJOR DEPRESSION RESISTANT TO TREATMENT: ICD-10-CM

## 2022-11-30 RX ORDER — METHYLPHENIDATE HYDROCHLORIDE 27 MG/1
27 TABLET ORAL EVERY MORNING
Qty: 30 TABLET | Refills: 0 | Status: SHIPPED | OUTPATIENT
Start: 2022-11-30 | End: 2022-12-27 | Stop reason: SDUPTHER

## 2022-11-30 NOTE — TELEPHONE ENCOUNTER
Patient called and would like the Concerta or Provigil sent in to [facility] in [place] due to her pharmacy not having concerta in stock.

## 2022-11-30 NOTE — TELEPHONE ENCOUNTER
Rx Refill Note  Requested Prescriptions     Pending Prescriptions Disp Refills   • methylphenidate (Concerta) 27 MG CR tablet 30 tablet 0     Sig: Take 1 tablet by mouth Every Morning      Last office visit with prescribing clinician: 11/16/2022   Last telemedicine visit with prescribing clinician: 1/5/2023   Next office visit with prescribing clinician: 1/5/2023                         Would you like a call back once the refill request has been completed: [] Yes [] No    If the office needs to give you a call back, can they leave a voicemail: [] Yes [] No    Dara Bradley MA  11/30/22, 13:34 EST

## 2022-12-13 RX ORDER — LORATADINE 10 MG
1 TABLET ORAL DAILY
Qty: 30 TABLET | Refills: 2 | Status: SHIPPED | OUTPATIENT
Start: 2022-12-13 | End: 2023-01-10 | Stop reason: SDUPTHER

## 2022-12-19 DIAGNOSIS — J34.3 HYPERTROPHY, NASAL, TURBINATE: ICD-10-CM

## 2022-12-20 RX ORDER — FLUTICASONE PROPIONATE 50 MCG
SPRAY, SUSPENSION (ML) NASAL
Qty: 48 G | Refills: 0 | Status: SHIPPED | OUTPATIENT
Start: 2022-12-20

## 2022-12-21 DIAGNOSIS — F41.1 GAD (GENERALIZED ANXIETY DISORDER): ICD-10-CM

## 2022-12-21 RX ORDER — CLONAZEPAM 0.5 MG/1
TABLET ORAL
Qty: 60 TABLET | Refills: 1 | Status: SHIPPED | OUTPATIENT
Start: 2022-12-21 | End: 2023-02-20

## 2022-12-21 NOTE — TELEPHONE ENCOUNTER
KIMBERLY reviewed. Refill approved. Medication E rx'd to pharmacy as requested. Pt to keep f/u apt as scheduled.    
Rx Refill Note  Requested Prescriptions     Pending Prescriptions Disp Refills   • clonazePAM (KlonoPIN) 0.5 MG tablet [Pharmacy Med Name: CLONAZEPAM 0.5MG TABLETS] 60 tablet      Sig: TAKE 1 TABLET BY MOUTH TWICE DAILY AS NEEDED FOR ANXIETY OR INSOMNIA      Last office visit with prescribing clinician: 11/16/2022   Last telemedicine visit with prescribing clinician: 1/5/2023   Next office visit with prescribing clinician: 1/5/2023                         Would you like a call back once the refill request has been completed: [] Yes [] No    If the office needs to give you a call back, can they leave a voicemail: [] Yes [] No    Lizzy Graves MA  12/21/22, 11:44 EST  
Walking

## 2022-12-27 DIAGNOSIS — G47.19 EXCESSIVE DAYTIME SLEEPINESS: ICD-10-CM

## 2022-12-27 DIAGNOSIS — F33.9 RECURRENT MAJOR DEPRESSION RESISTANT TO TREATMENT: ICD-10-CM

## 2022-12-27 DIAGNOSIS — G47.33 OBSTRUCTIVE SLEEP APNEA: ICD-10-CM

## 2022-12-27 RX ORDER — FLUOXETINE HYDROCHLORIDE 20 MG/1
20 CAPSULE ORAL EVERY EVENING
Qty: 30 CAPSULE | Refills: 1 | Status: SHIPPED | OUTPATIENT
Start: 2022-12-27 | End: 2022-12-28 | Stop reason: DRUGHIGH

## 2022-12-27 NOTE — TELEPHONE ENCOUNTER
Rx Refill Note  Requested Prescriptions     Pending Prescriptions Disp Refills   • methylphenidate (Concerta) 27 MG CR tablet 30 tablet 0     Sig: Take 1 tablet by mouth Every Morning     Signed Prescriptions Disp Refills   • FLUoxetine (PROzac) 20 MG capsule 30 capsule 1     Sig: Take 1 capsule by mouth Every Evening.     Authorizing Provider: SILKE HENRY     Ordering User: LENARD FORD      Last office visit with prescribing clinician: 11/16/2022   Last telemedicine visit with prescribing clinician: 1/5/2023   Next office visit with prescribing clinician: 1/5/2023   {TIP  Encounters:23}                      Would you like a call back once the refill request has been completed: [] Yes [] No    If the office needs to give you a call back, can they leave a voicemail: [] Yes [] No    Dara Bradley MA  12/27/22, 14:38 EST

## 2022-12-28 ENCOUNTER — TELEMEDICINE (OUTPATIENT)
Dept: FAMILY MEDICINE CLINIC | Facility: CLINIC | Age: 57
End: 2022-12-28

## 2022-12-28 DIAGNOSIS — I10 ESSENTIAL HYPERTENSION: ICD-10-CM

## 2022-12-28 DIAGNOSIS — F32.A DEPRESSIVE DISORDER: ICD-10-CM

## 2022-12-28 DIAGNOSIS — J32.9 CHRONIC RECURRENT SINUSITIS: ICD-10-CM

## 2022-12-28 DIAGNOSIS — F51.04 CHRONIC INSOMNIA: Primary | ICD-10-CM

## 2022-12-28 DIAGNOSIS — G47.61 PERIODIC LIMB MOVEMENT DISORDER: ICD-10-CM

## 2022-12-28 DIAGNOSIS — R06.81 WITNESSED APNEIC SPELLS: ICD-10-CM

## 2022-12-28 DIAGNOSIS — G47.19 EXCESSIVE DAYTIME SLEEPINESS: ICD-10-CM

## 2022-12-28 PROCEDURE — 99214 OFFICE O/P EST MOD 30 MIN: CPT | Performed by: FAMILY MEDICINE

## 2022-12-28 RX ORDER — TRAZODONE HYDROCHLORIDE 50 MG/1
50 TABLET ORAL
COMMUNITY
Start: 2022-11-28 | End: 2023-01-17 | Stop reason: SDUPTHER

## 2022-12-28 RX ORDER — FLUOXETINE HYDROCHLORIDE 40 MG/1
40 CAPSULE ORAL DAILY
COMMUNITY
Start: 2022-11-28 | End: 2023-01-17

## 2022-12-28 RX ORDER — LISINOPRIL 30 MG/1
30 TABLET ORAL DAILY
COMMUNITY
Start: 2022-11-29 | End: 2022-12-28 | Stop reason: SDUPTHER

## 2022-12-28 RX ORDER — METHYLPHENIDATE HYDROCHLORIDE 27 MG/1
27 TABLET ORAL EVERY MORNING
Qty: 30 TABLET | Refills: 0 | Status: SHIPPED | OUTPATIENT
Start: 2022-12-28 | End: 2023-01-24

## 2022-12-28 RX ORDER — FLUCONAZOLE 150 MG/1
TABLET ORAL
Qty: 2 TABLET | Refills: 0 | Status: SHIPPED | OUTPATIENT
Start: 2022-12-28 | End: 2023-01-17

## 2022-12-28 RX ORDER — LISINOPRIL 30 MG/1
15 TABLET ORAL DAILY
Qty: 30 TABLET | Refills: 0
Start: 2022-12-28

## 2022-12-28 RX ORDER — AMOXICILLIN AND CLAVULANATE POTASSIUM 875; 125 MG/1; MG/1
1 TABLET, FILM COATED ORAL 2 TIMES DAILY
Qty: 20 TABLET | Refills: 0 | Status: SHIPPED | OUTPATIENT
Start: 2022-12-28 | End: 2023-01-07

## 2022-12-28 NOTE — PROGRESS NOTES
TELEHEALTH/VIDEO VISIT (via Zoom/Veeva)  BLUM 2022  LOCATION OF PROVIDER: [facility]   10/7/22  LOCATION OF PATIENT: PERSONAL RESIDENCE    You have chosen to receive care through a telehealth visit.  Do you consent to use a video/audio connection for your medical care today? Yes      History of Present Illness   She reports recent medication changes for mgnt of her depression. Accessed telehealth behavioral health provider. Not able to afford Fetzima. Back on prozac. Trazodone added for sleep as well which is helping.    She was rx'd lisinopril for HTN. After colon surgery, BP has improved/dropped. Has decreased dose since that time.    She c/o severe nasal congestion/facial pressure/pain with purulent nasal d/c. No fever, + HA, dizziness. Taking her usual allergy meds without improvement. Symptoms over 10 days.    Previous dx of PLMD/ RLSx. Last sleep study years ago. Has been told on more than one occasion during hospital stays that she has witnessed apneic spells.     She plans on rescheduling mammogram.     Patient's past medical hx, surgical hx, family hx, social hx reviewed on chart. Medication and allergy lists reviewed on chart. Information updated as indicated.      Review of Systems   Constitutional: Positive for fatigue. Negative for fever and unexpected weight change.   HENT: Positive for congestion, postnasal drip, rhinorrhea, sinus pressure, sinus pain and trouble swallowing (intermittent). Negative for ear pain, hearing loss, nosebleeds and sore throat.    Eyes: Positive for visual disturbance (chronic). Negative for pain.        Dry eyes   Respiratory: Negative for cough, shortness of breath and wheezing.    Cardiovascular: Positive for palpitations (chronic, intermittent, stable). Negative for chest pain and leg swelling.   Gastrointestinal: Positive for diarrhea, nausea and vomiting. Negative for abdominal pain, blood in stool and constipation.    Endocrine: Positive for heat intolerance. Negative for polydipsia and polyuria.   Genitourinary: Positive for dysuria (mild, intermittent) and pelvic pain (chronic). Negative for hematuria.   Musculoskeletal: Positive for arthralgias, myalgias, neck pain and neck stiffness. Negative for back pain and joint swelling.   Skin: Negative for rash.   Neurological: Positive for weakness and headaches. Negative for dizziness, seizures, syncope and speech difficulty.   Hematological: Negative for adenopathy. Bruises/bleeds easily.   Psychiatric/Behavioral: Positive for dysphoric mood and sleep disturbance. Negative for confusion and suicidal ideas. The patient is nervous/anxious.    Pt's previous ROS reviewed and updated as indicated.       Physical Exam  Constitutional:       Appearance: She is ill-appearing.   Pulmonary:      Effort: Pulmonary effort is normal.   Neurological:      Mental Status: She is alert and oriented to person, place, and time.   Psychiatric:         Mood and Affect: Mood and affect normal.         Behavior: Behavior normal. Behavior is cooperative.         Cognition and Memory: Cognition normal.       Lab Results   Component Value Date    WBC 5.09 11/16/2022    HGB 11.8 (L) 11/16/2022    HCT 36.5 11/16/2022    MCV 86.7 11/16/2022     11/16/2022       Lab Results   Component Value Date    GLUCOSE 93 11/16/2022    BUN 9 11/16/2022    CREATININE 0.72 11/16/2022    EGFRIFNONA 84 12/23/2021    EGFRIFAFRI 97 12/23/2021    BCR 12.5 11/16/2022    K 4.6 11/16/2022    CO2 24.3 11/16/2022    CALCIUM 9.6 11/16/2022    PROTENTOTREF 6.6 10/20/2022    ALBUMIN 4.60 10/20/2022    LABIL2 2.3 10/20/2022    AST 17 10/20/2022    ALT 16 10/20/2022       Lab Results   Component Value Date    CHOL 172 08/16/2021    CHLPL 203 (H) 09/26/2022    TRIG 249 (H) 09/26/2022    HDL 50 09/26/2022     (H) 09/26/2022       Lab Results   Component Value Date    HGBA1C 6.1 (H) 09/26/2022       Lab Results   Component  Value Date    TSH 2.290 10/20/2022           Diagnoses and all orders for this visit:    Chronic insomnia    Excessive daytime sleepiness  -     Ambulatory Referral to Sleep Medicine    Periodic limb movement disorder  -     Ambulatory Referral to Sleep Medicine    Witnessed apneic spells  -     Ambulatory Referral to Sleep Medicine    Chronic recurrent sinusitis    Essential hypertension  -     lisinopril (PRINIVIL,ZESTRIL) 30 MG tablet; Take 0.5 tablets by mouth Daily.    Depressive disorder    Other orders  -     FLUoxetine (PROzac) 40 MG capsule; Take 40 mg by mouth Daily.  -     Discontinue: lisinopril (PRINIVIL,ZESTRIL) 30 MG tablet; Take 30 mg by mouth Daily.  -     traZODone (DESYREL) 50 MG tablet; Take 50 mg by mouth every night at bedtime.  -     fluconazole (Diflucan) 150 MG tablet; 1 po then repeat 1 week  -     amoxicillin-clavulanate (Augmentin) 875-125 MG per tablet; Take 1 tablet by mouth 2 (Two) Times a Day for 10 days.      Continue prozac 40 mg, trazodone for depression with PTSD. Continue klonopin as needed.    HTN improved. Decreased lisinpril to 1/2 dose of 30 mg. Pt advised to eat a heart healthy diet and get regular aerobic exercise.    Treatment of recurrent sinusitis as above.    Consultation with sleep medicine.    Patient will f/u as schedule in approx 3 weeks, f/u sooner as needed.  Patient was encouraged to keep me informed of any acute changes, lack of improvement, or any new concerning symptoms.  Pt is aware of reasons to seek emergent care.  Patient voiced understanding of all instructions and denied further questions.      Total face to face time with patient was 10 minutes.    Please note that portions of this note may have been completed with a voice recognition program. Efforts were made to edit the dictations, but occasionally words are mistranscribed.

## 2022-12-29 RX ORDER — TRAZODONE HYDROCHLORIDE 50 MG/1
50 TABLET ORAL
OUTPATIENT
Start: 2022-12-29

## 2023-01-10 DIAGNOSIS — E03.9 ACQUIRED HYPOTHYROIDISM: ICD-10-CM

## 2023-01-10 RX ORDER — RIZATRIPTAN BENZOATE 10 MG/1
10 TABLET ORAL ONCE AS NEEDED
OUTPATIENT
Start: 2023-01-10

## 2023-01-10 RX ORDER — LEVOTHYROXINE SODIUM 0.05 MG/1
TABLET ORAL
Qty: 90 TABLET | Refills: 1
Start: 2023-01-10

## 2023-01-10 RX ORDER — LORATADINE 10 MG
1 TABLET ORAL DAILY
Qty: 30 TABLET | Refills: 2 | Status: SHIPPED | OUTPATIENT
Start: 2023-01-10

## 2023-01-10 RX ORDER — NAPROXEN 500 MG/1
TABLET ORAL
OUTPATIENT
Start: 2023-01-10

## 2023-01-10 RX ORDER — PANTOPRAZOLE SODIUM 40 MG/1
40 TABLET, DELAYED RELEASE ORAL DAILY
Qty: 30 TABLET | Refills: 11 | OUTPATIENT
Start: 2023-01-10 | End: 2024-01-10

## 2023-01-17 ENCOUNTER — OFFICE VISIT (OUTPATIENT)
Dept: FAMILY MEDICINE CLINIC | Facility: CLINIC | Age: 58
End: 2023-01-17
Payer: MEDICARE

## 2023-01-17 ENCOUNTER — TELEPHONE (OUTPATIENT)
Dept: FAMILY MEDICINE CLINIC | Facility: CLINIC | Age: 58
End: 2023-01-17

## 2023-01-17 VITALS
HEIGHT: 64 IN | WEIGHT: 166 LBS | BODY MASS INDEX: 28.34 KG/M2 | HEART RATE: 102 BPM | SYSTOLIC BLOOD PRESSURE: 120 MMHG | DIASTOLIC BLOOD PRESSURE: 74 MMHG | OXYGEN SATURATION: 97 % | TEMPERATURE: 98.1 F

## 2023-01-17 DIAGNOSIS — F51.04 CHRONIC INSOMNIA: ICD-10-CM

## 2023-01-17 DIAGNOSIS — N30.10 CHRONIC INTERSTITIAL CYSTITIS: ICD-10-CM

## 2023-01-17 DIAGNOSIS — G43.709 CHRONIC MIGRAINE WITHOUT AURA WITHOUT STATUS MIGRAINOSUS, NOT INTRACTABLE: ICD-10-CM

## 2023-01-17 DIAGNOSIS — I10 ESSENTIAL HYPERTENSION: ICD-10-CM

## 2023-01-17 DIAGNOSIS — N95.1 POSTMENOPAUSAL DISORDER: ICD-10-CM

## 2023-01-17 DIAGNOSIS — G47.61 PERIODIC LIMB MOVEMENT DISORDER: ICD-10-CM

## 2023-01-17 DIAGNOSIS — E03.9 ACQUIRED HYPOTHYROIDISM: ICD-10-CM

## 2023-01-17 DIAGNOSIS — K04.7 DENTAL INFECTION: ICD-10-CM

## 2023-01-17 DIAGNOSIS — M79.7 FIBROMYALGIA: ICD-10-CM

## 2023-01-17 DIAGNOSIS — G95.9 CERVICAL MYELOPATHY: ICD-10-CM

## 2023-01-17 DIAGNOSIS — Z79.890 HORMONE REPLACEMENT THERAPY (HRT): ICD-10-CM

## 2023-01-17 DIAGNOSIS — F32.A DEPRESSIVE DISORDER: ICD-10-CM

## 2023-01-17 DIAGNOSIS — F90.9 ADULT ADHD: Primary | ICD-10-CM

## 2023-01-17 DIAGNOSIS — Z79.899 POLYPHARMACY: ICD-10-CM

## 2023-01-17 DIAGNOSIS — J32.9 CHRONIC SINUSITIS, UNSPECIFIED LOCATION: ICD-10-CM

## 2023-01-17 DIAGNOSIS — Z12.31 ENCOUNTER FOR SCREENING MAMMOGRAM FOR MALIGNANT NEOPLASM OF BREAST: ICD-10-CM

## 2023-01-17 DIAGNOSIS — Z00.00 MEDICARE ANNUAL WELLNESS VISIT, SUBSEQUENT: Primary | ICD-10-CM

## 2023-01-17 DIAGNOSIS — R51.9 CHRONIC DAILY HEADACHE: ICD-10-CM

## 2023-01-17 DIAGNOSIS — F43.10 POSTTRAUMATIC STRESS DISORDER: ICD-10-CM

## 2023-01-17 DIAGNOSIS — F33.9 RECURRENT MAJOR DEPRESSION RESISTANT TO TREATMENT: ICD-10-CM

## 2023-01-17 PROCEDURE — 99214 OFFICE O/P EST MOD 30 MIN: CPT | Performed by: FAMILY MEDICINE

## 2023-01-17 PROCEDURE — 1125F AMNT PAIN NOTED PAIN PRSNT: CPT | Performed by: FAMILY MEDICINE

## 2023-01-17 PROCEDURE — 1159F MED LIST DOCD IN RCRD: CPT | Performed by: FAMILY MEDICINE

## 2023-01-17 PROCEDURE — G0439 PPPS, SUBSEQ VISIT: HCPCS | Performed by: FAMILY MEDICINE

## 2023-01-17 PROCEDURE — 96372 THER/PROPH/DIAG INJ SC/IM: CPT | Performed by: FAMILY MEDICINE

## 2023-01-17 PROCEDURE — 96160 PT-FOCUSED HLTH RISK ASSMT: CPT | Performed by: FAMILY MEDICINE

## 2023-01-17 PROCEDURE — 1170F FXNL STATUS ASSESSED: CPT | Performed by: FAMILY MEDICINE

## 2023-01-17 RX ORDER — TRAZODONE HYDROCHLORIDE 50 MG/1
50 TABLET ORAL
Qty: 90 TABLET | Refills: 1 | Status: SHIPPED | OUTPATIENT
Start: 2023-01-17 | End: 2023-03-09 | Stop reason: ALTCHOICE

## 2023-01-17 RX ORDER — HYDROCHLOROTHIAZIDE 25 MG/1
25 TABLET ORAL DAILY
COMMUNITY
Start: 2023-01-12

## 2023-01-17 RX ORDER — RIZATRIPTAN BENZOATE 10 MG/1
10 TABLET ORAL ONCE AS NEEDED
Qty: 27 TABLET | Refills: 3 | Status: SHIPPED | OUTPATIENT
Start: 2023-01-17 | End: 2023-03-10 | Stop reason: SDUPTHER

## 2023-01-17 RX ORDER — DEXAMETHASONE SODIUM PHOSPHATE 4 MG/ML
4 INJECTION, SOLUTION INTRA-ARTICULAR; INTRALESIONAL; INTRAMUSCULAR; INTRAVENOUS; SOFT TISSUE ONCE
Status: COMPLETED | OUTPATIENT
Start: 2023-01-17 | End: 2023-01-17

## 2023-01-17 RX ORDER — PROGESTERONE 100 MG/1
CAPSULE ORAL
Status: CANCELLED | OUTPATIENT
Start: 2023-01-17

## 2023-01-17 RX ORDER — CLINDAMYCIN HYDROCHLORIDE 300 MG/1
300 CAPSULE ORAL 3 TIMES DAILY
Qty: 30 CAPSULE | Refills: 0 | Status: SHIPPED | OUTPATIENT
Start: 2023-01-17 | End: 2023-01-27

## 2023-01-17 RX ORDER — SODIUM FLUORIDE 5 MG/G
GEL, DENTIFRICE DENTAL
COMMUNITY
Start: 2023-01-15 | End: 2023-03-09 | Stop reason: ALTCHOICE

## 2023-01-17 RX ORDER — LEVOMILNACIPRAN HYDROCHLORIDE 40 MG/1
CAPSULE, EXTENDED RELEASE ORAL
COMMUNITY
Start: 2023-01-12 | End: 2023-03-09 | Stop reason: ALTCHOICE

## 2023-01-17 RX ADMIN — DEXAMETHASONE SODIUM PHOSPHATE 4 MG: 4 INJECTION, SOLUTION INTRA-ARTICULAR; INTRALESIONAL; INTRAMUSCULAR; INTRAVENOUS; SOFT TISSUE at 11:24

## 2023-01-17 NOTE — TELEPHONE ENCOUNTER
Caller: Tatiana Fields    Relationship: Self    Best call back number: [phone]  What is the best time to reach you: ANY     Who are you requesting to speak with (clinical staff, provider,  specific staff member): CLINICAL       What was the call regarding: DISCUSSED NEW MEDICATION VIVANCE AND ADDERALL, BOTH REQUIRE A PRIOR AUTH BUT ADDERALL WILL ONLY BE $10 AFTER THAT     Do you require a callback: YES

## 2023-01-17 NOTE — PATIENT INSTRUCTIONS
Medicare Wellness  Personal Prevention Plan of Service     Date of Office Visit:    Encounter Provider:  Amber Mills MD  Place of Service:  [facility] [place]  Patient Name: Tatiana Fields  :  1965    As part of the Medicare Wellness portion of your visit today, we are providing you with this personalized preventive plan of services (PPPS). This plan is based upon recommendations of the [place] Preventive Services Task Force (USPSTF) and the Advisory Committee on Immunization Practices (ACIP).    This lists the preventive care services that should be considered, and provides dates of when you are due. Items listed as completed are up-to-date and do not require any further intervention.    Health Maintenance   Topic Date Due    TDAP/TD VACCINES (1 - Tdap) Never done    ZOSTER VACCINE (1 of 2) Never done    MAMMOGRAM  2022    ANNUAL WELLNESS VISIT  2022    COVID-19 Vaccine (5 - Booster for Pfizer series) 12/15/2022    HEMOGLOBIN A1C  2023    LIPID PANEL  2023    COLONOSCOPY  2030    HEPATITIS C SCREENING  Completed    INFLUENZA VACCINE  Completed    Pneumococcal Vaccine 0-64  Aged Out    DIABETIC FOOT EXAM  Discontinued    PAP SMEAR  Discontinued    DIABETIC EYE EXAM  Discontinued    URINE MICROALBUMIN  Discontinued       No orders of the defined types were placed in this encounter.      Return in about 3 months (around 2023).

## 2023-01-17 NOTE — PROGRESS NOTES
The ABCs of the Annual Wellness Visit  Subsequent Medicare Wellness Visit    Subjective    Tatiana Fields is a 57 y.o. female who presents for a Subsequent Medicare Wellness Visit.    Overdue for mammogram.    The following portions of the patient's history were reviewed and   updated as appropriate: allergies, current medications, past family history, past medical history, past social history, past surgical history and problem list.    Compared to one year ago, the patient feels her physical   health is the same.    Compared to one year ago, the patient feels her mental   health is the same.    Recent Hospitalizations:  This patient has had a [facility] admission record on file within the last 365 days.    Current Medical Providers:  Patient Care Team:  Amber Mills MD as PCP - General (Family Medicine)  Jake Naranjo MD as Consulting Physician (Cardiology)  Bora Miles MD as Consulting Physician (Sleep Medicine)  Lisa Villalobos MD as Consulting Physician (Physical Medicine and Rehabilitation)  Keisha Ruiz MD as Consulting Physician (Gastroenterology)  Malik Faria II, MD as Consulting Physician (Pain Medicine)  Mal Weeks MD as Consulting Physician (Colon and Rectal Surgery)  Shira Lawrence MD as Obstetrician (Obstetrics and Gynecology)  Aj Mendiola MD as Surgeon (Neurosurgery)  Doc David MD as Consulting Physician (Gastroenterology)  Abigail Kang MD as Consulting Physician (Colon and Rectal Surgery)    Outpatient Medications Prior to Visit   Medication Sig Dispense Refill   • acetaminophen (TYLENOL) 500 MG tablet Take 2 tablets by mouth Every 6 (Six) Hours.     • clonazePAM (KlonoPIN) 0.5 MG tablet TAKE 1 TABLET BY MOUTH TWICE DAILY AS NEEDED FOR ANXIETY OR INSOMNIA 60 tablet 1   • estradiol (MINIVELLE, VIVELLE-DOT) 0.05 MG/24HR patch Place 1 patch on the skin as directed by provider 2 (Two) Times a Week. 8 each 11   • famciclovir (FAMVIR)  500 MG tablet Take 500 mg by mouth Daily.     • Fetzima 40 MG capsule sustained-release 24 hr      • fluticasone (FLONASE) 50 MCG/ACT nasal spray USE 2 SPRAYS TO EACH NOSTRIL EVERY DAY 48 g 0   • hydroCHLOROthiazide (HYDRODIURIL) 25 MG tablet Take 25 mg by mouth Daily.     • levothyroxine (SYNTHROID, LEVOTHROID) 50 MCG tablet TAKE 1 TABLET BY MOUTH DAILY 90 tablet 1   • Linzess 145 MCG capsule capsule TAKE 1 CAPSULE BY MOUTH 1 TIME EACH DAY     • lisinopril (PRINIVIL,ZESTRIL) 30 MG tablet Take 0.5 tablets by mouth Daily. 30 tablet 0   • loratadine-pseudoephedrine (Wal-itin D 24 Hour)  MG per 24 hr tablet Take 1 tablet by mouth Daily. 30 tablet 2   • methylphenidate (Concerta) 27 MG CR tablet Take 1 tablet by mouth Every Morning 30 tablet 0   • naproxen (NAPROSYN) 500 MG tablet      • pantoprazole (PROTONIX) 40 MG EC tablet Take 40 mg by mouth Daily.     • progesterone (PROMETRIUM) 100 MG capsule Take one capsule at hs (Patient taking differently: Take 100 mg by mouth Daily. Take one capsule at hs) 30 capsule 5   • Sodium Fluoride 1.1 % gel      • tiZANidine (ZANAFLEX) 4 MG tablet Take 1 tablet by mouth Every 8 (Eight) Hours As Needed for Muscle Spasms. 90 tablet 0   • rizatriptan (MAXALT) 10 MG tablet Take 10 mg by mouth 1 (One) Time As Needed for Migraine.     • traZODone (DESYREL) 50 MG tablet Take 50 mg by mouth every night at bedtime.     • estradiol (ESTRACE) 0.1 MG/GM vaginal cream Insert  into the vagina Daily.     • Testosterone powder Place 0.5 mL on the skin as directed by provider Daily. DISPENSE 15 ML 0.15 g 2   • fluconazole (Diflucan) 150 MG tablet 1 po then repeat 1 week 2 tablet 0   • FLUoxetine (PROzac) 40 MG capsule Take 40 mg by mouth Daily.     • zolpidem (AMBIEN) 10 MG tablet Take 1 tablet by mouth At Night As Needed for Sleep. 30 tablet 2     No facility-administered medications prior to visit.       No opioid medication identified on active medication list. I have reviewed chart for  "other potential  high risk medication/s and harmful drug interactions in the elderly.          Aspirin is not on active medication list.  Aspirin use is not indicated based on review of current medical condition/s. Risk of harm outweighs potential benefits.  .    Patient Active Problem List   Diagnosis   • Depressive disorder   • Periodic limb movement disorder   • Chronic daily headache   • Excessive daytime sleepiness   • Witnessed apneic spells   • Intractable migraine with aura without status migrainosus   • History of Lyme disease   • Fibromyalgia   • Chronic interstitial cystitis   • Essential hypertension   • IFG (impaired fasting glucose)   • Chronic constipation   • Duodenal gastroesophageal reflux   • Irritable bowel syndrome   • Posttraumatic stress disorder   • Vitamin D deficiency   • Hepatomegaly   • Postmenopausal disorder   • Chronic insomnia   • Cervical arthritis   • Cervical nerve root compression   • Vitamin B12 deficiency   • Hormone replacement therapy (HRT)   • Polypharmacy   • Chronic migraine without aura   • Postherpetic neuralgia   • Colonic inertia   • Cervical myelopathy (HCC)   • DDD (degenerative disc disease), cervical   • Pelvic floor dysfunction in female   • Female cystocele   • Vaginal vault prolapse   • Acquired hypothyroidism   • Gastroparesis   • Esophageal spasm   • Gastroesophageal reflux disease     Advance Care Planning  Advance Directive is not on file.  ACP discussion was held with the patient during this visit. Patient does not have an advance directive, information provided.     Objective    Vitals:    01/17/23 1026   BP: 120/74   Pulse: 102   Temp: 98.1 °F (36.7 °C)   SpO2: 97%   Weight: 75.3 kg (166 lb)   Height: 162.6 cm (64\")   PainSc:   6     Estimated body mass index is 28.49 kg/m² as calculated from the following:    Height as of this encounter: 162.6 cm (64\").    Weight as of this encounter: 75.3 kg (166 lb).    BMI is >= 25 and <30. (Overweight) The following " options were offered after discussion;: exercise counseling/recommendations and nutrition counseling/recommendations      Does the patient have evidence of cognitive impairment? No          HEALTH RISK ASSESSMENT    Smoking Status:  Social History     Tobacco Use   Smoking Status Never   • Passive exposure: Never   Smokeless Tobacco Never     Alcohol Consumption:  Social History     Substance and Sexual Activity   Alcohol Use No     Fall Risk Screen:    MADDIE Fall Risk Assessment has not been completed.    Depression Screening:  PHQ-2/PHQ-9 Depression Screening 10/20/2022   Feeling Down, Depressed or Hopeless 0-->not at all   PHQ-9: Brief Depression Severity Measure Score 0       Health Habits and Functional and Cognitive Screening:  Functional & Cognitive Status 1/17/2023   Do you have difficulty preparing food and eating? No   Do you have difficulty bathing yourself, getting dressed or grooming yourself? No   Do you have difficulty using the toilet? No   Do you have difficulty moving around from place to place? No   Do you have trouble with steps or getting out of a bed or a chair? No   Current Diet Well Balanced Diet   Dental Exam Up to date   Eye Exam Not up to date   Exercise (times per week) 7 times per week   Current Exercises Include Walking   Current Exercise Activities Include -   Do you need help using the phone?  No   Are you deaf or do you have serious difficulty hearing?  No   Do you need help with transportation? No   Do you need help shopping? No   Do you need help preparing meals?  No   Do you need help with housework?  No   Do you need help with laundry? No   Do you need help taking your medications? No   Do you need help managing money? No   Do you ever drive or ride in a car without wearing a seat belt? No   Have you felt unusual stress, anger or loneliness in the last month? Yes   Who do you live with? Alone   If you need help, do you have trouble finding someone available to you? Yes   Have  you been bothered in the last four weeks by sexual problems? No   Do you have difficulty concentrating, remembering or making decisions? No       Age-appropriate Screening Schedule:  Refer to the list below for future screening recommendations based on patient's age, sex and/or medical conditions. Orders for these recommended tests are listed in the plan section. The patient has been provided with a written plan.    Health Maintenance   Topic Date Due   • TDAP/TD VACCINES (1 - Tdap) Never done   • ZOSTER VACCINE (1 of 2) Never done   • MAMMOGRAM  02/11/2022   • HEMOGLOBIN A1C  03/26/2023   • LIPID PANEL  09/26/2023   • COLONOSCOPY  03/27/2030   • INFLUENZA VACCINE  Completed   • DIABETIC FOOT EXAM  Discontinued   • PAP SMEAR  Discontinued   • DIABETIC EYE EXAM  Discontinued   • URINE MICROALBUMIN  Discontinued                CMS Preventative Services Quick Reference  Risk Factors Identified During Encounter  Chronic Pain: Natural history and expected course discussed. Questions answered.  Depression/Dysphoria: Current medication is effective, no change recommended  Immunizations Discussed/Encouraged: Tdap, Influenza, Pneumococcal 23, Prevnar 20 (Pneumococcal 20-valent conjugate), Shingrix and COVID19  Inactivity/Sedentary: Patient was advised to exercise at least 150 minutes a week per CDC recommendations.  Polypharmacy: Medication List reviewed  The above risks/problems have been discussed with the patient.  Pertinent information has been shared with the patient in the After Visit Summary.  An After Visit Summary and PPPS were made available to the patient.    Follow Up:   Next Medicare Wellness visit to be scheduled in 1 year.       Additional E&M Note during same encounter follows:  Patient has multiple medical problems which are significant and separately identifiable that require additional work above and beyond the Medicare Wellness Visit.      Chief Complaint  Medicare Wellness-subsequent    Subjective   "      HPI  Tatiana Fields is also being seen today for f/u on several chronic medical problems.    Currently on prozac and trazodone for depression. Mood stable. Still looking for psychologist for counseling. Had telehealth psych eval for suspected ADHD. Confirmed dx. Currently on Concerta. Does not feel it is particularly effective, interested in trying adderall or vyvanse, but concerned about cost.    Decreased dose of lisiniopril as BP under better control.    Last sleep study in 2017 does not have sleep apnea but does have PLMD.     Has broken tooth with assoc'd infection. Awaiting apt with oral surgeon. Had eval by primary dentist. Continues to have pain left cheek, forehead, congestion. No fever.      Review of Systems   Constitutional: Positive for fatigue. Negative for fever.   HENT: Positive for congestion, dental problem, postnasal drip, rhinorrhea, sinus pressure and trouble swallowing. Negative for mouth sores and nosebleeds.    Eyes: Positive for visual disturbance (chronic).   Respiratory: Negative for cough, shortness of breath and wheezing.    Cardiovascular: Positive for palpitations. Negative for chest pain and leg swelling.   Gastrointestinal: Positive for diarrhea and nausea. Negative for vomiting.   Endocrine: Positive for heat intolerance.   Genitourinary: Positive for dysuria (mild, intermittent, chronic) and pelvic pain (chronic).   Musculoskeletal: Positive for arthralgias, myalgias, neck pain and neck stiffness.   Neurological: Positive for weakness. Negative for syncope, facial asymmetry, speech difficulty and confusion.   Hematological: Negative for adenopathy. Bruises/bleeds easily.   Psychiatric/Behavioral: Positive for dysphoric mood and sleep disturbance. The patient is nervous/anxious.    Pt's previous ROS reviewed and updated as indicated.       Objective   Vital Signs:  /74   Pulse 102   Temp 98.1 °F (36.7 °C)   Ht 162.6 cm (64\")   Wt 75.3 kg (166 lb)   SpO2 97%   BMI " 28.49 kg/m²     Physical Exam  Vitals and nursing note reviewed.   Constitutional:       General: She is not in acute distress.     Appearance: She is well-developed, well-groomed and overweight. She is not ill-appearing.   HENT:      Head: Atraumatic.      Right Ear: Ear canal and external ear normal. Tympanic membrane is retracted.      Left Ear: Ear canal and external ear normal. Tympanic membrane is retracted.      Nose: Mucosal edema and congestion present. No rhinorrhea.      Right Sinus: Maxillary sinus tenderness and frontal sinus tenderness present.      Left Sinus: Maxillary sinus tenderness and frontal sinus tenderness present.      Mouth/Throat:      Mouth: Mucous membranes are moist. No oral lesions.      Pharynx: Oropharynx is clear.   Eyes:      General: No scleral icterus.     Conjunctiva/sclera: Conjunctivae normal.   Neck:      Thyroid: No thyroid mass.   Cardiovascular:      Rate and Rhythm: Normal rate and regular rhythm.      Pulses: Normal pulses.      Heart sounds: Normal heart sounds.   Pulmonary:      Effort: Pulmonary effort is normal.      Breath sounds: Normal breath sounds.   Musculoskeletal:      Right lower leg: No edema.      Left lower leg: No edema.   Lymphadenopathy:      Cervical: No cervical adenopathy.   Skin:     General: Skin is warm and dry.      Coloration: Skin is not jaundiced or pale.   Neurological:      Mental Status: She is alert and oriented to person, place, and time.      Gait: Gait is intact.   Psychiatric:         Mood and Affect: Mood and affect normal.         Behavior: Behavior normal. Behavior is cooperative.         Cognition and Memory: Cognition normal.     Pt's previous physical exam reviewed and updated as indicated.                     Lab Results   Component Value Date    WBC 5.09 11/16/2022    HGB 11.8 (L) 11/16/2022    HCT 36.5 11/16/2022    MCV 86.7 11/16/2022     11/16/2022       Lab Results   Component Value Date    GLUCOSE 93 11/16/2022     BUN 9 11/16/2022    CREATININE 0.72 11/16/2022    EGFRIFNONA 84 12/23/2021    EGFRIFAFRI 97 12/23/2021    BCR 12.5 11/16/2022    K 4.6 11/16/2022    CO2 24.3 11/16/2022    CALCIUM 9.6 11/16/2022    PROTENTOTREF 6.6 10/20/2022    ALBUMIN 4.60 10/20/2022    LABIL2 2.3 10/20/2022    AST 17 10/20/2022    ALT 16 10/20/2022       Lab Results   Component Value Date    CHOL 172 08/16/2021    CHLPL 203 (H) 09/26/2022    TRIG 249 (H) 09/26/2022    HDL 50 09/26/2022     (H) 09/26/2022       Lab Results   Component Value Date    HGBA1C 6.1 (H) 09/26/2022       Lab Results   Component Value Date    TSH 2.290 10/20/2022          Assessment and Plan   Diagnoses and all orders for this visit:    1. Medicare annual wellness visit, subsequent (Primary)    2. Hormone replacement therapy (HRT)    3. Postmenopausal disorder    4. Dental infection  -     clindamycin (Cleocin) 300 MG capsule; Take 1 capsule by mouth 3 (Three) Times a Day for 10 days.  Dispense: 30 capsule; Refill: 0    5. Chronic sinusitis, unspecified location  -     dexamethasone (DECADRON) injection 4 mg    6. Chronic insomnia    7. Periodic limb movement disorder    8. Chronic migraine without aura without status migrainosus, not intractable    9. Chronic daily headache    10. Fibromyalgia    11. Posttraumatic stress disorder    12. Depressive disorder    13. Chronic interstitial cystitis    14. Acquired hypothyroidism    15. Essential hypertension    16. Polypharmacy    17. Encounter for screening mammogram for malignant neoplasm of breast  -     Mammo Screening Digital Tomosynthesis Bilateral With CAD; Future    18. Cervical myelopathy (HCC)    19. Recurrent major depression resistant to treatment (HCC)    Other orders  -     traZODone (DESYREL) 50 MG tablet; Take 1 tablet by mouth every night at bedtime.  Dispense: 90 tablet; Refill: 1  -     rizatriptan (MAXALT) 10 MG tablet; Take 1 tablet by mouth 1 (One) Time As Needed for Migraine.  Dispense: 27 tablet;  Refill: 3      Confirmed dx of ADHD-inattentive type. I have reviewed risks/benefits and potential side effects of various treatment options. Pt voices understanding and wishes to proceed with trial of Adderall XR in place on Concerta when due at end of month.    F/u with oral surgery as directed by her dentist. abx for assoc'd infection.    Depression with anxiety stable. Options for counseling services reviewed. She wishes to schedule her own apt.    Chronic pain appears stable.       Follow Up   Return in about 2 months (around 3/17/2023).   F/u sooner as needed.  Patient was encouraged to keep me informed of any acute changes, lack of improvement, or any new concerning symptoms.  Pt is aware of reasons to seek emergent care.  Patient voiced understanding of all instructions and denied further questions.    Patient was given instructions and counseling regarding her condition or for health maintenance advice. Please see specific information pulled into the AVS if appropriate.     As part of patient's treatment plan I am prescribing a controlled substance.  The patient has been made aware of the appropriate use of such medications, including potential risk of somnolence, limited ability to drive and/or work safely, and potential for dependence and/or overdose.  It has also been made clear that these medications are for use by this patient only, without concomitant use of alcohol or other substances, unless prescribed.  History and physical exam exhibit continued safe and appropriate use of controlled substance.  KIMBERLY reviewed.  Patient has completed a prescribing agreement detailing terms of continued prescribing of controlled substances, including monitoring KIMBERLY reports, urine drug screening, and pill counts if necessary.  Patient is aware that inappropriate use will result in cessation of prescribing such medications.    Please note that portions of this note may have been completed with a voice recognition  program.

## 2023-01-24 RX ORDER — DEXTROAMPHETAMINE SACCHARATE, AMPHETAMINE ASPARTATE MONOHYDRATE, DEXTROAMPHETAMINE SULFATE AND AMPHETAMINE SULFATE 3.75; 3.75; 3.75; 3.75 MG/1; MG/1; MG/1; MG/1
15 CAPSULE, EXTENDED RELEASE ORAL EVERY MORNING
Qty: 30 CAPSULE | Refills: 0 | Status: SHIPPED | OUTPATIENT
Start: 2023-01-24 | End: 2023-03-09 | Stop reason: ALTCHOICE

## 2023-02-03 ENCOUNTER — TELEPHONE (OUTPATIENT)
Dept: FAMILY MEDICINE CLINIC | Facility: CLINIC | Age: 58
End: 2023-02-03

## 2023-02-03 NOTE — TELEPHONE ENCOUNTER
Caller: Tatiana Fields    Relationship: Self    Best call back number:     What medication are you requesting:     SOMETHING IN PILL FORM SIMILAR TO THE STEROID SHOT SHE GOT WHEN SHE WAS IN THE OFFICE LAST    If a prescription is needed, what is your preferred pharmacy and phone number:      [id number]  [place], [place] - [address] AT [facility] [address] - [phone]  - [phone] FX     Additional notes:    PATIENT WAS IN FEW WEEKS AGO AND GOT A STEROID SHOT    IT HELPED A WHOLE LOT - SHE HAS BEEN FIGHTING A SINUS INFECTION    IS THERE SOMETHING SIMILAR IN PILL FORM SHE CAN GET A PRESCRIPTION FOR  IF SHE IS NOT DOING BETTER BY MONEY SHE WILL CALL AND SEE WHEN SHE CAN GET IN THE OFFICE

## 2023-02-10 DIAGNOSIS — Z00.00 MEDICARE ANNUAL WELLNESS VISIT, SUBSEQUENT: Primary | ICD-10-CM

## 2023-02-10 LAB
EXPIRATION DATE 2: NORMAL
EXPIRATION DATE 3: NORMAL
EXPIRATION DATE: NORMAL
GASTROCULT GAST QL: NEGATIVE
HEMOCCULT SP2 STL QL: NEGATIVE
HEMOCCULT SP3 STL QL: NEGATIVE
Lab: NORMAL

## 2023-02-10 PROCEDURE — 82274 ASSAY TEST FOR BLOOD FECAL: CPT | Performed by: FAMILY MEDICINE

## 2023-02-16 DIAGNOSIS — E03.9 ACQUIRED HYPOTHYROIDISM: ICD-10-CM

## 2023-02-17 RX ORDER — LEVOTHYROXINE SODIUM 0.05 MG/1
TABLET ORAL
Qty: 90 TABLET | Refills: 1 | Status: SHIPPED | OUTPATIENT
Start: 2023-02-17 | End: 2023-03-19 | Stop reason: SDUPTHER

## 2023-02-19 DIAGNOSIS — F41.1 GAD (GENERALIZED ANXIETY DISORDER): ICD-10-CM

## 2023-02-20 RX ORDER — CLONAZEPAM 0.5 MG/1
TABLET ORAL
Qty: 60 TABLET | Refills: 1 | Status: SHIPPED | OUTPATIENT
Start: 2023-02-20

## 2023-03-03 RX ORDER — FAMCICLOVIR 500 MG/1
500 TABLET ORAL DAILY
OUTPATIENT
Start: 2023-03-03

## 2023-03-09 RX ORDER — LISDEXAMFETAMINE DIMESYLATE 50 MG
1 CAPSULE ORAL DAILY
COMMUNITY
Start: 2023-02-22

## 2023-03-09 RX ORDER — DEXTROMETHORPHAN HYDROBROMIDE, BUPROPION HYDROCHLORIDE 105; 45 MG/1; MG/1
1 TABLET, MULTILAYER, EXTENDED RELEASE ORAL EVERY 12 HOURS SCHEDULED
COMMUNITY
Start: 2023-01-31

## 2023-03-09 RX ORDER — FAMCICLOVIR 500 MG/1
500 TABLET ORAL DAILY
Qty: 30 TABLET | Refills: 5 | Status: SHIPPED | OUTPATIENT
Start: 2023-03-09

## 2023-03-09 RX ORDER — QUETIAPINE FUMARATE 25 MG/1
TABLET, FILM COATED ORAL
COMMUNITY
Start: 2023-02-23

## 2023-03-09 RX ORDER — TIZANIDINE 4 MG/1
4 TABLET ORAL EVERY 8 HOURS PRN
Qty: 90 TABLET | Refills: 5 | Status: SHIPPED | OUTPATIENT
Start: 2023-03-09

## 2023-03-09 RX ORDER — OXYCODONE HCL/ACETAMINOPHEN 10MG-325MG
1 TABLET ORAL 3 TIMES DAILY
COMMUNITY
Start: 2023-02-21 | End: 2023-03-27

## 2023-03-10 RX ORDER — RIZATRIPTAN BENZOATE 10 MG/1
10 TABLET ORAL ONCE AS NEEDED
Qty: 27 TABLET | Refills: 3 | Status: SHIPPED | OUTPATIENT
Start: 2023-03-10

## 2023-03-15 ENCOUNTER — OFFICE VISIT (OUTPATIENT)
Dept: FAMILY MEDICINE CLINIC | Facility: CLINIC | Age: 58
End: 2023-03-15
Payer: MEDICARE

## 2023-03-15 VITALS
OXYGEN SATURATION: 97 % | SYSTOLIC BLOOD PRESSURE: 120 MMHG | TEMPERATURE: 98.6 F | BODY MASS INDEX: 27.31 KG/M2 | HEART RATE: 109 BPM | WEIGHT: 160 LBS | DIASTOLIC BLOOD PRESSURE: 74 MMHG | HEIGHT: 64 IN

## 2023-03-15 DIAGNOSIS — E03.9 ACQUIRED HYPOTHYROIDISM: ICD-10-CM

## 2023-03-15 DIAGNOSIS — J01.01 ACUTE RECURRENT MAXILLARY SINUSITIS: Primary | ICD-10-CM

## 2023-03-15 DIAGNOSIS — M79.671 FOOT PAIN, RIGHT: ICD-10-CM

## 2023-03-15 DIAGNOSIS — R73.01 IFG (IMPAIRED FASTING GLUCOSE): ICD-10-CM

## 2023-03-15 DIAGNOSIS — I10 ESSENTIAL HYPERTENSION: ICD-10-CM

## 2023-03-15 DIAGNOSIS — E55.9 VITAMIN D DEFICIENCY: ICD-10-CM

## 2023-03-15 PROCEDURE — 3074F SYST BP LT 130 MM HG: CPT | Performed by: FAMILY MEDICINE

## 2023-03-15 PROCEDURE — 1159F MED LIST DOCD IN RCRD: CPT | Performed by: FAMILY MEDICINE

## 2023-03-15 PROCEDURE — 1160F RVW MEDS BY RX/DR IN RCRD: CPT | Performed by: FAMILY MEDICINE

## 2023-03-15 PROCEDURE — 96372 THER/PROPH/DIAG INJ SC/IM: CPT | Performed by: FAMILY MEDICINE

## 2023-03-15 PROCEDURE — 3078F DIAST BP <80 MM HG: CPT | Performed by: FAMILY MEDICINE

## 2023-03-15 PROCEDURE — 99214 OFFICE O/P EST MOD 30 MIN: CPT | Performed by: FAMILY MEDICINE

## 2023-03-15 RX ORDER — CEFTRIAXONE 1 G/1
1 INJECTION, POWDER, FOR SOLUTION INTRAMUSCULAR; INTRAVENOUS ONCE
Status: COMPLETED | OUTPATIENT
Start: 2023-03-15 | End: 2023-03-15

## 2023-03-15 RX ORDER — CEPHALEXIN 500 MG/1
500 CAPSULE ORAL 3 TIMES DAILY
Qty: 30 CAPSULE | Refills: 0 | Status: SHIPPED | OUTPATIENT
Start: 2023-03-15 | End: 2023-03-25

## 2023-03-15 RX ORDER — DEXAMETHASONE SODIUM PHOSPHATE 4 MG/ML
4 INJECTION, SOLUTION INTRA-ARTICULAR; INTRALESIONAL; INTRAMUSCULAR; INTRAVENOUS; SOFT TISSUE ONCE
Status: COMPLETED | OUTPATIENT
Start: 2023-03-15 | End: 2023-03-15

## 2023-03-15 RX ORDER — FLUCONAZOLE 150 MG/1
TABLET ORAL
Qty: 3 TABLET | Refills: 0 | Status: SHIPPED | OUTPATIENT
Start: 2023-03-15 | End: 2023-03-27

## 2023-03-15 RX ADMIN — DEXAMETHASONE SODIUM PHOSPHATE 4 MG: 4 INJECTION, SOLUTION INTRA-ARTICULAR; INTRALESIONAL; INTRAMUSCULAR; INTRAVENOUS; SOFT TISSUE at 16:47

## 2023-03-15 RX ADMIN — CEFTRIAXONE 1 G: 1 INJECTION, POWDER, FOR SOLUTION INTRAMUSCULAR; INTRAVENOUS at 16:47

## 2023-03-15 NOTE — PROGRESS NOTES
Subjective   Tatiana Fields is a 57 y.o. female.     History of Present Illness  She c/o facial pain, purulent nasal discharge  Sinusitis  This is a recurrent problem. The current episode started more than 1 month ago. The problem has been gradually worsening since onset. There has been no fever. The pain is moderate. Associated symptoms include congestion, ear pain, headaches, a hoarse voice (mildly), shortness of breath, sinus pressure and a sore throat (mild). Pertinent negatives include no coughing or swollen glands. Past treatments include oral decongestants, saline sprays and acetaminophen (nasal CS). The treatment provided no relief.      She c/o worsening great Toe pain. No injury.    Taking thyroid replacement as rx'd. Due for recheck TSH.    On hctz, lisinopril for HTN.     Taking her vit D replacement.    Previous IFG, preDM. Working on improving diet.    The following portions of the patient's history were reviewed and updated as appropriate: allergies, current medications, past family history, past medical history, past social history, past surgical history and problem list.    Review of Systems   Constitutional: Positive for fatigue. Negative for fever.   HENT: Positive for congestion, ear pain, hoarse voice (mildly), postnasal drip, rhinorrhea, sinus pressure, sinus pain and sore throat (mild). Negative for mouth sores and nosebleeds.    Eyes: Positive for pain. Negative for discharge and redness.   Respiratory: Positive for shortness of breath. Negative for cough and wheezing.    Cardiovascular: Negative for chest pain.   Gastrointestinal: Negative for diarrhea, nausea and vomiting.   Genitourinary: Negative for dysuria.   Musculoskeletal: Positive for arthralgias.   Skin: Negative for rash.   Neurological: Positive for headaches.   Hematological: Negative for adenopathy.       Objective    Vitals:    03/15/23 1620   BP: 120/74   Pulse: 109   Temp: 98.6 °F (37 °C)   SpO2: 97%     Body mass index is  27.46 kg/m².      03/15/23  1620   Weight: 72.6 kg (160 lb)       Physical Exam  Vitals and nursing note reviewed.   Constitutional:       General: She is not in acute distress.     Appearance: She is well-developed, well-groomed and overweight. She is ill-appearing (mildly).   HENT:      Right Ear: Ear canal and external ear normal. Tympanic membrane is retracted.      Left Ear: Ear canal and external ear normal. Tympanic membrane is retracted.      Nose: Mucosal edema and congestion present. No rhinorrhea.      Right Sinus: Maxillary sinus tenderness present.      Left Sinus: Maxillary sinus tenderness present.      Mouth/Throat:      Mouth: Mucous membranes are moist. No oral lesions.      Pharynx: Oropharynx is clear.   Eyes:      General: No scleral icterus.     Conjunctiva/sclera: Conjunctivae normal.   Neck:      Thyroid: No thyroid mass.   Cardiovascular:      Rate and Rhythm: Normal rate and regular rhythm.      Pulses: Normal pulses.      Heart sounds: Normal heart sounds.   Pulmonary:      Effort: Pulmonary effort is normal.      Breath sounds: Normal breath sounds.   Musculoskeletal:      Right lower leg: No edema.      Left lower leg: No edema.      Left foot: Deformity (mild) present.        Feet:    Feet:      Left foot:      Skin integrity: Skin integrity normal.      Comments: approx location of TTP  Lymphadenopathy:      Cervical: No cervical adenopathy.   Skin:     General: Skin is warm and dry.      Coloration: Skin is not jaundiced or pale.      Findings: No rash.   Neurological:      Mental Status: She is alert and oriented to person, place, and time.      Gait: Gait is intact.   Psychiatric:         Mood and Affect: Mood and affect normal.         Behavior: Behavior normal. Behavior is cooperative.         Cognition and Memory: Cognition normal.         Assessment & Plan   Diagnoses and all orders for this visit:    1. Acute recurrent maxillary sinusitis (Primary)  -     cefTRIAXone (ROCEPHIN)  injection 1 g  -     dexamethasone (DECADRON) injection 4 mg    2. Foot pain, right  -     Uric Acid  -     Sedimentation Rate  -     C-reactive Protein    3. IFG (impaired fasting glucose)  -     Comprehensive Metabolic Panel  -     Hemoglobin A1c    4. Essential hypertension  -     Comprehensive Metabolic Panel    5. Vitamin D deficiency  -     Vitamin D,25-Hydroxy    6. Acquired hypothyroidism  -     TSH Rfx On Abnormal To Free T4    Other orders  -     cephalexin (Keflex) 500 MG capsule; Take 1 capsule by mouth 3 (Three) Times a Day for 10 days.  Dispense: 30 capsule; Refill: 0  -     fluconazole (Diflucan) 150 MG tablet; 1 po now, repeat every 5 days until gone  Dispense: 3 tablet; Refill: 0  -     T4F       Symptom mgnt for sinusitis reviewed.  eval for gout as above, consider imaging  Assess status of vit/min deficiencies and replace as indicated.  Routine surveillance labs as above.  Keep routine f/u, f/u sooner as needed/instructed.  I will contact patient regarding test results and provide instructions regarding any necessary changes in plan of care.  Patient was encouraged to keep me informed of any acute changes, lack of improvement, or any new concerning symptoms.  Pt is aware of reasons to seek emergent care.  Patient voiced understanding of all instructions and denied further questions.    Please note that portions of this note may have been completed with a voice recognition program.

## 2023-03-16 LAB
25(OH)D3+25(OH)D2 SERPL-MCNC: 19.5 NG/ML (ref 30–100)
ALBUMIN SERPL-MCNC: 4.2 G/DL (ref 3.8–4.9)
ALBUMIN/GLOB SERPL: 2 {RATIO} (ref 1.2–2.2)
ALP SERPL-CCNC: 69 IU/L (ref 44–121)
ALT SERPL-CCNC: 12 IU/L (ref 0–32)
AST SERPL-CCNC: 16 IU/L (ref 0–40)
BILIRUB SERPL-MCNC: 0.7 MG/DL (ref 0–1.2)
BUN SERPL-MCNC: 17 MG/DL (ref 6–24)
BUN/CREAT SERPL: 17 (ref 9–23)
CALCIUM SERPL-MCNC: 9.7 MG/DL (ref 8.7–10.2)
CHLORIDE SERPL-SCNC: 104 MMOL/L (ref 96–106)
CO2 SERPL-SCNC: 18 MMOL/L (ref 20–29)
CREAT SERPL-MCNC: 0.99 MG/DL (ref 0.57–1)
CRP SERPL-MCNC: <1 MG/L (ref 0–10)
EGFRCR SERPLBLD CKD-EPI 2021: 67 ML/MIN/1.73
ERYTHROCYTE [SEDIMENTATION RATE] IN BLOOD BY WESTERGREN METHOD: 10 MM/HR (ref 0–40)
GLOBULIN SER CALC-MCNC: 2.1 G/DL (ref 1.5–4.5)
GLUCOSE SERPL-MCNC: 115 MG/DL (ref 70–99)
HBA1C MFR BLD: 5.8 % (ref 4.8–5.6)
POTASSIUM SERPL-SCNC: 3.7 MMOL/L (ref 3.5–5.2)
PROT SERPL-MCNC: 6.3 G/DL (ref 6–8.5)
SODIUM SERPL-SCNC: 140 MMOL/L (ref 134–144)
T4 FREE SERPL-MCNC: 1.31 NG/DL (ref 0.82–1.77)
TSH SERPL DL<=0.005 MIU/L-ACNC: 0.27 UIU/ML (ref 0.45–4.5)
URATE SERPL-MCNC: 4.6 MG/DL (ref 3–7.2)

## 2023-03-19 DIAGNOSIS — E03.9 ACQUIRED HYPOTHYROIDISM: ICD-10-CM

## 2023-03-19 RX ORDER — LEVOTHYROXINE SODIUM 0.03 MG/1
25 TABLET ORAL DAILY
Qty: 90 TABLET | Refills: 1 | Status: SHIPPED | OUTPATIENT
Start: 2023-03-19

## 2023-03-20 ENCOUNTER — TELEPHONE (OUTPATIENT)
Dept: FAMILY MEDICINE CLINIC | Facility: CLINIC | Age: 58
End: 2023-03-20
Payer: MEDICARE

## 2023-03-20 NOTE — TELEPHONE ENCOUNTER
PATIENT WAS SEEN LAST WEEK FOR A SINUS INFECTION.  SHE FEELS LIKE SHE IS GETTING WORSE.  SPITTING UP GREEN MUCUS, EAR PAIN.  WHAT TO DO?    PLEASE CALL [phone]

## 2023-03-27 ENCOUNTER — OFFICE VISIT (OUTPATIENT)
Dept: SURGERY | Facility: CLINIC | Age: 58
End: 2023-03-27
Payer: MEDICARE

## 2023-03-27 VITALS
TEMPERATURE: 97 F | HEART RATE: 82 BPM | HEIGHT: 64 IN | BODY MASS INDEX: 28.44 KG/M2 | WEIGHT: 166.6 LBS | SYSTOLIC BLOOD PRESSURE: 110 MMHG | OXYGEN SATURATION: 96 % | DIASTOLIC BLOOD PRESSURE: 78 MMHG

## 2023-03-27 DIAGNOSIS — K31.84 GASTROPARESIS: ICD-10-CM

## 2023-03-27 DIAGNOSIS — K59.9 COLONIC INERTIA: Primary | ICD-10-CM

## 2023-03-27 PROCEDURE — 3078F DIAST BP <80 MM HG: CPT | Performed by: COLON & RECTAL SURGERY

## 2023-03-27 PROCEDURE — 99213 OFFICE O/P EST LOW 20 MIN: CPT | Performed by: COLON & RECTAL SURGERY

## 2023-03-27 PROCEDURE — 1160F RVW MEDS BY RX/DR IN RCRD: CPT | Performed by: COLON & RECTAL SURGERY

## 2023-03-27 PROCEDURE — 1159F MED LIST DOCD IN RCRD: CPT | Performed by: COLON & RECTAL SURGERY

## 2023-03-27 PROCEDURE — 3074F SYST BP LT 130 MM HG: CPT | Performed by: COLON & RECTAL SURGERY

## 2023-03-27 RX ORDER — DEXTROAMPHETAMINE SACCHARATE, AMPHETAMINE ASPARTATE, DEXTROAMPHETAMINE SULFATE AND AMPHETAMINE SULFATE 2.5; 2.5; 2.5; 2.5 MG/1; MG/1; MG/1; MG/1
1 TABLET ORAL DAILY
COMMUNITY
Start: 2023-03-21
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
## 2023-04-20 RX ORDER — FAMCICLOVIR 500 MG/1
500 TABLET ORAL DAILY
Qty: 90 TABLET | OUTPATIENT
Start: 2023-04-20

## 2023-04-25 ENCOUNTER — TRANSCRIBE ORDERS (OUTPATIENT)
Dept: ADMINISTRATIVE | Facility: HOSPITAL | Age: 58
End: 2023-04-25
Payer: MEDICARE

## 2023-04-25 DIAGNOSIS — M50.30 DDD (DEGENERATIVE DISC DISEASE), CERVICAL: Primary | ICD-10-CM

## 2023-05-13 DIAGNOSIS — N95.1 POSTMENOPAUSAL DISORDER: ICD-10-CM

## 2023-05-13 DIAGNOSIS — I10 ESSENTIAL HYPERTENSION: ICD-10-CM

## 2023-05-16 RX ORDER — PROGESTERONE 100 MG/1
CAPSULE ORAL
Qty: 90 CAPSULE | Refills: 0 | OUTPATIENT
Start: 2023-05-16

## 2023-05-16 RX ORDER — CLONIDINE HYDROCHLORIDE 0.1 MG/1
TABLET ORAL
Qty: 30 TABLET | Refills: 1 | OUTPATIENT
Start: 2023-05-16

## 2023-05-17 RX ORDER — HYDROCHLOROTHIAZIDE 25 MG/1
25 TABLET ORAL DAILY
Qty: 30 TABLET | Refills: 0 | Status: SHIPPED | OUTPATIENT
Start: 2023-05-17

## 2023-05-17 RX ORDER — HYDROCHLOROTHIAZIDE 25 MG/1
25 TABLET ORAL DAILY
Qty: 90 TABLET | OUTPATIENT
Start: 2023-05-17

## 2023-05-24 RX ORDER — HYDROCHLOROTHIAZIDE 25 MG/1
25 TABLET ORAL DAILY
Qty: 30 TABLET | Refills: 0 | OUTPATIENT
Start: 2023-05-24

## 2023-06-14 ENCOUNTER — TELEMEDICINE (OUTPATIENT)
Dept: FAMILY MEDICINE CLINIC | Facility: CLINIC | Age: 58
End: 2023-06-14
Payer: MEDICARE

## 2023-06-14 DIAGNOSIS — I10 ESSENTIAL HYPERTENSION: ICD-10-CM

## 2023-06-14 DIAGNOSIS — G47.00 INSOMNIA, UNSPECIFIED TYPE: ICD-10-CM

## 2023-06-14 DIAGNOSIS — K04.7 DENTAL INFECTION: Primary | ICD-10-CM

## 2023-06-14 RX ORDER — BACLOFEN 10 MG/1
TABLET ORAL
COMMUNITY
Start: 2023-04-12

## 2023-06-14 RX ORDER — ZOLPIDEM TARTRATE 5 MG/1
TABLET ORAL
Qty: 30 TABLET | Refills: 0 | Status: SHIPPED | OUTPATIENT
Start: 2023-06-14

## 2023-06-14 RX ORDER — LEVOTHYROXINE SODIUM 0.05 MG/1
1 TABLET ORAL DAILY
COMMUNITY
Start: 2023-05-13

## 2023-06-14 RX ORDER — LISINOPRIL 10 MG/1
10 TABLET ORAL DAILY
Qty: 90 TABLET | Refills: 3 | Status: SHIPPED | OUTPATIENT
Start: 2023-06-14

## 2023-06-14 RX ORDER — FLUCONAZOLE 150 MG/1
TABLET ORAL
Qty: 3 TABLET | Refills: 0 | Status: SHIPPED | OUTPATIENT
Start: 2023-06-14

## 2023-06-14 RX ORDER — PROGESTERONE 100 MG/1
100 CAPSULE ORAL
COMMUNITY
Start: 2023-06-14 | End: 2024-06-13

## 2023-06-14 RX ORDER — PROMETHAZINE HYDROCHLORIDE 25 MG/1
25 TABLET ORAL EVERY 8 HOURS PRN
COMMUNITY
Start: 2023-05-13

## 2023-06-14 RX ORDER — OXYCODONE HCL/ACETAMINOPHEN 10MG-325MG
1 TABLET ORAL 3 TIMES DAILY
COMMUNITY
Start: 2023-05-18

## 2023-06-14 RX ORDER — ONDANSETRON 4 MG/1
4 TABLET, FILM COATED ORAL EVERY 8 HOURS PRN
COMMUNITY
Start: 2023-04-12

## 2023-06-14 RX ORDER — DEXTROAMPHETAMINE SULFATE 15 MG/1
TABLET ORAL
COMMUNITY
Start: 2023-05-22

## 2023-06-14 RX ORDER — AMOXICILLIN AND CLAVULANATE POTASSIUM 875; 125 MG/1; MG/1
1 TABLET, FILM COATED ORAL EVERY 12 HOURS SCHEDULED
Qty: 20 TABLET | Refills: 0 | Status: SHIPPED | OUTPATIENT
Start: 2023-06-14 | End: 2023-06-24

## 2023-06-14 NOTE — PROGRESS NOTES
TELEHEALTH/VIDEO VISIT (via [employer])  BLUM 23  LOCATION OF PROVIDER: [facility] MEDICINE    TIERNEY FINE   [birth year]  LOCATION OF PT: PERSONAL RESIDENCE      You have chosen to receive care through a telehealth visit.  Do you consent to use a video/audio connection for your medical care today? Yes      History of Present Illness   She c/o painful upper right tooth (?molar). Gumline and jaw of swollen and throbbing. Has oral surgery apt on . Concerned about infection in the meantime.    She c/o poor sleep, trouble falling asleep. Previously on ambien. Would like to use again. High psychosocial stress.    Her bp has been dropping too low so she is taking 1/2 dose. Still getting low (90s SBP). Would like to decrease back to 10 mg lisinopril.      Patient's past medical hx, surgical hx, family hx, social hx reviewed on chart. Medication and allergy lists reviewed on chart. Information updated as indicated.      Review of Systems   Constitutional: Positive for fatigue. Negative for fever and unexpected weight change.   HENT: Positive for congestion, dental problem, postnasal drip, sinus pressure and trouble swallowing (intermittent). Negative for ear pain, hearing loss, nosebleeds and sore throat.    Eyes: Positive for visual disturbance (chronic). Negative for pain.        Dry eyes   Respiratory: Negative for cough, shortness of breath and wheezing.    Cardiovascular: Positive for palpitations (chronic, intermittent, stable). Negative for chest pain and leg swelling.   Gastrointestinal: Positive for diarrhea, nausea and vomiting. Negative for abdominal pain, blood in stool and constipation.   Endocrine: Positive for heat intolerance. Negative for polydipsia and polyuria.   Genitourinary: Positive for dysuria (mild, intermittent) and pelvic pain (chronic). Negative for hematuria.   Musculoskeletal: Positive for arthralgias, myalgias, neck pain and neck stiffness. Negative for back pain and joint  swelling.   Skin: Negative for rash.   Neurological: Positive for weakness and headaches. Negative for dizziness, seizures, syncope and speech difficulty.   Hematological: Negative for adenopathy. Bruises/bleeds easily.   Psychiatric/Behavioral: Positive for dysphoric mood and sleep disturbance. Negative for confusion and suicidal ideas. The patient is nervous/anxious.    Pt's previous ROS reviewed and updated as indicated.         Physical Exam  Constitutional:       General: She is not in acute distress.     Appearance: She is well-developed and well-groomed. She is not ill-appearing.   HENT:      Head:      Comments: Swelling right jaw/cheek  Eyes:      Conjunctiva/sclera: Conjunctivae normal.   Pulmonary:      Effort: Pulmonary effort is normal.   Skin:     Coloration: Skin is not jaundiced or pale.   Neurological:      Mental Status: She is alert and oriented to person, place, and time.   Psychiatric:         Behavior: Behavior normal. Behavior is cooperative.         Cognition and Memory: Cognition normal.           Diagnoses and all orders for this visit:    Dental infection  -     amoxicillin-clavulanate (AUGMENTIN) 875-125 MG per tablet; Take 1 tablet by mouth Every 12 (Twelve) Hours for 10 days.    Insomnia, unspecified type  -     zolpidem (Ambien) 5 MG tablet; 1/2 to 1 po qhs as needed for sleep    Essential hypertension    Other orders  -     lisinopril (PRINIVIL,ZESTRIL) 10 MG tablet; Take 1 tablet by mouth Daily.  -     fluconazole (Diflucan) 150 MG tablet; 1 po now, repeat q 5 days until gone  -     baclofen (LIORESAL) 10 MG tablet  -     Dextroamphetamine Sulfate 15 MG tablet; TAKE 1 TABLET BY MOUTH EVERY DAY IN THE AFTERNOON  -     ondansetron (ZOFRAN) 4 MG tablet; Take 1 tablet by mouth Every 8 (Eight) Hours As Needed for Nausea or Vomiting.  -     Endocet  MG per tablet; Take 1 tablet by mouth 3 (Three) Times a Day.  -     Progesterone (PROMETRIUM) 100 MG capsule; Take 1 capsule by  mouth.  -     promethazine (PHENERGAN) 25 MG tablet; Take 1 tablet by mouth Every 8 (Eight) Hours As Needed for Nausea or Vomiting.  -     levothyroxine (SYNTHROID, LEVOTHROID) 50 MCG tablet; Take 1 tablet by mouth Daily.      Keep apt with oral surgery next week. Abx as above.    I have reviewed risks/benefits and potential side effects of various treatment options for insomnia. Pt voices understanding and wishes to proceed with trial of ambien.    Decrease lisinopril to 10 mg daily. Continue close BP check at home.    Routine refills of multiple meds provided per pt request.  Keep routine f/u as scheduled, f/u sooner as needed.  Patient was encouraged to keep me informed of any acute changes, lack of improvement, or any new concerning symptoms.  Pt is aware of reasons to seek emergent care.  Patient voiced understanding of all instructions and denied further questions.    Please note that portions of this note may have been completed with a voice recognition program.           Total face to face time with patient was 10 minutes.

## 2023-07-31 ENCOUNTER — OFFICE VISIT (OUTPATIENT)
Dept: FAMILY MEDICINE CLINIC | Facility: CLINIC | Age: 58
End: 2023-07-31
Payer: MEDICARE

## 2023-07-31 VITALS
HEIGHT: 64 IN | BODY MASS INDEX: 28.54 KG/M2 | TEMPERATURE: 97.3 F | WEIGHT: 167.2 LBS | OXYGEN SATURATION: 96 % | SYSTOLIC BLOOD PRESSURE: 114 MMHG | DIASTOLIC BLOOD PRESSURE: 82 MMHG | HEART RATE: 106 BPM

## 2023-07-31 DIAGNOSIS — J01.11 ACUTE RECURRENT FRONTAL SINUSITIS: Primary | ICD-10-CM

## 2023-07-31 DIAGNOSIS — Z79.890 HORMONE REPLACEMENT THERAPY (HRT): ICD-10-CM

## 2023-07-31 DIAGNOSIS — K04.7 DENTAL INFECTION: ICD-10-CM

## 2023-07-31 DIAGNOSIS — E55.9 VITAMIN D DEFICIENCY: ICD-10-CM

## 2023-07-31 DIAGNOSIS — I10 ESSENTIAL HYPERTENSION: ICD-10-CM

## 2023-07-31 DIAGNOSIS — N95.1 POSTMENOPAUSAL DISORDER: ICD-10-CM

## 2023-07-31 DIAGNOSIS — E03.9 ACQUIRED HYPOTHYROIDISM: ICD-10-CM

## 2023-07-31 PROCEDURE — 96372 THER/PROPH/DIAG INJ SC/IM: CPT | Performed by: FAMILY MEDICINE

## 2023-07-31 PROCEDURE — 3074F SYST BP LT 130 MM HG: CPT | Performed by: FAMILY MEDICINE

## 2023-07-31 PROCEDURE — 99214 OFFICE O/P EST MOD 30 MIN: CPT | Performed by: FAMILY MEDICINE

## 2023-07-31 PROCEDURE — 3079F DIAST BP 80-89 MM HG: CPT | Performed by: FAMILY MEDICINE

## 2023-07-31 RX ORDER — METHYLPREDNISOLONE ACETATE 40 MG/ML
40 INJECTION, SUSPENSION INTRA-ARTICULAR; INTRALESIONAL; INTRAMUSCULAR; SOFT TISSUE ONCE
Status: COMPLETED | OUTPATIENT
Start: 2023-07-31 | End: 2023-07-31

## 2023-07-31 RX ORDER — CEFTRIAXONE 1 G/1
1 INJECTION, POWDER, FOR SOLUTION INTRAMUSCULAR; INTRAVENOUS EVERY 24 HOURS
Status: COMPLETED | OUTPATIENT
Start: 2023-07-31 | End: 2023-07-31

## 2023-07-31 RX ORDER — PANTOPRAZOLE SODIUM 40 MG/1
40 TABLET, DELAYED RELEASE ORAL DAILY
Qty: 30 TABLET | Refills: 11 | Status: SHIPPED | OUTPATIENT
Start: 2023-07-31 | End: 2024-07-30

## 2023-07-31 RX ORDER — CLINDAMYCIN HYDROCHLORIDE 300 MG/1
300 CAPSULE ORAL 3 TIMES DAILY
Qty: 21 CAPSULE | Refills: 0 | Status: SHIPPED | OUTPATIENT
Start: 2023-07-31 | End: 2023-08-07

## 2023-07-31 RX ADMIN — METHYLPREDNISOLONE ACETATE 40 MG: 40 INJECTION, SUSPENSION INTRA-ARTICULAR; INTRALESIONAL; INTRAMUSCULAR; SOFT TISSUE at 16:12

## 2023-07-31 RX ADMIN — CEFTRIAXONE 1 G: 1 INJECTION, POWDER, FOR SOLUTION INTRAMUSCULAR; INTRAVENOUS at 16:12

## 2023-08-01 LAB
25(OH)D3+25(OH)D2 SERPL-MCNC: 23.5 NG/ML (ref 30–100)
TSH SERPL DL<=0.005 MIU/L-ACNC: 1.73 UIU/ML (ref 0.45–4.5)

## 2023-08-10 DIAGNOSIS — E03.9 ACQUIRED HYPOTHYROIDISM: ICD-10-CM

## 2023-08-10 DIAGNOSIS — G47.00 INSOMNIA, UNSPECIFIED TYPE: ICD-10-CM

## 2023-08-10 RX ORDER — HYDROCHLOROTHIAZIDE 25 MG/1
25 TABLET ORAL DAILY
Qty: 30 TABLET | Refills: 0 | Status: SHIPPED | OUTPATIENT
Start: 2023-08-10

## 2023-08-10 RX ORDER — FAMCICLOVIR 500 MG/1
500 TABLET ORAL DAILY
Qty: 30 TABLET | Refills: 5 | Status: SHIPPED | OUTPATIENT
Start: 2023-08-10

## 2023-08-10 RX ORDER — LEVOTHYROXINE SODIUM 0.03 MG/1
25 TABLET ORAL DAILY
Qty: 90 TABLET | Refills: 1 | Status: SHIPPED | OUTPATIENT
Start: 2023-08-10

## 2023-08-10 NOTE — TELEPHONE ENCOUNTER
Rx Refill Note  Requested Prescriptions     Pending Prescriptions Disp Refills    naproxen (NAPROSYN) 500 MG tablet      zolpidem (Ambien) 5 MG tablet 30 tablet 0     Si/2 to 1 po qhs as needed for sleep     Signed Prescriptions Disp Refills    famciclovir (FAMVIR) 500 MG tablet 30 tablet 5     Sig: Take 1 tablet by mouth Daily.     Authorizing Provider: SILKE HENRY     Ordering User: LIZZY ADDISON    levothyroxine (SYNTHROID, LEVOTHROID) 25 MCG tablet 90 tablet 1     Sig: Take 1 tablet by mouth Daily.     Authorizing Provider: SILKE HENRY     Ordering User: LIZZY ADDISON    hydroCHLOROthiazide (HYDRODIURIL) 25 MG tablet 30 tablet 0     Sig: Take 1 tablet by mouth Daily.     Authorizing Provider: SILKE HENRY     Ordering User: LIZZY ADDISON      Last office visit with prescribing clinician: 2023   Last telemedicine visit with prescribing clinician: 2023   Next office visit with prescribing clinician: Visit date not found                         Would you like a call back once the refill request has been completed: [] Yes [] No    If the office needs to give you a call back, can they leave a voicemail: [] Yes [] No    Lizzy Addison MA  08/10/23, 08:21 EDT

## 2023-08-11 RX ORDER — NAPROXEN 500 MG/1
500 TABLET ORAL 2 TIMES DAILY WITH MEALS
Qty: 180 TABLET | Refills: 1 | Status: SHIPPED | OUTPATIENT
Start: 2023-08-11

## 2023-08-11 RX ORDER — FLUCONAZOLE 150 MG/1
TABLET ORAL
Qty: 2 TABLET | Refills: 0 | Status: SHIPPED | OUTPATIENT
Start: 2023-08-11

## 2023-08-11 RX ORDER — ZOLPIDEM TARTRATE 5 MG/1
TABLET ORAL
Qty: 30 TABLET | Refills: 0 | Status: SHIPPED | OUTPATIENT
Start: 2023-08-11

## 2023-09-06 DIAGNOSIS — N95.1 POSTMENOPAUSAL DISORDER: Primary | ICD-10-CM

## 2023-09-06 DIAGNOSIS — Z79.890 HORMONE REPLACEMENT THERAPY (HRT): ICD-10-CM

## 2023-09-06 RX ORDER — ESTROGENS, ESTERIFIED AND METHYLTESTOSTERONE .625; 1.25 MG/1; MG/1
1 TABLET, COATED ORAL DAILY
Qty: 30 TABLET | Refills: 2 | Status: SHIPPED | OUTPATIENT
Start: 2023-09-06

## 2023-09-15 RX ORDER — HYDROCHLOROTHIAZIDE 25 MG/1
25 TABLET ORAL DAILY
Qty: 30 TABLET | Refills: 0 | Status: SHIPPED | OUTPATIENT
Start: 2023-09-15

## 2023-09-20 RX ORDER — LORATADINE/PSEUDOEPHEDRINE 10MG-240MG
TABLET, EXTENDED RELEASE 24 HR ORAL
Qty: 30 TABLET | Refills: 2 | Status: SHIPPED | OUTPATIENT
Start: 2023-09-20

## 2023-09-26 DIAGNOSIS — J34.3 HYPERTROPHY, NASAL, TURBINATE: ICD-10-CM

## 2023-09-26 DIAGNOSIS — G47.00 INSOMNIA, UNSPECIFIED TYPE: ICD-10-CM

## 2023-09-26 RX ORDER — PROMETHAZINE HYDROCHLORIDE 25 MG/1
25 TABLET ORAL EVERY 8 HOURS PRN
Qty: 15 TABLET | Refills: 2 | Status: SHIPPED | OUTPATIENT
Start: 2023-09-26

## 2023-09-26 RX ORDER — TIZANIDINE 4 MG/1
4 TABLET ORAL EVERY 8 HOURS PRN
Qty: 90 TABLET | Refills: 5 | Status: SHIPPED | OUTPATIENT
Start: 2023-09-26

## 2023-09-26 RX ORDER — ZOLPIDEM TARTRATE 6.25 MG/1
6.25 TABLET, FILM COATED, EXTENDED RELEASE ORAL NIGHTLY PRN
Qty: 30 TABLET | Refills: 0 | Status: SHIPPED | OUTPATIENT
Start: 2023-09-26

## 2023-09-26 RX ORDER — ZOLPIDEM TARTRATE 5 MG/1
TABLET ORAL
Qty: 30 TABLET | Refills: 0 | Status: CANCELLED | OUTPATIENT
Start: 2023-09-26

## 2023-09-26 RX ORDER — FLUTICASONE PROPIONATE 50 MCG
2 SPRAY, SUSPENSION (ML) NASAL DAILY
Qty: 48 G | Refills: 0 | Status: SHIPPED | OUTPATIENT
Start: 2023-09-26

## 2023-09-26 NOTE — TELEPHONE ENCOUNTER
Rx Refill Note  Requested Prescriptions     Pending Prescriptions Disp Refills    zolpidem (Ambien) 5 MG tablet 30 tablet 0     Si/2 to 1 po qhs as needed for sleep      Last office visit with prescribing clinician: 2023   Last telemedicine visit with prescribing clinician: 2023   Next office visit with prescribing clinician: Visit date not found                         Would you like a call back once the refill request has been completed: [] Yes [] No    If the office needs to give you a call back, can they leave a voicemail: [] Yes [] No    Montserrat Ochoa MA  23, 10:40 EDT

## 2023-11-07 ENCOUNTER — TELEPHONE (OUTPATIENT)
Dept: FAMILY MEDICINE CLINIC | Facility: CLINIC | Age: 58
End: 2023-11-07
Payer: MEDICARE

## 2023-11-07 RX ORDER — LORATADINE/PSEUDOEPHEDRINE 10MG-240MG
1 TABLET, EXTENDED RELEASE 24 HR ORAL DAILY
Qty: 30 TABLET | Refills: 2 | Status: SHIPPED | OUTPATIENT
Start: 2023-11-07

## 2023-11-07 RX ORDER — FAMCICLOVIR 500 MG/1
500 TABLET ORAL DAILY
Qty: 30 TABLET | Refills: 5 | OUTPATIENT
Start: 2023-11-07

## 2023-11-07 NOTE — TELEPHONE ENCOUNTER
Caller: Tatiana Fields    Relationship: Self    Best call back number: [phone]     What medication are you requesting: Z-PACK     What are your current symptoms: EAR PAIN IN BOTH EARS, BUT RIGHT EAR HURTS WORSE THAN THE LEFT AND FATIGUED     How long have you been experiencing symptoms: SINCE 10/2023    Have you had these symptoms before:    [x] Yes  [] No    Have you been treated for these symptoms before:   [x] Yes  [] No    If a prescription is needed, what is your preferred pharmacy and phone number: [facility] #[id number] [place], [place] - [facility] [address] AT [facility] [address] - [phone]  - [phone] FX     Additional notes:    PLEASE CALL THE PATIENT AND ADVISE

## 2023-11-09 ENCOUNTER — OFFICE VISIT (OUTPATIENT)
Dept: FAMILY MEDICINE CLINIC | Facility: CLINIC | Age: 58
End: 2023-11-09
Payer: MEDICARE

## 2023-11-09 VITALS
HEIGHT: 63 IN | BODY MASS INDEX: 28.92 KG/M2 | DIASTOLIC BLOOD PRESSURE: 84 MMHG | HEART RATE: 111 BPM | OXYGEN SATURATION: 98 % | TEMPERATURE: 98.1 F | WEIGHT: 163.2 LBS | SYSTOLIC BLOOD PRESSURE: 118 MMHG

## 2023-11-09 DIAGNOSIS — H91.93 BILATERAL HEARING LOSS, UNSPECIFIED HEARING LOSS TYPE: ICD-10-CM

## 2023-11-09 DIAGNOSIS — H61.23 BILATERAL IMPACTED CERUMEN: ICD-10-CM

## 2023-11-09 DIAGNOSIS — J01.01 ACUTE RECURRENT MAXILLARY SINUSITIS: ICD-10-CM

## 2023-11-09 DIAGNOSIS — Z13.6 ENCOUNTER FOR LIPID SCREENING FOR CARDIOVASCULAR DISEASE: ICD-10-CM

## 2023-11-09 DIAGNOSIS — I10 ESSENTIAL HYPERTENSION: Primary | ICD-10-CM

## 2023-11-09 DIAGNOSIS — R11.2 NAUSEA AND VOMITING, UNSPECIFIED VOMITING TYPE: ICD-10-CM

## 2023-11-09 DIAGNOSIS — R10.13 EPIGASTRIC PAIN: ICD-10-CM

## 2023-11-09 DIAGNOSIS — G89.29 CHRONIC RIGHT EAR PAIN: ICD-10-CM

## 2023-11-09 DIAGNOSIS — Z23 NEED FOR INFLUENZA VACCINATION: ICD-10-CM

## 2023-11-09 DIAGNOSIS — E03.9 ACQUIRED HYPOTHYROIDISM: ICD-10-CM

## 2023-11-09 DIAGNOSIS — R73.01 IFG (IMPAIRED FASTING GLUCOSE): ICD-10-CM

## 2023-11-09 DIAGNOSIS — R79.9 ABNORMAL BLOOD CHEMISTRY: ICD-10-CM

## 2023-11-09 DIAGNOSIS — R16.0 HEPATOMEGALY: ICD-10-CM

## 2023-11-09 DIAGNOSIS — Z13.220 ENCOUNTER FOR LIPID SCREENING FOR CARDIOVASCULAR DISEASE: ICD-10-CM

## 2023-11-09 DIAGNOSIS — H92.01 CHRONIC RIGHT EAR PAIN: ICD-10-CM

## 2023-11-09 PROBLEM — Z79.899 POLYPHARMACY: Status: RESOLVED | Noted: 2018-10-15 | Resolved: 2023-11-09

## 2023-11-09 PROBLEM — G43.009 MIGRAINE WITHOUT AURA AND WITHOUT STATUS MIGRAINOSUS, NOT INTRACTABLE: Status: ACTIVE | Noted: 2023-09-05

## 2023-11-09 RX ORDER — VORTIOXETINE 10 MG/1
1 TABLET, FILM COATED ORAL DAILY
COMMUNITY
Start: 2023-11-07

## 2023-11-09 RX ORDER — DEXTROAMPHETAMINE SULFATE 20 MG/1
1 TABLET ORAL EVERY 12 HOURS SCHEDULED
COMMUNITY
Start: 2023-11-08

## 2023-11-09 RX ORDER — PANTOPRAZOLE SODIUM 40 MG/1
40 TABLET, DELAYED RELEASE ORAL DAILY
Qty: 30 TABLET | Refills: 11 | Status: SHIPPED | OUTPATIENT
Start: 2023-11-09 | End: 2024-11-08

## 2023-11-09 RX ORDER — IBUPROFEN 600 MG/1
600 TABLET ORAL EVERY 6 HOURS PRN
COMMUNITY
Start: 2023-08-01

## 2023-11-09 RX ORDER — TRAZODONE HYDROCHLORIDE 50 MG/1
50 TABLET ORAL
COMMUNITY
Start: 2023-08-01

## 2023-11-09 RX ORDER — CEFTRIAXONE 1 G/1
1 INJECTION, POWDER, FOR SOLUTION INTRAMUSCULAR; INTRAVENOUS ONCE
Status: COMPLETED | OUTPATIENT
Start: 2023-11-09 | End: 2023-11-09

## 2023-11-09 RX ORDER — DEXTROAMPHETAMINE SACCHARATE, AMPHETAMINE ASPARTATE MONOHYDRATE, DEXTROAMPHETAMINE SULFATE AND AMPHETAMINE SULFATE 7.5; 7.5; 7.5; 7.5 MG/1; MG/1; MG/1; MG/1
30 CAPSULE, EXTENDED RELEASE ORAL EVERY MORNING
COMMUNITY
Start: 2023-08-16

## 2023-11-09 RX ORDER — SODIUM FLUORIDE 5 MG/G
GEL, DENTIFRICE DENTAL
COMMUNITY
Start: 2023-09-05

## 2023-11-09 RX ADMIN — CEFTRIAXONE 1 G: 1 INJECTION, POWDER, FOR SOLUTION INTRAMUSCULAR; INTRAVENOUS at 12:01

## 2023-11-09 NOTE — PROGRESS NOTES
"Subjective   Tatiana Fields is a 58 y.o. female.     History of Present Illness  She presents today with several concerns.    Main concern is that of chronic persistent bilateral ear pain, right worse than left. Recently significant hearing loss in right ear. No better after recent course of abx.    She c/o increased epigastric pain, nausea with occ vomiting, similar to what she previously had prior to her colectomy. Having regular BMs, generally loose. No blood in stool. Possible blood in emesis, not sure if she ate/drank \"red\" food/drink prior to episode.    Taking meds as rx'd for chronic conditions including:  HTN  Hypothyoidism  HRT for severe postmenopausal DO    Followed by behavioral health for depression, anxiety, PTSD    Followed by pain mgnt for multifactorial chronic pain dx.    The following portions of the patient's history were reviewed and updated as appropriate: allergies, current medications, past family history, past medical history, past social history, past surgical history, and problem list.    Review of Systems   Constitutional:  Positive for fatigue. Negative for fever and unexpected weight change.   HENT:  Positive for congestion, ear pain, hearing loss, postnasal drip, sinus pressure, sinus pain and trouble swallowing (intermittent). Negative for ear discharge, mouth sores, nosebleeds, rhinorrhea and sore throat.    Eyes:  Positive for visual disturbance (chronic). Negative for pain.        Dry eyes   Respiratory:  Negative for cough, shortness of breath and wheezing.    Cardiovascular:  Positive for palpitations (chronic, intermittent, stable). Negative for chest pain and leg swelling.   Gastrointestinal:  Positive for abdominal pain, nausea and vomiting. Negative for blood in stool and diarrhea.   Endocrine: Positive for heat intolerance. Negative for polydipsia and polyuria.   Genitourinary:  Positive for dysuria (mild, intermittent) and pelvic pain (chronic). Negative for hematuria. "   Musculoskeletal:  Positive for arthralgias, myalgias, neck pain and neck stiffness. Negative for back pain and joint swelling.   Skin:  Negative for rash.   Neurological:  Positive for weakness and headaches. Negative for dizziness, seizures, syncope and speech difficulty.   Hematological:  Negative for adenopathy. Bruises/bleeds easily.   Psychiatric/Behavioral:  Positive for confusion, dysphoric mood and sleep disturbance. Negative for suicidal ideas. The patient is nervous/anxious.    Pt's previous ROS reviewed and updated as indicated.       Objective   Vitals:    11/09/23 1035   BP: 118/84   Pulse: 111   Temp: 98.1 °F (36.7 °C)   SpO2: 98%     Body mass index is 28.91 kg/m².      11/09/23  1035   Weight: 74 kg (163 lb 3.2 oz)       Physical Exam  Vitals and nursing note reviewed.   Constitutional:       General: She is not in acute distress.     Appearance: She is well-developed, well-groomed and overweight. She is ill-appearing (mildly).   HENT:      Head: Atraumatic.      Right Ear: Ear canal and external ear normal. There is impacted cerumen.      Left Ear: Ear canal and external ear normal. There is impacted cerumen.      Nose: Mucosal edema and congestion present. No rhinorrhea.      Right Sinus: Maxillary sinus tenderness and frontal sinus tenderness present.      Left Sinus: Maxillary sinus tenderness and frontal sinus tenderness present.      Mouth/Throat:      Mouth: Mucous membranes are moist. No oral lesions.      Pharynx: Oropharynx is clear.   Eyes:      General: No scleral icterus.     Conjunctiva/sclera: Conjunctivae normal.   Neck:      Thyroid: No thyroid mass.   Cardiovascular:      Rate and Rhythm: Normal rate and regular rhythm.      Pulses: Normal pulses.      Heart sounds: Normal heart sounds.   Pulmonary:      Effort: Pulmonary effort is normal.      Breath sounds: Normal breath sounds.   Abdominal:      General: There is no distension.      Palpations: Abdomen is soft. There is no  mass.      Tenderness: There is abdominal tenderness (mild) in the epigastric area. There is no guarding or rebound.   Musculoskeletal:      Right lower leg: No edema.      Left lower leg: No edema.   Lymphadenopathy:      Cervical: No cervical adenopathy.   Skin:     General: Skin is warm and dry.      Coloration: Skin is not jaundiced or pale.      Findings: No rash.   Neurological:      Mental Status: She is alert and oriented to person, place, and time. Mental status is at baseline.      Gait: Gait is intact.   Psychiatric:         Mood and Affect: Affect normal. Mood is anxious (mildly).         Speech: Speech normal.         Behavior: Behavior normal. Behavior is cooperative.         Thought Content: Thought content normal. Thought content is not paranoid or delusional. Thought content does not include suicidal ideation.         Cognition and Memory: Cognition normal.     Pt's previous physical exam reviewed and updated as indicated.      Assessment & Plan   Diagnoses and all orders for this visit:    1. Essential hypertension (Primary)  -     CBC & Differential  -     Comprehensive Metabolic Panel    2. Acquired hypothyroidism  -     TSH Rfx On Abnormal To Free T4    3. Bilateral hearing loss, unspecified hearing loss type  -     Ambulatory Referral to ENT (Otolaryngology)    4. Bilateral impacted cerumen  -     Ambulatory Referral to ENT (Otolaryngology)    5. Epigastric pain  -     CBC & Differential  -     Comprehensive Metabolic Panel  -     Lipase    6. Hepatomegaly  -     CBC & Differential  -     Comprehensive Metabolic Panel    7. IFG (impaired fasting glucose)    8. Abnormal blood chemistry  -     Hemoglobin A1c    9. Nausea and vomiting, unspecified vomiting type  -     CBC & Differential  -     Comprehensive Metabolic Panel  -     Lipase    10. Encounter for lipid screening for cardiovascular disease  -     Lipid Panel    11. Need for influenza vaccination    12. Chronic right ear pain    13. Acute  recurrent maxillary sinusitis  -     cefTRIAXone (ROCEPHIN) injection 1 g    Other orders  -     pantoprazole (PROTONIX) 40 MG EC tablet; Take 1 tablet by mouth Daily.  Dispense: 30 tablet; Refill: 11  -     Fluzone (or Fluarix & Flulaval for VFC) >6 Mos ([id number])       Attempted water irrigation of ears. Significant residual cerumen impaction. TMs not able to visualized. Refer to ENT.    POC IM rocephin to tx empirically for possible sinusitis with/without otitis media. Re-eval early next week.    Recurrent abd pain, GI symptoms of unclear etiology - she has extensive GI hx. Non-acute abd exam at this time. Clinically stable. She is amenable to lab eval with US vs CT, endoscopy etc as indicated. Continue PPI for now.    Recheck TSH, adjust replacement as indicated.    HTN controlled - continue hctz, lisinopril.    Routine f/u in 3 months, sooner as needed/instructed.  I will contact patient regarding test results and provide instructions regarding any necessary changes in plan of care.  Patient was encouraged to keep me informed of any acute changes, lack of improvement, or any new concerning symptoms.  Pt is aware of reasons to seek emergent care.  Patient voiced understanding of all instructions and denied further questions.    Please note that portions of this note may have been completed with a voice recognition program.

## 2023-11-10 LAB
ALBUMIN SERPL-MCNC: 4.6 G/DL (ref 3.5–5.2)
ALBUMIN/GLOB SERPL: 2.3 G/DL
ALP SERPL-CCNC: 60 U/L (ref 39–117)
ALT SERPL-CCNC: 15 U/L (ref 1–33)
AST SERPL-CCNC: 19 U/L (ref 1–32)
BASOPHILS # BLD AUTO: 0.05 10*3/MM3 (ref 0–0.2)
BASOPHILS NFR BLD AUTO: 0.8 % (ref 0–1.5)
BILIRUB SERPL-MCNC: 0.4 MG/DL (ref 0–1.2)
BUN SERPL-MCNC: 10 MG/DL (ref 6–20)
BUN/CREAT SERPL: 11.2 (ref 7–25)
CALCIUM SERPL-MCNC: 9.6 MG/DL (ref 8.6–10.5)
CHLORIDE SERPL-SCNC: 99 MMOL/L (ref 98–107)
CHOLEST SERPL-MCNC: 194 MG/DL (ref 0–200)
CO2 SERPL-SCNC: 29.5 MMOL/L (ref 22–29)
CREAT SERPL-MCNC: 0.89 MG/DL (ref 0.57–1)
EGFRCR SERPLBLD CKD-EPI 2021: 75.3 ML/MIN/1.73
EOSINOPHIL # BLD AUTO: 0.07 10*3/MM3 (ref 0–0.4)
EOSINOPHIL NFR BLD AUTO: 1.1 % (ref 0.3–6.2)
ERYTHROCYTE [DISTWIDTH] IN BLOOD BY AUTOMATED COUNT: 12 % (ref 12.3–15.4)
GLOBULIN SER CALC-MCNC: 2 GM/DL
GLUCOSE SERPL-MCNC: 92 MG/DL (ref 65–99)
HBA1C MFR BLD: 5.6 % (ref 4.8–5.6)
HCT VFR BLD AUTO: 37.8 % (ref 34–46.6)
HDLC SERPL-MCNC: 54 MG/DL (ref 40–60)
HGB BLD-MCNC: 12.4 G/DL (ref 12–15.9)
IMM GRANULOCYTES # BLD AUTO: 0.01 10*3/MM3 (ref 0–0.05)
IMM GRANULOCYTES NFR BLD AUTO: 0.2 % (ref 0–0.5)
LDLC SERPL CALC-MCNC: 117 MG/DL (ref 0–100)
LIPASE SERPL-CCNC: 12 U/L (ref 13–60)
LYMPHOCYTES # BLD AUTO: 1.81 10*3/MM3 (ref 0.7–3.1)
LYMPHOCYTES NFR BLD AUTO: 28.9 % (ref 19.6–45.3)
MCH RBC QN AUTO: 27.8 PG (ref 26.6–33)
MCHC RBC AUTO-ENTMCNC: 32.8 G/DL (ref 31.5–35.7)
MCV RBC AUTO: 84.8 FL (ref 79–97)
MONOCYTES # BLD AUTO: 0.52 10*3/MM3 (ref 0.1–0.9)
MONOCYTES NFR BLD AUTO: 8.3 % (ref 5–12)
NEUTROPHILS # BLD AUTO: 3.81 10*3/MM3 (ref 1.7–7)
NEUTROPHILS NFR BLD AUTO: 60.7 % (ref 42.7–76)
NRBC BLD AUTO-RTO: 0 /100 WBC (ref 0–0.2)
PLATELET # BLD AUTO: 351 10*3/MM3 (ref 140–450)
POTASSIUM SERPL-SCNC: 3.3 MMOL/L (ref 3.5–5.2)
PROT SERPL-MCNC: 6.6 G/DL (ref 6–8.5)
RBC # BLD AUTO: 4.46 10*6/MM3 (ref 3.77–5.28)
SODIUM SERPL-SCNC: 138 MMOL/L (ref 136–145)
TRIGL SERPL-MCNC: 127 MG/DL (ref 0–150)
TSH SERPL DL<=0.005 MIU/L-ACNC: 0.97 UIU/ML (ref 0.27–4.2)
VLDLC SERPL CALC-MCNC: 23 MG/DL (ref 5–40)
WBC # BLD AUTO: 6.27 10*3/MM3 (ref 3.4–10.8)

## 2023-11-14 ENCOUNTER — TELEPHONE (OUTPATIENT)
Dept: FAMILY MEDICINE CLINIC | Facility: CLINIC | Age: 58
End: 2023-11-14

## 2023-11-14 DIAGNOSIS — E03.9 ACQUIRED HYPOTHYROIDISM: ICD-10-CM

## 2023-11-14 NOTE — TELEPHONE ENCOUNTER
Caller: Tatiana Fields TIMOTHY    Relationship: Self    Best call back number: [phone]     What medication are you requesting: LEVOQUIN- AN ANTIBIOTIC    What are your current symptoms: FACE SWOLLEN, CAN'T BREATH THRU NOSE, EARS ARE BLOCKED, SPITTING UP YELLOW/GREEN PHLEGM, WEAK, HEADACHE    How long have you been experiencing symptoms: ABOUT A MONTH    Have you had these symptoms before:    [x] Yes  [] No    Have you been treated for these symptoms before:   [x] Yes  [] No SEEN IN OFFICE LAST WEEK. GETTING WORSE    If a prescription is needed, what is your preferred pharmacy and phone number: [facility] #[id number] [place], [place] - [address] AT [facility] [address] - [phone]  - [phone] FX     Additional notes: ASKING FOR MEDICATION TO BE CALLED IN. PLEASE ADVISE

## 2023-11-15 DIAGNOSIS — R10.13 EPIGASTRIC PAIN: Primary | ICD-10-CM

## 2023-11-15 DIAGNOSIS — R16.0 HEPATOMEGALY: ICD-10-CM

## 2023-11-15 DIAGNOSIS — R11.2 NAUSEA AND VOMITING, UNSPECIFIED VOMITING TYPE: ICD-10-CM

## 2023-11-16 RX ORDER — ONDANSETRON 4 MG/1
4 TABLET, FILM COATED ORAL EVERY 8 HOURS PRN
Qty: 30 TABLET | Refills: 5 | Status: SHIPPED | OUTPATIENT
Start: 2023-11-16 | End: 2023-11-16 | Stop reason: SDUPTHER

## 2023-11-16 RX ORDER — TRAZODONE HYDROCHLORIDE 50 MG/1
50 TABLET ORAL
Qty: 90 TABLET | Refills: 1 | Status: SHIPPED | OUTPATIENT
Start: 2023-11-16

## 2023-11-16 NOTE — TELEPHONE ENCOUNTER
"I already sent her a message via [employer] stating I do not think she should take any more antibiotics for \"sinus infections\" until she has CT of sinus proving she has one. Let me know if she wishes to have one.  "

## 2023-11-16 NOTE — TELEPHONE ENCOUNTER
"Dr Mills spoke with patient via [employer]. Patient stated she was seen at Urgent Care and she said, \"Since they got all the wax out I can hear and ringing stopped.  My breathing is much better too.  We can wait on the sinus catscan unless you think we should go ahead.  Thanks Dr Mills.  I was miserable and couldn’t hardly hear and dizzy and ringing in my ears.  When they flushed out me ears it helped alot.\" Patient is denying CT at this time, unless otherwise recommended by Dr Mills.   "

## 2023-11-17 RX ORDER — RIZATRIPTAN BENZOATE 10 MG/1
10 TABLET ORAL ONCE AS NEEDED
Qty: 27 TABLET | Refills: 3 | Status: SHIPPED | OUTPATIENT
Start: 2023-11-17

## 2023-11-17 RX ORDER — ONDANSETRON 4 MG/1
4 TABLET, FILM COATED ORAL EVERY 8 HOURS PRN
Qty: 30 TABLET | Refills: 5 | Status: SHIPPED | OUTPATIENT
Start: 2023-11-17

## 2023-11-21 ENCOUNTER — TELEPHONE (OUTPATIENT)
Dept: FAMILY MEDICINE CLINIC | Facility: CLINIC | Age: 58
End: 2023-11-21
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
## 2023-11-27 ENCOUNTER — TRANSCRIBE ORDERS (OUTPATIENT)
Dept: ADMINISTRATIVE | Facility: HOSPITAL | Age: 58
End: 2023-11-27
Payer: MEDICARE

## 2023-11-27 DIAGNOSIS — R10.11 RIGHT UPPER QUADRANT PAIN: Primary | ICD-10-CM

## 2023-11-30 ENCOUNTER — HOSPITAL ENCOUNTER (OUTPATIENT)
Dept: CT IMAGING | Facility: HOSPITAL | Age: 58
Discharge: HOME | End: 2023-11-30
Admitting: FAMILY MEDICINE
Payer: MEDICARE

## 2023-11-30 DIAGNOSIS — R10.13 EPIGASTRIC PAIN: ICD-10-CM

## 2023-11-30 DIAGNOSIS — R11.2 NAUSEA AND VOMITING, UNSPECIFIED VOMITING TYPE: ICD-10-CM

## 2023-11-30 DIAGNOSIS — R16.0 HEPATOMEGALY: ICD-10-CM

## 2023-11-30 PROCEDURE — 25510000001 IOPAMIDOL 61 % SOLUTION: Performed by: FAMILY MEDICINE

## 2023-11-30 PROCEDURE — 74177 CT ABD & PELVIS W/CONTRAST: CPT

## 2023-11-30 RX ORDER — SODIUM FLUORIDE 5 MG/G
GEL, DENTIFRICE DENTAL
OUTPATIENT
Start: 2023-11-30

## 2023-11-30 RX ORDER — PANTOPRAZOLE SODIUM 40 MG/1
40 TABLET, DELAYED RELEASE ORAL DAILY
Qty: 30 TABLET | Refills: 11 | Status: SHIPPED | OUTPATIENT
Start: 2023-11-30 | End: 2024-11-29

## 2023-11-30 RX ORDER — TIZANIDINE 4 MG/1
4 TABLET ORAL EVERY 8 HOURS PRN
Qty: 90 TABLET | Refills: 5 | Status: SHIPPED | OUTPATIENT
Start: 2023-11-30

## 2023-11-30 RX ORDER — PROMETHAZINE HYDROCHLORIDE 25 MG/1
25 TABLET ORAL EVERY 8 HOURS PRN
Qty: 15 TABLET | Refills: 2 | Status: SHIPPED | OUTPATIENT
Start: 2023-11-30

## 2023-11-30 RX ADMIN — IOPAMIDOL 95 ML: 612 INJECTION, SOLUTION INTRAVENOUS at 10:51

## 2023-12-11 ENCOUNTER — E-VISIT (OUTPATIENT)
Dept: FAMILY MEDICINE CLINIC | Facility: TELEHEALTH | Age: 58
End: 2023-12-11
Payer: MEDICARE

## 2023-12-12 NOTE — EXTERNAL PATIENT INSTRUCTIONS
Note   I have prescribed Levaqin and Flonase. If you have any leg pain after taking Levaquin, stop the medication and see your doctor.   Diagnosis   Bacterial sinusitis   My name is SOULEYMANE Brown FNP-BC, and I'm a healthcare provider at [facility]. I reviewed your interview, and I see that you have bacterial sinusitis, also known as a bacterial sinus infection.   Medications   Your pharmacy   [id number] [address] [place] [id number] [phone]     Prescription   Levofloxacin (500mg): Take 1 tablet by mouth daily for 7 days for infection. This medication is an antibiotic. Take it exactly as directed. You must finish the entire course of medication, even if you feel better after the first few days of treatment.    Start taking the antibiotics I've prescribed right away. You need to finish the entire course of antibiotics, even if you start to feel better before the pills run out.   Non-prescription   Fluticasone (50mcg): Spray 2 times in each nostril daily for nasal symptoms due to allergies or sinusitis. Fluticasone needs to be used every day to be effective. It may take up to a week for the full effects of the medication to be seen. Brands to look for include Flonase.    Some people develop a yeast infection after taking antibiotics. If you get a yeast infection, you can treat it with antifungal creams or suppositories. These are available without a prescription at [employer] and many supermarkets.   About your diagnosis   The sinuses are hollow spaces connected to the nasal passages. Sinusitis occurs when the sinuses swell and block the drainage of fluid and mucus from the nose, causing pain, pressure, and congestion. Fatigue, difficulty sleeping, or decreased appetite may accompany your symptoms.   More than 90% of sinus infections are caused by viruses. However, in certain cases, a sinus infection may be caused by bacteria. Bacterial sinus infections usually look like one  of the following cases:    Severe sinus symptoms with a fever over 102F.    Sinus symptoms that have not improved at all after 10 days.    Cold symptoms that slowly improve but then worsen again after 5 or 6 days, usually with a high fever, headache, or nasal discharge.   Bacterial sinusitis is more common among people with nasal polyps.   What to expect   If you follow this treatment plan, you should start to feel better within a few days.   When to seek care   Call us at [phone]   with any sudden or unexpected symptoms.    Symptoms that last longer than 10 to 14 days.    Symptoms that get better for a few days, and then suddenly get worse.    Fever that measures over 103F or continues for more than 3 days.    Any vision changes.    Your headache worsens.    Stiff neck.    Swelling of your forehead or eyes.    Coughing up red or bloody mucus.    Your throat pain becomes worse and makes swallowing extremely difficult or impossible.    More than 5 episodes of diarrhea in a day.    More than 5 episodes of vomiting in a day.    Severe shortness of breath.    Severe chest pain   Other treatment    Rest! Your body needs rest to recover and fight infection.    Drink plenty of water to stay hydrated.    Use steam to soothe your sinuses: Breathe it in from a shower or a bowl of hot water. Placing a warm, moist washcloth over your nose and forehead may help relieve the sinus pain and pressure.    Try non-prescription saline nasal sprays to help your nasal symptoms. Try using a Neti Pot to flush out your stuffy nose and sinuses. Neti Pots are available at any drugstore without a prescription.    Avoid smoke and air pollution. Smoke can make infections worse.   Prevention    Avoid close contact with other people when you're sick.    Cover your mouth and nose when you cough or sneeze. Use a tissue or cough into your elbow. Make sure that used tissues go directly into the trash.    Avoid touching your eyes, nose, or  mouth while you're sick.    Wash your hands often, especially after coughing, sneezing, or blowing your nose. If soap and water are not available, use an alcohol-based hand .    If you or someone in your home or workplace is sick, disinfect commonly used items. This includes door handles, tables, computers, remotes, and pens.    Coronavirus (COVID-19) information   Common symptoms of COVID-19 include fever, cough, shortness of breath, fatigue, muscle or body aches, headaches, new loss of sense of taste or smell, sore throat, stuffy or runny nose, nausea or vomiting, and diarrhea. Most people who get COVID-19 have mild symptoms and can rest at home until they get better. Elderly people and those with chronic medical problems may be at risk for more serious complications.   FAQs about the COVID-19 vaccine   There are four authorized COVID-19 vaccines: Julian & Alaris Royalty's Aliyah Vaccine (J&J/Aliyah), Moderna, Novavax, and Pfizer-BioNTech (Pfizer). The J&J/Aliyah and Novavax vaccines are approved for use in people aged 18 and older. The Moderna and Pfizer vaccines are approved for those aged 6 months and older. All four are available at no cost. Even if you don't have health insurance, you can still get the COVID-19 vaccine for free.   Which vaccine is the best? Which vaccine should I get?   All four vaccines are highly effective. Even if you get COVID-19 after being vaccinated, all of the vaccines help prevent severe disease, hospitalization, and complications.   Most people should get whichever vaccine is first available to them. However, women younger than 50 years old should consider the rare risk of blood clots with low platelets after vaccination with the J&J/Aliyah vaccine. This risk hasn't been seen with the other three vaccines.   Are the vaccines safe?   Yes. Hundreds of millions of people in the [place] have already safely received COVID-19 vaccines under the most intense safety monitoring in the  history of the [place].   Do I need the vaccine if I've already had COVID?   Yes. Vaccination helps protect you even if you've already had COVID.   If you had COVID-19 and had symptoms, wait to get vaccinated until you've recovered and completed your isolation period.   If you tested positive for COVID-19 but did not have symptoms, you can get vaccinated after 5 full days have passed since you had a positive test, as long as you don't develop symptoms.   How many doses of the vaccine do I need?   Visit www.cdc.gov/coronavirus/2019-ncov/vaccines/stay-up-to-date.html   to find out how to stay up to date with your COVID-19 vaccines.   I'm immunocompromised. How many doses of the vaccine do I need?   For information on how immunocompromised people can stay up to date with their COVID-19 vaccines, visit www.cdc.gov/coronavirus/2019-ncov/vaccines/recommendations/immuno.html  .   What are the common side effects of the vaccine?   A sore arm, tiredness, headache, and muscle pain may occur within two days of getting the vaccine and last a day or two. For the Moderna or Pfizer vaccines, side effects are more common after the second dose. People over the age of 55 are less likely to have side effects than younger people.   After I'm up to date on vaccines, can I still get or spread COVID?   Yes, you can still get COVID, but your disease should be milder. And your risk of serious illness, hospitalization, and complications will be much lower, especially if you're up to date. Unfortunately, you can still spread COVID if you've been vaccinated. That's why it's important to follow isolation guidelines if you get sick or test positive.   After I'm up to date on vaccines, can I go back to normal?   You should still wear a mask indoors in public if:    It's required by laws, rules, regulations, or local guidance.    You have a weakened immune system.    Your age puts you at increased risk of severe disease.    You have a medical condition  that puts you at increased risk of severe disease.    Someone in your household has a weakened immune system, is at increased risk for severe disease, or is unvaccinated.    You're in an area of high transmission.   Where can I get a COVID-19 vaccine?   Visit [facility]'s website for more information. To find a COVID-19 vaccination site near you, visit www.[employer].gov/  , call [phone]  , or text your zip code to [address] ([place]). Message and data rates may apply.   I've had close contact with someone who has COVID. Do I need to quarantine, and if so, for how long?   For the most current answer, including a calculator to determine whether you need to stay home and for how long, visit www.cdc.gov/coronavirus/2019-ncov/your-health/quarantine-isolation.html  .   I've tested positive for COVID. How long do I need to isolate?   For the latest recommendations, including a calculator to determine how long you need to stay home, visit www.cdc.gov/coronavirus/2019-ncov/your-health/quarantine-isolation.html  .   What if I develop symptoms that might be from COVID?   For the latest recommendations on what to do if you're sick, including when to seek emergency care, visit www.cdc.gov/coronavirus/2019-ncov/if-you-are-sick/steps-when-sick.html  .    Flu vaccine information   Who should get a flu vaccine?   Everyone 6 months of age and older should get a yearly flu vaccine.   When should I get vaccinated?   You should get a flu vaccine by the end of October. Once you're vaccinated, it takes about two weeks for antibodies to develop and protect you against the flu. That's why it's important to get vaccinated as soon as possible.   After October, is it too late to get vaccinated?   No. You should still get vaccinated. As long as the flu viruses are still in your community, flu vaccines will remain available, even into January of next year or later.   Why do I need a flu vaccine EVERY year?   Flu viruses are constantly  changing, so flu vaccines are usually updated from one season to the next. Your protection from the flu vaccine also lessens over time.   Is the flu vaccine safe?   Yes. Over the last 50 years, hundreds of millions of Americans have safely received the flu vaccines.   What are the side effects of flu vaccines?   You CANNOT get the flu from a flu vaccine. Common side effects of the flu shot include soreness, redness and/or swelling where the shot was given, low grade fever, and aches. Common side effects of the nasal spray flu vaccine for adults include runny nose, headaches, sore throat, and cough. For children, side effects include wheezing, vomiting, muscle aches, and fever.   Does the flu vaccine increase your risk of getting COVID-19?   No. There is no evidence that getting a flu vaccine increases your risk of getting COVID-19.   Is it safe to get the flu vaccine along with a COVID-19 vaccine?   Yes. It's safe to get the flu vaccine with a COVID-19 vaccine or booster.   Contact your healthcare provider TODAY for details on when and where to get your flu vaccine.   Your provider   Your diagnosis was provided by SOULEYMANE Brown, TABITHA-BC, a member of your trusted care team at [facility].   If you have any questions, call us at [phone]  .

## 2023-12-12 NOTE — E-VISIT TREATED
Chief Complaint: Cold, flu, COVID, sinus, hay fever, or seasonal allergies   Patient introduction   Patient is 58-year-old female with congestion, sore throat, headache, and fatigue that started more than 1 month ago.   Systemic symptoms have been constant.   COVID-19 testing history, vaccination status, and exposure:    Patient was tested for COVID-19 15 to 30 days ago. Test result was negative.    Patient took a self-test during the interview. Test result was negative.    Vaccinated with the updated [id number] COVID-19 vaccine (Pfizer-BioNTech or Moderna vaccine after September 12, 2023; or Novavax vaccine after October 3, 2023). Received their most recent dose of the vaccine more than 14 days ago.    No known exposure to a person with a confirmed or suspected case of COVID-19.    No high-risk (household) exposure to COVID-19 within the last 14 days.   Risk factors for severe disease from COVID-19 infection    Age 50 or older.    Higher risk for severe disease from COVID-19 because of BMI >= 25.    Mostly sedentary lifestyle.   Warning. The following may warrant further investigation:    Constant headache lasting longer than 2 weeks.    Constant fatigue lasting longer than 2 weeks.   General presentation   Patient saw improvement in symptoms for more than 2 days, followed by worsening of symptoms. Symptoms came on gradually.   Fever:    No fever.   Sinus and nasal symptoms:    Green nasal drainage.    Nasal drainage is thick.    Postnasal drip.    1 to 3 episodes of antibiotic treatment for sinus infection in the last year.    Current diagnosis of nasal polyps.    Sinus pain or pressure on or around the forehead, eyes, nose, and cheeks.    Patient first noticed sinus pain more than a month ago.    Sinus pain is not worse with Valsalva.    No nasal discharge.    No itchy nose or sneezing.    No history of unhealed nasal septal ulcer/nasal wound.   Throat symptoms:    Sore throat.    Has discomfort when swallowing  "but can swallow liquids and solid foods.   Head and body aches:    Headache described as moderate (4 to 6 on a scale of 1 to 10).    Fatigue.    No sweats.    No chills.    No myalgia.   Cough:    No cough.   Wheezing and shortness of breath:    Wheezing.    No COPD diagnosis.    No asthma diagnosis.    No shortness of breath.    No previous albuterol inhaler use for URIs, bronchitis, or pneumonia.    No previous steroid inhaler use for URIs, bronchitis, or pneumonia.   Chest pain:    No chest pain.   Ear symptoms:    Current symptoms include pain, pressure, crackling or popping, and a plugged or blocked sensation in the ear(s).   Dizziness:    No dizziness.   Allergies:    Patient has known seasonal allergies and known dust allergies.    Patient does not think symptoms are allergy-related.   Flu exposure:    No recent known exposure to a person with a confirmed flu diagnosis.    Received a flu vaccine in the last 1 to 3 months.   Patient is taking over-the-counter medications for current symptoms, including acetaminophen, fexofenadine, ibuprofen, loratadine, and omeprazole.   Review of red flags/alarm symptoms:    No changes in alertness or awareness.    No symptoms suggesting airway obstruction.    No symptoms suggesting intracranial hemorrhage.    No decreased urination.   Pregnancy/menstrual status/breastfeeding:    Patient is postmenopausal.   Self-exam:    Height: 5' 3\"    Weight: 165 lbs    Tonsillar edema.    Purulent tonsils.    No palatal petechiae.    Swollen/painful neck lymph nodes.   Recent antibiotic use:    Has not taken antibiotics for similar symptoms within the past month.   Current medications   Currently taking pantoprazole 40 MG EC tablet, naproxen 500 MG tablet, levothyroxine 25 MCG tablet, linaclotide 72 MCG capsule capsule, fluticasone 50 MCG/ACT nasal spray, clonazePAM 0.5 MG tablet, estradiol 0.1 MG/GM vaginal cream, Sodium Fluoride 1.1 % gel, lisinopril 10 MG tablet, Loratadine-D 24HR "  MG per 24 hr tablet, and FLUORIDE/IRON/VITAMIN A/VITAMIN C/VITAMIN D.   Medication allergies    Penicillins (reaction: raised, red, itchy spots with each spot lasting less than 24 hours)    Tetracyclines   Medication contraindication review   No history of anaphylactic reaction to beta-lactam antibiotics; aspirin triad; blood dyscrasia; bone marrow depression; catecholamine-releasing paraganglioma; coronary artery disease; coagulation disorder; congenital long QT syndrome; depression; electrolyte abnormalities; fungal infection; GI bleeding; GI obstruction; G6PD deficiency; heart arrhythmia; hypertension; mononucleosis; myasthenia; recent myocardial infarction; NSAID-induced asthma/urticaria; Parkinson's disease; pheochromocytoma; porphyria; Reye syndrome; seizure disorder; ulcerative colitis; and urinary retention.   No history of metoclopramide-associated dystonic reaction and tardive dyskinesia.   No known history of azithromycin-associated cholestatic jaundice or hepatic impairment.   Past medical history   Immune conditions:   No immunocompromising conditions.   No history of cancer.   Social history   Never smoked tobacco.   Patient-submitted comments   Patient was asked if they had anything to add about their symptoms. Patient writes: I've been struggling and fighting bad sinus, ear infection for a month and I believe I need a strong antibiotic to get better. I've had sinus surgery several years ago and I can tell when it gets this bad. Spitting up thick yellow and green. Fa.   Patient did not request an excuse note.   Assessment   Bacterial sinusitis.   This is the likely diagnosis based on patient's interview responses, including:    Symptom profile    Duration of symptoms longer than 10 days    Sinus pressure lasting longer than 10 days    Symptoms improved then came back worse    Improved symptoms lasted longer than 2 days   Plan   Medications:    levofloxacin 500 mg tablet RX 500mg 1 tab PO qd 7d  for infection. This medication is an antibiotic. Take it exactly as directed. You must finish the entire course of medication, even if you feel better after the first few days of treatment. Amount is 7 tab.    fluticasone 50 mcg/actuation nasal spray,suspension OTC 50mcg 2 sprays each nostril nasal qd 30d for nasal symptoms due to allergies or sinusitis. Fluticasone needs to be used every day to be effective. It may take up to a week for the full effects of the medication to be seen. Brands to look for include Flonase. Amount is 16 g.   The patient's prescription will be sent to:   [id number]   [address] [place] [id number]   Phone: [phone]     Fax: [phone]   Patient informed to purchase OTC medication.   Education:    Condition and causes    Prevention    Treatment and self-care    When to call provider   ----------   Electronically signed by SOULEYMANE Brown FNP-BC on 2023-12-11 at 21:04PM   ----------   Patient Interview Transcript:   Which of these symptoms are bothering you? Select all that apply.    Stuffed-up nose or sinuses    Sore throat    Headache    Fatigue or tiredness   Not selected:    Cough    Shortness of breath    Runny nose    Itchy or watery eyes    Itchy nose or sneezing    Loss of smell or taste    Hoarse voice or loss of voice    Fever    Sweats    Chills    Muscle or body aches    Nausea or vomiting    Diarrhea    I don't have any of these symptoms   When did your current symptoms start? Select one.    More than a month ago   Not selected:    Less than 48 hours ago    3 to 5 days ago    6 to 9 days ago    10 to 14 days ago    2 to 3 weeks ago    3 to 4 weeks ago   Did your symptoms come on suddenly or gradually? Select one.    Gradually   Not selected:    Suddenly    I'm not sure   Have your symptoms improved at all since they began? Select one.    Yes, but then they came back worse than before   Not selected:    Yes, but they haven't gone away  "completely    No   It sounds like you felt better, but now you feel sick again. How long did you feel better? Select one.    More than 2 days   Not selected:    Less than 1 day    1 to 2 days   Since your current symptoms started, have you been tested for COVID-19? This includes home self-tests as well as nose swab or saliva tests done at a doctor's office, lab, or testing site. Select one.    Yes   Not selected:    No   When was your most recent COVID-19 test? Select one.    15 to 30 days ago   Not selected:    Within the last 24 hours    Within the last week (specify date as MM/DD/YY)    7 to 14 days ago    More than 1 month ago   What was the result of your most recent COVID-19 test? Select one.    Negative   Not selected:    Positive   Since your last COVID test was more than 2 weeks ago, you should test again. - If you have a COVID test kit, take the test now before continuing this interview. - If you choose not to take a test or don't have one, you should still continue this interview. Your provider can still help you get care. Do you have a COVID test kit? Select one.    Yes, and I'll take another test now   Not selected:    Yes, but I prefer not to take a test now    No, I don't have a test kit   What is the result of the COVID-19 home test you just took? Select one.    Negative   Not selected:    Positive   Has anyone in your household tested positive for COVID-19 in the past 14 days? Select one.    No   Not selected:    Yes   In the last 14 days, have you had close contact with someone who has COVID-19? \"Close contact\" means any of these: - Caring for someone with COVID-19. - Being within 6 feet of someone with COVID-19 for a total of at least 15 minutes over a 24-hour period. For example, three 5-minute exposures for a total of 15 minutes. - Being in direct contact with respiratory droplets from someone with COVID-19 (being coughed on, kissing, sharing utensils). Select one.    No, not that I know of   " Not selected:    Yes, a confirmed case    Yes, a suspected case   Have you gotten the [id number] updated COVID-19 vaccine? This means either the updated Pfizer-ZeroVM or Moderna vaccine after September 12, 2023; or the updated Novavax vaccine after October 3, 2023. Select one.    Yes   Not selected:    No   When did you get the updated COVID-19 vaccine? Select one.    More than 14 days ago   Not selected:    Less than 48 hours (2 days) ago    48 to 72 hours (3 days) ago    3 to 5 days ago    5 to 7 days ago    7 to 14 days ago   Since your symptoms started, have you felt dizzy? Select one.    No   Not selected:    Yes, but I can still do my regular daily activities    Yes, and it makes it hard to stand, walk, or do daily activities   You mentioned having sweats, chills, aches, fatigue, or headache. Have you had these symptoms the whole time you've been sick? Select one.    Yes   Not selected:    No, they come and go    No, I had them at first, but they're gone now   You mentioned having a headache. On a scale of 1 to 10, how severe is your headache pain? Select one.    Moderate (4 to 6)   Not selected:    Mild (1 to 3)    Severe (7 to 9)    Unbearable (10)    The worst headache of my life (10+)   Do you feel sinus pain or pressure in any of these areas?    In my forehead    Around my eyes    Behind my nose    In my cheeks   Not selected:    In my upper teeth or jaw    No   When did you first notice your sinus pain or pressure? Select one.    1 month ago or longer   Not selected:    Less than 5 days ago    5 to 9 days ago    10 to 14 days ago    2 to 4 weeks ago   Does coughing, sneezing, or leaning forward make your sinuses feel worse? Select one.    No   Not selected:    Yes   What color is your nasal drainage? Select one.    Green or greenish   Not selected:    Clear    White    Yellow or yellowish    My nose is stuffed but not draining or running   Is your nasal drainage thick or thin? Select one.    Thick   Not  "selected:    Thin   Is there any drainage (mucus) going down the back of your throat? This kind of drainage is also called \"postnasal drip.\" It can cause frequent throat clearing. Select one.    Yes   Not selected:    No, not that I know of   Can you swallow liquids and solid foods? A sore throat may be painful when swallowing, but it shouldn't prevent you from swallowing. Select one.    Yes, but it's uncomfortable   Not selected:    Yes, with ease    Yes, but it's painful    It's hard to swallow anything because it feels like liquids and food get stuck in my throat    No, I can't swallow anything, liquid or solid foods   Is your throat pain worse on one side than the other? Select one.    No, it's the same on both sides   Not selected:    Yes, it's worse on the right side    Yes, it's worse on the left side   Do you have chest pain? You might also feel it as discomfort, aching, tightness, or squeezing in the chest. Select one.    No   Not selected:    Yes   Have you urinated at least 3 times in the last 24 hours? Select one.    Yes   Not selected:    No   Changes in alertness or awareness may mean you need emergency care. Since your symptoms started, have you had any of these? Select all that apply.    None of the above   Not selected:    Confusion    Slurred speech    Not knowing where you are or what day it is    Difficulty staying conscious    Fainting or passing out   Do your symptoms include a whistling sound, or wheezing, when you breathe? Select one.    Yes   Not selected:    No   Do you have any of these symptoms in your ear(s)? Select all that apply.    Pain    Pressure    Crackling or popping    Plugged or blocked sensation   Not selected:    Fullness    None of the above   Are your tonsils larger than usual?    Yes   Not selected:    No, not that I can tell    I've had my tonsils removed   Is there any white or yellow pus on your tonsils?    Yes   Not selected:    No, not that I can see   Are there red " spots on the roof of your mouth or the back of your throat?    No, not that I can see   Not selected:    Yes   Are your glands/lymph nodes swollen, or does it hurt when you touch them?    Yes   Not selected:    No, not that I can tell   In the past week, has anyone around you (such as at school, work, or home) had a confirmed diagnosis of the flu? A confirmed diagnosis means that a nose swab was done to verify a flu infection. Select all that apply.    No, not that I know of   Not selected:    I live with someone who has the flu    I've been within touching distance of someone who has the flu    I've walked by, or sat about 3 feet away from, someone who has the flu    I've been in the same building as someone who has the flu   Have you ever been diagnosed with asthma? Select one.    No   Not selected:    Yes   Have you ever been prescribed albuterol to use for wheezing, cough, or shortness of breath caused by a cold, bronchitis, or pneumonia? Albuterol (ProAir, Proventil, Ventolin) is prescribed as an inhaler or a solution to be used with a nebulizer machine. Select one.    No, not that I know of   Not selected:    Yes   Have you ever been prescribed a steroid inhaler to use for wheezing, cough, or shortness of breath caused by a cold, bronchitis, or pneumonia? Some examples of steroid inhalers include Pulmicort, Flovent, Qvar, and Alvesco. Select one.    No, not that I know of   Not selected:    Yes   Have you ever been diagnosed with chronic obstructive pulmonary disease (COPD)? Select one.    No, not that I know of   Not selected:    Yes   In the past month, have you taken antibiotics for similar symptoms? Examples of antibiotics include amoxicillin, amoxicillin-clavulanate (Augmentin), penicillin, cefdinir (Omnicef), doxycycline, and clindamycin (Cleocin). Select one.    No   Not selected:    Yes (specify)   In the last year, how many times were you treated with antibiotics for a sinus infection? Select one.     1 to 3 times   Not selected:    None    4 or more times   Have you been diagnosed with a deviated septum or nasal polyps? The nose is divided into two nostrils by the septum. A crooked septum is called a deviated septum. Nasal polyps are growths inside the nose or sinuses. Select one.    Yes, I have nasal polyps   Not selected:    Yes, but I had surgery to treat them    Yes, I have a deviated septum    Yes, I have a deviated septum and nasal polyps    No, not that I know of   Do you have a sore inside your nose that won't heal? Select one.    No, not that I know of   Not selected:    Yes   Do you have allergies (pollen, dust mites, mold, animal dander)? Select one.    Yes   Not selected:    No, not that I know of   What kind of allergies do you have? Select all that apply.    Seasonal allergies (hay fever)    Dust allergies   Not selected:    Perennial, or year-round, allergies (hay fever)    Pet allergies    None of the above    I'm not sure   Do you think your symptoms could be allergy-related? Select one.    No   Not selected:    Yes    I'm not sure   Have you had a flu shot this season? Select one.    Yes, 1 to 3 months ago   Not selected:    Yes, less than 2 weeks ago    Yes, 2 to 4 weeks ago    Yes, 3 to 6 months ago    Yes, more than 6 months ago    No   Have you gone through menopause? Select one.    Yes   Not selected:    No    I'm going through it now   The flu and COVID-19 can be more serious for people in certain groups. The next few questions help us figure out if you or anyone you live with is at higher risk for complications from these infections. Do any of these statements apply to you? Select all that apply.    None of the above   Not selected:    I'm     I'm     I'm Black    I'm  or    Do you smoke tobacco? Select one.    No   Not selected:    Yes, every day    Yes, some days    No, I quit   Do you have a mostly inactive lifestyle? Answer yes if all of  these are true: - You spend at least 6 hours a day sitting or lying down - You get less than 2 and a half hours per week of moderate exercise such as walking fast - You get less than 1 hour and 15 minutes per week of intense exercise such as jogging or running Select one.    Yes   Not selected:    No   Do you have any of these conditions? Select all that apply.    None of the above   Not selected:    Chronic lung disease, such as cystic fibrosis or interstitial fibrosis    Heart disease, such as congenital heart disease, congestive heart failure, or coronary artery disease    Disorder of the brain, spinal cord, or nerves and muscles, such as dementia, cerebral palsy, epilepsy, muscular dystrophy, or developmental delay    Metabolic disorder or mitochondrial disease    Cerebrovascular disease, such as stroke or another condition affecting the blood vessels or blood supply to the brain    Down syndrome    Mood disorder, including depression or schizophrenia spectrum disorders    Substance use disorder, such as alcohol, opioid, or cocaine use disorder    Tuberculosis    Primary immunodeficiency   Do you live in a group care setting? Examples include: - Nursing home - Residential care - Psychiatric treatment facility - Group home - [facility] - [facility] and care [facility] - Homeless shelter - Foster care setting Select one.    No   Not selected:    Yes   Are you a healthcare worker? Select one.    No   Not selected:    Yes   People with a very high body mass index (BMI) are at higher risk for developing complications from the flu and severe illness from COVID-19. To determine your BMI, we need to know your weight and height. Please enter your weight (in pounds).    Weight   Please enter your height.    Height   Do you have any of these conditions that can affect the immune system? Scroll to see all options. Select all that apply.    None of these   Not selected:    History of bone marrow transplant    Chronic kidney disease     Chronic liver disease (including cirrhosis)    HIV/AIDS    Inflammatory bowel disease (Crohn's disease or ulcerative colitis)    Lupus    Moderate to severe plaque psoriasis    Multiple sclerosis    Rheumatoid arthritis    Sickle cell anemia    Alpha or beta thalassemia    History of solid organ transplant (kidney, liver, or heart)    History of spleen removal    An autoimmune disorder not listed here (specify)    A condition requiring treatment with long-term use of oral steroids (such as prednisone, prednisolone, or dexamethasone) (specify)   Have you ever been diagnosed with cancer? Select one.    No   Not selected:    Yes, I have cancer now    Yes, but I'm in remission   Do any of these apply to you? Select all that apply.    None of the above   Not selected:    I've been hospitalized within the last 5 days    I have diabetes    I'm in close contact with a child in    The flu and COVID-19 can be more serious for people in certain groups. Do any of these apply to the people who live with you? Select all that apply.    None of the above   Not selected:    Under age 5    Over age 65            Black     or     Pregnant    Has given birth, had a miscarriage, had a pregnancy loss, or had an  in the last 2 weeks   Does any member of your household have any of these medical conditions? Select all that apply.    None of the above   Not selected:    Asthma    Disorders of the brain, spinal cord, or nerves and muscles, such as dementia, cerebral palsy, epilepsy, muscular dystrophy, or developmental delay    Chronic lung disease, such as COPD or cystic fibrosis    Heart disease, such as congenital heart disease, congestive heart failure, or coronary artery disease    Cerebrovascular disease, such as stroke or another condition affecting the blood vessels or blood supply to the brain    Blood disorders, such as sickle cell disease    Diabetes    Metabolic disorders  such as inherited metabolic disorders or mitochondrial disease    Kidney disorders    Liver disorders    Weakened immune system due to illness or medications such as chemotherapy or steroids    Children under the age of 19 who are on long-term aspirin therapy    Extreme obesity (BMI > 40)   Do you have any of these conditions? Scroll to see all options. Select all that apply.    None of the above   Not selected:    Aspirin triad (also known as Samter's triad or ASA triad)    Asthma or hives from taking aspirin or other NSAIDs, such as ibuprofen or naproxen    Blockage or narrowing of the blood vessels of the heart    Blood clotting disorder    Blood dyscrasia, such anemia, leukemia, lymphoma, or myeloma    Bone marrow depression    Catecholamine-releasing paraganglioma    Congenital long QT syndrome    Depression    Difficulty urinating or completely emptying your bladder    Uncorrected electrolyte abnormalities    Fungal infection    Gastrointestinal (GI) bleeding    Gastrointestinal (GI) obstruction    G6PD deficiency    Recent heart attack    High blood pressure    Irregular heartbeat or heart rhythm    Mononucleosis (mono)    Myasthenia gravis    Parkinson's disease    Pheochromocytoma    Reye syndrome    Seizure disorder    Thyroid disease    Ulcerative colitis   Have you ever had either of these conditions? Select all that apply.    No   Not selected:    Metoclopramide-associated dystonic reaction    Tardive dyskinesia   Just a few more questions about medications, and then you're finished. Have you used any non-prescription medications or nasal sprays for your current symptoms? Examples include saline sprays, decongestants, NyQuil, and Tylenol. Select one.    Yes   Not selected:    No   Which of these non-prescription medications have you tried? Scroll to see all options. Select all that apply.    Acetaminophen (Tylenol)    Fexofenadine (Allegra)    Ibuprofen (Advil, Motrin, Midol)    Loratadine (Alavert,  Claritin)    Omeprazole (Prilosec)   Not selected:    Budesonide (Rhinocort)    Cetirizine (Zyrtec)    Chlorpheniramine (Aller-chlor, Chlor-Trimeton)    Cromolyn (NasalCrom)    Dextromethorphan (Delsym, Robitussin, Vicks DayQuil Cough)    Diphenhydramine (Benadryl)    Fluticasone (Flonase)    Guaifenesin (Mucinex)    Guaifenesin/dextromethorphan (Delsym DM, Mucinex DM, Robitussin DM)    Ketotifen (Alaway, Zaditor)    Naphazoline-pheniramine (Naphcon-A, Opcon-A, Visine-A)    Oxymetazoline (Afrin)    Phenylephrine (Sudafed PE)    Triamcinolone (Nasacort)    None of the above   Have you taken any monoamine oxidase inhibitor (MAOI) medications in the last 14 days? Examples include rasagiline (Azilect), selegiline (Eldepryl, Zelapar), isocarboxazid (Marplan), phenelzine (Nardil), and tranylcypromine (Parnate). Select one.    No, not that I know of   Not selected:    Yes   Do you take Kynmobi or Apokyn (apomorphine)? Select one.    No   Not selected:    Yes   Are you still taking these medications listed in your medical record? If you're not taking any of these, click Next. Select all that apply.    pantoprazole 40 MG EC tablet    naproxen 500 MG tablet    levothyroxine 25 MCG tablet    linaclotide 72 MCG capsule capsule    fluticasone 50 MCG/ACT nasal spray    clonazePAM 0.5 MG tablet    estradiol 0.1 MG/GM vaginal cream    Sodium Fluoride 1.1 % gel    lisinopril 10 MG tablet    Loratadine-D 24HR  MG per 24 hr tablet   Are you taking any other medications, vitamins, or supplements? Select one.    Yes   Not selected:    No   Have you ever had an allergic or bad reaction to any medication? Select one.    Yes   Not selected:    No   Have you had an allergic or bad reaction to any of these medications? Select all that apply.    No, not that I know of   Not selected:    Baloxavir (Xofluza)    Benzonatate (Tessalon Perles)    Fluconazole, itraconazole, or terconazole (brands include Diflucan, Sporanox, Terazol)    " Oseltamivir (Tamiflu) or zanamivir (Relenza)    Paxlovid, nirmatrelvir, or ritonavir (Norvir)   Have you had an allergic or bad reaction to any of these antibiotic medications? Select all that apply.    Penicillin or any \"-cillin\" antibiotic, such as amoxicillin, ampicillin, dicloxacillin, nafcillin, or piperacillin (Brands include Augmentin, Unasyn, and Zosyn)    Tetracycline or any \"-cycline\" antibiotic, such as doxycycline, demeclocycline, minocycline (Brands include Declomycin, Doryx, Dynacin, Oracea, Monodox, Panmycin, and Vibramycin)   Not selected:    Ciprofloxacin or any \"-floxacin\" antibiotic, such as gemifloxacin, levofloxacin, moxifloxacin, or ofloxacin (Brands include Factive, Cipro, Floxin, and Levaquin)    Cephalexin or any \"cef-\" antibiotic, such as cefazolin, cefdinir, cefuroxime, ceftriaxone, ceftazidime, or cefepime (Brands include Ancef, Ceftin, Fortaz, Keflex, Maxipime, Rocephin, and Simplicef)    Azithromycin or any \"-thromycin\" antibiotic, such as erythromycin or clarithromycin (Brands include Biaxin, Erythrocin, Z-tessy, and Zithromax)    Clindamycin or lincomycin (Brands include Cleocin and Lincocin)    No, not that I know of   When you had an allergic or bad reaction to penicillin or another \"-cillin\" antibiotic, did you have any of these symptoms? Select all that apply.    Raised, red, itchy spots with each spot lasting less than 24 hours   Not selected:    Swelling of the mouth, eyes, lips, or tongue    Blisters or ulcers on the lips, mouth, eyes, or vagina    _Peeling skin _    Breathing difficulties    Feeling light-headed or faint    A fast heartbeat    Clammy skin    Confusion and anxiety    Collapsing or losing consciousness    Joint pains    Problems with kidneys, lungs, or liver    None of the above   Have you had an allergic or bad reaction to any of these medications? Select all that apply.    No, not that I know of   Not selected:    Albuterol or a similar medication    Atropine   "  Corticosteroid (steroid) medication, including topical steroids, inhaled steroids, nasal steroids, or oral steroids (budesonide, ciclesonide, dexamethasone, flunisolide, fluticasone, methylprednisolone, triamcinolone, prednisone (or brand names Alvesco, Deltasone, Flovent, Medrol, Nasacort, Rhinocort, or Veramyst)    Metoclopramide (Reglan)    Ondansetron (Zuplenz, Zofran ODT, Zofran)    Prochlorperazine (Compazine)   Have you had an allergic or bad reaction to any of these eye drops, nasal sprays, or inhalers? Scroll to see all options. Select all that apply.    No, not that I know of   Not selected:    Azelastine (Astelin, Astepro, Optivar)    Cromolyn (Crolom, NasalCrom)    Ipratropium (Atrovent)    Ketotifen (Alaway, Zaditor)    Pheniramine/naphazoline (Naphcon-A, Opcon-A, Visine-A)    Olopatadine (Pataday, Patanol, Pazeo)   Have you had an allergic or bad reaction to any of these non-prescription medications? Scroll to see all options. Select all that apply.    No, not that I know of   Not selected:    Acetaminophen (Tylenol)    Aspirin    Cetirizine (Zyrtec)    Dextromethorphan (Delsym, Robitussin, Vicks DayQuil Cough)    Diphenhydramine (Benadryl)    Fexofenadine (Allegra)    Guaifenesin (Mucinex)    Dextromethorphan (Delsym)    Ibuprofen (Advil, Motrin, Midol)    Loratadine (Alavert, Claritin)    Oxymetazoline (Afrin)    Phenylephrine (Sudafed PE)    Pseudoephedrine (Sudafed)   Are you allergic to milk or to the proteins found in milk (for example, whey or casein)? A milk allergy is different from lactose intolerance. Select one.    No, not that I know of   Not selected:    Yes   Have you ever had jaundice or liver problems as a result of taking azithromycin (Zithromax, Zmax)? Jaundice is a condition in which the skin and the whites of the eyes turn yellow. Select all that apply.    No, not that I know of   Not selected:    Yes, jaundice    Yes, liver problems   Do you need a doctor's note? A doctor's note  confirms that you received care today and states when you can return to school or work. It does not contain information about your diagnosis or treatment plan. Your provider will make the final decision on whether to give you a doctor's note and for how long. Doctor's notes CANNOT be backdated. We can't provide medical leave paperwork through this type of visit. If more paperwork is needed to request time off, contact your primary care provider. Select one.    No   Not selected:    Today only (1 day)    Today and tomorrow (2 days)    3 days    5 days    7 days   Is there anything you'd like to add about your symptoms? Please limit your comments to the symptoms asked about in this interview. If you include comments about other concerns, your provider may recommend that you be seen in person.    I've been struggling and fighting bad sinus, ear infection for a month and I believe I need a strong antibiotic to get better. I've had sinus surgery several years ago and I can tell when it gets this bad. Spitting up thick yellow and green. Fa   ----------   Medical history   Medical history data does not currently exist for this patient.

## 2023-12-13 RX ORDER — METOCLOPRAMIDE 5 MG/1
TABLET ORAL
COMMUNITY
Start: 2023-11-06

## 2023-12-13 RX ORDER — FLUCONAZOLE 150 MG/1
TABLET ORAL
Qty: 2 TABLET | Refills: 0 | Status: SHIPPED | OUTPATIENT
Start: 2023-12-13

## 2024-01-01 DIAGNOSIS — G47.00 INSOMNIA, UNSPECIFIED TYPE: ICD-10-CM

## 2024-01-02 RX ORDER — ZOLPIDEM TARTRATE 6.25 MG/1
6.25 TABLET, FILM COATED, EXTENDED RELEASE ORAL NIGHTLY PRN
Qty: 30 TABLET | Refills: 0 | Status: SHIPPED | OUTPATIENT
Start: 2024-01-02

## 2024-01-09 ENCOUNTER — HOSPITAL ENCOUNTER (OUTPATIENT)
Dept: MRI IMAGING | Facility: HOSPITAL | Age: 59
Discharge: HOME | End: 2024-01-09
Admitting: ANESTHESIOLOGY
Payer: MEDICARE

## 2024-01-09 DIAGNOSIS — M50.30 DDD (DEGENERATIVE DISC DISEASE), CERVICAL: ICD-10-CM

## 2024-01-09 PROCEDURE — 72141 MRI NECK SPINE W/O DYE: CPT

## 2024-01-23 ENCOUNTER — HOSPITAL ENCOUNTER (OUTPATIENT)
Dept: ULTRASOUND IMAGING | Facility: HOSPITAL | Age: 59
Discharge: HOME | End: 2024-01-23
Admitting: PHYSICIAN ASSISTANT
Payer: MEDICARE

## 2024-01-23 DIAGNOSIS — R10.11 RIGHT UPPER QUADRANT PAIN: ICD-10-CM

## 2024-01-23 PROCEDURE — 76700 US EXAM ABDOM COMPLETE: CPT

## 2024-02-05 DIAGNOSIS — G47.00 INSOMNIA, UNSPECIFIED TYPE: ICD-10-CM

## 2024-02-07 RX ORDER — ZOLPIDEM TARTRATE 5 MG/1
TABLET ORAL
Qty: 30 TABLET | OUTPATIENT
Start: 2024-02-07

## 2024-02-20 RX ORDER — HYDROCHLOROTHIAZIDE 25 MG/1
25 TABLET ORAL DAILY
Qty: 30 TABLET | Refills: 0 | Status: SHIPPED | OUTPATIENT
Start: 2024-02-20

## 2024-02-23 ENCOUNTER — PATIENT MESSAGE (OUTPATIENT)
Dept: FAMILY MEDICINE CLINIC | Facility: CLINIC | Age: 59
End: 2024-02-23
Payer: MEDICARE

## 2024-02-23 DIAGNOSIS — G47.00 INSOMNIA, UNSPECIFIED TYPE: ICD-10-CM

## 2024-02-27 RX ORDER — ZOLPIDEM TARTRATE 6.25 MG/1
6.25 TABLET, FILM COATED, EXTENDED RELEASE ORAL NIGHTLY PRN
Qty: 30 TABLET | Refills: 0 | Status: SHIPPED | OUTPATIENT
Start: 2024-02-27

## 2024-02-27 NOTE — TELEPHONE ENCOUNTER
From: Tatiana Fields  To: Amber Mills  Sent: 2/23/2024 4:13 PM EST  Subject: Medication     Hi. I request refill for the ambien 6.25 or fine with 5mg. Pharmacy says they never received it but it looks like online you sent it, so I’m not sure what to do. Thanks.

## 2024-03-08 ENCOUNTER — PATIENT MESSAGE (OUTPATIENT)
Dept: FAMILY MEDICINE CLINIC | Facility: CLINIC | Age: 59
End: 2024-03-08
Payer: MEDICARE

## 2024-03-08 RX ORDER — FLUCONAZOLE 150 MG/1
TABLET ORAL
Qty: 2 TABLET | Refills: 0 | Status: SHIPPED | OUTPATIENT
Start: 2024-03-08

## 2024-03-08 NOTE — TELEPHONE ENCOUNTER
From: Tatiana Fields  To: Amber Mills  Sent: 3/8/2024 10:23 AM EST  Subject: Diflucan     I have a bad yeast infection. Could you please sent in a few diflucan. My Medicare visit is scheduled. Thank you. Tatiana

## 2024-03-11 RX ORDER — HYDROCHLOROTHIAZIDE 25 MG/1
25 TABLET ORAL DAILY
Qty: 30 TABLET | Refills: 0 | OUTPATIENT
Start: 2024-03-11

## 2024-03-20 ENCOUNTER — OFFICE VISIT (OUTPATIENT)
Dept: FAMILY MEDICINE CLINIC | Facility: CLINIC | Age: 59
End: 2024-03-20
Payer: MEDICARE

## 2024-03-20 VITALS
HEIGHT: 63 IN | OXYGEN SATURATION: 96 % | DIASTOLIC BLOOD PRESSURE: 86 MMHG | HEART RATE: 88 BPM | WEIGHT: 167.4 LBS | SYSTOLIC BLOOD PRESSURE: 140 MMHG | BODY MASS INDEX: 29.66 KG/M2

## 2024-03-20 DIAGNOSIS — G47.61 PERIODIC LIMB MOVEMENT DISORDER: ICD-10-CM

## 2024-03-20 DIAGNOSIS — M62.89 PELVIC FLOOR DYSFUNCTION IN FEMALE: ICD-10-CM

## 2024-03-20 DIAGNOSIS — N30.10 CHRONIC INTERSTITIAL CYSTITIS: ICD-10-CM

## 2024-03-20 DIAGNOSIS — J01.01 ACUTE RECURRENT MAXILLARY SINUSITIS: ICD-10-CM

## 2024-03-20 DIAGNOSIS — R30.0 DYSURIA: ICD-10-CM

## 2024-03-20 DIAGNOSIS — E03.9 ACQUIRED HYPOTHYROIDISM: ICD-10-CM

## 2024-03-20 DIAGNOSIS — F33.41 RECURRENT MAJOR DEPRESSIVE DISORDER, IN PARTIAL REMISSION: ICD-10-CM

## 2024-03-20 DIAGNOSIS — Z79.890 HORMONE REPLACEMENT THERAPY: ICD-10-CM

## 2024-03-20 DIAGNOSIS — K22.4 ESOPHAGEAL SPASM: ICD-10-CM

## 2024-03-20 DIAGNOSIS — E53.8 VITAMIN B12 DEFICIENCY: ICD-10-CM

## 2024-03-20 DIAGNOSIS — N95.1 POSTMENOPAUSAL DISORDER: ICD-10-CM

## 2024-03-20 DIAGNOSIS — K59.9 COLONIC INERTIA: ICD-10-CM

## 2024-03-20 DIAGNOSIS — M79.7 FIBROMYALGIA: ICD-10-CM

## 2024-03-20 DIAGNOSIS — K31.84 GASTROPARESIS: ICD-10-CM

## 2024-03-20 DIAGNOSIS — K21.9 GASTROESOPHAGEAL REFLUX DISEASE, UNSPECIFIED WHETHER ESOPHAGITIS PRESENT: ICD-10-CM

## 2024-03-20 DIAGNOSIS — E55.9 VITAMIN D DEFICIENCY: ICD-10-CM

## 2024-03-20 DIAGNOSIS — F51.04 CHRONIC INSOMNIA: ICD-10-CM

## 2024-03-20 DIAGNOSIS — F43.10 POSTTRAUMATIC STRESS DISORDER: ICD-10-CM

## 2024-03-20 DIAGNOSIS — R51.9 CHRONIC DAILY HEADACHE: ICD-10-CM

## 2024-03-20 DIAGNOSIS — Z00.00 MEDICARE ANNUAL WELLNESS VISIT, SUBSEQUENT: Primary | ICD-10-CM

## 2024-03-20 DIAGNOSIS — G47.00 INSOMNIA, UNSPECIFIED TYPE: ICD-10-CM

## 2024-03-20 DIAGNOSIS — G43.709 CHRONIC MIGRAINE WITHOUT AURA WITHOUT STATUS MIGRAINOSUS, NOT INTRACTABLE: ICD-10-CM

## 2024-03-20 DIAGNOSIS — Z79.890 HORMONE REPLACEMENT THERAPY (HRT): ICD-10-CM

## 2024-03-20 DIAGNOSIS — I10 ESSENTIAL HYPERTENSION: ICD-10-CM

## 2024-03-20 LAB
BILIRUB BLD-MCNC: NEGATIVE MG/DL
CLARITY, POC: CLEAR
COLOR UR: YELLOW
EXPIRATION DATE: 0
GLUCOSE UR STRIP-MCNC: NEGATIVE MG/DL
KETONES UR QL: NEGATIVE
LEUKOCYTE EST, POC: NEGATIVE
Lab: 0
NITRITE UR-MCNC: NEGATIVE MG/ML
PH UR: 6 [PH] (ref 5–8)
PROT UR STRIP-MCNC: NEGATIVE MG/DL
RBC # UR STRIP: NEGATIVE /UL
SP GR UR: 1.01 (ref 1–1.03)
UROBILINOGEN UR QL: NORMAL

## 2024-03-20 RX ORDER — AZITHROMYCIN 250 MG/1
TABLET, FILM COATED ORAL
Qty: 6 TABLET | Refills: 0 | Status: SHIPPED | OUTPATIENT
Start: 2024-03-20

## 2024-03-20 RX ORDER — CEFTRIAXONE 1 G/1
1 INJECTION, POWDER, FOR SOLUTION INTRAMUSCULAR; INTRAVENOUS ONCE
Status: COMPLETED | OUTPATIENT
Start: 2024-03-20 | End: 2024-03-20

## 2024-03-20 RX ORDER — HYDROCHLOROTHIAZIDE 25 MG/1
25 TABLET ORAL DAILY
Qty: 90 TABLET | Refills: 3 | Status: SHIPPED | OUTPATIENT
Start: 2024-03-20

## 2024-03-20 RX ORDER — METHYLPREDNISOLONE ACETATE 40 MG/ML
40 INJECTION, SUSPENSION INTRA-ARTICULAR; INTRALESIONAL; INTRAMUSCULAR; SOFT TISSUE ONCE
Status: COMPLETED | OUTPATIENT
Start: 2024-03-20 | End: 2024-03-20

## 2024-03-20 RX ADMIN — CEFTRIAXONE 1 G: 1 INJECTION, POWDER, FOR SOLUTION INTRAMUSCULAR; INTRAVENOUS at 15:47

## 2024-03-20 RX ADMIN — METHYLPREDNISOLONE ACETATE 40 MG: 40 INJECTION, SUSPENSION INTRA-ARTICULAR; INTRALESIONAL; INTRAMUSCULAR; SOFT TISSUE at 15:48

## 2024-03-20 NOTE — PROGRESS NOTES
The ABCs of the Annual Wellness Visit  Subsequent Medicare Wellness Visit    Subjective    Tatiana Fields is a 58 y.o. female who presents for a Subsequent Medicare Wellness Visit.    The following portions of the patient's history were reviewed and   updated as appropriate: allergies, current medications, past family history, past medical history, past social history, past surgical history, and problem list.    Compared to one year ago, the patient feels her physical   health is the same.    Compared to one year ago, the patient feels her mental   health is the same.    Recent Hospitalizations:  She was not admitted to the hospital during the last year.       Current Medical Providers:  Patient Care Team:  Amber Mills MD as PCP - General (Family Medicine)  Jake Naranjo MD as Consulting Physician (Cardiology)  Bora Miles MD as Consulting Physician (Sleep Medicine)  Lisa Villalobos MD as Consulting Physician (Physical Medicine and Rehabilitation)  Keisha Ruiz MD as Consulting Physician (Gastroenterology)  Malik Faria II, MD as Consulting Physician (Pain Medicine)  Mal Weeks MD as Consulting Physician (Colon and Rectal Surgery)  Shira Lawrence MD as Obstetrician (Obstetrics and Gynecology)  Aj Mendiola MD as Surgeon (Neurosurgery)  Doc David MD as Consulting Physician (Gastroenterology)  Abigail Kang MD as Consulting Physician (Colon and Rectal Surgery)    Outpatient Medications Prior to Visit   Medication Sig Dispense Refill    clonazePAM (KlonoPIN) 0.5 MG tablet TAKE 1 TABLET BY MOUTH TWICE DAILY AS NEEDED FOR ANXIETY OR INSOMNIA 60 tablet 1    Dextroamphetamine Sulfate 20 MG tablet Take 1 tablet by mouth Every 12 (Twelve) Hours.      Endocet  MG per tablet Take 1 tablet by mouth 3 (Three) Times a Day.      estradiol (ESTRACE) 0.1 MG/GM vaginal cream Insert  into the vagina Daily.      famciclovir (FAMVIR) 500 MG tablet Take 1 tablet by  mouth Daily. 30 tablet 5    fluticasone (FLONASE) 50 MCG/ACT nasal spray 2 sprays into the nostril(s) as directed by provider Daily. 48 g 0    ibuprofen (ADVIL,MOTRIN) 600 MG tablet Take 1 tablet by mouth Every 6 (Six) Hours As Needed.      levothyroxine (SYNTHROID, LEVOTHROID) 25 MCG tablet Take 1 tablet by mouth Daily. 90 tablet 1    linaclotide (Linzess) 72 MCG capsule capsule Take 1 capsule by mouth Every Morning Before Breakfast. 30 capsule 3    lisinopril (PRINIVIL,ZESTRIL) 10 MG tablet Take 1 tablet by mouth Daily. 90 tablet 3    loratadine-pseudoephedrine (Loratadine-D 24HR)  MG per 24 hr tablet Take 1 tablet by mouth Daily. 30 tablet 2    metoclopramide (REGLAN) 5 MG tablet       naproxen (NAPROSYN) 500 MG tablet Take 1 tablet by mouth 2 (Two) Times a Day With Meals. 180 tablet 1    ondansetron (ZOFRAN) 4 MG tablet Take 1 tablet by mouth Every 8 (Eight) Hours As Needed for Nausea or Vomiting. 30 tablet 5    pantoprazole (PROTONIX) 40 MG EC tablet Take 1 tablet by mouth Daily. 30 tablet 11    progesterone (PROMETRIUM) 100 MG capsule Take one capsule at hs (Patient taking differently: Take 1 capsule by mouth Daily. Take one capsule at hs) 30 capsule 5    rizatriptan (MAXALT) 10 MG tablet Take 1 tablet by mouth 1 (One) Time As Needed for Migraine. 27 tablet 3    Sodium Fluoride 1.1 % gel APPLY TOPICALLY EVERY DAY      tiZANidine (ZANAFLEX) 4 MG tablet Take 1 tablet by mouth Every 8 (Eight) Hours As Needed for Muscle Spasms. 90 tablet 5    traZODone (DESYREL) 50 MG tablet TAKE 1 TABLET BY MOUTH EVERY NIGHT AT BEDTIME 90 tablet 1    zolpidem CR (Ambien CR) 6.25 MG CR tablet Take 1 tablet by mouth At Night As Needed for Sleep. 30 tablet 0    amphetamine-dextroamphetamine XR (ADDERALL XR) 30 MG 24 hr capsule Take 1 capsule by mouth Every Morning      estrogens, conjugated,-methyltestosterone (Covaryx HS) 0.625-1.25 MG per tablet Take 1 tablet by mouth Daily. 30 tablet 2    hydroCHLOROthiazide 25 MG tablet  TAKE 1 TABLET BY MOUTH DAILY 30 tablet 0    promethazine (PHENERGAN) 25 MG tablet Take 1 tablet by mouth Every 8 (Eight) Hours As Needed for Nausea or Vomiting. 15 tablet 2    Trintellix 10 MG tablet tablet Take 1 tablet by mouth Daily.      fluconazole (Diflucan) 150 MG tablet 1 po now, repeat in 3 days 2 tablet 0     No facility-administered medications prior to visit.       Opioid medication/s are on active medication list.  and I have evaluated her active treatment plan and pain score trends (see table).  Vitals:    03/20/24 1452   PainSc:   6     I have reviewed the chart for potential of high risk medication and harmful drug interactions in the elderly.          Aspirin is not on active medication list.  Aspirin use is not indicated based on review of current medical condition/s. Risk of harm outweighs potential benefits.  .    Patient Active Problem List   Diagnosis    Recurrent major depressive disorder, in partial remission    Periodic limb movement disorder    Chronic daily headache    Excessive daytime sleepiness    Witnessed apneic spells    History of Lyme disease    Fibromyalgia    Chronic interstitial cystitis    Essential hypertension    IFG (impaired fasting glucose)    Chronic constipation    Duodenal gastroesophageal reflux    Irritable bowel syndrome    Posttraumatic stress disorder    Vitamin D deficiency    Hepatomegaly    Postmenopausal disorder    Chronic insomnia    Cervical arthritis    Cervical nerve root compression    Vitamin B12 deficiency    Hormone replacement therapy (HRT)    Chronic migraine without aura    Postherpetic neuralgia    Colonic inertia    Cervical myelopathy    DDD (degenerative disc disease), cervical    Pelvic floor dysfunction in female    Female cystocele    Vaginal vault prolapse    Acquired hypothyroidism    Gastroparesis    Esophageal spasm    Gastroesophageal reflux disease    Migraine without aura and without status migrainosus, not intractable     Advance Care  "Planning   Advance Care Planning     Advance Directive is not on file.  ACP discussion was held with the patient during this visit. Patient does not have an advance directive, information provided.     Objective    Vitals:    03/20/24 1452   BP: 140/86   Pulse: 88   SpO2: 96%   Weight: 75.9 kg (167 lb 6.4 oz)   Height: 160 cm (63\")   PainSc:   6     Estimated body mass index is 29.65 kg/m² as calculated from the following:    Height as of this encounter: 160 cm (63\").    Weight as of this encounter: 75.9 kg (167 lb 6.4 oz).    BMI is >= 25 and <30. (Overweight) The following options were offered after discussion;: exercise counseling/recommendations and nutrition counseling/recommendations      Does the patient have evidence of cognitive impairment? No          HEALTH RISK ASSESSMENT    Smoking Status:  Social History     Tobacco Use   Smoking Status Never    Passive exposure: Never   Smokeless Tobacco Never     Alcohol Consumption:  Social History     Substance and Sexual Activity   Alcohol Use No     Fall Risk Screen:    MELYSSAADI Fall Risk Assessment was completed, and patient is at LOW risk for falls.Assessment completed on:3/20/2024    Depression Screening:      3/20/2024     2:56 PM   PHQ-2/PHQ-9 Depression Screening   Little Interest or Pleasure in Doing Things 0-->not at all   Feeling Down, Depressed or Hopeless 1-->several days   PHQ-9: Brief Depression Severity Measure Score 1       Health Habits and Functional and Cognitive Screening:      3/20/2024     2:54 PM   Functional & Cognitive Status   Do you have difficulty preparing food and eating? No   Do you have difficulty bathing yourself, getting dressed or grooming yourself? No   Do you have difficulty using the toilet? No   Do you have difficulty moving around from place to place? No   Do you have trouble with steps or getting out of a bed or a chair? No   Current Diet Well Balanced Diet   Dental Exam Up to date   Eye Exam Up to date   Exercise (times per " week) 3 times per week   Current Exercises Include Walking   Do you need help using the phone?  No   Are you deaf or do you have serious difficulty hearing?  No   Do you need help to go to places out of walking distance? No   Do you need help shopping? No   Do you need help preparing meals?  No   Do you need help with housework?  No   Do you need help with laundry? No   Do you need help taking your medications? No   Do you need help managing money? No   Do you ever drive or ride in a car without wearing a seat belt? No   Have you felt unusual stress, anger or loneliness in the last month? Yes   Who do you live with? Alone   If you need help, do you have trouble finding someone available to you? Yes   Have you been bothered in the last four weeks by sexual problems? No       Age-appropriate Screening Schedule:  Refer to the list below for future screening recommendations based on patient's age, sex and/or medical conditions. Orders for these recommended tests are listed in the plan section. The patient has been provided with a written plan.    Health Maintenance   Topic Date Due    TDAP/TD VACCINES (1 - Tdap) Never done    ZOSTER VACCINE (1 of 2) Never done    ANNUAL WELLNESS VISIT  01/17/2024    BMI FOLLOWUP  01/17/2024    HEMOGLOBIN A1C  05/09/2024    LIPID PANEL  11/09/2024    MAMMOGRAM  05/18/2025    COLONOSCOPY  03/24/2030    HEPATITIS C SCREENING  Completed    COVID-19 Vaccine  Completed    INFLUENZA VACCINE  Completed    Pneumococcal Vaccine 0-64  Aged Out    DIABETIC FOOT EXAM  Discontinued    PAP SMEAR  Discontinued    DIABETIC EYE EXAM  Discontinued    URINE MICROALBUMIN  Discontinued                  CMS Preventative Services Quick Reference  Risk Factors Identified During Encounter  Chronic Pain: Natural history and expected course discussed. Questions answered.  Depression/Dysphoria:  managed per behavioral health  Immunizations Discussed/Encouraged: Tdap, Influenza, Pneumococcal 23, Prevnar 20  (Pneumococcal 20-valent conjugate), Shingrix, COVID19, and RSV (Respiratory Syncytial Virus)  Inactivity/Sedentary: Patient was advised to exercise at least 150 minutes a week per CDC recommendations.  The above risks/problems have been discussed with the patient.  Pertinent information has been shared with the patient in the After Visit Summary.  An After Visit Summary and PPPS were made available to the patient.    Follow Up:   Next Medicare Wellness visit to be scheduled in 1 year.       Additional E&M Note during same encounter follows:  Patient has multiple medical problems which are significant and separately identifiable that require additional work above and beyond the Medicare Wellness Visit.      Chief Complaint  Medicare Wellness-subsequent    Subjective        HPI  Tatiana Fields is also being seen today for routine f/u on several chronic med problems as acute recurrent sinus symptoms. Increased nasal congestion, facial pressure/pain, purulent nasal discharge x 1 month.  Not responding to her usual medications.    Has had follow-up with gastroenterology in [place] regarding her dysphagia, GERD.  Follow-up procedure scheduled in April.    Currently on HRT in the form of estrogen/testosterone combo.  Wishes to return to using Vivelle-Dot as she feels it is as effective and is cheaper.  Denies side effects.  Is up-to-date on mammogram.    Continues to be followed by pain management for multifactorial chronic pain syndrome.  No recent medication changes.    Hypertension -currently on lisinopril, HCTZ.  Blood pressures been well-controlled.    Followed by behavioral health for depression, PTSD,?  ADD.    Chronic migraine currently stable.  No new focal neurological symptoms associated with headaches.    Taking thyroid replacement as prescribed    On vitamin D and B12 replacement for deficiencies.    On Ambien CR for chronic insomnia.  Getting approximately 5 to 6 hours of quality sleep.  Denies side  "effects to medication.    Review of Systems   Constitutional:  Positive for fatigue. Negative for fever.   HENT:  Positive for congestion, postnasal drip, rhinorrhea, sinus pressure and trouble swallowing. Negative for ear pain, mouth sores, nosebleeds and sore throat.    Eyes:  Positive for visual disturbance (chronic).   Respiratory:  Negative for cough, shortness of breath and wheezing.    Cardiovascular:  Positive for palpitations. Negative for chest pain and leg swelling.   Gastrointestinal:  Positive for abdominal pain (chronic intermittent), diarrhea and nausea. Negative for blood in stool and vomiting.   Endocrine: Positive for heat intolerance.   Genitourinary:  Positive for dysuria (mild, intermittent, chronic) and pelvic pain (chronic).   Musculoskeletal:  Positive for arthralgias, myalgias, neck pain and neck stiffness.   Skin:  Negative for rash and wound.   Neurological:  Negative for seizures, syncope, facial asymmetry, speech difficulty and confusion.   Hematological:  Negative for adenopathy. Bruises/bleeds easily.   Psychiatric/Behavioral:  Positive for dysphoric mood and sleep disturbance. The patient is nervous/anxious.    Pt's previous ROS reviewed and updated as indicated.       Objective   Vital Signs:  /86   Pulse 88   Ht 160 cm (63\")   Wt 75.9 kg (167 lb 6.4 oz)   SpO2 96%   BMI 29.65 kg/m²     Physical Exam  Vitals and nursing note reviewed.   Constitutional:       General: She is not in acute distress.     Appearance: She is well-groomed and overweight. She is ill-appearing (mildly).   HENT:      Head: Atraumatic.      Right Ear: Ear canal and external ear normal. Tympanic membrane is retracted.      Left Ear: Ear canal and external ear normal. Tympanic membrane is retracted.      Nose: Mucosal edema and congestion present. No rhinorrhea.      Right Sinus: Maxillary sinus tenderness present.      Left Sinus: Maxillary sinus tenderness present.      Mouth/Throat:      Mouth: " Mucous membranes are moist. No oral lesions.      Pharynx: Oropharynx is clear.   Eyes:      General: No scleral icterus.     Conjunctiva/sclera: Conjunctivae normal.   Neck:      Thyroid: No thyroid mass.   Cardiovascular:      Rate and Rhythm: Normal rate and regular rhythm.      Pulses: Normal pulses.      Heart sounds: Normal heart sounds.   Pulmonary:      Effort: Pulmonary effort is normal.      Breath sounds: Normal breath sounds.   Abdominal:      General: There is no distension.      Palpations: Abdomen is soft. There is no mass.      Tenderness: There is abdominal tenderness (mild) in the epigastric area. There is no guarding or rebound.   Musculoskeletal:      Right lower leg: No edema.      Left lower leg: No edema.   Lymphadenopathy:      Cervical: No cervical adenopathy.   Skin:     General: Skin is warm and dry.      Coloration: Skin is not jaundiced or pale.      Findings: No rash.   Neurological:      Mental Status: She is alert and oriented to person, place, and time. Mental status is at baseline.      Gait: Gait is intact.   Psychiatric:         Mood and Affect: Mood and affect normal.         Speech: Speech normal.         Behavior: Behavior normal. Behavior is cooperative.         Thought Content: Thought content normal.         Cognition and Memory: Cognition normal.     Pt's previous physical exam reviewed and updated as indicated.                Brief Urine Lab Results  (Last result in the past 365 days)        Color   Clarity   Blood   Leuk Est   Nitrite   Protein   CREAT   Urine HCG        03/20/24 1639 Yellow   Clear   Negative   Negative   Negative   Negative                 Lab Results   Component Value Date    WBC 6.27 11/09/2023    HGB 12.4 11/09/2023    HCT 37.8 11/09/2023    MCV 84.8 11/09/2023     11/09/2023       Lab Results   Component Value Date    GLUCOSE 92 11/09/2023    BUN 10 11/09/2023    CREATININE 0.89 11/09/2023    EGFRIFNONA 84 12/23/2021    EGFRIFAFRI 97  12/23/2021    BCR 11.2 11/09/2023    K 3.3 (L) 11/09/2023    CO2 29.5 (H) 11/09/2023    CALCIUM 9.6 11/09/2023    PROTENTOTREF 6.6 11/09/2023    ALBUMIN 4.6 11/09/2023    LABIL2 2.3 11/09/2023    AST 19 11/09/2023    ALT 15 11/09/2023       Lab Results   Component Value Date    CHOL 172 08/16/2021    CHLPL 194 11/09/2023    TRIG 127 11/09/2023    HDL 54 11/09/2023     (H) 11/09/2023       Lab Results   Component Value Date    HGBA1C 5.60 11/09/2023       Lab Results   Component Value Date    TSH 0.975 11/09/2023        Assessment and Plan   Diagnoses and all orders for this visit:    1. Medicare annual wellness visit, subsequent (Primary)    2. Acute recurrent maxillary sinusitis  -     cefTRIAXone (ROCEPHIN) injection 1 g  -     methylPREDNISolone acetate (DEPO-medrol) injection 40 mg    3. Dysuria  -     POC Urinalysis Dipstick, Automated    4. Chronic insomnia    5. Periodic limb movement disorder    6. Chronic migraine without aura without status migrainosus, not intractable    7. Chronic daily headache    8. Fibromyalgia    9. Pelvic floor dysfunction in female    10. Recurrent major depressive disorder, in partial remission    11. Chronic interstitial cystitis    12. Posttraumatic stress disorder    13. Colonic inertia    14. Esophageal spasm    15. Gastroesophageal reflux disease, unspecified whether esophagitis present    16. Gastroparesis    17. Acquired hypothyroidism    18. Hormone replacement therapy (HRT)    19. Vitamin B12 deficiency    20. Vitamin D deficiency    21. Essential hypertension    22. Hormone replacement therapy  -     estradiol (Minivelle) 0.05 MG/24HR patch; 1 patch every 4 days.  Dispense: 8 patch; Refill: 11    23. Postmenopausal disorder  -     estradiol (Minivelle) 0.05 MG/24HR patch; 1 patch every 4 days.  Dispense: 8 patch; Refill: 11    Other orders  -     hydroCHLOROthiazide 25 MG tablet; Take 1 tablet by mouth Daily.  Dispense: 90 tablet; Refill: 3  -     azithromycin  (Zithromax Z-Wale) 250 MG tablet; Take 2 tablets the first day, then 1 tablet daily for 4 days.  Dispense: 6 tablet; Refill: 0      Hypertension typically well-controlled.  Mildly elevated today possibly due to acute illness.  Continue lisinopril, HCTZ.    Medications as above for recurrent sinusitis.  Encouraged to continue seasonal allergy medications including loratadine, nasal corticosteroid etc.    Continue Ambien CR as needed.  Good clinical benefit at this time.  No side effects reported.  No aberrant behavior.  As part of patient's treatment plan I am prescribing a controlled substance.  The patient has been made aware of the appropriate use of such medications, including potential risk of somnolence, limited ability to drive and/or work safely, and potential for dependence and/or overdose.  It has also been made clear that these medications are for use by this patient only, without concomitant use of alcohol or other substances, unless prescribed.  History and physical exam exhibit continued safe and appropriate use of controlled substance.  KIMBERLY reviewed.  Patient has completed a prescribing agreement detailing terms of continued prescribing of controlled substances, including monitoring KIMBERLY reports, urine drug screening, and pill counts if necessary.  Patient is aware that inappropriate use will result in cessation of prescribing such medications.    Postmenopausal disorder with desire for HRT.  We have once again reviewed risk/benefits and potential side effects of HRT.  Patient voiced understanding wish to proceed with treatment.  Jorge A-Ana provided as above.    Continue thyroid replacement.  Recheck TSH  Visit.    Continue vitamin D and B12 replacement.         Follow Up   Return in about 6 months (around 9/20/2024).  F/u sooner as needed.  Patient was encouraged to keep me informed of any acute changes, lack of improvement, or any new concerning symptoms.  Pt is aware of reasons to seek emergent  care.  Patient voiced understanding of all instructions and denied further questions.    Patient was given instructions and counseling regarding her condition or for health maintenance advice. Please see specific information pulled into the AVS if appropriate.     Please note that portions of this note may have been completed with a voice recognition program.

## 2024-03-20 NOTE — PATIENT INSTRUCTIONS
Medicare Wellness  Personal Prevention Plan of Service     Date of Office Visit:    Encounter Provider:  Amber Mills MD  Place of Service:  [facility]  Patient Name: Tatiana Fields  :  1965    As part of the Medicare Wellness portion of your visit today, we are providing you with this personalized preventive plan of services (PPPS). This plan is based upon recommendations of the [place] Preventive Services Task Force (USPSTF) and the Advisory Committee on Immunization Practices (ACIP).    This lists the preventive care services that should be considered, and provides dates of when you are due. Items listed as completed are up-to-date and do not require any further intervention.    Health Maintenance   Topic Date Due    TDAP/TD VACCINES (1 - Tdap) Never done    ZOSTER VACCINE (1 of 2) Never done    ANNUAL WELLNESS VISIT  2024    BMI FOLLOWUP  2024    HEMOGLOBIN A1C  2024    LIPID PANEL  2024    MAMMOGRAM  2025    COLONOSCOPY  2030    HEPATITIS C SCREENING  Completed    COVID-19 Vaccine  Completed    INFLUENZA VACCINE  Completed    Pneumococcal Vaccine 0-64  Aged Out    DIABETIC FOOT EXAM  Discontinued    PAP SMEAR  Discontinued    DIABETIC EYE EXAM  Discontinued    URINE MICROALBUMIN  Discontinued       No orders of the defined types were placed in this encounter.      Return in about 6 months (around 2024).

## 2024-03-21 RX ORDER — PROMETHAZINE HYDROCHLORIDE 25 MG/1
25 TABLET ORAL EVERY 8 HOURS PRN
Qty: 15 TABLET | Refills: 2 | Status: SHIPPED | OUTPATIENT
Start: 2024-03-21

## 2024-03-23 DIAGNOSIS — Z79.890 HORMONE REPLACEMENT THERAPY: ICD-10-CM

## 2024-03-23 DIAGNOSIS — N95.1 POSTMENOPAUSAL DISORDER: ICD-10-CM

## 2024-03-23 PROBLEM — G43.119 INTRACTABLE MIGRAINE WITH AURA WITHOUT STATUS MIGRAINOSUS: Status: RESOLVED | Noted: 2017-04-19 | Resolved: 2024-03-23

## 2024-03-23 RX ORDER — ESTRADIOL 0.05 MG/D
PATCH, EXTENDED RELEASE TRANSDERMAL
Qty: 8 PATCH | Refills: 11 | Status: SHIPPED | OUTPATIENT
Start: 2024-03-23

## 2024-03-23 RX ORDER — ZOLPIDEM TARTRATE 6.25 MG/1
6.25 TABLET, FILM COATED, EXTENDED RELEASE ORAL NIGHTLY PRN
Qty: 30 TABLET | Refills: 2 | Status: SHIPPED | OUTPATIENT
Start: 2024-03-23

## 2024-03-26 RX ORDER — ESTRADIOL 0.05 MG/D
PATCH, EXTENDED RELEASE TRANSDERMAL
Qty: 8 PATCH | Refills: 11 | OUTPATIENT
Start: 2024-03-26

## 2024-04-09 RX ORDER — LINACLOTIDE 72 UG/1
72 CAPSULE, GELATIN COATED ORAL
Qty: 30 CAPSULE | Refills: 3 | OUTPATIENT
Start: 2024-04-09

## 2024-04-10 RX ORDER — RIZATRIPTAN BENZOATE 10 MG/1
10 TABLET ORAL ONCE AS NEEDED
Qty: 27 TABLET | Refills: 3 | Status: SHIPPED | OUTPATIENT
Start: 2024-04-10

## 2024-04-10 RX ORDER — FAMCICLOVIR 500 MG/1
500 TABLET ORAL DAILY
Qty: 30 TABLET | Refills: 5 | Status: SHIPPED | OUTPATIENT
Start: 2024-04-10

## 2024-04-10 RX ORDER — LORATADINE/PSEUDOEPHEDRINE 10MG-240MG
1 TABLET, EXTENDED RELEASE 24 HR ORAL DAILY
Qty: 30 TABLET | Refills: 2 | Status: SHIPPED | OUTPATIENT
Start: 2024-04-10

## 2024-04-10 RX ORDER — FAMCICLOVIR 500 MG/1
500 TABLET ORAL DAILY
Qty: 30 TABLET | Refills: 5 | OUTPATIENT
Start: 2024-04-10

## 2024-04-15 ENCOUNTER — PATIENT MESSAGE (OUTPATIENT)
Dept: FAMILY MEDICINE CLINIC | Facility: CLINIC | Age: 59
End: 2024-04-15
Payer: MEDICARE

## 2024-04-15 ENCOUNTER — TELEPHONE (OUTPATIENT)
Dept: FAMILY MEDICINE CLINIC | Facility: CLINIC | Age: 59
End: 2024-04-15

## 2024-04-15 NOTE — TELEPHONE ENCOUNTER
----- Message from Tatiana Fields sent at 4/15/2024  3:30 PM EDT -----  Regarding: Linzess   Contact: [phone]  Can you please send in prescription refill for rody Borja.  Thanks Ngoc

## 2024-04-15 NOTE — TELEPHONE ENCOUNTER
From: Tatiana Fields  To: Amber Mills  Sent: 4/15/2024 3:30 PM EDT  Subject: Linzess     Can you please send in prescription refill for rody 70. Thanks Ngoc

## 2024-04-30 DIAGNOSIS — J34.3 HYPERTROPHY, NASAL, TURBINATE: ICD-10-CM

## 2024-05-01 RX ORDER — FLUTICASONE PROPIONATE 50 MCG
SPRAY, SUSPENSION (ML) NASAL
Qty: 48 G | Refills: 0 | Status: SHIPPED | OUTPATIENT
Start: 2024-05-01

## 2024-05-03 ENCOUNTER — E-VISIT (OUTPATIENT)
Dept: FAMILY MEDICINE CLINIC | Facility: TELEHEALTH | Age: 59
End: 2024-05-03

## 2024-05-03 NOTE — EXTERNAL PATIENT INSTRUCTIONS
Note   continue over the counter medicine of choice.   Diagnosis   Bacterial sinusitis   My name is SOULEYMANE Arias, and I'm a healthcare provider at [facility]. I reviewed your interview, and I see that you have bacterial sinusitis, also known as a bacterial sinus infection.   Medications   Your pharmacy   [id number] [address] [place] [id number] [phone]     Prescription   Levofloxacin (500mg): Take 1 tablet by mouth daily for 7 days for infection. This medication is an antibiotic. Take it exactly as directed. You must finish the entire course of medication, even if you feel better after the first few days of treatment.   Fluconazole (150mg): Take 1 tablet by mouth once for 1 day as a single dose. Use this medication only if you develop a yeast infection. If yeast infection symptoms are still present 3 days after taking the first tablet, take the second tablet.    Start taking the antibiotics I've prescribed right away. You need to finish the entire course of antibiotics, even if you start to feel better before the pills run out.    Some people develop a yeast infection after taking antibiotics. If you get a yeast infection, you can treat it with Diflucan (fluconazole), an oral antifungal prescribed here.   About your diagnosis   The sinuses are hollow spaces connected to the nasal passages. Sinusitis occurs when the sinuses swell and block the drainage of fluid and mucus from the nose, causing pain, pressure, and congestion. Fatigue, difficulty sleeping, or decreased appetite may accompany your symptoms.   More than 90% of sinus infections are caused by viruses. However, in certain cases, a sinus infection may be caused by bacteria. Bacterial sinus infections usually look like one of the following cases:    Severe sinus symptoms with a fever over 102F.    Sinus symptoms that have not improved at all after 10 days.    Cold symptoms that slowly improve but then worsen again  after 5 or 6 days, usually with a high fever, headache, or nasal discharge.   What to expect   If you follow this treatment plan, you should start to feel better within a few days.   When to seek care   Call us at [phone]   with any sudden or unexpected symptoms.    Symptoms that last longer than 10 to 14 days.    Symptoms that get better for a few days, and then suddenly get worse.    Fever that measures over 103F or continues for more than 3 days.    Any vision changes.    A worsening headache.    Stiff neck.    Swelling of your forehead or eyes.    Coughing up red or bloody mucus.    Swallowing becomes extremely difficult or impossible.    More than 5 episodes of diarrhea in a day.    More than 5 episodes of vomiting in a day.    Severe shortness of breath.    Severe chest pain   Other treatment    Rest! Your body needs rest to recover and fight infection.    Drink plenty of water to stay hydrated.    Use steam to soothe your sinuses: Breathe it in from a shower or a bowl of hot water. Placing a warm, moist washcloth over your nose and forehead may help relieve the sinus pain and pressure.    Try non-prescription saline nasal sprays to help your nasal symptoms. Try using a Neti Pot to flush out your stuffy nose and sinuses. Neti Pots are available at any drugstore without a prescription.    Avoid smoke and air pollution. Smoke can make infections worse.   Prevention    Avoid close contact with other people when you're sick.    Cover your mouth and nose when you cough or sneeze. Use a tissue or cough into your elbow. Make sure that used tissues go directly into the trash.    Avoid touching your eyes, nose, or mouth while you're sick.    Wash your hands often, especially after coughing, sneezing, or blowing your nose. If soap and water are not available, use an alcohol-based hand .    If you or someone in your home or workplace is sick, disinfect commonly used items. This includes door handles,  tables, computers, remotes, and pens.    Coronavirus (COVID-19) information   Common symptoms of COVID-19 include fever, cough, shortness of breath, fatigue, muscle or body aches, headaches, new loss of sense of taste or smell, sore throat, stuffy or runny nose, nausea or vomiting, and diarrhea. Most people who get COVID-19 have mild symptoms and can rest at home until they get better. Elderly people and those with chronic medical problems may be at risk for more serious complications.   FAQs about the COVID-19 vaccine   Are the vaccines safe?   Yes. Hundreds of millions of people in the [place] have already safely received COVID-19 vaccines under the most intense safety monitoring in the history of the [place].   Do I need the vaccine if I've already had COVID?   Yes. Vaccination helps protect you even if you've already had COVID.   If you had COVID-19 and had symptoms, wait to get vaccinated until you've recovered and completed your isolation period.   If you tested positive for COVID-19 but did not have symptoms, you can get vaccinated after 5 full days have passed since you had a positive test, as long as you don't develop symptoms.   How many doses of the vaccine do I need?   Visit www.cdc.gov/coronavirus/2019-ncov/vaccines/stay-up-to-date.html   to find out how to stay up to date with your COVID-19 vaccines.   I'm immunocompromised. How many doses of the vaccine do I need?   For information on how immunocompromised people can stay up to date with their COVID-19 vaccines, visit www.cdc.gov/coronavirus/2019-ncov/vaccines/recommendations/immuno.html  .   What are the common side effects of the vaccine?   A sore arm, tiredness, headache, and muscle pain may occur within two days of getting the vaccine and last a day or two. For the Moderna or Pfizer vaccines, side effects are more common after the second dose. People over the age of 55 are less likely to have side effects than younger people.   After I'm up to date on  vaccines, can I still get or spread COVID?   Yes, you can still get COVID, but your disease should be milder. And your risk of serious illness, hospitalization, and complications will be much lower, especially if you're up to date. Unfortunately, you can still spread COVID if you've been vaccinated. That's why it's important to follow isolation guidelines if you get sick or test positive.   After I'm up to date on vaccines, can I go back to normal?   You should still wear a mask indoors in public if:    It's required by laws, rules, regulations, or local guidance.    You have a weakened immune system.    Your age puts you at increased risk of severe disease.    You have a medical condition that puts you at increased risk of severe disease.    Someone in your household has a weakened immune system, is at increased risk for severe disease, or is unvaccinated.    You're in an area of high transmission.   Where can I get a COVID-19 vaccine?   Visit [facility]'s website for more information. To find a COVID-19 vaccination site near you, visit www.vaccines.gov/  , call [phone]  , or text your zip code to [id number] ([employer]). Message and data rates may apply.   I've had close contact with someone who has COVID. Do I need to quarantine, and if so, for how long?   For the most current answer, including a calculator to determine whether you need to stay home and for how long, visit www.cdc.gov/coronavirus/2019-ncov/your-health/isolation.html  .   I've tested positive for COVID. How long do I need to isolate?   For the latest recommendations, including a calculator to determine how long you need to stay home, visit www.cdc.gov/coronavirus/2019-ncov/your-health/isolation.html  .   What if I develop symptoms that might be from COVID?   For the latest recommendations on what to do if you're sick, including when to seek emergency care, visit www.cdc.gov/coronavirus/2019-ncov/if-you-are-sick/index.html  .    Flu vaccine  information   Who should get a flu vaccine?   Everyone 6 months of age and older should get a yearly flu vaccine.   When should I get vaccinated?   You should get a flu vaccine by the end of October. Once you're vaccinated, it takes about two weeks for antibodies to develop and protect you against the flu. That's why it's important to get vaccinated as soon as possible.   After October, is it too late to get vaccinated?   No. You should still get vaccinated. As long as the flu viruses are still in your community, flu vaccines will remain available, even into January of next year or later.   Why do I need a flu vaccine EVERY year?   Flu viruses are constantly changing, so flu vaccines are usually updated from one season to the next. Your protection from the flu vaccine also lessens over time.   Is the flu vaccine safe?   Yes. Over the last 50 years, hundreds of millions of Americans have safely received the flu vaccines.   What are the side effects of flu vaccines?   You CANNOT get the flu from a flu vaccine. Common side effects of the flu shot include soreness, redness and/or swelling where the shot was given, low grade fever, and aches. Common side effects of the nasal spray flu vaccine for adults include runny nose, headaches, sore throat, and cough. For children, side effects include wheezing, vomiting, muscle aches, and fever.   Does the flu vaccine increase your risk of getting COVID-19?   No. There is no evidence that getting a flu vaccine increases your risk of getting COVID-19.   Is it safe to get the flu vaccine along with a COVID-19 vaccine?   Yes. It's safe to get the flu vaccine with a COVID-19 vaccine or booster.   Contact your healthcare provider TODAY for details on when and where to get your flu vaccine.   Your provider   Your diagnosis was provided by SOULEYMANE Arias, a member of your trusted care team at [facility].   If you have any questions, call us at [phone]  .

## 2024-05-03 NOTE — E-VISIT TREATED
Chief Complaint: Cold, flu, COVID, sinus, hay fever, or seasonal allergies   Patient introduction   Patient is 58-year-old female with cough, congestion, sore throat, headache, and fever (which may have resolved; see below) that started more than 1 month ago.   Systemic symptoms have been constant.   COVID-19 testing history, vaccination status, and exposure:    Patient was tested for COVID-19 within the last 24 hours. Test result was negative.    Vaccinated with the updated [id number] COVID-19 vaccine (Pfizer-BioNTech or Moderna vaccine after September 12, 2023; or Novavax vaccine after October 3, 2023). Received their most recent dose of the vaccine more than 14 days ago.    No known exposure to a person with a confirmed or suspected case of COVID-19.    No high-risk (household) exposure to COVID-19 within the last 14 days.   Risk factors for severe disease from COVID-19 infection    Age 50 or older.    Higher risk for severe disease from COVID-19 because of BMI >= 25.   Warning. The following may warrant further investigation:    Constant headache lasting longer than 2 weeks.    Headache described as severe (7 to 9 on a scale of 1 to 10).   General presentation   Patient saw improvement in symptoms for 1 to 2 days, followed by worsening of symptoms. Symptoms came on gradually.   Fever:    Patient had a fever that lasted 1 to 3 days, but it has resolved.    Highest temperature of 100.4F to 101.5F.    Patient's last fever was within the last 24 hours.   Sinus and nasal symptoms:    Green nasal drainage.    Nasal drainage is thick.    Postnasal drip.    1 to 3 episodes of antibiotic treatment for sinus infection in the last year.    History of surgically corrected septal deviation or nasal polyps.    Sinus pain or pressure on or around the forehead, eyes, nose, cheeks, and upper teeth or jaw.    Patient first noticed sinus pain 2 to 4 weeks ago.    Sinus pain is not worse with Valsalva.    No nasal discharge.    " No itchy nose or sneezing.    No history of unhealed nasal septal ulcer/nasal wound.   Throat symptoms:    Sore throat. Has redness on the tonsils. No redness in the back of the throat.    Patient can swallow liquids and solid foods with ease.   Head and body aches:    Headache described as severe (7 to 9 on a scale of 1 to 10).    No sweats.    No chills.    No myalgia.    No fatigue.   Cough:    Cough is worse in the morning and at night/while sleeping.    Cough is productive of sputum.    Describes color of sputum as green.   Wheezing and shortness of breath:    Wheezing.    No COPD diagnosis.    No asthma diagnosis.    No shortness of breath.    No previous albuterol inhaler use for URIs, bronchitis, or pneumonia.    No previous steroid inhaler use for URIs, bronchitis, or pneumonia.   Chest pain:    No chest pain.   Ear symptoms:    Current symptoms include pain, pressure, and a plugged or blocked sensation in the ear(s).   Dizziness:    No dizziness.   Allergies:    No history of allergies.   Flu exposure:    No recent known exposure to a person with a confirmed flu diagnosis.    Received a flu vaccine in the last 3 to 6 months.   Patient is taking over-the-counter medications for current symptoms, including acetaminophen, fluticasone, ibuprofen, and loratadine.   Review of red flags/alarm symptoms:    No changes in alertness or awareness.    No paroxysmal cough followed by whoop on inspiration    No symptoms suggesting airway obstruction.    No symptoms suggesting intracranial hemorrhage.    No decreased urination.    No blue or gray coloring present in face, lips, or nail beds.    No swelling, pain, redness, or increased warmth in the calf or lower part of ONE leg only.    No proptosis.   Risk factors for antibiotic resistance:    Antibiotic use for similar symptoms within the last 30 days.   Pregnancy/menstrual status/breastfeeding:    Patient is postmenopausal.   Self-exam:    Height: 5' 3\"    Weight: 165 " lbs    Tonsillar edema.    Purulent tonsils.    No palatal petechiae.    Swollen/painful neck lymph nodes.   Recent antibiotic use:    Has taken antibiotics for similar symptoms within the past month. Patient specifies the antibiotics taken as Amoxiclav.   Current medications   Currently taking ibuprofen 600 MG tablet, azithromycin 250 MG tablet, Endocet  MG per tablet, traZODone 50 MG tablet, and VITAMIN A/VITAMIN D/VITAMIN E.   Medication allergies    Cephalosporins    Lincosamides    Tetracyclines   Medication contraindication review   No history of anaphylactic reaction to beta-lactam antibiotics; aspirin triad; blood dyscrasia; bone marrow depression; catecholamine-releasing paraganglioma; coronary artery disease; coagulation disorder; congenital long QT syndrome; depression; electrolyte abnormalities; fungal infection; GI bleeding; GI obstruction; G6PD deficiency; heart arrhythmia; hypertension; mononucleosis; myasthenia; recent myocardial infarction; NSAID-induced asthma/urticaria; Parkinson's disease; pheochromocytoma; porphyria; Reye syndrome; seizure disorder; ulcerative colitis; and urinary retention.   No history of metoclopramide-associated dystonic reaction and tardive dyskinesia.   No known history of amoxicillin-clavulanate-associated cholestatic jaundice or hepatic impairment.   No known history of azithromycin-associated cholestatic jaundice or hepatic impairment.   Past medical history   Immune conditions:   No immunocompromising conditions.   No history of cancer.   Social history   Never smoked tobacco.   Patient-submitted comments   Patient was asked if they had anything to add about their symptoms. Patient writes: I've been fighting a bad sinus infection for a month. I took amoxiclav and nasal spray and Claritin D.  The last time it was this bad I ended up take levaquin and it got better. I've not taken that often in my life but I fell It would help and I w.   Patient did not request an  excuse note.   Assessment   Bacterial sinusitis.   This is the likely diagnosis based on patient's interview responses, including:    Symptom profile    Duration of symptoms longer than 10 days    Sinus pressure lasting longer than 10 days    Symptoms improved then came back worse   Plan   Medications:    levofloxacin 500 mg tablet RX 500mg 1 tab PO qd 7d for infection. This medication is an antibiotic. Take it exactly as directed. You must finish the entire course of medication, even if you feel better after the first few days of treatment. Amount is 7 tab.    fluconazole 150 mg tablet RX 150mg 1 tab PO once 1d as a single dose for vaginal yeast infection. Repeat in 72 hours if symptoms persist. Amount is 2 tab.   The patient's prescriptions will be sent to:   [id number]   [address] [place] [id number]   Phone: [phone]     Fax: [phone]   Education:    Condition and causes    Prevention    Treatment and self-care    When to call provider   ----------   Electronically signed by SOULEYMANE Arias on 2024-05-03 at 12:48PM   ----------   Patient Interview Transcript:   Which of these symptoms are bothering you? Select all that apply.    Cough    Stuffed-up nose or sinuses    Sore throat    Headache    Fever   Not selected:    Shortness of breath    Runny nose    Itchy or watery eyes    Itchy nose or sneezing    Loss of smell or taste    Hoarse voice or loss of voice    Sweats    Chills    Muscle or body aches    Fatigue or tiredness    Nausea or vomiting    Diarrhea    I don't have any of these symptoms   When did your current symptoms start? Select one.    More than a month ago   Not selected:    Less than 48 hours ago    3 to 5 days ago    6 to 9 days ago    10 to 14 days ago    2 to 3 weeks ago    3 to 4 weeks ago   Did your symptoms come on suddenly or gradually? Select one.    Gradually   Not selected:    Suddenly    I'm not sure   Have your symptoms improved at all since  "they began? Select one.    Yes, but then they came back worse than before   Not selected:    Yes, but they haven't gone away completely    No   It sounds like you felt better, but now you feel sick again. How long did you feel better? Select one.    1 to 2 days   Not selected:    Less than 1 day    More than 2 days   Since your current symptoms started, have you been tested for COVID-19? This includes home self-tests as well as nose swab or saliva tests done at a doctor's office, lab, or testing site. Select one.    Yes   Not selected:    No   When was your most recent COVID-19 test? Select one.    Within the last 24 hours   Not selected:    Within the last week (specify date as MM/DD/YY)    7 to 14 days ago    15 to 30 days ago    More than 1 month ago   What was the result of your most recent COVID-19 test? Select one.    Negative   Not selected:    Positive   Has anyone in your household tested positive for COVID-19 in the past 14 days? Select one.    No   Not selected:    Yes   In the last 14 days, have you had close contact with someone who has COVID-19? \"Close contact\" means any of these: - Caring for someone with COVID-19. - Being within 6 feet of someone with COVID-19 for a total of at least 15 minutes over a 24-hour period. For example, three 5-minute exposures for a total of 15 minutes. - Being in direct contact with respiratory droplets from someone with COVID-19 (being coughed on, kissing, sharing utensils). Select one.    No, not that I know of   Not selected:    Yes, a confirmed case    Yes, a suspected case   Have you gotten the [id number] updated COVID-19 vaccine? This means either the updated Pfizer-BioNTech or Moderna vaccine after September 12, 2023; or the updated Novavax vaccine after October 3, 2023. Select one.    Yes   Not selected:    No   When did you get the updated COVID-19 vaccine? Select one.    More than 14 days ago   Not selected:    Less than 48 hours (2 days) ago    48 to 72 hours (3 " days) ago    3 to 5 days ago    5 to 7 days ago    7 to 14 days ago   Since your symptoms started, have you felt dizzy? Select one.    No   Not selected:    Yes, but I can still do my regular daily activities    Yes, and it makes it hard to stand, walk, or do daily activities   Do you have chest pain? You might also feel it as discomfort, aching, tightness, or squeezing in the chest. Select one.    No   Not selected:    Yes   You mentioned having a fever. Do you have a fever now? Select one.    No, it's gone now   Not selected:    Yes, and I've had one since my symptoms started    Yes, but I didn't have one when my symptoms started   Did you take your temperature with a thermometer? Select one.    Yes   Not selected:    No, but it felt mild    No, but it felt high   What was the highest reading on the thermometer? Select one.    100.4 to 101.5F   Not selected:    Below 100.4F    101.6 to 101.9F    102.0 to 103.0F    Above 103.0F   How long did your fever last? Select one.    1 to 3 days   Not selected:    Less than 24 hours    4 or more days   When did you last have a fever? Select one.    Within the last 24 hours   Not selected:    24 to 48 hours ago    More than 2 days ago   You mentioned having sweats, chills, aches, fatigue, or headache. Have you had these symptoms the whole time you've been sick? Select one.    Yes   Not selected:    No, they come and go    No, I had them at first, but they're gone now   You mentioned having a headache. On a scale of 1 to 10, how severe is your headache pain? Select one.    Severe (7 to 9)   Not selected:    Mild (1 to 3)    Moderate (4 to 6)    Unbearable (10)    The worst headache of my life (10+)   Do you cough so hard that it's made you gag or vomit? By gag, we mean has your coughing made you choke or dry heave? Select all that apply.    No   Not selected:    Yes, my coughing has made me gag    Yes, my coughing has made me vomit   Does your cough come in spasms of 10 to 20  "coughs in a row, followed by a loud whoop when breathing in? Select one.    No   Not selected:    Yes   When is your cough the worst? Select all that apply.    In the morning, or when I wake up    At nighttime, or while I'm sleeping   Not selected:    During the day    I haven't noticed a difference depending on time of day   Are you coughing up mucus or phlegm? Select one.    Yes, a lot   Not selected:    No, my cough is dry    Yes, a little   What color is most of the mucus or phlegm that you're coughing up? Select one.    Green or greenish   Not selected:    Clear    White/frothy    Yellow or yellowish    Red or pink    I'm not sure   Do you feel sinus pain or pressure in any of these areas?    In my forehead    Around my eyes    Behind my nose    In my cheeks    In my upper teeth or jaw   Not selected:    No   When did you first notice your sinus pain or pressure? Select one.    2 to 4 weeks ago   Not selected:    Less than 5 days ago    5 to 9 days ago    10 to 14 days ago    1 month ago or longer   Does coughing, sneezing, or leaning forward make your sinuses feel worse? Select one.    No   Not selected:    Yes   What color is your nasal drainage? Select one.    Green or greenish   Not selected:    Clear    White    Yellow or yellowish    My nose is stuffed but not draining or running   Is your nasal drainage thick or thin? Select one.    Thick   Not selected:    Thin   Is there any drainage (mucus) going down the back of your throat? This kind of drainage is also called \"postnasal drip.\" It can cause frequent throat clearing. Select one.    Yes   Not selected:    No, not that I know of   Can you swallow liquids and solid foods? A sore throat may be painful when swallowing, but it shouldn't prevent you from swallowing. Select one.    Yes, with ease   Not selected:    Yes, but it's uncomfortable    Yes, but it's painful    It's hard to swallow anything because it feels like liquids and food get stuck in my " "throat    No, I can't swallow anything, liquid or solid foods   Is your throat pain worse on one side than the other? Select one.    No, it's the same on both sides   Not selected:    Yes, it's worse on the right side    Yes, it's worse on the left side   To recommend the best treatment, we need to see photos of your throat. You can have someone else take the photo, or use a mirror. If you choose not to send photos, you can still continue this interview, but your treatment options may be affected. Select one.    I'll take the photos myself   Not selected:    Someone can take the photos for me    I'd rather not send photos   Send at least 2 photos for review. - Switch the flash to On (not auto). - Stand close to a mirror. - Don't use the \"selfie\" camera. Instead, turn the phone around and tilt it slightly up. - Looking in the mirror, tap to focus on the back of the throat, not the teeth. - Say \"Ah\" so the back of your throat is visible, and take the photo. - Before sending, make sure the photos are clear and the throat is in focus.    Upload 1    Upload 2   Not selected:    Upload 3   Have you urinated at least 3 times in the last 24 hours? Select one.    Yes   Not selected:    No   Do your face, lips, or nail beds appear blue or gray? Select one.    No   Not selected:    Yes   Do you have any swelling, pain, redness, or increased warmth in the calf or lower part of ONE leg only? Select one.    No   Not selected:    Yes   Changes in alertness or awareness may mean you need emergency care. Since your symptoms started, have you had any of these? Select all that apply.    None of the above   Not selected:    Confusion    Slurred speech    Not knowing where you are or what day it is    Difficulty staying conscious    Fainting or passing out   Do your symptoms include a whistling sound, or wheezing, when you breathe? Select one.    Yes   Not selected:    No   Since your symptoms started, have you noticed that one or both " of your eyes is bulging or poking out? Select one.    No   Not selected:    Yes   Do you have any of these symptoms in your ear(s)? Select all that apply.    Pain    Pressure    Plugged or blocked sensation   Not selected:    Fullness    Crackling or popping    None of the above   Are your tonsils larger than usual?    Yes   Not selected:    No, not that I can tell    I've had my tonsils removed   Is there redness in the back of your throat or on your tonsils? Select all that apply.    Yes, on the tonsils   Not selected:    Yes, in the back of the throat    No, not that I can see   Is there any white or yellow pus on your tonsils?    Yes   Not selected:    No, not that I can see   Are there red spots on the roof of your mouth or the back of your throat?    No, not that I can see   Not selected:    Yes   Are your glands/lymph nodes swollen, or does it hurt when you touch them?    Yes   Not selected:    No, not that I can tell   In the past week, has anyone around you (such as at school, work, or home) had a confirmed diagnosis of the flu? A confirmed diagnosis means that a nose swab was done to verify a flu infection. Select all that apply.    No, not that I know of   Not selected:    I live with someone who has the flu    I've been within touching distance of someone who has the flu    I've walked by, or sat about 3 feet away from, someone who has the flu    I've been in the same building as someone who has the flu   Have you ever been diagnosed with asthma? Select one.    No   Not selected:    Yes   Have you ever been prescribed albuterol to use for wheezing, cough, or shortness of breath caused by a cold, bronchitis, or pneumonia? Albuterol (ProAir, Proventil, Ventolin) is prescribed as an inhaler or a solution to be used with a nebulizer machine. Select one.    No, not that I know of   Not selected:    Yes   Have you ever been prescribed a steroid inhaler to use for wheezing, cough, or shortness of breath caused  by a cold, bronchitis, or pneumonia? Some examples of steroid inhalers include Pulmicort, Flovent, Qvar, and Alvesco. Select one.    No, not that I know of   Not selected:    Yes   Have you ever been diagnosed with chronic obstructive pulmonary disease (COPD)? Select one.    No, not that I know of   Not selected:    Yes   In the past month, have you taken antibiotics for similar symptoms? Examples of antibiotics include amoxicillin, amoxicillin-clavulanate (Augmentin), penicillin, cefdinir (Omnicef), doxycycline, and clindamycin (Cleocin). Select one.    Yes (specify): Amoxiclav   Not selected:    No   In the last year, how many times were you treated with antibiotics for a sinus infection? Select one.    1 to 3 times   Not selected:    None    4 or more times   Do any of these apply to you? Select all that apply.    None of the above   Not selected:    I've been hospitalized within the last 5 days    I have diabetes    I'm in close contact with a child in    Have you been diagnosed with a deviated septum or nasal polyps? The nose is divided into two nostrils by the septum. A crooked septum is called a deviated septum. Nasal polyps are growths inside the nose or sinuses. Select one.    Yes, but I had surgery to treat them   Not selected:    Yes, I have a deviated septum    Yes, I have nasal polyps    Yes, I have a deviated septum and nasal polyps    No, not that I know of   Do you have a sore inside your nose that won't heal? Select one.    No, not that I know of   Not selected:    Yes   Do you have allergies (pollen, dust mites, mold, animal dander)? Select one.    No, not that I know of   Not selected:    Yes   Have you had a flu shot this season? Select one.    Yes, 3 to 6 months ago   Not selected:    Yes, less than 2 weeks ago    Yes, 2 to 4 weeks ago    Yes, 1 to 3 months ago    Yes, more than 6 months ago    No   Have you gone through menopause? Select one.    Yes   Not selected:    No    I'm going  through it now   The flu and COVID-19 can be more serious for people in certain groups. The next few questions help us figure out if you or anyone you live with is at higher risk for complications from these infections. Do any of these statements apply to you? Select all that apply.    None of the above   Not selected:    I'm     I'm     I'm Black    I'm  or    Do you smoke tobacco? Select one.    No   Not selected:    Yes, every day    Yes, some days    No, I quit   Do you have a mostly inactive lifestyle? Answer yes if all of these are true: - You spend at least 6 hours a day sitting or lying down - You get less than 2 and a half hours per week of moderate exercise such as walking fast - You get less than 1 hour and 15 minutes per week of intense exercise such as jogging or running Select one.    No   Not selected:    Yes   Do you have any of these conditions? Select all that apply.    None of the above   Not selected:    Chronic lung disease, such as cystic fibrosis or interstitial fibrosis    Heart disease, such as congenital heart disease, congestive heart failure, or coronary artery disease    Disorder of the brain, spinal cord, or nerves and muscles, such as dementia, cerebral palsy, epilepsy, muscular dystrophy, or developmental delay    Metabolic disorder or mitochondrial disease    Cerebrovascular disease, such as stroke or another condition affecting the blood vessels or blood supply to the brain    Down syndrome    Mood disorder, including depression or schizophrenia spectrum disorders    Substance use disorder, such as alcohol, opioid, or cocaine use disorder    Tuberculosis    Primary immunodeficiency   Do you live in a group care setting? Examples include: - Nursing home - Residential care - Psychiatric treatment facility - Group home - Dormitory - Board and care home - Homeless shelter - Foster care setting Select one.    No   Not selected:    Yes   Are you a  healthcare worker? Select one.    No   Not selected:    Yes   People with a very high body mass index (BMI) are at higher risk for developing complications from the flu and severe illness from COVID-19. To determine your BMI, we need to know your weight and height. Please enter your weight (in pounds).    Weight   Please enter your height.    Height   Do you have any of these conditions that can affect the immune system? Scroll to see all options. Select all that apply.    None of these   Not selected:    History of bone marrow transplant    Chronic kidney disease    Chronic liver disease (including cirrhosis)    HIV/AIDS    Inflammatory bowel disease (Crohn's disease or ulcerative colitis)    Lupus    Moderate to severe plaque psoriasis    Multiple sclerosis    Rheumatoid arthritis    Sickle cell anemia    Alpha or beta thalassemia    History of kidney, liver, heart, or other solid organ transplant    History of liver, heart, or other solid organ transplant    History of spleen removal    An autoimmune disorder not listed here (specify)    A condition requiring treatment with long-term use of oral steroids (such as prednisone, prednisolone, or dexamethasone) (specify)   Have you ever been diagnosed with cancer? Select one.    No   Not selected:    Yes, I have cancer now    Yes, but I'm in remission   The flu and COVID-19 can be more serious for people in certain groups. Do any of these apply to the people who live with you? Select all that apply.    None of the above   Not selected:    Under age 5    Over age 65            Black     or     Pregnant    Has given birth, had a miscarriage, had a pregnancy loss, or had an  in the last 2 weeks   Does any member of your household have any of these medical conditions? Select all that apply.    None of the above   Not selected:    Asthma    Disorders of the brain, spinal cord, or nerves and muscles, such as dementia,  cerebral palsy, epilepsy, muscular dystrophy, or developmental delay    Chronic lung disease, such as COPD or cystic fibrosis    Heart disease, such as congenital heart disease, congestive heart failure, or coronary artery disease    Cerebrovascular disease, such as stroke or another condition affecting the blood vessels or blood supply to the brain    Blood disorders, such as sickle cell disease    Diabetes    Metabolic disorders such as inherited metabolic disorders or mitochondrial disease    Kidney disorders    Liver disorders    Weakened immune system due to illness or medications such as chemotherapy or steroids    Children under the age of 19 who are on long-term aspirin therapy    Extreme obesity (BMI > 40)   Do you have any of these conditions? Scroll to see all options. Select all that apply.    None of the above   Not selected:    Aspirin triad (also known as Samter's triad or ASA triad)    Asthma or hives from taking aspirin or other NSAIDs, such as ibuprofen or naproxen    Blockage or narrowing of the blood vessels of the heart    Blood clotting disorder    Blood dyscrasia, such anemia, leukemia, lymphoma, or myeloma    Bone marrow depression    Catecholamine-releasing paraganglioma    Congenital long QT syndrome    Depression    Difficulty urinating or completely emptying your bladder    Uncorrected electrolyte abnormalities    Fungal infection    Gastrointestinal (GI) bleeding    Gastrointestinal (GI) obstruction    G6PD deficiency    Recent heart attack    High blood pressure    Irregular heartbeat or heart rhythm    Mononucleosis (mono)    Myasthenia gravis    Parkinson's disease    Pheochromocytoma    Reye syndrome    Seizure disorder    Thyroid disease    Ulcerative colitis   Have you ever had either of these conditions? Select all that apply.    No   Not selected:    Metoclopramide-associated dystonic reaction    Tardive dyskinesia   Just a few more questions about medications, and then you're  finished. Have you used any non-prescription medications or nasal sprays for your current symptoms? Examples include saline sprays, decongestants, NyQuil, and Tylenol. Select one.    Yes   Not selected:    No   Which of these non-prescription medications have you tried? Scroll to see all options. Select all that apply.    Acetaminophen (Tylenol)    Fluticasone (Flonase)    Ibuprofen (Advil, Motrin, Midol)    Loratadine (Alavert, Claritin)   Not selected:    Budesonide (Rhinocort)    Cetirizine (Zyrtec)    Chlorpheniramine (Aller-chlor, Chlor-Trimeton)    Cromolyn (NasalCrom)    Dextromethorphan (Delsym, Robitussin, Vicks DayQuil Cough)    Diphenhydramine (Benadryl)    Fexofenadine (Allegra)    Guaifenesin (Mucinex)    Guaifenesin/dextromethorphan (Delsym DM, Mucinex DM, Robitussin DM)    Ketotifen (Alaway, Zaditor)    Naphazoline-pheniramine (Naphcon-A, Opcon-A, Visine-A)    Omeprazole (Prilosec)    Oxymetazoline (Afrin)    Phenylephrine (Sudafed PE)    Triamcinolone (Nasacort)    None of the above   Have you taken any monoamine oxidase inhibitor (MAOI) medications in the last 14 days? Examples include rasagiline (Azilect), selegiline (Eldepryl, Zelapar), isocarboxazid (Marplan), phenelzine (Nardil), and tranylcypromine (Parnate). Select one.    No, not that I know of   Not selected:    Yes   Do you take Kynmobi or Apokyn (apomorphine)? Select one.    No   Not selected:    Yes   Are you still taking these medications listed in your medical record? If you're not taking any of these, click Next. Select all that apply.    ibuprofen 600 MG tablet    azithromycin 250 MG tablet    Endocet  MG per tablet    traZODone 50 MG tablet   Are you taking any other medications, vitamins, or supplements? Select one.    Yes   Not selected:    No   Have you ever had an allergic or bad reaction to any medication? Select one.    Yes   Not selected:    No   Have you had an allergic or bad reaction to any of these medications?  "Select all that apply.    No, not that I know of   Not selected:    Baloxavir (Xofluza)    Benzonatate (Tessalon Perles)    Fluconazole, itraconazole, or terconazole (brands include Diflucan, Sporanox, Terazol)    Oseltamivir (Tamiflu) or zanamivir (Relenza)    Paxlovid, nirmatrelvir, or ritonavir (Norvir)   Have you had an allergic or bad reaction to any of these antibiotic medications? Select all that apply.    Tetracycline or any \"-cycline\" antibiotic, such as doxycycline, demeclocycline, minocycline (Brands include Declomycin, Doryx, Dynacin, Oracea, Monodox, Panmycin, and Vibramycin)    Cephalexin or any \"cef-\" antibiotic, such as cefazolin, cefdinir, cefuroxime, ceftriaxone, ceftazidime, or cefepime (Brands include Ancef, Ceftin, Fortaz, Keflex, Maxipime, Rocephin, and Simplicef)    Clindamycin or lincomycin (Brands include Cleocin and Lincocin)   Not selected:    Penicillin or any \"-cillin\" antibiotic, such as amoxicillin, ampicillin, dicloxacillin, nafcillin, or piperacillin (Brands include Augmentin, Unasyn, and Zosyn)    Ciprofloxacin or any \"-floxacin\" antibiotic, such as gemifloxacin, levofloxacin, moxifloxacin, or ofloxacin (Brands include Factive, Cipro, Floxin, and Levaquin)    Azithromycin or any \"-thromycin\" antibiotic, such as erythromycin or clarithromycin (Brands include Biaxin, Erythrocin, Z-tessy, and Zithromax)    No, not that I know of   When you had an allergic or bad reaction to cephalexin or another \"cef-\" antibiotic, did you have any of these symptoms? Select all that apply.    None of the above   Not selected:    Raised, red, itchy spots with each spot lasting less than 24 hours    Swelling of the mouth, eyes, lips, or tongue    Blisters or ulcers on the lips, mouth, eyes, or vagina    _Peeling skin _    Breathing difficulties    Feeling light-headed or faint    A fast heartbeat    Clammy skin    Confusion and anxiety    Collapsing or losing consciousness    Joint pains    Problems with " kidneys, lungs, or liver   Have you had an allergic or bad reaction to any of these medications? Select all that apply.    No, not that I know of   Not selected:    Albuterol or a similar medication    Atropine    Corticosteroid (steroid) medication, including topical steroids, inhaled steroids, nasal steroids, or oral steroids (budesonide, ciclesonide, dexamethasone, flunisolide, fluticasone, methylprednisolone, triamcinolone, prednisone (or brand names Alvesco, Deltasone, Flovent, Medrol, Nasacort, Rhinocort, or Veramyst)    Metoclopramide (Reglan)    Ondansetron (Zuplenz, Zofran ODT, Zofran)    Prochlorperazine (Compazine)   Have you had an allergic or bad reaction to any of these eye drops, nasal sprays, or inhalers? Scroll to see all options. Select all that apply.    No, not that I know of   Not selected:    Azelastine (Astelin, Astepro, Optivar)    Cromolyn (Crolom, NasalCrom)    Ipratropium (Atrovent)    Ketotifen (Alaway, Zaditor)    Pheniramine/naphazoline (Naphcon-A, Opcon-A, Visine-A)    Olopatadine (Pataday, Patanol, Pazeo)   Have you had an allergic or bad reaction to any of these non-prescription medications? Scroll to see all options. Select all that apply.    No, not that I know of   Not selected:    Acetaminophen (Tylenol)    Aspirin    Cetirizine (Zyrtec)    Dextromethorphan (Delsym, Robitussin, Vicks DayQuil Cough)    Diphenhydramine (Benadryl)    Fexofenadine (Allegra)    Guaifenesin (Mucinex)    Dextromethorphan (Delsym)    Ibuprofen (Advil, Motrin, Midol)    Loratadine (Alavert, Claritin)    Oxymetazoline (Afrin)    Phenylephrine (Sudafed PE)    Pseudoephedrine (Sudafed)   Are you allergic to milk or to the proteins found in milk (for example, whey or casein)? A milk allergy is different from lactose intolerance. Select one.    No, not that I know of   Not selected:    Yes   Have you ever had jaundice or liver problems as a result of taking amoxicillin-clavulanate (Augmentin)? Jaundice is a  condition in which the skin and the whites of the eyes turn yellow. Select all that apply.    No, not that I know of   Not selected:    Yes, jaundice    Yes, liver problems   Have you ever had jaundice or liver problems as a result of taking azithromycin (Zithromax, Zmax)? Jaundice is a condition in which the skin and the whites of the eyes turn yellow. Select all that apply.    No, not that I know of   Not selected:    Yes, jaundice    Yes, liver problems   Do you need a doctor's note? A doctor's note confirms that you received care today and states when you can return to school or work. It does not contain information about your diagnosis or treatment plan. Your provider will make the final decision on whether to give you a doctor's note and for how long. Doctor's notes CANNOT be backdated. We can't provide medical leave paperwork through this type of visit. If more paperwork is needed to request time off, contact your primary care provider. Select one.    No   Not selected:    Today only (1 day)    Today and tomorrow (2 days)    3 days    5 days    7 days   Is there anything you'd like to add about your symptoms? Please limit your comments to the symptoms covered in this interview. If you include comments about other concerns, your provider may recommend that you be seen in person.    I've been fighting a bad sinus infection for a month. I took amoxiclav and nasal spray and Claritin D.  The last time it was this bad I ended up take levaquin and it got better. I've not taken that often in my life but I fell It would help and I w   ----------   Medical history   The following information was received from the EMR on May 03, 2024.   Allergies:    SULFA ANTIBIOTICS   - Allergy Type: Medication   - Reaction: Rash   - Severity: Low   - Clinical Status: Active   - Verification Status: Confirmed    MORPHINE   - Allergy Type: Medication   - Reaction: Itching   - Severity: Low   - Clinical Status: Active   - Verification  Status: Confirmed   Medications:    zolpidem (AMBIEN CR) CR tablet   - Route: Oral   - Start Date: March 23, 2024   - End Date: None   - Status: Active    ibuprofen (MOTRIN) tablet   - Route: Oral   - Start Date: August 01, 2023   - End Date: None   - Status: Active    ondansetron (ZOFRAN) tablet   - Route: Oral   - Start Date: November 17, 2023   - End Date: None   - Status: Active    pantoprazole (PROTONIX) EC tablet   - Route: Oral   - Start Date: November 30, 2023   - End Date: None   - Status: Active    naproxen (NAPROSYN) tablet   - Route: Oral   - Start Date: August 11, 2023   - End Date: None   - Status: Active    levothyroxine (SYNTHROID) tablet   - Route: Oral   - Start Date: August 10, 2023   - End Date: None   - Status: Active    famciclovir (FAMVIR) tablet   - Route: Oral   - Start Date: April 10, 2024   - End Date: None   - Status: Active    LORATADINE-D 24HR  MG PO TB24   - Route: Oral   - Start Date: April 10, 2024   - End Date: None   - Status: Active    linaclotide (LINZESS) capsule   - Route: Oral   - Start Date: April 15, 2024   - End Date: None   - Status: Active    progesterone (PROMETRIUM) capsule   - Route: Oral   - Start Date: March 12, 2023   - End Date: None   - Status: Active    DEXTROAMPHETAMINE SULFATE 20 MG PO TABS   - Route: Oral   - Start Date: November 08, 2023   - End Date: None   - Status: Active    hydroCHLOROthiazide tablet   - Route: Oral   - Start Date: March 20, 2024   - End Date: None   - Status: Active    clonazePAM (KlonoPIN) tablet   - Route:   - Start Date: February 20, 2023   - End Date: None   - Status: Active    rizatriptan (MAXALT) tablet   - Route: Oral   - Start Date: April 10, 2024   - End Date: None   - Status: Active    estradiol (ESTRACE) vaginal cream 0.1 mg/gram   - Route: Vaginal   - Start Date: May 02, 2021   - End Date: None   - Status: Active    estradiol (MINIVELLE, VIVELLE-DOT) patch 0.05 mg/24 hr   - Route:   - Start Date: March 23, 2024   - End  Date: None   - Status: Active    SODIUM FLUORIDE 1.1 % DT GEL   - Route:   - Start Date: September 05, 2023   - End Date: None   - Status: Active    lisinopril (PRINIVIL,ZESTRIL) tablet   - Route: Oral   - Start Date: June 14, 2023   - End Date: None   - Status: Active    promethazine (PHENERGAN) tablet   - Route: Oral   - Start Date: March 21, 2024   - End Date: None   - Status: Active    azithromycin (ZITHROMAX) tablet   - Route:   - Start Date: March 20, 2024   - End Date: None   - Status: Active    ENDOCET  MG PO TABS   - Route: Oral   - Start Date: June 14, 2023   - End Date: None   - Status: Active    tiZANidine (ZANAFLEX) tablet   - Route: Oral   - Start Date: November 30, 2023   - End Date: None   - Status: Active    metoclopramide (REGLAN) tablet   - Route:   - Start Date: November 06, 2023   - End Date: None   - Status: Active    fluticasone (FLONASE) nasal spray   - Route:   - Start Date: May 01, 2024   - End Date: None   - Status: Active    traZODone (DESYREL) tablet   - Route: Oral   - Start Date: November 16, 2023   - End Date: None   - Status: Active   Problem list:    Recurrent major depressive disorder, in partial remission   - Category: Problem List Item   - Health Status:   - Start Date: November 18, 2016   - End Date: None   - Status: Active    Essential tremor   - Category: Problem List Item   - Health Status:   - Start Date: November 18, 2016   - End Date: November 24, 2020   - Status: Resolved    Periodic limb movement disorder   - Category: Problem List Item   - Health Status:   - Start Date: November 18, 2016   - End Date: None   - Status: Active    Chronic daily headache   - Category: Problem List Item   - Health Status:   - Start Date: November 18, 2016   - End Date: None   - Status: Active    Excessive daytime sleepiness   - Category: Problem List Item   - Health Status:   - Start Date: March 29, 2017   - End Date: None   - Status: Active    Fatigue due to exposure   - Category:  Problem List Item   - Health Status:   - Start Date: March 29, 2017   - End Date: May 23, 2017   - Status: Resolved    Witnessed apneic spells   - Category: Problem List Item   - Health Status:   - Start Date: March 29, 2017   - End Date: None   - Status: Active    Intractable migraine with aura without status migrainosus   - Category: Problem List Item   - Health Status:   - Start Date: April 19, 2017   - End Date: March 23, 2024   - Status: Resolved    Ovarian cyst   - Category: Problem List Item   - Health Status:   - Start Date: May 23, 2017   - End Date: November 24, 2020   - Status: Resolved    History of Lyme disease   - Category: Problem List Item   - Health Status:   - Start Date: May 23, 2017   - End Date: None   - Status: Active    Fibromyalgia   - Category: Problem List Item   - Health Status:   - Start Date: May 23, 2017   - End Date: None   - Status: Active    Chronic interstitial cystitis   - Category: Problem List Item   - Health Status:   - Start Date: May 23, 2017   - End Date: None   - Status: Active    Essential hypertension   - Category: Problem List Item   - Health Status:   - Start Date: May 23, 2017   - End Date: None   - Status: Active    IFG (impaired fasting glucose)   - Category: Problem List Item   - Health Status:   - Start Date: May 23, 2017   - End Date: None   - Status: Active    Chronic constipation   - Category: Problem List Item   - Health Status:   - Start Date: May 23, 2017   - End Date: None   - Status: Active    Brash   - Category: Problem List Item   - Health Status:   - Start Date: May 23, 2017   - End Date: May 23, 2017   - Status: Resolved    Duodenal gastroesophageal reflux   - Category: Problem List Item   - Health Status:   - Start Date: May 23, 2017   - End Date: None   - Status: Active    Irritable bowel syndrome   - Category: Problem List Item   - Health Status:   - Start Date: May 23, 2017   - End Date: None   - Status: Active    Posttraumatic stress disorder    - Category: Problem List Item   - Health Status:   - Start Date: May 23, 2017   - End Date: None   - Status: Active    Acute frontal sinusitis   - Category: Problem List Item   - Health Status:   - Start Date: June 11, 2017   - End Date: June 16, 2017   - Status: Resolved    Acute recurrent maxillary sinusitis   - Category: Problem List Item   - Health Status:   - Start Date: June 11, 2017   - End Date: June 16, 2017   - Status: Resolved    Vitamin D deficiency   - Category: Problem List Item   - Health Status:   - Start Date: June 16, 2017   - End Date: None   - Status: Active    Hepatomegaly   - Category: Problem List Item   - Health Status:   - Start Date: June 16, 2017   - End Date: None   - Status: Active    Postmenopausal disorder   - Category: Problem List Item   - Health Status:   - Start Date: June 27, 2017   - End Date: None   - Status: Active    Chronic insomnia   - Category: Problem List Item   - Health Status:   - Start Date: June 28, 2017   - End Date: None   - Status: Active    Cervical arthritis   - Category: Problem List Item   - Health Status:   - Start Date: July 25, 2017   - End Date: None   - Status: Active    Cervical nerve root compression   - Category: Problem List Item   - Health Status:   - Start Date: July 25, 2017   - End Date: None   - Status: Active    Shingles   - Category: Problem List Item   - Health Status:   - Start Date: July 25, 2017   - End Date: May 17, 2018   - Status: Resolved    Vitamin B12 deficiency   - Category: Problem List Item   - Health Status:   - Start Date: August 08, 2017   - End Date: None   - Status: Active    BMI 25.0-25.9,adult   - Category: Problem List Item   - Health Status:   - Start Date: February 16, 2018   - End Date: May 07, 2022   - Status: Resolved    Medication monitoring encounter   - Category: Problem List Item   - Health Status:   - Start Date: February 16, 2018   - End Date: November 24, 2020   - Status: Resolved    S/P Botox injection    - Category: Problem List Item   - Health Status:   - Start Date: February 21, 2018   - End Date: May 06, 2020   - Status: Resolved    Hormone replacement therapy (HRT)   - Category: Problem List Item   - Health Status:   - Start Date: October 15, 2018   - End Date: None   - Status: Active    Polypharmacy   - Category: Problem List Item   - Health Status:   - Start Date: October 15, 2018   - End Date: November 09, 2023   - Status: Resolved    Chronic migraine without aura   - Category: Problem List Item   - Health Status:   - Start Date: November 28, 2018   - End Date: None   - Status: Active    Postherpetic neuralgia   - Category: Problem List Item   - Health Status:   - Start Date: January 09, 2020   - End Date: None   - Status: Active    Colonic inertia   - Category: Problem List Item   - Health Status:   - Start Date: May 06, 2020   - End Date: None   - Status: Active    Cervical myelopathy   - Category: Problem List Item   - Health Status:   - Start Date: July 10, 2021   - End Date: None   - Status: Active    DDD (degenerative disc disease), cervical   - Category: Problem List Item   - Health Status:   - Start Date: July 10, 2021   - End Date: None   - Status: Active    Pelvic floor dysfunction in female   - Category: Problem List Item   - Health Status:   - Start Date: July 10, 2021   - End Date: None   - Status: Active    Female cystocele   - Category: Problem List Item   - Health Status:   - Start Date: July 10, 2021   - End Date: None   - Status: Active    Vaginal vault prolapse   - Category: Problem List Item   - Health Status:   - Start Date: July 10, 2021   - End Date: None   - Status: Active    Acquired hypothyroidism   - Category: Problem List Item   - Health Status:   - Start Date: February 02, 2022   - End Date: None   - Status: Active    Gastroparesis   - Category: Problem List Item   - Health Status:   - Start Date: May 23, 2022   - End Date: None   - Status: Active    Esophageal spasm   - Category:  Problem List Item   - Health Status:   - Start Date: May 23, 2022   - End Date: None   - Status: Active    Gastroesophageal reflux disease   - Category: Problem List Item   - Health Status:   - Start Date: August 22, 2022   - End Date: None   - Status: Active    Motility disorder of large intestine   - Category: Problem List Item   - Health Status:   - Start Date: August 22, 2022   - End Date: November 18, 2022   - Status: Resolved    Migraine without aura and without status migrainosus, not intractable   - Category: Problem List Item   - Health Status:   - Start Date: November 09, 2023   - End Date: None   - Status: Active

## 2024-06-04 RX ORDER — TRAZODONE HYDROCHLORIDE 50 MG/1
50 TABLET ORAL
Qty: 90 TABLET | Refills: 1 | Status: SHIPPED | OUTPATIENT
Start: 2024-06-04

## 2024-06-18 DIAGNOSIS — G47.00 INSOMNIA, UNSPECIFIED TYPE: ICD-10-CM

## 2024-06-18 RX ORDER — ZOLPIDEM TARTRATE 6.25 MG/1
6.25 TABLET, FILM COATED, EXTENDED RELEASE ORAL NIGHTLY PRN
Qty: 30 TABLET | Refills: 1 | Status: SHIPPED | OUTPATIENT
Start: 2024-06-18

## 2024-06-18 NOTE — TELEPHONE ENCOUNTER
Rx Refill Note  Requested Prescriptions     Pending Prescriptions Disp Refills    zolpidem CR (Ambien CR) 6.25 MG CR tablet 30 tablet 2     Sig: Take 1 tablet by mouth At Night As Needed for Sleep.     Signed Prescriptions Disp Refills    linaclotide (Linzess) 72 MCG capsule capsule 30 capsule 5     Sig: Take 1 capsule by mouth Every Morning Before Breakfast.     Authorizing Provider: SILKE HENRY     Ordering User: DARA BRADLEY      Last office visit with prescribing clinician: 3/20/2024   Last telemedicine visit with prescribing clinician: Visit date not found   Next office visit with prescribing clinician: 9/25/2024                         Would you like a call back once the refill request has been completed: [] Yes [] No    If the office needs to give you a call back, can they leave a voicemail: [] Yes [] No    Dara Bradley MA  06/18/24, 12:40 EDT

## 2024-07-02 DIAGNOSIS — R79.9 ABNORMAL FINDING OF BLOOD CHEMISTRY, UNSPECIFIED: ICD-10-CM

## 2024-07-02 DIAGNOSIS — R73.01 IFG (IMPAIRED FASTING GLUCOSE): Primary | ICD-10-CM

## 2024-07-02 DIAGNOSIS — E55.9 VITAMIN D DEFICIENCY: ICD-10-CM

## 2024-07-02 DIAGNOSIS — E53.8 VITAMIN B12 DEFICIENCY: ICD-10-CM

## 2024-07-02 DIAGNOSIS — Z79.890 HORMONE REPLACEMENT THERAPY (HRT): ICD-10-CM

## 2024-07-02 DIAGNOSIS — I10 ESSENTIAL HYPERTENSION: ICD-10-CM

## 2024-07-02 DIAGNOSIS — R53.82 CHRONIC FATIGUE: ICD-10-CM

## 2024-07-02 DIAGNOSIS — E03.9 ACQUIRED HYPOTHYROIDISM: ICD-10-CM

## 2024-07-02 DIAGNOSIS — R79.9 ABNORMAL BLOOD CHEMISTRY: ICD-10-CM

## 2024-07-09 ENCOUNTER — LAB (OUTPATIENT)
Dept: LAB | Facility: HOSPITAL | Age: 59
End: 2024-07-09
Payer: MEDICARE

## 2024-07-09 DIAGNOSIS — M83.2 ADULT OSTEOMALACIA DUE TO MALABSORPTION: ICD-10-CM

## 2024-07-09 DIAGNOSIS — E03.9 PRIMARY HYPOTHYROIDISM: ICD-10-CM

## 2024-07-09 DIAGNOSIS — R73.01 ABNORMAL FASTING GLUCOSE: ICD-10-CM

## 2024-07-09 DIAGNOSIS — R53.82 CHRONIC FATIGUE: ICD-10-CM

## 2024-07-09 DIAGNOSIS — R79.9 ABNORMAL BLOOD CHEMISTRY: ICD-10-CM

## 2024-07-09 PROCEDURE — 83036 HEMOGLOBIN GLYCOSYLATED A1C: CPT

## 2024-07-09 PROCEDURE — 84403 ASSAY OF TOTAL TESTOSTERONE: CPT

## 2024-07-09 PROCEDURE — 84144 ASSAY OF PROGESTERONE: CPT

## 2024-07-09 PROCEDURE — 83540 ASSAY OF IRON: CPT

## 2024-07-09 PROCEDURE — 84402 ASSAY OF FREE TESTOSTERONE: CPT

## 2024-07-09 PROCEDURE — 82746 ASSAY OF FOLIC ACID SERUM: CPT

## 2024-07-09 PROCEDURE — 82670 ASSAY OF TOTAL ESTRADIOL: CPT

## 2024-07-09 PROCEDURE — 84443 ASSAY THYROID STIM HORMONE: CPT

## 2024-07-09 PROCEDURE — 80053 COMPREHEN METABOLIC PANEL: CPT

## 2024-07-09 PROCEDURE — 82306 VITAMIN D 25 HYDROXY: CPT

## 2024-07-09 PROCEDURE — 82728 ASSAY OF FERRITIN: CPT

## 2024-07-09 PROCEDURE — 84466 ASSAY OF TRANSFERRIN: CPT

## 2024-07-09 PROCEDURE — 85025 COMPLETE CBC W/AUTO DIFF WBC: CPT

## 2024-07-09 PROCEDURE — 36415 COLL VENOUS BLD VENIPUNCTURE: CPT

## 2024-07-09 PROCEDURE — 82607 VITAMIN B-12: CPT

## 2024-07-10 LAB
25(OH)D3 SERPL-MCNC: 20.3 NG/ML (ref 30–100)
ALBUMIN SERPL-MCNC: 4.1 G/DL (ref 3.5–5.2)
ALBUMIN/GLOB SERPL: 1.7 G/DL
ALP SERPL-CCNC: 70 U/L (ref 39–117)
ALT SERPL W P-5'-P-CCNC: 12 U/L (ref 1–33)
ANION GAP SERPL CALCULATED.3IONS-SCNC: 12.5 MMOL/L (ref 5–15)
AST SERPL-CCNC: 17 U/L (ref 1–32)
BASOPHILS # BLD AUTO: 0.07 10*3/MM3 (ref 0–0.2)
BASOPHILS NFR BLD AUTO: 1.2 % (ref 0–1.5)
BILIRUB SERPL-MCNC: 0.3 MG/DL (ref 0–1.2)
BUN SERPL-MCNC: 12 MG/DL (ref 6–20)
BUN/CREAT SERPL: 14.5 (ref 7–25)
CALCIUM SPEC-SCNC: 9.3 MG/DL (ref 8.6–10.5)
CHLORIDE SERPL-SCNC: 99 MMOL/L (ref 98–107)
CO2 SERPL-SCNC: 25.5 MMOL/L (ref 22–29)
CREAT SERPL-MCNC: 0.83 MG/DL (ref 0.57–1)
DEPRECATED RDW RBC AUTO: 37.7 FL (ref 37–54)
EGFRCR SERPLBLD CKD-EPI 2021: 81.8 ML/MIN/1.73
EOSINOPHIL # BLD AUTO: 0.15 10*3/MM3 (ref 0–0.4)
EOSINOPHIL NFR BLD AUTO: 2.5 % (ref 0.3–6.2)
ERYTHROCYTE [DISTWIDTH] IN BLOOD BY AUTOMATED COUNT: 12.2 % (ref 12.3–15.4)
ESTRADIOL SERPL HS-MCNC: 8.7 PG/ML
FERRITIN SERPL-MCNC: 35.8 NG/ML (ref 13–150)
FOLATE SERPL-MCNC: 7.66 NG/ML (ref 4.78–24.2)
GLOBULIN UR ELPH-MCNC: 2.4 GM/DL
GLUCOSE SERPL-MCNC: 80 MG/DL (ref 65–99)
HBA1C MFR BLD: 5.5 % (ref 4.8–5.6)
HCT VFR BLD AUTO: 37 % (ref 34–46.6)
HGB BLD-MCNC: 11.9 G/DL (ref 12–15.9)
IMM GRANULOCYTES # BLD AUTO: 0.01 10*3/MM3 (ref 0–0.05)
IMM GRANULOCYTES NFR BLD AUTO: 0.2 % (ref 0–0.5)
IRON 24H UR-MRATE: 70 MCG/DL (ref 37–145)
IRON SATN MFR SERPL: 16 % (ref 20–50)
LYMPHOCYTES # BLD AUTO: 2.01 10*3/MM3 (ref 0.7–3.1)
LYMPHOCYTES NFR BLD AUTO: 33.4 % (ref 19.6–45.3)
MCH RBC QN AUTO: 27.7 PG (ref 26.6–33)
MCHC RBC AUTO-ENTMCNC: 32.2 G/DL (ref 31.5–35.7)
MCV RBC AUTO: 86.2 FL (ref 79–97)
MONOCYTES # BLD AUTO: 0.51 10*3/MM3 (ref 0.1–0.9)
MONOCYTES NFR BLD AUTO: 8.5 % (ref 5–12)
NEUTROPHILS NFR BLD AUTO: 3.27 10*3/MM3 (ref 1.7–7)
NEUTROPHILS NFR BLD AUTO: 54.2 % (ref 42.7–76)
NRBC BLD AUTO-RTO: 0 /100 WBC (ref 0–0.2)
PLATELET # BLD AUTO: 339 10*3/MM3 (ref 140–450)
PMV BLD AUTO: 9.9 FL (ref 6–12)
POTASSIUM SERPL-SCNC: 3.7 MMOL/L (ref 3.5–5.2)
PROGEST SERPL-MCNC: 0.18 NG/ML
PROT SERPL-MCNC: 6.5 G/DL (ref 6–8.5)
RBC # BLD AUTO: 4.29 10*6/MM3 (ref 3.77–5.28)
SODIUM SERPL-SCNC: 137 MMOL/L (ref 136–145)
TIBC SERPL-MCNC: 434 MCG/DL (ref 298–536)
TRANSFERRIN SERPL-MCNC: 291 MG/DL (ref 200–360)
TSH SERPL DL<=0.05 MIU/L-ACNC: 1.53 UIU/ML (ref 0.27–4.2)
VIT B12 BLD-MCNC: 892 PG/ML (ref 211–946)
WBC NRBC COR # BLD AUTO: 6.02 10*3/MM3 (ref 3.4–10.8)

## 2024-07-13 LAB
TESTOST FREE SERPL-MCNC: <0.2 PG/ML (ref 0–4.2)
TESTOST SERPL-MCNC: <3 NG/DL (ref 4–50)

## 2024-08-13 ENCOUNTER — TELEMEDICINE (OUTPATIENT)
Dept: FAMILY MEDICINE CLINIC | Facility: CLINIC | Age: 59
End: 2024-08-13
Payer: MEDICARE

## 2024-08-13 DIAGNOSIS — J01.10 ACUTE FRONTAL SINUSITIS, RECURRENCE NOT SPECIFIED: Primary | ICD-10-CM

## 2024-08-13 DIAGNOSIS — J30.9 CHRONIC ALLERGIC RHINITIS: ICD-10-CM

## 2024-08-13 DIAGNOSIS — K31.84 GASTROPARESIS: ICD-10-CM

## 2024-08-13 DIAGNOSIS — K59.01 SLOW TRANSIT CONSTIPATION: ICD-10-CM

## 2024-08-13 DIAGNOSIS — B37.31 VAGINAL YEAST INFECTION: ICD-10-CM

## 2024-08-13 DIAGNOSIS — J02.9 SORE THROAT: ICD-10-CM

## 2024-08-13 PROCEDURE — 1125F AMNT PAIN NOTED PAIN PRSNT: CPT | Performed by: FAMILY MEDICINE

## 2024-08-13 PROCEDURE — 99214 OFFICE O/P EST MOD 30 MIN: CPT | Performed by: FAMILY MEDICINE

## 2024-08-13 PROCEDURE — 1159F MED LIST DOCD IN RCRD: CPT | Performed by: FAMILY MEDICINE

## 2024-08-13 PROCEDURE — 1160F RVW MEDS BY RX/DR IN RCRD: CPT | Performed by: FAMILY MEDICINE

## 2024-08-13 RX ORDER — FLUCONAZOLE 150 MG/1
150 TABLET ORAL
Qty: 2 TABLET | Refills: 0 | Status: SHIPPED | OUTPATIENT
Start: 2024-08-13 | End: 2024-08-17

## 2024-08-13 RX ORDER — AMOXICILLIN 500 MG/1
500 CAPSULE ORAL 2 TIMES DAILY
Qty: 20 CAPSULE | Refills: 0 | Status: SHIPPED | OUTPATIENT
Start: 2024-08-13 | End: 2024-08-23

## 2024-08-13 RX ORDER — PREDNISONE 10 MG/1
TABLET ORAL
Qty: 15 TABLET | Refills: 0 | Status: SHIPPED | OUTPATIENT
Start: 2024-08-13 | End: 2024-08-18

## 2024-08-13 NOTE — PROGRESS NOTES
Telehealth E-Visit      Date: 2024   Patient Name: Tatiana Fields  : 1965   MRN: [id number]     Chief Complaint:    Chief Complaint   Patient presents with    Fever    Sore Throat    Nasal Congestion    Headache       I have reviewed the E-Visit questionnaire and the patient's answers, my assessment and plan are listed below.     This provider is located at the [facility] (through [facility]), [address] [place] [address] using a secure EARTHTORY Video Visit through [employer]. Patient is being seen remotely via telehealth at their home address in [place], and stated they are in a secure environment for this session. The patient's condition being diagnosed/treated is appropriate for telemedicine. The provider identified herself as well as her credentials. The patient, and/or patients guardian, consent to be seen remotely, and when consent is given they understand that the consent allows for patient identifiable information to be sent to a third party as needed. They may refuse to be seen remotely at any time. The electronic data is encrypted and password protected, and the patient and/or guardian has been advised of the potential risks to privacy not withstanding such measures.    You have chosen to receive care through a telehealth visit. Do you consent to use a video/audio connection for your medical care today? Yes    History of Present Illness: Tatiana Fields is a 58 y.o. female who is here today to follow up with fever, sore throat, headache, congestion.  She reports that she has been battling with sinus congestion mostly in frontal sinuses for a few weeks.  However over the last few days she has noticed tactile fever off and on.  She also endorses cough and low energy.  She does have some nausea and vomiting but this is longstanding and proceeds sinus congestion.  Patient denies any recent sick contacts with known flu or COVID.  She took a home COVID test which  was negative.    Patient does have history of slow transit constipation, gastroparesis seen by Dr. Guillen for initial consult at [facility] motility clinic in [facility] on 7/20/2022.  She is continue to follow-up mostly with telemedicine visits due to difficulty with traveling to [place] for consultation.  She would like to be evaluated by specialist closer to the area.      Subjective      I have reviewed and the following portions of the patient's history were updated as appropriate: past family history, past medical history, past social history, past surgical history and problem list.    Medications:     Current Outpatient Medications:     amoxicillin (AMOXIL) 500 MG capsule, Take 1 capsule by mouth 2 (Two) Times a Day for 10 days., Disp: 20 capsule, Rfl: 0    clonazePAM (KlonoPIN) 0.5 MG tablet, TAKE 1 TABLET BY MOUTH TWICE DAILY AS NEEDED FOR ANXIETY OR INSOMNIA, Disp: 60 tablet, Rfl: 1    Dextroamphetamine Sulfate 20 MG tablet, Take 1 tablet by mouth Every 12 (Twelve) Hours., Disp: , Rfl:     Endocet  MG per tablet, Take 1 tablet by mouth 3 (Three) Times a Day., Disp: , Rfl:     estradiol (ESTRACE) 0.1 MG/GM vaginal cream, Insert  into the vagina Daily., Disp: , Rfl:     estradiol (Minivelle) 0.05 MG/24HR patch, 1 patch every 4 days., Disp: 8 patch, Rfl: 11    famciclovir (FAMVIR) 500 MG tablet, TAKE 1 TABLET BY MOUTH DAILY, Disp: 30 tablet, Rfl: 5    fluconazole (Diflucan) 150 MG tablet, Take 1 tablet by mouth Every 72 (Seventy-Two) Hours for 2 doses., Disp: 2 tablet, Rfl: 0    fluticasone (FLONASE) 50 MCG/ACT nasal spray, USE 2 SPRAYS TO EACH NOSTRIL EVERY DAY, Disp: 48 g, Rfl: 0    hydroCHLOROthiazide 25 MG tablet, Take 1 tablet by mouth Daily., Disp: 90 tablet, Rfl: 3    ibuprofen (ADVIL,MOTRIN) 600 MG tablet, Take 1 tablet by mouth Every 6 (Six) Hours As Needed., Disp: , Rfl:     levothyroxine (SYNTHROID, LEVOTHROID) 25 MCG tablet, Take 1 tablet by mouth Daily., Disp: 90 tablet, Rfl:  1    linaclotide (Linzess) 72 MCG capsule capsule, Take 1 capsule by mouth Every Morning Before Breakfast., Disp: 30 capsule, Rfl: 5    lisinopril (PRINIVIL,ZESTRIL) 10 MG tablet, Take 1 tablet by mouth Daily., Disp: 90 tablet, Rfl: 3    loratadine-pseudoephedrine (Loratadine-D 24HR)  MG per 24 hr tablet, Take 1 tablet by mouth Daily., Disp: 30 tablet, Rfl: 2    metoclopramide (REGLAN) 5 MG tablet, , Disp: , Rfl:     naproxen (NAPROSYN) 500 MG tablet, Take 1 tablet by mouth 2 (Two) Times a Day With Meals., Disp: 180 tablet, Rfl: 1    ondansetron (ZOFRAN) 4 MG tablet, Take 1 tablet by mouth Every 8 (Eight) Hours As Needed for Nausea or Vomiting., Disp: 30 tablet, Rfl: 5    pantoprazole (PROTONIX) 40 MG EC tablet, Take 1 tablet by mouth Daily., Disp: 30 tablet, Rfl: 11    predniSONE (DELTASONE) 10 MG tablet, Take 5 tablets by mouth Daily for 1 day, THEN 4 tablets Daily for 1 day, THEN 3 tablets Daily for 1 day, THEN 2 tablets Daily for 1 day, THEN 1 tablet Daily for 1 day., Disp: 15 tablet, Rfl: 0    progesterone (PROMETRIUM) 100 MG capsule, Take one capsule at hs (Patient taking differently: Take 1 capsule by mouth Daily. Take one capsule at hs), Disp: 30 capsule, Rfl: 5    promethazine (PHENERGAN) 25 MG tablet, Take 1 tablet by mouth Every 8 (Eight) Hours As Needed for Nausea or Vomiting., Disp: 15 tablet, Rfl: 2    rizatriptan (MAXALT) 10 MG tablet, Take 1 tablet by mouth 1 (One) Time As Needed for Migraine., Disp: 27 tablet, Rfl: 3    Sodium Fluoride 1.1 % gel, APPLY TOPICALLY EVERY DAY, Disp: , Rfl:     tiZANidine (ZANAFLEX) 4 MG tablet, Take 1 tablet by mouth Every 8 (Eight) Hours As Needed for Muscle Spasms., Disp: 90 tablet, Rfl: 5    traZODone (DESYREL) 50 MG tablet, TAKE 1 TABLET BY MOUTH EVERY NIGHT AT BEDTIME, Disp: 90 tablet, Rfl: 1    zolpidem CR (Ambien CR) 6.25 MG CR tablet, Take 1 tablet by mouth At Night As Needed for Sleep., Disp: 30 tablet, Rfl: 1    Allergies:   Allergies   Allergen  Reactions    Morphine Itching    Sulfa Antibiotics Rash       Objective     Physical Exam: Limited due to virtual visit  Vital Signs: There were no vitals filed for this visit.         Physical Exam  Constitutional:       General: She is not in acute distress.     Appearance: Normal appearance. She is not ill-appearing, toxic-appearing or diaphoretic.   HENT:      Head: Normocephalic and atraumatic.      Right Ear: External ear normal.      Left Ear: External ear normal.      Nose: Nose normal.   Eyes:      Extraocular Movements: Extraocular movements intact.      Conjunctiva/sclera: Conjunctivae normal.   Pulmonary:      Effort: Pulmonary effort is normal.   Musculoskeletal:      Cervical back: Normal range of motion.   Neurological:      General: No focal deficit present.      Mental Status: She is alert and oriented to person, place, and time.   Psychiatric:         Mood and Affect: Mood normal.         Behavior: Behavior normal.         Thought Content: Thought content normal.         Judgment: Judgment normal.           Assessment / Plan      Assessment/Plan:   Diagnoses and all orders for this visit:    1. Acute frontal sinusitis, recurrence not specified (Primary)  -     amoxicillin (AMOXIL) 500 MG capsule; Take 1 capsule by mouth 2 (Two) Times a Day for 10 days.  Dispense: 20 capsule; Refill: 0  -     predniSONE (DELTASONE) 10 MG tablet; Take 5 tablets by mouth Daily for 1 day, THEN 4 tablets Daily for 1 day, THEN 3 tablets Daily for 1 day, THEN 2 tablets Daily for 1 day, THEN 1 tablet Daily for 1 day.  Dispense: 15 tablet; Refill: 0    2. Chronic allergic rhinitis    3. Gastroparesis  -     Ambulatory Referral to Gastroenterology    4. Slow transit constipation  -     Ambulatory Referral to Gastroenterology    5. Sore throat  -     amoxicillin (AMOXIL) 500 MG capsule; Take 1 capsule by mouth 2 (Two) Times a Day for 10 days.  Dispense: 20 capsule; Refill: 0  -     predniSONE (DELTASONE) 10 MG tablet; Take 5  tablets by mouth Daily for 1 day, THEN 4 tablets Daily for 1 day, THEN 3 tablets Daily for 1 day, THEN 2 tablets Daily for 1 day, THEN 1 tablet Daily for 1 day.  Dispense: 15 tablet; Refill: 0    6. Vaginal yeast infection  -     fluconazole (Diflucan) 150 MG tablet; Take 1 tablet by mouth Every 72 (Seventy-Two) Hours for 2 doses.  Dispense: 2 tablet; Refill: 0      Sxs likely 2/2 bacterial sinus infection given the length of time of symptoms and new tactile fever  Vital signs not completed due to virtual visit.  I have elected to start short course of abx and steroid taper to help with infection as well as inflammation.  There is concern for possible strep pharyngitis given sore throat and patient's exposure to her son's known strep infection in his nose.  Will treat with amoxicillin twice daily x 10 days  Continue fluticasone nasal spray 2 sprays in each nostril daily. May obtain this OTC  Continue oral antihistamine such as fexofenadine, cetirizine, or loratadine.  May obtain this OTC  Symptomatic treatment  Increase fluid intake  Patient is prone to vaginal yeast infections with antibiotic use.  Will give 2 doses of Diflucan  Patient requested referral to gastro closer to the area. Referral placed.   Discussed signs and symptoms that would warrant further f/u or ER f/u.    Follow Up:   Return for Next scheduled follow up.    Any medications prescribed have been sent electronically to   [id number] - [place] - [address] AT [facility] [address] - [phone] PH - [phone] FX  [address] [place] [address]  Phone: [phone] Fax: [phone]      30 minutes were spent reviewing the patient's questionnaire, formulating a treatment plan, and relaying information to the patient via My[employer].    Jd Ferrell MD  [facility] EBENEZER Plasencia  08/13/24  16:37 EDT

## 2024-08-15 DIAGNOSIS — J34.3 HYPERTROPHY, NASAL, TURBINATE: ICD-10-CM

## 2024-08-15 RX ORDER — FLUTICASONE PROPIONATE 50 MCG
SPRAY, SUSPENSION (ML) NASAL
Qty: 48 G | Refills: 0 | Status: SHIPPED | OUTPATIENT
Start: 2024-08-15

## 2024-08-16 ENCOUNTER — TELEPHONE (OUTPATIENT)
Dept: FAMILY MEDICINE CLINIC | Facility: CLINIC | Age: 59
End: 2024-08-16

## 2024-09-25 ENCOUNTER — OFFICE VISIT (OUTPATIENT)
Dept: FAMILY MEDICINE CLINIC | Facility: CLINIC | Age: 59
End: 2024-09-25
Payer: MEDICARE

## 2024-09-25 VITALS
HEART RATE: 112 BPM | BODY MASS INDEX: 30.16 KG/M2 | OXYGEN SATURATION: 98 % | SYSTOLIC BLOOD PRESSURE: 124 MMHG | HEIGHT: 63 IN | DIASTOLIC BLOOD PRESSURE: 82 MMHG | WEIGHT: 170.2 LBS

## 2024-09-25 DIAGNOSIS — E55.9 VITAMIN D DEFICIENCY: ICD-10-CM

## 2024-09-25 DIAGNOSIS — Z79.890 HORMONE REPLACEMENT THERAPY (HRT): ICD-10-CM

## 2024-09-25 DIAGNOSIS — F33.1 MODERATE EPISODE OF RECURRENT MAJOR DEPRESSIVE DISORDER: ICD-10-CM

## 2024-09-25 DIAGNOSIS — K22.4 ESOPHAGEAL SPASM: ICD-10-CM

## 2024-09-25 DIAGNOSIS — K58.1 IRRITABLE BOWEL SYNDROME WITH CONSTIPATION: ICD-10-CM

## 2024-09-25 DIAGNOSIS — G95.9 CERVICAL MYELOPATHY: ICD-10-CM

## 2024-09-25 DIAGNOSIS — G43.709 CHRONIC MIGRAINE WITHOUT AURA WITHOUT STATUS MIGRAINOSUS, NOT INTRACTABLE: ICD-10-CM

## 2024-09-25 DIAGNOSIS — K59.01 SLOW TRANSIT CONSTIPATION: ICD-10-CM

## 2024-09-25 DIAGNOSIS — K31.84 GASTROPARESIS: ICD-10-CM

## 2024-09-25 DIAGNOSIS — I10 ESSENTIAL HYPERTENSION: Primary | ICD-10-CM

## 2024-09-25 DIAGNOSIS — F43.10 POSTTRAUMATIC STRESS DISORDER: ICD-10-CM

## 2024-09-25 DIAGNOSIS — R16.0 HEPATOMEGALY: ICD-10-CM

## 2024-09-25 DIAGNOSIS — E03.9 ACQUIRED HYPOTHYROIDISM: ICD-10-CM

## 2024-09-25 DIAGNOSIS — Z23 NEED FOR INFLUENZA VACCINATION: ICD-10-CM

## 2024-09-25 DIAGNOSIS — K21.9 DUODENAL GASTROESOPHAGEAL REFLUX: ICD-10-CM

## 2024-09-25 DIAGNOSIS — F33.41 RECURRENT MAJOR DEPRESSIVE DISORDER, IN PARTIAL REMISSION: ICD-10-CM

## 2024-09-25 DIAGNOSIS — Z90.49 HISTORY OF TOTAL COLECTOMY: ICD-10-CM

## 2024-09-25 DIAGNOSIS — E53.8 VITAMIN B12 DEFICIENCY: ICD-10-CM

## 2024-09-25 DIAGNOSIS — M79.7 FIBROMYALGIA: ICD-10-CM

## 2024-09-25 PROCEDURE — 3079F DIAST BP 80-89 MM HG: CPT | Performed by: FAMILY MEDICINE

## 2024-09-25 PROCEDURE — 90656 IIV3 VACC NO PRSV 0.5 ML IM: CPT | Performed by: FAMILY MEDICINE

## 2024-09-25 PROCEDURE — G0008 ADMIN INFLUENZA VIRUS VAC: HCPCS | Performed by: FAMILY MEDICINE

## 2024-09-25 PROCEDURE — 3074F SYST BP LT 130 MM HG: CPT | Performed by: FAMILY MEDICINE

## 2024-09-25 PROCEDURE — 99214 OFFICE O/P EST MOD 30 MIN: CPT | Performed by: FAMILY MEDICINE

## 2024-09-25 PROCEDURE — 1125F AMNT PAIN NOTED PAIN PRSNT: CPT | Performed by: FAMILY MEDICINE

## 2024-09-25 PROCEDURE — 1160F RVW MEDS BY RX/DR IN RCRD: CPT | Performed by: FAMILY MEDICINE

## 2024-09-25 PROCEDURE — 1159F MED LIST DOCD IN RCRD: CPT | Performed by: FAMILY MEDICINE

## 2024-09-25 RX ORDER — FLUOXETINE 40 MG/1
1 CAPSULE ORAL DAILY
COMMUNITY
Start: 2024-06-12

## 2024-09-25 RX ORDER — METOPROLOL SUCCINATE 50 MG/1
50 TABLET, EXTENDED RELEASE ORAL DAILY
Qty: 90 TABLET | Refills: 0 | Status: SHIPPED | OUTPATIENT
Start: 2024-09-25

## 2024-09-25 RX ORDER — POTASSIUM CHLORIDE 750 MG/1
10 TABLET, EXTENDED RELEASE ORAL 2 TIMES DAILY
Qty: 180 TABLET | Refills: 0 | Status: SHIPPED | OUTPATIENT
Start: 2024-09-25

## 2024-10-25 ENCOUNTER — E-VISIT (OUTPATIENT)
Dept: FAMILY MEDICINE CLINIC | Facility: TELEHEALTH | Age: 59
End: 2024-10-25

## 2024-10-25 DIAGNOSIS — B37.31 VAGINAL YEAST INFECTION: Primary | ICD-10-CM

## 2024-10-25 RX ORDER — FLUCONAZOLE 150 MG/1
TABLET ORAL
Qty: 2 TABLET | Refills: 0 | Status: SHIPPED | OUTPATIENT
Start: 2024-10-25

## 2024-10-25 NOTE — E-VISIT TREATED
Date: 10/25/2024 10:08:59  Clinician: Vanita Rubio  Clinician NPI: [id number]  Patient: Tatiana Fields  Patient : 1965  Patient Address: [address] [place] [address] [address] [id number]  Patient Phone: [phone]  Visit Protocol: UTI  Patient Summary:  Tatiana is a 59 year old ( : 1965 ) female who initiated a visit for a presumed bladder infection.    The symptoms started 4-6 days ago and consist of feeling as if the bladder is never empty, foul-smelling urine, nausea,   urgency, urinary frequency, and dysuria.   Symptom details   Urine color: Yellow    Denied symptoms include urinary incontinence, vaginal itching, abdominal pain, vaginal discharge, chills, vomiting, and flank pain. Tatiana does not feel feverish.   Tatiana   has not used any over-the-counter medications or home remedies to relieve the current symptoms.  Precipitating events  Tatiana denies having a sexually transmitted disease.  Pertinent medical history  Tatiana has had a bladder infection before and has had 2   in the past 12 months. The most recent bladder infection was not within the last 4 weeks. The current symptoms are similar to previous bladder infection symptoms.   Ciprofloxacin (Cipro) has been effective in treating past bladder infections.   Tatiana has   experienced problems or side effects with the following antibiotics in the past: nitrofurantoin (Macrobid), cephalexin (Keflex), and sulfamethoxazole-trimethoprim (Bactrim DS)   Tatiana typically gets a yeast infection when taking antibiotics. Tatiana has   successfully used Diflucan to treat previous yeast infections. 2 doses of fluconazole (Diflucan) has typically been needed for symptoms to resolve in the past.  Tatiana has not been prescribed antibiotics to prevent frequent or repeated bladder infections   in the past.   Tatiana has not had a procedure or surgery done to the urinary tract. Tatiana has not been diagnosed with advanced kidney disease.   Tatiana has not used a catheter and  denies being a patient in a hospital or nursing home in the past 2 weeks.   Tatiana does not have diabetes. Immunosuppressive conditions (e.g., chemotherapy, HIV, organ transplant, long-term use of steroids or other immunosuppressive medications, splenectomy) were denied.   Tatiana does not smoke or use smokeless tobacco. Tatiana does   not vape or use other e-cigarette products.   Additional information as reported by the patient (free text): I have a UTI and also a tooth that is accessed.  With my stomach condition I cant take some antibiotics.    I can take Zithromax or Cipro if either one of them will work for both tooth and UTI.    Thank You     MEDICATIONS: methylprednisolone acetate injection, sodium fluoride dental, naproxen oral, loratadine-pseudoephedrine oral, tizanidine oral, hydrochlorothiazide oral, metoprolol succinate oral, onabotulinumtoxinA injection, clonazepam oral,   methylprednisolone acetate injection, ceftriaxone injection, methylprednisolone acetate injection, dexamethasone oral, ceftriaxone injection, onabotulinumtoxinA injection, ketorolac intramuscular, dexamethasone oral, methylprednisolone acetate injection,   clindamycin HCl oral, ceftriaxone injection, methylprednisolone acetate injection, dexamethasone sodium phosphate injection, cephalexin oral, estradiol vaginal, methylprednisolone acetate injection, onabotulinumtoxinA injection, ceftriaxone injection,   bupivacaine (PF) injection, zolpidem oral, albumin, human 5 % intravenous, cefuroxime axetil oral, amoxicillin-potassium clavulanate oral, prednisone oral, diphtheria,pertussis(acell),tetanus vaccine intramuscular, nitrofurantoin   monohydrate/macrocrystals oral, methylprednisolone acetate injection, fluconazole oral, methylprednisolone acetate injection, onabotulinumtoxinA injection, linaclotide oral, promethazine oral, onabotulinumtoxinA injection, dexamethasone oral, fluconazole   oral, cefazolin intravenous, ketorolac intramuscular,  prednisone oral, onabotulinumtoxinA injection, onabotulinumtoxinA injection, bupivacaine (PF) injection, ceftriaxone injection, fluconazole oral, levofloxacin oral, bupivacaine liposome (PF) local   infiltration, fluticasone propionate nasal, scopolamine transdermal, amoxicillin-potassium clavulanate oral, rizatriptan oral, fluconazole oral, amoxicillin-potassium clavulanate oral, methylprednisolone acetate injection, ceftriaxone injection,   ceftriaxone injection, methylprednisolone acetate injection, metronidazole in sodium chloride(iso) intravenous, trazodone oral, methylprednisolone acetate injection, onabotulinumtoxinA injection, methylprednisolone acetate injection,   diphtheria,pertussis(acell),tetanus vaccine intramuscular, ceftriaxone injection, bupivacaine HCl injection, bupivacaine HCl injection, fluconazole oral, methylprednisolone acetate injection, dexamethasone sodium phosphate injection, ceftriaxone   injection, methylprednisolone acetate injection, potassium chloride oral, celecoxib oral, estradiol transdermal, levofloxacin oral, metoclopramide oral, Endocet oral, famciclovir oral, famotidine intravenous, ceftriaxone injection, lidocaine (PF)   injection, onabotulinumtoxinA injection, ceftriaxone injection, gabapentin oral, ketorolac intramuscular, hydrocortisone acetate rectal, famciclovir oral, dextroamphetamine sulfate oral, methylprednisolone acetate injection, ketorolac injection,   ceftriaxone injection, amoxicillin-potassium clavulanate oral, cefdinir oral, dexamethasone sodium phosphate injection, methylprednisolone acetate injection, methylprednisolone acetate injection, lidocaine (PF) injection, dexamethasone sodium phosphate   injection, clindamycin HCl oral, doxycycline monohydrate oral, pantoprazole oral, ketorolac intramuscular, clindamycin HCl oral, fluoxetine oral, promethazine oral, triamcinolone acetonide injection, clindamycin HCl oral, methylprednisolone acetate   injection,  onabotulinumtoxinA injection, clindamycin HCl oral, dexamethasone oral, ALLERGIES: morphine, sulfasalazine, Sulfa (Sulfonamide Antibiotics)  Clinician Response:  Dear Tatiana,  Based on the information you have provided, you have an acute urinary tract infection, also called a bladder infection. Bladder infections occur when bacteria from the outside of the body enters the urinary tract. Any   part of the urinary system can be infected, but the bladder is the most common.  Medication information  I am prescribing:     Cephalexin (Keflex) 500 mg oral capsule. Take 1 capsule by mouth every 12 hours for 7 days. There are no refills with this prescription.   The medication I prescribed for your bladder infection is an antibiotic. Continue taking the medication until it is   gone even if you feel better. If you get an upset stomach while taking antibiotics, taking the medication with food can help.   Yeast infections can be a common side effect of antibiotics. The most common symptom of a yeast infection is itchiness in and   around the vagina. Other signs and symptoms include burning, redness of the vulva (the outer part of the female genitals), or a thick, white vaginal discharge that looks like cottage cheese and does not have a bad smell.  Self care  Urination helps to   flush bacteria from the urinary tract. For this reason, drinking water and urinating often helps relieve some urinary symptoms and can decrease your risk of getting bladder infections in the future.  Other steps you can take to prevent future bladder   infections include:     Wipe front to back after using the bathroom    Urinate after sexual intercourse    Avoid using deodorant sprays, douches, or powders in the vaginal area     When to seek care  Please make an appointment to be seen in a clinic or urgent care if any of the following occur:     You develop new symptoms or your symptoms become worse    You have medication side effects that make it  difficult to take them as prescribed    Your symptoms do not improve within 1-2 days of starting treatment    You have symptoms of a bladder infection that return shortly after completing treatment     It is possible to have an allergic reaction to an antibiotic even if you have not had one in the past. If you notice a new rash, significant swelling, or difficulty breathing, stop taking this medication immediately and go to a clinic or urgent care.   Diagnosis: Acute uncomplicated bladder infection  Diagnosis ICD: N39.0    Follow up instructions:  ATTENTION: If you have been prescribed medications, your prescriptions will not be sent until you choose your pharmacy. To do so open the link within your notification, or go to [employer] and click eVisit in the menu to open your   treatment plan. From there, you can select your pharmacy at the bottom of your after visit summary. You can also go to [email or web address]   Prescriptions  Prescription: cephalexin (Keflex) 500 mg oral capsule, take 1 capsule by mouth every 12 hours for 7 days  Sent To: [facility] [id number] - [phone] - [address] [place] [address]

## 2024-11-01 ENCOUNTER — HOSPITAL ENCOUNTER (OUTPATIENT)
Dept: GENERAL RADIOLOGY | Facility: HOSPITAL | Age: 59
Discharge: HOME | End: 2024-11-01
Payer: MEDICARE

## 2024-11-01 DIAGNOSIS — K22.4 ESOPHAGEAL SPASM: ICD-10-CM

## 2024-11-01 DIAGNOSIS — K31.84 GASTROPARESIS: ICD-10-CM

## 2024-11-01 DIAGNOSIS — K21.9 DUODENAL GASTROESOPHAGEAL REFLUX: ICD-10-CM

## 2024-11-01 PROCEDURE — 74248 X-RAY SM INT F-THRU STD: CPT

## 2024-11-01 PROCEDURE — 74240 X-RAY XM UPR GI TRC 1CNTRST: CPT

## 2024-11-04 DIAGNOSIS — F32.A DEPRESSION WITH SOMATIZATION: ICD-10-CM

## 2024-11-04 DIAGNOSIS — F45.0 DEPRESSION WITH SOMATIZATION: ICD-10-CM

## 2024-11-05 RX ORDER — LEVOTHYROXINE SODIUM 50 UG/1
TABLET ORAL
Qty: 90 TABLET | Refills: 1 | OUTPATIENT
Start: 2024-11-05

## 2024-11-07 ENCOUNTER — TELEPHONE (OUTPATIENT)
Dept: FAMILY MEDICINE CLINIC | Facility: CLINIC | Age: 59
End: 2024-11-07
Payer: MEDICARE

## 2024-11-07 NOTE — TELEPHONE ENCOUNTER
Caller: Tatiana Fields    Relationship: Self    Best call back number: [phone]    What is the medical concern/diagnosis: BOTOX    What is the provider, practice or medical service name:  NEUROLOGY IN [place]    PT STATED THAT SHE HAS BEEN GOING TO [place] TO HAVE BOTOX DONE RATHER HAVE IT DONE IN [place]

## 2024-11-12 DIAGNOSIS — Z79.890 HORMONE REPLACEMENT THERAPY: ICD-10-CM

## 2024-11-12 DIAGNOSIS — N95.1 POSTMENOPAUSAL DISORDER: ICD-10-CM

## 2024-11-12 RX ORDER — ESTRADIOL 0.05 MG/D
PATCH, EXTENDED RELEASE TRANSDERMAL
Qty: 8 PATCH | Refills: 11 | Status: SHIPPED | OUTPATIENT
Start: 2024-11-12

## 2024-11-15 ENCOUNTER — TELEPHONE (OUTPATIENT)
Dept: FAMILY MEDICINE CLINIC | Facility: CLINIC | Age: 59
End: 2024-11-15
Payer: MEDICARE

## 2024-11-15 NOTE — TELEPHONE ENCOUNTER
Caller: Tatiana Fields    Relationship: Self    Best call back number: [phone]        What specialty or service is being requested: REFERRAL FOR NEUROLOGIST  IN [place]

## 2024-11-15 NOTE — TELEPHONE ENCOUNTER
Can you please refer me to start Botox for migraines in [facility].  I did them in [place] for several years until Dr Miles left and I had to start going to [place].  I get them every three months.   .  I’m struggling to make it to [place] and called since they are now doing the Botox for migraines again in [place] but they said I would need you to do a referral        Patient previously sent the above message regarding neurology referral.

## 2024-11-19 DIAGNOSIS — G43.709 CHRONIC MIGRAINE WITHOUT AURA WITHOUT STATUS MIGRAINOSUS, NOT INTRACTABLE: Primary | ICD-10-CM

## 2024-11-25 ENCOUNTER — TELEMEDICINE (OUTPATIENT)
Dept: FAMILY MEDICINE CLINIC | Facility: CLINIC | Age: 59
End: 2024-11-25
Payer: MEDICARE

## 2024-11-25 DIAGNOSIS — I10 ESSENTIAL HYPERTENSION: ICD-10-CM

## 2024-11-25 DIAGNOSIS — R15.9 INCONTINENCE OF FECES, UNSPECIFIED FECAL INCONTINENCE TYPE: Primary | ICD-10-CM

## 2024-11-25 DIAGNOSIS — R51.9 FACIAL PAIN: ICD-10-CM

## 2024-11-25 DIAGNOSIS — J32.9 CHRONIC SINUSITIS, UNSPECIFIED LOCATION: ICD-10-CM

## 2024-11-25 RX ORDER — METOPROLOL SUCCINATE 25 MG/1
25 TABLET, EXTENDED RELEASE ORAL DAILY
Qty: 90 TABLET | Refills: 3 | Status: SHIPPED | OUTPATIENT
Start: 2024-11-25

## 2024-11-25 RX ORDER — UNDERPADS 23" X 36"
1 EACH MISCELLANEOUS NIGHTLY
Qty: 90 EACH | Refills: 3 | Status: SHIPPED | OUTPATIENT
Start: 2024-11-25

## 2024-11-25 NOTE — PROGRESS NOTES
TELEHEALTH/VIDEO VISIT (via [employer] Pixie/Magazing[employer])  BLUM 2024  LOCATION OF PROVIDER: [facility] MEDICINE    TIERNEY FINE   1965  LOCATION OF PT: PERSONAL RESIDENCE    You have chosen to receive care through a telehealth visit.  Do you consent to use a video/audio connection for your medical care today? Yes    History of Present Illness  She c/o blood pressure dropping too low. HR well controlled. Wanting to try a decreased dose of metoprolol. BP as low as 90s/60s.    Due to colonic motility issues, previous surgeries she struggles with rectal incontinence, particularly at night. Requesting rx for adult pull ups and incontinence chux pads.     She c/o persistent pain in left upper jaw. Difficulty with dental implant. Better on abx. Has had recent f/u with dentist. Recommend imaging. Also with previous chronic sinusitis, retention cyst, etc.    Patient's past medical hx, surgical hx, family hx, social hx reviewed on chart. Medication and allergy lists reviewed on chart. Information updated as indicated.      Review of Systems   Constitutional:  Positive for fatigue. Negative for fever and unexpected weight change.   HENT:  Positive for congestion, postnasal drip, sinus pressure and trouble swallowing (intermittent). Negative for ear discharge, mouth sores, nosebleeds, rhinorrhea and sore throat.    Eyes:  Positive for visual disturbance (chronic). Negative for pain.        Dry eyes   Respiratory:  Negative for cough, shortness of breath and wheezing.    Cardiovascular:  Positive for palpitations (chronic, intermittent, stable). Negative for chest pain and leg swelling.   Gastrointestinal:  Positive for abdominal pain (chronic), constipation (intermittent), diarrhea (intermittent), nausea and vomiting (intermittent). Negative for blood in stool.   Endocrine: Positive for heat intolerance. Negative for polydipsia and polyuria.   Genitourinary:  Positive for dysuria (mild, intermittent) and pelvic pain  (chronic). Negative for hematuria.   Musculoskeletal:  Positive for arthralgias, myalgias, neck pain and neck stiffness. Negative for back pain and joint swelling.   Skin:  Negative for rash.   Neurological:  Positive for weakness and headaches. Negative for dizziness, seizures, syncope and speech difficulty.   Hematological:  Negative for adenopathy. Bruises/bleeds easily.   Psychiatric/Behavioral:  Positive for dysphoric mood and sleep disturbance. Negative for suicidal ideas. The patient is nervous/anxious.    Pt's previous ROS reviewed and updated as indicated.         Physical Exam  Vitals and nursing note reviewed.   Constitutional:       General: She is not in acute distress.     Appearance: She is not ill-appearing.   HENT:      Head: Atraumatic.      Mouth/Throat:      Mouth: Mucous membranes are moist.      Comments: Mild facial swelling left side  Eyes:      General: No scleral icterus.     Conjunctiva/sclera: Conjunctivae normal.   Pulmonary:      Effort: Pulmonary effort is normal.   Skin:     Coloration: Skin is not jaundiced or pale.   Neurological:      Mental Status: She is alert and oriented to person, place, and time.   Psychiatric:         Mood and Affect: Mood and affect normal.         Behavior: Behavior normal. Behavior is cooperative.         Cognition and Memory: Cognition normal.       Lab Results   Component Value Date    WBC 6.02 07/09/2024    HGB 11.9 (L) 07/09/2024    HCT 37.0 07/09/2024    MCV 86.2 07/09/2024     07/09/2024       Lab Results   Component Value Date    GLUCOSE 80 07/09/2024    BUN 12 07/09/2024    CREATININE 0.83 07/09/2024    EGFRIFNONA 84 12/23/2021    EGFRIFAFRI 97 12/23/2021    BCR 14.5 07/09/2024    K 3.7 07/09/2024    CO2 25.5 07/09/2024    CALCIUM 9.3 07/09/2024    PROTENTOTREF 6.6 11/09/2023    ALBUMIN 4.1 07/09/2024    LABIL2 2.3 11/09/2023    AST 17 07/09/2024    ALT 12 07/09/2024       Lab Results   Component Value Date    CHOL 172 08/16/2021    CHLPL  194 11/09/2023    TRIG 127 11/09/2023    HDL 54 11/09/2023     (H) 11/09/2023       Lab Results   Component Value Date    HGBA1C 5.50 07/09/2024       Lab Results   Component Value Date    TSH 1.530 07/09/2024           Diagnoses and all orders for this visit:    Incontinence of feces, unspecified fecal incontinence type  -     Incontinence Supply Disposable (Incontinence Brief Large) misc; Use 1 each Every Night. FOR RECTAL INCONTINENCE  -     Incontinence Supplies misc; DISPOSABLE GARRETT PADS TO BE USED NIGHTLY FOR RECTAL INCONTINENCE    Essential hypertension  -     metoprolol succinate XL (Toprol XL) 25 MG 24 hr tablet; Take 1 tablet by mouth Daily.    Chronic sinusitis, unspecified location  -     CT Sinus Without Contrast; Future    Facial pain  -     CT Sinus Without Contrast; Future      Trial of decreased dose of metoprolol to 25 mg daily.  F/u as scheduled, f/u sooner as needed/instructed.  I will contact patient regarding test results and provide instructions regarding any necessary changes in plan of care.  Patient was encouraged to keep me informed of any acute changes, lack of improvement, or any new concerning symptoms.  Pt is aware of reasons to seek emergent care.  Patient voiced understanding of all instructions and denied further questions.      Total face to face time with patient was 15 minutes.    Please note that portions of this note may have been completed with a voice recognition program.

## 2024-11-30 DIAGNOSIS — J34.3 HYPERTROPHY, NASAL, TURBINATE: ICD-10-CM

## 2024-12-02 RX ORDER — FLUTICASONE PROPIONATE 50 MCG
SPRAY, SUSPENSION (ML) NASAL
Qty: 48 G | Refills: 0 | Status: SHIPPED | OUTPATIENT
Start: 2024-12-02

## 2024-12-06 RX ORDER — LORATADINE 10 MG
1 TABLET ORAL DAILY
Qty: 30 TABLET | Refills: 2 | Status: SHIPPED | OUTPATIENT
Start: 2024-12-06

## 2024-12-12 ENCOUNTER — TELEPHONE (OUTPATIENT)
Dept: FAMILY MEDICINE CLINIC | Facility: CLINIC | Age: 59
End: 2024-12-12

## 2024-12-12 NOTE — TELEPHONE ENCOUNTER
Caller: Tatiana Fields    Relationship: Self    Best call back number: [phone]     What is the medical concern/diagnosis: SINUS    What specialty or service is being requested: CT SCAN WITH CONTRAST    Any additional details: PATIENT STATES THAT ORDERS SENT WERE FOR WITHOUT CONTRAST

## 2024-12-17 DIAGNOSIS — R51.9 FACIAL PAIN: ICD-10-CM

## 2024-12-17 DIAGNOSIS — Z97.2: ICD-10-CM

## 2024-12-17 DIAGNOSIS — J32.9 CHRONIC SINUSITIS, UNSPECIFIED LOCATION: Primary | ICD-10-CM

## 2024-12-26 ENCOUNTER — OFFICE VISIT (OUTPATIENT)
Dept: NEUROLOGY | Facility: CLINIC | Age: 59
End: 2024-12-26
Payer: MEDICARE

## 2024-12-26 VITALS
SYSTOLIC BLOOD PRESSURE: 132 MMHG | OXYGEN SATURATION: 97 % | DIASTOLIC BLOOD PRESSURE: 78 MMHG | HEIGHT: 63 IN | BODY MASS INDEX: 30.12 KG/M2 | RESPIRATION RATE: 14 BRPM | WEIGHT: 170 LBS | TEMPERATURE: 98.2 F | HEART RATE: 122 BPM

## 2024-12-26 DIAGNOSIS — G43.909 ACUTE MIGRAINE: ICD-10-CM

## 2024-12-26 DIAGNOSIS — G43.711 INTRACTABLE CHRONIC MIGRAINE WITHOUT AURA AND WITH STATUS MIGRAINOSUS: Primary | ICD-10-CM

## 2024-12-26 RX ORDER — RIZATRIPTAN BENZOATE 10 MG/1
10 TABLET ORAL ONCE AS NEEDED
Qty: 27 TABLET | Refills: 3 | Status: SHIPPED | OUTPATIENT
Start: 2024-12-26

## 2024-12-26 NOTE — PROGRESS NOTES
New Patient Office Visit      Patient Name: Tatiana Fields  : 1965   MRN: [id number]     Referring Physician: Amber Mills MD    Chief Complaint:    Chief Complaint   Patient presents with    Establish Care     Migraine; pt reports she has had  HA days    Meds tried: Botox, Maxalt, Emgality, Ibuprofen, TPI, propranolol.        History of Present Illness: Tatiana Fields is a 59 y.o. female who is here today to establish care with Neurology.  She is a former patient of [facility] ([facility] She was last seen in clinic 10/24 ([facility]) for migraines.    MDM . Suffering from migraines since . + PMH of cervical neck surgery  at . Last Botox with Neurology ([facility])  and she would like to resume injections with [facility], which is closer to home. She describes her migraines as being a dull HA that is retro-orbital and will radiate to the entire skull. HA are often pounding and unilateral. She can have pain radiate down into her shoulders and neck. + N/V. + light and sound sensitivity. No aura. She likes to sleep these off in a cold dark room. HA can last > 72 hours. Meds Tried and Failed: Ajovy, topiramate, lisinopril, escitalopram, sumatriptan, Tylenol, ibuprofen, endocet, metoprolol, Maxalt, propranolol, TPI, Botox and Emgality. She would also like to resume TPI. She is using Zanaflex PRN as a muscle relaxer for her chronic neck and shoulder tension, which triggers her migraines.      SOCIAL: - lives alone. Two adult sons. 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 No tobacco or vaping. No ETOH. No recreational drugs.     Recent Imaging:     MRI Brain W/WO  + nonspecific periventricular and subcortical foci of T2 abnormality; nonspecific white matter changes    Pertinent Medical History: Allergic rhinitis, hypertension, vitamin D deficiency, vitamin B12 deficiency, HRT, hypothyroidism, constipation, GERD, IBS, gastroparesis, GERD,  "interstitial cystitis, depression, PTSD, fibromyalgia, pelvic floor dysfunction, cervical nerve root compression, cervical arthritis, migraine, degenerative disc disease, insomnia    Subjective      Review of Systems:   Review of Systems   Constitutional: Negative.    HENT: Negative.     Eyes: Negative.    Respiratory: Negative.     Cardiovascular: Negative.    Gastrointestinal: Negative.    Endocrine: Negative.    Genitourinary: Negative.    Musculoskeletal: Negative.    Skin: Negative.    Allergic/Immunologic: Negative.    Neurological:  Positive for headache.   Hematological: Negative.    Psychiatric/Behavioral: Negative.     All other systems reviewed and are negative.      Past Medical History:   Past Medical History:   Diagnosis Date    Acquired hypothyroidism 11/08/2021    ADHD (attention deficit hyperactivity disorder) 2000    Adjustment disorder     Allergic     Allergic rhinitis     Anemia     IRON DEF    Arthritis     Bursitis of right shoulder 02/2021    CAP (community acquired pneumonia) 06/2022    Cerumen impaction     Cervical arthritis 02/15/2012    Cervical nerve root compression 10/2019    Cervical radiculopathy     Cervical spinal stenosis     Cervical spondylosis     Chronic constipation 05/23/2017    Impression: 08/11/2015 - History of long-standing recurrent constipation. Of interest, the patient states she has had severe constipation since having bladder tack and endometriosis sugery in 2013 in which her colon was \"cut\". We do not have these records.;     Chronic fatigue     Chronic gastritis     Chronic insomnia 06/28/2017    Chronic pain syndrome     Chronic sinusitis     Clonic hemifacial spasm, right 10/2019    Cluster headache 2012    DAILY    Colonic inertia 05/06/2020    Contracture of muscle of right hand 10/2019    Corneal dystrophy     DDD (degenerative disc disease), cervical 04/15/2019    DDD (degenerative disc disease), lumbar     DDD (degenerative disc disease), thoracic     " Depression with somatization 11/18/2016    Dermatochalasis of both upper eyelids     Diabetes mellitus     Prediabetes   Gestational diabetes 1988 and in 1986    Diplopia     Duodenal gastroesophageal reflux 05/23/2017    Dyskinesia of esophagus 03/2022    Dysphagia     Dyssynergic defecation 01/2018    SEEN BY DR. CORIE RHODES AT [facility]    Early satiety     Endometriosis     S/P HYSTERECTOMY    Esophageal spasm 05/23/2022    Essential tremor     Excessive daytime sleepiness     Fibromyalgia, primary 2013    Full incontinence of feces 06/2022    Functional dyspepsia     MELVIN (generalized anxiety disorder)     Gastritis     Gastroparesis 05/23/2022    GERD (gastroesophageal reflux disease)     Gout 09/2018    Headache, tension-type 2013    Hepatomegaly 11/2016    Hiatal hernia     History of gestational diabetes     History of medical problems     Hormone replacement therapy (HRT) 10/15/2018    Hyperlipidemia     Hypertension     Hypertrophy, nasal, turbinate     Interstitial cystitis 05/23/2017    Iridocyclitis 06/2019    Irritable bowel syndrome     Keratoconjunctivitis sicca     BILATERAL    Labral tear of shoulder 09/2018    RIGHT    Low back pain 2012    Lumbar spondylosis     Lyme disease 2012    tx'd with prolonged course abx    Memory loss     Migraine 2013    Mixed conductive and sensorineural hearing loss of both ears 2013    Myositis 06/2022    Narcolepsy     listed in medical records, tx'd with provigil    Obesity 2018    Obstructive sleep apnea     Ovarian cyst 06/2016    CORPUS LUTEUM CYST    Pelvic organ prolapse quantification stage 2 cystocele 02/10/2021    Periapical abscess without sinus 06/08/2017    Periodic limb movements of sleep 11/18/2016    probable    PHN (postherpetic neuralgia) 01/09/2020    Polypharmacy 10/15/2018    Post laminectomy syndrome     Prediabetes     Ptosis, mechanical, bilateral 01/2020    AND MYOGENIC    PTSD (post-traumatic stress disorder) 05/23/2017    Punctate  keratitis, left 03/2020    Pylorospasm 03/2022    Regular astigmatism of left eye 08/2021    Rheumatoid arthritis     RLS (restless legs syndrome)     Scoliosis 2012    Segmental and somatic dysfunction of cervical region     Segmental and somatic dysfunction of lumbar region     Segmental and somatic dysfunction of pelvic region     Shingles 02/15/2013    SLE (systemic lupus erythematosus)     Soft tissue injury of left chest wall 02/01/2021    Tachycardia 05/2016    Thyroiditis 01/2015    TIA (transient ischemic attack) 03/2019    RIGHT SIDE AFFECTED    TIA (transient ischemic attack) 05/04/2016    Trochanteric bursitis, left hip 06/2019    Ulcerative colitis     Vision loss     Vitamin B12 deficiency 08/08/2017    Vitamin D deficiency     White matter abnormality on MRI of brain 12/2020       Past Surgical History:   Past Surgical History:   Procedure Laterality Date    ANORECTAL MANOMETRY N/A 03/29/2016    DONE AT [facility]    ANORECTAL MANOMETRY N/A 06/11/2020    (+) COLONIC INERTIA, DR. PARISH ALMONTE AT  [facility]    BILATERAL SALPINGO OOPHORECTOMY Bilateral 2013    BLADDER REPAIR N/A 2013    bladder tack    CERVICAL DISCECTOMY POSTERIOR FUSION WITH INSTRUMENTATION N/A 03/28/2018    C3-C4, DR. SAUL KESSLER AT [facility]    COLECTOMY PARTIAL / TOTAL N/A 2012    listed in medical record; done at time of tx for endometriosis    COLECTOMY TOTAL N/A 10/04/2022    Procedure: LAPAROSCOPIC TOTAL ABDOMINAL COLECTOMY WITH DAVINCI ROBOT;  Surgeon: Abigail Kang MD;  Location: [facility];  Service: Robotics - DaVinci;  Laterality: N/A;    COLON MANOMETRY N/A 2016    SITZ MARKER STUDY, 24 MARKERS FROM SPLENIC FLEXURE TO SIGMOID, DR. SAUL ESCOBAR    COLON SURGERY  2012    Endrometrosis cut off pf colon    COLONOSCOPY  09/29/2015    ENDOMETRIAL ABLATION N/A 2012    ENDOSCOPY N/A 12/07/2016    WITH SMALL BOWEL FOLLOW THROUGH, SMALL SLIDING HIATAL HERNIA, DR. LOCO NUNES    ENDOSCOPY N/A 04/21/2014    FOREIGN  OBJECT CONSTISTANT WITH PAPER PRODUCT, DR. ARYA ZHAO AT [facility]    ENDOSCOPY AND COLONOSCOPY N/A 2015    HIATAL HERNIA, GASTRITIS, HEMORRHOIDS, RESCOPE IN 10 YRS, DR. DAVID SARAH AT [facility]    HYSTERECTOMY N/A     for endometriosis    ILEOSCOPY N/A 2020    INTERNAL HEMORRHOIDS, RESCOPE IN 5 YRS, DR. LOCO NUNES    SACROSPINUS SUSPENSION N/A 2021    WITH CYSTOSCOPY AND COLPORRHAPHY, DR. ESTEFANIA GARZA AT [facility]    SINUS SURGERY N/A     SMALL INTESTINE SURGERY      Removed endometrosis from lining of colon    SPINE SURGERY  2018    SUBTOTAL HYSTERECTOMY      TRIGGER POINT INJECTION  2020    DR. JEFFY AHUJA PA-C AT [facility]       Family History:   Family History   Problem Relation Age of Onset    Inflammatory bowel disease Mother     Hypertension Mother     Heart disease Mother     Arthritis Mother     Hyperlipidemia Mother     Diabetes Mother     Migraines Mother     Neuropathy Mother     Stroke Mother     Asthma Mother     Cancer Mother     COPD Mother     Hypertension Father     Heart attack Father     Hyperlipidemia Father     Arthritis Father     Hearing loss Father     Cancer Sister     Bone cancer Sister     Cancer Brother     Hyperlipidemia Brother     Colon cancer Brother     Stroke Maternal Aunt     Diabetes Maternal Grandmother     Diabetes Paternal Grandmother     Neuropathy Paternal Grandmother     Stroke Paternal Grandmother     Arthritis Paternal Grandmother     Colon cancer Other     Migraines Sister             Malig Hyperthermia Neg Hx        Social History:   Social History     Socioeconomic History    Marital status:    Tobacco Use    Smoking status: Never     Passive exposure: Never    Smokeless tobacco: Never   Vaping Use    Vaping status: Never Used   Substance and Sexual Activity    Alcohol use: No    Drug use: No    Sexual activity: Not Currently     Partners: Male     Birth control/protection: Abstinence,  Post-menopausal, Surgical     Comment: No sex       Medications:     Current Outpatient Medications:     cholecalciferol (VITAMIN D3) 250 MCG (98529 UT) capsule, Take 1 capsule by mouth Daily., Disp: 90 capsule, Rfl: 1    clonazePAM (KlonoPIN) 0.5 MG tablet, TAKE 1 TABLET BY MOUTH TWICE DAILY AS NEEDED FOR ANXIETY OR INSOMNIA, Disp: 60 tablet, Rfl: 1    Dextroamphetamine Sulfate 20 MG tablet, Take 1 tablet by mouth Every 12 (Twelve) Hours., Disp: , Rfl:     Endocet  MG per tablet, Take 1 tablet by mouth 3 (Three) Times a Day., Disp: , Rfl:     estradiol (ESTRACE) 0.1 MG/GM vaginal cream, Insert  into the vagina Daily., Disp: , Rfl:     estradiol (Minivelle) 0.05 MG/24HR patch, 1 patch every 4 days., Disp: 8 patch, Rfl: 11    famciclovir (FAMVIR) 500 MG tablet, TAKE 1 TABLET BY MOUTH DAILY, Disp: 30 tablet, Rfl: 5    FLUoxetine (PROzac) 40 MG capsule, Take 1 capsule by mouth Daily., Disp: , Rfl:     fluticasone (FLONASE) 50 MCG/ACT nasal spray, USE 2 SPRAYS TO EACH NOSTRIL EVERY DAY, Disp: 48 g, Rfl: 0    hydroCHLOROthiazide 25 MG tablet, Take 1 tablet by mouth Daily., Disp: 90 tablet, Rfl: 3    Incontinence Supplies misc, DISPOSABLE GARRETT PADS TO BE USED NIGHTLY FOR RECTAL INCONTINENCE, Disp: 90 each, Rfl: 3    Incontinence Supply Disposable (Incontinence Brief Large) misc, Use 1 each Every Night. FOR RECTAL INCONTINENCE, Disp: 90 each, Rfl: 3    levothyroxine (SYNTHROID, LEVOTHROID) 25 MCG tablet, Take 1 tablet by mouth Daily., Disp: 90 tablet, Rfl: 1    linaclotide (Linzess) 72 MCG capsule capsule, Take 1 capsule by mouth Every Morning Before Breakfast., Disp: 30 capsule, Rfl: 5    metoclopramide (REGLAN) 5 MG tablet, , Disp: , Rfl:     metoprolol succinate XL (Toprol XL) 25 MG 24 hr tablet, Take 1 tablet by mouth Daily., Disp: 90 tablet, Rfl: 3    ondansetron (ZOFRAN) 4 MG tablet, Take 1 tablet by mouth Every 8 (Eight) Hours As Needed for Nausea or Vomiting., Disp: 30 tablet, Rfl: 5    potassium chloride 10  "MEQ CR tablet, Take 1 tablet by mouth 2 (Two) Times a Day., Disp: 180 tablet, Rfl: 0    promethazine (PHENERGAN) 25 MG tablet, Take 1 tablet by mouth Every 8 (Eight) Hours As Needed for Nausea or Vomiting., Disp: 15 tablet, Rfl: 2    Sodium Fluoride 1.1 % gel, APPLY TOPICALLY EVERY DAY, Disp: , Rfl:     tiZANidine (ZANAFLEX) 4 MG tablet, Take 1 tablet by mouth Every 8 (Eight) Hours As Needed for Muscle Spasms., Disp: 90 tablet, Rfl: 5    traZODone (DESYREL) 50 MG tablet, TAKE 1 TABLET BY MOUTH EVERY NIGHT AT BEDTIME, Disp: 90 tablet, Rfl: 1    Wal-itin D 24 Hour  MG per 24 hr tablet, TAKE 1 TABLET BY MOUTH DAILY, Disp: 30 tablet, Rfl: 2    zolpidem CR (Ambien CR) 6.25 MG CR tablet, Take 1 tablet by mouth At Night As Needed for Sleep., Disp: 30 tablet, Rfl: 1    fluconazole (DIFLUCAN) 150 MG tablet, Take 1 tablet now, repeat in 72 hours if symptoms continue (Patient not taking: Reported on 12/26/2024), Disp: 2 tablet, Rfl: 0    naproxen (NAPROSYN) 500 MG tablet, Take 1 tablet by mouth 2 (Two) Times a Day With Meals. (Patient not taking: Reported on 12/26/2024), Disp: 180 tablet, Rfl: 1    progesterone (PROMETRIUM) 100 MG capsule, Take one capsule at hs (Patient not taking: Reported on 12/26/2024), Disp: 30 capsule, Rfl: 5    rizatriptan (MAXALT) 10 MG tablet, Take 1 tablet by mouth 1 (One) Time As Needed for Migraine., Disp: 27 tablet, Rfl: 3    Allergies:   Allergies   Allergen Reactions    Amoxicillin-Pot Clavulanate Other (See Comments)    Morphine Itching    Sulfa Antibiotics Rash       Objective     Physical Exam:  Vital Signs:   Vitals:    12/26/24 1350   BP: 132/78   BP Location: Left arm   Patient Position: Sitting   Cuff Size: Adult   Pulse: (!) 122   Resp: 14   Temp: 98.2 °F (36.8 °C)   TempSrc: Infrared   SpO2: 97%   Weight: 77.1 kg (170 lb)   Height: 160 cm (62.99\")   PainSc:   7   PainLoc: Head     Body mass index is 30.12 kg/m².     Physical Exam  Vitals and nursing note reviewed. "   Constitutional:       General: She is not in acute distress.     Appearance: Normal appearance.   HENT:      Head: Normocephalic.      Nose: Nose normal.      Mouth/Throat:      Mouth: Mucous membranes are moist.      Pharynx: Oropharynx is clear.   Eyes:      General: Lids are normal.      Extraocular Movements: Extraocular movements intact.      Conjunctiva/sclera: Conjunctivae normal.      Pupils: Pupils are equal, round, and reactive to light.   Musculoskeletal:      Cervical back: Normal range of motion and neck supple.   Skin:     General: Skin is warm and dry.      Capillary Refill: Capillary refill takes less than 2 seconds.   Neurological:      General: No focal deficit present.      Mental Status: She is oriented to person, place, and time.      Motor: Motor strength is normal.  Psychiatric:         Mood and Affect: Mood normal.         Behavior: Behavior normal.         Neurological Exam  Mental Status  Awake, alert and oriented to person, place and time. Oriented to person, place, and time.    Cranial Nerves  CN II: Visual acuity is normal. Visual fields full to confrontation.  CN III, IV, VI: Extraocular movements intact bilaterally. Normal lids and orbits bilaterally. Pupils equal round and reactive to light bilaterally.  CN V: Facial sensation is normal.  CN VII: Full and symmetric facial movement.  CN IX, X: Palate elevates symmetrically. Normal gag reflex.  CN XI: Shoulder shrug strength is normal.  CN XII: Tongue midline without atrophy or fasciculations.    Motor  Normal muscle bulk throughout. No fasciculations present. Normal muscle tone. No abnormal involuntary movements. Strength is 5/5 throughout all four extremities.    Coordination  Right: Finger-to-nose normal.Left: Finger-to-nose normal.    Gait  Casual gait is normal including stance, stride, and arm swing.      PHQ-9 Total Score:       Assessment / Plan      Assessment/Plan:   Diagnoses and all orders for this visit:    1. Intractable  chronic migraine without aura and with status migrainosus (Primary)  -     Ambulatory Referral to Disease State Management  -     MRI Brain With & Without Contrast; Future    2. Acute migraine  -     rizatriptan (MAXALT) 10 MG tablet; Take 1 tablet by mouth 1 (One) Time As Needed for Migraine.  Dispense: 27 tablet; Refill: 3         Follow Up:   Return in about 3 months (around 3/26/2025), or if symptoms worsen or fail to improve.    Anticipatory Guidance and Safety Reviewed  Patient Education Provided  MRI Brain W/WO r/o demyelinating diease  MIDAS score 110 (severe disability)   DSM Referral for BOTOX  Resume TPI PRN- she will make appt at her convenience and we will alternate TPI and Botox scheduling.   Continue Maxalt 10 mg PRN (abortive); SE reviewed     RTC PRN or within 12 weeks or sooner with issues     SOULEYMANE Gill  [facility] Neurology and Sleep Medicine

## 2024-12-30 RX ORDER — POTASSIUM CHLORIDE 750 MG/1
10 TABLET, EXTENDED RELEASE ORAL 2 TIMES DAILY
Qty: 180 TABLET | Refills: 0 | Status: SHIPPED | OUTPATIENT
Start: 2024-12-30

## 2025-01-02 ENCOUNTER — PATIENT ROUNDING (BHMG ONLY) (OUTPATIENT)
Dept: NEUROLOGY | Facility: CLINIC | Age: 60
End: 2025-01-02
Payer: MEDICARE

## 2025-01-02 DIAGNOSIS — G47.00 INSOMNIA, UNSPECIFIED TYPE: ICD-10-CM

## 2025-01-02 RX ORDER — ONDANSETRON 4 MG/1
4 TABLET, FILM COATED ORAL EVERY 8 HOURS PRN
Qty: 30 TABLET | Refills: 5 | Status: SHIPPED | OUTPATIENT
Start: 2025-01-02

## 2025-01-02 RX ORDER — PROMETHAZINE HYDROCHLORIDE 25 MG/1
25 TABLET ORAL EVERY 8 HOURS PRN
Qty: 15 TABLET | Refills: 2 | Status: SHIPPED | OUTPATIENT
Start: 2025-01-02

## 2025-01-02 RX ORDER — SODIUM FLUORIDE 5 MG/G
GEL, DENTIFRICE DENTAL
OUTPATIENT
Start: 2025-01-02

## 2025-01-02 NOTE — TELEPHONE ENCOUNTER
Rx Refill Note  Requested Prescriptions     Pending Prescriptions Disp Refills    zolpidem CR (Ambien CR) 6.25 MG CR tablet 30 tablet 1     Sig: Take 1 tablet by mouth At Night As Needed for Sleep.     Signed Prescriptions Disp Refills    ondansetron (ZOFRAN) 4 MG tablet 30 tablet 5     Sig: Take 1 tablet by mouth Every 8 (Eight) Hours As Needed for Nausea or Vomiting.     Authorizing Provider: SILKE HENRY     Ordering User: JUANI HILL    tiZANidine (ZANAFLEX) 4 MG tablet 90 tablet 5     Sig: Take 1 tablet by mouth Every 8 (Eight) Hours As Needed for Muscle Spasms.     Authorizing Provider: SILKE HENRY     Ordering User: JUANI HILL    promethazine (PHENERGAN) 25 MG tablet 15 tablet 2     Sig: Take 1 tablet by mouth Every 8 (Eight) Hours As Needed for Nausea or Vomiting.     Authorizing Provider: SILKE HENRY     Ordering User: JUANI HILL    linaclotide (Linzess) 72 MCG capsule capsule 30 capsule 5     Sig: Take 1 capsule by mouth Every Morning Before Breakfast.     Authorizing Provider: SILKE HENRY     Ordering User: JUANI HILL     Refused Prescriptions Disp Refills    Sodium Fluoride 1.1 % gel       Refused By: JUANI HILL     Reason for Refusal: Prescriber not at this practice      Last office visit with prescribing clinician: 9/25/2024   Last telemedicine visit with prescribing clinician: 11/25/2024   Next office visit with prescribing clinician: Visit date not found                         Would you like a call back once the refill request has been completed: [] Yes [] No    If the office needs to give you a call back, can they leave a voicemail: [] Yes [] No    Juani Hill MA  01/02/25, 16:00 EST

## 2025-01-06 RX ORDER — ZOLPIDEM TARTRATE 6.25 MG/1
6.25 TABLET, FILM COATED, EXTENDED RELEASE ORAL NIGHTLY PRN
Qty: 30 TABLET | Refills: 1 | Status: SHIPPED | OUTPATIENT
Start: 2025-01-06

## 2025-01-08 RX ORDER — FAMCICLOVIR 500 MG/1
500 TABLET ORAL DAILY
Qty: 30 TABLET | Refills: 5 | Status: SHIPPED | OUTPATIENT
Start: 2025-01-08

## 2025-01-09 ENCOUNTER — TELEPHONE (OUTPATIENT)
Dept: FAMILY MEDICINE CLINIC | Facility: CLINIC | Age: 60
End: 2025-01-09
Payer: MEDICARE

## 2025-01-09 NOTE — TELEPHONE ENCOUNTER
CALLED PATIENT TO SEE IF WE CAN SCHEDULE APPOINTMENT FOR HER MOUTH SWELLING AND POSSIBLE YEAST INFECTION. SHE SAID SHE IS GOING TO THE DENTIST IN JUST A BIT FOR HER MOUTH ISSUE, AND WILL CALL TO LET US KNOW WHAT THEY SAID/POSSIBLY MAKE APPT.    PER DR. ECHEVARRIA. WE CAN DO A MYCHART VISIT FOR THE YEAST INFECTION. IF SHE STILL NEEDS TO BE SEEN FOR MOUTH PROBLEM AS WELL, NEEDS TO BE IN OFFICE APPT.

## 2025-01-16 ENCOUNTER — TELEPHONE (OUTPATIENT)
Dept: NEUROLOGY | Facility: CLINIC | Age: 60
End: 2025-01-16

## 2025-01-16 NOTE — TELEPHONE ENCOUNTER
PATIENT CALLING REGARDING START OF BOTOX. SHE SAYS SHE HAS NOT HEARD ANYTHING ABOUT STARTING THIS.    PLEASE ADVISE PATIENT

## 2025-01-17 ENCOUNTER — TELEPHONE (OUTPATIENT)
Dept: FAMILY MEDICINE CLINIC | Facility: CLINIC | Age: 60
End: 2025-01-17

## 2025-01-17 NOTE — TELEPHONE ENCOUNTER
Rx Refill Note  Requested Prescriptions     Pending Prescriptions Disp Refills    metoclopramide (REGLAN) 5 MG tablet        Not active on med list    Juani Hill MA  01/17/25, 15:52 EST

## 2025-01-20 RX ORDER — METOCLOPRAMIDE 5 MG/1
TABLET ORAL
OUTPATIENT
Start: 2025-01-20

## 2025-01-21 ENCOUNTER — TRANSCRIBE ORDERS (OUTPATIENT)
Dept: ADMINISTRATIVE | Facility: HOSPITAL | Age: 60
End: 2025-01-21
Payer: MEDICARE

## 2025-01-21 DIAGNOSIS — R11.0 NAUSEA: Primary | ICD-10-CM

## 2025-01-27 ENCOUNTER — HOSPITAL ENCOUNTER (OUTPATIENT)
Dept: CT IMAGING | Facility: HOSPITAL | Age: 60
Discharge: HOME | End: 2025-01-27
Admitting: FAMILY MEDICINE
Payer: MEDICARE

## 2025-01-27 DIAGNOSIS — J32.9 CHRONIC SINUSITIS, UNSPECIFIED LOCATION: ICD-10-CM

## 2025-01-27 DIAGNOSIS — Z97.2: ICD-10-CM

## 2025-01-27 DIAGNOSIS — R51.9 FACIAL PAIN: ICD-10-CM

## 2025-01-27 PROCEDURE — 70486 CT MAXILLOFACIAL W/O DYE: CPT

## 2025-01-29 ENCOUNTER — TELEPHONE (OUTPATIENT)
Dept: FAMILY MEDICINE CLINIC | Facility: CLINIC | Age: 60
End: 2025-01-29

## 2025-01-29 ENCOUNTER — PROCEDURE VISIT (OUTPATIENT)
Dept: NEUROLOGY | Facility: CLINIC | Age: 60
End: 2025-01-29
Payer: MEDICARE

## 2025-01-29 VITALS
WEIGHT: 171.4 LBS | RESPIRATION RATE: 14 BRPM | SYSTOLIC BLOOD PRESSURE: 136 MMHG | OXYGEN SATURATION: 98 % | HEIGHT: 63 IN | BODY MASS INDEX: 30.37 KG/M2 | HEART RATE: 100 BPM | TEMPERATURE: 98.2 F | DIASTOLIC BLOOD PRESSURE: 88 MMHG

## 2025-01-29 DIAGNOSIS — M79.18 MYOFASCIAL PAIN: Primary | ICD-10-CM

## 2025-01-29 RX ORDER — BUPIVACAINE HYDROCHLORIDE 5 MG/ML
8 INJECTION, SOLUTION PERINEURAL ONCE
Status: COMPLETED | OUTPATIENT
Start: 2025-01-29 | End: 2025-01-29

## 2025-01-29 RX ORDER — METHYLPREDNISOLONE SODIUM SUCCINATE 125 MG/2ML
125 INJECTION INTRAMUSCULAR; INTRAVENOUS ONCE
Status: COMPLETED | OUTPATIENT
Start: 2025-01-29 | End: 2025-01-29

## 2025-01-29 RX ADMIN — METHYLPREDNISOLONE SODIUM SUCCINATE 125 MG: 125 INJECTION INTRAMUSCULAR; INTRAVENOUS at 10:35

## 2025-01-29 RX ADMIN — BUPIVACAINE HYDROCHLORIDE 8 ML: 5 INJECTION, SOLUTION PERINEURAL at 10:35

## 2025-01-29 NOTE — PROGRESS NOTES
Procedure note     Patient Name: Tatiana Fields  : 1965   MRN: [id number]     Chief Complaint:    Chief Complaint   Patient presents with    Procedure     Trigger point injection       History of Present Illness: Tatiana Fields is a 59 y.o. female who is here today for trigger point injections.     Procedure:    Trigger Point Injections      After risks and benefits were explained including bleeding, infection, worsening of the pain, damage to the area being injected, weakness, allergic reaction to medications, vascular injection, and nerve damage, signed consent was obtained.  All questions were answered.      The area of the trigger point was identified and the skin prepped three times with alcohol and the alcohol allowed to dry.  Next, a 25 gauge 1 inch needle was placed in the area of the trigger point.  Once reproduction of the pain was elicited and negative aspiration confirmed, the trigger point was injected and the needle removed.      The patient tolerated procedure well without immediate complications.     Medication used: 125mg/2 ml of Depo-Medrol; 8 ml of 0.5 % Bupivacaine    Trigger points injected: Cervical paraspinal and trapezius muscles in 10 divided sites.       Subjective     I have reviewed and the following portions of the patient's history were updated as appropriate: past family history, past medical history, past social history, past surgical history and problem list.    Medications:     Current Outpatient Medications:     cholecalciferol (VITAMIN D3) 250 MCG (52379 UT) capsule, Take 1 capsule by mouth Daily., Disp: 90 capsule, Rfl: 1    clonazePAM (KlonoPIN) 0.5 MG tablet, TAKE 1 TABLET BY MOUTH TWICE DAILY AS NEEDED FOR ANXIETY OR INSOMNIA, Disp: 60 tablet, Rfl: 1    Dextroamphetamine Sulfate 20 MG tablet, Take 1 tablet by mouth Every 12 (Twelve) Hours., Disp: , Rfl:     estradiol (ESTRACE) 0.1 MG/GM vaginal cream, Insert  into the vagina Daily., Disp: , Rfl:     estradiol  (Minivelle) 0.05 MG/24HR patch, 1 patch every 4 days., Disp: 8 patch, Rfl: 11    famciclovir (FAMVIR) 500 MG tablet, TAKE 1 TABLET BY MOUTH DAILY, Disp: 30 tablet, Rfl: 5    FLUoxetine (PROzac) 40 MG capsule, Take 1 capsule by mouth Daily., Disp: , Rfl:     fluticasone (FLONASE) 50 MCG/ACT nasal spray, USE 2 SPRAYS TO EACH NOSTRIL EVERY DAY, Disp: 48 g, Rfl: 0    hydroCHLOROthiazide 25 MG tablet, Take 1 tablet by mouth Daily., Disp: 90 tablet, Rfl: 3    levothyroxine (SYNTHROID, LEVOTHROID) 25 MCG tablet, Take 1 tablet by mouth Daily., Disp: 90 tablet, Rfl: 1    linaclotide (Linzess) 72 MCG capsule capsule, Take 1 capsule by mouth Every Morning Before Breakfast., Disp: 30 capsule, Rfl: 5    metoclopramide (REGLAN) 5 MG tablet, , Disp: , Rfl:     metoprolol succinate XL (Toprol XL) 25 MG 24 hr tablet, Take 1 tablet by mouth Daily., Disp: 90 tablet, Rfl: 3    naproxen (NAPROSYN) 500 MG tablet, Take 1 tablet by mouth 2 (Two) Times a Day With Meals., Disp: 180 tablet, Rfl: 1    ondansetron (ZOFRAN) 4 MG tablet, Take 1 tablet by mouth Every 8 (Eight) Hours As Needed for Nausea or Vomiting., Disp: 30 tablet, Rfl: 5    potassium chloride 10 MEQ CR tablet, TAKE 1 TABLET BY MOUTH TWICE DAILY, Disp: 180 tablet, Rfl: 0    progesterone (PROMETRIUM) 100 MG capsule, Take one capsule at hs, Disp: 30 capsule, Rfl: 5    promethazine (PHENERGAN) 25 MG tablet, Take 1 tablet by mouth Every 8 (Eight) Hours As Needed for Nausea or Vomiting., Disp: 15 tablet, Rfl: 2    rizatriptan (MAXALT) 10 MG tablet, Take 1 tablet by mouth 1 (One) Time As Needed for Migraine., Disp: 27 tablet, Rfl: 3    Sodium Fluoride 1.1 % gel, APPLY TOPICALLY EVERY DAY, Disp: , Rfl:     tiZANidine (ZANAFLEX) 4 MG tablet, Take 1 tablet by mouth Every 8 (Eight) Hours As Needed for Muscle Spasms., Disp: 90 tablet, Rfl: 5    Wal-itin D 24 Hour  MG per 24 hr tablet, TAKE 1 TABLET BY MOUTH DAILY, Disp: 30 tablet, Rfl: 2    zolpidem CR (Ambien CR) 6.25 MG CR tablet,  "Take 1 tablet by mouth At Night As Needed for Sleep., Disp: 30 tablet, Rfl: 1    Endocet  MG per tablet, Take 1 tablet by mouth 3 (Three) Times a Day., Disp: , Rfl:     traZODone (DESYREL) 50 MG tablet, TAKE 1 TABLET BY MOUTH EVERY NIGHT AT BEDTIME, Disp: 90 tablet, Rfl: 1  No current facility-administered medications for this visit.    Allergies:   Allergies   Allergen Reactions    Amoxicillin-Pot Clavulanate Other (See Comments)    Morphine Itching    Sulfa Antibiotics Rash       Objective     Physical Exam:  Vital Signs:   Vitals:    01/29/25 1016   BP: 136/88   BP Location: Right arm   Patient Position: Sitting   Cuff Size: Adult   Pulse: 100   Resp: 14   Temp: 98.2 °F (36.8 °C)   TempSrc: Infrared   SpO2: 98%   Weight: 77.7 kg (171 lb 6.4 oz)   Height: 160 cm (62.99\")   PainSc:   7   PainLoc: Head     Body mass index is 30.37 kg/m².    Physical Exam  Vitals and nursing note reviewed.   Constitutional:       General: She is not in acute distress.     Appearance: Normal appearance.   HENT:      Head: Normocephalic.      Nose: Nose normal.      Mouth/Throat:      Mouth: Mucous membranes are moist.      Pharynx: Oropharynx is clear.   Eyes:      Extraocular Movements: Extraocular movements intact.      Conjunctiva/sclera: Conjunctivae normal.   Musculoskeletal:      Cervical back: Normal range of motion and neck supple.   Skin:     General: Skin is warm and dry.   Neurological:      Mental Status: She is alert and oriented to person, place, and time.   Psychiatric:         Mood and Affect: Mood normal.         Behavior: Behavior normal.           Assessment / Plan      Assessment/Plan:   Diagnoses and all orders for this visit:    1. Myofascial pain (Primary)  -     bupivacaine (MARCAINE) 0.5 % injection 8 mL  -     methylPREDNISolone sodium succinate (SOLU-Medrol) injection 125 mg         Follow Up:   Return in about 3 months (around 4/29/2025), or if symptoms worsen or fail to improve.    Anticipatory " guidance and safety reviewed  Patient education provided  Risks benefits and alternatives discussed; consents obtained     Return to care as needed or within 12 weeks or sooner with issues    Kellen Cruz, RENETTA, APRN, FNP-C  [facility]

## 2025-01-29 NOTE — TELEPHONE ENCOUNTER
Caller: Tatiana Fields    Relationship: Self    Best call back number: [phone]     What are your current symptoms: CONGESTION/ SINUS PRESSURE AND HEADACHE     How long have you been experiencing symptoms:   A MONTH     Have you had these symptoms before:    [x] Yes  [] No    Have you been treated for these symptoms before:   [x] Yes  [] No    If a prescription is needed, what is your preferred pharmacy and phone number:    [facility] [id number] - [place], [place] - [address] [place] [address] AT [facility] [phone] [facility] [phone]  [phone]     Additional notes:  PATIENT WOULD LIKE FOR MEDICATION TO BE CALLED INTO THE PHARMACY TO HELP WITH SINUS ISSUES

## 2025-02-04 ENCOUNTER — TELEPHONE (OUTPATIENT)
Dept: NEUROLOGY | Facility: CLINIC | Age: 60
End: 2025-02-04

## 2025-02-04 DIAGNOSIS — J32.0 CHRONIC MAXILLARY SINUSITIS: Primary | ICD-10-CM

## 2025-02-04 RX ORDER — METHYLPREDNISOLONE 4 MG/1
TABLET ORAL
Qty: 21 TABLET | Refills: 0 | Status: SHIPPED | OUTPATIENT
Start: 2025-02-04

## 2025-02-04 RX ORDER — CLINDAMYCIN HYDROCHLORIDE 300 MG/1
300 CAPSULE ORAL 3 TIMES DAILY
Qty: 42 CAPSULE | Refills: 0 | Status: SHIPPED | OUTPATIENT
Start: 2025-02-04 | End: 2025-02-18

## 2025-02-04 NOTE — TELEPHONE ENCOUNTER
Provider: SOULEYMANE CULLEN    Caller: Tatiana Fields    Relationship to Patient: Self    Phone Number: [phone]      Reason for Call: PT STATES SHE HAS NOT HEARD ANYTHING REGARDING GETTING BOTOX STARTED, PER T.E. ON 01/16 PT INQUIRED, ALSO PT DISCUSSED THE BOTOX W/ALEX AT HER VISIT ON 01/29.    When was the patient last seen: 01/29/25      PLEASE REVIEW AND ADVISE

## 2025-02-16 NOTE — TELEPHONE ENCOUNTER
KIMBERLY reviewed. Refill approved. Medication E rx'd to pharmacy as requested. Pt to keep f/u apt as scheduled.     stay very well-hydrated, advised close follow-up with primary care, if she changes her mind or symptoms worsen to return to the emergency room, acknowledged        1)  Number and Complexity of Problems  Problem List This Visit: As above    Differential Diagnosis: Polynephritis UTI MSK NOS    Diagnoses Considered but Do Not Suspect: N/A    Pertinent Comorbid Conditions: N/A    2)  Data Reviewed  My EKG interpretation:  As above    Decision Rules/Scores utilized: N/A    Tests considered but not ordered and why: N/A    External Documents Reviewed: N/A    Imaging that is independently reviewed and interpreted by me as: N/A    See more data below for the lab and radiology tests and orders.    3)  Treatment and Disposition    Patient repeat assessment:  As above    Disposition discussion with patient/family: Discharge    Case discussed with consulting clinician:  As above    Social determinants of health impacting treatment or disposition: N/A    Shared Decision Making: As above    Code Status Discussion: N/A      REASSESSMENT     As above      CRITICAL CARE TIME     Total Critical Care time was 0 minutes, excluding separately reportable procedures.  There was a high probability of clinically significant/life threatening deterioration in the patient's condition which required my urgent intervention.      PROCEDURES:  Unless otherwise noted below, none     Procedures    FINAL IMPRESSION      1. Pyelonephritis          DISPOSITION/PLAN     DISPOSITION Decision To Discharge 02/15/2025 12:32:23 PM   DISPOSITION CONDITION Stable           PATIENT REFERRED TO:  Socorro Samayoa PA  [address] [place] [address]  [phone]    Call       [facility]  Emergency Department  [address] [facility] [address] [phone]    As needed, If symptoms worsen      DISCHARGE MEDICATIONS:  Discharge Medication List as of 2/15/2025 12:33 PM        START taking these medications    Details   ciprofloxacin (CIPRO) 500 MG tablet

## 2025-02-19 DIAGNOSIS — E03.9 ACQUIRED HYPOTHYROIDISM: ICD-10-CM

## 2025-02-19 RX ORDER — LEVOTHYROXINE SODIUM 25 UG/1
25 TABLET ORAL DAILY
Qty: 90 TABLET | Refills: 1 | Status: SHIPPED | OUTPATIENT
Start: 2025-02-19

## 2025-02-19 RX ORDER — METOCLOPRAMIDE 5 MG/1
TABLET ORAL
OUTPATIENT
Start: 2025-02-19

## 2025-03-06 ENCOUNTER — HOSPITAL ENCOUNTER (OUTPATIENT)
Dept: MRI IMAGING | Facility: HOSPITAL | Age: 60
Discharge: HOME | End: 2025-03-06
Admitting: NURSE PRACTITIONER
Payer: MEDICARE

## 2025-03-06 DIAGNOSIS — G43.711 INTRACTABLE CHRONIC MIGRAINE WITHOUT AURA AND WITH STATUS MIGRAINOSUS: ICD-10-CM

## 2025-03-06 PROCEDURE — 25510000002 GADOBENATE DIMEGLUMINE 529 MG/ML SOLUTION: Performed by: NURSE PRACTITIONER

## 2025-03-06 PROCEDURE — A9577 INJ MULTIHANCE: HCPCS | Performed by: NURSE PRACTITIONER

## 2025-03-06 PROCEDURE — 70553 MRI BRAIN STEM W/O & W/DYE: CPT

## 2025-03-06 RX ADMIN — GADOBENATE DIMEGLUMINE 15 ML: 529 INJECTION, SOLUTION INTRAVENOUS at 11:15

## 2025-03-14 RX ORDER — FLUCONAZOLE 150 MG/1
TABLET ORAL
Qty: 2 TABLET | Refills: 0 | Status: SHIPPED | OUTPATIENT
Start: 2025-03-14

## 2025-03-19 DIAGNOSIS — J34.3 HYPERTROPHY, NASAL, TURBINATE: ICD-10-CM

## 2025-03-19 RX ORDER — LORATADINE 10 MG
1 TABLET ORAL DAILY
Qty: 30 TABLET | Refills: 2 | Status: SHIPPED | OUTPATIENT
Start: 2025-03-19

## 2025-03-19 RX ORDER — FLUTICASONE PROPIONATE 50 MCG
SPRAY, SUSPENSION (ML) NASAL
Qty: 48 G | Refills: 1 | Status: SHIPPED | OUTPATIENT
Start: 2025-03-19

## 2025-03-19 NOTE — TELEPHONE ENCOUNTER
Dr. Mills,    Would you be ok with filling this medication?    I did advise the patient that you are out of the office until 3/24/2025.

## 2025-03-20 ENCOUNTER — TELEMEDICINE (OUTPATIENT)
Dept: NEUROLOGY | Facility: CLINIC | Age: 60
End: 2025-03-20
Payer: MEDICARE

## 2025-03-20 DIAGNOSIS — R93.0 ABNORMAL MRI OF HEAD: ICD-10-CM

## 2025-03-20 DIAGNOSIS — G43.909 ACUTE MIGRAINE: ICD-10-CM

## 2025-03-20 DIAGNOSIS — G43.711 INTRACTABLE CHRONIC MIGRAINE WITHOUT AURA AND WITH STATUS MIGRAINOSUS: Primary | ICD-10-CM

## 2025-03-20 RX ORDER — ATOGEPANT 60 MG/1
60 TABLET ORAL DAILY
Qty: 30 TABLET | Refills: 6 | Status: SHIPPED | OUTPATIENT
Start: 2025-03-20

## 2025-03-20 RX ORDER — RIZATRIPTAN BENZOATE 10 MG/1
10 TABLET ORAL ONCE AS NEEDED
Qty: 9 TABLET | Refills: 3 | Status: SHIPPED | OUTPATIENT
Start: 2025-03-20

## 2025-03-20 NOTE — PROGRESS NOTES
Follow Up Office Visit      Patient Name: Tatiana Fields  : 1965   MRN: [id number]     Chief Complaint:    Chief Complaint   Patient presents with    Migraine       History of Present Illness: Tatiana Fields is a 59 y.o. female who is here today to follow up with neurology for migriane WAYNE. She was last seen in clinic  (Christina). This is a telehealth encounter.     Had surgery this week for small bowel herniation; she is resting at home.     [facility] 15/30. She has not heard about referral to [facility] for Botox therapy with [facility]. She likes to alternative TPI and Botox every 6 weeks. She has been getting Botox with previous Neurologist (Damaso) with last injections  and FU . Last TPI with last injections  (Christina). She was sent for MRI and would like to discuss results today. She has been using Maxalt 10 mg PRN and this will terminate her migraines. She is taking Prozac 40 mg Daily and Metoprolol 25 mg Daily for comorbid conditions and these are managed by PCP (Lina).     Taken from previous encounter:    [facility] . Suffering from migraines since . + PMH of cervical neck surgery  at . Last Botox with Neurology (Damaso)  and she would like to resume injections with [facility], which is closer to home. She describes her migraines as being a dull HA that is retro-orbital and will radiate to the entire skull. HA are often pounding and unilateral. She can have pain radiate down into her shoulders and neck. + N/V. + light and sound sensitivity. No aura. She likes to sleep these off in a cold dark room. HA can last > 72 hours. Meds Tried and Failed: Ajovy, topiramate, lisinopril, escitalopram, sumatriptan, Tylenol, ibuprofen, endocet, metoprolol, Maxalt, propranolol, TPI, Botox and Emgality. She would also like to resume TPI. She is using Zanaflex PRN as a muscle relaxer for her chronic neck and shoulder tension, which triggers her migraines.     Recent Imaging:     MRI Brain W/WO  12/24 + Brain parenchyma displays periventricular and subcortical white matter signal changes. These have overall increased in number from prior exam. These are nonspecific but may be due to progressive chronic microvascular change, vasculitis or even demyelinating disease. Based on a significant number migraine-related signal changes would unlikely account for the number of white matter lesions.  MRI Brain W/WO 12/20 + nonspecific periventricular and subcortical foci of T2 abnormality; nonspecific white matter changes     Pertinent Medical History: Allergic rhinitis, hypertension, vitamin D deficiency, vitamin B12 deficiency, HRT, hypothyroidism, constipation, GERD, IBS, gastroparesis, GERD, interstitial cystitis, depression, PTSD, fibromyalgia, pelvic floor dysfunction, cervical nerve root compression, cervical arthritis, migraine, degenerative disc disease, insomnia    Subjective      Review of Systems:   Review of Systems   Constitutional: Negative.    HENT: Negative.     Eyes: Negative.    Respiratory: Negative.     Cardiovascular: Negative.    Gastrointestinal: Negative.    Endocrine: Negative.    Genitourinary: Negative.    Musculoskeletal: Negative.    Skin: Negative.    Allergic/Immunologic: Negative.    Neurological:  Positive for headache.   Hematological: Negative.    Psychiatric/Behavioral: Negative.     All other systems reviewed and are negative.      I have reviewed and the following portions of the patient's history were updated as appropriate: past family history, past medical history, past social history, past surgical history and problem list.    Medications:     Current Outpatient Medications:     rizatriptan (MAXALT) 10 MG tablet, Take 1 tablet by mouth 1 (One) Time As Needed for Migraine., Disp: 9 tablet, Rfl: 3    Atogepant (Qulipta) 60 MG tablet, Take 1 tablet by mouth Daily., Disp: 30 tablet, Rfl: 6    cholecalciferol (VITAMIN D3) 250 MCG (65471 UT) capsule, Take 1 capsule by mouth Daily.,  Disp: 90 capsule, Rfl: 1    clonazePAM (KlonoPIN) 0.5 MG tablet, TAKE 1 TABLET BY MOUTH TWICE DAILY AS NEEDED FOR ANXIETY OR INSOMNIA, Disp: 60 tablet, Rfl: 1    Dextroamphetamine Sulfate 20 MG tablet, Take 1 tablet by mouth Every 12 (Twelve) Hours., Disp: , Rfl:     estradiol (ESTRACE) 0.1 MG/GM vaginal cream, Insert  into the vagina Daily., Disp: , Rfl:     estradiol (Minivelle) 0.05 MG/24HR patch, 1 patch every 4 days., Disp: 8 patch, Rfl: 11    famciclovir (FAMVIR) 500 MG tablet, TAKE 1 TABLET BY MOUTH DAILY, Disp: 30 tablet, Rfl: 5    fluconazole (Diflucan) 150 MG tablet, 1 po now, may repeat in 3 days if needed, Disp: 2 tablet, Rfl: 0    FLUoxetine (PROzac) 40 MG capsule, Take 1 capsule by mouth Daily., Disp: , Rfl:     fluticasone (FLONASE) 50 MCG/ACT nasal spray, USE 2 SPRAYS TO EACH NOSTRIL EVERY DAY, Disp: 48 g, Rfl: 1    hydroCHLOROthiazide 25 MG tablet, Take 1 tablet by mouth Daily., Disp: 90 tablet, Rfl: 3    levothyroxine (SYNTHROID, LEVOTHROID) 25 MCG tablet, Take 1 tablet by mouth Daily., Disp: 90 tablet, Rfl: 1    linaclotide (Linzess) 72 MCG capsule capsule, Take 1 capsule by mouth Every Morning Before Breakfast., Disp: 30 capsule, Rfl: 5    loratadine-pseudoephedrine (Wal-itin D 24 Hour)  MG per 24 hr tablet, Take 1 tablet by mouth Daily., Disp: 30 tablet, Rfl: 2    methylPREDNISolone (MEDROL) 4 MG dose pack, Take as directed on package instructions., Disp: 21 tablet, Rfl: 0    metoclopramide (REGLAN) 5 MG tablet, , Disp: , Rfl:     metoprolol succinate XL (Toprol XL) 25 MG 24 hr tablet, Take 1 tablet by mouth Daily., Disp: 90 tablet, Rfl: 3    naproxen (NAPROSYN) 500 MG tablet, Take 1 tablet by mouth 2 (Two) Times a Day With Meals., Disp: 180 tablet, Rfl: 1    ondansetron (ZOFRAN) 4 MG tablet, Take 1 tablet by mouth Every 8 (Eight) Hours As Needed for Nausea or Vomiting., Disp: 30 tablet, Rfl: 5    potassium chloride 10 MEQ CR tablet, TAKE 1 TABLET BY MOUTH TWICE DAILY, Disp: 180 tablet,  Rfl: 0    progesterone (PROMETRIUM) 100 MG capsule, Take one capsule at hs, Disp: 30 capsule, Rfl: 5    promethazine (PHENERGAN) 25 MG tablet, Take 1 tablet by mouth Every 8 (Eight) Hours As Needed for Nausea or Vomiting., Disp: 15 tablet, Rfl: 2    Sodium Fluoride 1.1 % gel, APPLY TOPICALLY EVERY DAY, Disp: , Rfl:     tiZANidine (ZANAFLEX) 4 MG tablet, Take 1 tablet by mouth Every 8 (Eight) Hours As Needed for Muscle Spasms., Disp: 90 tablet, Rfl: 5    zolpidem CR (Ambien CR) 6.25 MG CR tablet, Take 1 tablet by mouth At Night As Needed for Sleep., Disp: 30 tablet, Rfl: 1    Allergies:   Allergies   Allergen Reactions    Amoxicillin-Pot Clavulanate Other (See Comments)    Morphine Itching    Sulfa Antibiotics Rash       Objective     Physical Exam:  Vital Signs: There were no vitals filed for this visit.  There is no height or weight on file to calculate BMI.    Physical Exam  Vitals and nursing note reviewed.   Constitutional:       General: She is not in acute distress.     Appearance: Normal appearance.   HENT:      Head: Normocephalic.      Nose: Nose normal.      Mouth/Throat:      Mouth: Mucous membranes are moist.      Pharynx: Oropharynx is clear.   Eyes:      Extraocular Movements: Extraocular movements intact.      Conjunctiva/sclera: Conjunctivae normal.   Musculoskeletal:      Cervical back: Normal range of motion.   Neurological:      General: No focal deficit present.      Mental Status: She is alert and oriented to person, place, and time.   Psychiatric:         Mood and Affect: Mood normal.         Behavior: Behavior normal.         Neurological Exam  Mental Status  Alert. Oriented to person, place, and time.    Cranial Nerves  CN III, IV, VI: Extraocular movements intact bilaterally.       Assessment / Plan      Assessment/Plan:   Diagnoses and all orders for this visit:    1. Intractable chronic migraine without aura and with status migrainosus (Primary)  -     Atogepant (Qulipta) 60 MG tablet;  Take 1 tablet by mouth Daily.  Dispense: 30 tablet; Refill: 6    2. Abnormal MRI of head  -     MRI Brain With & Without Contrast; Future    3. Acute migraine  -     rizatriptan (MAXALT) 10 MG tablet; Take 1 tablet by mouth 1 (One) Time As Needed for Migraine.  Dispense: 9 tablet; Refill: 3         Follow Up:   No follow-ups on file.    Anticipatory Guidance and Safety Reviewed  Patient Education Provided  Referral to [facility] clinic for BOTOX  Continue TPI PRN  Begin Qulipta 60 mg Daily; SE reviewed (prevention). Coupons and samples offered for .   Continue Maxalt 10 mg PRN (abortive); SE reviewed   FU MRI Brain for surveillance 10/25     RTC PRN or within 12 weeks or sooner with issues     Twilio Encounter for 21 min with > 50% of encounter spent counseling and coordinating care     Kellen Cruz, DNP, APRN, FNP-C  [facility]

## 2025-03-21 RX ORDER — METOCLOPRAMIDE 5 MG/1
TABLET ORAL
OUTPATIENT
Start: 2025-03-21

## 2025-03-25 RX ORDER — HYDROCHLOROTHIAZIDE 25 MG/1
25 TABLET ORAL DAILY
Qty: 90 TABLET | Refills: 3 | Status: SHIPPED | OUTPATIENT
Start: 2025-03-25

## 2025-03-31 ENCOUNTER — TELEPHONE (OUTPATIENT)
Dept: FAMILY MEDICINE CLINIC | Facility: CLINIC | Age: 60
End: 2025-03-31

## 2025-03-31 NOTE — TELEPHONE ENCOUNTER
Hub staff attempted to follow warm transfer process and was unsuccessful     Caller: Tatiana Fields    Relationship to patient: Self    Best call back number: [phone]    Patient is needing: PT CALLED TO RESCHEDULE HER HOSPITAL F/U THAT IS SCHEDULED TODAY FOR A LATER TIME OR ANOTHER DAY. PT HAVING CAR ISSUES.

## 2025-04-02 ENCOUNTER — OFFICE VISIT (OUTPATIENT)
Dept: FAMILY MEDICINE CLINIC | Facility: CLINIC | Age: 60
End: 2025-04-02
Payer: MEDICARE

## 2025-04-02 VITALS
DIASTOLIC BLOOD PRESSURE: 90 MMHG | HEIGHT: 64 IN | HEART RATE: 81 BPM | OXYGEN SATURATION: 98 % | SYSTOLIC BLOOD PRESSURE: 128 MMHG | WEIGHT: 174.2 LBS | BODY MASS INDEX: 29.74 KG/M2

## 2025-04-02 DIAGNOSIS — I10 ESSENTIAL HYPERTENSION: ICD-10-CM

## 2025-04-02 DIAGNOSIS — B00.89 RECURRENT ORAL HERPES SIMPLEX INFECTION: ICD-10-CM

## 2025-04-02 DIAGNOSIS — J30.9 CHRONIC ALLERGIC RHINITIS: ICD-10-CM

## 2025-04-02 DIAGNOSIS — J32.9 CHRONIC SINUSITIS, UNSPECIFIED LOCATION: ICD-10-CM

## 2025-04-02 DIAGNOSIS — R73.03 PREDIABETES: ICD-10-CM

## 2025-04-02 DIAGNOSIS — E03.9 ACQUIRED HYPOTHYROIDISM: ICD-10-CM

## 2025-04-02 DIAGNOSIS — R51.9 CHRONIC DAILY HEADACHE: ICD-10-CM

## 2025-04-02 DIAGNOSIS — F90.9 ATTENTION DEFICIT HYPERACTIVITY DISORDER (ADHD), UNSPECIFIED ADHD TYPE: ICD-10-CM

## 2025-04-02 DIAGNOSIS — G54.2 CERVICAL NERVE ROOT COMPRESSION: ICD-10-CM

## 2025-04-02 DIAGNOSIS — K21.9 DUODENAL GASTROESOPHAGEAL REFLUX: ICD-10-CM

## 2025-04-02 DIAGNOSIS — F33.1 MODERATE EPISODE OF RECURRENT MAJOR DEPRESSIVE DISORDER: ICD-10-CM

## 2025-04-02 DIAGNOSIS — G47.61 PERIODIC LIMB MOVEMENT DISORDER: ICD-10-CM

## 2025-04-02 DIAGNOSIS — G25.81 RESTLESS LEGS SYNDROME: ICD-10-CM

## 2025-04-02 DIAGNOSIS — M50.30 DDD (DEGENERATIVE DISC DISEASE), CERVICAL: ICD-10-CM

## 2025-04-02 DIAGNOSIS — F51.04 CHRONIC INSOMNIA: ICD-10-CM

## 2025-04-02 DIAGNOSIS — E78.5 DYSLIPIDEMIA: ICD-10-CM

## 2025-04-02 DIAGNOSIS — E53.8 VITAMIN B12 DEFICIENCY: ICD-10-CM

## 2025-04-02 DIAGNOSIS — E55.9 VITAMIN D DEFICIENCY: ICD-10-CM

## 2025-04-02 DIAGNOSIS — N30.10 CHRONIC INTERSTITIAL CYSTITIS: ICD-10-CM

## 2025-04-02 DIAGNOSIS — Z79.890 HORMONE REPLACEMENT THERAPY (HRT): ICD-10-CM

## 2025-04-02 DIAGNOSIS — Z12.31 ENCOUNTER FOR SCREENING MAMMOGRAM FOR MALIGNANT NEOPLASM OF BREAST: ICD-10-CM

## 2025-04-02 DIAGNOSIS — M47.812 CERVICAL ARTHRITIS: ICD-10-CM

## 2025-04-02 DIAGNOSIS — R90.82 WHITE MATTER ABNORMALITY ON MRI OF BRAIN: ICD-10-CM

## 2025-04-02 DIAGNOSIS — F33.41 RECURRENT MAJOR DEPRESSIVE DISORDER, IN PARTIAL REMISSION: ICD-10-CM

## 2025-04-02 DIAGNOSIS — N95.1 POSTMENOPAUSAL DISORDER: ICD-10-CM

## 2025-04-02 DIAGNOSIS — Z00.00 ENCOUNTER FOR PREVENTIVE HEALTH EXAMINATION: ICD-10-CM

## 2025-04-02 DIAGNOSIS — G47.19 EXCESSIVE DAYTIME SLEEPINESS: ICD-10-CM

## 2025-04-02 DIAGNOSIS — M62.89 PELVIC FLOOR DYSFUNCTION IN FEMALE: ICD-10-CM

## 2025-04-02 DIAGNOSIS — K59.09 CHRONIC CONSTIPATION: ICD-10-CM

## 2025-04-02 DIAGNOSIS — G43.009 MIGRAINE WITHOUT AURA AND WITHOUT STATUS MIGRAINOSUS, NOT INTRACTABLE: ICD-10-CM

## 2025-04-02 DIAGNOSIS — K31.84 GASTROPARESIS: ICD-10-CM

## 2025-04-02 DIAGNOSIS — R73.01 IFG (IMPAIRED FASTING GLUCOSE): ICD-10-CM

## 2025-04-02 DIAGNOSIS — M79.7 FIBROMYALGIA: ICD-10-CM

## 2025-04-02 DIAGNOSIS — G95.9 CERVICAL MYELOPATHY: ICD-10-CM

## 2025-04-02 DIAGNOSIS — F43.10 POSTTRAUMATIC STRESS DISORDER: ICD-10-CM

## 2025-04-02 DIAGNOSIS — K59.9 COLONIC INERTIA: ICD-10-CM

## 2025-04-02 DIAGNOSIS — K11.7 XEROSTOMIA: ICD-10-CM

## 2025-04-02 DIAGNOSIS — Z00.00 MEDICARE ANNUAL WELLNESS VISIT, SUBSEQUENT: Primary | ICD-10-CM

## 2025-04-02 DIAGNOSIS — K21.9 GASTROESOPHAGEAL REFLUX DISEASE, UNSPECIFIED WHETHER ESOPHAGITIS PRESENT: ICD-10-CM

## 2025-04-02 DIAGNOSIS — K22.4 ESOPHAGEAL SPASM: ICD-10-CM

## 2025-04-02 DIAGNOSIS — Z51.81 MEDICATION MONITORING ENCOUNTER: ICD-10-CM

## 2025-04-02 PROBLEM — Z79.899 LONG-TERM CURRENT USE OF BENZODIAZEPINE: Status: ACTIVE | Noted: 2025-01-09

## 2025-04-02 RX ORDER — LEVOFLOXACIN 750 MG/1
TABLET, FILM COATED ORAL
COMMUNITY
Start: 2025-03-26

## 2025-04-02 NOTE — PATIENT INSTRUCTIONS
Medicare Wellness  Personal Prevention Plan of Service     Date of Office Visit:    Encounter Provider:  Amber Mills MD  Place of Service:  [facility]  Patient Name: Tatiana Fields  :  1965    As part of the Medicare Wellness portion of your visit today, we are providing you with this personalized preventive plan of services (PPPS). This plan is based upon recommendations of the [place] Preventive Services Task Force (USPSTF) and the Advisory Committee on Immunization Practices (ACIP).    This lists the preventive care services that should be considered, and provides dates of when you are due. Items listed as completed are up-to-date and do not require any further intervention.    Health Maintenance   Topic Date Due    TDAP/TD VACCINES (1 - Tdap) Never done    Pneumococcal Vaccine 50+ (1 of 1 - PCV) Never done    ZOSTER VACCINE (1 of 2) Never done    HEMOGLOBIN A1C  2025    MAMMOGRAM  2025    INFLUENZA VACCINE  2025    ANNUAL WELLNESS VISIT  2026    HEPATITIS C SCREENING  Completed    COVID-19 Vaccine  Completed    DIABETIC EYE EXAM  Discontinued    COLONOSCOPY  Discontinued       Orders Placed This Encounter   Procedures    Comprehensive Metabolic Panel     Release to patient:   Routine Release [[id number]]    Lipid Panel     Release to patient:   Routine Release [[id number]]    Hemoglobin A1c     Release to patient:   Routine Release [[id number]]    TSH     Release to patient:   Routine Release [[id number]]    T3, Free     Release to patient:   Routine Release [[id number]]    T4, Free     Release to patient:   Routine Release [[id number]]    CBC & Differential     Manual Differential:   No     Release to patient:   Routine Release [[id number]]       Return in about 6 months (around 10/2/2025).

## 2025-04-02 NOTE — PROGRESS NOTES
Subjective   The ABCs of the Annual Wellness Visit  Medicare Wellness Visit      Tatiana Fields is a 59 y.o. patient who presents for a Medicare Wellness Visit.      The following portions of the patient's history were reviewed and   updated as appropriate: allergies, current medications, past family history, past medical history, past social history, past surgical history, and problem list.    Compared to one year ago, the patient's physical   health is the same.  Compared to one year ago, the patient's mental   health is the same.    Recent Hospitalizations:  She was not admitted to the hospital during the last year.     Current Medical Providers:  Patient Care Team:  Amber Mills MD as PCP - General (Family Medicine)  Jake Naranjo MD as Consulting Physician (Cardiology)  Bora Miles MD as Consulting Physician (Sleep Medicine)  Lisa Villalobos MD as Consulting Physician (Physical Medicine and Rehabilitation)  Keisha Ruiz MD as Consulting Physician (Gastroenterology)  Malik Faria II, MD as Consulting Physician (Pain Medicine)  Mal Weeks MD as Consulting Physician (Colon and Rectal Surgery)  Shira Lawrence MD as Obstetrician (Obstetrics and Gynecology)  Aj Mendiola MD as Surgeon (Neurosurgery)  Doc David MD as Consulting Physician (Gastroenterology)  Abigail Kang MD as Consulting Physician (Colon and Rectal Surgery)    Outpatient Medications Prior to Visit   Medication Sig Dispense Refill    Atogepant (Qulipta) 60 MG tablet Take 1 tablet by mouth Daily. 30 tablet 6    cholecalciferol (VITAMIN D3) 250 MCG (52761 UT) capsule Take 1 capsule by mouth Daily. 90 capsule 1    clonazePAM (KlonoPIN) 0.5 MG tablet TAKE 1 TABLET BY MOUTH TWICE DAILY AS NEEDED FOR ANXIETY OR INSOMNIA 60 tablet 1    Dextroamphetamine Sulfate 20 MG tablet Take 1 tablet by mouth Every 12 (Twelve) Hours.      estradiol (Minivelle) 0.05 MG/24HR patch 1 patch every 4 days. 8 patch  11    famciclovir (FAMVIR) 500 MG tablet TAKE 1 TABLET BY MOUTH DAILY 30 tablet 5    FLUoxetine (PROzac) 40 MG capsule Take 1 capsule by mouth Daily.      fluticasone (FLONASE) 50 MCG/ACT nasal spray USE 2 SPRAYS TO EACH NOSTRIL EVERY DAY 48 g 1    hydroCHLOROthiazide 25 MG tablet TAKE 1 TABLET BY MOUTH DAILY 90 tablet 3    levoFLOXacin (LEVAQUIN) 750 MG tablet Take 1 tablet every day by oral route for 3 days.      levothyroxine (SYNTHROID, LEVOTHROID) 25 MCG tablet Take 1 tablet by mouth Daily. 90 tablet 1    loratadine-pseudoephedrine (Wal-itin D 24 Hour)  MG per 24 hr tablet Take 1 tablet by mouth Daily. 30 tablet 2    metoclopramide (REGLAN) 5 MG tablet       metoprolol succinate XL (Toprol XL) 25 MG 24 hr tablet Take 1 tablet by mouth Daily. 90 tablet 3    naproxen (NAPROSYN) 500 MG tablet Take 1 tablet by mouth 2 (Two) Times a Day With Meals. 180 tablet 1    ondansetron (ZOFRAN) 4 MG tablet Take 1 tablet by mouth Every 8 (Eight) Hours As Needed for Nausea or Vomiting. 30 tablet 5    potassium chloride 10 MEQ CR tablet TAKE 1 TABLET BY MOUTH TWICE DAILY 180 tablet 0    promethazine (PHENERGAN) 25 MG tablet Take 1 tablet by mouth Every 8 (Eight) Hours As Needed for Nausea or Vomiting. 15 tablet 2    rizatriptan (MAXALT) 10 MG tablet Take 1 tablet by mouth 1 (One) Time As Needed for Migraine. 9 tablet 3    tiZANidine (ZANAFLEX) 4 MG tablet Take 1 tablet by mouth Every 8 (Eight) Hours As Needed for Muscle Spasms. 90 tablet 5    fluconazole (Diflucan) 150 MG tablet 1 po now, may repeat in 3 days if needed 2 tablet 0    linaclotide (Linzess) 72 MCG capsule capsule Take 1 capsule by mouth Every Morning Before Breakfast. (Patient not taking: Reported on 4/2/2025) 30 capsule 5     No facility-administered medications prior to visit.     No opioid medication identified on active medication list. I have reviewed chart for other potential  high risk medication/s and harmful drug interactions in the  "elderly.      Aspirin is not on active medication list.  Aspirin use is not indicated based on review of current medical condition/s. Risk of harm outweighs potential benefits.  .    Patient Active Problem List   Diagnosis    Recurrent major depressive disorder, in partial remission    Periodic limb movement disorder    Chronic daily headache    Excessive daytime sleepiness    Witnessed apneic spells    Fibromyalgia    Chronic interstitial cystitis    Essential hypertension    IFG (impaired fasting glucose)    Chronic constipation    Duodenal gastroesophageal reflux    Irritable bowel syndrome    Posttraumatic stress disorder    Vitamin D deficiency    Hepatomegaly    Postmenopausal disorder    Chronic insomnia    Cervical arthritis    Cervical nerve root compression    Vitamin B12 deficiency    Hormone replacement therapy (HRT)    Chronic migraine without aura    Postherpetic neuralgia    Colonic inertia    Cervical myelopathy    DDD (degenerative disc disease), cervical    Pelvic floor dysfunction in female    Female cystocele    Vaginal vault prolapse    Acquired hypothyroidism    Gastroparesis    Esophageal spasm    Gastroesophageal reflux disease    Migraine without aura and without status migrainosus, not intractable    Slow transit constipation    Chronic allergic rhinitis    ADHD    Chronic sinusitis    Long-term current use of benzodiazepine    Prediabetes    Recurrent oral herpes simplex infection    Restless legs syndrome    Xerostomia     Advance Care Planning Advance Directive is not on file.  ACP discussion was held with the patient during this visit. Patient does not have an advance directive, information provided.            Objective   Vitals:    04/02/25 1110   BP: 128/90   Pulse: 81   SpO2: 98%   Weight: 79 kg (174 lb 3.2 oz)   Height: 162.6 cm (64\")   PainSc: 0-No pain       Estimated body mass index is 29.9 kg/m² as calculated from the following:    Height as of this encounter: 162.6 cm (64\").   "  Weight as of this encounter: 79 kg (174 lb 3.2 oz).    BMI is >= 25 and <30. (Overweight) The following options were offered after discussion;: exercise counseling/recommendations and nutrition counseling/recommendations           Does the patient have evidence of cognitive impairment? No  Lab Results   Component Value Date    CHLPL 205 (H) 2025    TRIG 283 (H) 2025    HDL 48 2025     (H) 2025    VLDL 48 (H) 2025    HGBA1C 5.80 (H) 2025                                                                                                Health  Risk Assessment    Smoking Status:  Social History     Tobacco Use   Smoking Status Never    Passive exposure: Never   Smokeless Tobacco Never     Alcohol Consumption:  Social History     Substance and Sexual Activity   Alcohol Use No       Fall Risk Screen  STEADI Fall Risk Assessment was completed, and patient is at HIGH risk for falls. Assessment completed on:2025    Depression Screening   Little interest or pleasure in doing things? Not at all   Feeling down, depressed, or hopeless? Several days   PHQ-2 Total Score 1      Health Habits and Functional and Cognitive Screenin/2/2025    11:10 AM   Functional & Cognitive Status   Do you have difficulty preparing food and eating? No   Do you have difficulty bathing yourself, getting dressed or grooming yourself? No   Do you have difficulty using the toilet? No   Do you have difficulty moving around from place to place? No   Do you have trouble with steps or getting out of a bed or a chair? No   Current Diet Well Balanced Diet   Dental Exam Up to date   Eye Exam Not up to date   Exercise (times per week) 5 times per week   Current Exercises Include Walking   Do you need help using the phone?  No   Are you deaf or do you have serious difficulty hearing?  No   Do you need help to go to places out of walking distance? No   Do you need help shopping? Yes   Do you need help preparing  meals?  No   Do you need help with housework?  No   Do you need help with laundry? No   Do you need help taking your medications? No   Do you need help managing money? No   Do you ever drive or ride in a car without wearing a seat belt? No   Have you felt unusual stress, anger or loneliness in the last month? No   Who do you live with? Alone   If you need help, do you have trouble finding someone available to you? Yes   Have you been bothered in the last four weeks by sexual problems? No   Do you have difficulty concentrating, remembering or making decisions? No           Age-appropriate Screening Schedule:  Refer to the list below for future screening recommendations based on patient's age, sex and/or medical conditions. Orders for these recommended tests are listed in the plan section. The patient has been provided with a written plan.    Health Maintenance List  Health Maintenance   Topic Date Due    TDAP/TD VACCINES (1 - Tdap) Never done    Pneumococcal Vaccine 50+ (1 of 1 - PCV) Never done    ZOSTER VACCINE (1 of 2) Never done    MAMMOGRAM  05/18/2025    INFLUENZA VACCINE  07/01/2025    HEMOGLOBIN A1C  10/02/2025    ANNUAL WELLNESS VISIT  04/02/2026    HEPATITIS C SCREENING  Completed    COVID-19 Vaccine  Completed    DIABETIC EYE EXAM  Discontinued    COLONOSCOPY  Discontinued                                                                                                                                                CMS Preventative Services Quick Reference  Risk Factors Identified During Encounter  Chronic Pain: Natural history and expected course discussed. Questions answered.  Depression/Dysphoria:  pt followed by behavioral health  Fall Risk-High or Moderate: Discussed Fall Prevention in the home and Information on Fall Prevention Shared in After Visit Summary  Immunizations Discussed/Encouraged: Tdap, Influenza, Pneumococcal 23, Prevnar 20 (Pneumococcal 20-valent conjugate), Shingrix, COVID19, and RSV  (Respiratory Syncytial Virus)  Inactivity/Sedentary: Patient was advised to exercise at least 150 minutes a week per CDC recommendations.    The above risks/problems have been discussed with the patient.  Pertinent information has been shared with the patient in the After Visit Summary.  An After Visit Summary and PPPS were made available to the patient.    Follow Up:   Next Medicare Wellness visit to be scheduled in 1 year.         Additional E&M Note during same encounter follows:  Patient has additional, significant, and separately identifiable condition(s)/problem(s) that require work above and beyond the Medicare Wellness Visit     Chief Complaint  Medicare Wellness-subsequent    Subjective   HPI  Tatiana is also being seen today for an annual adult preventative physical exam.  and Tatiana is also being seen today for additional medical problem/s.    Saw neuro 3/20 for further eval of headaches. MRI brain with white matter changes progressed from previous. Placed on Qulipta, maxalt. Denies focal neuro changes. Requesting referral to neurology in [place].    S/p abd ventral hernia repair 3/18. Some residual pain but improving. Will see surgery on Friday. Rx'd levaquin post op out of concern for initially worsening pain. Rx'dstool softener and sitmmulant. No longer having to take linzess.    Currently on HRT in the form of estrogen/progesterone.  Wishes to continue as she feels it has helped symptoms. Denies side effects.  Is up-to-date on mammogram.     Continues to be followed by pain management for multifactorial chronic pain syndrome.  No recent medication changes.     Hypertension -currently on lisinopril, HCTZ.  Blood pressures been higher recently. K has been low/low normal chronically.     Followed by behavioral health for depression, PTSD,a ADD.     Taking thyroid replacement as prescribed     On vitamin D and B12 replacement for deficiencies.      On NSAIDs chronically 2 times per day. On PPI    Pt's  "previous HPI reviewed and updated as indicated.        Review of Systems   Constitutional:  Positive for fatigue. Negative for fever.   HENT:  Positive for congestion, postnasal drip, rhinorrhea, sinus pressure and trouble swallowing. Negative for ear pain, mouth sores, nosebleeds and sore throat.    Eyes:  Positive for visual disturbance (chronic).   Respiratory:  Negative for cough, shortness of breath and wheezing.    Cardiovascular:  Positive for palpitations. Negative for chest pain and leg swelling.   Gastrointestinal:  Positive for abdominal pain (chronic intermittent), diarrhea and nausea. Negative for blood in stool and vomiting.   Endocrine: Positive for heat intolerance.   Genitourinary:  Positive for dysuria (mild, intermittent, chronic) and pelvic pain (chronic).   Musculoskeletal:  Positive for arthralgias, myalgias, neck pain and neck stiffness.   Skin:  Negative for rash and wound.   Neurological:  Negative for seizures, syncope, facial asymmetry, speech difficulty and confusion.   Hematological:  Negative for adenopathy. Bruises/bleeds easily.   Psychiatric/Behavioral:  Positive for dysphoric mood and sleep disturbance. The patient is nervous/anxious.    Pt's previous ROS reviewed and updated as indicated.        Objective   Vital Signs:  /90   Pulse 81   Ht 162.6 cm (64\")   Wt 79 kg (174 lb 3.2 oz)   SpO2 98%   BMI 29.90 kg/m²   Physical Exam  Vitals and nursing note reviewed.   Constitutional:       General: She is not in acute distress.     Appearance: She is well-groomed and overweight. She is not ill-appearing.   HENT:      Head: Atraumatic.      Right Ear: Ear canal and external ear normal. Tympanic membrane is retracted.      Left Ear: Ear canal and external ear normal. Tympanic membrane is retracted.      Nose: Mucosal edema and congestion present. No rhinorrhea.      Mouth/Throat:      Mouth: Mucous membranes are moist. No oral lesions.      Pharynx: Oropharynx is clear.   Eyes: "      General: No scleral icterus.     Conjunctiva/sclera: Conjunctivae normal.   Neck:      Thyroid: No thyroid mass.   Cardiovascular:      Rate and Rhythm: Normal rate and regular rhythm.      Pulses: Normal pulses.      Heart sounds: Normal heart sounds.   Pulmonary:      Effort: Pulmonary effort is normal.      Breath sounds: Normal breath sounds.   Abdominal:      General: There is no distension.      Palpations: Abdomen is soft. There is no mass.      Tenderness: There is generalized abdominal tenderness (mild). There is no guarding or rebound.   Musculoskeletal:      Right lower leg: No edema.      Left lower leg: No edema.   Lymphadenopathy:      Cervical: No cervical adenopathy.   Skin:     General: Skin is warm and dry.      Coloration: Skin is not jaundiced or pale.      Findings: No rash.   Neurological:      Mental Status: She is alert and oriented to person, place, and time. Mental status is at baseline.      Gait: Gait is intact.   Psychiatric:         Mood and Affect: Mood and affect normal.         Behavior: Behavior normal. Behavior is cooperative.         Cognition and Memory: Cognition normal.     Pt's previous physical exam reviewed and updated as indicated.                 Assessment and Plan Additional age appropriate preventative wellness advice topics were discussed during today's preventative wellness exam(some topics already addressed during AWV portion of the note above):    Physical Activity: Advised cardiovascular activity 150 minutes per week as tolerated. (example brisk walk for 30 minutes, 5 days a week).     Nutrition: Discussed nutrition plan with patient. Information shared in after visit summary. Goal is for a well balanced diet to enhance overall health.     Healthy Weight: Discussed current and goal BMI with patient. Steps to attain this goal discussed. Information shared in after visit summary.     Injury Prevention Discussion:  Information shared in after visit  summary.           Medicare annual wellness visit, subsequent         IFG (impaired fasting glucose)    Orders:    Comprehensive Metabolic Panel    Hemoglobin A1c    Essential hypertension  Hypertension is stable and controlled  Continue current treatment regimen.  Blood pressure will be reassessed in 6 months.    Orders:    CBC & Differential    Comprehensive Metabolic Panel    Acquired hypothyroidism    Orders:    TSH    T3, Free    T4, Free    Dyslipidemia    Orders:    Lipid Panel    Medication monitoring encounter    Orders:    CBC & Differential    Comprehensive Metabolic Panel    Duodenal gastroesophageal reflux    Orders:    CBC & Differential    Postmenopausal disorder         Migraine without aura and without status migrainosus, not intractable  Headaches are stable.    Plan:  Continue same medication/s without change.     Discussed medication dosage, use, side effects, and goals of treatment in detail.    Discussed monitoring symptoms and use of quick-relief medications and maintenance medication.    General Treatment Goals:   symptom prevention  minimize work absence  minimizing limitation in activity  prevention of exacerbations  decrease use of ER/inpatient care  minimization of adverse effects of treatment    Followup in 6 months                Orders:    CBC & Differential    Hormone replacement therapy (HRT)    Orders:    CBC & Differential    Comprehensive Metabolic Panel    Cervical myelopathy         Moderate episode of recurrent major depressive disorder  Patient's depression is a recurrent episode that is moderate without psychosis. Depression is in partial remission and stable.    Plan:   Continue current medication therapy     Followup in 6 months.          Encounter for preventive health examination  Age appropriate preventive care reviewed including cancer screenings, safety measures, mental health concerns, supplements, prevention of CV disease and DM, etc. Handout provided.          Chronic insomnia         Periodic limb movement disorder         Excessive daytime sleepiness         Xerostomia         Chronic daily headache  Headaches are stable.    Plan:  Continue same medication/s without change.     Discussed medication dosage, use, side effects, and goals of treatment in detail.    Discussed monitoring symptoms and use of quick-relief medications and maintenance medication.    General Treatment Goals:   symptom prevention  minimize work absence  minimizing limitation in activity  prevention of exacerbations  decrease use of ER/inpatient care  minimization of adverse effects of treatment    Followup in 6 months                Orders:    Ambulatory Referral to Neurology    Restless legs syndrome         Fibromyalgia         Pelvic floor dysfunction in female         Attention deficit hyperactivity disorder (ADHD), unspecified ADHD type  Psychological condition is stable.  Continue current treatment regimen.  Psychological condition  will be reassessed in 6 months.         Recurrent major depressive disorder, in partial remission  Patient's depression is a recurrent episode that is moderate without psychosis. Depression is in partial remission and stable.    Plan:   Continue current medication therapy     Followup in 6 months.          Posttraumatic stress disorder  Psychological condition is stable.  Continue current treatment regimen.  Psychological condition  will be reassessed in 6 months.         Cervical nerve root compression         Cervical arthritis         DDD (degenerative disc disease), cervical         Chronic allergic rhinitis         Chronic sinusitis, unspecified location         Vitamin D deficiency         Vitamin B12 deficiency         Prediabetes         Gastroparesis         Gastroesophageal reflux disease, unspecified whether esophagitis present         Colonic inertia         Esophageal spasm         Chronic constipation         Chronic interstitial cystitis          Recurrent oral herpes simplex infection         White matter abnormality on MRI of brain    Orders:    Ambulatory Referral to Neurology    Encounter for screening mammogram for malignant neoplasm of breast    Orders:    Mammo Screening Digital Tomosynthesis Bilateral With CAD; Future         Follow Up   Return in about 6 months (around 10/2/2025).  F/u sooner as needed/instructed.  I will contact patient regarding test results and provide instructions regarding any necessary changes in plan of care.  Patient was encouraged to keep me informed of any acute changes, lack of improvement, or any new concerning symptoms.  Pt is aware of reasons to seek emergent care.  Patient voiced understanding of all instructions and denied further questions.    Patient was given instructions and counseling regarding her condition or for health maintenance advice. Please see specific information pulled into the AVS if appropriate.    Please note that portions of this note may have been completed with a voice recognition program.

## 2025-04-03 LAB
ALBUMIN SERPL-MCNC: 4.2 G/DL (ref 3.5–5.2)
ALBUMIN/GLOB SERPL: 1.8 G/DL
ALP SERPL-CCNC: 78 U/L (ref 39–117)
ALT SERPL-CCNC: 10 U/L (ref 1–33)
AST SERPL-CCNC: 15 U/L (ref 1–32)
BASOPHILS # BLD AUTO: 0.06 10*3/MM3 (ref 0–0.2)
BASOPHILS NFR BLD AUTO: 0.6 % (ref 0–1.5)
BILIRUB SERPL-MCNC: 0.3 MG/DL (ref 0–1.2)
BUN SERPL-MCNC: 13 MG/DL (ref 6–20)
BUN/CREAT SERPL: 14 (ref 7–25)
CALCIUM SERPL-MCNC: 9.8 MG/DL (ref 8.6–10.5)
CHLORIDE SERPL-SCNC: 99 MMOL/L (ref 98–107)
CHOLEST SERPL-MCNC: 205 MG/DL (ref 0–200)
CO2 SERPL-SCNC: 26.5 MMOL/L (ref 22–29)
CREAT SERPL-MCNC: 0.93 MG/DL (ref 0.57–1)
EGFRCR SERPLBLD CKD-EPI 2021: 70.9 ML/MIN/1.73
EOSINOPHIL # BLD AUTO: 0.14 10*3/MM3 (ref 0–0.4)
EOSINOPHIL NFR BLD AUTO: 1.5 % (ref 0.3–6.2)
ERYTHROCYTE [DISTWIDTH] IN BLOOD BY AUTOMATED COUNT: 12.1 % (ref 12.3–15.4)
GLOBULIN SER CALC-MCNC: 2.3 GM/DL
GLUCOSE SERPL-MCNC: 82 MG/DL (ref 65–99)
HBA1C MFR BLD: 5.8 % (ref 4.8–5.6)
HCT VFR BLD AUTO: 34.6 % (ref 34–46.6)
HDLC SERPL-MCNC: 48 MG/DL (ref 40–60)
HGB BLD-MCNC: 11.3 G/DL (ref 12–15.9)
IMM GRANULOCYTES # BLD AUTO: 0.03 10*3/MM3 (ref 0–0.05)
IMM GRANULOCYTES NFR BLD AUTO: 0.3 % (ref 0–0.5)
LDLC SERPL CALC-MCNC: 109 MG/DL (ref 0–100)
LYMPHOCYTES # BLD AUTO: 1.83 10*3/MM3 (ref 0.7–3.1)
LYMPHOCYTES NFR BLD AUTO: 19.2 % (ref 19.6–45.3)
MCH RBC QN AUTO: 28.1 PG (ref 26.6–33)
MCHC RBC AUTO-ENTMCNC: 32.7 G/DL (ref 31.5–35.7)
MCV RBC AUTO: 86.1 FL (ref 79–97)
MONOCYTES # BLD AUTO: 0.61 10*3/MM3 (ref 0.1–0.9)
MONOCYTES NFR BLD AUTO: 6.4 % (ref 5–12)
NEUTROPHILS # BLD AUTO: 6.86 10*3/MM3 (ref 1.7–7)
NEUTROPHILS NFR BLD AUTO: 72 % (ref 42.7–76)
NRBC BLD AUTO-RTO: 0 /100 WBC (ref 0–0.2)
PLATELET # BLD AUTO: 367 10*3/MM3 (ref 140–450)
POTASSIUM SERPL-SCNC: 3.8 MMOL/L (ref 3.5–5.2)
PROT SERPL-MCNC: 6.5 G/DL (ref 6–8.5)
RBC # BLD AUTO: 4.02 10*6/MM3 (ref 3.77–5.28)
SODIUM SERPL-SCNC: 138 MMOL/L (ref 136–145)
T3FREE SERPL-MCNC: 3.2 PG/ML (ref 2–4.4)
T4 FREE SERPL-MCNC: 1.15 NG/DL (ref 0.92–1.68)
TRIGL SERPL-MCNC: 283 MG/DL (ref 0–150)
TSH SERPL DL<=0.005 MIU/L-ACNC: 2.41 UIU/ML (ref 0.27–4.2)
VLDLC SERPL CALC-MCNC: 48 MG/DL (ref 5–40)
WBC # BLD AUTO: 9.53 10*3/MM3 (ref 3.4–10.8)

## 2025-04-07 RX ORDER — METOCLOPRAMIDE 5 MG/1
TABLET ORAL
OUTPATIENT
Start: 2025-04-07

## 2025-04-07 RX ORDER — PROMETHAZINE HYDROCHLORIDE 25 MG/1
25 TABLET ORAL EVERY 8 HOURS PRN
Qty: 15 TABLET | Refills: 2 | Status: SHIPPED | OUTPATIENT
Start: 2025-04-07

## 2025-04-07 RX ORDER — NAPROXEN 500 MG/1
500 TABLET ORAL 2 TIMES DAILY WITH MEALS
Qty: 180 TABLET | Refills: 1 | Status: SHIPPED | OUTPATIENT
Start: 2025-04-07

## 2025-04-07 NOTE — ASSESSMENT & PLAN NOTE
Orders:    CBC & Differential    
  Orders:    CBC & Differential    Comprehensive Metabolic Panel    
  Orders:    Comprehensive Metabolic Panel    Hemoglobin A1c    
  Orders:    TSH    T3, Free    T4, Free    
Headaches are stable.    Plan:  Continue same medication/s without change.     Discussed medication dosage, use, side effects, and goals of treatment in detail.    Discussed monitoring symptoms and use of quick-relief medications and maintenance medication.    General Treatment Goals:   symptom prevention  minimize work absence  minimizing limitation in activity  prevention of exacerbations  decrease use of ER/inpatient care  minimization of adverse effects of treatment    Followup in 6 months                Orders:    Ambulatory Referral to Neurology    
Headaches are stable.    Plan:  Continue same medication/s without change.     Discussed medication dosage, use, side effects, and goals of treatment in detail.    Discussed monitoring symptoms and use of quick-relief medications and maintenance medication.    General Treatment Goals:   symptom prevention  minimize work absence  minimizing limitation in activity  prevention of exacerbations  decrease use of ER/inpatient care  minimization of adverse effects of treatment    Followup in 6 months                Orders:    CBC & Differential    
Hypertension is stable and controlled  Continue current treatment regimen.  Blood pressure will be reassessed in 6 months.    Orders:    CBC & Differential    Comprehensive Metabolic Panel    
Patient's depression is a recurrent episode that is moderate without psychosis. Depression is in partial remission and stable.    Plan:   Continue current medication therapy     Followup in 6 months.          
Psychological condition is stable.  Continue current treatment regimen.  Psychological condition  will be reassessed in 6 months.         
Psychological condition is stable.  Continue current treatment regimen.  Psychological condition  will be reassessed in 6 months.         
Orientation to room/Bed in low position, brakes on/Side rails x 2 or 4 up, assess large gaps, such that a patient could get extremity or other body part entrapped, use additional safety procedures/Call light is within reach, educate patient/family on its functionality/Environment clear of unused equipment, furniture's in place, clear of hazards/Assess for adequate lighting, leave nightlight on/Patient and family education available to parents and patient/Document fall prevention teaching and include in plan of care/Educate patient/parents of falls protocol precautions/Remove all unused equipment out of the room/Keep bed in the lowest position, unless patient is directly attended/Document in nursing narrative teaching and plan of care

## 2025-04-10 RX ORDER — FLUCONAZOLE 150 MG/1
TABLET ORAL
Qty: 2 TABLET | Refills: 0 | OUTPATIENT
Start: 2025-04-10

## 2025-04-10 RX ORDER — METOCLOPRAMIDE 5 MG/1
TABLET ORAL
OUTPATIENT
Start: 2025-04-10

## 2025-04-10 NOTE — TELEPHONE ENCOUNTER
Rx Refill Note  Requested Prescriptions     Pending Prescriptions Disp Refills    metoclopramide (REGLAN) 5 MG tablet        Not active on med list.     Juani Hill MA  04/10/25, 08:41 EDT

## 2025-04-15 ENCOUNTER — TELEPHONE (OUTPATIENT)
Dept: FAMILY MEDICINE CLINIC | Facility: CLINIC | Age: 60
End: 2025-04-15
Payer: MEDICARE

## 2025-04-15 NOTE — TELEPHONE ENCOUNTER
Please contact pt's compounding pharmacy and give verbal for refill of her combo hormone cream.    If they have an order form they can fax to use that would be great.    For name/number of pharmacy please see photos pt attached to her most recent [employer] message

## 2025-04-23 ENCOUNTER — PROCEDURE VISIT (OUTPATIENT)
Dept: NEUROLOGY | Facility: CLINIC | Age: 60
End: 2025-04-23
Payer: MEDICARE

## 2025-04-23 VITALS
HEIGHT: 64 IN | HEART RATE: 112 BPM | RESPIRATION RATE: 14 BRPM | OXYGEN SATURATION: 97 % | DIASTOLIC BLOOD PRESSURE: 92 MMHG | BODY MASS INDEX: 28.85 KG/M2 | TEMPERATURE: 96.6 F | SYSTOLIC BLOOD PRESSURE: 168 MMHG | WEIGHT: 169 LBS

## 2025-04-23 DIAGNOSIS — M79.18 MYOFASCIAL PAIN: Primary | ICD-10-CM

## 2025-04-23 RX ORDER — METHYLPREDNISOLONE SODIUM SUCCINATE 125 MG/2ML
125 INJECTION INTRAMUSCULAR; INTRAVENOUS ONCE
Status: COMPLETED | OUTPATIENT
Start: 2025-04-23 | End: 2025-04-23

## 2025-04-23 RX ORDER — BUPIVACAINE HYDROCHLORIDE 5 MG/ML
8 INJECTION, SOLUTION PERINEURAL ONCE
Status: COMPLETED | OUTPATIENT
Start: 2025-04-23 | End: 2025-04-23

## 2025-04-23 RX ADMIN — METHYLPREDNISOLONE SODIUM SUCCINATE 125 MG: 125 INJECTION INTRAMUSCULAR; INTRAVENOUS at 13:33

## 2025-04-23 RX ADMIN — BUPIVACAINE HYDROCHLORIDE 8 ML: 5 INJECTION, SOLUTION PERINEURAL at 13:32

## 2025-04-23 NOTE — PROGRESS NOTES
Procedure note     Patient Name: Tatiana Fields  : 1965   MRN: [id number]     Chief Complaint:    Chief Complaint   Patient presents with    Procedure     Trigger point injections       History of Present Illness: Tatiana Fields is a 59 y.o. female who is here today for trigger point injections.     Procedure:    Trigger Point Injections      After risks and benefits were explained including bleeding, infection, worsening of the pain, damage to the area being injected, weakness, allergic reaction to medications, vascular injection, and nerve damage, signed consent was obtained.  All questions were answered.      The area of the trigger point was identified and the skin prepped three times with alcohol and the alcohol allowed to dry.  Next, a 25 gauge 1 inch needle was placed in the area of the trigger point.  Once reproduction of the pain was elicited and negative aspiration confirmed, the trigger point was injected and the needle removed.      The patient tolerated procedure well without immediate complications.     Medication used: 125mg/2 ml of Depo-Medrol; 8 ml of 0.5 % Bupivacaine    Trigger points injected: Cervical paraspinal and trapezius muscles in 10 divided sites.       Subjective     I have reviewed and the following portions of the patient's history were updated as appropriate: past family history, past medical history, past social history, past surgical history and problem list.    Medications:     Current Outpatient Medications:     Atogepant (Qulipta) 60 MG tablet, Take 1 tablet by mouth Daily., Disp: 30 tablet, Rfl: 6    cholecalciferol (VITAMIN D3) 250 MCG (09107 UT) capsule, Take 1 capsule by mouth Daily., Disp: 90 capsule, Rfl: 1    clonazePAM (KlonoPIN) 0.5 MG tablet, TAKE 1 TABLET BY MOUTH TWICE DAILY AS NEEDED FOR ANXIETY OR INSOMNIA, Disp: 60 tablet, Rfl: 1    Dextroamphetamine Sulfate 20 MG tablet, Take 1 tablet by mouth Every 12 (Twelve) Hours., Disp: , Rfl:     estradiol  (Minivelle) 0.05 MG/24HR patch, 1 patch every 4 days., Disp: 8 patch, Rfl: 11    famciclovir (FAMVIR) 500 MG tablet, TAKE 1 TABLET BY MOUTH DAILY, Disp: 30 tablet, Rfl: 5    FLUoxetine (PROzac) 40 MG capsule, Take 1 capsule by mouth Daily., Disp: , Rfl:     fluticasone (FLONASE) 50 MCG/ACT nasal spray, USE 2 SPRAYS TO EACH NOSTRIL EVERY DAY, Disp: 48 g, Rfl: 1    hydroCHLOROthiazide 25 MG tablet, TAKE 1 TABLET BY MOUTH DAILY, Disp: 90 tablet, Rfl: 3    levoFLOXacin (LEVAQUIN) 750 MG tablet, Take 1 tablet every day by oral route for 3 days., Disp: , Rfl:     levothyroxine (SYNTHROID, LEVOTHROID) 25 MCG tablet, Take 1 tablet by mouth Daily., Disp: 90 tablet, Rfl: 1    linaclotide (Linzess) 72 MCG capsule capsule, Take 1 capsule by mouth Every Morning Before Breakfast., Disp: 30 capsule, Rfl: 5    loratadine-pseudoephedrine (Wal-itin D 24 Hour)  MG per 24 hr tablet, Take 1 tablet by mouth Daily., Disp: 30 tablet, Rfl: 2    metoclopramide (REGLAN) 5 MG tablet, , Disp: , Rfl:     metoprolol succinate XL (Toprol XL) 25 MG 24 hr tablet, Take 1 tablet by mouth Daily., Disp: 90 tablet, Rfl: 3    naproxen (NAPROSYN) 500 MG tablet, Take 1 tablet by mouth 2 (Two) Times a Day With Meals., Disp: 180 tablet, Rfl: 1    ondansetron (ZOFRAN) 4 MG tablet, Take 1 tablet by mouth Every 8 (Eight) Hours As Needed for Nausea or Vomiting., Disp: 30 tablet, Rfl: 5    potassium chloride 10 MEQ CR tablet, TAKE 1 TABLET BY MOUTH TWICE DAILY, Disp: 180 tablet, Rfl: 0    promethazine (PHENERGAN) 25 MG tablet, Take 1 tablet by mouth Every 8 (Eight) Hours As Needed for Nausea or Vomiting., Disp: 15 tablet, Rfl: 2    rizatriptan (MAXALT) 10 MG tablet, Take 1 tablet by mouth 1 (One) Time As Needed for Migraine., Disp: 9 tablet, Rfl: 3    tiZANidine (ZANAFLEX) 4 MG tablet, Take 1 tablet by mouth Every 8 (Eight) Hours As Needed for Muscle Spasms., Disp: 90 tablet, Rfl: 5    fluconazole (Diflucan) 150 MG tablet, 1 po now, may repeat in 3 days if  "needed (Patient not taking: Reported on 4/23/2025), Disp: 2 tablet, Rfl: 0  No current facility-administered medications for this visit.    Allergies:   Allergies   Allergen Reactions    Amoxicillin-Pot Clavulanate Other (See Comments)    Morphine Itching    Sulfa Antibiotics Rash       Objective     Physical Exam:  Vital Signs:   Vitals:    04/23/25 1308   BP: 168/92   BP Location: Right arm   Patient Position: Sitting   Cuff Size: Adult   Pulse: 112   Resp: 14   Temp: 96.6 °F (35.9 °C)   TempSrc: Temporal   SpO2: 97%   Weight: 76.7 kg (169 lb)   Height: 162.6 cm (64.02\")   PainSc: 6    PainLoc: Head     Body mass index is 28.99 kg/m².    Physical Exam  Vitals and nursing note reviewed.   Constitutional:       General: She is not in acute distress.     Appearance: Normal appearance.   Musculoskeletal:      Cervical back: Normal range of motion and neck supple.   Skin:     General: Skin is warm and dry.   Neurological:      General: No focal deficit present.      Mental Status: She is alert and oriented to person, place, and time.   Psychiatric:         Mood and Affect: Mood normal.         Behavior: Behavior normal.             Assessment / Plan      Assessment/Plan:   Diagnoses and all orders for this visit:    1. Myofascial pain (Primary)  -     bupivacaine (MARCAINE) 0.5 % injection 8 mL  -     methylPREDNISolone sodium succinate (SOLU-Medrol) injection 125 mg         Follow Up:   Return in about 3 months (around 7/23/2025).    Anticipatory guidance and safety reviewed  Patient education provided  Risks benefits and alternatives discussed; consents obtained     Return to care as needed or within 12 weeks or sooner with issues    eKllen Cruz, RENETTA, APRN, FNP-C  [facility]  "

## 2025-05-21 ENCOUNTER — DISEASE STATE MANAGEMENT VISIT (OUTPATIENT)
Dept: PHARMACY | Facility: HOSPITAL | Age: 60
End: 2025-05-21
Payer: MEDICARE

## 2025-05-21 VITALS
TEMPERATURE: 96.9 F | OXYGEN SATURATION: 96 % | HEIGHT: 64 IN | HEART RATE: 113 BPM | BODY MASS INDEX: 29.4 KG/M2 | WEIGHT: 172.2 LBS | SYSTOLIC BLOOD PRESSURE: 137 MMHG | DIASTOLIC BLOOD PRESSURE: 89 MMHG

## 2025-05-21 DIAGNOSIS — G43.711 INTRACTABLE CHRONIC MIGRAINE WITHOUT AURA AND WITH STATUS MIGRAINOSUS: ICD-10-CM

## 2025-05-21 DIAGNOSIS — G43.009 MIGRAINE WITHOUT AURA AND WITHOUT STATUS MIGRAINOSUS, NOT INTRACTABLE: Primary | ICD-10-CM

## 2025-05-21 PROCEDURE — 25010000002 ONABOTULINUMTOXINA 200 UNITS RECONSTITUTED SOLUTION: Performed by: NURSE PRACTITIONER

## 2025-05-21 RX ADMIN — ONABOTULINUMTOXINA 165 UNITS: 200 INJECTION, POWDER, LYOPHILIZED, FOR SOLUTION INTRADERMAL; INTRAMUSCULAR at 15:45

## 2025-05-21 NOTE — PROGRESS NOTES
Botox Procedure Note       Patient Name: Tatiana Fields  : 1965   MRN: [id number]     Chief Complaint:  Here for Botox injections and the Botox is facility supplied (will be billed by the hospital).      History of Present Illness: Tatiana Fields is a 59 y.o. female who is here today for Botox injections for migraines.  This will be her first set of Botox injections here for migraines.  She previously had Botox injections and had significant improvement in her migraines with them.     We have discussed risk and benefits of this Botox procedure and common side effects including headache, neck pain, neck stiffness or weakness, ptosis, flu-like symptoms as well as more serious possible adverse effects including possible dysphagia, respiratory distress or even death (death has only been reported once with adults for Botox for migraines in another state when mixed with lidocaine solution which we do not use lidocaine solution in our practice for mixing Botox). The patient verbally understands and accepts risks and agrees with moving forward with Botox injections for chronic migraine prevention.    Verbal and written consent has been obtained from the patient prior to beginning treatment injections.     Subjective      Review of Systems:   Review of Systems    The following portions of the patient's history were reviewed an updated as appropriate: past family history, past medical history, past social history, past surgical history.     Medications:     Current Outpatient Medications:     cholecalciferol (VITAMIN D3) 250 MCG (44045 UT) capsule, Take 1 capsule by mouth Daily., Disp: 90 capsule, Rfl: 1    clonazePAM (KlonoPIN) 0.5 MG tablet, TAKE 1 TABLET BY MOUTH TWICE DAILY AS NEEDED FOR ANXIETY OR INSOMNIA, Disp: 60 tablet, Rfl: 1    Dextroamphetamine Sulfate 20 MG tablet, Take 1 tablet by mouth Every 12 (Twelve) Hours., Disp: , Rfl:     estradiol (Minivelle) 0.05 MG/24HR patch, 1 patch every 4 days.,  Disp: 8 patch, Rfl: 11    famciclovir (FAMVIR) 500 MG tablet, TAKE 1 TABLET BY MOUTH DAILY, Disp: 30 tablet, Rfl: 5    fluconazole (Diflucan) 150 MG tablet, 1 po now, may repeat in 3 days if needed, Disp: 2 tablet, Rfl: 0    FLUoxetine (PROzac) 40 MG capsule, Take 1 capsule by mouth Daily., Disp: , Rfl:     fluticasone (FLONASE) 50 MCG/ACT nasal spray, USE 2 SPRAYS TO EACH NOSTRIL EVERY DAY, Disp: 48 g, Rfl: 1    hydroCHLOROthiazide 25 MG tablet, TAKE 1 TABLET BY MOUTH DAILY, Disp: 90 tablet, Rfl: 3    levothyroxine (SYNTHROID, LEVOTHROID) 25 MCG tablet, Take 1 tablet by mouth Daily., Disp: 90 tablet, Rfl: 1    linaclotide (Linzess) 72 MCG capsule capsule, Take 1 capsule by mouth Every Morning Before Breakfast., Disp: 30 capsule, Rfl: 5    loratadine-pseudoephedrine (Wal-itin D 24 Hour)  MG per 24 hr tablet, Take 1 tablet by mouth Daily., Disp: 30 tablet, Rfl: 2    metoclopramide (REGLAN) 5 MG tablet, , Disp: , Rfl:     metoprolol succinate XL (Toprol XL) 25 MG 24 hr tablet, Take 1 tablet by mouth Daily., Disp: 90 tablet, Rfl: 3    naproxen (NAPROSYN) 500 MG tablet, Take 1 tablet by mouth 2 (Two) Times a Day With Meals., Disp: 180 tablet, Rfl: 1    OnabotulinumtoxinA 200 units reconstituted solution, Inject 200 Units into the appropriate muscle as directed by prescriber Every 3 (Three) Months., Disp: 1 each, Rfl: 3    ondansetron (ZOFRAN) 4 MG tablet, Take 1 tablet by mouth Every 8 (Eight) Hours As Needed for Nausea or Vomiting., Disp: 30 tablet, Rfl: 5    potassium chloride 10 MEQ CR tablet, TAKE 1 TABLET BY MOUTH TWICE DAILY, Disp: 180 tablet, Rfl: 0    promethazine (PHENERGAN) 25 MG tablet, Take 1 tablet by mouth Every 8 (Eight) Hours As Needed for Nausea or Vomiting., Disp: 15 tablet, Rfl: 2    rizatriptan (MAXALT) 10 MG tablet, Take 1 tablet by mouth 1 (One) Time As Needed for Migraine., Disp: 9 tablet, Rfl: 3    tiZANidine (ZANAFLEX) 4 MG tablet, Take 1 tablet by mouth Every 8 (Eight) Hours As Needed for  "Muscle Spasms., Disp: 90 tablet, Rfl: 5    Current Facility-Administered Medications:     OnabotulinumtoxinA 200 Units, 200 Units, Intramuscular, Q3 Months, Brittanysolomon Kim MolinaMELVIN hernándezEVELIN, 165 Units at 05/21/25 1545    Allergies:   Allergies   Allergen Reactions    Amoxicillin-Pot Clavulanate Other (See Comments)    Morphine Itching    Sulfa Antibiotics Rash       Objective     Physical Exam:  Vital Signs:   Vitals:    05/21/25 1528   BP: 137/89   BP Location: Right arm   Patient Position: Sitting   Cuff Size: Adult   Pulse: 113   Temp: 96.9 °F (36.1 °C)   SpO2: 96%   Weight: 78.1 kg (172 lb 3.2 oz)   Height: 162.6 cm (64.02\")   PainSc: 0-No pain     Body mass index is 29.54 kg/m².     Physical Exam      BOTOX PROCEDURE:     Date of Last Injection: n/a  Response: n/a  Side Effects: n/a  : [employer]  Lot #: [id number]  Expiration Date: 10/2027  NDC: [id number]    200 unit vial Botox was re-constituted with 4 mL 0.9 NS to 5 unit/0.1 mL using standard techniques.  10 units were given at the  muscle in 2 divided sites  5 units were given at the Procerus muscle  20 units were given at the Frontalis muscle in 4 divided sites  40 units were given at the Temporalis muscle in 8 divided sites  30 units were given at the Occipitalis muscle in 6 divided sites  20 units were given at the Cervical paraspinal muscle in 4 divided sites  30 units were given at the Trapezius muscle in 6 divided sites  10 units were given at the Masseter muscle in 2 divided sites.   For a total of 165 units divided between 33 sites and 35 units of wastage    Patient tolerated procedure well with no immediate complications.     Assessment / Plan      Assessment/Plan:   Diagnoses and all orders for this visit:    1. Migraine without aura and without status migrainosus, not intractable (Primary)  -     Discontinue: OnabotulinumtoxinA 200 Units  -     Discontinue: OnabotulinumtoxinA 200 Units  -     Discontinue: OnabotulinumtoxinA 200 " Units  -     OnabotulinumtoxinA 200 Units    2. Intractable chronic migraine without aura and with status migrainosus  -     Discontinue: OnabotulinumtoxinA 200 Units  -     Discontinue: OnabotulinumtoxinA 200 Units  -     Discontinue: OnabotulinumtoxinA 200 Units  -     OnabotulinumtoxinA 200 Units    Other orders  -     OnabotulinumtoxinA 200 units reconstituted solution; Inject 200 Units into the appropriate muscle as directed by prescriber Every 3 (Three) Months.  Dispense: 1 each; Refill: 3         Follow Up:   It has been determined that Tatiana Fields has chronic migraine headaches. We will proceed with a 12 week regimen of 200 units of Botox injections, to treat the patient's chronic migraines.      Return in about 3 months (around 8/21/2025) for Botox.      SOULEYMANE Frost, FNP-C  [facility]

## 2025-05-24 ENCOUNTER — E-VISIT (OUTPATIENT)
Dept: FAMILY MEDICINE CLINIC | Facility: TELEHEALTH | Age: 60
End: 2025-05-24
Payer: MEDICARE

## 2025-05-24 NOTE — E-VISIT TREATED
Date: 2025 15:38:42  Clinician: Vanita Rubio  Clinician NPI: [id number]  Patient: Tatiana Fields  Patient : 1965  Patient Address[address] [address] [place], [place] [address], [place] [address]  Patient Phone: [phone]  Visit Protocol: URI  Patient Summary:  Tatiana is a 59 year old ( : 1965 ) female who initiated a visit for cold, sinus infection, or influenza.     Tatiana states the symptoms started gradually 7-9 days ago.   Symptom start date: 2025   The symptoms consist of   ear pain, chills, nasal congestion, a sore throat, tooth pain, a headache, facial pain or pressure, malaise, and myalgia. Tatiana is experiencing difficulty breathing due to nasal congestion but is not short of breath. Tatiana also feels feverish but was   unable to measure temperature.   Symptom details     Nasal secretions: The color of the mucus is yellow, blood-tinged, and green.    Sore throat: Tatiana reports having moderate throat pain (4-6 on a 10 point pain scale), does not have exudate on the tonsils, and can swallow liquids with mild discomfort. Tatiana is not sure if the lymph nodes in the neck are enlarged. A rash has not   appeared on the skin since the sore throat started.     Facial pain or pressure: The facial pain or pressure does not feel worse when bending or leaning forward.     Headache: The headache is moderate (4-6 on a 10 point pain scale).     Tooth pain: The tooth pain is caused by a cavity, recent dental work, or other mouth problems.      Tatiana denies having rhinitis, vomiting, diarrhea, anosmia and ageusia, wheezing, cough, and nausea. Tatiana also denies double sickening (worsening symptoms after initial improvement), pain in the front of the neck that sometimes moves to the ear,   experiencing difficulty opening their mouth due to pain or swelling in the jaw muscles, taking antibiotic medication in the past month, and having recent facial or sinus surgery in the past 60 days.   Precipitating events  Within the  past week, Tatiana has   been exposed to someone with strep throat. Tatiana has not recently been exposed to someone with influenza. Tatiana has not been in close contact with any high risk individuals.    Tatiana has received the influenza vaccine more than 2 weeks ago.   Pertinent   COVID-19 (Coronavirus) information  Since the symptoms started, Tatiana has not tested for COVID-19. Tatiana was able to test during the visit. The result is negative.   Tatiana has not received a COVID-19 vaccine in the past year.   Pertinent medical history      Tatinaa reports having the following conditions:   Hypertension    Tatiana had 1 sinus infection within the past year.   Tatiana typically gets a yeast infection when an antibiotic is taken. Tatiana has successfully used Diflucan to treat previous yeast   infections. 2 doses of fluconazole (Diflucan) has typically been needed for symptoms to resolve in the past.  A provider has not told Tatiana to avoid NSAIDs.   Tatiana does not have diabetes. Tatiana denies having immunosuppressive conditions (e.g.,   chemotherapy, HIV, organ transplant, long-term use of steroids or other immunosuppressive medications, splenectomy). Tatiana denies having severe COPD, heart disease, and congestive heart failure. Tatiana does not have asthma.   Tatiana does not smoke or use   smokeless tobacco. Tatiana does not vape or use other e-cigarette products.   Additional information as reported by the patient (free text): Zithromax or Levaquin, Diflucan   Weight: 172 lbs (78.02 kg)    MEDICATIONS: onabotulinumtoxinA injection, estradiol transdermal, rizatriptan oral, onabotulinumtoxinA injection, methylprednisolone sod succ (PF) injection, hydrochlorothiazide oral, methylprednisolone sod succ (PF) injection, Wal-itin D oral,   fluticasone propionate nasal, promethazine oral, bupivacaine HCl injection, metoprolol succinate oral, tizanidine oral, famciclovir oral, naproxen oral, potassium chloride oral, bupivacaine HCl injection,  linaclotide oral, fluoxetine oral, ketorolac   intramuscular, onabotulinumtoxinA injection, bupivacaine HCl injection, bupivacaine liposome (PF) local infiltration, onabotulinumtoxinA injection, onabotulinumtoxinA injection, onabotulinumtoxinA injection, methylprednisolone acetate injection,   methylprednisolone acetate injection, ceftriaxone injection, methylprednisolone acetate injection, ketorolac intramuscular, methylprednisolone acetate injection, ceftriaxone injection, methylprednisolone acetate injection, dexamethasone sodium phosphate   injection, methylprednisolone acetate injection, onabotulinumtoxinA injection, ceftriaxone injection, bupivacaine (PF) injection, albumin, human 5 % intravenous, methylprednisolone acetate injection, onabotulinumtoxinA injection, cefazolin intravenous,   ketorolac intramuscular, onabotulinumtoxinA injection, bupivacaine (PF) injection, ceftriaxone injection, scopolamine transdermal, methylprednisolone acetate injection, ceftriaxone injection, ceftriaxone injection, methylprednisolone acetate injection,   metronidazole in sodium chloride(iso) intravenous, methylprednisolone acetate injection, methylprednisolone acetate injection, ceftriaxone injection, bupivacaine HCl injection, methylprednisolone acetate injection, dexamethasone sodium phosphate   injection, ceftriaxone injection, methylprednisolone acetate injection, metoclopramide oral, famotidine intravenous, ceftriaxone injection, lidocaine (PF) injection, ceftriaxone injection, gabapentin oral, ketorolac intramuscular, dextroamphetamine   sulfate oral, methylprednisolone acetate injection, ceftriaxone injection, amoxicillin-potassium clavulanate oral, dexamethasone sodium phosphate injection, methylprednisolone acetate injection, methylprednisolone acetate injection, lidocaine (PF)   injection, dexamethasone sodium phosphate injection, pantoprazole oral, triamcinolone acetonide injection, methylprednisolone acetate  injection, ALLERGIES: Sulfa (Sulfonamide Antibiotics), morphine  Clinician Response:  Dear Tatiana,  Based on the information provided, you have acute bacterial sinusitis, also known as a sinus infection. Most cases of sinus infections are caused by viruses and symptoms start to improve on their own in 7-10 days. Both   viral and bacterial sinus infections usually resolve on its own. A bacterial infection may have developed if any of the following apply to you.      Symptoms of sinus infection lasting 10 days or more without showing any improvement    If you feel better after a viral upper respiratory infection such as, a cold but then suddenly feel worse with symptoms such as fever, headache, or increase in nasal discharge     Medication information  I am prescribing:     Azithromycin (Zithromax Z-Wale) 250 mg oral tablet. Take 2 tablets by mouth on day 1, then 1 tablet by mouth on days 2-5. There are no refills with this prescription.   Because you usually get a yeast infection when taking antibiotics, I am also   prescribing:     Fluconazole (Diflucan) 150 mg oral tablet. Take 1 tablet by mouth in a single dose. Repeat dose in 3 days if symptoms are still present. There are no refills with this prescription.   Self care  Steps you can take to be as comfortable as possible:     Rest.    Drink plenty of fluids.    Take a warm shower to loosen congestion.    Use a cool-mist humidifier.    Use throat lozenges.    Suck on frozen items such as popsicles.    Drink hot tea with lemon and honey.    Gargle with warm salt water (1/4 teaspoon of salt per 8 ounce glass of water).     When to seek care  Call your dentist if you suspect your tooth pain is a dental problem.  Please be seen in a clinic or urgent care if any of the following occur:     New symptoms develop, or symptoms become worse    Symptoms do not start to improve after 3 days of treatment     Call 911 or go to the emergency room if any of the following occur:      Difficulty breathing    If you feel that your throat is closing off    Suddenly develop a rash    Unable to swallow fluids or are drooling     It is possible to have an allergic reaction to an antibiotic even if you have not had one in the past. If you notice a new rash, significant swelling, or difficulty breathing, stop taking this medication immediately and go to a clinic or urgent care.    For the latest updates on COVID-19 (Coronavirus), please visit the Centers for Disease Control and Prevention (CDC). Also, your state and local health department websites may provide additional guidance regarding testing and isolation recommendations for   your location.   Diagnosis: Acute bacterial sinusitis  Diagnosis ICD: J01.90    Follow up instructions:  ATTENTION: If you have been prescribed medications, your prescriptions will not be sent until you choose your pharmacy. To do so open the link within your notification, or go to [employer] and click eVisit in the menu to open your   treatment plan. From there, you can select your pharmacy at the bottom of your after visit summary. You can also go to [email or web address]   Prescriptions  Prescription: fluconazole (Diflucan) 150 mg oral tablet, take 1 tablet by mouth in a single dose, repeat dose in 3 days if symptoms are still present  Sent To: [facility] #[id number] - [id number] - [address] [place] [address]  Prescription: azithromycin (Zithromax Z-Wale) 250 mg oral tablet, take 2 tablets by mouth on day 1, then 1 tablet by mouth on days 2-5  Sent To: [facility] #[id number] - [id number] - [address] [place] [address]

## 2025-06-02 ENCOUNTER — TELEMEDICINE (OUTPATIENT)
Dept: FAMILY MEDICINE CLINIC | Facility: CLINIC | Age: 60
End: 2025-06-02
Payer: MEDICARE

## 2025-06-02 DIAGNOSIS — R73.01 IFG (IMPAIRED FASTING GLUCOSE): ICD-10-CM

## 2025-06-02 DIAGNOSIS — B37.31 VULVOVAGINAL CANDIDIASIS: ICD-10-CM

## 2025-06-02 DIAGNOSIS — R73.03 PREDIABETES: ICD-10-CM

## 2025-06-02 DIAGNOSIS — I10 ESSENTIAL HYPERTENSION: Primary | ICD-10-CM

## 2025-06-02 DIAGNOSIS — G89.29 CHRONIC RIGHT SHOULDER PAIN: ICD-10-CM

## 2025-06-02 DIAGNOSIS — M25.511 CHRONIC RIGHT SHOULDER PAIN: ICD-10-CM

## 2025-06-02 RX ORDER — FLUCONAZOLE 150 MG/1
TABLET ORAL
Qty: 2 TABLET | Refills: 0 | Status: SHIPPED | OUTPATIENT
Start: 2025-06-02

## 2025-06-02 RX ORDER — POTASSIUM CHLORIDE 750 MG/1
20 TABLET, EXTENDED RELEASE ORAL 2 TIMES DAILY
Qty: 360 TABLET | Refills: 3 | Status: SHIPPED | OUTPATIENT
Start: 2025-06-02

## 2025-06-02 RX ORDER — BUPROPION HYDROCHLORIDE 150 MG/1
TABLET ORAL
COMMUNITY
Start: 2025-05-13 | End: 2025-06-02

## 2025-06-02 RX ORDER — METOPROLOL SUCCINATE 25 MG/1
TABLET, EXTENDED RELEASE ORAL
Qty: 180 TABLET | Refills: 3 | Status: SHIPPED | OUTPATIENT
Start: 2025-06-02

## 2025-06-02 RX ORDER — PANTOPRAZOLE SODIUM 40 MG/1
40 TABLET, DELAYED RELEASE ORAL
COMMUNITY
Start: 2025-05-15

## 2025-06-02 RX ORDER — RAMELTEON 8 MG/1
TABLET ORAL
COMMUNITY
Start: 2025-05-13

## 2025-06-02 RX ORDER — METFORMIN HYDROCHLORIDE 500 MG/1
3 TABLET, EXTENDED RELEASE ORAL DAILY
COMMUNITY
Start: 2025-05-07

## 2025-06-02 RX ORDER — OXYCODONE AND ACETAMINOPHEN 10; 325 MG/1; MG/1
TABLET ORAL
COMMUNITY
Start: 2025-05-10

## 2025-06-02 RX ORDER — ZOLPIDEM TARTRATE 6.25 MG/1
6.25 TABLET, FILM COATED, EXTENDED RELEASE ORAL NIGHTLY PRN
COMMUNITY
Start: 2025-05-09 | End: 2025-06-06 | Stop reason: SDUPTHER

## 2025-06-02 NOTE — PROGRESS NOTES
"VIDEO/TELEHEALTH VISIT (via [email or web address])  BLUM: 2025  LOCATION OF PROVIDER: [facility] MEDICINE    TIERNEY FINE  : 1965  LOCATION OF PT: PERSONAL RESIDENCE    You have chosen to receive care through a telehealth visit.  Do you consent to use a video/audio connection for your medical care today? Yes    History of Present Illness  She c/o \"sweating badly\" even with light activity. Minimally at night. Assoc'd with \"bone pain \" at night for a little while but that has improved in last few days. On estrgen patch until 3-4 days ago. Also on P/E/T cream.     BP recently increased again. Feels it is related to weight gain as well as increased pain. BP up to 160/100. Apt with cardiology coming up next month. On metoprolol. Has not been taking HCTZ regularly.    Recently fell, rehurt her shoulder, h/o labral tear. Keeping her awake at night    Requesting medication for recurrent yeast infection        Patient's past medical hx, surgical hx, family hx, social hx reviewed on chart. Medication and allergy lists reviewed on chart. Information updated as indicated.      Review of Systems   Constitutional:  Positive for diaphoresis and fatigue. Negative for fever and unexpected weight change.   HENT:  Positive for congestion, postnasal drip, sinus pressure and trouble swallowing (intermittent). Negative for ear discharge, mouth sores, nosebleeds, rhinorrhea and sore throat.    Eyes:  Positive for visual disturbance (chronic). Negative for pain.        Dry eyes   Respiratory:  Negative for cough, shortness of breath and wheezing.    Cardiovascular:  Positive for palpitations (chronic, intermittent, stable). Negative for chest pain and leg swelling.   Gastrointestinal:  Positive for abdominal pain (chronic), constipation (intermittent), diarrhea (intermittent), nausea and vomiting (intermittent). Negative for blood in stool.   Endocrine: Positive for heat intolerance. Negative for polydipsia and polyuria. "   Genitourinary:  Positive for dysuria (mild, intermittent) and pelvic pain (chronic). Negative for hematuria.   Musculoskeletal:  Positive for arthralgias, myalgias, neck pain and neck stiffness. Negative for back pain and joint swelling.   Skin:  Negative for rash.   Neurological:  Positive for weakness and headaches. Negative for dizziness, seizures, syncope and speech difficulty.   Hematological:  Negative for adenopathy. Bruises/bleeds easily.   Psychiatric/Behavioral:  Positive for dysphoric mood and sleep disturbance. Negative for suicidal ideas. The patient is nervous/anxious.    Pt's previous ROS reviewed and updated as indicated.       Physical Exam  Constitutional:       General: She is not in acute distress.     Appearance: She is well-groomed and overweight. She is not ill-appearing.   HENT:      Mouth/Throat:      Mouth: Mucous membranes are moist.   Eyes:      General: No scleral icterus.     Conjunctiva/sclera: Conjunctivae normal.   Pulmonary:      Effort: Pulmonary effort is normal. No respiratory distress.   Musculoskeletal:      Right shoulder: Decreased range of motion.   Skin:     Coloration: Skin is not jaundiced or pale.      Findings: No bruising or rash.   Neurological:      Mental Status: She is alert and oriented to person, place, and time.      Gait: Gait is intact.   Psychiatric:         Mood and Affect: Mood and affect normal.         Speech: Speech normal.         Behavior: Behavior normal. Behavior is cooperative.         Thought Content: Thought content normal.         Cognition and Memory: Cognition normal.         Judgment: Judgment normal.       Lab Results   Component Value Date    HGBA1C 5.80 (H) 04/02/2025    HGBA1C 5.50 07/09/2024    HGBA1C 5.60 11/09/2023     Lab Results   Component Value Date    CREATININE 0.93 04/02/2025     Lab Results   Component Value Date    WBC 9.53 04/02/2025    HGB 11.3 (L) 04/02/2025    HCT 34.6 04/02/2025    MCV 86.1 04/02/2025      04/02/2025       Lab Results   Component Value Date    GLUCOSE 82 04/02/2025    BUN 13 04/02/2025    CREATININE 0.93 04/02/2025    EGFRIFNONA 84 12/23/2021    EGFRIFAFRI 97 12/23/2021    BCR 14.0 04/02/2025    K 3.8 04/02/2025    CO2 26.5 04/02/2025    CALCIUM 9.8 04/02/2025    ALBUMIN 4.2 04/02/2025    AST 15 04/02/2025    ALT 10 04/02/2025       Lab Results   Component Value Date    CHOL 172 08/16/2021    CHLPL 205 (H) 04/02/2025    TRIG 283 (H) 04/02/2025    HDL 48 04/02/2025     (H) 04/02/2025       Lab Results   Component Value Date    TSH 2.410 04/02/2025     Lab Results   Component Value Date    MAVNJUNG53 892 07/09/2024     Lab Results   Component Value Date    IRON 70 07/09/2024    LABIRON 16 (L) 07/09/2024    TRANSFERRIN 291 07/09/2024    TIBC 434 07/09/2024    FERRITIN 35.80 07/09/2024         Diagnoses and all orders for this visit:    Essential hypertension  -     metoprolol succinate XL (Toprol XL) 25 MG 24 hr tablet; 1-2 po daily    IFG (impaired fasting glucose)  -     SITagliptin (Januvia) 25 MG tablet; Take 1 tablet by mouth Daily.    Prediabetes  -     SITagliptin (Januvia) 25 MG tablet; Take 1 tablet by mouth Daily.    Chronic right shoulder pain  -     MRI Shoulder Right Without Contrast; Future    Vulvovaginal candidiasis    Other orders  -     metFORMIN ER (GLUCOPHAGE-XR) 500 MG 24 hr tablet; Take 3 tablets by mouth Daily.  -     Discontinue: buPROPion XL (WELLBUTRIN XL) 150 MG 24 hr tablet  -     oxyCODONE-acetaminophen (PERCOCET)  MG per tablet  -     pantoprazole (PROTONIX) 40 MG EC tablet; Take 1 tablet by mouth.  -     ramelteon (ROZEREM) 8 MG tablet  -     zolpidem CR (AMBIEN CR) 6.25 MG CR tablet; Take 1 tablet by mouth At Night As Needed for Sleep.  -     fluconazole (Diflucan) 150 MG tablet; 1 po now, may repeat in 3 days  -     potassium chloride 10 MEQ CR tablet; Take 2 tablets by mouth 2 (Two) Times a Day.      HTN- increase metoprolol to 2 per day. Restart HCTZ and  KCl 20 bid.    Risks, benefits, and potential side effects of HRT reviewed with patient.  Patient voiced understanding and wished to proceed with treatment. Continue estrogen patch. Add P/T compounded cream.    I have reviewed risks/benefits and potential side effects of various treatment options for prediabetes. Pt voices understanding and wishes to proceed with Januvia is affordable.    Patient will f/u as scheduled, f/u sooner as needed/instructed.  I will contact patient regarding test results and provide instructions regarding any necessary changes in plan of care.  Patient was encouraged to keep me informed of any acute changes, lack of improvement, or any new concerning symptoms.  Pt is aware of reasons to seek emergent care.  Patient voiced understanding of all instructions and denied further questions.      Total face to face time with patient was 23 minutes.    Please note that portions of this note may have been completed with a voice recognition program.

## 2025-06-03 PROBLEM — G89.29 CHRONIC RIGHT SHOULDER PAIN: Status: ACTIVE | Noted: 2025-06-03

## 2025-06-03 PROBLEM — M25.511 CHRONIC RIGHT SHOULDER PAIN: Status: ACTIVE | Noted: 2025-06-03

## 2025-06-06 DIAGNOSIS — Z79.890 HORMONE REPLACEMENT THERAPY: ICD-10-CM

## 2025-06-06 DIAGNOSIS — F51.04 CHRONIC INSOMNIA: Primary | ICD-10-CM

## 2025-06-06 DIAGNOSIS — N95.1 POSTMENOPAUSAL DISORDER: ICD-10-CM

## 2025-06-06 RX ORDER — ESTRADIOL 0.05 MG/D
PATCH, EXTENDED RELEASE TRANSDERMAL
Qty: 8 PATCH | Refills: 11 | Status: SHIPPED | OUTPATIENT
Start: 2025-06-06

## 2025-06-06 RX ORDER — ZOLPIDEM TARTRATE 6.25 MG/1
6.25 TABLET, FILM COATED, EXTENDED RELEASE ORAL NIGHTLY PRN
Qty: 30 TABLET | Refills: 2 | Status: SHIPPED | OUTPATIENT
Start: 2025-06-06

## 2025-06-06 NOTE — TELEPHONE ENCOUNTER
Patient:  Hi. I tried to get the estrogen patches but they said no refills had been received and none on file.     If you can please send those to [facility] or [facility].  And send the testosterone , progesterone cream to [facility] pharmacy that would be helpful.   Thanks so much   Have a wonderful weekend!  Rx Refill Note  Requested Prescriptions     Pending Prescriptions Disp Refills    zolpidem CR (AMBIEN CR) 6.25 MG CR tablet       Sig: Take 1 tablet by mouth At Night As Needed for Sleep.     Signed Prescriptions Disp Refills    estradiol (Minivelle) 0.05 MG/24HR patch 8 patch 11     Si patch every 4 days.     Authorizing Provider: SILKE HENRY     Ordering User: TOÑA CHUN      Last office visit with prescribing clinician: 2025   Last telemedicine visit with prescribing clinician: 2025   Next office visit with prescribing clinician: 10/6/2025                         Would you like a call back once the refill request has been completed: [] Yes [] No    If the office needs to give you a call back, can they leave a voicemail: [] Yes [] No    Toña Caruso CMA  25, 13:48 EDT

## 2025-06-13 ENCOUNTER — TELEMEDICINE (OUTPATIENT)
Dept: NEUROLOGY | Facility: CLINIC | Age: 60
End: 2025-06-13
Payer: MEDICARE

## 2025-06-13 DIAGNOSIS — G43.711 INTRACTABLE CHRONIC MIGRAINE WITHOUT AURA AND WITH STATUS MIGRAINOSUS: Primary | ICD-10-CM

## 2025-06-13 DIAGNOSIS — G43.909 ACUTE MIGRAINE: ICD-10-CM

## 2025-06-13 RX ORDER — RIZATRIPTAN BENZOATE 10 MG/1
10 TABLET ORAL ONCE AS NEEDED
Qty: 9 TABLET | Refills: 3 | Status: SHIPPED | OUTPATIENT
Start: 2025-06-13

## 2025-06-13 NOTE — PROGRESS NOTES
Follow Up Office Visit      Patient Name: Tatiana Fields  : 1965   MRN: [id number]     Chief Complaint:    Chief Complaint   Patient presents with    Migraine       History of Present Illness: Tatiana Fields is a 59 y.o. female who is here today to follow up with neurology for migraine HA via telehealth  (Christina).  This is a telehealth encounter.     MDM 15/30. HA intensity and frequency has improved. She got behind on Botox scheduling. She is an established pt of [facility] ([facility]) with last injections . She is alternating TPI with Botox with last injections  (Christina). She has been taking Qulipta 60 mg Daily, which isnt helping as much injectable. She has been using Maxalt 10 mg PRN and reports good tolerance and compliance. Triggers: bruxism, chronic neck pain.    Scheduled to see Board Certified Neurologist (Abigail)  for abnormal MRI; FU MRI scheduled 10/25.     Recent Imaging:      MRI Brain W/WO  + Brain parenchyma displays periventricular and subcortical white matter signal changes. These have overall increased in number from prior exam. These are nonspecific but may be due to progressive chronic microvascular change, vasculitis or even demyelinating disease. Based on a significant number migraine-related signal changes would unlikely account for the number of white matter lesions.  MRI Cervical Spine WO  + Mild worsening of disc disease C4-5 with borderline central canal stenosis and mild left neural foraminal narrowing.Moderate left neural foraminal narrowing C5-6  MRI Brain W/WO  + nonspecific periventricular and subcortical foci of T2 abnormality; nonspecific white matter changes     Pertinent Medical History: Allergic rhinitis, hypertension, vitamin D deficiency, vitamin B12 deficiency, HRT, hypothyroidism, constipation, GERD, IBS, gastroparesis, GERD, interstitial cystitis, depression, PTSD, fibromyalgia, pelvic floor dysfunction, cervical nerve root  compression, cervical arthritis, migraine, degenerative disc disease, insomnia    Subjective      Review of Systems:   Review of Systems   Constitutional: Negative.    HENT: Negative.     Eyes: Negative.    Respiratory: Negative.     Cardiovascular: Negative.    Gastrointestinal: Negative.    Endocrine: Negative.    Genitourinary: Negative.    Musculoskeletal: Negative.    Skin: Negative.    Allergic/Immunologic: Negative.    Neurological:  Positive for headache.   Hematological: Negative.    Psychiatric/Behavioral: Negative.     All other systems reviewed and are negative.      I have reviewed and the following portions of the patient's history were updated as appropriate: past family history, past medical history, past social history, past surgical history and problem list.    Medications:     Current Outpatient Medications:     rizatriptan (MAXALT) 10 MG tablet, Take 1 tablet by mouth 1 (One) Time As Needed for Migraine., Disp: 9 tablet, Rfl: 3    Atogepant 60 MG tablet, Take 1 tablet by mouth Daily., Disp: 30 tablet, Rfl: 6    cholecalciferol (VITAMIN D3) 250 MCG (48639 UT) capsule, Take 1 capsule by mouth Daily., Disp: 90 capsule, Rfl: 1    clonazePAM (KlonoPIN) 0.5 MG tablet, TAKE 1 TABLET BY MOUTH TWICE DAILY AS NEEDED FOR ANXIETY OR INSOMNIA, Disp: 60 tablet, Rfl: 1    estradiol (Minivelle) 0.05 MG/24HR patch, 1 patch every 4 days., Disp: 8 patch, Rfl: 11    fluconazole (Diflucan) 150 MG tablet, 1 po now, may repeat in 3 days, Disp: 2 tablet, Rfl: 0    FLUoxetine (PROzac) 40 MG capsule, Take 1 capsule by mouth Daily., Disp: , Rfl:     fluticasone (FLONASE) 50 MCG/ACT nasal spray, USE 2 SPRAYS TO EACH NOSTRIL EVERY DAY, Disp: 48 g, Rfl: 1    hydroCHLOROthiazide 25 MG tablet, TAKE 1 TABLET BY MOUTH DAILY, Disp: 90 tablet, Rfl: 3    levothyroxine (SYNTHROID, LEVOTHROID) 25 MCG tablet, Take 1 tablet by mouth Daily., Disp: 90 tablet, Rfl: 1    linaclotide (Linzess) 72 MCG capsule capsule, Take 1 capsule by mouth  Every Morning Before Breakfast., Disp: 30 capsule, Rfl: 5    metFORMIN ER (GLUCOPHAGE-XR) 500 MG 24 hr tablet, Take 3 tablets by mouth Daily., Disp: , Rfl:     metoclopramide (REGLAN) 5 MG tablet, , Disp: , Rfl:     metoprolol succinate XL (Toprol XL) 25 MG 24 hr tablet, 1-2 po daily, Disp: 180 tablet, Rfl: 3    naproxen (NAPROSYN) 500 MG tablet, Take 1 tablet by mouth 2 (Two) Times a Day With Meals., Disp: 180 tablet, Rfl: 1    OnabotulinumtoxinA 200 units reconstituted solution, Inject 200 Units into the appropriate muscle as directed by prescriber Every 3 (Three) Months., Disp: 1 each, Rfl: 3    ondansetron (ZOFRAN) 4 MG tablet, Take 1 tablet by mouth Every 8 (Eight) Hours As Needed for Nausea or Vomiting., Disp: 30 tablet, Rfl: 5    oxyCODONE-acetaminophen (PERCOCET)  MG per tablet, , Disp: , Rfl:     pantoprazole (PROTONIX) 40 MG EC tablet, Take 1 tablet by mouth., Disp: , Rfl:     potassium chloride 10 MEQ CR tablet, Take 2 tablets by mouth 2 (Two) Times a Day., Disp: 360 tablet, Rfl: 3    promethazine (PHENERGAN) 25 MG tablet, Take 1 tablet by mouth Every 8 (Eight) Hours As Needed for Nausea or Vomiting., Disp: 15 tablet, Rfl: 2    ramelteon (ROZEREM) 8 MG tablet, , Disp: , Rfl:     SITagliptin (Januvia) 25 MG tablet, Take 1 tablet by mouth Daily., Disp: 90 tablet, Rfl: 0    tiZANidine (ZANAFLEX) 4 MG tablet, Take 1 tablet by mouth Every 8 (Eight) Hours As Needed for Muscle Spasms., Disp: 90 tablet, Rfl: 5    zolpidem CR (AMBIEN CR) 6.25 MG CR tablet, Take 1 tablet by mouth At Night As Needed for Sleep., Disp: 30 tablet, Rfl: 2    Current Facility-Administered Medications:     OnabotulinumtoxinA 200 Units, 200 Units, Intramuscular, Q3 Months, Kim Clayton, APRN, 165 Units at 05/21/25 4755    Allergies:   Allergies   Allergen Reactions    Amoxicillin-Pot Clavulanate Other (See Comments)    Morphine Itching    Sulfa Antibiotics Rash       Objective     Physical Exam:  Vital Signs: There were  no vitals filed for this visit.  There is no height or weight on file to calculate BMI.    Physical Exam  Vitals and nursing note reviewed.   Constitutional:       General: She is not in acute distress.     Appearance: Normal appearance.   HENT:      Head: Normocephalic.      Nose: Nose normal.      Mouth/Throat:      Mouth: Mucous membranes are moist.      Pharynx: Oropharynx is clear.   Eyes:      Conjunctiva/sclera: Conjunctivae normal.   Musculoskeletal:      Cervical back: Normal range of motion.   Neurological:      Mental Status: She is alert and oriented to person, place, and time.   Psychiatric:         Mood and Affect: Mood normal.         Behavior: Behavior normal.         Neurological Exam  Mental Status  Alert. Oriented to person, place, and time.       Assessment / Plan      Assessment/Plan:   Diagnoses and all orders for this visit:    1. Intractable chronic migraine without aura and with status migrainosus (Primary)  -     Atogepant 60 MG tablet; Take 1 tablet by mouth Daily.  Dispense: 30 tablet; Refill: 6    2. Acute migraine  -     rizatriptan (MAXALT) 10 MG tablet; Take 1 tablet by mouth 1 (One) Time As Needed for Migraine.  Dispense: 9 tablet; Refill: 3         Follow Up:   Return in about 3 months (around 9/13/2025), or if symptoms worsen or fail to improve.    Anticipatory Guidance and Safety Reviewed  Patient Education Provided  Referral to [facility] clinic for BOTOX  Continue TPI PRN  Continue Qulipta 60 mg Daily; SE reviewed (prevention).   Continue Maxalt 10 mg PRN (abortive); SE reviewed   FU MRI Brain for surveillance 10/25     RTC PRN or within 12 weeks or sooner with issues     Twilio Encounter for 21 min with > 50% of encounter spent counseling and coordinating care     Kellen Cruz, DNP, APRN, FNP-C  [facility]

## 2025-06-18 ENCOUNTER — TELEPHONE (OUTPATIENT)
Dept: CARDIOLOGY | Facility: CLINIC | Age: 60
End: 2025-06-18

## 2025-06-19 ENCOUNTER — TELEPHONE (OUTPATIENT)
Dept: FAMILY MEDICINE CLINIC | Facility: CLINIC | Age: 60
End: 2025-06-19
Payer: MEDICARE

## 2025-07-04 ENCOUNTER — E-VISIT (OUTPATIENT)
Dept: ADMINISTRATIVE | Facility: OTHER | Age: 60
End: 2025-07-04
Payer: MEDICARE

## 2025-07-04 ENCOUNTER — E-VISIT (OUTPATIENT)
Dept: FAMILY MEDICINE CLINIC | Facility: TELEHEALTH | Age: 60
End: 2025-07-04

## 2025-07-04 NOTE — E-VISIT ESCALATED
Status: Referred Out  Date: 2025 11:58:47  Acuity Level: Not applicable  Referral message: Based on the information you provided during your interview, eVisit is not appropriate for treating your condition.  Patient: Tatiana Fields  Patient : 1965  Patient Address: [address] [place], [place] [address], [place] [address]  Patient Phone: [phone]  Clinician Response: Unavailable  Diagnosis: Unavailable  Diagnosis ICD: Unavailable     Patient Interview Questions and Responses: None available

## 2025-07-04 NOTE — E-VISIT ESCALATED
"Status: Referred Out  Date: 2025 12:01:38  Acuity Level: Within 24 hours  Referral message:  We're sorry you are not feeling well. Your safety is important to us. Based on your symptoms and the information you have provided, it is possible that you may have strep throat. Further testing is required to determine if you have   strep throat.  For the most appropriate care, please be seen:   At a clinic or an urgent care  Within 24 hours   You won't be charged for this visit. We hope you feel better soon!   Patient: Tatiana Fields  Patient : 1965  Patient Address: [facility] [address] [address]  Patient Phone: [phone]  Clinician Response: Unavailable  Diagnosis: Unavailable  Diagnosis ICD: Unavailable     Patient Interview Questions and Responses:  Clinical Protocol: URI  Please select the reason for your visit today.: Cold, sinus infection, or flu  Please select when your first symptom started.: 7-9 days ago  Knowing exactly when the symptoms started will help the provider diagnose and treat the condition appropriately.Please enter the date your symptoms started (MM/DD/YYYY). If you do not know the exact date, please enter \"unknown\".: 2025  How quickly did your symptoms start?: Gradually (symptoms developed over several days)  After your symptoms started, did they improve and then get worse again?: No  Do you currently have these symptoms? Nasal congestion (stuffy nose): Yes  Do you currently have these symptoms? Runny nose: No  Do you currently have these symptoms? Cough: No  Do you currently have these symptoms? Sore throat: Yes  Color helps provide description to the provider but it does not help decide if the condition is viral or bacterial or if antibiotics will be prescribed.What color is your mucus? Select all that apply. Clear: No  Color helps provide description to the provider but it does not help decide if the condition is viral or bacterial or if antibiotics will be " prescribed.What color is your mucus? Select all that apply. White: No  Color helps provide description to the provider but it does not help decide if the condition is viral or bacterial or if antibiotics will be prescribed.What color is your mucus? Select all that apply. Yellow: Yes  Color helps provide description to the provider but it does not help decide if the condition is viral or bacterial or if antibiotics will be prescribed.What color is your mucus? Select all that apply. Green: Yes  Color helps provide description to the provider but it does not help decide if the condition is viral or bacterial or if antibiotics will be prescribed.What color is your mucus? Select all that apply. Blood-tinged: Yes  Color helps provide description to the provider but it does not help decide if the condition is viral or bacterial or if antibiotics will be prescribed.What color is your mucus? Select all that apply. Bloody: No  Please rate the severity of your throat pain on a pain scale, with 0 being no pain and 10 being the worst pain imaginable.: 4-6 = Moderate pain  Being unable to swallow any liquids, including one's own saliva, may indicate a very serious condition called epiglottitis. Please be seen in the emergency room if liquids cannot be swallowed. Can you swallow liquids?: Yes, I can swallow liquids without   any difficulty  Please feel your neck. Are the lymph nodes in the neck enlarged?: Yes  Is the lymph node swelling worse on one side?: No  Has the swelling caused any changes in your speech or hoarseness in your voice?: No  Are you experiencing difficulty opening the mouth due to pain or swelling in the jaw muscles?: No  Are you feeling pain in the front of the neck that sometimes moves to the ear?: No  Do you have white spots on the back of the throat?: Yes

## 2025-07-04 NOTE — E-VISIT TREATED
Date: 2025 13:22:15  Clinician: Vanita Rubio  Clinician NPI: [id number]  Patient: Tatiana Fields  Patient : 1965  Patient Address: [address] [place] [address] [address], [place] [address]  Patient Phone: [phone]  Visit Protocol: URI  Patient Summary:  Tatiana is a 59 year old ( : 1965 ) female who initiated a visit for cold, sinus infection, or influenza.     Tatiana states the symptoms started gradually 7-9 days ago.   Symptom start date: 2025   The symptoms consist of   tooth pain, facial pain or pressure, a headache, nasal congestion, and malaise. Tatiana is experiencing difficulty breathing due to nasal congestion but is not short of breath. Tatiana also feels feverish but was unable to measure temperature.   Symptom   details     Nasal secretions: The color of the mucus is green, yellow, and blood-tinged.    Facial pain or pressure: The facial pain or pressure does not feel worse when bending or leaning forward.     Headache: The headache is moderate (4-6 on a 10 point pain scale).     Tooth pain: The tooth pain is caused by a cavity, recent dental work, or other mouth problems.      Tatiana denies having cough, wheezing, rhinitis, vomiting, sore throat, myalgias, chills, nausea, diarrhea, ear pain, and anosmia and ageusia. Tatiana also denies having recent facial or sinus surgery in the past 60 days and double sickening (worsening   symptoms after initial improvement).     Pertinent COVID-19 (Coronavirus) information  Since the symptoms started, Tatiana has not tested for COVID-19. Tatiana was not able to re-test today.   Tatiana has not received a COVID-19 vaccine in the past year.     Pertinent medical history     Tatiana had 1 sinus infection within the past year.   Tatiana has taken an antibiotic medication for similar symptoms in the past month. Antibiotic name as reported by the patient (free text): Zithromax   Tatiana typically gets a   yeast infection when an antibiotic is taken. Tatiana has successfully used  Diflucan to treat previous yeast infections. 2 doses of fluconazole (Diflucan) has typically been needed for symptoms to resolve in the past.  A provider has not told Tatiana to avoid   NSAIDs.   Tatiana does not have diabetes. Tatiana denies having immunosuppressive conditions (e.g., chemotherapy, HIV, organ transplant, long-term use of steroids or other immunosuppressive medications, splenectomy). Tatiana denies having heart disease,   severe COPD, and congestive heart failure. Tatiana does not have asthma.   Tatiana denies having chronic lung disease, cystic fibrosis, hypertension, long-term disabilities, mental health conditions, sickle cell disease or thalassemia, stroke or other   cardiovascular disease, substance use disorders, or tuberculosis (TB).  Tatiana does not smoke or use smokeless tobacco. Tatiana does not vape or use other e-cigarette products.   Additional information as reported by the patient (free text): There must be   medications on list from surgeries and other over years  didn't recognize or couldn't respond to all.  Don't feel good due to sinus infection. Thanks   Weight: 175 lbs (79.38 kg)  Reason for repeat visit for the same protocol within 24 hours:  I have bad   sinus infection.  I have chronic sinusitis.  I don't have any infection in chest but very congested and nasal discharge green, yellow and blood tinged.  I've been using nasal spray, decongestant , nasal steroid which usually cuts off a sinus infection.    But it has turned into infection and have a bad headache, face tenderness and Fatique.   What has helped in the past has been Zithromax or Levaquin and they don't hurt my stomach and I have stomach issues with many antibiotics. Was hoping to get   prescription for Zithromax and diflican  so I can fight this office.  Feeling bad and it's turned into sinus infection. Thanks  See the History of referred by protocol and completed visits section for details on previous visits (visits currently in  queue   to be diagnosed will not appear in this section).    MEDICATIONS: metoprolol succinate oral, potassium chloride oral, rizatriptan oral, zolpidem oral, estradiol transdermal, pantoprazole oral, onabotulinumtoxinA injection, onabotulinumtoxinA injection, fluticasone propionate nasal, promethazine oral,   tizanidine oral, naproxen oral, linaclotide oral, fluoxetine oral, onabotulinumtoxinA injection, bupivacaine HCl injection, bupivacaine liposome (PF) local infiltration, onabotulinumtoxinA injection, onabotulinumtoxinA injection, onabotulinumtoxinA   injection, ketorolac intramuscular, methylprednisolone acetate injection, methylprednisolone acetate injection, dexamethasone sodium phosphate injection, onabotulinumtoxinA injection, bupivacaine (PF) injection, onabotulinumtoxinA injection, cefazolin   intravenous, ketorolac intramuscular, onabotulinumtoxinA injection, bupivacaine (PF) injection, ceftriaxone injection, scopolamine transdermal, methylprednisolone acetate injection, ceftriaxone injection, ceftriaxone injection, methylprednisolone acetate   injection, metronidazole in sodium chloride(iso) intravenous, methylprednisolone acetate injection, methylprednisolone acetate injection, ceftriaxone injection, bupivacaine HCl injection, methylprednisolone acetate injection, dexamethasone sodium   phosphate injection, ceftriaxone injection, methylprednisolone acetate injection, metoclopramide oral, famotidine intravenous, ceftriaxone injection, lidocaine (PF) injection, ceftriaxone injection, gabapentin oral, ketorolac intramuscular,   methylprednisolone acetate injection, ceftriaxone injection, dexamethasone sodium phosphate injection, methylprednisolone acetate injection, methylprednisolone acetate injection, lidocaine (PF) injection, dexamethasone sodium phosphate injection,   pantoprazole oral, triamcinolone acetonide injection, methylprednisolone acetate injection, ALLERGIES: Sulfa (Sulfonamide  Antibiotics), morphine  Clinician Response:  Dear Tatiana,  Based on the information provided, you have acute bacterial sinusitis, also known as a sinus infection. Most cases of sinus infections are caused by viruses and symptoms start to improve on their own in 7-10 days. Both   viral and bacterial sinus infections usually resolve on its own. A bacterial infection may have developed if any of the following apply to you.      Symptoms of sinus infection lasting 10 days or more without showing any improvement    If you feel better after a viral upper respiratory infection such as, a cold but then suddenly feel worse with symptoms such as fever, headache, or sinus pressure     Medication information  I am prescribing:     Azithromycin (Zithromax Z-Wale) 250 mg oral tablet. Take 2 tablets by mouth on day 1, then 1 tablet by mouth on days 2-5. There are no refills with this prescription.   Because you usually get a yeast infection when taking antibiotics, I am also   prescribing:     Fluconazole (Diflucan) 150 mg oral tablet. Take 1 tablet by mouth in a single dose. Repeat dose in 3 days if symptoms are still present. There are no refills with this prescription.   Self care  Steps you can take to be as comfortable as possible:     Rest.    Drink plenty of fluids.    Take a warm shower to loosen congestion.    Use a cool-mist humidifier.     When to seek care  Call your dentist if you suspect your tooth pain is a dental problem.  Please be seen in a clinic or urgent care if any of the following occur:     New symptoms develop, or symptoms become worse    Symptoms do not start to improve after 3 days of treatment     Call 911 or go to the emergency room if any of the following occur:     If you feel that your throat is closing off    Suddenly develop a rash    Unable to swallow fluids or are drooling     It is possible to have an allergic reaction to an antibiotic even if you have not had one in the past. If you notice a new  rash, significant swelling, or difficulty breathing, stop taking this medication immediately and go to a clinic or urgent care.    For the latest updates on COVID-19 (Coronavirus), please visit the Centers for Disease Control and Prevention (CDC). Also, your state and local health department websites may provide additional guidance regarding testing and isolation recommendations for   your location.   Diagnosis: Acute bacterial sinusitis  Diagnosis ICD: J01.90    Follow up instructions:  ATTENTION: If you have been prescribed medications, your prescriptions will not be sent until you choose your pharmacy. To do so open the link within your notification, or go to [employer] and click eVisit in the menu to open your   treatment plan. From there, you can select your pharmacy at the bottom of your after visit summary. You can also go to [email or web address]   Prescriptions  Prescription: azithromycin (Zithromax Z-Wale) 250 mg oral tablet, take 2 tablets by mouth on day 1, then 1 tablet by mouth on days 2-5  Sent To: [facility] #[id number] - [id number] - [address] [address]  Prescription: fluconazole (Diflucan) 150 mg oral tablet, take 1 tablet by mouth in a single dose, repeat dose in 3 days if symptoms are still present  Sent To: [facility] #[id number] - [phone] - [address] [address]  Prescription: prednisone 10 mg oral tablets,dose pack, take tablets,dose pack  Sent To: [facility] #[id number] - [phone] - [address] [address]

## 2025-07-17 ENCOUNTER — PROCEDURE VISIT (OUTPATIENT)
Dept: NEUROLOGY | Facility: CLINIC | Age: 60
End: 2025-07-17
Payer: MEDICARE

## 2025-07-17 ENCOUNTER — OFFICE VISIT (OUTPATIENT)
Dept: NEUROLOGY | Facility: CLINIC | Age: 60
End: 2025-07-17
Payer: MEDICARE

## 2025-07-17 VITALS
SYSTOLIC BLOOD PRESSURE: 112 MMHG | HEART RATE: 92 BPM | HEIGHT: 64 IN | WEIGHT: 176.9 LBS | TEMPERATURE: 97.6 F | DIASTOLIC BLOOD PRESSURE: 84 MMHG | RESPIRATION RATE: 14 BRPM | OXYGEN SATURATION: 96 % | BODY MASS INDEX: 30.2 KG/M2

## 2025-07-17 VITALS
HEIGHT: 64 IN | DIASTOLIC BLOOD PRESSURE: 82 MMHG | BODY MASS INDEX: 30.39 KG/M2 | OXYGEN SATURATION: 94 % | SYSTOLIC BLOOD PRESSURE: 130 MMHG | WEIGHT: 178 LBS | HEART RATE: 98 BPM

## 2025-07-17 DIAGNOSIS — G43.709 CHRONIC MIGRAINE WITHOUT AURA WITHOUT STATUS MIGRAINOSUS, NOT INTRACTABLE: Primary | ICD-10-CM

## 2025-07-17 DIAGNOSIS — M79.10 MYALGIA: Primary | ICD-10-CM

## 2025-07-17 DIAGNOSIS — R93.0 ABNORMAL MRI OF HEAD: ICD-10-CM

## 2025-07-17 DIAGNOSIS — G43.009 MIGRAINE WITHOUT AURA AND WITHOUT STATUS MIGRAINOSUS, NOT INTRACTABLE: ICD-10-CM

## 2025-07-17 RX ORDER — BUPIVACAINE HYDROCHLORIDE 5 MG/ML
8 INJECTION, SOLUTION PERINEURAL ONCE
Status: COMPLETED | OUTPATIENT
Start: 2025-07-17 | End: 2025-07-17

## 2025-07-17 RX ORDER — METHYLPREDNISOLONE SODIUM SUCCINATE 125 MG/2ML
125 INJECTION, POWDER, LYOPHILIZED, FOR SOLUTION INTRAMUSCULAR; INTRAVENOUS ONCE
Status: COMPLETED | OUTPATIENT
Start: 2025-07-17 | End: 2025-07-17

## 2025-07-17 RX ADMIN — BUPIVACAINE HYDROCHLORIDE 8 ML: 5 INJECTION, SOLUTION PERINEURAL at 10:02

## 2025-07-17 RX ADMIN — METHYLPREDNISOLONE SODIUM SUCCINATE 125 MG: 125 INJECTION, POWDER, LYOPHILIZED, FOR SOLUTION INTRAMUSCULAR; INTRAVENOUS at 10:03

## 2025-07-17 NOTE — PROGRESS NOTES
Procedure note     Patient Name: Tatiana Fields  : 1965   MRN: [id number]     Chief Complaint:    Chief Complaint   Patient presents with    Procedure     Trigger point injection       History of Present Illness: Tatiana Fields is a 59 y.o. female who is here today for trigger point injections.     Procedure:    Trigger Point Injections      After risks and benefits were explained including bleeding, infection, worsening of the pain, damage to the area being injected, weakness, allergic reaction to medications, vascular injection, and nerve damage, signed consent was obtained.  All questions were answered.      The area of the trigger point was identified and the skin prepped three times with alcohol and the alcohol allowed to dry.  Next, a 25 gauge 1 inch needle was placed in the area of the trigger point.  Once reproduction of the pain was elicited and negative aspiration confirmed, the trigger point was injected and the needle removed.      The patient tolerated procedure well without immediate complications.     Medication used: 125mg/2 ml of Depo-Medrol; 8 ml of 0.5 % Bupivacaine    Trigger points injected: Cervical paraspinal and trapezius muscles in 10 divided sites.       Subjective     I have reviewed and the following portions of the patient's history were updated as appropriate: past family history, past medical history, past social history, past surgical history and problem list.    Medications:     Current Outpatient Medications:     Atogepant 60 MG tablet, Take 1 tablet by mouth Daily., Disp: 30 tablet, Rfl: 6    cholecalciferol (VITAMIN D3) 250 MCG (21627 UT) capsule, Take 1 capsule by mouth Daily., Disp: 90 capsule, Rfl: 1    clonazePAM (KlonoPIN) 0.5 MG tablet, TAKE 1 TABLET BY MOUTH TWICE DAILY AS NEEDED FOR ANXIETY OR INSOMNIA, Disp: 60 tablet, Rfl: 1    estradiol (Minivelle) 0.05 MG/24HR patch, 1 patch every 4 days., Disp: 8 patch, Rfl: 11    fluconazole (Diflucan) 150 MG tablet, 1  SANJAY probe inserted anesthesia.   po now, may repeat in 3 days, Disp: 2 tablet, Rfl: 0    FLUoxetine (PROzac) 40 MG capsule, Take 1 capsule by mouth Daily., Disp: , Rfl:     fluticasone (FLONASE) 50 MCG/ACT nasal spray, USE 2 SPRAYS TO EACH NOSTRIL EVERY DAY, Disp: 48 g, Rfl: 1    hydroCHLOROthiazide 25 MG tablet, TAKE 1 TABLET BY MOUTH DAILY, Disp: 90 tablet, Rfl: 3    levothyroxine (SYNTHROID, LEVOTHROID) 25 MCG tablet, Take 1 tablet by mouth Daily., Disp: 90 tablet, Rfl: 1    linaclotide (Linzess) 72 MCG capsule capsule, Take 1 capsule by mouth Every Morning Before Breakfast., Disp: 30 capsule, Rfl: 5    metoclopramide (REGLAN) 5 MG tablet, , Disp: , Rfl:     metoprolol succinate XL (Toprol XL) 25 MG 24 hr tablet, 1-2 po daily, Disp: 180 tablet, Rfl: 3    naproxen (NAPROSYN) 500 MG tablet, Take 1 tablet by mouth 2 (Two) Times a Day With Meals., Disp: 180 tablet, Rfl: 1    OnabotulinumtoxinA 200 units reconstituted solution, Inject 200 Units into the appropriate muscle as directed by prescriber Every 3 (Three) Months., Disp: 1 each, Rfl: 3    ondansetron (ZOFRAN) 4 MG tablet, Take 1 tablet by mouth Every 8 (Eight) Hours As Needed for Nausea or Vomiting., Disp: 30 tablet, Rfl: 5    pantoprazole (PROTONIX) 40 MG EC tablet, Take 1 tablet by mouth., Disp: , Rfl:     potassium chloride 10 MEQ CR tablet, Take 2 tablets by mouth 2 (Two) Times a Day., Disp: 360 tablet, Rfl: 3    promethazine (PHENERGAN) 25 MG tablet, Take 1 tablet by mouth Every 8 (Eight) Hours As Needed for Nausea or Vomiting., Disp: 15 tablet, Rfl: 2    rizatriptan (MAXALT) 10 MG tablet, Take 1 tablet by mouth 1 (One) Time As Needed for Migraine., Disp: 9 tablet, Rfl: 3    tiZANidine (ZANAFLEX) 4 MG tablet, Take 1 tablet by mouth Every 8 (Eight) Hours As Needed for Muscle Spasms., Disp: 90 tablet, Rfl: 5    SITagliptin (Januvia) 25 MG tablet, Take 1 tablet by mouth Daily., Disp: 90 tablet, Rfl: 0    zolpidem CR (AMBIEN CR) 6.25 MG CR tablet, Take 1 tablet by mouth At Night As Needed for  "Sleep., Disp: 30 tablet, Rfl: 2    Current Facility-Administered Medications:     OnabotulinumtoxinA 200 Units, 200 Units, Intramuscular, Q3 Months, Brittanysolomon SOULEYMANE Schrader, 165 Units at 05/21/25 1545    Allergies:   Allergies   Allergen Reactions    Amoxicillin-Pot Clavulanate Other (See Comments)    Morphine Itching    Sulfa Antibiotics Rash       Objective     Physical Exam:  Vital Signs:   Vitals:    07/17/25 0936   BP: 112/84   BP Location: Left arm   Patient Position: Sitting   Cuff Size: Adult   Pulse: 92   Resp: 14   Temp: 97.6 °F (36.4 °C)   TempSrc: Temporal   SpO2: 96%   Weight: 80.2 kg (176 lb 14.4 oz)   Height: 162.6 cm (64.02\")   PainSc: 6    PainLoc: Head     Body mass index is 30.35 kg/m².    Physical Exam  Vitals and nursing note reviewed.   Constitutional:       General: She is not in acute distress.     Appearance: Normal appearance.   HENT:      Nose: Nose normal.   Musculoskeletal:      Cervical back: Normal range of motion and neck supple.   Skin:     General: Skin is warm and dry.   Neurological:      General: No focal deficit present.      Mental Status: She is alert and oriented to person, place, and time.   Psychiatric:         Mood and Affect: Mood normal.         Behavior: Behavior normal.              Assessment / Plan      Assessment/Plan:   Diagnoses and all orders for this visit:    1. Myalgia (Primary)  -     methylPREDNISolone sodium succinate (SOLU-Medrol) injection 125 mg  -     bupivacaine (MARCAINE) 0.5 % injection 8 mL         Follow Up:   Return in about 4 months (around 11/17/2025).    Anticipatory guidance and safety reviewed  Patient education provided  Risks benefits and alternatives discussed; consents obtained     Return to care as needed or within 12-16 weeks or sooner with issues    Kellen Vasquez, RENETTA, APRN, FNP-C  [facility]  "

## 2025-07-17 NOTE — PROGRESS NOTES
"Subjective   Patient ID: Tatiana Fields is a 59 y.o. female     Chief Complaint   Patient presents with    Headache        History of Present Illness    59 y.o. female returns in follow up.     Last visit with Kellen COMER on 7/17/25 for trigger point injections.     Varying wt and BP, glucose over the last 6 years.     MRI Brain, my review of films, 3/6/25 as compared to 3/22/19 increased mild   T2 white matter abnl    MRI Cervical, my review of films, 1/9/24 ACDF C3-4, mod multi level DDD, no cord lesions    Treated for migraines.     Preventative:  Qulitpa, Botox, Trigger point injections, Ajovy, TPM  Abortive:  Maxalt     Past Medical History:   Diagnosis Date    Abnormal ECG 2024    Acquired hypothyroidism 11/08/2021    ADHD (attention deficit hyperactivity disorder) 2000    Adjustment disorder     Allergic     Allergic rhinitis     Anemia     IRON DEF    Arthritis     Bursitis of right shoulder 02/2021    CAP (community acquired pneumonia) 06/2022    Cerumen impaction     Cervical arthritis 02/15/2012    Cervical nerve root compression 10/2019    Cervical radiculopathy     Cervical spinal stenosis     Cervical spondylosis     Chronic constipation 05/23/2017    Impression: 08/11/2015 - History of long-standing recurrent constipation. Of interest, the patient states she has had severe constipation since having bladder tack and endometriosis sugery in 2013 in which her colon was \"cut\". We do not have these records.;     Chronic fatigue     Chronic gastritis     Chronic insomnia 06/28/2017    Chronic pain syndrome     Chronic sinusitis     Clonic hemifacial spasm, right 10/2019    Cluster headache 2012    DAILY    Colonic inertia 05/06/2020    Contracture of muscle of right hand 10/2019    Corneal dystrophy     CTS (carpal tunnel syndrome) 2024    DDD (degenerative disc disease), cervical 04/15/2019    DDD (degenerative disc disease), lumbar     DDD (degenerative disc disease), thoracic     Depression " with somatization 11/18/2016    Dermatochalasis of both upper eyelids     Diabetes mellitus     Prediabetes   Gestational diabetes 1988 and in 1986    Diplopia     Duodenal gastroesophageal reflux 05/23/2017    Dyskinesia of esophagus 03/2022    Dysphagia     Dyssynergic defecation 01/2018    SEEN BY DR. CORIE RHODES AT [facility]     Early satiety     Endometriosis     S/P HYSTERECTOMY    Esophageal spasm 05/23/2022    Essential tremor     Excessive daytime sleepiness     Fibromyalgia, primary 2013    Full incontinence of feces 06/2022    Functional dyspepsia     MELVIN (generalized anxiety disorder)     Gastritis     Gastroparesis 05/23/2022    GERD (gastroesophageal reflux disease)     Gout 09/2018    Headache, tension-type 2013    Hepatomegaly 11/2016    Hiatal hernia     History of gestational diabetes     History of Lyme disease     Tx'd 2012      History of medical problems     Hormone replacement therapy (HRT) 10/15/2018    Hyperlipidemia     Hypertension     Hypertrophy, nasal, turbinate     Interstitial cystitis 05/23/2017    Iridocyclitis 06/2019    Irritable bowel syndrome     Keratoconjunctivitis sicca     BILATERAL    Labral tear of shoulder 09/2018    RIGHT    Low back pain 2012    Lumbar spondylosis     Lyme disease 2012    tx'd with prolonged course abx    Memory loss     Migraine 2013    Mixed conductive and sensorineural hearing loss of both ears 2013    Myositis 06/2022    Narcolepsy     listed in medical records, tx'd with provigil    Obesity 2018    Obstructive sleep apnea     Ovarian cyst 06/2016    CORPUS LUTEUM CYST    Pelvic organ prolapse quantification stage 2 cystocele 02/10/2021    Periapical abscess without sinus 06/08/2017    Periodic limb movements of sleep 11/18/2016    probable    PHN (postherpetic neuralgia) 01/09/2020    Polypharmacy 10/15/2018    Post laminectomy syndrome     Prediabetes     Ptosis, mechanical, bilateral 01/2020    AND MYOGENIC    PTSD (post-traumatic stress  disorder) 2017    Punctate keratitis, left 2020    Pylorospasm 2022    Regular astigmatism of left eye 2021    Rheumatoid arthritis     RLS (restless legs syndrome)     Scoliosis     Segmental and somatic dysfunction of cervical region     Segmental and somatic dysfunction of lumbar region     Segmental and somatic dysfunction of pelvic region     Shingles 02/15/2013    SLE (systemic lupus erythematosus)     Soft tissue injury of left chest wall 2021    Tachycardia 2016    Thyroiditis 2015    TIA (transient ischemic attack) 2019    RIGHT SIDE AFFECTED    TIA (transient ischemic attack) 2016    Trochanteric bursitis, left hip 2019    Ulcerative colitis     Vision loss     Vitamin B12 deficiency 2017    Vitamin D deficiency     White matter abnormality on MRI of brain 2020     Family History   Problem Relation Age of Onset    Inflammatory bowel disease Mother     Hypertension Mother     Heart disease Mother     Arthritis Mother     Hyperlipidemia Mother     Diabetes Mother     Migraines Mother     Neuropathy Mother     Stroke Mother     Asthma Mother     Cancer Mother     COPD Mother     Seizures Mother     Clotting disorder Mother     Hypertension Father     Heart attack Father     Hyperlipidemia Father     Arthritis Father     Hearing loss Father     Cancer Sister     Bone cancer Sister     Early death Sister         Cancer. Age 3    Cancer Brother     Hyperlipidemia Brother     Colon cancer Brother     Stroke Maternal Aunt     Diabetes Maternal Grandmother     Cancer Maternal Grandfather     Diabetes Paternal Grandmother     Neuropathy Paternal Grandmother     Stroke Paternal Grandmother     Arthritis Paternal Grandmother     Colon cancer Other     Migraines Sister             Anemia Sister     Thyroid disease Sister     Neuropathy Maternal Aunt     Stroke Maternal Aunt     Malig Hyperthermia Neg Hx      Social History     Socioeconomic History    Marital  "status:    Tobacco Use    Smoking status: Never     Passive exposure: Never    Smokeless tobacco: Never   Vaping Use    Vaping status: Never Used   Substance and Sexual Activity    Alcohol use: No    Drug use: No    Sexual activity: Not Currently     Partners: Male     Birth control/protection: Abstinence, Post-menopausal, Hysterectomy, Surgical     Comment: No sex       Review of Systems    Objective     Vitals:    07/17/25 1359   BP: 130/82   Pulse: 98   SpO2: 94%   Weight: 80.7 kg (178 lb)   Height: 162.6 cm (64.02\")       Neurological Exam  Mental Status  Awake, alert and oriented to person, place and time. Oriented to person, place and time. Speech is normal. Language is fluent with no aphasia. Attention and concentration are normal. Fund of knowledge is appropriate for level of education.    Cranial Nerves  CN III, IV, VI: Extraocular movements intact bilaterally. Pupils equal round and reactive to light bilaterally.  CN V: Facial sensation is normal.  CN VII: Full and symmetric facial movement.  CN IX, X: Palate elevates symmetrically  CN XI: Shoulder shrug strength is normal.  CN XII: Tongue midline without atrophy or fasciculations.    Motor   Strength is 5/5 throughout all four extremities.    Sensory  Sensation is intact to light touch, pinprick, vibration and proprioception in all four extremities.    Reflexes  Deep tendon reflexes are 2+ and symmetric in all four extremities.    Coordination    Finger-to-nose, rapid alternating movements and heel-to-shin normal bilaterally without dysmetria.    Gait  Normal casual, toe, heel and tandem gait.       Physical Exam  Eyes:      Extraocular Movements: Extraocular movements intact.      Pupils: Pupils are equal, round, and reactive to light.   Neurological:      Motor: Motor strength is normal.     Coordination: Coordination is intact.      Deep Tendon Reflexes: Reflexes are normal and symmetric.   Psychiatric:         Speech: Speech normal. "         Office Visit on 04/02/2025   Component Date Value Ref Range Status    WBC 04/02/2025 9.53  3.40 - 10.80 10*3/mm3 Final    RBC 04/02/2025 4.02  3.77 - 5.28 10*6/mm3 Final    Hemoglobin 04/02/2025 11.3 (L)  12.0 - 15.9 g/dL Final    Hematocrit 04/02/2025 34.6  34.0 - 46.6 % Final    MCV 04/02/2025 86.1  79.0 - 97.0 fL Final    MCH 04/02/2025 28.1  26.6 - 33.0 pg Final    MCHC 04/02/2025 32.7  31.5 - 35.7 g/dL Final    RDW 04/02/2025 12.1 (L)  12.3 - 15.4 % Final    Platelets 04/02/2025 367  140 - 450 10*3/mm3 Final    Neutrophil Rel % 04/02/2025 72.0  42.7 - 76.0 % Final    Lymphocyte Rel % 04/02/2025 19.2 (L)  19.6 - 45.3 % Final    Monocyte Rel % 04/02/2025 6.4  5.0 - 12.0 % Final    Eosinophil Rel % 04/02/2025 1.5  0.3 - 6.2 % Final    Basophil Rel % 04/02/2025 0.6  0.0 - 1.5 % Final    Neutrophils Absolute 04/02/2025 6.86  1.70 - 7.00 10*3/mm3 Final    Lymphocytes Absolute 04/02/2025 1.83  0.70 - 3.10 10*3/mm3 Final    Monocytes Absolute 04/02/2025 0.61  0.10 - 0.90 10*3/mm3 Final    Eosinophils Absolute 04/02/2025 0.14  0.00 - 0.40 10*3/mm3 Final    Basophils Absolute 04/02/2025 0.06  0.00 - 0.20 10*3/mm3 Final    Immature Granulocyte Rel % 04/02/2025 0.3  0.0 - 0.5 % Final    Immature Grans Absolute 04/02/2025 0.03  0.00 - 0.05 10*3/mm3 Final    nRBC 04/02/2025 0.0  0.0 - 0.2 /100 WBC Final    Glucose 04/02/2025 82  65 - 99 mg/dL Final    BUN 04/02/2025 13  6 - 20 mg/dL Final    Creatinine 04/02/2025 0.93  0.57 - 1.00 mg/dL Final    EGFR Result 04/02/2025 70.9  >60.0 mL/min/1.73 Final    Comment: GFR Categories in Chronic Kidney Disease (CKD)/X09/  /X09/  GFR Category          GFR (mL/min/1.73)    Interpretation  G1/X09/                    90 or greater/X09/        Normal or high  (1)  G2//                    60-89                Mild decrease  (1)  G3a                   45-59                Mild to moderate  decrease  G3b                   30-44                Moderate to  severe decrease  G4                     15-29                Severe decrease  G5                    14 or less           Kidney failure//  /[id number]/  (1)In the absence of evidence of kidney disease, neither  GFR category G1 or G2 fulfill the criteria for CKD.  eGFR calculation 2021 CKD-EPI creatinine equation, which  does not include race as a factor      BUN/Creatinine Ratio 04/02/2025 14.0  7.0 - 25.0 Final    Sodium 04/02/2025 138  136 - 145 mmol/L Final    Potassium 04/02/2025 3.8  3.5 - 5.2 mmol/L Final    Chloride 04/02/2025 99  98 - 107 mmol/L Final    Total CO2 04/02/2025 26.5  22.0 - 29.0 mmol/L Final    Calcium 04/02/2025 9.8  8.6 - 10.5 mg/dL Final    Total Protein 04/02/2025 6.5  6.0 - 8.5 g/dL Final    Albumin 04/02/2025 4.2  3.5 - 5.2 g/dL Final    Globulin 04/02/2025 2.3  gm/dL Final    A/G Ratio 04/02/2025 1.8  g/dL Final    Total Bilirubin 04/02/2025 0.3  0.0 - 1.2 mg/dL Final    Alkaline Phosphatase 04/02/2025 78  39 - 117 U/L Final    AST (SGOT) 04/02/2025 15  1 - 32 U/L Final    ALT (SGPT) 04/02/2025 10  1 - 33 U/L Final    Total Cholesterol 04/02/2025 205 (H)  0 - 200 mg/dL Final    Comment: Cholesterol Reference Ranges  (U.S. Department of Health and Human Services ATP III  Classifications)  Desirable          <200 mg/dL  Borderline High    200-239 mg/dL  High Risk          >240 mg/dL  Triglyceride Reference Ranges  (U.S. Department of Health and Human Services ATP III  Classifications)  Normal           <150 mg/dL  Borderline High  150-199 mg/dL  High             200-499 mg/dL  Very High        >500 mg/dL  HDL Reference Ranges  (U.S. Department of Health and Human Services ATP III  Classifications)  Low     <40 mg/dl (major risk factor for CHD)  High    >60 mg/dl ('negative' risk factor for CHD)  LDL Reference Ranges  (U.S. Department of Health and Human Services ATP III  Classifications)  Optimal          <100 mg/dL  Near Optimal     100-129 mg/dL  Borderline High  130-159 mg/dL  High             160-189  mg/dL  Very High        >189 mg/dL  LDL is calculated using the NIH LDL-C calculation.      Triglycerides 04/02/2025 283 (H)  0 - 150 mg/dL Final    HDL Cholesterol 04/02/2025 48  40 - 60 mg/dL Final    VLDL Cholesterol Cezar 04/02/2025 48 (H)  5 - 40 mg/dL Final    LDL Chol Calc (NIH) 04/02/2025 109 (H)  0 - 100 mg/dL Final    Hemoglobin A1C 04/02/2025 5.80 (H)  4.80 - 5.60 % Final    Comment: Hemoglobin A1C Ranges:  Increased Risk for Diabetes  5.7% to 6.4%  Diabetes                     >= 6.5%  Diabetic Goal                < 7.0%      TSH 04/02/2025 2.410  0.270 - 4.200 uIU/mL Final    T3, Free 04/02/2025 3.2  2.0 - 4.4 pg/mL Final    Free T4 04/02/2025 1.15  0.92 - 1.68 ng/dL Final         Assessment & Plan     Problem List Items Addressed This Visit          Neuro    Migraine without aura and without status migrainosus, not intractable    Relevant Medications    clonazePAM (KlonoPIN) 0.5 MG tablet    FLUoxetine (PROzac) 40 MG capsule    naproxen (NAPROSYN) 500 MG tablet    tiZANidine (ZANAFLEX) 4 MG tablet    OnabotulinumtoxinA 200 Units    metoprolol succinate XL (Toprol XL) 25 MG 24 hr tablet    rizatriptan (MAXALT) 10 MG tablet    Atogepant 60 MG tablet    bupivacaine (MARCAINE) 0.5 % injection 8 mL (Completed)    RESOLVED: Chronic migraine without aura - Primary    Relevant Medications    clonazePAM (KlonoPIN) 0.5 MG tablet    FLUoxetine (PROzac) 40 MG capsule    naproxen (NAPROSYN) 500 MG tablet    tiZANidine (ZANAFLEX) 4 MG tablet    OnabotulinumtoxinA 200 Units    metoprolol succinate XL (Toprol XL) 25 MG 24 hr tablet    rizatriptan (MAXALT) 10 MG tablet    Atogepant 60 MG tablet    bupivacaine (MARCAINE) 0.5 % injection 8 mL (Completed)       Symptoms and Signs    Abnormal MRI of head    Current Assessment & Plan   Continue risk factor management    Wt loss, BP control, dietary changes.                No follow-ups on file.

## 2025-07-25 RX ORDER — PROMETHAZINE HYDROCHLORIDE 25 MG/1
25 TABLET ORAL EVERY 8 HOURS PRN
Qty: 15 TABLET | Refills: 2 | OUTPATIENT
Start: 2025-07-25

## 2025-07-28 ENCOUNTER — OFFICE VISIT (OUTPATIENT)
Dept: FAMILY MEDICINE CLINIC | Facility: CLINIC | Age: 60
End: 2025-07-28
Payer: MEDICARE

## 2025-07-28 VITALS
HEART RATE: 90 BPM | OXYGEN SATURATION: 96 % | HEIGHT: 64 IN | WEIGHT: 183.2 LBS | DIASTOLIC BLOOD PRESSURE: 86 MMHG | SYSTOLIC BLOOD PRESSURE: 134 MMHG | BODY MASS INDEX: 31.28 KG/M2

## 2025-07-28 DIAGNOSIS — E03.9 ACQUIRED HYPOTHYROIDISM: ICD-10-CM

## 2025-07-28 DIAGNOSIS — I10 ESSENTIAL HYPERTENSION: ICD-10-CM

## 2025-07-28 DIAGNOSIS — D64.9 CHRONIC ANEMIA: ICD-10-CM

## 2025-07-28 DIAGNOSIS — E55.9 VITAMIN D DEFICIENCY: ICD-10-CM

## 2025-07-28 DIAGNOSIS — E66.811 CLASS 1 OBESITY WITH SERIOUS COMORBIDITY AND BODY MASS INDEX (BMI) OF 31.0 TO 31.9 IN ADULT, UNSPECIFIED OBESITY TYPE: ICD-10-CM

## 2025-07-28 DIAGNOSIS — R53.82 CHRONIC FATIGUE: Primary | ICD-10-CM

## 2025-07-28 DIAGNOSIS — R73.01 IFG (IMPAIRED FASTING GLUCOSE): ICD-10-CM

## 2025-07-28 DIAGNOSIS — R79.9 ABNORMAL BLOOD CHEMISTRY: ICD-10-CM

## 2025-07-28 DIAGNOSIS — E53.8 VITAMIN B12 DEFICIENCY: ICD-10-CM

## 2025-07-28 DIAGNOSIS — R30.0 DYSURIA: ICD-10-CM

## 2025-07-28 RX ORDER — DEXTROAMPHETAMINE SULFATE 20 MG/1
TABLET ORAL
COMMUNITY
Start: 2025-02-01

## 2025-07-28 RX ORDER — CEFTRIAXONE 1 G/1
1 INJECTION, POWDER, FOR SOLUTION INTRAMUSCULAR; INTRAVENOUS ONCE
Status: COMPLETED | OUTPATIENT
Start: 2025-07-28 | End: 2025-07-28

## 2025-07-28 RX ADMIN — CEFTRIAXONE 1 G: 1 INJECTION, POWDER, FOR SOLUTION INTRAMUSCULAR; INTRAVENOUS at 17:56

## 2025-07-28 NOTE — PROGRESS NOTES
Subjective   Tatiana Fields is a 59 y.o. female.     History of Present Illness  Here for f/u on several chronic conditions.  Fatigue  Symptoms: abdominal pain (chronic), joint pain, nasal congestion, diaphoresis, fatigue, headaches, myalgias, nausea, neck pain, dysuria (mild, intermittent), visual change, vomiting (intermittent) and weakness    Symptoms: no chest pain, no cough, no fever, no rash and no sore throat      Weak, Fatique  Onset was 1 to 6 months.   Symptoms include: abdominal pain (chronic), joint pain, nasal congestion, diaphoresis, fatigue, headaches, myalgias, nausea, neck pain, dysuria (mild, intermittent), visual change, vomiting (intermittent) and weakness.   Pertinent negative symptoms include no chest pain, no cough, no fever, no rash and no sore throat.   Other symptom:  Severe Fatique    Since last visit has had f/u with behavioral health. Tried wellbutrin with prozac for fatigue, dperession. Felt she may be having itching from wellbutrin so she stopped it. Also on dextroamphetamine for ADHD    Saw neurology last week for migraine headaches, abnormal MRI. No new dx or medication started. Rec'd weight loss    She c/o dysuria, urgency. No hematuria.    Also c/o recurrent sinus congestion/pressure/pain/purulent drainage.    Taking thyroid replacement as rx'd.    Low B12 in past, low vit D in past.    HTN - on hctz, metoprolol    The following portions of the patient's history were reviewed and updated as appropriate: allergies, current medications, past family history, past medical history, past social history, past surgical history, and problem list.    Review of Systems   Constitutional:  Positive for diaphoresis, fatigue and unexpected weight change (gain). Negative for fever.   HENT:  Positive for congestion, postnasal drip, sinus pressure and trouble swallowing (intermittent). Negative for ear discharge, mouth sores, nosebleeds, rhinorrhea and sore throat.    Eyes:  Positive for visual  disturbance (chronic). Negative for pain.        Dry eyes   Respiratory:  Negative for cough, shortness of breath and wheezing.    Cardiovascular:  Positive for palpitations (chronic, intermittent, stable). Negative for chest pain and leg swelling.   Gastrointestinal:  Positive for abdominal pain (chronic), constipation (intermittent), diarrhea (intermittent), nausea and vomiting (intermittent). Negative for blood in stool.   Endocrine: Positive for heat intolerance. Negative for polydipsia and polyuria.   Genitourinary:  Positive for dysuria (mild, intermittent) and pelvic pain (chronic). Negative for hematuria.   Musculoskeletal:  Positive for arthralgias, joint pain, myalgias, neck pain and neck stiffness. Negative for back pain and joint swelling.   Skin:  Negative for rash.   Neurological:  Positive for weakness and headaches. Negative for dizziness, seizures, syncope and speech difficulty.   Hematological:  Negative for adenopathy. Bruises/bleeds easily.   Psychiatric/Behavioral:  Positive for dysphoric mood and sleep disturbance. Negative for suicidal ideas. The patient is nervous/anxious.    Pt's previous ROS reviewed and updated as indicated.       Objective   Vitals:    07/28/25 1559   BP: 134/86   Pulse: 90   SpO2: 96%     Body mass index is 31.45 kg/m².      07/28/25  1559   Weight: 83.1 kg (183 lb 3.2 oz)     Physical Exam  Vitals and nursing note reviewed.   Constitutional:       General: She is not in acute distress.     Appearance: She is well-groomed and overweight. She is not ill-appearing.   HENT:      Head: Atraumatic.      Nose: Mucosal edema and congestion present. No rhinorrhea.      Right Sinus: Maxillary sinus tenderness present.      Left Sinus: Maxillary sinus tenderness present.      Mouth/Throat:      Mouth: Mucous membranes are moist. No oral lesions.      Pharynx: Oropharynx is clear.   Eyes:      General: No scleral icterus.     Conjunctiva/sclera: Conjunctivae normal.   Neck:       Thyroid: No thyroid mass.   Cardiovascular:      Rate and Rhythm: Normal rate and regular rhythm.      Pulses: Normal pulses.      Heart sounds: Normal heart sounds.   Pulmonary:      Effort: Pulmonary effort is normal.      Breath sounds: Normal breath sounds.   Musculoskeletal:      Right lower leg: No edema.      Left lower leg: No edema.   Lymphadenopathy:      Cervical: No cervical adenopathy.   Skin:     General: Skin is warm and dry.      Coloration: Skin is not jaundiced or pale.      Findings: No rash.   Neurological:      Mental Status: She is alert and oriented to person, place, and time. Mental status is at baseline.      Sensory: Sensation is intact.      Motor: Motor function is intact.      Gait: Gait is intact.   Psychiatric:         Mood and Affect: Mood and affect normal.         Speech: Speech normal.         Behavior: Behavior normal. Behavior is cooperative.         Thought Content: Thought content normal.         Cognition and Memory: Cognition normal.         Judgment: Judgment normal.     Pt's previous physical exam reviewed and updated as indicated.      Assessment & Plan   Diagnoses and all orders for this visit:    1. Chronic fatigue (Primary)  -     Comprehensive Metabolic Panel    2. IFG (impaired fasting glucose)  -     Comprehensive Metabolic Panel  -     Hemoglobin A1c    3. Essential hypertension  -     Comprehensive Metabolic Panel    4. Vitamin D deficiency  -     Vitamin D,25-Hydroxy    5. Vitamin B12 deficiency  -     Vitamin B12    6. Chronic anemia  -     Iron  -     CBC (No Diff)    7. Acquired hypothyroidism  -     TSH  -     T4, Free  -     T3, Free    8. Abnormal blood chemistry  -     Hemoglobin A1c    9. Dysuria  -     Urinalysis With Culture If Indicated - Urine, Clean Catch  -     cefTRIAXone (ROCEPHIN) injection 1 g    10. Class 1 obesity with serious comorbidity and body mass index (BMI) of 31.0 to 31.9 in adult, unspecified obesity type  -     Semaglutide,0.25 or  0.5MG/DOS, (OZEMPIC) 2 MG/3ML solution pen-injector; PLEASE COMPOUND SEMAGLUTIDE. Pt to take 0.2 mg SQ weekly, increase as tolerated. Contact PCP regarding refill dose.  Dispense: 2 mL; Refill: 0    Other orders  -     UA / M With / Rflx Culture([facility] ONLY) -  -     Microscopic Examination -  -     Ambiguous Test Order  -     Please Note       Risks/benefits and potential side effects of various treatment options for obesity have been reviewed with patient.  I have also reviewed the necessity of consistent lifestyle modification (including willingness to exercise on a regular basis and adopt healthful dietary habits) in order to attain and maintain a healthy BMI.  Patient voiced understanding and wishes to proceed with a trial of compounded GLP. For medical reasons, she requires dose which is not commercially available. Informed consent obtained.    Nutrition and activity goals reviewed including: mainly water to drink, limit white flour/processed sugar, higher lean protein, high fiber carbs, regular meals, working toward 150 mins cardio per week.    POC empiric mgnt of possible UTI. Culture pending.    Assess status of vit/min deficiencies and replace as indicated.    Otherwise continue current medical mgnt for now. F/u with specialists as scheduled.  F/u with me in 6 weeks, sooner as needed/instructed.  I will contact patient regarding test results and provide instructions regarding any necessary changes in plan of care.  Patient was encouraged to keep me informed of any acute changes, lack of improvement, or any new concerning symptoms.  Pt is aware of reasons to seek emergent care.  Patient voiced understanding of all instructions and denied further questions.    Please note that portions of this note may have been completed with a voice recognition program.

## 2025-07-29 LAB
25(OH)D3+25(OH)D2 SERPL-MCNC: 28.2 NG/ML (ref 30–100)
ALBUMIN SERPL-MCNC: 4.3 G/DL (ref 3.8–4.9)
ALP SERPL-CCNC: 75 IU/L (ref 44–121)
ALT SERPL-CCNC: 11 IU/L (ref 0–32)
APPEARANCE UR: CLEAR
AST SERPL-CCNC: 17 IU/L (ref 0–40)
BACTERIA #/AREA URNS HPF: ABNORMAL /[HPF]
BILIRUB SERPL-MCNC: 0.4 MG/DL (ref 0–1.2)
BILIRUB UR QL STRIP: NEGATIVE
BUN SERPL-MCNC: 26 MG/DL (ref 6–24)
BUN/CREAT SERPL: 34 (ref 9–23)
CALCIUM SERPL-MCNC: 9.8 MG/DL (ref 8.7–10.2)
CASTS URNS QL MICRO: ABNORMAL /LPF
CHLORIDE SERPL-SCNC: 101 MMOL/L (ref 96–106)
CO2 SERPL-SCNC: 23 MMOL/L (ref 20–29)
COLOR UR: YELLOW
CREAT SERPL-MCNC: 0.76 MG/DL (ref 0.57–1)
CRYSTALS URNS MICRO: ABNORMAL
EGFRCR SERPLBLD CKD-EPI 2021: 90 ML/MIN/1.73
EPI CELLS #/AREA URNS HPF: ABNORMAL /HPF (ref 0–10)
ERYTHROCYTE [DISTWIDTH] IN BLOOD BY AUTOMATED COUNT: 13.2 % (ref 11.7–15.4)
GLOBULIN SER CALC-MCNC: 2.3 G/DL (ref 1.5–4.5)
GLUCOSE SERPL-MCNC: 95 MG/DL (ref 70–99)
GLUCOSE UR QL STRIP: NEGATIVE
HBA1C MFR BLD: 5.8 % (ref 4.8–5.6)
HCT VFR BLD AUTO: 39.6 % (ref 34–46.6)
HGB BLD-MCNC: 12.4 G/DL (ref 11.1–15.9)
HGB UR QL STRIP: NEGATIVE
IRON SERPL-MCNC: 105 UG/DL (ref 27–159)
KETONES UR QL STRIP: NEGATIVE
LEUKOCYTE ESTERASE UR QL STRIP: NEGATIVE
Lab: NORMAL
Lab: NORMAL
MCH RBC QN AUTO: 27.9 PG (ref 26.6–33)
MCHC RBC AUTO-ENTMCNC: 31.3 G/DL (ref 31.5–35.7)
MCV RBC AUTO: 89 FL (ref 79–97)
MICRO URNS: ABNORMAL
MICRO URNS: ABNORMAL
NITRITE UR QL STRIP: NEGATIVE
PH UR STRIP: 5.5 [PH] (ref 5–7.5)
PLATELET # BLD AUTO: 371 X10E3/UL (ref 150–450)
POTASSIUM SERPL-SCNC: 3.9 MMOL/L (ref 3.5–5.2)
PROT SERPL-MCNC: 6.6 G/DL (ref 6–8.5)
PROT UR QL STRIP: NEGATIVE
RBC # BLD AUTO: 4.44 X10E6/UL (ref 3.77–5.28)
RBC #/AREA URNS HPF: ABNORMAL /HPF (ref 0–2)
SODIUM SERPL-SCNC: 138 MMOL/L (ref 134–144)
SP GR UR STRIP: >=1.03 (ref 1–1.03)
T3FREE SERPL-MCNC: 2.9 PG/ML (ref 2–4.4)
T4 FREE SERPL-MCNC: 1.14 NG/DL (ref 0.82–1.77)
TSH SERPL DL<=0.005 MIU/L-ACNC: 1.01 UIU/ML (ref 0.45–4.5)
UNIDENT CRYS URNS QL MICRO: PRESENT
URINALYSIS REFLEX: ABNORMAL
UROBILINOGEN UR STRIP-MCNC: 0.2 MG/DL (ref 0.2–1)
VIT B12 SERPL-MCNC: 1562 PG/ML (ref 232–1245)
WBC # BLD AUTO: 9.2 X10E3/UL (ref 3.4–10.8)
WBC #/AREA URNS HPF: ABNORMAL /HPF (ref 0–5)

## 2025-07-30 ENCOUNTER — TELEPHONE (OUTPATIENT)
Dept: FAMILY MEDICINE CLINIC | Facility: CLINIC | Age: 60
End: 2025-07-30
Payer: MEDICARE

## 2025-07-31 ENCOUNTER — TELEPHONE (OUTPATIENT)
Dept: FAMILY MEDICINE CLINIC | Facility: CLINIC | Age: 60
End: 2025-07-31
Payer: MEDICARE

## 2025-07-31 NOTE — TELEPHONE ENCOUNTER
"PHARMACY CALLED AND STATED THAT ORDERS ON PRESCRIPTION NEED TO SAY \"PLEASE COMPOUND PATIENT SPECIFIC DOSE\" FOR THEM TO FILL IT.  "

## 2025-07-31 NOTE — TELEPHONE ENCOUNTER
Spoke with pharmacy gave them a verbal to COMPOUND PATIENT SPECIFIC DOSE. Tech was able to take verbal and will fill this and call the pt to inform.

## 2025-08-04 ENCOUNTER — OFFICE VISIT (OUTPATIENT)
Dept: CARDIOLOGY | Facility: CLINIC | Age: 60
End: 2025-08-04
Payer: MEDICARE

## 2025-08-04 VITALS
DIASTOLIC BLOOD PRESSURE: 98 MMHG | HEART RATE: 100 BPM | HEIGHT: 64 IN | RESPIRATION RATE: 20 BRPM | OXYGEN SATURATION: 97 % | SYSTOLIC BLOOD PRESSURE: 134 MMHG | WEIGHT: 175.2 LBS | BODY MASS INDEX: 29.91 KG/M2

## 2025-08-04 DIAGNOSIS — R94.31 ABNORMAL ELECTROCARDIOGRAM (ECG) (EKG): ICD-10-CM

## 2025-08-04 DIAGNOSIS — R53.83 OTHER FATIGUE: ICD-10-CM

## 2025-08-04 DIAGNOSIS — Z91.89 AT RISK FOR CORONARY ARTERY DISEASE: Primary | ICD-10-CM

## 2025-08-04 DIAGNOSIS — I10 ESSENTIAL HYPERTENSION: ICD-10-CM

## 2025-08-04 DIAGNOSIS — E78.5 HYPERLIPIDEMIA LDL GOAL <100: ICD-10-CM

## 2025-08-04 PROCEDURE — 3075F SYST BP GE 130 - 139MM HG: CPT | Performed by: NURSE PRACTITIONER

## 2025-08-04 PROCEDURE — 3080F DIAST BP >= 90 MM HG: CPT | Performed by: NURSE PRACTITIONER

## 2025-08-04 PROCEDURE — 99213 OFFICE O/P EST LOW 20 MIN: CPT | Performed by: NURSE PRACTITIONER

## 2025-08-04 PROCEDURE — 93000 ELECTROCARDIOGRAM COMPLETE: CPT | Performed by: NURSE PRACTITIONER

## 2025-08-04 PROCEDURE — 1160F RVW MEDS BY RX/DR IN RCRD: CPT | Performed by: NURSE PRACTITIONER

## 2025-08-04 PROCEDURE — 1159F MED LIST DOCD IN RCRD: CPT | Performed by: NURSE PRACTITIONER

## 2025-08-05 ENCOUNTER — PATIENT ROUNDING (BHMG ONLY) (OUTPATIENT)
Dept: CARDIOLOGY | Facility: CLINIC | Age: 60
End: 2025-08-05
Payer: MEDICARE

## 2025-08-20 ENCOUNTER — DISEASE STATE MANAGEMENT VISIT (OUTPATIENT)
Dept: PHARMACY | Facility: HOSPITAL | Age: 60
End: 2025-08-20
Payer: MEDICARE

## 2025-08-20 VITALS
HEIGHT: 64 IN | DIASTOLIC BLOOD PRESSURE: 85 MMHG | WEIGHT: 169 LBS | BODY MASS INDEX: 28.85 KG/M2 | HEART RATE: 105 BPM | TEMPERATURE: 97.1 F | SYSTOLIC BLOOD PRESSURE: 124 MMHG | OXYGEN SATURATION: 96 %

## 2025-08-20 DIAGNOSIS — G43.711 INTRACTABLE CHRONIC MIGRAINE WITHOUT AURA AND WITH STATUS MIGRAINOSUS: Primary | ICD-10-CM

## 2025-08-20 PROCEDURE — 25010000002 ONABOTULINUMTOXINA 200 UNITS RECONSTITUTED SOLUTION: Performed by: NURSE PRACTITIONER

## 2025-08-20 RX ADMIN — ONABOTULINUMTOXINA 155 UNITS: 200 INJECTION, POWDER, LYOPHILIZED, FOR SOLUTION INTRADERMAL; INTRAMUSCULAR at 15:02

## 2025-08-26 ENCOUNTER — TELEPHONE (OUTPATIENT)
Dept: CARDIOLOGY | Facility: CLINIC | Age: 60
End: 2025-08-26
Payer: MEDICARE

## 2025-08-26 DIAGNOSIS — N95.1 POSTMENOPAUSAL DISORDER: ICD-10-CM

## 2025-08-26 DIAGNOSIS — Z79.890 HORMONE REPLACEMENT THERAPY: ICD-10-CM

## 2025-08-27 RX ORDER — ESTRADIOL 0.05 MG/D
PATCH, EXTENDED RELEASE TRANSDERMAL
Qty: 8 PATCH | Refills: 11 | Status: SHIPPED | OUTPATIENT
Start: 2025-08-27

## 2025-08-27 RX ORDER — PROMETHAZINE HYDROCHLORIDE 25 MG/1
25 TABLET ORAL EVERY 8 HOURS PRN
Qty: 15 TABLET | Refills: 2 | Status: SHIPPED | OUTPATIENT
Start: 2025-08-27

## (undated) DEVICE — SOL ANTISTICK CAUTRY ELECTROLUBE LF

## (undated) DEVICE — COVER,MAYO STAND,STERILE: Brand: MEDLINE

## (undated) DEVICE — PENCL ES MEGADINE EZ/CLEAN BUTN W/HOLSTR 10FT

## (undated) DEVICE — MEDI-VAC YANKAUER SUCTION HANDLE W/BULBOUS TIP: Brand: CARDINAL HEALTH

## (undated) DEVICE — GOWN,NON-REINFORCED,SIRUS,SET IN SLV,XL: Brand: MEDLINE

## (undated) DEVICE — LAPAROVUE VISIBILITY SYSTEM LAPAROSCOPIC SOLUTIONS: Brand: LAPAROVUE

## (undated) DEVICE — GOWN,SIRUS,NON REINFRCD,LARGE,SET IN SL: Brand: MEDLINE

## (undated) DEVICE — IRRIGATOR BULB ASEPTO 60CC STRL

## (undated) DEVICE — DRAPE,UTILITY,TAPE,15X26,STERILE: Brand: MEDLINE

## (undated) DEVICE — SUT VIC 0 TIES 18IN J912G

## (undated) DEVICE — DEV COND GAS LAP INSUFLOW W/LUER CONN

## (undated) DEVICE — ACCESS PLATFORM FOR MINIMALLY INVASIVE SURGERY: Brand: GELPOINT®  MINI ADVANCED ACCESS PLATFORM

## (undated) DEVICE — COLUMN DRAPE

## (undated) DEVICE — ARM DRAPE

## (undated) DEVICE — GLV SURG SENSICARE PI LF PF 7.5 GRN STRL

## (undated) DEVICE — 3M™ IOBAN™ 2 ANTIMICROBIAL INCISE DRAPE 6648EZ: Brand: IOBAN™ 2

## (undated) DEVICE — SOL NACL 0.9PCT 1000ML

## (undated) DEVICE — SUCTION IRRIGATOR: Brand: ENDOWRIST

## (undated) DEVICE — WOUND RETRACTOR AND PROTECTOR: Brand: ALEXIS WOUND PROTECTOR-RETRACTOR

## (undated) DEVICE — ANTIBACTERIAL UNDYED BRAIDED (POLYGLACTIN 910), SYNTHETIC ABSORBABLE SUTURE: Brand: COATED VICRYL

## (undated) DEVICE — LAPAROSCOPIC SMOKE FILTRATION SYSTEM: Brand: PALL LAPAROSHIELD® PLUS LAPAROSCOPIC SMOKE FILTRATION SYSTEM

## (undated) DEVICE — SPNG LAP 18X18IN LF STRL PK/5

## (undated) DEVICE — SEAL

## (undated) DEVICE — SUT MNCRYL PLS ANTIB UD 4/0 PS2 18IN

## (undated) DEVICE — 1000ML,PRESSURE INFUSER W/STOPCOCK: Brand: MEDLINE

## (undated) DEVICE — STERILE LATEX POWDER-FREE SURGICAL GLOVESWITH NITRILE COATING: Brand: PROTEXIS

## (undated) DEVICE — BANDAGE,GAUZE,BULKEE II,4.5"X4.1YD,STRL: Brand: MEDLINE

## (undated) DEVICE — LEGGINGS, PAIR, CLEAR, STERILE: Brand: MEDLINE

## (undated) DEVICE — REDUCER: Brand: ENDOWRIST

## (undated) DEVICE — SUT VIC 2/0 UR6 27IN J602H

## (undated) DEVICE — APPL HEMOS FIBRIN DUPLOTIP W/SNPLK 5MM 40CM

## (undated) DEVICE — LOU GENERAL ROBOT: Brand: MEDLINE INDUSTRIES, INC.

## (undated) DEVICE — 3M™ STERI-DRAPE™ INSTRUMENT POUCH 1018L: Brand: STERI-DRAPE™

## (undated) DEVICE — STAPLER 60: Brand: SUREFORM

## (undated) DEVICE — VESSEL SEALER EXTEND: Brand: ENDOWRIST

## (undated) DEVICE — ENDOPATH XCEL BLADELESS TROCARS WITH STABILITY SLEEVES: Brand: ENDOPATH XCEL

## (undated) DEVICE — SUT POLY BR TP 2STRND 1/8X30IN

## (undated) DEVICE — TIP COVER ACCESSORY

## (undated) DEVICE — BLADELESS OBTURATOR: Brand: WECK VISTA

## (undated) DEVICE — GOWN SURG AERO CHROME XL

## (undated) DEVICE — TUBING, SUCTION, 1/4" X 20', STRAIGHT: Brand: MEDLINE INDUSTRIES, INC.

## (undated) DEVICE — TTL1LYR 16FR10ML 100%SIL TMPST TR: Brand: MEDLINE

## (undated) DEVICE — CANNULA SEAL

## (undated) DEVICE — DISPOSABLE GRASPER CARTRIDGE: Brand: DIRECT DRIVE REPOSABLE GRASPERS